# Patient Record
Sex: MALE | Race: WHITE | NOT HISPANIC OR LATINO | Employment: OTHER | URBAN - METROPOLITAN AREA
[De-identification: names, ages, dates, MRNs, and addresses within clinical notes are randomized per-mention and may not be internally consistent; named-entity substitution may affect disease eponyms.]

---

## 2022-03-07 LAB
LEFT EYE DIABETIC RETINOPATHY: NORMAL
RIGHT EYE DIABETIC RETINOPATHY: NORMAL
SEVERITY (EYE EXAM): NORMAL

## 2022-03-24 ENCOUNTER — OFFICE VISIT (OUTPATIENT)
Dept: ENDOCRINOLOGY | Facility: HOSPITAL | Age: 82
End: 2022-03-24
Payer: COMMERCIAL

## 2022-03-24 ENCOUNTER — TELEPHONE (OUTPATIENT)
Dept: ENDOCRINOLOGY | Facility: HOSPITAL | Age: 82
End: 2022-03-24

## 2022-03-24 VITALS
HEIGHT: 71 IN | SYSTOLIC BLOOD PRESSURE: 158 MMHG | OXYGEN SATURATION: 96 % | HEART RATE: 47 BPM | BODY MASS INDEX: 36.15 KG/M2 | DIASTOLIC BLOOD PRESSURE: 72 MMHG | WEIGHT: 258.2 LBS

## 2022-03-24 DIAGNOSIS — E11.22 STAGE 3 CHRONIC KIDNEY DISEASE DUE TO TYPE 2 DIABETES MELLITUS (HCC): ICD-10-CM

## 2022-03-24 DIAGNOSIS — E66.01 MORBID (SEVERE) OBESITY DUE TO EXCESS CALORIES (HCC): ICD-10-CM

## 2022-03-24 DIAGNOSIS — I10 PRIMARY HYPERTENSION: ICD-10-CM

## 2022-03-24 DIAGNOSIS — E78.2 MIXED HYPERLIPIDEMIA: ICD-10-CM

## 2022-03-24 DIAGNOSIS — E11.649 HYPOGLYCEMIA UNAWARENESS ASSOCIATED WITH TYPE 2 DIABETES MELLITUS (HCC): ICD-10-CM

## 2022-03-24 DIAGNOSIS — Z79.4 TYPE 2 DIABETES MELLITUS WITH HYPERGLYCEMIA, WITH LONG-TERM CURRENT USE OF INSULIN (HCC): Primary | ICD-10-CM

## 2022-03-24 DIAGNOSIS — E11.65 TYPE 2 DIABETES MELLITUS WITH HYPERGLYCEMIA, WITH LONG-TERM CURRENT USE OF INSULIN (HCC): Primary | ICD-10-CM

## 2022-03-24 DIAGNOSIS — E11.42 DIABETIC POLYNEUROPATHY ASSOCIATED WITH TYPE 2 DIABETES MELLITUS (HCC): ICD-10-CM

## 2022-03-24 DIAGNOSIS — N18.30 STAGE 3 CHRONIC KIDNEY DISEASE DUE TO TYPE 2 DIABETES MELLITUS (HCC): ICD-10-CM

## 2022-03-24 PROCEDURE — 99205 OFFICE O/P NEW HI 60 MIN: CPT | Performed by: INTERNAL MEDICINE

## 2022-03-24 RX ORDER — LOSARTAN POTASSIUM 100 MG/1
100 TABLET ORAL DAILY
COMMUNITY
Start: 2022-02-22

## 2022-03-24 RX ORDER — ATORVASTATIN CALCIUM 20 MG/1
20 TABLET, FILM COATED ORAL DAILY
COMMUNITY
Start: 2022-02-22

## 2022-03-24 RX ORDER — AMLODIPINE BESYLATE 10 MG/1
10 TABLET ORAL DAILY
COMMUNITY
Start: 2022-02-22

## 2022-03-24 RX ORDER — POTASSIUM CITRATE 10 MEQ/1
TABLET, EXTENDED RELEASE ORAL EVERY 12 HOURS
COMMUNITY

## 2022-03-24 RX ORDER — LORATADINE 10 MG/1
10 TABLET ORAL DAILY
COMMUNITY

## 2022-03-24 RX ORDER — ALBUTEROL SULFATE 2.5 MG/3ML
SOLUTION RESPIRATORY (INHALATION)
COMMUNITY
Start: 2022-03-09

## 2022-03-24 RX ORDER — ALLOPURINOL 300 MG/1
TABLET ORAL EVERY 24 HOURS
COMMUNITY

## 2022-03-24 RX ORDER — DABIGATRAN ETEXILATE 150 MG/1
CAPSULE, COATED PELLETS ORAL EVERY 12 HOURS
COMMUNITY

## 2022-03-24 RX ORDER — BIOTIN 1 MG
TABLET ORAL EVERY 24 HOURS
COMMUNITY

## 2022-03-24 RX ORDER — BLOOD SUGAR DIAGNOSTIC
STRIP MISCELLANEOUS
COMMUNITY
Start: 2022-02-10

## 2022-03-24 RX ORDER — AZELASTINE 1 MG/ML
SPRAY, METERED NASAL
COMMUNITY

## 2022-03-24 RX ORDER — SODIUM PHOSPHATE,MONO-DIBASIC 19G-7G/118
ENEMA (ML) RECTAL
COMMUNITY

## 2022-03-24 RX ORDER — FLUTICASONE PROPIONATE 50 MCG
SPRAY, SUSPENSION (ML) NASAL EVERY 24 HOURS
COMMUNITY

## 2022-03-24 RX ORDER — INSULIN GLARGINE 100 [IU]/ML
INJECTION, SOLUTION SUBCUTANEOUS
COMMUNITY

## 2022-03-24 RX ORDER — FUROSEMIDE 40 MG/1
40 TABLET ORAL DAILY
COMMUNITY

## 2022-03-24 NOTE — TELEPHONE ENCOUNTER
Patient called  He notes that he called his insurance company and the do cover both the [place]  Which one would you prefer?

## 2022-03-24 NOTE — PATIENT INSTRUCTIONS
We'll get the blood work results and determine when next set is needed  call insurance company to see which sensor is approved; the freestyle gerard 2 or the dexcom, call us when you  Know so we can order  Continue the same lantus insulin  Let's slightly adjust the novolog insulin to 18 units at breakfast, 20 units at lunch and 24 units at supper  Keep using the sliding scale also  Continue to test blood sugars 4 times a day  Follow up in 3 months with blood work

## 2022-03-24 NOTE — PROGRESS NOTES
3/25/2022    Assessment/Plan      Diagnoses and all orders for this visit:    Type 2 diabetes mellitus with hyperglycemia, with long-term current use of insulin ([place] [address] )  -     HEMOGLOBIN A1C W/ EAG ESTIMATION Lab Collect; Future  -     Comprehensive metabolic panel Lab Collect; Future  -     Microalbumin / creatinine urine ratio Lab Collect; Future  -     TSH, 3rd generation Lab Collect; Future  -     insulin aspart (NovoLOG) 100 units/mL injection; 18 units at breakfast , 20 units at lunch and 24 units at dinner    Diabetic polyneuropathy associated with type 2 diabetes mellitus ([place] [address] )  -     HEMOGLOBIN A1C W/ EAG ESTIMATION Lab Collect; Future  -     Comprehensive metabolic panel Lab Collect; Future  -     Microalbumin / creatinine urine ratio Lab Collect; Future  -     TSH, 3rd generation Lab Collect; Future    Mixed hyperlipidemia  -     HEMOGLOBIN A1C W/ EAG ESTIMATION Lab Collect; Future  -     Comprehensive metabolic panel Lab Collect; Future  -     Microalbumin / creatinine urine ratio Lab Collect; Future  -     TSH, 3rd generation Lab Collect; Future    Primary hypertension  -     HEMOGLOBIN A1C W/ EAG ESTIMATION Lab Collect; Future  -     Comprehensive metabolic panel Lab Collect; Future  -     Microalbumin / creatinine urine ratio Lab Collect; Future  -     TSH, 3rd generation Lab Collect; Future    Morbid (severe) obesity due to excess calories (HCC)    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 ([place] [address] )    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 ([place] [address] )    Other orders  -     losartan (COZAAR) 100 MG tablet; Take 100 mg by mouth daily  -     amLODIPine (NORVASC) 10 mg tablet; Take 10 mg by mouth daily  -     atorvastatin (LIPITOR) 20 mg tablet;  Take 20 mg by mouth daily  -     albuterol (2 5 mg/3 mL) 0 083 % nebulizer solution; INHALE ONE VIAL VIA NEBULIZER EVERY 6 HOURS AS NEEDED  -     OneTouch Verio test strip; USE AS DIRECTED FOUR TIMES DAILY  -     potassium citrate (UROCIT-K) 10 mEq; Every 12 hours  -     dabigatran etexilate (Pradaxa) 150 mg capsu; Every 12 hours  -     allopurinol (ZYLOPRIM) 300 mg tablet; every 24 hours  -     Ipratropium Bromide (ATROVENT NA)  -     azelastine (ASTELIN) 0 1 % nasal spray  -     furosemide (Lasix) 40 mg tablet; Every 12 hours  -     Multiple Vitamin (MULTIVITAMIN ADULT PO); every 24 hours  -     Cholecalciferol (Vitamin D3) 25 MCG (1000 UT) CAPS; every 24 hours  -     Glucosamine-Chondroitin 500-400 MG CAPS  -     fluticasone (Flonase Allergy Relief) 50 mcg/act nasal spray; every 24 hours  -     insulin glargine (LANTUS) 100 units/mL subcutaneous injection; Inject under the skin 30 units in the morning 26 units in the evening  -     Discontinue: insulin aspart (NovoLOG) 100 units/mL injection; Inject under the skin 18units at breakfast and lunch 22 units at dinner  -     loratadine (CLARITIN) 10 mg tablet; Take 10 mg by mouth daily        Assessment/Plan:  1  Type 2 diabetes, insulin requiring  The last hemoglobin A1c done in [place] in November 2021 was quite good at 7 3%  He reportedly had blood work done at [facility] by his primary care physician in January  I will try to get this blood work result  For now, he will continue the same Lantus insulin but will adjust his NovoLog insulin to 18 units at breakfast, 20 at lunch, and 24 units at supper with sliding scale  He will continue to test his blood sugars 4 to 6 times a day  He would be a candidate for a Dexcom or freestyle Дмитрий 2 continuous glucose monitoring system due to his hypoglycemic unawareness and insulin adjustment on a daily basis  2  Diabetic neuropathy  3  Hypoglycemic unawareness  He has had several instances where he had loss of consciousness due to hypoglycemia with ambulance calls  His most recent low blood sugar did not have any symptomatology    As such, due to his hypoglycemic unawareness, frequent testing of blood sugars 4 to 6 times a day, and utilization of insulin 5 times a day with adjustment in insulin dosage based on blood sugars, he would be a good candidate for a continuous glucose monitoring system, the DEX com G6 continuous glucose monitoring system   4  Hypertension  Blood pressure is mildly elevated today but he was seeing a new physician so for now, he will continue the same amlodipine, furosemide, and losartan  5  Hyperlipidemia  He will continue same atorvastatin 20 mg daily  I have asked him to follow up in 3 months with preceding hemoglobin A1c, CMP, TSH, and urine microalbumin to creatinine ratio  CC: Diabetes Consult    History of Present Illness     HPI: Timbo Wallace is a 79y o  year old male with type 2 diabetes, insulin requiring with neuropathy for 50 years, hyperlipidemia, hypertension for evaluation/consult  He was diagnosed with type 2 diabetes in his 35s  He originally went on metformin therapy  He was never on any other oral agents as metformin controlled his blood sugars for the 1st several years and then he was switched to insulin therapy  He reports that Humalog insulin does not improve his blood sugars as well as NovoLog insulin does  He was on higher dose of Lantus insulin and NovoLog in [place] but since moving to [place], blood sugars have dropped significantly and he has been needing to decrease his insulin and utilizes sliding scale  He is here for transfer of care since he moved from [place]  He is on insulin at home and takes Lantus insulin 30 units in am and 26 units in pm and NovoLog insulin 18 units at breakfast and lunch and 22 units at supper  He denies any polyuria, polydipsia, nocturia and blurry vision  He has once a night nocturia  He denies chest pain or shortness of breath  He has occasional shooting pains of the legs at night and has numbness of the feet to his knees bilaterally    He has had diabetic wounds in the past   He denies retinopathy, heart attack, stroke and claudication but does admit to neuropathy and nephropathy  He reports he was to Diabetes Education on diagnosis in several times since then has seen 1 on 1 with a dietitian and gone to diabetes classes  He does try to limit carbohydrates  Hypoglycemic episodes: Yes several times per week  Not much since insulin adjusted by PCP  Recently had an event while shopping or blood sugar was less than 50 with no symptoms other than just feeling off  H/o of hypoglycemia causing hospitalization or intervention such as glucagon injection  or ambulance call  Yes  Has been loc in the past requiring 911 many years ago  Hypoglycemia symptoms: sweating and and feel bad or no symptoms at all  Treatment of hypoglycemia: either cookies or glucose tablets  Glucagon:Yes  Medic alert tag: recommended,Yes  The patient's last eye exam was in March 2022 in [place] [address] at [facility] with no retinopathy  The patient's last foot exam was in March 2022 at the [address] when a podiatrist came in to trim his calluses and in January 2022 with Dr Karen Herr  Last A1C was 7 3% on 11/05/2021 in [place]  Blood Sugar/Glucometer/Pump/CGM review: checks blood sugar 4 times a day  Misses pm meal shot at times  Sugars running higher in the evening  Blood sugar record does demonstrate blood sugars that are in the 100s in the morning but go into the 200 to 300s later in the day  He has hyperlipidemia and takes atorvastatin 20 mg daily  He denies chest pain or shortness of breath  He has hypertension and takes amlodipine 10 mg daily and losartan 100 mg daily along with furosemide 40 mg twice a day  He denies headache or stroke-like symptoms  Review of Systems   Constitutional: Negative for fatigue and unexpected weight change  Wakes up at 10 am if able to  HENT: Positive for hearing loss and postnasal drip  Negative for tinnitus and trouble swallowing           Lots of PND, had a antibiotic for 10 days  Had hearing aides not wearing due to the masks  Eyes: Negative for visual disturbance  Wears glasses  Respiratory: Negative for chest tightness and shortness of breath  Cardiovascular: Positive for leg swelling  Negative for chest pain and palpitations  Gastrointestinal: Negative for abdominal pain, constipation, diarrhea and nausea  Endocrine: Negative for cold intolerance, heat intolerance, polydipsia, polyphagia and polyuria  Nocturia once a night  Musculoskeletal: Positive for back pain and neck pain  Negative for arthralgias  Low back and neck surgery  To start with PT at [facility]  Skin: Negative for wound  Callus ball of the right foot, has had wound care for open callus in the past     Neurological: Positive for numbness  Negative for dizziness, weakness, light-headedness and headaches  Numbness of the feet to the knee  Occasional shooting pain of the leg, especially at night  Uses a walker for the low back  Psychiatric/Behavioral: Negative for dysphoric mood and sleep disturbance  The patient is not nervous/anxious          Historical Information   Past Medical History:   Diagnosis Date    Asthma     Bradycardia     Carcinoma, basal cell, skin     head and face and ear    Dupuytren contracture     bilateral ring and right middle    Gout     Herniated lumbar intervertebral disc     Sleep apnea      Past Surgical History:   Procedure Laterality Date    APPENDECTOMY      age 23, ruptured    CATARACT EXTRACTION, BILATERAL Bilateral     CERVICAL FUSION      DUPUYTREN CONTRACTURE RELEASE Left     left ring finger    LUMBAR LAMINECTOMY      belkis placed    TONSILLECTOMY      VASECTOMY       Social History   Social History     Substance and Sexual Activity   Alcohol Use Yes    Comment: rare     Social History     Substance and Sexual Activity   Drug Use Never     Social History     Tobacco Use   Smoking Status Never Smoker Smokeless Tobacco Never Used     Family History:   Family History   Problem Relation Age of Onset    Hypertension Mother     Diabetes type II Mother     Hypertension Father     Diabetes type II Brother     Hypertension Brother     Kidney disease Sister     Hypertension Daughter     GI problems Daughter        Meds/Allergies   Current Outpatient Medications   Medication Sig Dispense Refill    albuterol (2 5 mg/3 mL) 0 083 % nebulizer solution INHALE ONE VIAL VIA NEBULIZER EVERY 6 HOURS AS NEEDED      allopurinol (ZYLOPRIM) 300 mg tablet every 24 hours      amLODIPine (NORVASC) 10 mg tablet Take 10 mg by mouth daily      atorvastatin (LIPITOR) 20 mg tablet Take 20 mg by mouth daily      azelastine (ASTELIN) 0 1 % nasal spray       Cholecalciferol (Vitamin D3) 25 MCG (1000 UT) CAPS every 24 hours      dabigatran etexilate (Pradaxa) 150 mg capsu Every 12 hours      fluticasone (Flonase Allergy Relief) 50 mcg/act nasal spray every 24 hours      furosemide (Lasix) 40 mg tablet Every 12 hours      Glucosamine-Chondroitin 500-400 MG CAPS       insulin aspart (NovoLOG) 100 units/mL injection 18 units at breakfast , 20 units at lunch and 24 units at dinner 10 mL     insulin glargine (LANTUS) 100 units/mL subcutaneous injection Inject under the skin 30 units in the morning 26 units in the evening      Ipratropium Bromide (ATROVENT NA)       loratadine (CLARITIN) 10 mg tablet Take 10 mg by mouth daily      losartan (COZAAR) 100 MG tablet Take 100 mg by mouth daily      Multiple Vitamin (MULTIVITAMIN ADULT PO) every 24 hours      OneTouch Verio test strip USE AS DIRECTED FOUR TIMES DAILY      potassium citrate (UROCIT-K) 10 mEq Every 12 hours       No current facility-administered medications for this visit       Allergies   Allergen Reactions    Hydrochlorothiazide Other (See Comments)     SEVERE DIZZINESS    Insulin Lispro Other (See Comments)     Cannot control blood sugars(Humalog)    Lisinopril Other (See Comments)     Reports cough with use     Minoxidil GI Intolerance    Nifedipine Other (See Comments)     EDEMA LEGS    Prazosin Other (See Comments)     CHEST PAIN      Venlafaxine Other (See Comments)     Made pt feel bad      Doxycycline Rash    Simvastatin Rash     PEDAL EDEMA         Objective   Vitals: Blood pressure 158/72, pulse (!) 47, height 5' 11" (1 803 m), weight 117 kg (258 lb 3 2 oz), SpO2 96 %  Invasive Devices  Report    None                 Physical Exam  Vitals reviewed  Constitutional:       Appearance: Normal appearance  He is well-developed  HENT:      Head: Normocephalic and atraumatic  Eyes:      Extraocular Movements: Extraocular movements intact  Conjunctiva/sclera: Conjunctivae normal       Comments: No lid lag, stare, proptosis, or periorbital edema  Neck:      Thyroid: No thyromegaly  Vascular: No carotid bruit  Comments: Thyroid normal in size  No palpable thyroid nodules  No bruits over the thyroid gland  Cardiovascular:      Rate and Rhythm: Normal rate and regular rhythm  Heart sounds: Normal heart sounds  No murmur heard  Pulmonary:      Effort: Pulmonary effort is normal       Breath sounds: Normal breath sounds  No wheezing  Abdominal:      General: Bowel sounds are normal       Palpations: Abdomen is soft  Tenderness: There is no abdominal tenderness  Musculoskeletal:         General: No deformity  Normal range of motion  Cervical back: Normal range of motion and neck supple  Right lower leg: Edema present  Left lower leg: Edema present  Comments: Teds stockings in place  2+ bilateral lower extremity edema  Diabetic foot exam was deferred today but he was asked to come in to his next appointment without his Jayme stockings on so foot exam could be performed  Lymphadenopathy:      Cervical: No cervical adenopathy  Skin:     General: Skin is warm and dry  Findings: No erythema or rash  Neurological:      Mental Status: He is alert and oriented to person, place, and time  Deep Tendon Reflexes: Reflexes are normal and symmetric  Comments: Deep tendon reflexes normal          The history was obtained from the review of the chart and from the patient and wife  Lab Results:   Blood work done on 11/05/2021 showed hemoglobin A1c of 7 3% which was done in [place]  CMP showed a creatinine of 1 24 with a GFR 53 7 and a BUN of 37 was otherwise normal   Glucose was 155  Intact PTH was 87 with a calcium of 9 8 and a phosphorus of 3 2 in the setting of an albumin of 4 0  Urine protein to creatinine ratio was 2 41     CBC is normal     TSH is 2 183      Future Appointments   Date Time Provider Delia Anderws   6/27/2022 11:20 AM Tip Hickman PA-C [facility]

## 2022-03-25 PROBLEM — E11.21 DIABETIC NEPHROPATHY ASSOCIATED WITH TYPE 2 DIABETES MELLITUS (HCC): Status: ACTIVE | Noted: 2022-03-25

## 2022-03-25 PROBLEM — R79.89 ELEVATED PARATHYROID HORMONE: Status: ACTIVE | Noted: 2022-03-25

## 2022-03-25 PROBLEM — E11.649 HYPOGLYCEMIA UNAWARENESS ASSOCIATED WITH TYPE 2 DIABETES MELLITUS (HCC): Status: ACTIVE | Noted: 2022-03-25

## 2022-03-25 PROBLEM — E34.9 ELEVATED PARATHYROID HORMONE: Status: ACTIVE | Noted: 2022-03-25

## 2022-04-06 ENCOUNTER — TELEPHONE (OUTPATIENT)
Dept: ENDOCRINOLOGY | Facility: HOSPITAL | Age: 82
End: 2022-04-06

## 2022-04-06 NOTE — TELEPHONE ENCOUNTER
Please call pt to set up an appt with [facility] for his Dexcom training  He has it at his home

## 2022-04-07 ENCOUNTER — TELEPHONE (OUTPATIENT)
Dept: DIABETES SERVICES | Facility: CLINIC | Age: 82
End: 2022-04-07

## 2022-04-07 NOTE — TELEPHONE ENCOUNTER
Pt scheduled appt for 4/21/22 for personal Dexcom training   Could you please place referral  thanks

## 2022-04-08 DIAGNOSIS — E11.65 TYPE 2 DIABETES MELLITUS WITH HYPERGLYCEMIA, WITH LONG-TERM CURRENT USE OF INSULIN (HCC): Primary | ICD-10-CM

## 2022-04-08 DIAGNOSIS — Z79.4 TYPE 2 DIABETES MELLITUS WITH HYPERGLYCEMIA, WITH LONG-TERM CURRENT USE OF INSULIN (HCC): Primary | ICD-10-CM

## 2022-06-02 LAB
CREAT ?TM UR-SCNC: 74.9 UMOL/L
EXT MICROALBUMIN URINE RANDOM: 102
HBA1C MFR BLD HPLC: 8 %
MICROALBUMIN/CREAT UR: 734 MG/G{CREAT}

## 2022-06-15 ENCOUNTER — HOSPITAL ENCOUNTER (OUTPATIENT)
Dept: RADIOLOGY | Facility: HOSPITAL | Age: 82
Discharge: HOME | End: 2022-06-15
Attending: PODIATRIST
Payer: COMMERCIAL

## 2022-06-15 DIAGNOSIS — L97.509 DIABETIC ULCER OF OTHER PART OF FOOT ASSOCIATED WITH TYPE 2 DIABETES MELLITUS, UNSPECIFIED LATERALITY, UNSPECIFIED ULCER STAGE (HCC): ICD-10-CM

## 2022-06-15 DIAGNOSIS — E11.621 DIABETIC ULCER OF OTHER PART OF FOOT ASSOCIATED WITH TYPE 2 DIABETES MELLITUS, UNSPECIFIED LATERALITY, UNSPECIFIED ULCER STAGE (HCC): ICD-10-CM

## 2022-06-15 PROCEDURE — 73630 X-RAY EXAM OF FOOT: CPT

## 2022-06-27 ENCOUNTER — OFFICE VISIT (OUTPATIENT)
Dept: ENDOCRINOLOGY | Facility: HOSPITAL | Age: 82
End: 2022-06-27
Payer: COMMERCIAL

## 2022-06-27 VITALS
HEIGHT: 71 IN | HEART RATE: 92 BPM | WEIGHT: 235.8 LBS | SYSTOLIC BLOOD PRESSURE: 142 MMHG | BODY MASS INDEX: 33.01 KG/M2 | DIASTOLIC BLOOD PRESSURE: 80 MMHG

## 2022-06-27 DIAGNOSIS — Z79.4 TYPE 2 DIABETES MELLITUS WITH HYPERGLYCEMIA, WITH LONG-TERM CURRENT USE OF INSULIN (HCC): Primary | ICD-10-CM

## 2022-06-27 DIAGNOSIS — E11.65 TYPE 2 DIABETES MELLITUS WITH HYPERGLYCEMIA, WITH LONG-TERM CURRENT USE OF INSULIN (HCC): Primary | ICD-10-CM

## 2022-06-27 PROCEDURE — 99214 OFFICE O/P EST MOD 30 MIN: CPT | Performed by: PHYSICIAN ASSISTANT

## 2022-06-27 NOTE — PROGRESS NOTES
Heaven Galdamez 79 y o  male MRN: [id number]    Encounter: [id number]      Assessment/Plan     Assessment: This is a 79y o -year-old male with type 2 diabetes with hypoglycemic unawareness, neuropathy, hypertension and hyperlipidemia  Plan:  1  Type 2 diabetes, insulin requiring:  Most recent hemoglobin A1c has increased to 8 0  Has been utilizing a Dexcom for the last 2 months  Unfortunately we are not linked to his account, but review of his Dexcom on his phone shows than glucose levels have been improving over the last 3 months  Is not having any significant episodes of hypoglycemia  At this time will not make any adjustments to his insulin  At this time he will continue with Lantus 30 units in the morning and 26 units in the evening, and NovoLog 18 units with breakfast and lunch and 23 with dinner  Contact the office with any concerns or questions  Follow-up in 3 months with lab work completed prior to visit  2  Hypoglycemic unawareness:  Has had several episodes of loss of consciousness due to hypoglycemia with ambulance called  No episodes of hypoglycemia since he started to utilize Dexcom  As such, due to his hypoglycemic unawareness, frequent testing of blood sugars 4 to 6 times a day, and utilization of insulin 5 times a day with adjustment in insulin dosage based on blood sugars, he would be a good candidate for a continuous glucose monitoring system, the DEX com G6 continuous glucose monitoring system   3  Diabetic neuropathy:  Stable  Does have wounds on bilateral feet missed following up with wound care  Diabetic foot exam is up-to-date  4  Hypertension:  Normotensive in the office  Kidney function remains stable with normal electrolytes  Continue with current medication  Repeat CMP prior to next office visit  5  Hyperlipidemia:  Previous lipid panel was excellent  Continue with current medications  Will continue monitor in the future      CC:  Type 2 diabetes follow-up    History of Present Illness     HPI:  William Contreras is a 79y o  year old male with type 2 diabetes, insulin requiring with neuropathy for 50 years, hyperlipidemia, hypertension for follow-up  He was diagnosed with type 2 diabetes in his 35s  He originally went on metformin therapy  He was never on any other oral agents as metformin controlled his blood sugars for the 1st several years and then he was switched to insulin therapy  He reports that Humalog insulin does not improve his blood sugars as well as NovoLog insulin does  He is on insulin at home and takes Lantus insulin 30 units in am and 26 units in pm and NovoLog insulin 18 units at breakfast and lunch and 22 units at supper  He denies any polyuria, polydipsia, nocturia and blurry vision  He has once a night nocturia  He denies chest pain or shortness of breath  He has occasional shooting pains of the legs at night and has numbness of the feet to his knees bilaterally  Currently has wounds on bilateral feet and is following up with wound care  He denies retinopathy, heart attack, stroke and claudication but does admit to neuropathy and nephropathy  He reports he was to Diabetes Education on diagnosis in several times since then has seen 1 on 1 with a dietitian and gone to diabetes classes  He does try to limit carbohydrates  Had a pacer placed in May 2022 a states since then has been doing much better  Has gotten off several of his medications and has lost 30 lb      Hypoglycemic episodes:  None recently since utilizing the Dexcom  H/o of hypoglycemia causing hospitalization or intervention such as glucagon injection  or ambulance call  Yes  Has been loc in the past requiring 911 many years ago  Hypoglycemia symptoms: sweating and and feel bad or no symptoms at all  Treatment of hypoglycemia: either cookies or glucose tablets  Glucagon:Yes    Medic alert tag: recommended,Yes       The patient's last eye exam was in March 2022 in [facility] at [facility] with no retinopathy  The patient's last foot exam was in March 2022 at the [address] when a podiatrist came in to trim his calluses and in January 2022 with Dr Harish Ross  Most recent hemoglobin A1c June 2, 2022 was 8 0      Blood Sugar/Glucometer/Pump/CGM review:  Is currently utilizing a Dexcom continuous glucose monitoring system going through his phone  We are not linked to his account  Review of the Dexcom clarity breanne on his phone is average glucose over the last 2 weeks has been around 160       He has hyperlipidemia and takes atorvastatin 20 mg daily  He denies chest pain or shortness of breath        He has hypertension and takes amlodipine 10 mg daily and losartan 100 mg daily along with furosemide 40 mg twice a day  He denies headache or stroke-like symptoms  Review of Systems   Constitutional: Negative for activity change, appetite change, fatigue and unexpected weight change  HENT: Negative for trouble swallowing  Eyes: Negative for visual disturbance  Respiratory: Negative for chest tightness and shortness of breath  Cardiovascular: Positive for leg swelling  Negative for chest pain and palpitations  Gastrointestinal: Negative for abdominal pain, diarrhea, nausea and vomiting  Endocrine: Negative for cold intolerance, heat intolerance, polydipsia, polyphagia and polyuria  Genitourinary: Negative for frequency  Musculoskeletal: Positive for gait problem  Skin: Positive for wound  Negative for rash  Neurological: Positive for tremors  Negative for dizziness, weakness, light-headedness, numbness and headaches  Psychiatric/Behavioral: Negative for dysphoric mood and sleep disturbance  The patient is not nervous/anxious          Historical Information   Past Medical History:   Diagnosis Date    Asthma     Bradycardia     Carcinoma, basal cell, skin     head and face and ear    Dupuytren contracture     bilateral ring and right middle    Gout     Herniated lumbar intervertebral disc     Sleep apnea      Past Surgical History:   Procedure Laterality Date    APPENDECTOMY      age 23, ruptured    CATARACT EXTRACTION, BILATERAL Bilateral     CERVICAL FUSION      DUPUYTREN CONTRACTURE RELEASE Left     left ring finger    LUMBAR LAMINECTOMY      belkis placed    TONSILLECTOMY      VASECTOMY       Social History   Social History     Substance and Sexual Activity   Alcohol Use Yes    Comment: rare     Social History     Substance and Sexual Activity   Drug Use Never     Social History     Tobacco Use   Smoking Status Never Smoker   Smokeless Tobacco Never Used     Family History:   Family History   Problem Relation Age of Onset    Hypertension Mother     Diabetes type II Mother     Hypertension Father     Diabetes type II Brother     Hypertension Brother     Kidney disease Sister     Hypertension Daughter     GI problems Daughter        Meds/Allergies   Current Outpatient Medications   Medication Sig Dispense Refill    albuterol (2 5 mg/3 mL) 0 083 % nebulizer solution INHALE ONE VIAL VIA NEBULIZER EVERY 6 HOURS AS NEEDED      allopurinol (ZYLOPRIM) 300 mg tablet every 24 hours      atorvastatin (LIPITOR) 20 mg tablet Take 20 mg by mouth daily      azelastine (ASTELIN) 0 1 % nasal spray       Cholecalciferol (Vitamin D3) 25 MCG (1000 UT) CAPS every 24 hours      fluticasone (FLONASE) 50 mcg/act nasal spray every 24 hours      furosemide (LASIX) 40 mg tablet Take 40 mg by mouth daily      Glucosamine-Chondroitin 500-400 MG CAPS       insulin aspart (NovoLOG) 100 units/mL injection 18 units at breakfast , 20 units at lunch and 24 units at dinner (Patient taking differently: 18 units at breakfast , 18 units at lunch and 23 units at dinner) 10 mL     insulin glargine (LANTUS) 100 units/mL subcutaneous injection Inject under the skin 30 units in the morning 26 units in the evening      Ipratropium Bromide (ATROVENT NA)  loratadine (CLARITIN) 10 mg tablet Take 10 mg by mouth daily      losartan (COZAAR) 100 MG tablet Take 100 mg by mouth daily      Multiple Vitamin (MULTIVITAMIN ADULT PO) every 24 hours      OneTouch Verio test strip USE AS DIRECTED FOUR TIMES DAILY      potassium citrate (UROCIT-K) 10 mEq Every 12 hours      amLODIPine (NORVASC) 10 mg tablet Take 10 mg by mouth daily (Patient not taking: Reported on 6/27/2022)      dabigatran etexilate (PRADAXA) 150 mg capsu Every 12 hours (Patient not taking: Reported on 6/27/2022)       No current facility-administered medications for this visit  Allergies   Allergen Reactions    Hydrochlorothiazide Other (See Comments)     SEVERE DIZZINESS    Insulin Lispro Other (See Comments)     Cannot control blood sugars(Humalog)    Lisinopril Other (See Comments)     Reports cough with use     Minoxidil GI Intolerance    Nifedipine Other (See Comments)     EDEMA LEGS    Other Other (See Comments)     SEVERE DIZZINESS  Hydrap-es    Prazosin Other (See Comments)     CHEST PAIN      Venlafaxine Other (See Comments)     Made pt feel bad      Doxycycline Rash    Simvastatin Rash     PEDAL EDEMA         Objective   Vitals: Blood pressure 142/80, pulse 92, height 5' 11" (1 803 m), weight 107 kg (235 lb 12 8 oz)  Physical Exam  Vitals and nursing note reviewed  Constitutional:       General: He is not in acute distress  Appearance: Normal appearance  He is not diaphoretic  HENT:      Head: Normocephalic and atraumatic  Eyes:      General: No scleral icterus  Extraocular Movements: Extraocular movements intact  Conjunctiva/sclera: Conjunctivae normal       Pupils: Pupils are equal, round, and reactive to light  Cardiovascular:      Rate and Rhythm: Normal rate and regular rhythm  Heart sounds: No murmur heard  Pulmonary:      Effort: Pulmonary effort is normal  No respiratory distress  Breath sounds: Normal breath sounds  No wheezing  Musculoskeletal:      Cervical back: Normal range of motion  Right lower leg: No edema  Left lower leg: No edema  Lymphadenopathy:      Cervical: No cervical adenopathy  Skin:     General: Skin is warm and dry  Neurological:      Mental Status: He is alert and oriented to person, place, and time  Mental status is at baseline  Sensory: No sensory deficit  Motor: Tremor ( bilateral hands) present  Gait: Gait abnormal (Utilizes walker)  Psychiatric:         Mood and Affect: Mood normal          Behavior: Behavior normal          Thought Content: Thought content normal          The history was obtained from the review of the chart, patient  Lab Results:   Lab Results   Component Value Date/Time    Hemoglobin A1C 8 0 06/02/2022 12:00 AM    Hemoglobin A1C 7 7 01/26/2022 12:00 AM       Portions of the record may have been created with voice recognition software  Occasional wrong word or "sound a like" substitutions may have occurred due to the inherent limitations of voice recognition software  Read the chart carefully and recognize, using context, where substitutions have occurred

## 2022-06-27 NOTE — PATIENT INSTRUCTIONS
Monitor diet and maintain activity  Continue with same dose of insulin at this time  Continue to use Dexcom to monitor blood sugar  Call the office with any concerns  Follow up in 3 months with lab work prior to visit

## 2022-06-28 ENCOUNTER — TELEPHONE (OUTPATIENT)
Dept: ADMINISTRATIVE | Facility: OTHER | Age: 82
End: 2022-06-28

## 2022-06-28 NOTE — TELEPHONE ENCOUNTER
----- Message from [facility] sent at 6/27/2022  1:41 PM EDT -----  Regarding: DM EYE EXAM  06/27/22 1:41 PM    Hello, our patient Enmanuel Romero has had a DM Eye Exam performed at [facility]  Their number is [phone]      Thank you,  [facility] Rocael Vazquez

## 2022-06-28 NOTE — LETTER
Diabetic Eye Exam Form    Date Requested: 22  Patient: Claris Olszewski  Patient : 1940    Referring Provider: David Corona PA-C  [facility]  [address] Exam Date _______________________________    Type of Exam MUST be documented for Diabetic Eye Exams  Please CHECK ONE  Retinal Exam       Dilated Retinal Exam       OCT       Optomap-Iris Exam      Fundus Photography     Left Eye - Please check Retinopathy AND Type or No Retinopathy      Exam did show retinopathy    Exam did not show retinopathy         Mild     Proliferative           Moderate    Severe            None         Right Eye - Please check Retinopathy AND Type or No Retinopathy     Exam did show retinopathy    Exam did not show retinopathy         Mild     Proliferative        Moderate    Severe        None       Comments ________________________________________________________    Practice Providing Exam ______________________________________________    Exam Performed By (print name) _______________________________________      Provider Signature ___________________________________________________    These reports are needed for  compliance  Please fax this completed form and a copy of the Diabetic Eye Exam report to our office located at [place] [address] as soon as possible via [phone] attention Wilfrid Funez: Phone [phone]  We thank you for your assistance in treating our mutual patient

## 2022-06-30 NOTE — TELEPHONE ENCOUNTER
Upon review of the In Basket request we were able to locate, review, and update the patient chart as requested for Diabetic Eye Exam     Any additional questions or concerns should be emailed to the Practice Liaisons via [email or web address] email, please do not reply via In Basket      Thank you  Sasha Gaitan

## 2022-09-02 ENCOUNTER — DOCUMENTATION (OUTPATIENT)
Dept: ENDOCRINOLOGY | Facility: HOSPITAL | Age: 82
End: 2022-09-02

## 2022-09-15 LAB — HBA1C MFR BLD HPLC: 7.5 %

## 2022-09-19 ENCOUNTER — TELEPHONE (OUTPATIENT)
Dept: ENDOCRINOLOGY | Facility: HOSPITAL | Age: 82
End: 2022-09-19

## 2022-09-19 NOTE — TELEPHONE ENCOUNTER
Patient is running out of insulin because when he has a high number he taking 10-15 units of Novolog  He usually takes 18 units with breakfast and lunch and 23 units with dinner  He normally runs between 150-160 but when he's high he runs in the 300's  I printed his Dexcom for you to review  He would need his dose increased in order for insurance to pay for his insulin

## 2022-09-20 DIAGNOSIS — E11.65 TYPE 2 DIABETES MELLITUS WITH HYPERGLYCEMIA, WITH LONG-TERM CURRENT USE OF INSULIN (HCC): Primary | ICD-10-CM

## 2022-09-20 DIAGNOSIS — Z79.4 TYPE 2 DIABETES MELLITUS WITH HYPERGLYCEMIA, WITH LONG-TERM CURRENT USE OF INSULIN (HCC): Primary | ICD-10-CM

## 2022-09-20 RX ORDER — INSULIN ASPART 100 [IU]/ML
INJECTION, SOLUTION INTRAVENOUS; SUBCUTANEOUS
Qty: 30 ML | Refills: 5 | Status: SHIPPED | OUTPATIENT
Start: 2022-09-20 | End: 2022-10-04 | Stop reason: SDUPTHER

## 2022-09-20 NOTE — TELEPHONE ENCOUNTER
General Reviewed recent Dexcom download  Average blood sugars over the last 2 weeks is 196 and he is in target range 38% of the time  At this time I would like to increase his NovoLog at dinner to 26 units  Keep all other insulin the same at this time  Send in a [employer] again about 2 weeks

## 2022-10-04 ENCOUNTER — OFFICE VISIT (OUTPATIENT)
Dept: ENDOCRINOLOGY | Facility: HOSPITAL | Age: 82
End: 2022-10-04
Payer: COMMERCIAL

## 2022-10-04 VITALS
WEIGHT: 237 LBS | DIASTOLIC BLOOD PRESSURE: 62 MMHG | HEIGHT: 71 IN | BODY MASS INDEX: 33.18 KG/M2 | SYSTOLIC BLOOD PRESSURE: 128 MMHG | HEART RATE: 80 BPM

## 2022-10-04 DIAGNOSIS — Z79.4 TYPE 2 DIABETES MELLITUS WITH HYPERGLYCEMIA, WITH LONG-TERM CURRENT USE OF INSULIN (HCC): ICD-10-CM

## 2022-10-04 DIAGNOSIS — E11.65 TYPE 2 DIABETES MELLITUS WITH HYPERGLYCEMIA, WITH LONG-TERM CURRENT USE OF INSULIN (HCC): ICD-10-CM

## 2022-10-04 PROCEDURE — 99214 OFFICE O/P EST MOD 30 MIN: CPT | Performed by: PHYSICIAN ASSISTANT

## 2022-10-04 PROCEDURE — 95251 CONT GLUC MNTR ANALYSIS I&R: CPT | Performed by: PHYSICIAN ASSISTANT

## 2022-10-04 RX ORDER — INSULIN ASPART 100 [IU]/ML
INJECTION, SOLUTION INTRAVENOUS; SUBCUTANEOUS
Qty: 40 ML | Refills: 5 | Status: SHIPPED | OUTPATIENT
Start: 2022-10-04

## 2022-10-04 RX ORDER — GABAPENTIN 100 MG/1
100 CAPSULE ORAL DAILY
COMMUNITY
Start: 2022-09-28

## 2022-10-04 RX ORDER — INSULIN GLARGINE 100 [IU]/ML
INJECTION, SOLUTION SUBCUTANEOUS
Qty: 30 ML | Refills: 5 | Status: SHIPPED | OUTPATIENT
Start: 2022-10-04

## 2022-10-04 NOTE — PATIENT INSTRUCTIONS
Monitor diet and maintain activity  Continue Lantus 30 units in the morning and 32 units in the evening  Continue NovoLog 20 units with breakfast, 23 units with lunch and 30 units with dinner  Continue to use Dexcom to monitor blood sugar  Call the office with any concerns  Follow up in 3 months with lab work prior to visit

## 2022-10-04 NOTE — PROGRESS NOTES
Lacey Sport 79 y o  male MRN: [id number]    Encounter: [id number]      Assessment/Plan     Assessment: This is a 79y o -year-old male with type 2 diabetes with hypoglycemic unawareness, neuropathy, hypertension and hyperlipidemia  Plan:  1  Type 2 diabetes, insulin requiring:  Most recent hemoglobin A1c was 7 5  Blood sugars have been running a little bit higher in the evening, so will make adjustments to his insulin as follows  His Lantus will be 30 units in the morning and 32 units in the evening  For his NovoLog he will take 20 units with breakfast, 23 units with lunch, and 30 units with dinner  Continue with lifestyle modifications to help improve glucose levels  Continue monitor glucose levels with Dexcom  Contact the office with any concerns or questions  Follow-up in 3 months with lab work completed prior to visit      2  Hypoglycemic unawareness:  Has had several episodes of loss of consciousness due to hypoglycemia with ambulance called  No episodes of hypoglycemia since he started to utilize Dexcom  As such, due to his hypoglycemic unawareness, frequent testing of blood sugars 4 to 6 times a day, and utilization of insulin 5 times a day with adjustment in insulin dosage based on blood sugars, he would be a good candidate for a continuous glucose monitoring system, the DEX com G6 continuous glucose monitoring system       3  Diabetic neuropathy:  Stable  Does have wounds on bilateral feet missed following up with wound care  Diabetic foot exam is up-to-date      4  Hypertension:  Normotensive in the office  Kidney function remains stable with normal electrolytes  Continue with current medication  Repeat CMP prior to next office visit      5  Hyperlipidemia:  Previous lipid panel was excellent  Continue with current medications  Will continue monitor in the future      CC:  Type 2 diabetes follow-up    History of Present Illness     HPI:  Desi Avila [facility] old male with type 2 diabetes, insulin requiring with neuropathy for 54 years, hyperlipidemia, hypertension for follow-up   He was diagnosed with type 2 diabetes in his 35s  Matt Lipscomb originally went on metformin therapy  Matt Lipscomb was never on any other oral agents as metformin controlled his blood sugars for the 1st several years and then he was switched to insulin therapy  Matt Lipscomb reports that Humalog insulin does not improve his blood sugars as well as NovoLog insulin does   He is on insulin at home and takes Lantus insulin 30 units in am and 30 units in pm and NovoLog insulin 20 units with breakfast, 23 units with lunch and 26 units at supper  He denies any polyuria, polydipsia, nocturia and blurry vision   He has once a night nocturia   He denies chest pain or shortness of breath   He has occasional shooting pains of the legs at night and has numbness of the feet to his knees bilaterally  Recently started on gabapentin and has been going to physical therapy  Currently has wounds on bilateral feet and is following up with wound care    He denies retinopathy, heart attack, stroke and claudication but does admit to neuropathy and nephropathy   He reports he was to Diabetes Education on diagnosis in several times since then has seen 1 on 1 with a dietitian and gone to diabetes classes  Matt Lipscomb does try to limit carbohydrates  Had a pacer placed in May 2022 a states since then has been doing much better      Hypoglycemic episodes:  None recently since utilizing the Dexcom  H/o of hypoglycemia causing hospitalization or intervention such as glucagon injection  or ambulance call  Yes  Has been loc in the past requiring 911 many years ago  Hypoglycemia symptoms: sweating and and feel bad or no symptoms at all  Treatment of hypoglycemia: either cookies or glucose tablets  Glucagon:Yes    Medic alert tag: recommended,Yes       The patient's last eye exam was in March 2022 in [place] [address] at [facility] with no retinopathy   The patient's last foot exam was in March 2022 at the [address] when a podiatrist came in to trim his calluses and in January 2022 with Dr Julio Fang  Most recent hemoglobin A1c completed September 15, 2022 was 7 5      Blood Sugar/Glucometer/Pump/CGM review:  Dexcom download from September 20 through October 3, 2022 reveals an average glucose level of 202 with a standard deviation of 60  He is in target range 43% the time, above target range 56% time, below target range 1% time  Glucose levels throughout the day are fruity well controlled up until dinner tray when he has a significant increase in glucose levels but then trends down overnight        He has hyperlipidemia and takes atorvastatin 20 mg daily   He denies chest pain or shortness of breath        He has hypertension and takes amlodipine 10 mg daily and losartan 100 mg daily along with furosemide 40 mg twice a day   He denies headache or stroke-like symptoms  Review of Systems   Constitutional: Negative for activity change, appetite change, fatigue and unexpected weight change  HENT: Negative for trouble swallowing  Eyes: Positive for visual disturbance (Wears glasses)  Respiratory: Negative for chest tightness and shortness of breath  Cardiovascular: Positive for leg swelling  Negative for chest pain and palpitations  Gastrointestinal: Negative for abdominal pain, diarrhea, nausea and vomiting  Endocrine: Negative for cold intolerance, heat intolerance, polydipsia, polyphagia and polyuria  Genitourinary: Negative for frequency  Musculoskeletal: Positive for gait problem  Skin: Positive for wound  Negative for rash  Neurological: Positive for tremors  Negative for dizziness, weakness, light-headedness, numbness and headaches  Psychiatric/Behavioral: Negative for dysphoric mood and sleep disturbance  The patient is not nervous/anxious          Historical Information   Past Medical History:   Diagnosis Date    Asthma     Bradycardia     Carcinoma, basal cell, skin     head and face and ear    Dupuytren contracture     bilateral ring and right middle    Gout     Herniated lumbar intervertebral disc     Sleep apnea      Past Surgical History:   Procedure Laterality Date    APPENDECTOMY      age 23, ruptured    CATARACT EXTRACTION, BILATERAL Bilateral     CERVICAL FUSION      DUPUYTREN CONTRACTURE RELEASE Left     left ring finger    LUMBAR LAMINECTOMY      belkis placed    TONSILLECTOMY      VASECTOMY       Social History   Social History     Substance and Sexual Activity   Alcohol Use Yes    Comment: rare     Social History     Substance and Sexual Activity   Drug Use Never     Social History     Tobacco Use   Smoking Status Never Smoker   Smokeless Tobacco Never Used     Family History:   Family History   Problem Relation Age of Onset    Hypertension Mother     Diabetes type II Mother     Hypertension Father     Diabetes type II Brother     Hypertension Brother     Kidney disease Sister     Hypertension Daughter     GI problems Daughter        Meds/Allergies   Current Outpatient Medications   Medication Sig Dispense Refill    albuterol (2 5 mg/3 mL) 0 083 % nebulizer solution INHALE ONE VIAL VIA NEBULIZER EVERY 6 HOURS AS NEEDED      allopurinol (ZYLOPRIM) 300 mg tablet every 24 hours      amLODIPine (NORVASC) 10 mg tablet Take 10 mg by mouth daily (Patient not taking: Reported on 6/27/2022)      atorvastatin (LIPITOR) 20 mg tablet Take 20 mg by mouth daily      azelastine (ASTELIN) 0 1 % nasal spray       Cholecalciferol (Vitamin D3) 25 MCG (1000 UT) CAPS every 24 hours      dabigatran etexilate (PRADAXA) 150 mg capsu Every 12 hours (Patient not taking: Reported on 6/27/2022)      fluticasone (FLONASE) 50 mcg/act nasal spray every 24 hours      furosemide (LASIX) 40 mg tablet Take 40 mg by mouth daily      Glucosamine-Chondroitin 500-400 MG CAPS       insulin aspart (NovoLOG) 100 units/mL injection 18 units at breakfast , 20 units at lunch and 24 units at dinner (Patient taking differently: 18 units at breakfast , 18 units at lunch and 23 units at dinner) 10 mL     Insulin Aspart (NovoLOG) 100 units/mL injection Inject insulin prior to each meal, use up to 80 units daily 30 mL 5    insulin glargine (LANTUS) 100 units/mL subcutaneous injection Inject under the skin 30 units in the morning 26 units in the evening      Ipratropium Bromide (ATROVENT NA)       loratadine (CLARITIN) 10 mg tablet Take 10 mg by mouth daily      losartan (COZAAR) 100 MG tablet Take 100 mg by mouth daily      Multiple Vitamin (MULTIVITAMIN ADULT PO) every 24 hours      OneTouch Verio test strip USE AS DIRECTED FOUR TIMES DAILY      potassium citrate (UROCIT-K) 10 mEq Every 12 hours       No current facility-administered medications for this visit  Allergies   Allergen Reactions    Hydrochlorothiazide Other (See Comments)     SEVERE DIZZINESS    Insulin Lispro Other (See Comments)     Cannot control blood sugars(Humalog)    Lisinopril Other (See Comments)     Reports cough with use     Minoxidil GI Intolerance    Nifedipine Other (See Comments)     EDEMA LEGS    Other Other (See Comments)     SEVERE DIZZINESS  Hydrap-es    Prazosin Other (See Comments)     CHEST PAIN      Venlafaxine Other (See Comments)     Made pt feel bad      Doxycycline Rash    Simvastatin Rash     PEDAL EDEMA         Objective   Vitals: There were no vitals taken for this visit  Physical Exam  Vitals and nursing note reviewed  Constitutional:       General: He is not in acute distress  Appearance: Normal appearance  He is not diaphoretic  HENT:      Head: Normocephalic and atraumatic  Eyes:      General: No scleral icterus  Extraocular Movements: Extraocular movements intact  Conjunctiva/sclera: Conjunctivae normal       Pupils: Pupils are equal, round, and reactive to light     Cardiovascular:      Rate and Rhythm: Normal rate and regular rhythm  Heart sounds: No murmur heard  Pulmonary:      Effort: Pulmonary effort is normal  No respiratory distress  Breath sounds: Normal breath sounds  No wheezing  Musculoskeletal:      Cervical back: Normal range of motion  Right lower leg: No edema  Left lower leg: No edema  Lymphadenopathy:      Cervical: No cervical adenopathy  Skin:     General: Skin is warm and dry  Neurological:      Mental Status: He is alert and oriented to person, place, and time  Mental status is at baseline  Sensory: No sensory deficit  Gait: Gait abnormal (Utilizes walker)  Psychiatric:         Mood and Affect: Mood normal          Behavior: Behavior normal          Thought Content: Thought content normal          The history was obtained from the review of the chart, patient  Lab Results:   Lab Results   Component Value Date/Time    Hemoglobin A1C 7 5 09/15/2022 12:00 AM    Hemoglobin A1C 8 0 06/02/2022 12:00 AM    Hemoglobin A1C 7 7 01/26/2022 12:00 AM       Portions of the record may have been created with voice recognition software  Occasional wrong word or "sound a like" substitutions may have occurred due to the inherent limitations of voice recognition software  Read the chart carefully and recognize, using context, where substitutions have occurred

## 2022-10-06 ENCOUNTER — DOCUMENTATION (OUTPATIENT)
Dept: ENDOCRINOLOGY | Facility: HOSPITAL | Age: 82
End: 2022-10-06

## 2022-10-06 ENCOUNTER — TELEPHONE (OUTPATIENT)
Dept: ENDOCRINOLOGY | Facility: HOSPITAL | Age: 82
End: 2022-10-06

## 2022-10-06 NOTE — TELEPHONE ENCOUNTER
Received fax from patient's pharmacy, insulin aspart and Novolog are not covered, but Humalog is- okay to order?

## 2022-10-06 NOTE — PROGRESS NOTES
Prior authorization completed on covermymeds for Insulin Aspart  Patient has tried and failed Humalog which is the covered alternative

## 2022-10-06 NOTE — TELEPHONE ENCOUNTER
Cristin would like the nurse to please call her back to convey test results. [phone] ([phone])   That is fine to order Humalog

## 2022-11-14 ENCOUNTER — OFFICE VISIT (OUTPATIENT)
Dept: ENDOCRINOLOGY | Facility: HOSPITAL | Age: 82
End: 2022-11-14

## 2022-11-14 VITALS
SYSTOLIC BLOOD PRESSURE: 146 MMHG | HEART RATE: 80 BPM | HEIGHT: 71 IN | BODY MASS INDEX: 33.49 KG/M2 | DIASTOLIC BLOOD PRESSURE: 80 MMHG | WEIGHT: 239.2 LBS

## 2022-11-14 DIAGNOSIS — I10 PRIMARY HYPERTENSION: ICD-10-CM

## 2022-11-14 DIAGNOSIS — E11.649 HYPOGLYCEMIA UNAWARENESS ASSOCIATED WITH TYPE 2 DIABETES MELLITUS (HCC): ICD-10-CM

## 2022-11-14 DIAGNOSIS — E11.65 TYPE 2 DIABETES MELLITUS WITH HYPERGLYCEMIA, WITH LONG-TERM CURRENT USE OF INSULIN (HCC): Primary | ICD-10-CM

## 2022-11-14 DIAGNOSIS — E78.2 MIXED HYPERLIPIDEMIA: ICD-10-CM

## 2022-11-14 DIAGNOSIS — E11.42 DIABETIC POLYNEUROPATHY ASSOCIATED WITH TYPE 2 DIABETES MELLITUS (HCC): ICD-10-CM

## 2022-11-14 DIAGNOSIS — Z79.4 TYPE 2 DIABETES MELLITUS WITH HYPERGLYCEMIA, WITH LONG-TERM CURRENT USE OF INSULIN (HCC): Primary | ICD-10-CM

## 2022-11-14 DIAGNOSIS — E11.21 DIABETIC NEPHROPATHY ASSOCIATED WITH TYPE 2 DIABETES MELLITUS (HCC): ICD-10-CM

## 2022-11-14 RX ORDER — PEN NEEDLE, DIABETIC 32GX 5/32"
NEEDLE, DISPOSABLE MISCELLANEOUS
COMMUNITY
Start: 2022-10-25

## 2022-11-14 RX ORDER — FLUTICASONE PROPIONATE AND SALMETEROL 250; 50 UG/1; UG/1
1 POWDER RESPIRATORY (INHALATION) 2 TIMES DAILY
COMMUNITY
Start: 2022-11-03

## 2022-11-14 RX ORDER — SULFAMETHOXAZOLE AND TRIMETHOPRIM 800; 160 MG/1; MG/1
TABLET ORAL
COMMUNITY
Start: 2022-11-03

## 2022-11-14 RX ORDER — INSULIN ASPART 100 [IU]/ML
INJECTION, SOLUTION INTRAVENOUS; SUBCUTANEOUS
Qty: 40 ML | Refills: 5 | Status: SHIPPED | OUTPATIENT
Start: 2022-11-14

## 2022-11-14 NOTE — PROGRESS NOTES
11/14/2022    Assessment/Plan      Diagnoses and all orders for this visit:    Type 2 diabetes mellitus with hyperglycemia, with long-term current use of insulin (HCC)  -     Insulin Aspart (NovoLOG) 100 units/mL injection; Inject 22 units with breakfast, 23 units with lunch, and 28 units with dinner, plus sliding scale  Up to 100 units daily    Diabetic polyneuropathy associated with type 2 diabetes mellitus ([facility] [address] )    Diabetic nephropathy associated with type 2 diabetes mellitus [facility]    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 ([facility] )    Primary hypertension    Mixed hyperlipidemia    Other orders  -     Easy Comfort Pen Needles 33G X 4 MM MISC; USE WITH INSULIN FIVE TIMES DAILY AS DIRECTED  -     sulfamethoxazole-trimethoprim (BACTRIM DS) 800-160 mg per tablet; TAKE ONE TABLET BY MOUTH TWICE DAILY FOR 10 DAYS  -     Fluticasone-Salmeterol (Advair) 250-50 mcg/dose inhaler; Inhale 1 puff 2 (two) times a day        Assessment/Plan:  1  Type 2 diabetes  His Dexcom was downloaded while he was in the office today  It does demonstrate some elevation in blood sugars post meals  I have asked him to adjust his NovoLog insulin to 22 units at breakfast, 23 units at lunch, and 28 units at supper  He will continue the same Lantus insulin 30 units in the morning and 32 units in the evening  I have asked him to have his Dexcom downloaded again in another 2 weeks  2  Diabetic neuropathy  Diabetic foot exam was performed in the office today  He will be getting shoes from his podiatrist       I have asked him to follow up in January with blood work as scheduled  CC: Diabetes type 2 follow-up    History of Present Illness     HPI: Lizette Betancourt is a 79y o  year old male with type 2 diabetes, insulin requiring with neuropat, nephropathy, hy and hypoglycemic unawareness for 50 years, hypertension, hyperlipidemia for follow-up visit    He is on insulin at home and takes Lantus 30 units in the morning and 32 units in the evening and NovoLog insulin 20 units at breakfast, 23 units at lunch, 26 units at supper  Hypoglycemic episodes: Yes occasional     The patient's last eye exam was in March 2022  The patient's last foot exam was  with Podiatry recently  Last A1C was   Lab Results   Component Value Date    HGBA1C 7 5 09/15/2022     Blood Sugar/Glucometer/Pump/CGM review:  He utilizes a Dexcom CGMS system to check his blood sugars 6 or more times a day  Dexcom download from 11/01/2022 through 11/14/2022 was reviewed the office today  Average glucose is 185 mg/dL with a standard deviation of 61 mg/dL  49% of blood sugars are in target range, 36% high, 14% very high, less than 1% low, and less than 1% very low  He tends to have higher blood sugars post breakfast and post supper      He is primarily here for diabetic foot exam     Review of Systems     Was not performed as he was here for foot exam      Historical Information   Past Medical History:   Diagnosis Date   • Asthma    • Bradycardia    • Carcinoma, basal cell, skin     head and face and ear   • Dupuytren contracture     bilateral ring and right middle   • Gout    • Herniated lumbar intervertebral disc    • Sleep apnea      Past Surgical History:   Procedure Laterality Date   • APPENDECTOMY      age 23, ruptured   • CATARACT EXTRACTION, BILATERAL Bilateral    • CERVICAL FUSION     • DUPUYTREN CONTRACTURE RELEASE Left     left ring finger   • LUMBAR LAMINECTOMY      belkis placed   • TONSILLECTOMY     • VASECTOMY       Social History   Social History     Substance and Sexual Activity   Alcohol Use Yes    Comment: rare     Social History     Substance and Sexual Activity   Drug Use Never     Social History     Tobacco Use   Smoking Status Never Smoker   Smokeless Tobacco Never Used     Family History:   Family History   Problem Relation Age of Onset   • Hypertension Mother    • Diabetes type II Mother    • Hypertension Father    • Diabetes type II Brother    • Hypertension Brother    • Kidney disease Sister    • Hypertension Daughter    • GI problems Daughter        Meds/Allergies   Current Outpatient Medications   Medication Sig Dispense Refill   • albuterol (2 5 mg/3 mL) 0 083 % nebulizer solution INHALE ONE VIAL VIA NEBULIZER EVERY 6 HOURS AS NEEDED     • allopurinol (ZYLOPRIM) 300 mg tablet every 24 hours     • amLODIPine (NORVASC) 10 mg tablet Take 10 mg by mouth daily     • atorvastatin (LIPITOR) 20 mg tablet Take 20 mg by mouth daily     • azelastine (ASTELIN) 0 1 % nasal spray      • Cholecalciferol (Vitamin D3) 25 MCG (1000 UT) CAPS every 24 hours     • Easy Comfort Pen Needles 33G X 4 MM MISC USE WITH INSULIN FIVE TIMES DAILY AS DIRECTED     • fluticasone (FLONASE) 50 mcg/act nasal spray every 24 hours     • Fluticasone-Salmeterol (Advair) 250-50 mcg/dose inhaler Inhale 1 puff 2 (two) times a day     • furosemide (LASIX) 40 mg tablet Take 40 mg by mouth daily     • gabapentin (NEURONTIN) 100 mg capsule Take 100 mg by mouth daily     • Glucosamine-Chondroitin 500-400 MG CAPS      • Insulin Aspart (NovoLOG) 100 units/mL injection Inject 22 units with breakfast, 23 units with lunch, and 28 units with dinner, plus sliding scale  Up to 100 units daily 40 mL 5   • insulin glargine (LANTUS) 100 units/mL subcutaneous injection In 30 units in am and 32 units in pm  May increase to 80 units daily  30 mL 5   • Ipratropium Bromide (ATROVENT NA)      • loratadine (CLARITIN) 10 mg tablet Take 10 mg by mouth daily     • losartan (COZAAR) 100 MG tablet Take 100 mg by mouth daily     • Multiple Vitamin (MULTIVITAMIN ADULT PO) every 24 hours     • OneTouch Verio test strip USE AS DIRECTED FOUR TIMES DAILY     • potassium citrate (UROCIT-K) 10 mEq Every 12 hours     • sulfamethoxazole-trimethoprim (BACTRIM DS) 800-160 mg per tablet TAKE ONE TABLET BY MOUTH TWICE DAILY FOR 10 DAYS       No current facility-administered medications for this visit       Allergies Allergen Reactions   • Hydrochlorothiazide Other (See Comments)     SEVERE DIZZINESS   • Insulin Lispro Other (See Comments)     Cannot control blood sugars(Humalog)   • Lisinopril Other (See Comments)     Reports cough with use    • Minoxidil GI Intolerance   • Nifedipine Other (See Comments)     EDEMA LEGS   • Other Other (See Comments)     SEVERE DIZZINESS  Hydrap-es   • Prazosin Other (See Comments)     CHEST PAIN     • Venlafaxine Other (See Comments)     Made pt feel bad     • Doxycycline Rash   • Simvastatin Rash     PEDAL EDEMA         Objective   Vitals: Blood pressure 146/80, pulse 80, height 5' 11" (1 803 m), weight 109 kg (239 lb 3 2 oz)  Invasive Devices  Report    None                 Physical Exam  Vitals reviewed  Constitutional:       Appearance: Normal appearance  He is well-developed  HENT:      Head: Normocephalic and atraumatic  Eyes:      Conjunctiva/sclera: Conjunctivae normal    Neck:      Thyroid: No thyromegaly  Cardiovascular:      Pulses: Pulses are weak  Dorsalis pedis pulses are 1+ on the right side and 1+ on the left side  Posterior tibial pulses are 1+ on the right side and 1+ on the left side  Comments: 1+ dorsalis pedis and posterior tibialis pulses bilaterally  Musculoskeletal:         General: No deformity  Right lower leg: Edema present  Left lower leg: Edema present  Comments: Trace bilateral lower extremity edema  Callus plantar surface of the 2nd MP joint bilaterally  Callus medial tip of the 1st toe and tip of 3rd toe on the left  Left 1st MP joint with a bunion  Feet:      Right foot:      Skin integrity: Callus present  No ulcer, skin breakdown, erythema, warmth or dry skin  Left foot:      Skin integrity: Callus present  No ulcer, skin breakdown, erythema, warmth or dry skin  Skin:     General: Skin is warm and dry  Findings: No erythema or rash     Neurological:      Mental Status: He is alert and oriented to person, place, and time  Deep Tendon Reflexes: Reflexes are normal and symmetric  Comments: Vibration sensation absent from the 1st toe DIP joint to the medial malleolus bilaterally  microfilament sensation absent bilateral  Achilles tendon reflexes absent  Patient's shoes and socks removed  Right Foot/Ankle   Right Foot Inspection  Skin Exam: skin normal, skin intact, callus and callus  No dry skin, no warmth, no erythema, no maceration, no abnormal color, no pre-ulcer and no ulcer  Toe Exam: No swelling and  no right toe deformity    Sensory   Vibration: absent  Monofilament testing: absent    Vascular  Capillary refills: < 3 seconds  The right DP pulse is 1+  The right PT pulse is 1+  Left Foot/Ankle  Left Foot Inspection  Skin Exam: skin normal, skin intact and callus  No dry skin, no warmth, no erythema, no maceration, normal color, no pre-ulcer and no ulcer  Toe Exam: left toe deformity  No swelling  Sensory   Vibration: absent  Monofilament testing: absent    Vascular  Capillary refills: < 3 seconds  The left DP pulse is 1+  The left PT pulse is 1+  Assign Risk Category  Deformity present  Loss of protective sensation  Weak pulses  Risk: 2         He is here for diabetic foot exam to help get diabetic shoes  The history was obtained from the review of the chart and from the patient      Lab Results:    Most recent Alc is  Lab Results   Component Value Date    HGBA1C 7 5 09/15/2022                     Future Appointments   Date Time Provider Delia Andrews   12/23/2022 11:00 AM KATHLEEN [facility]   1/5/2023 11:20 AM Darrel Goltz, PA-C [facility]

## 2022-11-14 NOTE — PATIENT INSTRUCTIONS
Based on the dexcom download, let's adjust the novolog to 22 units at breakfast, 23 units at lunch, and 28 units at supper  Continue the same lantus insulin 30 units in am and 32 units in pm      Call us to download dexcom in 2 weeks  Follow up in jan 2023 with blood work as scheduled

## 2022-11-15 ENCOUNTER — DOCUMENTATION (OUTPATIENT)
Dept: ENDOCRINOLOGY | Facility: HOSPITAL | Age: 82
End: 2022-11-15

## 2022-12-22 LAB
CREAT ?TM UR-SCNC: 56 UMOL/L
EXT MICROALBUMIN URINE RANDOM: 32.6
HBA1C MFR BLD HPLC: 7.4 %
MICROALBUMIN/CREAT UR: 582 MG/G{CREAT}

## 2022-12-23 ENCOUNTER — OFFICE VISIT (OUTPATIENT)
Dept: PODIATRY | Facility: CLINIC | Age: 82
End: 2022-12-23

## 2022-12-23 VITALS
WEIGHT: 241 LBS | BODY MASS INDEX: 33.74 KG/M2 | HEIGHT: 71 IN | DIASTOLIC BLOOD PRESSURE: 68 MMHG | SYSTOLIC BLOOD PRESSURE: 138 MMHG

## 2022-12-23 DIAGNOSIS — E11.40 TYPE 2 DIABETES MELLITUS WITH DIABETIC NEUROPATHY, UNSPECIFIED WHETHER LONG TERM INSULIN USE (HCC): Primary | ICD-10-CM

## 2022-12-23 DIAGNOSIS — B35.1 ONYCHOMYCOSIS: ICD-10-CM

## 2022-12-23 DIAGNOSIS — L84 CORNS AND CALLUS: ICD-10-CM

## 2022-12-23 NOTE — PROGRESS NOTES
PATIENT:  Christ Rocha  1940    ASSESSMENT/PLAN:  1  Type 2 diabetes mellitus with diabetic neuropathy, unspecified whether long term insulin use ([facility] [address] )        2  Onychomycosis        3  Corns and callus              No orders of the defined types were placed in this encounter  Disease prevention and related risk factors of diabetes were identified and discussed  The patient was educated in proper foot wear for diabetics  Also educated in daily foot assessment and routine diabetic foot care  Discussed the importance of controlling BS through diet and exercise  The patient will follow up in 9-12 weeks for further diabetic foot exam and care  PROCEDURE:  All mycotic toenails were reduced and debrided in length, width, and girth using a nail nipper and dremel  All hyperkeratotic skin lesion(s) were sharply pared with a scalpel with no evidence of ulceration  Patient tolerated procedure(s) well without complications  HPI:  Christ Rocha is a 79 y  o year old male seen for diabetic foot exam   The patient has class findings with diabetes  BS is under control  The patient complained of thick toenails and large preulcerative callus lesions at previous ulcer location  The patient denied any acute pedal disorder, redness, acute swelling, or recent injury          PAST MEDICAL HISTORY:  Past Medical History:   Diagnosis Date   • Asthma    • Bradycardia    • Carcinoma, basal cell, skin     head and face and ear   • Dupuytren contracture     bilateral ring and right middle   • Gout    • Herniated lumbar intervertebral disc    • Sleep apnea        PAST SURGICAL HISTORY:  Past Surgical History:   Procedure Laterality Date   • APPENDECTOMY      age 23, ruptured   • CATARACT EXTRACTION, BILATERAL Bilateral    • CERVICAL FUSION     • DUPUYTREN CONTRACTURE RELEASE Left     left ring finger   • LUMBAR LAMINECTOMY      belkis placed   • TONSILLECTOMY     • VASECTOMY ALLERGIES:  Hydrochlorothiazide, Insulin lispro, Lisinopril, Minoxidil, Nifedipine, Other, Prazosin, Venlafaxine, Doxycycline, and Simvastatin    MEDICATIONS:  Current Outpatient Medications   Medication Sig Dispense Refill   • albuterol (2 5 mg/3 mL) 0 083 % nebulizer solution INHALE ONE VIAL VIA NEBULIZER EVERY 6 HOURS AS NEEDED     • allopurinol (ZYLOPRIM) 300 mg tablet every 24 hours     • amLODIPine (NORVASC) 10 mg tablet Take 10 mg by mouth daily     • atorvastatin (LIPITOR) 20 mg tablet Take 20 mg by mouth daily     • azelastine (ASTELIN) 0 1 % nasal spray      • Cholecalciferol (Vitamin D3) 25 MCG (1000 UT) CAPS every 24 hours     • Easy Comfort Pen Needles 33G X 4 MM MISC USE WITH INSULIN FIVE TIMES DAILY AS DIRECTED     • fluticasone (FLONASE) 50 mcg/act nasal spray every 24 hours     • Fluticasone-Salmeterol (Advair) 250-50 mcg/dose inhaler Inhale 1 puff 2 (two) times a day     • furosemide (LASIX) 40 mg tablet Take 40 mg by mouth daily     • gabapentin (NEURONTIN) 100 mg capsule Take 100 mg by mouth daily     • Glucosamine-Chondroitin 500-400 MG CAPS      • Insulin Aspart (NovoLOG) 100 units/mL injection Inject 22 units with breakfast, 23 units with lunch, and 28 units with dinner, plus sliding scale  Up to 100 units daily 40 mL 5   • insulin glargine (LANTUS) 100 units/mL subcutaneous injection In 30 units in am and 32 units in pm  May increase to 80 units daily   30 mL 5   • Ipratropium Bromide (ATROVENT NA)      • loratadine (CLARITIN) 10 mg tablet Take 10 mg by mouth daily     • losartan (COZAAR) 100 MG tablet Take 100 mg by mouth daily     • Multiple Vitamin (MULTIVITAMIN ADULT PO) every 24 hours     • OneTouch Verio test strip USE AS DIRECTED FOUR TIMES DAILY     • potassium citrate (UROCIT-K) 10 mEq Every 12 hours     • sulfamethoxazole-trimethoprim (BACTRIM DS) 800-160 mg per tablet TAKE ONE TABLET BY MOUTH TWICE DAILY FOR 10 DAYS       No current facility-administered medications for this visit        SOCIAL HISTORY:  Social History     Socioeconomic History   • Marital status: /Civil Union     Spouse name: None   • Number of children: None   • Years of education: None   • Highest education level: None   Occupational History   • Occupation: teacher     Comment: retired   Tobacco Use   • Smoking status: Never   • Smokeless tobacco: Never   Vaping Use   • Vaping Use: Never used   Substance and Sexual Activity   • Alcohol use: Yes     Comment: rare   • Drug use: Never   • Sexual activity: None   Other Topics Concern   • None   Social History Narrative   • None     Social Determinants of Health     Financial Resource Strain: Not on file   Food Insecurity: Not on file   Transportation Needs: Not on file   Physical Activity: Not on file   Stress: Not on file   Social Connections: Not on file   Intimate Partner Violence: Not on file   Housing Stability: Not on file        REVIEW OF SYSTEMS:  GENERAL: No fever or chills  HEART: No chest pain, or palpitation  RESPIRATORY:  No acute SOB or cough  GI: No Nausea, vomit or diarrhea  NEUROLOGIC: No syncope or acute weakness    PHYSICAL EXAM:    /68   Ht 5' 11" (1 803 m)   Wt 109 kg (241 lb)   BMI 33 61 kg/m²     VASCULAR EXAM  Dorsalis pedis  +1, Posterior tibial artery  absent  The patient has class findings with skin atrophy, lack of digital hair, and nail dystrophy  There is +1 lower extremity edema bilaterally  Venous stasis skin changes noted BLE  NEUROLOGIC EXAM  Sensation is intact to light touch  Sensation is absent to 10gm monofilament  Vibratory sensation significantly diminished  No focal neurologic deficit  Tingling numb paresthesias noted bilateral         DERMATOLOGIC EXAM:   No ulcer or cellulitis noted  The patient has dystrophic/hypertrophic toenails with yellow/white discoloration, onycholysis, and subungal debris  Fungal odor noted     Right foot nails x3 severely dystrophic with 0 4 cm ave thickness (1 2 and 4)  Left foot nails x3 dystrophic with 0 4 cm ave thickness (1 2 and 3)  Patient has hyperkeratotic lesions noted at bilateral subsecond MPJ, large in nature approximately 1 5 cm² in size each respectively, hemorrhage noted within callus, some scar tissue noticed as this is prior ulceration site  M[place], Tone and Turgor are diminished? Yes  No notable suspicious skin lesions  MUSCULOSKELETAL EXAM:   No acute joint pain, edema, or redness  No acute musculoskeletal problem  Patient has deformity including plantarflexed second metatarsals bilateral  Patient has mild ambulation limitations  Patient wears DM shoes? Yes    Risk Category:   1 = Loss of protective sensation    A)  Has the patient had a previous amputation of the foot or integral skeletal portion thereof? No  B)  Does the patient have absent dorsalis pedis or posterior tibial pulse? Yes        Does the patient have three of the following? Yes        1  Hair growth (increased or decreased), 2   Nail changes (thickening), 3   Pigmentary changes (discoloring) and 4  Skin texture (thin, shiny)  C)  Does the patient have two of the following and one above? Yes       4  Paraesthesias (abnormal spontaneous sensations in the feet) and 5    Burning

## 2023-01-05 ENCOUNTER — OFFICE VISIT (OUTPATIENT)
Dept: ENDOCRINOLOGY | Facility: HOSPITAL | Age: 83
End: 2023-01-05

## 2023-01-05 VITALS
BODY MASS INDEX: 33.6 KG/M2 | OXYGEN SATURATION: 97 % | HEIGHT: 71 IN | SYSTOLIC BLOOD PRESSURE: 130 MMHG | HEART RATE: 68 BPM | DIASTOLIC BLOOD PRESSURE: 62 MMHG | WEIGHT: 240 LBS

## 2023-01-05 DIAGNOSIS — Z79.4 TYPE 2 DIABETES MELLITUS WITH HYPERGLYCEMIA, WITH LONG-TERM CURRENT USE OF INSULIN (HCC): Primary | ICD-10-CM

## 2023-01-05 DIAGNOSIS — E11.42 DIABETIC POLYNEUROPATHY ASSOCIATED WITH TYPE 2 DIABETES MELLITUS (HCC): ICD-10-CM

## 2023-01-05 DIAGNOSIS — E11.65 TYPE 2 DIABETES MELLITUS WITH HYPERGLYCEMIA, WITH LONG-TERM CURRENT USE OF INSULIN (HCC): Primary | ICD-10-CM

## 2023-01-05 RX ORDER — GABAPENTIN 100 MG/1
200 CAPSULE ORAL DAILY
Qty: 180 CAPSULE | Refills: 1 | Status: SHIPPED | OUTPATIENT
Start: 2023-01-05

## 2023-01-05 NOTE — PATIENT INSTRUCTIONS
Monitor diet and maintain activity  Continue Lantus 30 units in the morning and 33 units in the evening  Change NovoLog 22 units with breakfast, 23 units with lunch and 30 units with dinner  Increase gabapentin to 200 mg at night  Continue to use Dexcom to monitor blood sugar  Call the office with any concerns  Follow up in 3 months with lab work prior to visit

## 2023-01-05 NOTE — PROGRESS NOTES
Merlin eYpez 79 y o  male MRN: [id number]    Encounter: [id number]      Assessment/Plan     Assessment: This is a 79y o -year-old male with type 2 diabetes with hypoglycemic unawareness, neuropathy, hypertension and hyperlipidemia  Plan:  1  Type 2 diabetes, insulin requiring:   Recent hemoglobin A1c was 7 4  Glucose levels are doing much better than previous office visit, but he is having some significant postprandial hyperglycemia after dinner  He will continue with Lantus 30 units in the a m  and 33 units in the p m  For his NovoLog he will continue with 22 units with breakfast, 23 units with lunch but increase to 30 units with dinner  Continue to utilize Dexcom continuous glucose monitor  Contact the office with any concerns or questions  Follow-up in 3 months with lab work completed prior to visit      2  Hypoglycemic unawareness:  Has had several episodes of loss of consciousness due to hypoglycemia with ambulance called   No episodes of hypoglycemia since he started to utilize Dexcom  As such, due to his hypoglycemic unawareness, frequent testing of blood sugars 4 to 6 times a day, and utilization of insulin 5 times a day with adjustment in insulin dosage based on blood sugars, he would be a good candidate for a continuous glucose monitoring system, the DEX com G6 continuous glucose monitoring system       3  Diabetic neuropathy:  Stable   Does have wounds on bilateral feet missed following up with wound care   Diabetic foot exam is up-to-date      4  Hypertension:  Normotensive in the office  [facility]   Continue with current medication   Repeat CMP prior to next office visit      5  Hyperlipidemia:  Previous lipid panel was excellent   Continue with current medications   Will continue monitor in the future      CC: Type 2 diabetes follow-up    History of Present Illness     HPI:  Amie GORDILLO 68 PHRA old male with type 2 diabetes, insulin requiring with neuropathy for 55 years, hyperlipidemia, hypertension for follow-up   He was diagnosed with type 2 diabetes in his 35s   He originally went on metformin therapy  [facility] was never on any other oral agents as metformin controlled his blood sugars for the 1st several years and then he was switched to insulin therapy  [facility] reports that Humalog insulin does not improve his blood sugars as well as NovoLog insulin does   He is on insulin at home and takes Lantus insulin 30 units in am and 33 units in pm and NovoLog insulin 22 units with breakfast, 23 units with lunch and 24 units at supper  He denies any polyuria, polydipsia, nocturia and blurry vision   He has once a night nocturia   He denies chest pain or shortness of breath   He has occasional shooting pains of the legs at night and has numbness of the feet to his knees bilaterally  Recently started on gabapentin and has been going to physical therapy  States neuropathy in right leg has been getting worse recently  Some nights he has been having severe pain shooting up from his right foot to his right knee   He denies retinopathy, heart attack, stroke and claudication but does admit to neuropathy and nephropathy   He reports he was to Diabetes Education on diagnosis in several times since then has seen 1 on 1 with a dietitian and gone to diabetes classes  [facility] does try to limit carbohydrates   Had a pacer placed in May 2022 a states since then has been doing much better  Recently had a surgery to have a mass removed from his left shoulder  States glucose levels afterwards were a little out of control      Hypoglycemic episodes:  None recently since utilizing the Dexcom  H/o of hypoglycemia causing hospitalization or intervention such as glucagon injection  or ambulance call  Yes  Has been loc in the past requiring 911 many years ago  Hypoglycemia symptoms: sweating and and feel bad or no symptoms at all    Treatment of hypoglycemia: either cookies or glucose tablets  Glucagon:Yes  Medic alert tag: recommended,Yes       The patient's last eye exam was in March 2022 in [place] [address] at [facility] with no retinopathy   The patient's last foot exam was in November 2022 at endocrine office and in January 2022 with Dr Tanvi Gusman recent hemoglobin A1c completed December 22, 2022 was 7 4      Blood Sugar/Glucometer/Pump/CGM review: Download of Dexcom from December 23, 2022 through January 5, 2023 reveals an average glucose level of 193 with a standard deviation of 61  He is in target range 45% of the time, above target range 54% of the time, and below target range 1% of the time  Glucose levels trending down overnight, does have some mild postprandial hyperglycemia after breakfast and lunch and some significant postprandial hyperglycemia after dinner       He has hyperlipidemia and takes atorvastatin 20 mg daily   He denies chest pain or shortness of breath        He has hypertension and takes amlodipine 10 mg daily and losartan 100 mg daily along with furosemide 40 mg twice a day   He denies headache or stroke-like symptoms  Review of Systems   Constitutional: Negative for activity change, appetite change, fatigue and unexpected weight change  HENT: Negative for trouble swallowing  Eyes: Positive for visual disturbance (Wears glasses)  Respiratory: Negative for chest tightness and shortness of breath  Cardiovascular: Positive for leg swelling  Negative for chest pain and palpitations  Gastrointestinal: Negative for abdominal pain, diarrhea, nausea and vomiting  Endocrine: Negative for cold intolerance, heat intolerance, polydipsia, polyphagia and polyuria  Genitourinary: Negative for frequency  Musculoskeletal: Positive for gait problem  Skin: Positive for wound  Negative for rash  Neurological: Positive for tremors  Negative for dizziness, weakness, light-headedness, numbness and headaches     Psychiatric/Behavioral: Negative for dysphoric mood and sleep disturbance  The patient is not nervous/anxious          Historical Information   Past Medical History:   Diagnosis Date   • Asthma    • Bradycardia    • Carcinoma, basal cell, skin     head and face and ear   • COVID-19 12/05/2022   • Dupuytren contracture     bilateral ring and right middle   • Gout    • Herniated lumbar intervertebral disc    • Sleep apnea      Past Surgical History:   Procedure Laterality Date   • APPENDECTOMY      age 23, ruptured   • CATARACT EXTRACTION, BILATERAL Bilateral    • CERVICAL FUSION     • CYST REMOVAL Left 01/2023   • DUPUYTREN CONTRACTURE RELEASE Left     left ring finger   • LUMBAR LAMINECTOMY      belkis placed   • TONSILLECTOMY     • VASECTOMY       Social History   Social History     Substance and Sexual Activity   Alcohol Use Yes    Comment: rare     Social History     Substance and Sexual Activity   Drug Use Never     Social History     Tobacco Use   Smoking Status Never   Smokeless Tobacco Never     Family History:   Family History   Problem Relation Age of Onset   • Hypertension Mother    • Diabetes type II Mother    • Hypertension Father    • Diabetes type II Brother    • Hypertension Brother    • Kidney disease Sister    • Hypertension Daughter    • GI problems Daughter        Meds/Allergies   Current Outpatient Medications   Medication Sig Dispense Refill   • albuterol (2 5 mg/3 mL) 0 083 % nebulizer solution INHALE ONE VIAL VIA NEBULIZER EVERY 6 HOURS AS NEEDED     • allopurinol (ZYLOPRIM) 300 mg tablet every 24 hours     • amLODIPine (NORVASC) 10 mg tablet Take 10 mg by mouth daily     • atorvastatin (LIPITOR) 20 mg tablet Take 20 mg by mouth daily     • azelastine (ASTELIN) 0 1 % nasal spray      • Cholecalciferol (Vitamin D3) 25 MCG (1000 UT) CAPS every 24 hours     • Easy Comfort Pen Needles 33G X 4 MM MISC USE WITH INSULIN FIVE TIMES DAILY AS DIRECTED     • fluticasone (FLONASE) 50 mcg/act nasal spray every 24 hours     • Fluticasone-Salmeterol (Advair) 250-50 mcg/dose inhaler Inhale 1 puff 2 (two) times a day     • furosemide (LASIX) 40 mg tablet Take 40 mg by mouth daily     • gabapentin (NEURONTIN) 100 mg capsule Take 2 capsules (200 mg total) by mouth daily 180 capsule 1   • Glucosamine-Chondroitin 500-400 MG CAPS      • Insulin Aspart (NovoLOG) 100 units/mL injection Inject 22 units with breakfast, 23 units with lunch, and 28 units with dinner, plus sliding scale  Up to 100 units daily 40 mL 5   • insulin glargine (LANTUS) 100 units/mL subcutaneous injection In 30 units in am and 32 units in pm  May increase to 80 units daily  30 mL 5   • Ipratropium Bromide (ATROVENT NA)      • loratadine (CLARITIN) 10 mg tablet Take 10 mg by mouth daily     • losartan (COZAAR) 100 MG tablet Take 100 mg by mouth daily     • Multiple Vitamin (MULTIVITAMIN ADULT PO) every 24 hours     • OneTouch Verio test strip USE AS DIRECTED FOUR TIMES DAILY     • potassium citrate (UROCIT-K) 10 mEq Every 12 hours     • sulfamethoxazole-trimethoprim (BACTRIM DS) 800-160 mg per tablet TAKE ONE TABLET BY MOUTH TWICE DAILY FOR 10 DAYS       No current facility-administered medications for this visit  Allergies   Allergen Reactions   • Hydrochlorothiazide Other (See Comments)     SEVERE DIZZINESS   • Insulin Lispro Other (See Comments)     Cannot control blood sugars(Humalog)   • Lisinopril Other (See Comments)     Reports cough with use    • Minoxidil GI Intolerance   • Nifedipine Other (See Comments)     EDEMA LEGS   • Other Other (See Comments)     SEVERE DIZZINESS  Hydrap-es   • Prazosin Other (See Comments)     CHEST PAIN     • Venlafaxine Other (See Comments)     Made pt feel bad     • Doxycycline Rash   • Simvastatin Rash     PEDAL EDEMA         Objective   Vitals: Blood pressure 130/62, pulse 68, height 5' 11" (1 803 m), weight 109 kg (240 lb), SpO2 97 %  Physical Exam  Vitals and nursing note reviewed     Constitutional:       General: He is not in acute distress  Appearance: Normal appearance  He is not diaphoretic  HENT:      Head: Normocephalic and atraumatic  Eyes:      General: No scleral icterus  Extraocular Movements: Extraocular movements intact  Conjunctiva/sclera: Conjunctivae normal       Pupils: Pupils are equal, round, and reactive to light  Cardiovascular:      Rate and Rhythm: Normal rate and regular rhythm  Heart sounds: No murmur heard  Pulmonary:      Effort: Pulmonary effort is normal  No respiratory distress  Breath sounds: Normal breath sounds  No wheezing  Musculoskeletal:      Cervical back: Normal range of motion  Right lower leg: Edema present  Left lower leg: Edema present  Lymphadenopathy:      Cervical: No cervical adenopathy  Skin:     General: Skin is warm and dry  Neurological:      Mental Status: He is alert and oriented to person, place, and time  Mental status is at baseline  Sensory: No sensory deficit  Gait: Gait abnormal (Utilizing walker)  Psychiatric:         Mood and Affect: Mood normal          Behavior: Behavior normal          Thought Content: Thought content normal          The history was obtained from the review of the chart, patient  Lab Results:   Lab Results   Component Value Date/Time    Hemoglobin A1C 7 4 12/22/2022 12:00 AM    Hemoglobin A1C 7 5 09/15/2022 12:00 AM    Hemoglobin A1C 8 0 06/02/2022 12:00 AM       Portions of the record may have been created with voice recognition software  Occasional wrong word or "sound a like" substitutions may have occurred due to the inherent limitations of voice recognition software  Read the chart carefully and recognize, using context, where substitutions have occurred

## 2023-03-03 ENCOUNTER — OFFICE VISIT (OUTPATIENT)
Dept: PODIATRY | Facility: CLINIC | Age: 83
End: 2023-03-03

## 2023-03-03 VITALS — HEIGHT: 71 IN | BODY MASS INDEX: 35.25 KG/M2 | WEIGHT: 251.8 LBS

## 2023-03-03 DIAGNOSIS — E11.40 TYPE 2 DIABETES MELLITUS WITH DIABETIC NEUROPATHY, UNSPECIFIED WHETHER LONG TERM INSULIN USE (HCC): ICD-10-CM

## 2023-03-03 DIAGNOSIS — B35.1 ONYCHOMYCOSIS: Primary | ICD-10-CM

## 2023-03-03 DIAGNOSIS — L84 CORNS AND CALLUS: ICD-10-CM

## 2023-03-07 NOTE — PROGRESS NOTES
PATIENT:  Jessi Leach  1940    ASSESSMENT:  1  Onychomycosis        2  Corns and callus        3  Type 2 diabetes mellitus with diabetic neuropathy, unspecified whether long term insulin use ([facility] )              No orders of the defined types were placed in this encounter  PLAN:  Disease prevention and related risk factors of diabetes were identified and discussed  The patient was educated in proper foot wear for diabetics  Educated in daily foot assessment and routine diabetic foot care  Discussed the importance of controlling BS through diet and exercise  The patient will follow up in 9-12 weeks for further diabetic foot exam and care  Procedures: 39543, 38463  All mycotic toenails were reduced and debrided in length, width, and girth using a nail nipper and electric dremel  All hyperkeratotic skin lesion(s) if present were sharply pared with a #10 scalpel with no evidence of ulceration/abscess  Patient tolerated procedure(s) well without complications  Procedures     HPI:  Jessi Leach is a 79 y  o year old male seen for diabetic foot exam   The patient has class findings with diabetes  BS is under control  The patient complained of thick toenails and painful lesions which have previously ulcerated  The patient denied any acute pedal disorder, redness, acute swelling, or recent injury        The following portions of the patient's history were reviewed and updated as appropriate: allergies, current medications, past family history, past medical history, past social history, past surgical history and problem list     REVIEW OF SYSTEMS:  GENERAL: No fever or chills  HEART: No chest pain, or palpitation  RESPIRATORY:  No acute SOB or cough  GI: No Nausea, vomit or diarrhea  NEUROLOGIC: No syncope or acute weakness    PHYSICAL EXAM:    Ht 5' 11" (1 803 m)   Wt 114 kg (251 lb 12 8 oz)   BMI 35 12 kg/m²     VASCULAR EXAM  Posterior tibial artery absent bilateral  Dorsalis pedis artery +1 bilateral  The patient has class findings with skin atrophy, lack of digital hair, and nail dystrophy  There is +1 lower extremity edema bilaterally  Venous stasis skin changes noted BLE  No    NEUROLOGIC EXAM  Sensation is intact to light touch  Yes   Sensation is intact to 10gm monofilament  Vibratory sensation moderately diminished  Tingling numb paresthesias noted bilateral       No focal neurologic deficit  DERMATOLOGIC EXAM:   Texture, Tone and Turgor are diminished bilateral   The patient has dystrophic/hypertrophic toenails with yellow/white discoloration, onycholysis, and subungal debris  Fungal odor noted  Brittle nature noted  Right foot nails severely dystrophic x3 with 0 4 cm ave thickness (1 2 and 4)  Left foot nails severely dystrophic x3 with 0 4 cm ave thickness (1 2 and 3)  Patient has hyperkeratotic lesions noted:  Right foot at subsecond MPJ approximately 1 5 cm² in size, preulcerative, hemorrhaging callus noted     Left foot at subsecond MPJ, similar to contralateral side in presentation  Ulcer or cellulitis noted  No  No notable suspicious skin lesions  MUSCULOSKELETAL EXAM:   No acute joint pain, edema, or redness  No acute musculoskeletal problem  Patient has deformity including hallux valgus with contracted hammertoes, and plantarflexed second metatarsal bilateral  Patient has minimal ambulation limitations  Patient wears DM shoes? Yes    Risk Category/Class Findings: Q8 (B1, B2 ABC)  0 = No loss of protective sensation    A1)  Has the patient had a previous amputation of the foot or integral skeletal portion thereof? No  B1)  Does the patient have absent posterior tibial pulse? Yes  B3)  Does the patient have absent dorsalis pedis? No  B2)  Does the patient have three of the following? Yes           1  Hair growth (increased or decreased), 2   Nail changes (thickening) and 3    Pigmentary changes (discoloring)  C)  Does the patient have two of the following and one above? Yes            3   Edema and 4    Paraesthesias (abnormal spontaneous sensations in the feet)

## 2023-03-23 LAB — HBA1C MFR BLD HPLC: 7.1 %

## 2023-04-21 ENCOUNTER — TELEPHONE (OUTPATIENT)
Dept: ADMINISTRATIVE | Facility: OTHER | Age: 83
End: 2023-04-21

## 2023-04-21 NOTE — TELEPHONE ENCOUNTER
Upon review of the In Basket request and the patient's chart, initial outreach has been made via fax to facility  Please see Contacts section for details       Thank you  Barbara Irby MA

## 2023-04-21 NOTE — TELEPHONE ENCOUNTER
----- Message from Sergio Lincoln Jana sent at 4/20/2023  2:55 PM EDT -----  Regarding: Diabetic Eye Exam  04/20/23 2:55 PM    Hello, our patient Yaakov Belle has had Diabetic Eye Exam completed/performed  Please assist in updating the patient chart by making an External outreach to [facility] facility located at [address] [place] [address]       Thank you,  Damian Schultz  [address]

## 2023-04-21 NOTE — LETTER
Diabetic Eye Exam Form    Date Requested: 23  Patient: Yaakov Belle  Patient : 1940   Referring Provider: Ernie Wise MD      DIABETIC Eye Exam Date _______________________________      Type of Exam MUST be documented for Diabetic Eye Exams  Please CHECK ONE  Retinal Exam       Dilated Retinal Exam       OCT       Optomap-Iris Exam      Fundus Photography       Left Eye - Please check Retinopathy or No Retinopathy        Exam did show retinopathy    Exam did not show retinopathy       Right Eye - Please check Retinopathy or No Retinopathy       Exam did show retinopathy    Exam did not show retinopathy       Comments __________________________________________________________    Practice Providing Exam ______________________________________________    Exam Performed By (print name) _______________________________________      Provider Signature ___________________________________________________      These reports are needed for  compliance  Please fax this completed form and a copy of the Diabetic Eye Exam report to our office located at [place] [address] as soon as possible via Fax [phone] michelle Torres: Phone [phone]  We thank you for your assistance in treating our mutual patient

## 2023-04-25 NOTE — TELEPHONE ENCOUNTER
As a follow-up, a second attempt has been made for outreach via fax to facility  Please see Contacts section for details      Thank you  Fang Salas MA

## 2023-05-03 NOTE — TELEPHONE ENCOUNTER
As a final attempt, a third outreach has been made via telephone call to facility  Please see Contacts section for details  This encounter will be closed and completed by end of day  Should we receive the requested information because of previous outreach attempts, the requested patient's chart will be updated appropriately      Not able to leave a message on voice mail  Thank you  Cory Bautista, [address] [place]

## 2023-06-05 ENCOUNTER — OFFICE VISIT (OUTPATIENT)
Dept: PODIATRY | Facility: CLINIC | Age: 83
End: 2023-06-05
Payer: COMMERCIAL

## 2023-06-05 VITALS
DIASTOLIC BLOOD PRESSURE: 67 MMHG | HEIGHT: 71 IN | BODY MASS INDEX: 34.3 KG/M2 | WEIGHT: 245 LBS | HEART RATE: 74 BPM | SYSTOLIC BLOOD PRESSURE: 127 MMHG

## 2023-06-05 DIAGNOSIS — L84 CORNS AND CALLUS: ICD-10-CM

## 2023-06-05 DIAGNOSIS — E11.40 TYPE 2 DIABETES MELLITUS WITH DIABETIC NEUROPATHY, UNSPECIFIED WHETHER LONG TERM INSULIN USE (HCC): ICD-10-CM

## 2023-06-05 DIAGNOSIS — B35.1 ONYCHOMYCOSIS: Primary | ICD-10-CM

## 2023-06-05 PROCEDURE — 11721 DEBRIDE NAIL 6 OR MORE: CPT | Performed by: PODIATRIST

## 2023-06-05 PROCEDURE — 11056 PARNG/CUTG B9 HYPRKR LES 2-4: CPT | Performed by: PODIATRIST

## 2023-06-08 NOTE — PROGRESS NOTES
"   PATIENT:  Carrie Gerard  1940    ASSESSMENT:  1  Onychomycosis        2  Corns and callus        3  Type 2 diabetes mellitus with diabetic neuropathy, unspecified whether long term insulin use ([facility] )              No orders of the defined types were placed in this encounter  PLAN:  Disease prevention and related risk factors of diabetes were identified and discussed  The patient was educated in proper foot wear for diabetics  Educated in daily foot assessment and routine diabetic foot care  Discussed the importance of controlling BS through diet and exercise  The patient will follow up in 9-12 weeks for further diabetic foot exam and care  Procedures: 22587, 67911  All mycotic toenails were reduced and debrided in length, width, and girth using a nail nipper and electric dremel  All hyperkeratotic skin lesion(s) if present were sharply pared with a #10 scalpel with no evidence of ulceration/abscess  Patient tolerated procedure(s) well without complications  Procedures     HPI:  Carrie Gerard is a 79 y  o year old male seen for diabetic foot exam   The patient has class findings with diabetes  BS is under control  The patient complained of thick toenails and painful lesions which have previously ulcerated  The patient denied any acute pedal disorder, redness, acute swelling, or recent injury        The following portions of the patient's history were reviewed and updated as appropriate: allergies, current medications, past family history, past medical history, past social history, past surgical history and problem list     REVIEW OF SYSTEMS:  GENERAL: No fever or chills  HEART: No chest pain, or palpitation  RESPIRATORY:  No acute SOB or cough  GI: No Nausea, vomit or diarrhea  NEUROLOGIC: No syncope or acute weakness    PHYSICAL EXAM:    /67   Pulse 74   Ht 5' 11\" (1 803 m)   Wt 111 kg (245 lb)   BMI 34 17 kg/m²     VASCULAR EXAM  Posterior tibial artery " absent bilateral  Dorsalis pedis artery +1 bilateral  The patient has class findings with skin atrophy, lack of digital hair, and nail dystrophy  There is +1 lower extremity edema bilaterally  Venous stasis skin changes noted BLE  No    NEUROLOGIC EXAM  Sensation is intact to light touch  Yes   Sensation is intact to 10gm monofilament  Vibratory sensation moderately diminished  Tingling numb paresthesias noted bilateral       No focal neurologic deficit  DERMATOLOGIC EXAM:   Texture, Tone and Turgor are diminished bilateral   The patient has dystrophic/hypertrophic toenails with yellow/white discoloration, onycholysis, and subungal debris  Fungal odor noted  Brittle nature noted  Right foot nails severely dystrophic x3 with 0 4 cm ave thickness (1 2 and 4)  Left foot nails severely dystrophic x3 with 0 4 cm ave thickness (1 2 and 3)  Patient has hyperkeratotic lesions noted:  Right foot at subsecond MPJ approximately 1 5 cm² in size, preulcerative, hemorrhaging callus noted     Left foot at subsecond MPJ, similar to contralateral side in presentation  Ulcer or cellulitis noted  No  No notable suspicious skin lesions  MUSCULOSKELETAL EXAM:   No acute joint pain, edema, or redness  No acute musculoskeletal problem  Patient has deformity including hallux valgus with contracted hammertoes, and plantarflexed second metatarsal bilateral  Patient has minimal ambulation limitations  Patient wears DM shoes? Yes    Risk Category/Class Findings: Q8 (B1, B2 ABC)  0 = No loss of protective sensation    A1)  Has the patient had a previous amputation of the foot or integral skeletal portion thereof? No  B1)  Does the patient have absent posterior tibial pulse? Yes  B3)  Does the patient have absent dorsalis pedis? No  B2)  Does the patient have three of the following? Yes           1  Hair growth (increased or decreased), 2   Nail changes (thickening) and 3    Pigmentary changes (discoloring)  C) Does the patient have two of the following and one above? Yes            3   Edema and 4    Paraesthesias (abnormal spontaneous sensations in the feet)

## 2023-06-15 DIAGNOSIS — E11.65 TYPE 2 DIABETES MELLITUS WITH HYPERGLYCEMIA, WITH LONG-TERM CURRENT USE OF INSULIN (HCC): ICD-10-CM

## 2023-06-15 DIAGNOSIS — Z79.4 TYPE 2 DIABETES MELLITUS WITH HYPERGLYCEMIA, WITH LONG-TERM CURRENT USE OF INSULIN (HCC): ICD-10-CM

## 2023-06-16 RX ORDER — INSULIN GLARGINE 100 [IU]/ML
INJECTION, SOLUTION SUBCUTANEOUS
Qty: 30 ML | Refills: 5 | Status: SHIPPED | OUTPATIENT
Start: 2023-06-16

## 2023-07-19 DIAGNOSIS — E11.42 DIABETIC POLYNEUROPATHY ASSOCIATED WITH TYPE 2 DIABETES MELLITUS (HCC): ICD-10-CM

## 2023-07-19 RX ORDER — GABAPENTIN 100 MG/1
200 CAPSULE ORAL DAILY
Qty: 180 CAPSULE | Refills: 1 | Status: SHIPPED | OUTPATIENT
Start: 2023-07-19

## 2023-08-03 LAB
CREAT ?TM UR-SCNC: 38 UMOL/L
EXT ALBUMIN URINE RANDOM: 46.6
HBA1C MFR BLD HPLC: 8.1 %
MICROALBUMIN/CREAT UR: 1126 MG/G{CREAT}

## 2023-08-21 DIAGNOSIS — Z79.4 TYPE 2 DIABETES MELLITUS WITH HYPERGLYCEMIA, WITH LONG-TERM CURRENT USE OF INSULIN (HCC): ICD-10-CM

## 2023-08-21 DIAGNOSIS — E11.65 TYPE 2 DIABETES MELLITUS WITH HYPERGLYCEMIA, WITH LONG-TERM CURRENT USE OF INSULIN (HCC): ICD-10-CM

## 2023-08-21 RX ORDER — INSULIN ASPART 100 [IU]/ML
INJECTION, SOLUTION INTRAVENOUS; SUBCUTANEOUS
Qty: 45 ML | Refills: 5 | Status: SHIPPED | OUTPATIENT
Start: 2023-08-21

## 2023-08-31 ENCOUNTER — OFFICE VISIT (OUTPATIENT)
Dept: ENDOCRINOLOGY | Facility: HOSPITAL | Age: 83
End: 2023-08-31
Payer: COMMERCIAL

## 2023-08-31 VITALS
HEIGHT: 71 IN | OXYGEN SATURATION: 97 % | DIASTOLIC BLOOD PRESSURE: 70 MMHG | BODY MASS INDEX: 34.86 KG/M2 | WEIGHT: 249 LBS | HEART RATE: 52 BPM | SYSTOLIC BLOOD PRESSURE: 120 MMHG

## 2023-08-31 DIAGNOSIS — E11.65 TYPE 2 DIABETES MELLITUS WITH HYPERGLYCEMIA, WITH LONG-TERM CURRENT USE OF INSULIN (HCC): Primary | ICD-10-CM

## 2023-08-31 DIAGNOSIS — Z79.4 TYPE 2 DIABETES MELLITUS WITH HYPERGLYCEMIA, WITH LONG-TERM CURRENT USE OF INSULIN (HCC): Primary | ICD-10-CM

## 2023-08-31 PROCEDURE — 99214 OFFICE O/P EST MOD 30 MIN: CPT | Performed by: PHYSICIAN ASSISTANT

## 2023-08-31 PROCEDURE — 95251 CONT GLUC MNTR ANALYSIS I&R: CPT | Performed by: PHYSICIAN ASSISTANT

## 2023-08-31 RX ORDER — BENZONATATE 100 MG/1
CAPSULE ORAL
COMMUNITY
Start: 2023-06-30

## 2023-08-31 RX ORDER — CLINDAMYCIN PHOSPHATE 10 UG/ML
LOTION TOPICAL
COMMUNITY
Start: 2023-07-20

## 2023-08-31 RX ORDER — TRIAMCINOLONE ACETONIDE 1 MG/G
1 CREAM TOPICAL 2 TIMES DAILY
COMMUNITY
Start: 2023-06-02

## 2023-08-31 RX ORDER — CEFUROXIME AXETIL 500 MG/1
TABLET ORAL
COMMUNITY
Start: 2023-07-25

## 2023-08-31 NOTE — PATIENT INSTRUCTIONS
Monitor diet and maintain activity. Continue Lantus 30 units in the morning and 33 units in the evening. Continue NovoLog 22 units with breakfast, 23 units with lunch and 30 units with dinner. Continue gabapentin to 200 mg at night. Continue to use Dexcom to monitor blood sugar. Call the office with any concerns. Follow up in 3 months with lab work prior to visit.

## 2023-08-31 NOTE — PROGRESS NOTES
Ruthie Williamson 79 y.o. male MRN: [id number]    Encounter: [id number]      Assessment/Plan     Assessment: This is a 79y.o.-year-old male with type 2 diabetes with hypoglycemic unawareness, neuropathy, hypertension and hyperlipidemia. Plan:  1. Type 2 diabetes, insulin requiring: Recent hemoglobin A1c is 8.1. Likely sharp increase in his A1c is due to steroid and antibiotic use throughout the month of June. That being said download of his Dexcom shows a significant improvement in glucose levels that were expect his A1c to come down. Even so for his age and chronic medical conditions I will would be fine with his A1c. He will continue with Lantus 30 units in the a.m. and 33 units in the p.m., and NovoLog 22 units with breakfast, 23 units with lunch and 30 units with dinner.  Continue to utilize Dexcom continuous glucose monitor.  Contact the office with any concerns or questions.  Follow-up in 3 months with lab work completed prior to visit.     2. Hypoglycemic unawareness:  Has had several episodes of loss of consciousness due to hypoglycemia with ambulance called.  No episodes of hypoglycemia since he started to utilize Dexcom G7.     3. Diabetic neuropathy:  Stable. Diabetic foot exam is up-to-date.     4. Hypertension:  Normotensive in the office. Lethaniel Age function remains stable with normal electrolytes. May have a little bit of dehydration going on with this set of lab work. Make sure to drink plenty water throughout the day.  Continue with current medication.  Repeat CMP prior to next office visit.     5. Hyperlipidemia:  Previous lipid panel was excellent.  Continue with current medications. Repeat lipid panel prior to next office visit.     CC: Type 2 diabetes follow-up    History of Present Illness     HPI:  Cheyenne KHANNA 58 [employer] old male with type 2 diabetes, insulin requiring with neuropathy for 55 years, hyperlipidemia, hypertension for follow-up.  He was diagnosed with type 2 diabetes in his 35s. Mariah Krabbe originally went on metformin therapy. Mariah Krabbe was never on any other oral agents as metformin controlled his blood sugars for the 1st several years and then he was switched to insulin therapy. Mariah Krabbe reports that Humalog insulin does not improve his blood sugars as well as NovoLog insulin does.  He is on insulin at home and takes Lantus insulin 30 units in am and 33 units in pm and NovoLog insulin 22 units with breakfast, 23 units with lunch and 30 units at supper. We will make adjustments to his NovoLog based on current glucose levels. Has taken correction doses of his NovoLog if glucose levels remain in the 200s.  He denies any polyuria, polydipsia, nocturia and blurry vision.  He has once a night nocturia.  He denies chest pain or shortness of breath.  He has occasional shooting pains of the legs at night and has numbness of the feet to his knees bilaterally.  He denies retinopathy, heart attack, stroke and claudication but does admit to neuropathy and nephropathy.  He reports he was to Diabetes Education on diagnosis in several times since then has seen 1 on 1 with a dietitian and gone to diabetes classes. Mariah Krabbe does try to limit carbohydrates.  Had a pacer placed in May 2022. Is doing well today, but has been having breathing issues recently. Issues seem to be coming from the upper airway. That being said he was placed on antibiotics and steroids almost the whole month of June and with that glucose levels were running high. Since then they have trended back down. Does have an upcoming appointment with the ENT and pulmonology.     Hypoglycemic episodes: Rare episodes. H/o of hypoglycemia causing hospitalization or intervention such as glucagon injection  or ambulance call  Yes. Has been loc in the past requiring 911 many years ago. Hypoglycemia symptoms: sweating and and feel bad or no symptoms at all. Treatment of hypoglycemia: either cookies or glucose tablets. Glucagon:Yes.   Medic alert tag: recommended,Yes.      The patient's last eye exam was in March 2022 in [address] at [facility] with no retinopathy.  The patient's last foot exam was in November 2022 at endocrine office and in January 2022 with Dr. Wily Lynn. Most recent hemoglobin A1c completed August 3, 2023 was 8.1     Blood Sugar/Glucometer/Pump/CGM review: Download of Dexcom from August 18 through August 31, 2023 reveals an average glucose level of 162 with a standard deviation of 59. He is in target range 64% time, above target range 35% time, below target range 1% time. Typically glucose levels trend down slowly overnight and then increase with breakfast.  He will plateau on average around noon but then will slowly trend downwards late afternoon and level out until the later evening.     He has hyperlipidemia and takes atorvastatin 20 mg daily.  He denies chest pain or shortness of breath.       He has hypertension and takes amlodipine 10 mg daily and losartan 100 mg daily along with furosemide 40 mg twice a day.  He denies headache or stroke-like symptoms. Review of Systems   Constitutional: Negative for activity change, appetite change, fatigue and unexpected weight change. HENT: Negative for trouble swallowing. Eyes: Positive for visual disturbance (Wears glasses). Respiratory: Positive for shortness of breath. Negative for chest tightness. Cardiovascular: Positive for leg swelling. Negative for chest pain and palpitations. Gastrointestinal: Negative for abdominal pain, diarrhea, nausea and vomiting. Endocrine: Negative for cold intolerance, heat intolerance, polydipsia, polyphagia and polyuria. Genitourinary: Negative for frequency. Musculoskeletal: Positive for arthralgias, back pain and gait problem. Skin: Positive for wound. Negative for rash. Neurological: Positive for tremors. Negative for dizziness, weakness, light-headedness, numbness and headaches.    Psychiatric/Behavioral: Negative for dysphoric mood and sleep disturbance. The patient is not nervous/anxious.         Historical Information   Past Medical History:   Diagnosis Date   • Asthma    • Bradycardia    • Carcinoma, basal cell, skin     head and face and ear   • COVID-19 12/05/2022   • Dupuytren contracture     bilateral ring and right middle   • Gout    • Herniated lumbar intervertebral disc    • Sleep apnea      Past Surgical History:   Procedure Laterality Date   • APPENDECTOMY      age 23, ruptured   • CATARACT EXTRACTION, BILATERAL Bilateral    • CERVICAL FUSION     • CYST REMOVAL Left 01/2023   • DUPUYTREN CONTRACTURE RELEASE Left     left ring finger   • LUMBAR LAMINECTOMY      belkis placed   • TONSILLECTOMY     • VASECTOMY       Social History   Social History     Substance and Sexual Activity   Alcohol Use Yes    Comment: rare     Social History     Substance and Sexual Activity   Drug Use Never     Social History     Tobacco Use   Smoking Status Never   Smokeless Tobacco Never     Family History:   Family History   Problem Relation Age of Onset   • Hypertension Mother    • Diabetes type II Mother    • Hypertension Father    • Diabetes type II Brother    • Hypertension Brother    • Kidney disease Sister    • Hypertension Daughter    • GI problems Daughter        Meds/Allergies   Current Outpatient Medications   Medication Sig Dispense Refill   • albuterol (2.5 mg/3 mL) 0.083 % nebulizer solution INHALE ONE VIAL VIA NEBULIZER EVERY 6 HOURS AS NEEDED     • allopurinol (ZYLOPRIM) 300 mg tablet every 24 hours     • amLODIPine (NORVASC) 10 mg tablet Take 10 mg by mouth daily     • atorvastatin (LIPITOR) 20 mg tablet Take 20 mg by mouth daily     • azelastine (ASTELIN) 0.1 % nasal spray      • benzonatate (TESSALON PERLES) 100 mg capsule TAKE ONE CAPSULE BY MOUTH AT BEDTIME AS NEEDED FOR cough FOR 10 DAYS     • cefuroxime (CEFTIN) 500 mg tablet TAKE ONE TABLET BY MOUTH TWICE DAILY FOR 10 DAYS     • Cholecalciferol (Vitamin D3) 25 MCG (1000 UT) CAPS every 24 hours     • clindamycin (CLEOCIN T) 1 % lotion apply TO open cysts DAILY AFTER WASHING with hibiclens AND patting DRY     • Easy Comfort Pen Needles 33G X 4 MM MISC USE WITH INSULIN FIVE TIMES DAILY AS DIRECTED     • fluticasone (FLONASE) 50 mcg/act nasal spray every 24 hours     • Fluticasone-Salmeterol (Advair) 250-50 mcg/dose inhaler Inhale 1 puff 2 (two) times a day     • furosemide (LASIX) 40 mg tablet Take 40 mg by mouth daily     • gabapentin (NEURONTIN) 100 mg capsule Take 2 capsules (200 mg total) by mouth daily 180 capsule 1   • Glucosamine-Chondroitin 500-400 MG CAPS      • insulin aspart (NovoLOG FlexPen) 100 UNIT/ML injection pen INJECT 22 UNITS SUBCUTANEOUSLY with breakfast, 23 UNITS with LUNCH, AND 28 UNITS with dinner plus sliding scale. (UP  UNITS DAILY) 45 mL 5   • Insulin Glargine Solostar (Lantus SoloStar) 100 UNIT/ML SOPN INJECT 30 UNITS SUBCUTANEOUSLY IN THE MORNING AND 32 UNITS IN THE EVENING. MAY INCREASE TO 80 UNITS DAILY 30 mL 5   • Ipratropium Bromide (ATROVENT NA)      • losartan (COZAAR) 100 MG tablet Take 100 mg by mouth daily     • Multiple Vitamin (MULTIVITAMIN ADULT PO) every 24 hours     • mupirocin (BACTROBAN) 2 % ointment      • OneTouch Verio test strip USE AS DIRECTED FOUR TIMES DAILY     • potassium citrate (UROCIT-K) 10 mEq Every 12 hours     • sulfamethoxazole-trimethoprim (BACTRIM DS) 800-160 mg per tablet      • triamcinolone (KENALOG) 0.1 % cream 1 Application 2 (two) times a day To affected areas     • loratadine (CLARITIN) 10 mg tablet Take 10 mg by mouth daily (Patient not taking: Reported on 8/31/2023)       No current facility-administered medications for this visit.      Allergies   Allergen Reactions   • Hydrochlorothiazide Other (See Comments)     SEVERE DIZZINESS   • Insulin Lispro Other (See Comments)     Cannot control blood sugars(Humalog)   • Lisinopril Other (See Comments)     Reports cough with use    • Minoxidil GI Intolerance   • Nifedipine Other (See Comments)     EDEMA LEGS   • Other Other (See Comments)     SEVERE DIZZINESS  Hydrap-es   • Prazosin Other (See Comments)     CHEST PAIN     • Venlafaxine Other (See Comments)     Made pt feel bad     • Doxycycline Rash   • Simvastatin Rash     PEDAL EDEMA         Objective   Vitals: Blood pressure 120/70, pulse (!) 52, height 5' 11" (1.803 m), weight 113 kg (249 lb), SpO2 97 %. Physical Exam  Vitals and nursing note reviewed. Constitutional:       General: He is not in acute distress. Appearance: Normal appearance. He is not diaphoretic. HENT:      Head: Normocephalic and atraumatic. Eyes:      General: No scleral icterus. Extraocular Movements: Extraocular movements intact. Conjunctiva/sclera: Conjunctivae normal.      Pupils: Pupils are equal, round, and reactive to light. Cardiovascular:      Rate and Rhythm: Normal rate and regular rhythm. Heart sounds: No murmur heard. Pulmonary:      Effort: Pulmonary effort is normal. No respiratory distress. Breath sounds: Normal breath sounds. No stridor. No wheezing, rhonchi or rales. Musculoskeletal:      Cervical back: Normal range of motion. Right lower leg: Edema present. Left lower leg: Edema present. Lymphadenopathy:      Cervical: No cervical adenopathy. Skin:     General: Skin is warm and dry. Neurological:      Mental Status: He is alert and oriented to person, place, and time. Mental status is at baseline. Sensory: No sensory deficit. Gait: Gait abnormal (utilizing a walker). Psychiatric:         Mood and Affect: Mood normal.         Behavior: Behavior normal.         Thought Content: Thought content normal.         The history was obtained from the review of the chart, patient.     Lab Results:   Lab Results   Component Value Date/Time    Hemoglobin A1C 8.1 08/03/2023 12:00 AM    Hemoglobin A1C 7.1 03/23/2023 12:00 AM    Hemoglobin A1C 7.4 12/22/2022 12:00 AM       Portions of the record may have been created with voice recognition software. Occasional wrong word or "sound a like" substitutions may have occurred due to the inherent limitations of voice recognition software. Read the chart carefully and recognize, using context, where substitutions have occurred.

## 2023-09-05 ENCOUNTER — OFFICE VISIT (OUTPATIENT)
Dept: PODIATRY | Facility: CLINIC | Age: 83
End: 2023-09-05
Payer: COMMERCIAL

## 2023-09-05 VITALS
WEIGHT: 247 LBS | HEIGHT: 71 IN | DIASTOLIC BLOOD PRESSURE: 67 MMHG | BODY MASS INDEX: 34.58 KG/M2 | SYSTOLIC BLOOD PRESSURE: 157 MMHG

## 2023-09-05 DIAGNOSIS — E11.40 TYPE 2 DIABETES MELLITUS WITH DIABETIC NEUROPATHY, UNSPECIFIED WHETHER LONG TERM INSULIN USE (HCC): ICD-10-CM

## 2023-09-05 DIAGNOSIS — B35.1 ONYCHOMYCOSIS: Primary | ICD-10-CM

## 2023-09-05 DIAGNOSIS — L84 CORNS AND CALLUS: ICD-10-CM

## 2023-09-05 PROCEDURE — 11056 PARNG/CUTG B9 HYPRKR LES 2-4: CPT | Performed by: PODIATRIST

## 2023-09-05 PROCEDURE — 11721 DEBRIDE NAIL 6 OR MORE: CPT | Performed by: PODIATRIST

## 2023-09-06 NOTE — PROGRESS NOTES
PATIENT:  Homa Angel  1940    ASSESSMENT:  1. Onychomycosis        2. Corns and callus        3. Type 2 diabetes mellitus with diabetic neuropathy, unspecified whether long term insulin use ([address])              No orders of the defined types were placed in this encounter. PLAN:  Disease prevention and related risk factors of diabetes were identified and discussed. The patient was educated in proper foot wear for diabetics. Educated in daily foot assessment and routine diabetic foot care. Discussed the importance of controlling BS through diet and exercise. The patient will follow up in 9-12 weeks for further diabetic foot exam and care. Procedures: 14007, 11056  All mycotic toenails were reduced and debrided in length, width, and girth using a nail nipper and electric dremel. All hyperkeratotic skin lesion(s) if present were sharply pared with a #10 scalpel with no evidence of ulceration/abscess. Patient tolerated procedure(s) well without complications. Procedures     HPI:  Homa Angel is a 79 y. o.year old male seen for diabetic foot exam.  The patient has class findings with diabetes. BS is under control. The patient complained of thick toenails and painful lesions which have previously ulcerated. The patient denied any acute pedal disorder, redness, acute swelling, or recent injury.       The following portions of the patient's history were reviewed and updated as appropriate: allergies, current medications, past family history, past medical history, past social history, past surgical history and problem list.    REVIEW OF SYSTEMS:  GENERAL: No fever or chills  HEART: No chest pain, or palpitation  RESPIRATORY:  No acute SOB or cough  GI: No Nausea, vomit or diarrhea  NEUROLOGIC: No syncope or acute weakness    PHYSICAL EXAM:    /67   Ht 5' 11" (1.803 m)   Wt 112 kg (247 lb)   BMI 34.45 kg/m²     VASCULAR EXAM  Posterior tibial artery absent bilateral  Dorsalis pedis artery +1 bilateral  The patient has class findings with skin atrophy, lack of digital hair, and nail dystrophy. There is +1 lower extremity edema bilaterally. Venous stasis skin changes noted BLE. No    NEUROLOGIC EXAM  Sensation is intact to light touch. Yes   Sensation is intact to 10gm monofilament. Vibratory sensation moderately diminished. Tingling numb paresthesias noted bilateral.      No focal neurologic deficit. DERMATOLOGIC EXAM:   Texture, Tone and Turgor are diminished bilateral.  The patient has dystrophic/hypertrophic toenails with yellow/white discoloration, onycholysis, and subungal debris. Fungal odor noted. Brittle nature noted. Right foot nails severely dystrophic x3 with 0.4 cm ave thickness (1 2 and 4)  Left foot nails severely dystrophic x3 with 0.4 cm ave thickness (1 2 and 3)  Patient has hyperkeratotic lesions noted:  Right foot at subsecond MPJ approximately 1.5 cm² in size, preulcerative, hemorrhaging callus noted. .  Left foot at subsecond MPJ, similar to contralateral side in presentation. Ulcer or cellulitis noted. No  No notable suspicious skin lesions. MUSCULOSKELETAL EXAM:   No acute joint pain, edema, or redness. No acute musculoskeletal problem. Patient has deformity including hallux valgus with contracted hammertoes, and plantarflexed second metatarsal bilateral. Patient has minimal ambulation limitations. Patient wears DM shoes? Yes    Risk Category/Class Findings: Q8 (B1, B2 ABC)  0 = No loss of protective sensation    A1)  Has the patient had a previous amputation of the foot or integral skeletal portion thereof? No  B1)  Does the patient have absent posterior tibial pulse? Yes  B3)  Does the patient have absent dorsalis pedis? No  B2)  Does the patient have three of the following? Yes           1. Hair growth (increased or decreased), 2.  Nail changes (thickening) and 3.   Pigmentary changes (discoloring)  C)  Does the patient have two of the following and one above? Yes            3.  Edema and 4.   Paraesthesias (abnormal spontaneous sensations in the feet)

## 2023-09-09 ENCOUNTER — HOSPITAL ENCOUNTER (EMERGENCY)
Facility: HOSPITAL | Age: 83
Discharge: HOME | End: 2023-09-09
Attending: EMERGENCY MEDICINE
Payer: COMMERCIAL

## 2023-09-09 VITALS
RESPIRATION RATE: 17 BRPM | DIASTOLIC BLOOD PRESSURE: 58 MMHG | OXYGEN SATURATION: 99 % | TEMPERATURE: 97.9 F | HEART RATE: 61 BPM | SYSTOLIC BLOOD PRESSURE: 121 MMHG

## 2023-09-09 DIAGNOSIS — L89.152 SACRAL DECUBITUS ULCER, STAGE II (HCC): Primary | ICD-10-CM

## 2023-09-09 DIAGNOSIS — Z51.89 VISIT FOR WOUND CHECK: ICD-10-CM

## 2023-09-09 PROCEDURE — 99284 EMERGENCY DEPT VISIT MOD MDM: CPT | Performed by: PHYSICIAN ASSISTANT

## 2023-09-09 PROCEDURE — 99283 EMERGENCY DEPT VISIT LOW MDM: CPT

## 2023-09-09 NOTE — ED PROVIDER NOTES
History  Chief Complaint   Patient presents with   • Wound Check     Pt has an on going wound on his left buttock area. Pt was placed on antibiotics 5days ago, pt reports increase pain and drainage     HPI     79-year-old male with past medical history type 2 diabetes, hypertension, hyperlipidemia  Presents with chronic buttock wound he is pending evaluation at the wound care center at [facility] on September 20. He states is causing him pain and having some mild bloody spotting from the wound. He feels that the wound is open and raw. He has been seeing his PCP who has been taking care of the wound. Currently he is on clindamycin ointment as well as mupirocin ointment and oral cephalexin. He specifically denies any fevers, rigors, spreading redness, abscess formation, nausea or vomiting. No abdominal pain or diarrhea. No bloody stools. Prior to Admission Medications   Prescriptions Last Dose Informant Patient Reported? Taking?    Cholecalciferol (Vitamin D3) 25 MCG (1000 UT) CAPS 9/9/2023 Self Yes Yes   Sig: every 24 hours   Easy Comfort Pen Needles 33G X 4 MM MISC 9/9/2023 Self Yes Yes   Sig: USE WITH INSULIN FIVE TIMES DAILY AS DIRECTED   Fluticasone-Salmeterol (Advair) 250-50 mcg/dose inhaler 9/9/2023 Self Yes Yes   Sig: Inhale 1 puff 2 (two) times a day   Glucosamine-Chondroitin 500-400 MG CAPS 9/9/2023 Self Yes Yes   Insulin Glargine Solostar (Lantus SoloStar) 100 UNIT/ML SOPN 9/9/2023  No Yes   Sig: INJECT 30 UNITS SUBCUTANEOUSLY IN THE MORNING AND 32 UNITS IN THE EVENING. MAY INCREASE TO 80 UNITS DAILY   Ipratropium Bromide (ATROVENT NA) 9/9/2023 Self Yes Yes   Multiple Vitamin (MULTIVITAMIN ADULT PO) Not Taking Self Yes No   Sig: every 24 hours   Patient not taking: Reported on 9/1/2023   OneTouch Verio test strip 9/9/2023 Self Yes Yes   Sig: USE AS DIRECTED FOUR TIMES DAILY   albuterol (2.5 mg/3 mL) 0.083 % nebulizer solution 9/9/2023 Self Yes Yes   Sig: INHALE ONE VIAL VIA NEBULIZER EVERY 6 HOURS AS NEEDED   allopurinol (ZYLOPRIM) 300 mg tablet 9/9/2023 Self Yes Yes   Sig: every 24 hours   amLODIPine (NORVASC) 10 mg tablet Not Taking Self Yes No   Sig: Take 10 mg by mouth daily   Patient not taking: Reported on 9/1/2023   atorvastatin (LIPITOR) 20 mg tablet 9/9/2023 Self Yes Yes   Sig: Take 20 mg by mouth daily   azelastine (ASTELIN) 0.1 % nasal spray 9/9/2023 Self Yes Yes   benzonatate (TESSALON PERLES) 100 mg capsule Not Taking  Yes No   Sig: TAKE ONE CAPSULE BY MOUTH AT BEDTIME AS NEEDED FOR cough FOR 10 DAYS   Patient not taking: Reported on 9/1/2023   cefuroxime (CEFTIN) 500 mg tablet Not Taking  Yes No   Sig: TAKE ONE TABLET BY MOUTH TWICE DAILY FOR 10 DAYS   Patient not taking: Reported on 9/1/2023   clindamycin (CLEOCIN T) 1 % lotion Not Taking  Yes No   Sig: apply TO open cysts DAILY AFTER WASHING with hibiclens AND patting DRY   Patient not taking: Reported on 9/1/2023   fluticasone (FLONASE) 50 mcg/act nasal spray 9/9/2023 Self Yes Yes   Sig: every 24 hours   furosemide (LASIX) 40 mg tablet 9/9/2023 Self Yes Yes   Sig: Take 40 mg by mouth daily   gabapentin (NEURONTIN) 100 mg capsule 9/9/2023  No Yes   Sig: Take 2 capsules (200 mg total) by mouth daily   insulin aspart (NovoLOG FlexPen) 100 UNIT/ML injection pen Not Taking  No No   Sig: INJECT 22 UNITS SUBCUTANEOUSLY with breakfast, 23 UNITS with LUNCH, AND 28 UNITS with dinner plus sliding scale.  (UP  UNITS DAILY)   Patient not taking: Reported on 9/1/2023   loratadine (CLARITIN) 10 mg tablet Not Taking Self Yes No   Sig: Take 10 mg by mouth daily   Patient not taking: Reported on 8/31/2023   losartan (COZAAR) 100 MG tablet Not Taking Self Yes No   Sig: Take 100 mg by mouth daily   Patient not taking: Reported on 9/1/2023   mupirocin (BACTROBAN) 2 % ointment 9/9/2023  Yes Yes   potassium citrate (UROCIT-K) 10 mEq 9/9/2023 Self Yes Yes   Sig: Every 12 hours   sulfamethoxazole-trimethoprim (BACTRIM DS) 800-160 mg per tablet Not Taking Self Yes No   Patient not taking: Reported on 2023   triamcinolone (KENALOG) 0.1 % cream 2023  Yes Yes   Si Application 2 (two) times a day To affected areas      Facility-Administered Medications: None       Past Medical History:   Diagnosis Date   • Asthma    • Bradycardia    • Carcinoma, basal cell, skin     head and face and ear   • COVID-19 2022   • Dupuytren contracture     bilateral ring and right middle   • Gout    • Herniated lumbar intervertebral disc    • Sleep apnea        Past Surgical History:   Procedure Laterality Date   • APPENDECTOMY      age 23, ruptured   • CATARACT EXTRACTION, BILATERAL Bilateral    • CERVICAL FUSION     • CYST REMOVAL Left 2023   • DUPUYTREN CONTRACTURE RELEASE Left     left ring finger   • LUMBAR LAMINECTOMY      belkis placed   • TONSILLECTOMY     • VASECTOMY         Family History   Problem Relation Age of Onset   • Hypertension Mother    • Diabetes type II Mother    • Hypertension Father    • Diabetes type II Brother    • Hypertension Brother    • Kidney disease Sister    • Hypertension Daughter    • GI problems Daughter      I have reviewed and agree with the history as documented. E-Cigarette/Vaping   • E-Cigarette Use Never User      E-Cigarette/Vaping Substances   • Nicotine No    • THC No    • CBD No    • Flavoring No    • Other No    • Unknown No      Social History     Tobacco Use   • Smoking status: Never   • Smokeless tobacco: Never   Vaping Use   • Vaping Use: Never used   Substance Use Topics   • Alcohol use: Yes     Comment: rare   • Drug use: Never       Review of Systems   Constitutional: Negative for chills and fever. HENT: Negative for ear pain and sore throat. Eyes: Negative for pain and visual disturbance. Respiratory: Negative for cough and shortness of breath. Cardiovascular: Negative for chest pain and palpitations. Gastrointestinal: Negative for abdominal pain and vomiting.    Genitourinary: Negative for dysuria and hematuria. Musculoskeletal: Negative for arthralgias and back pain. Skin: Positive for wound. Negative for color change and rash. Neurological: Negative for seizures and syncope. All other systems reviewed and are negative. Physical Exam  Physical Exam  Constitutional:       Appearance: He is well-developed. Comments: Elderly male, walks with walker   HENT:      Head: Normocephalic and atraumatic. Eyes:      Conjunctiva/sclera: Conjunctivae normal.      Pupils: Pupils are equal, round, and reactive to light. Cardiovascular:      Rate and Rhythm: Normal rate and regular rhythm. Heart sounds: Normal heart sounds. Pulmonary:      Effort: Pulmonary effort is normal.      Breath sounds: Normal breath sounds. Abdominal:      General: Bowel sounds are normal.      Palpations: Abdomen is soft. Genitourinary:     Comments: Patient has bilateral buttock sacral decubitus ulcer stage II left greater than right with no surrounding cellulitis. Mild excoriations with open dermis. No abscess or fluctuance. Musculoskeletal:         General: Normal range of motion. Cervical back: Normal range of motion and neck supple. Comments: Patient has vertical lower linear healed midline scar   Skin:     General: Skin is warm and dry. Neurological:      Mental Status: He is alert and oriented to person, place, and time.              Vital Signs  ED Triage Vitals   Temperature Pulse Respirations Blood Pressure SpO2   09/09/23 0823 09/09/23 0821 09/09/23 0821 09/09/23 0823 09/09/23 0821   97.9 °F (36.6 °C) 57 18 147/69 98 %      Temp Source Heart Rate Source Patient Position - Orthostatic VS BP Location FiO2 (%)   09/09/23 0823 -- -- -- --   Temporal          Pain Score       --                  Vitals:    09/09/23 0821 09/09/23 0823   BP:  147/69   Pulse: 57          Visual Acuity      ED Medications  Medications - No data to display    Diagnostic Studies  Results Reviewed     None No orders to display              Procedures  Procedures         ED Course  ED Course as of 09/09/23 0939   Sat Sep 09, 2023   5061 Getting hydrocolloid bandages from upstairs. Provided calazime Zn Oxide cream to patient. Patient has upcoming appointment on 9/20 with [place] wound center. Provided referral also to [facility] wound care center in event patient may be able to get sooner follow-up. Medical Decision Making  Chronic sacral wound with excoriation and open dermis stage II sacral decubitus ulcer. Patient currently on antibiotics as well as mupirocin and clindamycin ointment. Abided Calazyme barrier lotion to patient as well as hydrocolloid dressings patient to use until he can get follow-up. Instructed patient and wife to purchase hydrocolloid dressings in the outpatient setting to bridge Until follow-up. No active cellulitis or abscess. Patient otherwise stable with unremarkable clinical course. Referral placed to wound care for possible earlier follow-up. Sacral decubitus ulcer, stage II ([address]): chronic illness or injury  Visit for wound check: chronic illness or injury      Disposition  Final diagnoses:   Visit for wound check   Sacral decubitus ulcer, stage II ([address])     Time reflects when diagnosis was documented in both MDM as applicable and the Disposition within this note     Time User Action Codes Description Comment    9/9/2023  8:53 AM Anola Chaya Add [Z51.89] Visit for wound check     9/9/2023  8:53 AM Anola Chaya Add [L89.152] Sacral decubitus ulcer, stage II ([address])     9/9/2023  8:54 AM Anola Chaya Modify [Z51.89] Visit for wound check     9/9/2023  8:54 AM Anola Chaya Modify [L89.152] Sacral decubitus ulcer, stage II [facility])       ED Disposition     ED Disposition   Discharge    Condition   Stable    Date/Time   Sat Sep 9, 2023  8:53 AM    Comment   Anjelica Gonzalez discharge to home/self care. Follow-up Information     Follow up With Specialties Details Why Contact Info Additional Information    Yary White MD [facility] Medicine Call  Call today for follow up appointment. [facility] [address]  [facility] at [facility] Call today Call today for follow up consultation appointment.  [address] [facility]  [phone] [place], [place], [address] [facility]          Patient's Medications   Discharge Prescriptions    No medications on file           PDMP Review     None          ED Provider  Electronically Signed by           Edie Bahena PA-C  09/09/23 1786

## 2023-09-09 NOTE — DISCHARGE INSTRUCTIONS
Use hydrocolloid dressing and calazime until follow-up. Discussed return emergency department for any newly developing or worsening signs or symptoms. Patient understood all instructions prior to discharge and plan agreed upon by patient and myself.

## 2023-09-18 ENCOUNTER — HOSPITAL ENCOUNTER (OUTPATIENT)
Dept: CT IMAGING | Facility: HOSPITAL | Age: 83
Discharge: HOME | End: 2023-09-18
Payer: COMMERCIAL

## 2023-09-18 ENCOUNTER — OFFICE VISIT (OUTPATIENT)
Dept: WOUND CARE | Facility: HOSPITAL | Age: 83
End: 2023-09-18
Payer: COMMERCIAL

## 2023-09-18 VITALS — SYSTOLIC BLOOD PRESSURE: 128 MMHG | DIASTOLIC BLOOD PRESSURE: 60 MMHG | TEMPERATURE: 98.2 F | HEART RATE: 56 BPM

## 2023-09-18 DIAGNOSIS — Z79.4 TYPE 2 DIABETES MELLITUS WITH HYPERGLYCEMIA, WITH LONG-TERM CURRENT USE OF INSULIN (HCC): ICD-10-CM

## 2023-09-18 DIAGNOSIS — L89.322 PRESSURE INJURY OF LEFT BUTTOCK, STAGE 2 (HCC): Primary | ICD-10-CM

## 2023-09-18 DIAGNOSIS — E11.65 TYPE 2 DIABETES MELLITUS WITH HYPERGLYCEMIA, WITH LONG-TERM CURRENT USE OF INSULIN (HCC): ICD-10-CM

## 2023-09-18 DIAGNOSIS — R05.9 COUGH, UNSPECIFIED: ICD-10-CM

## 2023-09-18 PROCEDURE — 71250 CT THORAX DX C-: CPT

## 2023-09-18 PROCEDURE — G1004 CDSM NDSC: HCPCS

## 2023-09-18 PROCEDURE — 99203 OFFICE O/P NEW LOW 30 MIN: CPT | Performed by: NURSE PRACTITIONER

## 2023-09-18 PROCEDURE — 99213 OFFICE O/P EST LOW 20 MIN: CPT | Performed by: NURSE PRACTITIONER

## 2023-09-18 NOTE — PROGRESS NOTES
Patient ID: Mely Pearl is a 79 y.o. male Date of Birth 1940     Chief Complaint  Chief Complaint   Patient presents with   • New Patient Visit     New patient- here for sacral wound       Allergies  Hydrochlorothiazide, Insulin lispro, Lisinopril, Minoxidil, Nifedipine, Other, Prazosin, Venlafaxine, Doxycycline, and Simvastatin    Assessment:     Diagnoses and all orders for this visit:    Pressure injury of left buttock, stage 2 (HCC)  -     Wound cleansing and dressings; Future    Type 2 diabetes mellitus with hyperglycemia, with long-term current use of insulin ([address])          Plan:  1. Initial visit. Wound cleansed with NSS and gauze. Patient has a stage 2 pressure ulcer of his left buttocks which is almost healed. Will change treatment to hydraguard cream applied to area TID and PRN. 2. Continue frequent repositioning to offload pressure from wound  3. A1C results reviewed with the patient today. Patient maintaining tight glycemic control  4. Patient will follow up in 2 weeks     Wound 09/18/23 Pressure Injury Buttocks Left (Active)   Wound Image Images linked 09/18/23 1308   Wound Description Granulation tissue 09/18/23 1307   Pressure Injury Stage 2 09/18/23 1307   Tash-wound Assessment Intact 09/18/23 1307   Wound Length (cm) 0.1 cm 09/18/23 1307   Wound Width (cm) 0.3 cm 09/18/23 1307   Wound Depth (cm) 0.1 cm 09/18/23 1307   Wound Surface Area (cm^2) 0.03 cm^2 09/18/23 1307   Wound Volume (cm^3) 0.003 cm^3 09/18/23 1307   Calculated Wound Volume (cm^3) 0 cm^3 09/18/23 1307   Drainage Amount Scant 09/18/23 1307   Drainage Description Bloody 09/18/23 1307   Non-staged Wound Description Full thickness 09/18/23 1307   Dressing Status Intact 09/18/23 1307       Wound 09/18/23 Pressure Injury Buttocks Left (Active)   Date First Assessed/Time First Assessed: 09/18/23 1307   Primary Wound Type: Pressure Injury  Location: Buttocks  Wound Location Orientation: Left       Subjective:      .     Patient is an 79year old male who presents to the [facility] wound center as a new patient for a stage 2 pressure ulcer of his left buttocks. Patient reports his wound has been present for approximately 2 months. He was recently seen in the ED for a wound check and a hydrocolloid was applied to the area. He reports that the hydrocolloid dressings have been helping his wound heal. He reports minimal drainage. Patient has a hx of T2DM. Per patient's chart his most recent A1c was 8.1 as of 8/3/23. He denies any pain, fevers, or chills. The following portions of the patient's history were reviewed and updated as appropriate:   He  has a past medical history of Asthma, Bradycardia, Carcinoma, basal cell, skin, COVID-19 (12/05/2022), Dupuytren contracture, Gout, Herniated lumbar intervertebral disc, and Sleep apnea. 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 ([address]) 03/25/2022   • Elevated parathyroid hormone 03/25/2022   • Diabetic nephropathy associated with type 2 diabetes mellitus ([address]) 03/25/2022   • Type 2 diabetes mellitus with hyperglycemia, with long-term current use of insulin ([address]) 03/24/2022   • Mixed hyperlipidemia 03/24/2022   • Diabetic polyneuropathy associated with type 2 diabetes mellitus ([address]) 03/24/2022   • Primary hypertension 03/24/2022   • Morbid (severe) obesity due to excess calories ([address]) 03/24/2022   • 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 ([address]) 03/24/2022     He  has a past surgical history that includes Cervical fusion; Lumbar laminectomy; Tonsillectomy; Appendectomy; Vasectomy; Cataract extraction, bilateral (Bilateral); Dupuytren contracture release (Left); and Cyst Removal (Left, 01/2023). His family history includes Diabetes type II in his brother and mother; GI problems in his daughter; Hypertension in his brother, daughter, father, and mother; Kidney disease in his sister.   He  reports that he has never smoked. He has never used smokeless tobacco. He reports current alcohol use. He reports that he does not use drugs. Current Outpatient Medications   Medication Sig Dispense Refill   • albuterol (2.5 mg/3 mL) 0.083 % nebulizer solution INHALE ONE VIAL VIA NEBULIZER EVERY 6 HOURS AS NEEDED     • allopurinol (ZYLOPRIM) 300 mg tablet every 24 hours     • amLODIPine (NORVASC) 10 mg tablet Take 10 mg by mouth daily (Patient not taking: Reported on 9/1/2023)     • atorvastatin (LIPITOR) 20 mg tablet Take 20 mg by mouth daily     • azelastine (ASTELIN) 0.1 % nasal spray      • benzonatate (TESSALON PERLES) 100 mg capsule TAKE ONE CAPSULE BY MOUTH AT BEDTIME AS NEEDED FOR cough FOR 10 DAYS (Patient not taking: Reported on 9/1/2023)     • cefuroxime (CEFTIN) 500 mg tablet TAKE ONE TABLET BY MOUTH TWICE DAILY FOR 10 DAYS (Patient not taking: Reported on 9/1/2023)     • Cholecalciferol (Vitamin D3) 25 MCG (1000 UT) CAPS every 24 hours     • clindamycin (CLEOCIN T) 1 % lotion apply TO open cysts DAILY AFTER WASHING with hibiclens AND patting DRY (Patient not taking: Reported on 9/1/2023)     • Easy Comfort Pen Needles 33G X 4 MM MISC USE WITH INSULIN FIVE TIMES DAILY AS DIRECTED     • fluticasone (FLONASE) 50 mcg/act nasal spray every 24 hours     • Fluticasone-Salmeterol (Advair) 250-50 mcg/dose inhaler Inhale 1 puff 2 (two) times a day     • furosemide (LASIX) 40 mg tablet Take 40 mg by mouth daily     • gabapentin (NEURONTIN) 100 mg capsule Take 2 capsules (200 mg total) by mouth daily 180 capsule 1   • Glucosamine-Chondroitin 500-400 MG CAPS      • insulin aspart (NovoLOG FlexPen) 100 UNIT/ML injection pen INJECT 22 UNITS SUBCUTANEOUSLY with breakfast, 23 UNITS with LUNCH, AND 28 UNITS with dinner plus sliding scale.  (UP  UNITS DAILY) (Patient not taking: Reported on 9/1/2023) 45 mL 5   • Insulin Glargine Solostar (Lantus SoloStar) 100 UNIT/ML SOPN INJECT 30 UNITS SUBCUTANEOUSLY IN THE MORNING AND 32 UNITS IN THE EVENING. MAY INCREASE TO 80 UNITS DAILY 30 mL 5   • Ipratropium Bromide (ATROVENT NA)      • loratadine (CLARITIN) 10 mg tablet Take 10 mg by mouth daily (Patient not taking: Reported on 8/31/2023)     • losartan (COZAAR) 100 MG tablet Take 100 mg by mouth daily (Patient not taking: Reported on 9/1/2023)     • Multiple Vitamin (MULTIVITAMIN ADULT PO) every 24 hours (Patient not taking: Reported on 9/1/2023)     • mupirocin (BACTROBAN) 2 % ointment      • OneTouch Verio test strip USE AS DIRECTED FOUR TIMES DAILY     • potassium citrate (UROCIT-K) 10 mEq Every 12 hours     • sulfamethoxazole-trimethoprim (BACTRIM DS) 800-160 mg per tablet  (Patient not taking: Reported on 9/1/2023)     • triamcinolone (KENALOG) 0.1 % cream 1 Application 2 (two) times a day To affected areas       No current facility-administered medications for this visit. He is allergic to hydrochlorothiazide, insulin lispro, lisinopril, minoxidil, nifedipine, other, prazosin, venlafaxine, doxycycline, and simvastatin. .    Review of Systems   Constitutional: Negative. HENT: Negative for ear pain and hearing loss. Eyes: Negative for pain. Respiratory: Negative for chest tightness and shortness of breath. Cardiovascular: Negative for chest pain, palpitations and leg swelling. Gastrointestinal: Negative for diarrhea, nausea and vomiting. Genitourinary: Negative for dysuria. Musculoskeletal: Positive for gait problem. Skin: Positive for wound. Neurological: Negative for tremors and weakness. Psychiatric/Behavioral: Negative for behavioral problems, confusion and suicidal ideas.          Objective:       Wound 09/18/23 Pressure Injury Buttocks Left (Active)   Wound Image Images linked 09/18/23 1308   Wound Description Granulation tissue 09/18/23 1307   Pressure Injury Stage 2 09/18/23 1307   Tash-wound Assessment Intact 09/18/23 1307   Wound Length (cm) 0.1 cm 09/18/23 1307   Wound Width (cm) 0.3 cm 09/18/23 1307 Wound Depth (cm) 0.1 cm 09/18/23 1307   Wound Surface Area (cm^2) 0.03 cm^2 09/18/23 1307   Wound Volume (cm^3) 0.003 cm^3 09/18/23 1307   Calculated Wound Volume (cm^3) 0 cm^3 09/18/23 1307   Drainage Amount Scant 09/18/23 1307   Drainage Description Bloody 09/18/23 1307   Non-staged Wound Description Full thickness 09/18/23 1307   Dressing Status Intact 09/18/23 1307       /60   Pulse 56   Temp 98.2 °F (36.8 °C)     Physical Exam  Vitals and nursing note reviewed. Constitutional:       General: He is not in acute distress. Appearance: Normal appearance. He is obese. HENT:      Head: Normocephalic and atraumatic. Eyes:      General:         Right eye: No discharge. Left eye: No discharge. Pulmonary:      Effort: Pulmonary effort is normal. No respiratory distress. Musculoskeletal:         General: Normal range of motion. Cervical back: Normal range of motion and neck supple. No rigidity. Left lower leg: No edema. Skin:     General: Skin is warm and dry. Findings: No erythema. Neurological:      General: No focal deficit present. Mental Status: He is alert and oriented to person, place, and time. Mental status is at baseline. Psychiatric:         Mood and Affect: Mood normal.         Behavior: Behavior normal.         Thought Content: Thought content normal.                    Wound Instructions:  Orders Placed This Encounter   Procedures   • Wound cleansing and dressings     Left buttock    Wash your hands with soap and water. Remove old dressing, discard into plastic bag and place in trash. Cleanse the wound with mild soap and waterprior to applying a clean dressing. Do not use tissue or cotton balls. Do not scrub the wound. Pat dry using gauze. Shower yes    Apply hydraguard to the L BUTTOCKwound. Apply 2-3 times daily and as needed. Applied today at [facility]. Next appointment in 2 weeks. Offload pressure to area.      Standing Status: Future     Standing Expiration Date:   9/18/2024        Diagnosis ICD-10-CM Associated Orders   1. Pressure injury of left buttock, stage 2 ([facility])  L89.322 Wound cleansing and dressings      2.  Type 2 diabetes mellitus with hyperglycemia, with long-term current use of insulin ([facility])  E11.65     Z79.4

## 2023-09-18 NOTE — PATIENT INSTRUCTIONS
Orders Placed This Encounter   Procedures    Wound cleansing and dressings     Left buttock    Wash your hands with soap and water. Remove old dressing, discard into plastic bag and place in trash. Cleanse the wound with mild soap and waterprior to applying a clean dressing. Do not use tissue or cotton balls. Do not scrub the wound. Pat dry using gauze. Shower yes    Apply hydraguard to the L BUTTOCKwound. Apply 2-3 times daily and as needed. Applied today at [facility]. Next appointment in 2 weeks. Offload pressure to area.      Standing Status:   Future     Standing Expiration Date:   9/18/2024

## 2023-10-02 ENCOUNTER — OFFICE VISIT (OUTPATIENT)
Dept: WOUND CARE | Facility: HOSPITAL | Age: 83
End: 2023-10-02
Payer: COMMERCIAL

## 2023-10-02 VITALS
SYSTOLIC BLOOD PRESSURE: 130 MMHG | TEMPERATURE: 97 F | RESPIRATION RATE: 18 BRPM | HEART RATE: 72 BPM | DIASTOLIC BLOOD PRESSURE: 72 MMHG

## 2023-10-02 DIAGNOSIS — L89.322 PRESSURE INJURY OF LEFT BUTTOCK, STAGE 2 (HCC): Primary | ICD-10-CM

## 2023-10-02 DIAGNOSIS — E11.65 TYPE 2 DIABETES MELLITUS WITH HYPERGLYCEMIA, WITH LONG-TERM CURRENT USE OF INSULIN (HCC): ICD-10-CM

## 2023-10-02 DIAGNOSIS — Z79.4 TYPE 2 DIABETES MELLITUS WITH HYPERGLYCEMIA, WITH LONG-TERM CURRENT USE OF INSULIN (HCC): ICD-10-CM

## 2023-10-02 PROCEDURE — 99213 OFFICE O/P EST LOW 20 MIN: CPT | Performed by: NURSE PRACTITIONER

## 2023-10-02 PROCEDURE — 99212 OFFICE O/P EST SF 10 MIN: CPT | Performed by: NURSE PRACTITIONER

## 2023-10-02 NOTE — PROGRESS NOTES
Patient ID: Geo Boston is a 79 y.o. male Date of Birth 1940     Chief Complaint  Chief Complaint   Patient presents with   • Follow Up Wound Care Visit     Wound care       Allergies  Hydrochlorothiazide, Insulin lispro, Lisinopril, Minoxidil, Nifedipine, Other, Prazosin, Venlafaxine, Doxycycline, and Simvastatin    Assessment:     Diagnoses and all orders for this visit:    Pressure injury of left buttock, stage 2 (HCC)  -     Wound cleansing and dressings; Future    Type 2 diabetes mellitus with hyperglycemia, with long-term current use of insulin ([address])          Plan:  1. F/u visit. Wound epithelialized. Counseled patient he may continue to use the hydraguard cream 1-2 times per day for prevention. Patient is discharged from the wound center today. He may follow up PRN. Wound 09/18/23 Pressure Injury Buttocks Left (Active)   Wound Image Images linked 10/02/23 1050   Wound Description Epithelialization 10/02/23 1050   Tash-wound Assessment Intact 10/02/23 1050   Wound Length (cm) 0 cm 10/02/23 1050   Wound Width (cm) 0 cm 10/02/23 1050   Wound Depth (cm) 0 cm 10/02/23 1050   Wound Surface Area (cm^2) 0 cm^2 10/02/23 1050   Wound Volume (cm^3) 0 cm^3 10/02/23 1050   Calculated Wound Volume (cm^3) 0 cm^3 10/02/23 1050   Drainage Amount None 10/02/23 1050   Treatments Cleansed 10/02/23 1050   Wound packed? No 10/02/23 1050   Patient Tolerance Tolerated well 10/02/23 1050       Wound 09/18/23 Pressure Injury Buttocks Left (Active)   Date First Assessed/Time First Assessed: 09/18/23 1307   Primary Wound Type: Pressure Injury  Location: Buttocks  Wound Location Orientation: Left       Subjective:      . F/u visit pressure ulcer of his left buttock. No new complaints. He denies any pain, fevers, or chills.        The following portions of the patient's history were reviewed and updated as appropriate:   He  has a past medical history of Asthma, Bradycardia, Carcinoma, basal cell, skin, COVID-19 (12/05/2022), Dupuytren contracture, Gout, Herniated lumbar intervertebral disc, and Sleep apnea. 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 ([address]) 03/25/2022   • Elevated parathyroid hormone 03/25/2022   • Diabetic nephropathy associated with type 2 diabetes mellitus ([address]) 03/25/2022   • Type 2 diabetes mellitus with hyperglycemia, with long-term current use of insulin ([address]) 03/24/2022   • Mixed hyperlipidemia 03/24/2022   • Diabetic polyneuropathy associated with type 2 diabetes mellitus ([address]) 03/24/2022   • Primary hypertension 03/24/2022   • Morbid (severe) obesity due to excess calories ([address]) 03/24/2022   • 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 ([address]) 03/24/2022     He  has a past surgical history that includes Cervical fusion; Lumbar laminectomy; Tonsillectomy; Appendectomy; Vasectomy; Cataract extraction, bilateral (Bilateral); Dupuytren contracture release (Left); and Cyst Removal (Left, 01/2023). His family history includes Diabetes type II in his brother and mother; GI problems in his daughter; Hypertension in his brother, daughter, father, and mother; Kidney disease in his sister. He  reports that he has never smoked. He has never used smokeless tobacco. He reports current alcohol use. He reports that he does not use drugs.   Current Outpatient Medications   Medication Sig Dispense Refill   • albuterol (2.5 mg/3 mL) 0.083 % nebulizer solution INHALE ONE VIAL VIA NEBULIZER EVERY 6 HOURS AS NEEDED     • allopurinol (ZYLOPRIM) 300 mg tablet every 24 hours     • amLODIPine (NORVASC) 10 mg tablet Take 10 mg by mouth daily (Patient not taking: Reported on 9/1/2023)     • atorvastatin (LIPITOR) 20 mg tablet Take 20 mg by mouth daily     • azelastine (ASTELIN) 0.1 % nasal spray      • benzonatate (TESSALON PERLES) 100 mg capsule TAKE ONE CAPSULE BY MOUTH AT BEDTIME AS NEEDED FOR cough FOR 10 DAYS (Patient not taking: Reported on 9/1/2023)     • cefuroxime (CEFTIN) 500 mg tablet TAKE ONE TABLET BY MOUTH TWICE DAILY FOR 10 DAYS (Patient not taking: Reported on 9/1/2023)     • Cholecalciferol (Vitamin D3) 25 MCG (1000 UT) CAPS every 24 hours     • clindamycin (CLEOCIN T) 1 % lotion apply TO open cysts DAILY AFTER WASHING with hibiclens AND patting DRY (Patient not taking: Reported on 9/1/2023)     • Easy Comfort Pen Needles 33G X 4 MM MISC USE WITH INSULIN FIVE TIMES DAILY AS DIRECTED     • fluticasone (FLONASE) 50 mcg/act nasal spray every 24 hours     • Fluticasone-Salmeterol (Advair) 250-50 mcg/dose inhaler Inhale 1 puff 2 (two) times a day     • furosemide (LASIX) 40 mg tablet Take 40 mg by mouth daily     • gabapentin (NEURONTIN) 100 mg capsule Take 2 capsules (200 mg total) by mouth daily 180 capsule 1   • Glucosamine-Chondroitin 500-400 MG CAPS      • insulin aspart (NovoLOG FlexPen) 100 UNIT/ML injection pen INJECT 22 UNITS SUBCUTANEOUSLY with breakfast, 23 UNITS with LUNCH, AND 28 UNITS with dinner plus sliding scale.  (UP  UNITS DAILY) (Patient not taking: Reported on 9/1/2023) 45 mL 5   • Insulin Glargine Solostar (Lantus SoloStar) 100 UNIT/ML SOPN INJECT 30 UNITS SUBCUTANEOUSLY IN THE MORNING AND 32 UNITS IN THE EVENING. MAY INCREASE TO 80 UNITS DAILY 30 mL 5   • Ipratropium Bromide (ATROVENT NA)      • loratadine (CLARITIN) 10 mg tablet Take 10 mg by mouth daily (Patient not taking: Reported on 8/31/2023)     • losartan (COZAAR) 100 MG tablet Take 100 mg by mouth daily (Patient not taking: Reported on 9/1/2023)     • Multiple Vitamin (MULTIVITAMIN ADULT PO) every 24 hours (Patient not taking: Reported on 9/1/2023)     • mupirocin (BACTROBAN) 2 % ointment      • OneTouch Verio test strip USE AS DIRECTED FOUR TIMES DAILY     • potassium citrate (UROCIT-K) 10 mEq Every 12 hours     • sulfamethoxazole-trimethoprim (BACTRIM DS) 800-160 mg per tablet  (Patient not taking: Reported on 9/1/2023)     • triamcinolone (KENALOG) 0.1 % cream 1 Application 2 (two) times a day To affected areas       No current facility-administered medications for this visit. He is allergic to hydrochlorothiazide, insulin lispro, lisinopril, minoxidil, nifedipine, other, prazosin, venlafaxine, doxycycline, and simvastatin. .    Review of Systems   Constitutional: Negative. HENT: Negative for ear pain and hearing loss. Eyes: Negative for pain. Respiratory: Negative for chest tightness and shortness of breath. Cardiovascular: Negative for chest pain, palpitations and leg swelling. Gastrointestinal: Negative for diarrhea, nausea and vomiting. Genitourinary: Negative for dysuria. Musculoskeletal: Negative for gait problem. Skin: Positive for wound. Neurological: Negative for tremors and weakness. Psychiatric/Behavioral: Negative for behavioral problems, confusion and suicidal ideas. Objective:       Wound 09/18/23 Pressure Injury Buttocks Left (Active)   Wound Image Images linked 10/02/23 1050   Wound Description Epithelialization 10/02/23 1050   Tash-wound Assessment Intact 10/02/23 1050   Wound Length (cm) 0 cm 10/02/23 1050   Wound Width (cm) 0 cm 10/02/23 1050   Wound Depth (cm) 0 cm 10/02/23 1050   Wound Surface Area (cm^2) 0 cm^2 10/02/23 1050   Wound Volume (cm^3) 0 cm^3 10/02/23 1050   Calculated Wound Volume (cm^3) 0 cm^3 10/02/23 1050   Drainage Amount None 10/02/23 1050   Treatments Cleansed 10/02/23 1050   Wound packed? No 10/02/23 1050   Patient Tolerance Tolerated well 10/02/23 1050       /72   Pulse 72   Temp (!) 97 °F (36.1 °C)   Resp 18     Physical Exam  Vitals and nursing note reviewed. Constitutional:       General: He is not in acute distress. Appearance: Normal appearance. He is obese. HENT:      Head: Normocephalic and atraumatic. Eyes:      General:         Right eye: No discharge. Left eye: No discharge.    Pulmonary:      Effort: Pulmonary effort is normal. No respiratory distress. Musculoskeletal:         General: Normal range of motion. Cervical back: Normal range of motion and neck supple. No rigidity. Right lower leg: No edema. Left lower leg: No edema. Skin:     General: Skin is warm and dry. Findings: No erythema or wound. Neurological:      General: No focal deficit present. Mental Status: He is alert and oriented to person, place, and time. Mental status is at baseline. Psychiatric:         Mood and Affect: Mood normal.         Behavior: Behavior normal.         Thought Content: Thought content normal.         Judgment: Judgment normal.                   Wound Instructions:  Orders Placed This Encounter   Procedures   • Wound cleansing and dressings     Left buttock wound healed today  Discharge from wound care today       Continue to Apply hydraguard to the L BUTTOCKwound. Apply 2-3 times daily and as needed.        Offload pressure to area. Standing Status:   Future     Standing Expiration Date:   10/2/2024        Diagnosis ICD-10-CM Associated Orders   1. Pressure injury of left buttock, stage 2 ([facility])  L89.322 Wound cleansing and dressings      2.  Type 2 diabetes mellitus with hyperglycemia, with long-term current use of insulin ([facility])  E11.65     Z79.4

## 2023-10-02 NOTE — PATIENT INSTRUCTIONS
Orders Placed This Encounter   Procedures    Wound cleansing and dressings     Left buttock wound healed today  Discharge from wound care today       Continue to Apply hydraguard to the L BUTTOCKwound. Apply 2-3 times daily and as needed. Offload pressure to area.      Standing Status:   Future     Standing Expiration Date:   10/2/2024

## 2023-11-02 ENCOUNTER — HOSPITAL ENCOUNTER (OUTPATIENT)
Dept: RADIOLOGY | Facility: HOSPITAL | Age: 83
End: 2023-11-02
Payer: COMMERCIAL

## 2023-11-02 DIAGNOSIS — R91.8 OTHER NONSPECIFIC ABNORMAL FINDING OF LUNG FIELD: ICD-10-CM

## 2023-11-02 PROCEDURE — 92611 MOTION FLUOROSCOPY/SWALLOW: CPT

## 2023-11-02 PROCEDURE — 74230 X-RAY XM SWLNG FUNCJ C+: CPT

## 2023-11-02 NOTE — PROCEDURES
Speech Pathology - Modified Barium Swallow Study    Patient Name: Edinson Benson    GNKEX'K Date: 11/2/2023     Problem List  Active Problems: There are no active Hospital Problems. Past Medical History  Past Medical History:   Diagnosis Date    Asthma     Bradycardia     Carcinoma, basal cell, skin     head and face and ear    COVID-19 12/05/2022    Dupuytren contracture     bilateral ring and right middle    Gout     Herniated lumbar intervertebral disc     Sleep apnea        Past Surgical History  Past Surgical History:   Procedure Laterality Date    APPENDECTOMY      age 23, ruptured    CATARACT EXTRACTION, BILATERAL Bilateral     CERVICAL FUSION      CYST REMOVAL Left 01/2023    DUPUYTREN CONTRACTURE RELEASE Left     left ring finger    LUMBAR LAMINECTOMY      belkis placed    TONSILLECTOMY      VASECTOMY         Assessment Summary:    Pt presents with minimal oropharyngeal dysphagia, ultimately functional at this time. Timely/effective mastication and oral organization w/ brisk transfers. Swallow initiation is mildly delayed w/ bolus head reaching the valleculae prior to reduced anterior hyoid movement. Complete laryngeal elevation, vestibule closure, epiglottic inversion and pharyngeal stripping wave. No significant pharyngeal retention. No aspiration on today's study. Note: Images are available for review in PACS as desired. Recommendations:   Recommended Diet: regular diet and thin liquids   Recommended Form of Medications: whole with liquid   Aspiration precautions and compensatory swallowing strategies: upright posture, only feed when fully alert, slow rate of feeding, and small bites/sips  Consider referral to:  -   SLP Dysphagia therapy recommended: Not at this time. MBS findings and recommendations immediately reviewed w/ pt w/ use of images to aid in understanding.  Education provided on strategies to optimize swallow safety, including the importance of oral care and s/s aspiration to monitor for and notify medical team of should they arise. Pt verbalized understanding and denied questions at this time. Patient Stated Goal: "I don't really know why I'm here"     Dysphagia Goals per SLP: none at this time      General Information;  Pt is a 79 y.o. male referred for MBS to assess oropharyngeal stage swallowing skills at this time. 9/1/23: Flexible nasopharyngolaryngoscopy was performed without the need for topical anesthesia  Findings demonstrate:     Nasal cavity:              Clear, no masses, exudates or polyps  Nasopharynx:            Clear, fossae of Rosenmuller clear bilaterally  Base of tongue:         Clear  Vallecula:                   Empty  Epiglottis:                   Crisp  Arytenoids/folds:      Normal mucosa  Interaryntenoid:         Normal mucosa  Postcricoid:               Normal mucosa  Glottis:                       Normal vocal fold motion, no laryngeal masses  Piriform sinuses:      Clear       Prior MBS: No    Oral Mechanism Exam  Facial: symmetrical  Labial: WFL  Lingual: WFL  Velum: symmetrical  Mandible: adequate ROM  Dentition: adequate  Vocal quality: clear/adequate   Volitional Cough: strong/productive   Respiratory Status: on RA      Pt was viewed sitting upright in the lateral and AP positions. Trials administered were consistent with MBSImP Validated Protocol: Pt was given 5-mL thin liquid x2, 20-mL cup sip thin, 40-mL sequential swallow thin, 5-mL nectar thick, 20-mL cup sip nectar thick, 40-mL sequential swallow nectar thick, 5-mL honey thick, 5-mL pudding, ½ cookie coated with 3-mL pudding, 5-mL nectar thick in the AP position, and 5-mL pudding in the AP position. Pt was also given thin liquids by straw, as well as a barium tablet with thin liquids.      Initial view observations/comments: Clear view of the upper airway and Hardware      8-Point Penetration-Aspiration Scale   Thin liquid 1 - Material does not enter the airway   Nectar thick liquid 1 - Material does not enter the airway   Honey thick liquid 1 - Material does not enter the airway   Puree (pudding) 1 - Material does not enter the airway   Solid 1 - Material does not enter the airway     Strategies and Efficacy: -    Aspiration Response and Efficacy:  -    MBS IMP Rating    ORAL Impairment  Compinent 1--Lip Closure  Judged at any point during the swallow. 0 - No labial escape    Component 2--Tongue Control During Bolus Hold  Judged on held liquid boluses only and prior to productive tongue movement. 0 - Cohesive bolus between tongue to palatal seal    Component 3--Bolus Preparation/Mastication  Judged only during presentation of 1/2 shortbread cookie coated in pudding. 0 - Timely and efficient chewing and mashing    Component 4--Bolus Transport/Lingual Motion  Judged after first productive tongue movement for oral bolus transport. 0 - Brisk tongue motion    Component 5--Oral Residue  Judged after first swallow or after the last swallow of the sequential swallow task. 1 - Trace residue lining oral structures   Location   C - Tongue    Component 6--Initiation of Pharyngeal Swallow  Judged at first movement of the brisk superior-anterior hyoid trajectory. 1 - Bolus Head in valleculae      PHARYNGEAL Impairment  Component 7--Soft Palate Elevation  Judged during maximum displacement of soft palate. 0 - No bolus between the soft palate (SP)/pharyngeal wall (PW)    Component 8--Laryngeal Elevation  Judged when epiglottis is in its most horizontal position. 0 - Complete superior movement of thyroid cartilage with complete approximation of arytenoids to epiglottic petiole    Component 9--Anterior Hyoid Excursion  Judged at height of swallow/maximal anterior hyoid displacement. 1 - Partial anterior movement    Component 10--Epiglottic Movement  Judged at height of swallow/maximal anterior hyoid displacement.   0 - Complete inversion    Component 11--Laryngeal Vestibular Closure  Judged at height of swallow/maximal anterior hyoid displacement. 0 - Complete; no air/contrast in laryngeal vestibule    Component 12--Pharyngeal Stripping Wave  Judged during the full duration of the pharyngeal swallow. 0 - Present - complete    Component 13--Pharyngeal Contraction  Judged in AP view at rest and throughout maximum movement of structures. 0 - Complete    Component 14--Pharyngoesophageal Segment Opening  Judged during maximum distension of PES and throughout opening and closure. 0 - Complete distension and complete duration; no obstruction of flow    Component 15--Tongue Base (TB) Retraction  Judged during maximum retraction of the tongue base. 0 - No contrast between TB and posterior pharyngeal wall (PW)    Component 16--Pharyngeal Residue  Judged after first swallow or after the last swallow of the sequential swallow task.   1 - Trace residue within or on pharyngeal structures   Location   B - Valleculae and E - Pyriform sinuses      ESOPHAGEAL Impairment  Component 17--Esophageal Clearance Upright Position  Judged in AP view during bolus transit through the oral cavity to the LES  0 - Complete clearance; esophageal coating

## 2023-11-14 ENCOUNTER — HOSPITAL ENCOUNTER (OUTPATIENT)
Dept: CT IMAGING | Facility: HOSPITAL | Age: 83
Discharge: HOME | End: 2023-11-14
Payer: COMMERCIAL

## 2023-11-14 DIAGNOSIS — R91.8 OTHER NONSPECIFIC ABNORMAL FINDING OF LUNG FIELD: ICD-10-CM

## 2023-11-14 PROCEDURE — 71250 CT THORAX DX C-: CPT

## 2023-11-14 PROCEDURE — G1004 CDSM NDSC: HCPCS

## 2023-11-16 LAB — HBA1C MFR BLD HPLC: 6.8 %

## 2023-12-05 ENCOUNTER — TELEPHONE (OUTPATIENT)
Age: 83
End: 2023-12-05

## 2023-12-05 ENCOUNTER — OFFICE VISIT (OUTPATIENT)
Dept: PODIATRY | Facility: CLINIC | Age: 83
End: 2023-12-05
Payer: COMMERCIAL

## 2023-12-05 VITALS
BODY MASS INDEX: 34.58 KG/M2 | WEIGHT: 247 LBS | SYSTOLIC BLOOD PRESSURE: 130 MMHG | DIASTOLIC BLOOD PRESSURE: 72 MMHG | HEIGHT: 71 IN

## 2023-12-05 DIAGNOSIS — E11.40 TYPE 2 DIABETES MELLITUS WITH DIABETIC NEUROPATHY, UNSPECIFIED WHETHER LONG TERM INSULIN USE (HCC): Primary | ICD-10-CM

## 2023-12-05 DIAGNOSIS — L84 PRE-ULCERATIVE CALLUSES: ICD-10-CM

## 2023-12-05 DIAGNOSIS — B35.1 ONYCHOMYCOSIS: ICD-10-CM

## 2023-12-05 PROCEDURE — 11056 PARNG/CUTG B9 HYPRKR LES 2-4: CPT | Performed by: PODIATRIST

## 2023-12-05 PROCEDURE — 11721 DEBRIDE NAIL 6 OR MORE: CPT | Performed by: PODIATRIST

## 2023-12-05 NOTE — TELEPHONE ENCOUNTER
Caller: Doc Ramírez    Doctor/Office: Dr. Luci Boxer    #: [phone]    Escalation: Last office visit Patient requesting script for diabetic shoes and last office visit notes to be faxed to [address] [place] [address].  Thank you

## 2023-12-12 ENCOUNTER — OFFICE VISIT (OUTPATIENT)
Dept: ENDOCRINOLOGY | Facility: HOSPITAL | Age: 83
End: 2023-12-12
Payer: COMMERCIAL

## 2023-12-12 VITALS
WEIGHT: 254 LBS | OXYGEN SATURATION: 99 % | SYSTOLIC BLOOD PRESSURE: 124 MMHG | HEIGHT: 71 IN | HEART RATE: 69 BPM | BODY MASS INDEX: 35.56 KG/M2 | DIASTOLIC BLOOD PRESSURE: 58 MMHG

## 2023-12-12 DIAGNOSIS — E78.2 MIXED HYPERLIPIDEMIA: ICD-10-CM

## 2023-12-12 DIAGNOSIS — E11.65 TYPE 2 DIABETES MELLITUS WITH HYPERGLYCEMIA, WITH LONG-TERM CURRENT USE OF INSULIN (HCC): Primary | ICD-10-CM

## 2023-12-12 DIAGNOSIS — I10 PRIMARY HYPERTENSION: ICD-10-CM

## 2023-12-12 DIAGNOSIS — Z79.4 TYPE 2 DIABETES MELLITUS WITH HYPERGLYCEMIA, WITH LONG-TERM CURRENT USE OF INSULIN (HCC): Primary | ICD-10-CM

## 2023-12-12 PROCEDURE — 99214 OFFICE O/P EST MOD 30 MIN: CPT | Performed by: PHYSICIAN ASSISTANT

## 2023-12-12 PROCEDURE — 95251 CONT GLUC MNTR ANALYSIS I&R: CPT | Performed by: PHYSICIAN ASSISTANT

## 2023-12-12 RX ORDER — IPRATROPIUM BROMIDE AND ALBUTEROL SULFATE 2.5; .5 MG/3ML; MG/3ML
SOLUTION RESPIRATORY (INHALATION) 3 TIMES DAILY
COMMUNITY
Start: 2023-09-26

## 2023-12-12 NOTE — PATIENT INSTRUCTIONS
Monitor diet and maintain activity. Decrease Lantus to 18-20 units twice a day. Call the office if still having low blood sugars. Continue NovoLog 22 units with breakfast, 23 units with lunch and 30 units with dinner. Continue gabapentin to 200 mg at night. Continue to use Dexcom to monitor blood sugar. Call the office with any concerns. Follow up in 3 months with lab work prior to visit.

## 2023-12-12 NOTE — PROGRESS NOTES
Cheo Carcamo 79 y.o. male MRN: [id number]    Encounter: [id number]      Assessment/Plan     Assessment: This is a 79y.o.-year-old male with type 2 diabetes with hypoglycemic unawareness, neuropathy, hypertension and hyperlipidemia. Plan:  1. Type 2 diabetes, insulin requiring: Recent hemoglobin A1c is 6.8. A1c is doing excellent at this time, but I do have concerns and so does he have episodes of hypoglycemia. He is currently backing off on Lantus on his own. At this time I recommend Lantus 18 units twice a day. He will continue with NovoLog 22 units breakfast, 23 units with lunch, 20 units with dinner. Continue utilizing Dexcom to monitor glucose levels. Contact the office with any concerns or questions especially if still experiencing episodes of hypoglycemia. Follow-up in 3 months with lab work completed prior to visit. 2. Hypoglycemic unawareness:  Has had several episodes of loss of consciousness due to hypoglycemia with ambulance called. Currently utilizing Dexcom G7.     3. Diabetic neuropathy:  Stable. Diabetic foot exam is up-to-date. 4. Hypertension:  Normotensive in the office. Kidney function remains stable with normal electrolytes. May have a little bit of dehydration going on with this set of lab work. Make sure to drink plenty water throughout the day. Continue with current medication. Repeat CMP prior to next office visit. 5. Hyperlipidemia:  Previous lipid panel was excellent. Continue with current medications. Repeat lipid panel prior to next office visit. CC: Type 2 diabetes follow-up    History of Present Illness     HPI:  Cheo Carcamo is a 79year old male with type 2 diabetes, insulin requiring with neuropathy for 50 years, hyperlipidemia, hypertension for follow-up. He was diagnosed with type 2 diabetes in his 35s. He originally went on metformin therapy.   He was never on any other oral agents as metformin controlled his blood sugars for the 1st several years and then he was switched to insulin therapy. He reports that Humalog insulin does not improve his blood sugars as well as NovoLog insulin does. He is on insulin at home and takes Lantus insulin 18-20 units twice a day, and NovoLog insulin 22 units with breakfast, 23 units with lunch and 30 units at supper. He will make adjustments to his NovoLog based on current glucose levels. Recently has been decreasing Lantus due to episodes of hypoglycemia. Has taken correction doses of his NovoLog if glucose levels remain in the 200s. He denies any polyuria, polydipsia, nocturia and blurry vision. He has once a night nocturia. He denies chest pain or shortness of breath. He has occasional shooting pains of the legs at night and has numbness of the feet to his knees bilaterally. He denies retinopathy, heart attack, stroke and claudication but does admit to neuropathy and nephropathy. He reports he was to Diabetes Education on diagnosis in several times since then has seen 1 on 1 with a dietitian and gone to diabetes classes. He does try to limit carbohydrates and continues to make lifestyle changes. Had a pacer placed in May 2022. Is doing well today, but has been having breathing issues recently This has been going on for about 6 months. Did have pneumonia several months ago. Has appointment with pulmonology later this month. Hypoglycemic episodes: Rare episodes. H/o of hypoglycemia causing hospitalization or intervention such as glucagon injection  or ambulance call  Yes. Has been loc in the past requiring 911 many years ago. Hypoglycemia symptoms: sweating and and feel bad or no symptoms at all. Treatment of hypoglycemia: either cookies or glucose tablets. Glucagon:Yes. Medic alert tag: recommended,Yes. The patient's last eye exam was in March 2022 in [address] [facility] with no retinopathy.   The patient's last foot exam was in November 2022 at endocrine office and in January 2022 with Dr. Tiajuana Baumgarten. Most recent hemoglobin A1c completed November 16, 2022 was 6.8. Blood Sugar/Glucometer/Pump/CGM review: Download of Dexcom from November 29 through December 12, 2023 reveals an average glucose level of 157 with a standard deviation of 54. He is in target range 71% of the time, above target range 27% of the time, and below target range 2% of the time. Glucose levels typically doing excellent overnight increase slightly with breakfast and remain at the high end of normal on average throughout the day up until about dinner where he will have postprandial hyperglycemia and then decrease in the late evening. He has hyperlipidemia and takes atorvastatin 20 mg daily. He denies chest pain or shortness of breath. He has hypertension and takes amlodipine 10 mg daily and losartan 100 mg daily along with furosemide 40 mg twice a day. He denies headache or stroke-like symptoms. Review of Systems   Constitutional:  Negative for activity change, appetite change, fatigue and unexpected weight change. HENT:  Negative for trouble swallowing. Eyes:  Positive for visual disturbance (Wears glasses). Respiratory:  Positive for shortness of breath. Negative for chest tightness. Cardiovascular:  Positive for leg swelling. Negative for chest pain and palpitations. Gastrointestinal:  Negative for abdominal pain, diarrhea, nausea and vomiting. Endocrine: Negative for cold intolerance, heat intolerance, polydipsia, polyphagia and polyuria. Genitourinary:  Negative for frequency. Musculoskeletal:  Positive for arthralgias, back pain and gait problem. Skin:  Negative for rash and wound. Neurological:  Positive for tremors. Negative for dizziness, weakness, light-headedness, numbness and headaches. Psychiatric/Behavioral:  Negative for dysphoric mood and sleep disturbance. The patient is not nervous/anxious.         Historical Information   Past Medical History:   Diagnosis Date   • Asthma    • Bradycardia    • Carcinoma, basal cell, skin     head and face and ear   • COVID-19 12/05/2022   • Dupuytren contracture     bilateral ring and right middle   • Gout    • Herniated lumbar intervertebral disc    • Sleep apnea      Past Surgical History:   Procedure Laterality Date   • APPENDECTOMY      age 23, ruptured   • CATARACT EXTRACTION, BILATERAL Bilateral    • CERVICAL FUSION     • CYST REMOVAL Left 01/2023   • DUPUYTREN CONTRACTURE RELEASE Left     left ring finger   • LUMBAR LAMINECTOMY      belkis placed   • TONSILLECTOMY     • VASECTOMY       Social History   Social History     Substance and Sexual Activity   Alcohol Use Yes    Comment: rare     Social History     Substance and Sexual Activity   Drug Use Never     Social History     Tobacco Use   Smoking Status Never   Smokeless Tobacco Never     Family History:   Family History   Problem Relation Age of Onset   • Hypertension Mother    • Diabetes type II Mother    • Hypertension Father    • Diabetes type II Brother    • Hypertension Brother    • Kidney disease Sister    • Hypertension Daughter    • GI problems Daughter        Meds/Allergies   Current Outpatient Medications   Medication Sig Dispense Refill   • allopurinol (ZYLOPRIM) 300 mg tablet every 24 hours     • amLODIPine (NORVASC) 10 mg tablet Take 10 mg by mouth daily     • atorvastatin (LIPITOR) 20 mg tablet Take 20 mg by mouth daily     • azelastine (ASTELIN) 0.1 % nasal spray      • clindamycin (CLEOCIN T) 1 % lotion      • Easy Comfort Pen Needles 33G X 4 MM MISC USE WITH INSULIN FIVE TIMES DAILY AS DIRECTED     • fluticasone (FLONASE) 50 mcg/act nasal spray every 24 hours     • Fluticasone-Salmeterol (Advair) 250-50 mcg/dose inhaler Inhale 1 puff 2 (two) times a day     • furosemide (LASIX) 40 mg tablet Take 40 mg by mouth daily     • gabapentin (NEURONTIN) 100 mg capsule Take 2 capsules (200 mg total) by mouth daily 180 capsule 1   • Glucosamine-Chondroitin 500-400 MG CAPS • insulin aspart (NovoLOG FlexPen) 100 UNIT/ML injection pen INJECT 22 UNITS SUBCUTANEOUSLY with breakfast, 23 UNITS with LUNCH, AND 28 UNITS with dinner plus sliding scale. (UP  UNITS DAILY) 45 mL 5   • Insulin Glargine Solostar (Lantus SoloStar) 100 UNIT/ML SOPN INJECT 30 UNITS SUBCUTANEOUSLY IN THE MORNING AND 32 UNITS IN THE EVENING. MAY INCREASE TO 80 UNITS DAILY 30 mL 5   • Ipratropium Bromide (ATROVENT NA)      • ipratropium-albuterol (DUO-NEB) 0.5-2.5 mg/3 mL nebulizer solution 3 (three) times a day     • losartan (COZAAR) 100 MG tablet Take 100 mg by mouth daily     • Multiple Vitamin (MULTIVITAMIN ADULT PO) every 24 hours     • mupirocin (BACTROBAN) 2 % ointment      • OneTouch Verio test strip USE AS DIRECTED FOUR TIMES DAILY     • potassium citrate (UROCIT-K) 10 mEq Every 12 hours     • triamcinolone (KENALOG) 0.1 % cream 1 Application 2 (two) times a day To affected areas     • benzonatate (TESSALON PERLES) 100 mg capsule TAKE ONE CAPSULE BY MOUTH AT BEDTIME AS NEEDED FOR cough FOR 10 DAYS (Patient not taking: Reported on 9/1/2023)     • cefuroxime (CEFTIN) 500 mg tablet TAKE ONE TABLET BY MOUTH TWICE DAILY FOR 10 DAYS (Patient not taking: Reported on 9/1/2023)     • Cholecalciferol (Vitamin D3) 25 MCG (1000 UT) CAPS every 24 hours     • sulfamethoxazole-trimethoprim (BACTRIM DS) 800-160 mg per tablet  (Patient not taking: Reported on 9/1/2023)       No current facility-administered medications for this visit.      Allergies   Allergen Reactions   • Hydrochlorothiazide Other (See Comments)     SEVERE DIZZINESS   • Insulin Lispro Other (See Comments)     Cannot control blood sugars(Humalog)   • Lisinopril Other (See Comments)     Reports cough with use    • Minoxidil GI Intolerance   • Nifedipine Other (See Comments)     EDEMA LEGS   • Other Other (See Comments)     SEVERE DIZZINESS  Hydrap-es   • Prazosin Other (See Comments)     CHEST PAIN     • Venlafaxine Other (See Comments)     Made pt feel bad     • Doxycycline Rash   • Simvastatin Rash     PEDAL EDEMA         Objective   Vitals: Blood pressure 124/58, pulse 69, height 5' 11" (1.803 m), weight 115 kg (254 lb), SpO2 99 %. Physical Exam  Vitals and nursing note reviewed. Constitutional:       General: He is not in acute distress. Appearance: Normal appearance. He is not diaphoretic. HENT:      Head: Normocephalic and atraumatic. Eyes:      General: No scleral icterus. Extraocular Movements: Extraocular movements intact. Conjunctiva/sclera: Conjunctivae normal.      Pupils: Pupils are equal, round, and reactive to light. Cardiovascular:      Rate and Rhythm: Normal rate and regular rhythm. Pulses: Pulses are weak. Dorsalis pedis pulses are 1+ on the right side and 1+ on the left side. Posterior tibial pulses are 1+ on the right side and 1+ on the left side. Heart sounds: No murmur heard. Pulmonary:      Effort: Pulmonary effort is normal. No respiratory distress. Breath sounds: Normal breath sounds. No wheezing. Musculoskeletal:      Cervical back: Normal range of motion. Right lower leg: Edema present. Left lower leg: Edema present. Feet:      Right foot:      Skin integrity: Callus and dry skin present. No ulcer, skin breakdown, erythema or warmth. Left foot:      Skin integrity: Dry skin present. No ulcer, skin breakdown, erythema, warmth or callus. Lymphadenopathy:      Cervical: No cervical adenopathy. Skin:     General: Skin is warm and dry. Neurological:      Mental Status: He is alert and oriented to person, place, and time. Mental status is at baseline. Sensory: No sensory deficit. Gait: Gait abnormal (utilizing a walker). Psychiatric:         Mood and Affect: Mood normal.         Behavior: Behavior normal.         Thought Content: Thought content normal.     Patient's shoes and socks removed.     Right Foot/Ankle   Right Foot Inspection  Skin Exam: skin normal, skin intact, dry skin, callus and callus. No warmth, no erythema, no maceration, no abnormal color, no pre-ulcer and no ulcer. Toe Exam: ROM and strength within normal limits. No tenderness, erythema and  no right toe deformity    Sensory   Vibration: diminished  Proprioception: intact  Monofilament testing: diminished    Vascular  Capillary refills: < 3 seconds  The right DP pulse is 1+. The right PT pulse is 1+. Left Foot/Ankle  Left Foot Inspection  Skin Exam: skin normal, skin intact and dry skin. No warmth, no erythema, no maceration, normal color, no pre-ulcer, no ulcer and no callus. Toe Exam: ROM and strength within normal limits. No tenderness, no erythema and no left toe deformity. Sensory   Vibration: diminished  Proprioception: intact  Monofilament testing: diminished    Vascular  Capillary refills: < 3 seconds  The left DP pulse is 1+. The left PT pulse is 1+. Assign Risk Category  Deformity present  Loss of protective sensation  Weak pulses  Risk: 2       The history was obtained from the review of the chart, patient. Lab Results:   Lab Results   Component Value Date/Time    Hemoglobin A1C 6.8 11/16/2023 12:00 AM    Hemoglobin A1C 8.1 08/03/2023 12:00 AM    Hemoglobin A1C 7.1 03/23/2023 12:00 AM       Portions of the record may have been created with voice recognition software. Occasional wrong word or "sound a like" substitutions may have occurred due to the inherent limitations of voice recognition software. Read the chart carefully and recognize, using context, where substitutions have occurred.

## 2023-12-28 ENCOUNTER — APPOINTMENT (OUTPATIENT)
Dept: RADIOLOGY | Facility: HOSPITAL | Age: 83
DRG: 291 | End: 2023-12-28
Payer: COMMERCIAL

## 2023-12-28 ENCOUNTER — HOSPITAL ENCOUNTER (INPATIENT)
Facility: HOSPITAL | Age: 83
LOS: 11 days | Discharge: HOME HEALTH | DRG: 291 | End: 2024-01-08
Attending: EMERGENCY MEDICINE | Admitting: INTERNAL MEDICINE
Payer: COMMERCIAL

## 2023-12-28 DIAGNOSIS — E11.65 TYPE 2 DIABETES MELLITUS WITH HYPERGLYCEMIA, WITH LONG-TERM CURRENT USE OF INSULIN (HCC): ICD-10-CM

## 2023-12-28 DIAGNOSIS — I50.9 ACUTE EXACERBATION OF CHF (CONGESTIVE HEART FAILURE) (HCC): Primary | ICD-10-CM

## 2023-12-28 DIAGNOSIS — Z79.4 TYPE 2 DIABETES MELLITUS WITH HYPERGLYCEMIA, WITH LONG-TERM CURRENT USE OF INSULIN (HCC): ICD-10-CM

## 2023-12-28 DIAGNOSIS — E87.70 VOLUME OVERLOAD: ICD-10-CM

## 2023-12-28 DIAGNOSIS — E11.42 DIABETIC POLYNEUROPATHY ASSOCIATED WITH TYPE 2 DIABETES MELLITUS (HCC): ICD-10-CM

## 2023-12-28 LAB
2HR DELTA HS TROPONIN: 16 NG/L
ALBUMIN SERPL BCP-MCNC: 3.5 G/DL (ref 3.5–5)
ALP SERPL-CCNC: 136 U/L (ref 34–104)
ALT SERPL W P-5'-P-CCNC: 11 U/L (ref 7–52)
ANION GAP SERPL CALCULATED.3IONS-SCNC: 9 MMOL/L
AST SERPL W P-5'-P-CCNC: 27 U/L (ref 13–39)
BASOPHILS # BLD AUTO: 0.04 THOUSANDS/ÂΜL (ref 0–0.1)
BASOPHILS NFR BLD AUTO: 1 % (ref 0–1)
BILIRUB SERPL-MCNC: 1.21 MG/DL (ref 0.2–1)
BNP SERPL-MCNC: 816 PG/ML (ref 0–100)
BUN SERPL-MCNC: 43 MG/DL (ref 5–25)
CALCIUM SERPL-MCNC: 9.2 MG/DL (ref 8.4–10.2)
CARDIAC TROPONIN I PNL SERPL HS: 48 NG/L
CARDIAC TROPONIN I PNL SERPL HS: 64 NG/L
CHLORIDE SERPL-SCNC: 102 MMOL/L (ref 96–108)
CO2 SERPL-SCNC: 24 MMOL/L (ref 21–32)
CREAT SERPL-MCNC: 1.3 MG/DL (ref 0.6–1.3)
EOSINOPHIL # BLD AUTO: 0.23 THOUSAND/ÂΜL (ref 0–0.61)
EOSINOPHIL NFR BLD AUTO: 3 % (ref 0–6)
ERYTHROCYTE [DISTWIDTH] IN BLOOD BY AUTOMATED COUNT: 18.4 % (ref 11.6–15.1)
FLUAV RNA RESP QL NAA+PROBE: NEGATIVE
FLUBV RNA RESP QL NAA+PROBE: NEGATIVE
GFR SERPL CREATININE-BSD FRML MDRD: 50 ML/MIN/1.73SQ M
GLUCOSE SERPL-MCNC: 118 MG/DL (ref 65–140)
HCT VFR BLD AUTO: 36.7 % (ref 36.5–49.3)
HGB BLD-MCNC: 11.1 G/DL (ref 12–17)
IMM GRANULOCYTES # BLD AUTO: 0.03 THOUSAND/UL (ref 0–0.2)
IMM GRANULOCYTES NFR BLD AUTO: 0 % (ref 0–2)
LYMPHOCYTES # BLD AUTO: 1.06 THOUSANDS/ÂΜL (ref 0.6–4.47)
LYMPHOCYTES NFR BLD AUTO: 13 % (ref 14–44)
MCH RBC QN AUTO: 25.1 PG (ref 26.8–34.3)
MCHC RBC AUTO-ENTMCNC: 30.2 G/DL (ref 31.4–37.4)
MCV RBC AUTO: 83 FL (ref 82–98)
MONOCYTES # BLD AUTO: 0.82 THOUSAND/ÂΜL (ref 0.17–1.22)
MONOCYTES NFR BLD AUTO: 10 % (ref 4–12)
NEUTROPHILS # BLD AUTO: 6 THOUSANDS/ÂΜL (ref 1.85–7.62)
NEUTS SEG NFR BLD AUTO: 73 % (ref 43–75)
NRBC BLD AUTO-RTO: 0 /100 WBCS
PLATELET # BLD AUTO: 169 THOUSANDS/UL (ref 149–390)
PMV BLD AUTO: 12.5 FL (ref 8.9–12.7)
POTASSIUM SERPL-SCNC: 4.9 MMOL/L (ref 3.5–5.3)
PROT SERPL-MCNC: 6.7 G/DL (ref 6.4–8.4)
RBC # BLD AUTO: 4.43 MILLION/UL (ref 3.88–5.62)
RSV RNA RESP QL NAA+PROBE: NEGATIVE
SARS-COV-2 RNA RESP QL NAA+PROBE: NEGATIVE
SODIUM SERPL-SCNC: 135 MMOL/L (ref 135–147)
WBC # BLD AUTO: 8.18 THOUSAND/UL (ref 4.31–10.16)

## 2023-12-28 PROCEDURE — 99223 1ST HOSP IP/OBS HIGH 75: CPT | Performed by: PHYSICIAN ASSISTANT

## 2023-12-28 PROCEDURE — 84484 ASSAY OF TROPONIN QUANT: CPT | Performed by: EMERGENCY MEDICINE

## 2023-12-28 PROCEDURE — 99285 EMERGENCY DEPT VISIT HI MDM: CPT

## 2023-12-28 PROCEDURE — 71046 X-RAY EXAM CHEST 2 VIEWS: CPT

## 2023-12-28 PROCEDURE — 0241U HB NFCT DS VIR RESP RNA 4 TRGT: CPT | Performed by: EMERGENCY MEDICINE

## 2023-12-28 PROCEDURE — 80053 COMPREHEN METABOLIC PANEL: CPT | Performed by: EMERGENCY MEDICINE

## 2023-12-28 PROCEDURE — 36415 COLL VENOUS BLD VENIPUNCTURE: CPT

## 2023-12-28 PROCEDURE — 99285 EMERGENCY DEPT VISIT HI MDM: CPT | Performed by: EMERGENCY MEDICINE

## 2023-12-28 PROCEDURE — 83880 ASSAY OF NATRIURETIC PEPTIDE: CPT | Performed by: EMERGENCY MEDICINE

## 2023-12-28 PROCEDURE — 93005 ELECTROCARDIOGRAM TRACING: CPT

## 2023-12-28 PROCEDURE — 85025 COMPLETE CBC W/AUTO DIFF WBC: CPT | Performed by: EMERGENCY MEDICINE

## 2023-12-28 RX ORDER — LORATADINE 10 MG/1
10 TABLET ORAL DAILY
COMMUNITY

## 2023-12-28 RX ORDER — LIDOCAINE 50 MG/G
1 PATCH TOPICAL
Status: DISCONTINUED | OUTPATIENT
Start: 2023-12-28 | End: 2024-01-08 | Stop reason: HOSPADM

## 2023-12-28 RX ORDER — PSYLLIUM HUSK 0.4 G
CAPSULE ORAL
COMMUNITY

## 2023-12-28 RX ORDER — GLUCOSA SU 2KCL/CHONDROITIN SU 500-400 MG
100 CAPSULE ORAL DAILY
COMMUNITY

## 2023-12-28 RX ORDER — ACETAMINOPHEN 325 MG/1
650 TABLET ORAL EVERY 6 HOURS PRN
Status: DISCONTINUED | OUTPATIENT
Start: 2023-12-28 | End: 2024-01-08 | Stop reason: HOSPADM

## 2023-12-28 RX ORDER — FORMOTEROL FUMARATE DIHYDRATE 20 UG/2ML
20 SOLUTION RESPIRATORY (INHALATION) 2 TIMES DAILY
COMMUNITY

## 2023-12-28 RX ORDER — BUDESONIDE 0.5 MG/2ML
0.5 INHALANT ORAL 2 TIMES DAILY
COMMUNITY

## 2023-12-28 RX ADMIN — LIDOCAINE 1 PATCH: 50 PATCH CUTANEOUS at 23:49

## 2023-12-29 PROBLEM — R79.89 ELEVATED TROPONIN: Status: ACTIVE | Noted: 2023-12-29

## 2023-12-29 PROBLEM — I27.20 PULMONARY HYPERTENSION (HCC): Status: ACTIVE | Noted: 2023-12-29

## 2023-12-29 PROBLEM — I48.0 PAROXYSMAL A-FIB (HCC): Status: ACTIVE | Noted: 2023-12-29

## 2023-12-29 PROBLEM — I50.33 ACUTE ON CHRONIC DIASTOLIC HEART FAILURE (HCC): Status: ACTIVE | Noted: 2023-12-29

## 2023-12-29 LAB
4HR DELTA HS TROPONIN: 12 NG/L
ALBUMIN SERPL BCP-MCNC: 3.4 G/DL (ref 3.5–5)
ALP SERPL-CCNC: 126 U/L (ref 34–104)
ALT SERPL W P-5'-P-CCNC: 10 U/L (ref 7–52)
ANION GAP SERPL CALCULATED.3IONS-SCNC: 9 MMOL/L
AST SERPL W P-5'-P-CCNC: 19 U/L (ref 13–39)
ATRIAL RATE: 68 BPM
BILIRUB SERPL-MCNC: 1.31 MG/DL (ref 0.2–1)
BUN SERPL-MCNC: 40 MG/DL (ref 5–25)
CALCIUM ALBUM COR SERPL-MCNC: 9.6 MG/DL (ref 8.3–10.1)
CALCIUM SERPL-MCNC: 9.1 MG/DL (ref 8.4–10.2)
CARDIAC TROPONIN I PNL SERPL HS: 60 NG/L
CHLORIDE SERPL-SCNC: 103 MMOL/L (ref 96–108)
CO2 SERPL-SCNC: 24 MMOL/L (ref 21–32)
CREAT SERPL-MCNC: 1.14 MG/DL (ref 0.6–1.3)
ERYTHROCYTE [DISTWIDTH] IN BLOOD BY AUTOMATED COUNT: 18.4 % (ref 11.6–15.1)
GFR SERPL CREATININE-BSD FRML MDRD: 58 ML/MIN/1.73SQ M
GLUCOSE SERPL-MCNC: 107 MG/DL (ref 65–140)
GLUCOSE SERPL-MCNC: 111 MG/DL (ref 65–140)
GLUCOSE SERPL-MCNC: 118 MG/DL (ref 65–140)
GLUCOSE SERPL-MCNC: 173 MG/DL (ref 65–140)
GLUCOSE SERPL-MCNC: 201 MG/DL (ref 65–140)
GLUCOSE SERPL-MCNC: 227 MG/DL (ref 65–140)
GLUCOSE SERPL-MCNC: 77 MG/DL (ref 65–140)
HCT VFR BLD AUTO: 35.1 % (ref 36.5–49.3)
HGB BLD-MCNC: 10.9 G/DL (ref 12–17)
MAGNESIUM SERPL-MCNC: 2 MG/DL (ref 1.9–2.7)
MCH RBC QN AUTO: 25.3 PG (ref 26.8–34.3)
MCHC RBC AUTO-ENTMCNC: 31.1 G/DL (ref 31.4–37.4)
MCV RBC AUTO: 82 FL (ref 82–98)
P AXIS: 50 DEGREES
PHOSPHATE SERPL-MCNC: 3.7 MG/DL (ref 2.3–4.1)
PLATELET # BLD AUTO: 154 THOUSANDS/UL (ref 149–390)
PMV BLD AUTO: 11.4 FL (ref 8.9–12.7)
POTASSIUM SERPL-SCNC: 3.6 MMOL/L (ref 3.5–5.3)
PROT SERPL-MCNC: 6.2 G/DL (ref 6.4–8.4)
QRS AXIS: 270 DEGREES
QRSD INTERVAL: 162 MS
QT INTERVAL: 454 MS
QTC INTERVAL: 530 MS
RBC # BLD AUTO: 4.3 MILLION/UL (ref 3.88–5.62)
SODIUM SERPL-SCNC: 136 MMOL/L (ref 135–147)
T WAVE AXIS: 77 DEGREES
VENTRICULAR RATE: 82 BPM
WBC # BLD AUTO: 6.55 THOUSAND/UL (ref 4.31–10.16)

## 2023-12-29 PROCEDURE — 84100 ASSAY OF PHOSPHORUS: CPT | Performed by: PHYSICIAN ASSISTANT

## 2023-12-29 PROCEDURE — 84484 ASSAY OF TROPONIN QUANT: CPT | Performed by: PHYSICIAN ASSISTANT

## 2023-12-29 PROCEDURE — 94760 N-INVAS EAR/PLS OXIMETRY 1: CPT

## 2023-12-29 PROCEDURE — 94640 AIRWAY INHALATION TREATMENT: CPT

## 2023-12-29 PROCEDURE — 85027 COMPLETE CBC AUTOMATED: CPT | Performed by: PHYSICIAN ASSISTANT

## 2023-12-29 PROCEDURE — 99223 1ST HOSP IP/OBS HIGH 75: CPT | Performed by: INTERNAL MEDICINE

## 2023-12-29 PROCEDURE — 94664 DEMO&/EVAL PT USE INHALER: CPT

## 2023-12-29 PROCEDURE — 36415 COLL VENOUS BLD VENIPUNCTURE: CPT | Performed by: PHYSICIAN ASSISTANT

## 2023-12-29 PROCEDURE — 82948 REAGENT STRIP/BLOOD GLUCOSE: CPT

## 2023-12-29 PROCEDURE — 80053 COMPREHEN METABOLIC PANEL: CPT | Performed by: PHYSICIAN ASSISTANT

## 2023-12-29 PROCEDURE — 99232 SBSQ HOSP IP/OBS MODERATE 35: CPT | Performed by: INTERNAL MEDICINE

## 2023-12-29 PROCEDURE — 83735 ASSAY OF MAGNESIUM: CPT | Performed by: PHYSICIAN ASSISTANT

## 2023-12-29 RX ORDER — ALBUTEROL SULFATE 2.5 MG/3ML
2.5 SOLUTION RESPIRATORY (INHALATION) EVERY 4 HOURS PRN
Status: DISCONTINUED | OUTPATIENT
Start: 2023-12-29 | End: 2024-01-08 | Stop reason: HOSPADM

## 2023-12-29 RX ORDER — BUDESONIDE 0.5 MG/2ML
0.5 INHALANT ORAL
Status: DISCONTINUED | OUTPATIENT
Start: 2023-12-29 | End: 2024-01-08 | Stop reason: HOSPADM

## 2023-12-29 RX ORDER — INSULIN GLARGINE 100 [IU]/ML
18 INJECTION, SOLUTION SUBCUTANEOUS EVERY 12 HOURS SCHEDULED
Status: DISCONTINUED | OUTPATIENT
Start: 2023-12-29 | End: 2023-12-30

## 2023-12-29 RX ORDER — INSULIN LISPRO 100 [IU]/ML
1-5 INJECTION, SOLUTION INTRAVENOUS; SUBCUTANEOUS
Status: DISCONTINUED | OUTPATIENT
Start: 2023-12-29 | End: 2023-12-29

## 2023-12-29 RX ORDER — GABAPENTIN 100 MG/1
100 CAPSULE ORAL DAILY
Status: DISCONTINUED | OUTPATIENT
Start: 2023-12-29 | End: 2024-01-08 | Stop reason: HOSPADM

## 2023-12-29 RX ORDER — FUROSEMIDE 10 MG/ML
100 INJECTION INTRAMUSCULAR; INTRAVENOUS
Status: DISCONTINUED | OUTPATIENT
Start: 2023-12-29 | End: 2024-01-06

## 2023-12-29 RX ORDER — FORMOTEROL FUMARATE DIHYDRATE 20 UG/2ML
20 SOLUTION RESPIRATORY (INHALATION)
Status: DISCONTINUED | OUTPATIENT
Start: 2023-12-29 | End: 2023-12-30

## 2023-12-29 RX ORDER — UBIDECARENONE 30 MG
100 CAPSULE ORAL DAILY
Status: DISCONTINUED | OUTPATIENT
Start: 2023-12-29 | End: 2024-01-08 | Stop reason: HOSPADM

## 2023-12-29 RX ORDER — SENNOSIDES 8.6 MG
2 TABLET ORAL
Status: DISCONTINUED | OUTPATIENT
Start: 2023-12-29 | End: 2024-01-08 | Stop reason: HOSPADM

## 2023-12-29 RX ORDER — HEPARIN SODIUM 5000 [USP'U]/ML
5000 INJECTION, SOLUTION INTRAVENOUS; SUBCUTANEOUS EVERY 8 HOURS SCHEDULED
Status: DISCONTINUED | OUTPATIENT
Start: 2023-12-29 | End: 2024-01-08 | Stop reason: HOSPADM

## 2023-12-29 RX ORDER — LOSARTAN POTASSIUM 50 MG/1
100 TABLET ORAL DAILY
Status: DISCONTINUED | OUTPATIENT
Start: 2023-12-29 | End: 2024-01-08 | Stop reason: HOSPADM

## 2023-12-29 RX ORDER — LORATADINE 10 MG/1
10 TABLET ORAL DAILY
Status: DISCONTINUED | OUTPATIENT
Start: 2023-12-29 | End: 2024-01-08 | Stop reason: HOSPADM

## 2023-12-29 RX ORDER — ATORVASTATIN CALCIUM 20 MG/1
20 TABLET, FILM COATED ORAL
Status: DISCONTINUED | OUTPATIENT
Start: 2023-12-29 | End: 2024-01-08 | Stop reason: HOSPADM

## 2023-12-29 RX ORDER — INSULIN LISPRO 100 [IU]/ML
1-5 INJECTION, SOLUTION INTRAVENOUS; SUBCUTANEOUS
Status: DISCONTINUED | OUTPATIENT
Start: 2023-12-29 | End: 2023-12-30

## 2023-12-29 RX ORDER — INSULIN LISPRO 100 [IU]/ML
1-5 INJECTION, SOLUTION INTRAVENOUS; SUBCUTANEOUS
Status: DISCONTINUED | OUTPATIENT
Start: 2023-12-30 | End: 2024-01-08 | Stop reason: HOSPADM

## 2023-12-29 RX ORDER — MAGNESIUM HYDROXIDE/ALUMINUM HYDROXICE/SIMETHICONE 120; 1200; 1200 MG/30ML; MG/30ML; MG/30ML
30 SUSPENSION ORAL EVERY 4 HOURS PRN
Status: DISCONTINUED | OUTPATIENT
Start: 2023-12-29 | End: 2024-01-08 | Stop reason: HOSPADM

## 2023-12-29 RX ORDER — INSULIN LISPRO 100 [IU]/ML
20 INJECTION, SOLUTION INTRAVENOUS; SUBCUTANEOUS
Status: DISCONTINUED | OUTPATIENT
Start: 2023-12-29 | End: 2023-12-30

## 2023-12-29 RX ORDER — IPRATROPIUM BROMIDE AND ALBUTEROL SULFATE 2.5; .5 MG/3ML; MG/3ML
3 SOLUTION RESPIRATORY (INHALATION)
Status: DISCONTINUED | OUTPATIENT
Start: 2023-12-29 | End: 2023-12-29

## 2023-12-29 RX ORDER — AMLODIPINE BESYLATE 5 MG/1
10 TABLET ORAL DAILY
Status: DISCONTINUED | OUTPATIENT
Start: 2023-12-29 | End: 2024-01-07

## 2023-12-29 RX ORDER — FLUTICASONE PROPIONATE 50 MCG
1 SPRAY, SUSPENSION (ML) NASAL 2 TIMES DAILY
Status: DISCONTINUED | OUTPATIENT
Start: 2023-12-29 | End: 2024-01-08 | Stop reason: HOSPADM

## 2023-12-29 RX ORDER — ALLOPURINOL 300 MG/1
300 TABLET ORAL DAILY
Status: DISCONTINUED | OUTPATIENT
Start: 2023-12-29 | End: 2024-01-08 | Stop reason: HOSPADM

## 2023-12-29 RX ORDER — LEVALBUTEROL INHALATION SOLUTION 1.25 MG/3ML
1.25 SOLUTION RESPIRATORY (INHALATION)
Status: DISCONTINUED | OUTPATIENT
Start: 2023-12-29 | End: 2023-12-30

## 2023-12-29 RX ORDER — MELATONIN
1000 DAILY
Status: DISCONTINUED | OUTPATIENT
Start: 2023-12-29 | End: 2024-01-08 | Stop reason: HOSPADM

## 2023-12-29 RX ORDER — CHLORAL HYDRATE 500 MG
1000 CAPSULE ORAL DAILY
Status: DISCONTINUED | OUTPATIENT
Start: 2023-12-29 | End: 2024-01-08 | Stop reason: HOSPADM

## 2023-12-29 RX ORDER — AZELASTINE 1 MG/ML
1 SPRAY, METERED NASAL 2 TIMES DAILY
Status: DISCONTINUED | OUTPATIENT
Start: 2023-12-29 | End: 2024-01-08 | Stop reason: HOSPADM

## 2023-12-29 RX ADMIN — FORMOTEROL FUMARATE 20 MCG: 20 SOLUTION RESPIRATORY (INHALATION) at 19:53

## 2023-12-29 RX ADMIN — BUDESONIDE 0.5 MG: 0.5 INHALANT ORAL at 19:30

## 2023-12-29 RX ADMIN — INSULIN GLARGINE 18 UNITS: 100 INJECTION, SOLUTION SUBCUTANEOUS at 22:09

## 2023-12-29 RX ADMIN — FUROSEMIDE 100 MG: 10 INJECTION, SOLUTION INTRAMUSCULAR; INTRAVENOUS at 01:30

## 2023-12-29 RX ADMIN — HEPARIN SODIUM 5000 UNITS: 5000 INJECTION INTRAVENOUS; SUBCUTANEOUS at 13:06

## 2023-12-29 RX ADMIN — INSULIN LISPRO 1 UNITS: 100 INJECTION, SOLUTION INTRAVENOUS; SUBCUTANEOUS at 22:08

## 2023-12-29 RX ADMIN — AZELASTINE HYDROCHLORIDE 1 SPRAY: 137 SPRAY, METERED NASAL at 09:08

## 2023-12-29 RX ADMIN — FUROSEMIDE 100 MG: 10 INJECTION, SOLUTION INTRAMUSCULAR; INTRAVENOUS at 16:26

## 2023-12-29 RX ADMIN — HEPARIN SODIUM 5000 UNITS: 5000 INJECTION INTRAVENOUS; SUBCUTANEOUS at 05:28

## 2023-12-29 RX ADMIN — HEPARIN SODIUM 5000 UNITS: 5000 INJECTION INTRAVENOUS; SUBCUTANEOUS at 22:08

## 2023-12-29 RX ADMIN — FLUTICASONE PROPIONATE 1 SPRAY: 50 SPRAY, METERED NASAL at 09:08

## 2023-12-29 RX ADMIN — ATORVASTATIN CALCIUM 20 MG: 20 TABLET, FILM COATED ORAL at 18:03

## 2023-12-29 RX ADMIN — IPRATROPIUM BROMIDE 0.5 MG: 0.5 SOLUTION RESPIRATORY (INHALATION) at 19:30

## 2023-12-29 RX ADMIN — LORATADINE 10 MG: 10 TABLET ORAL at 08:57

## 2023-12-29 RX ADMIN — GABAPENTIN 100 MG: 100 CAPSULE ORAL at 08:56

## 2023-12-29 RX ADMIN — LEVALBUTEROL HYDROCHLORIDE 1.25 MG: 1.25 SOLUTION RESPIRATORY (INHALATION) at 07:38

## 2023-12-29 RX ADMIN — Medication 100 MG: at 08:55

## 2023-12-29 RX ADMIN — BUDESONIDE 0.5 MG: 0.5 INHALANT ORAL at 07:38

## 2023-12-29 RX ADMIN — FORMOTEROL FUMARATE 20 MCG: 20 SOLUTION RESPIRATORY (INHALATION) at 00:52

## 2023-12-29 RX ADMIN — IPRATROPIUM BROMIDE 0.5 MG: 0.5 SOLUTION RESPIRATORY (INHALATION) at 07:38

## 2023-12-29 RX ADMIN — INSULIN LISPRO 1 UNITS: 100 INJECTION, SOLUTION INTRAVENOUS; SUBCUTANEOUS at 18:05

## 2023-12-29 RX ADMIN — INSULIN LISPRO 20 UNITS: 100 INJECTION, SOLUTION INTRAVENOUS; SUBCUTANEOUS at 18:04

## 2023-12-29 RX ADMIN — ACETAMINOPHEN 650 MG: 325 TABLET, FILM COATED ORAL at 00:06

## 2023-12-29 RX ADMIN — AMLODIPINE BESYLATE 10 MG: 5 TABLET ORAL at 08:55

## 2023-12-29 RX ADMIN — ALLOPURINOL 300 MG: 100 TABLET ORAL at 08:58

## 2023-12-29 RX ADMIN — LOSARTAN POTASSIUM 100 MG: 50 TABLET, FILM COATED ORAL at 08:58

## 2023-12-29 RX ADMIN — LEVALBUTEROL HYDROCHLORIDE 1.25 MG: 1.25 SOLUTION RESPIRATORY (INHALATION) at 19:30

## 2023-12-29 RX ADMIN — FORMOTEROL FUMARATE 20 MCG: 20 SOLUTION RESPIRATORY (INHALATION) at 07:38

## 2023-12-29 RX ADMIN — OMEGA-3 FATTY ACIDS CAP 1000 MG 1000 MG: 1000 CAP at 08:58

## 2023-12-29 RX ADMIN — LEVALBUTEROL HYDROCHLORIDE 1.25 MG: 1.25 SOLUTION RESPIRATORY (INHALATION) at 13:48

## 2023-12-29 RX ADMIN — INSULIN LISPRO 18 UNITS: 100 INJECTION, SOLUTION INTRAVENOUS; SUBCUTANEOUS at 11:12

## 2023-12-29 RX ADMIN — FLUTICASONE PROPIONATE 1 SPRAY: 50 SPRAY, METERED NASAL at 22:07

## 2023-12-29 RX ADMIN — BUDESONIDE 0.5 MG: 0.5 INHALANT ORAL at 00:52

## 2023-12-29 RX ADMIN — LIDOCAINE 1 PATCH: 50 PATCH CUTANEOUS at 22:11

## 2023-12-29 RX ADMIN — IPRATROPIUM BROMIDE 0.5 MG: 0.5 SOLUTION RESPIRATORY (INHALATION) at 13:48

## 2023-12-29 RX ADMIN — AZELASTINE HYDROCHLORIDE 1 SPRAY: 137 SPRAY, METERED NASAL at 23:00

## 2023-12-29 RX ADMIN — FUROSEMIDE 100 MG: 10 INJECTION, SOLUTION INTRAMUSCULAR; INTRAVENOUS at 08:58

## 2023-12-29 RX ADMIN — INSULIN GLARGINE 16 UNITS: 100 INJECTION, SOLUTION SUBCUTANEOUS at 09:11

## 2023-12-29 RX ADMIN — Medication 1000 UNITS: at 08:56

## 2023-12-29 RX ADMIN — IPRATROPIUM BROMIDE AND ALBUTEROL SULFATE 3 ML: 2.5; .5 SOLUTION RESPIRATORY (INHALATION) at 00:44

## 2023-12-29 NOTE — ASSESSMENT & PLAN NOTE
"Lab Results   Component Value Date    HGBA1C 6.8 11/16/2023       No results for input(s): \"POCGLU\" in the last 72 hours.    Blood Sugar Average: Last 72 hrs:  Home regimen: Lantus 18 units twice daily and NovoLog 22 unit with breakfast, 23 units lunch, and 20 units with dinner  Continue home Lantus and placed on NovoLog 20 units 3 times daily with meals the addition of SSI    "

## 2023-12-29 NOTE — PROGRESS NOTES
Respiratory Protocol:  Respiratory Plan: Patient is in mild respiratory distress and match home bronchodilators.    Keep BID Perf/Pulm to match home medications. Change Duo-neb BID to Atrovent/Xoponex TID and add an albuterol nebulizer Q4PRN.       Pt stated to have a hx of wearing home O2 before at night, but not recently. Patient uses a nebulizer set-up at home, unfamiliar to what meds. Patient stated to have an albuterol rescue inhaler, but does not frequently use. Patient has a hx of using CPAP at night with home device. Offered patient the chance to use one of our devices here for CPAP, but patient refused and said his wife is bringing in his home device today. Patient has a hx of asthma.

## 2023-12-29 NOTE — ASSESSMENT & PLAN NOTE
Home regimen: pulmicort BID, performist BID, and DuoNeb twice daily  Continue home nebulizers and increased dose of IV diuresis due to anasarca   If you are a smoker, it is important for your health to stop smoking. Please be aware that second hand smoke is also harmful.

## 2023-12-29 NOTE — ASSESSMENT & PLAN NOTE
Wt Readings from Last 3 Encounters:   12/29/23 119 kg (263 lb 4.8 oz)   12/12/23 115 kg (254 lb)   12/05/23 112 kg (247 lb)     Presents with worsening dyspnea and anasarca since [place]   Home regimen: lasix 40mg BID with increased to 60mg in AM and 40mg in PM 3 days ago with no improvement in symptoms   Last echo 10/2023: Severe left atrial dilation. RV systolic pressure 52.2mmHg. pulmonary artery pressure elevated  Pt with anasarca and pulmonary congestion - continue lasix 100mg IV BID   I/o and daily weights   Sodium and fluid restriction   Cardiology following, appreciate continue will IV diuresis over the weekend

## 2023-12-29 NOTE — ASSESSMENT & PLAN NOTE
"Home regimen: Amlodipine 10 Mg daily, losartan 100 Mg daily  Continue home medications  /63 (BP Location: Right arm)   Pulse 72   Temp 98.3 °F (36.8 °C)   Resp (!) 30   Ht 5' 11\" (1.803 m)   Wt 119 kg (263 lb 4.8 oz)   SpO2 97%   BMI 36.72 kg/m²    "

## 2023-12-29 NOTE — ASSESSMENT & PLAN NOTE
Wt Readings from Last 3 Encounters:   12/12/23 115 kg (254 lb)   12/05/23 112 kg (247 lb)   09/05/23 112 kg (247 lb)     Presents with worsening dyspnea and anasarca since [place]   Home regimen: lasix 40mg BID with increased to 60mg in AM and 40mg in PM 3 days ago with no improvement in symptoms   Last echo 10/2023: Severe left atrial dilation. RV systolic pressure 52.2mmHg. pulmonary artery pressure elevated  Pt with anasarca and pulmonary congestion - continue lasix 100mg IV BID   I/o and daily weights   Sodium and fluid restriction

## 2023-12-29 NOTE — CONSULTS
Consult - Cardiology   Hammad Montesinos 83 y.o. male MRN: [id number]  Unit/Bed#: ED 08 Encounter: [id number]        Reason For Consult: Heart failure  Outpatient Cardiologist: Dr. Richter, [facility]               ASSESSMENT:  Acute on chronic HFpEF  Pt follows with [facility]  10/2023 echo: LVEF 50%, moderate concentric LVH, severe left atrial dilation (echo reviewed on patient's phone through his [employer] breanne, official records requested through his cardiology office)  Prehospital diuretic: Lasix 40 twice daily recently increased to 60 a.m. and 40 p.m. due to worsening edema   on arrival  PERRY on CKD  Baseline creatinine reported around 1  Creatinine 1.3 on arrival which is improving with diuretics  Type 2 DM  HTN  Dual-chamber permanent pacemaker in situ  Paroxysmal atrial fibrillation  This is listed on his most recent cardiology office note however he is not maintained on anticoagulation and they have documented that he has had no recurrence of AF. Unclear when his initial diagnosis was or under what circumstances that occurred.    -He does appear significantly volume overloaded and will probably need several days of diuresis  -Records have been requested from his cardiology office, although he does have [employer] on his phone so we were able to go over his most recent echo done just 2 months ago  -No need to repeat echo  -We will await records from [facility] to find out what kind of PPM he has so we can interrogate it     PLAN/ DISCUSSION:     Continue IV Lasix, OK to trial high-dose of 100 mg but needs close watch on renal function  Continue losartan and amlodipine  Daily BMP, I/O, lytes, standing weights  Awaiting records from [facility] (will request them to be emailed)  Continue diuresis through the weekend. If lab work stable and has acceptable UOP, we will not see every day for official rounds. I do anticipate he will see several days of diuresis.     History Of Present Illness:  This is an  83-year-old gentleman who follows with cardiology at [place].  He has known history of heart failure with preserved ejection fraction as well as hypertension and dual-chamber pacemaker.  Recently he has had his diuretics increased by his cardiology office due to worsening swelling and fluid retention.  His breathing has unfortunately worsened acutely this week.  As such he came to the emergency room for evaluation.  He was found to be diffusely volume overloaded with anasarca.  He has been admitted for IV diuretics.  We are asked see him in consultation to assist with diuresis and managing his heart failure with preserved ejection fraction.    He is originally from [place].  He moved to [place] 2 years ago to be closer to his daughter.  She is a nurse practitioner working with [employer].  He lives in a house with his wife which is located in a senior living facility.  He is independent of his ADLs.  He does note probably some increased salty food over the holidays.    Past Medical History:        Past Medical History:   Diagnosis Date    Asthma     Bradycardia     Carcinoma, basal cell, skin     head and face and ear    COVID-19 12/05/2022    Dupuytren contracture     bilateral ring and right middle    Gout     Herniated lumbar intervertebral disc     Sleep apnea       Past Surgical History:   Procedure Laterality Date    APPENDECTOMY      age 20, ruptured    CATARACT EXTRACTION, BILATERAL Bilateral     CERVICAL FUSION      CYST REMOVAL Left 01/2023    DUPUYTREN CONTRACTURE RELEASE Left     left ring finger    LUMBAR LAMINECTOMY      belkis placed    TONSILLECTOMY      VASECTOMY          Allergy:        Allergies   Allergen Reactions    Hydrochlorothiazide Other (See Comments)     SEVERE DIZZINESS    Insulin Lispro Other (See Comments)     Cannot control blood sugars(Humalog)    Lisinopril Other (See Comments)     Reports cough with use     Minoxidil GI Intolerance    Nifedipine Other (See Comments)     EDEMA LEGS     Other Other (See Comments)     SEVERE DIZZINESS  Hydrap-es    Prazosin Other (See Comments)     CHEST PAIN      Venlafaxine Other (See Comments)     Made pt feel bad      Doxycycline Rash    Simvastatin Rash     PEDAL EDEMA         Medications:       Prior to Admission medications    Medication Sig Start Date End Date Taking? Authorizing Provider   allopurinol (ZYLOPRIM) 300 mg tablet every 24 hours   Yes Historical Provider, MD   amLODIPine (NORVASC) 10 mg tablet Take 10 mg by mouth daily 2/22/22  Yes Historical Provider, MD   atorvastatin (LIPITOR) 20 mg tablet Take 20 mg by mouth daily 2/22/22  Yes Historical Provider, MD   azelastine (ASTELIN) 0.1 % nasal spray    Yes Historical Provider, MD   budesonide (PULMICORT) 0.5 mg/2 mL nebulizer solution Take 0.5 mg by nebulization 2 (two) times a day Rinse mouth after use.   Yes Historical Provider, MD   Cholecalciferol (Vitamin D3) 25 MCG (1000 UT) CAPS every 24 hours   Yes Historical Provider, MD   Coenzyme Q10 (Co Q10) 100 MG CAPS Take 100 mg by mouth in the morning   Yes Historical Provider, MD   Easy Comfort Pen Needles 33G X 4 MM MISC USE WITH INSULIN FIVE TIMES DAILY AS DIRECTED 10/25/22  Yes Historical Provider, MD   fluticasone (FLONASE) 50 mcg/act nasal spray 1 spray into each nostril 2 (two) times a day   Yes Historical Provider, MD   formoterol (PERFOROMIST) 20 MCG/2ML nebulizer solution Take 20 mcg by nebulization 2 (two) times a day   Yes Historical Provider, MD   furosemide (LASIX) 40 mg tablet Take 40 mg by mouth 2 (two) times a day   Yes Historical Provider, MD   gabapentin (NEURONTIN) 100 mg capsule Take 2 capsules (200 mg total) by mouth daily  Patient taking differently: Take 100 mg by mouth daily 7/19/23  Yes Neris Sanchez MD   Glucosamine-Chondroitin 500-400 MG CAPS    Yes Historical Provider, MD   insulin aspart (NovoLOG FlexPen) 100 UNIT/ML injection pen INJECT 22 UNITS SUBCUTANEOUSLY with breakfast, 23 UNITS with LUNCH, AND 28 UNITS with  dinner plus sliding scale. (UP  UNITS DAILY)  Patient taking differently: INJECT 22 UNITS SUBCUTANEOUSLY with breakfast, 23 UNITS with LUNCH, AND 20 UNITS with dinner plus sliding scale. (UP  UNITS DAILY). 8/21/23  Yes Missy Nam MD   Insulin Glargine Solostar (Lantus SoloStar) 100 UNIT/ML SOPN INJECT 30 UNITS SUBCUTANEOUSLY IN THE MORNING AND 32 UNITS IN THE EVENING. MAY INCREASE TO 80 UNITS DAILY  Patient taking differently: Inject 18 Units under the skin 2 (two) times a day 6/16/23  Yes Jonn Rossi PA-C   ipratropium-albuterol (DUO-NEB) 0.5-2.5 mg/3 mL nebulizer solution Take 3 mL by nebulization 2 (two) times a day 9/26/23  Yes Historical Provider, MD   loratadine (CLARITIN) 10 mg tablet Take 10 mg by mouth daily   Yes Historical Provider, MD   losartan (COZAAR) 100 MG tablet Take 100 mg by mouth daily 2/22/22  Yes Historical Provider, MD   Multiple Vitamin (MULTIVITAMIN ADULT PO) every 24 hours   Yes Historical Provider, MD   mupirocin (BACTROBAN) 2 % ointment  4/20/23  Yes Historical Provider, MD   Omega-3 Fatty Acids (Fish Oil) 360 MG CAPS Take by mouth   Yes Historical Provider, MD   potassium citrate (UROCIT-K) 10 mEq Take 10 mEq by mouth 2 (two) times a day   Yes Historical Provider, MD   triamcinolone (KENALOG) 0.1 % cream 1 Application 2 (two) times a day To affected areas 6/2/23  Yes Historical Provider, MD   OneTouch Verio test strip USE AS DIRECTED FOUR TIMES DAILY 2/10/22   Historical Provider, MD       Family History:     Family History   Problem Relation Age of Onset    Hypertension Mother     Diabetes type II Mother     Hypertension Father     Diabetes type II Brother     Hypertension Brother     Kidney disease Sister     Hypertension Daughter     GI problems Daughter         Social History:       Social History     Socioeconomic History    Marital status: /Civil Union     Spouse name: None    Number of children: None    Years of education: None    Highest  education level: None   Occupational History    Occupation: teacher     Comment: retired   Tobacco Use    Smoking status: Never    Smokeless tobacco: Never   Vaping Use    Vaping status: Never Used   Substance and Sexual Activity    Alcohol use: Yes     Comment: rare    Drug use: Never    Sexual activity: None   Other Topics Concern    None   Social History Narrative    None     Social Determinants of Health     Financial Resource Strain: Not on file   Food Insecurity: Not on file   Transportation Needs: Not on file   Physical Activity: Not on file   Stress: Not on file   Social Connections: Not on file   Intimate Partner Violence: Not on file   Housing Stability: Not on file       ROS:  14 point ROS negative except as outlined above  Remainder review of systems is negative    Exam:  General:  alert, oriented and in no distress, cooperative  Head: Normocephalic, atraumatic.  Eyes:  EOMI. Pupils - equal, round, reactive to accomodation.  No icterus.  Normal Conjunctiva.   Oropharynx: moist and normal-appearing mucosa  Neck: supple, symmetrical, trachea midline and no JVD  Heart:  RRR, No: murmer, rub or gallop, S1 & S2 normal   Respiratory effort / Chest Inspection: unlabored  Lungs:  normal air entry, lungs clear to auscultation and no rales, rhonchi or wheezing   Abdomen: flat, normal findings: bowel sounds normal and soft, non-tender  Lower Limbs:  no pitting edema  Pulses::  RLE - DP: present 2+                 LLE - DP: present 2+  Musculoskeletal: ROM grossly normal        DATA:      ECG:                     Telemetry: Probable ventricular paced rhythm with intermittent PVCs          Echocardiogram:           Ischemic Testing:         Weights:    Wt Readings from Last 3 Encounters:   12/29/23 119 kg (263 lb 4.8 oz)   12/12/23 115 kg (254 lb)   12/05/23 112 kg (247 lb)   , Body mass index is 36.72 kg/m².         Lab Studies:             Results from last 7 days   Lab Units 12/29/23  0617 12/28/23  1947   WBC  Thousand/uL 6.55 8.18   HEMOGLOBIN g/dL 10.9* 11.1*   HEMATOCRIT % 35.1* 36.7   PLATELETS Thousands/uL 154 169   ,   Results from last 7 days   Lab Units 12/29/23 0527 12/28/23 1947   POTASSIUM mmol/L 3.6 4.9   CHLORIDE mmol/L 103 102   CO2 mmol/L 24 24   BUN mg/dL 40* 43*   CREATININE mg/dL 1.14 1.30   CALCIUM mg/dL 9.1 9.2   ALK PHOS U/L 126* 136*   ALT U/L 10 11   AST U/L 19 27

## 2023-12-29 NOTE — PLAN OF CARE
Problem: Potential for Falls  Goal: Patient will remain free of falls  Description: INTERVENTIONS:  - Educate patient/family on patient safety including physical limitations  - Instruct patient to call for assistance with activity   - Consult OT/PT to assist with strengthening/mobility   - Keep Call bell within reach  - Keep bed low and locked with side rails adjusted as appropriate  - Keep care items and personal belongings within reach  - Initiate and maintain comfort rounds  - Make Fall Risk Sign visible to staff  - Offer Toileting every x Hours, in advance of need  - Initiate/Maintain xalarm  - Obtain necessary fall risk management equipment: x  - Apply yellow socks and bracelet for high fall risk patients  - Consider moving patient to room near nurses station  Outcome: Progressing     Problem: CARDIOVASCULAR - ADULT  Goal: Maintains optimal cardiac output and hemodynamic stability  Description: INTERVENTIONS:  - Monitor I/O, vital signs and rhythm  - Monitor for S/S and trends of decreased cardiac output  - Administer and titrate ordered vasoactive medications to optimize hemodynamic stability  - Assess quality of pulses, skin color and temperature  - Assess for signs of decreased coronary artery perfusion  - Instruct patient to report change in severity of symptoms  Outcome: Progressing  Goal: Absence of cardiac dysrhythmias or at baseline rhythm  Description: INTERVENTIONS:  - Continuous cardiac monitoring, vital signs, obtain 12 lead EKG if ordered  - Administer antiarrhythmic and heart rate control medications as ordered  - Monitor electrolytes and administer replacement therapy as ordered  Outcome: Progressing     Problem: RESPIRATORY - ADULT  Goal: Achieves optimal ventilation and oxygenation  Description: INTERVENTIONS:  - Assess for changes in respiratory status  - Assess for changes in mentation and behavior  - Position to facilitate oxygenation and minimize respiratory effort  - Oxygen administered  by appropriate delivery if ordered  - Initiate smoking cessation education as indicated  - Encourage broncho-pulmonary hygiene including cough, deep breathe, Incentive Spirometry  - Assess the need for suctioning and aspirate as needed  - Assess and instruct to report SOB or any respiratory difficulty  - Respiratory Therapy support as indicated  Outcome: Progressing     Problem: METABOLIC, FLUID AND ELECTROLYTES - ADULT  Goal: Electrolytes maintained within normal limits  Description: INTERVENTIONS:  - Monitor labs and assess patient for signs and symptoms of electrolyte imbalances  - Administer electrolyte replacement as ordered  - Monitor response to electrolyte replacements, including repeat lab results as appropriate  - Instruct patient on fluid and nutrition as appropriate  Outcome: Progressing  Goal: Fluid balance maintained  Description: INTERVENTIONS:  - Monitor labs   - Monitor I/O and WT  - Instruct patient on fluid and nutrition as appropriate  - Assess for signs & symptoms of volume excess or deficit  Outcome: Progressing  Goal: Glucose maintained within target range  Description: INTERVENTIONS:  - Monitor Blood Glucose as ordered  - Assess for signs and symptoms of hyperglycemia and hypoglycemia  - Administer ordered medications to maintain glucose within target range  - Assess nutritional intake and initiate nutrition service referral as needed  Outcome: Progressing     Problem: MUSCULOSKELETAL - ADULT  Goal: Maintain or return mobility to safest level of function  Description: INTERVENTIONS:  - Assess patient's ability to carry out ADLs; assess patient's baseline for ADL function and identify physical deficits which impact ability to perform ADLs (bathing, care of mouth/teeth, toileting, grooming, dressing, etc.)  - Assess/evaluate cause of self-care deficits   - Assess range of motion  - Assess patient's mobility  - Assess patient's need for assistive devices and provide as appropriate  - Encourage  maximum independence but intervene and supervise when necessary  - Involve family in performance of ADLs  - Assess for home care needs following discharge   - Consider OT consult to assist with ADL evaluation and planning for discharge  - Provide patient education as appropriate  Outcome: Progressing     Problem: MOBILITY - ADULT  Goal: Maintain or return to baseline ADL function  Description: INTERVENTIONS:  -  Assess patient's ability to carry out ADLs; assess patient's baseline for ADL function and identify physical deficits which impact ability to perform ADLs (bathing, care of mouth/teeth, toileting, grooming, dressing, etc.)  - Assess/evaluate cause of self-care deficits   - Assess range of motion  - Assess patient's mobility; develop plan if impaired  - Assess patient's need for assistive devices and provide as appropriate  - Encourage maximum independence but intervene and supervise when necessary  - Involve family in performance of ADLs  - Assess for home care needs following discharge   - Consider OT consult to assist with ADL evaluation and planning for discharge  - Provide patient education as appropriate  Outcome: Progressing  Goal: Maintains/Returns to pre admission functional level  Description: INTERVENTIONS:  - Perform AM-PAC 6 Click Basic Mobility/ Daily Activity assessment daily.  - Set and communicate daily mobility goal to care team and patient/family/caregiver.   - Collaborate with rehabilitation services on mobility goals if consulted  - Perform Range of Motion x times a day.  - Reposition patient every x hours.  - Dangle patient x times a day  - Stand patient x times a day  - Ambulate patient x times a day  - Out of bed to chair x times a day   - Out of bed for meals x times a day  - Out of bed for toileting  - Record patient progress and toleration of activity level   Outcome: Progressing     Problem: PAIN - ADULT  Goal: Verbalizes/displays adequate comfort level or baseline comfort  level  Description: Interventions:  - Encourage patient to monitor pain and request assistance  - Assess pain using appropriate pain scale  - Administer analgesics based on type and severity of pain and evaluate response  - Implement non-pharmacological measures as appropriate and evaluate response  - Consider cultural and social influences on pain and pain management  - Notify physician/advanced practitioner if interventions unsuccessful or patient reports new pain  Outcome: Progressing     Problem: INFECTION - ADULT  Goal: Absence or prevention of progression during hospitalization  Description: INTERVENTIONS:  - Assess and monitor for signs and symptoms of infection  - Monitor lab/diagnostic results  - Monitor all insertion sites, i.e. indwelling lines, tubes, and drains  - Monitor endotracheal if appropriate and nasal secretions for changes in amount and color  - [place] appropriate cooling/warming therapies per order  - Administer medications as ordered  - Instruct and encourage patient and family to use good hand hygiene technique  - Identify and instruct in appropriate isolation precautions for identified infection/condition  Outcome: Progressing     Problem: SAFETY ADULT  Goal: Patient will remain free of falls  Description: INTERVENTIONS:  - Educate patient/family on patient safety including physical limitations  - Instruct patient to call for assistance with activity   - Consult OT/PT to assist with strengthening/mobility   - Keep Call bell within reach  - Keep bed low and locked with side rails adjusted as appropriate  - Keep care items and personal belongings within reach  - Initiate and maintain comfort rounds  - Make Fall Risk Sign visible to staff  - Offer Toileting every x Hours, in advance of need  - Initiate/Maintain xalarm  - Obtain necessary fall risk management equipment: xxx  - Apply yellow socks and bracelet for high fall risk patients  - Consider moving patient to room near nurses  station  Outcome: Progressing     Problem: DISCHARGE PLANNING  Goal: Discharge to home or other facility with appropriate resources  Description: INTERVENTIONS:  - Identify barriers to discharge w/patient and caregiver  - Arrange for needed discharge resources and transportation as appropriate  - Identify discharge learning needs (meds, wound care, etc.)  - Arrange for interpretive services to assist at discharge as needed  - Refer to Case Management Department for coordinating discharge planning if the patient needs post-hospital services based on physician/advanced practitioner order or complex needs related to functional status, cognitive ability, or social support system  Outcome: Progressing     Problem: Nutrition/Hydration-ADULT  Goal: Nutrient/Hydration intake appropriate for improving, restoring or maintaining nutritional needs  Description: Monitor and assess patient's nutrition/hydration status for malnutrition. Collaborate with interdisciplinary team and initiate plan and interventions as ordered.  Monitor patient's weight and dietary intake as ordered or per policy. Utilize nutrition screening tool and intervene as necessary. Determine patient's food preferences and provide high-protein, high-caloric foods as appropriate.     INTERVENTIONS:  - Monitor oral intake, urinary output, labs, and treatment plans  - Assess nutrition and hydration status and recommend course of action  - Evaluate amount of meals eaten  - Assist patient with eating if necessary   - Allow adequate time for meals  - Recommend/ encourage appropriate diets, oral nutritional supplements, and vitamin/mineral supplements  - Order, calculate, and assess calorie counts as needed  - Recommend, monitor, and adjust tube feedings and TPN/PPN based on assessed needs  - Assess need for intravenous fluids  - Provide specific nutrition/hydration education as appropriate  - Include patient/family/caregiver in decisions related to  nutrition  Outcome: Progressing

## 2023-12-29 NOTE — ED PROVIDER NOTES
History  Chief Complaint   Patient presents with    Shortness of Breath     Patient complaint of SOB w/ cough ,since xmas worsening     83-year-old man who sees cardiology at outside hospital has history of heart failure with preserved ejection fraction, sleep apnea, reactive airway disease, bradycardia.  Daughter with him states recently had increase in his diuretic dosage.  Last she saw him 5 days ago he was at his normal state of health but when she came back today she noted profound pedal edema, increased abdominal girth, increased dyspnea even at rest.  Patient unable to walk much due to the dyspnea.  Unable to lay flat to sleep.  Has a dry cough but no chest pain or palpitations.  No syncope.        Prior to Admission Medications   Prescriptions Last Dose Informant Patient Reported? Taking?   Cholecalciferol (Vitamin D3) 25 MCG (1000 UT) CAPS 12/28/2023 Self Yes Yes   Sig: every 24 hours   Coenzyme Q10 (Co Q10) 100 MG CAPS 12/28/2023  Yes Yes   Sig: Take 100 mg by mouth in the morning   Easy Comfort Pen Needles 33G X 4 MM MISC  Self Yes Yes   Sig: USE WITH INSULIN FIVE TIMES DAILY AS DIRECTED   Glucosamine-Chondroitin 500-400 MG CAPS 12/28/2023 Self Yes Yes   Insulin Glargine Solostar (Lantus SoloStar) 100 UNIT/ML SOPN 12/28/2023 Self No Yes   Sig: INJECT 30 UNITS SUBCUTANEOUSLY IN THE MORNING AND 32 UNITS IN THE EVENING. MAY INCREASE TO 80 UNITS DAILY   Patient taking differently: Inject 18 Units under the skin 2 (two) times a day   Multiple Vitamin (MULTIVITAMIN ADULT PO) 12/28/2023 Self Yes Yes   Sig: every 24 hours   Omega-3 Fatty Acids (Fish Oil) 360 MG CAPS 12/28/2023  Yes Yes   Sig: Take by mouth   OneTouch Verio test strip  Self Yes No   Sig: USE AS DIRECTED FOUR TIMES DAILY   allopurinol (ZYLOPRIM) 300 mg tablet 12/28/2023 Self Yes Yes   Sig: every 24 hours   amLODIPine (NORVASC) 10 mg tablet 12/28/2023 Self Yes Yes   Sig: Take 10 mg by mouth daily   atorvastatin (LIPITOR) 20 mg tablet 12/28/2023 Self  Yes Yes   Sig: Take 20 mg by mouth daily   azelastine (ASTELIN) 0.1 % nasal spray 2023 Self Yes Yes   budesonide (PULMICORT) 0.5 mg/2 mL nebulizer solution 2023  Yes Yes   Sig: Take 0.5 mg by nebulization 2 (two) times a day Rinse mouth after use.   fluticasone (FLONASE) 50 mcg/act nasal spray 2023 Self Yes Yes   Si spray into each nostril 2 (two) times a day   formoterol (PERFOROMIST) 20 MCG/2ML nebulizer solution 2023  Yes Yes   Sig: Take 20 mcg by nebulization 2 (two) times a day   furosemide (LASIX) 40 mg tablet 2023 Self Yes Yes   Sig: Take 40 mg by mouth 2 (two) times a day   gabapentin (NEURONTIN) 100 mg capsule 2023 Self No Yes   Sig: Take 2 capsules (200 mg total) by mouth daily   insulin aspart (NovoLOG FlexPen) 100 UNIT/ML injection pen  Self No Yes   Sig: INJECT 22 UNITS SUBCUTANEOUSLY with breakfast, 23 UNITS with LUNCH, AND 28 UNITS with dinner plus sliding scale. (UP  UNITS DAILY)   Patient taking differently: INJECT 22 UNITS SUBCUTANEOUSLY with breakfast, 23 UNITS with LUNCH, AND 20 UNITS with dinner plus sliding scale. (UP  UNITS DAILY).   ipratropium-albuterol (DUO-NEB) 0.5-2.5 mg/3 mL nebulizer solution 2023 Self Yes Yes   Sig: Take 3 mL by nebulization 2 (two) times a day   loratadine (CLARITIN) 10 mg tablet 2023  Yes Yes   Sig: Take 10 mg by mouth daily   losartan (COZAAR) 100 MG tablet 2023 Self Yes Yes   Sig: Take 100 mg by mouth daily   mupirocin (BACTROBAN) 2 % ointment 2023 Self Yes Yes   potassium citrate (UROCIT-K) 10 mEq 2023 Self Yes Yes   Sig: Take 10 mEq by mouth 2 (two) times a day   triamcinolone (KENALOG) 0.1 % cream 2023 Self Yes Yes   Si Application 2 (two) times a day To affected areas      Facility-Administered Medications: None       Past Medical History:   Diagnosis Date    Asthma     Bradycardia     Carcinoma, basal cell, skin     head and face and ear    COVID-19 2022     Dupuytren contracture     bilateral ring and right middle    Gout     Herniated lumbar intervertebral disc     Sleep apnea        Past Surgical History:   Procedure Laterality Date    APPENDECTOMY      age 20, ruptured    CATARACT EXTRACTION, BILATERAL Bilateral     CERVICAL FUSION      CYST REMOVAL Left 01/2023    DUPUYTREN CONTRACTURE RELEASE Left     left ring finger    LUMBAR LAMINECTOMY      belkis placed    TONSILLECTOMY      VASECTOMY         Family History   Problem Relation Age of Onset    Hypertension Mother     Diabetes type II Mother     Hypertension Father     Diabetes type II Brother     Hypertension Brother     Kidney disease Sister     Hypertension Daughter     GI problems Daughter      I have reviewed and agree with the history as documented.    E-Cigarette/Vaping    E-Cigarette Use Never User      E-Cigarette/Vaping Substances    Nicotine No     THC No     CBD No     Flavoring No     Other No     Unknown No      Social History     Tobacco Use    Smoking status: Never    Smokeless tobacco: Never   Vaping Use    Vaping status: Never Used   Substance Use Topics    Alcohol use: Not Currently    Drug use: Never       Review of Systems   Constitutional:  Positive for activity change. Negative for fever.   Respiratory:  Positive for shortness of breath. Negative for cough.    Cardiovascular:  Positive for leg swelling. Negative for chest pain and palpitations.   Gastrointestinal:  Negative for blood in stool.   Genitourinary:  Negative for difficulty urinating.   Neurological:  Negative for syncope and light-headedness.       Physical Exam  Physical Exam  Vitals and nursing note reviewed.   Constitutional:       General: He is not in acute distress.     Appearance: He is well-developed. He is not ill-appearing or diaphoretic.      Comments: Marked pedal edema, abdominal distention.  At times tachypneic.   HENT:      Head: Normocephalic and atraumatic.      Right Ear: External ear normal.      Left Ear:  External ear normal.      Mouth/Throat:      Mouth: Mucous membranes are moist.      Pharynx: Oropharynx is clear.   Eyes:      General: No scleral icterus.     Conjunctiva/sclera: Conjunctivae normal.   Cardiovascular:      Rate and Rhythm: Normal rate and regular rhythm.      Pulses: Normal pulses.      Heart sounds: Normal heart sounds.   Pulmonary:      Effort: Pulmonary effort is normal. No respiratory distress.      Breath sounds: Examination of the right-upper field reveals decreased breath sounds. Examination of the left-upper field reveals decreased breath sounds. Examination of the right-lower field reveals rales. Examination of the left-lower field reveals rales. Decreased breath sounds and rales present.   Chest:      Chest wall: No tenderness.   Abdominal:      Palpations: Abdomen is soft.      Tenderness: There is no abdominal tenderness.   Musculoskeletal:         General: No tenderness. Normal range of motion.      Cervical back: Neck supple.      Right lower leg: No tenderness. Edema present.      Left lower leg: No tenderness. Edema present.   Skin:     General: Skin is warm and dry.      Capillary Refill: Capillary refill takes less than 2 seconds.      Findings: No rash.   Neurological:      General: No focal deficit present.      Mental Status: He is alert and oriented to person, place, and time. Mental status is at baseline.      Cranial Nerves: No cranial nerve deficit.      Coordination: Coordination normal.      Deep Tendon Reflexes: Reflexes are normal and symmetric.   Psychiatric:         Mood and Affect: Mood normal.         Behavior: Behavior normal.         Vital Signs  ED Triage Vitals   Temperature Pulse Respirations Blood Pressure SpO2   12/28/23 1942 12/28/23 1942 12/28/23 1942 12/28/23 1942 12/28/23 1942   98.3 °F (36.8 °C) 66 18 148/71 97 %      Temp src Heart Rate Source Patient Position - Orthostatic VS BP Location FiO2 (%)   -- 12/28/23 2300 12/29/23 0740 12/29/23 0740 --     Monitor Lying Right arm       Pain Score       12/29/23 0006       9           Vitals:    12/1939 12/30/23 2105 12/30/23 2350 12/31/23 0823   BP: 125/60   127/61   Pulse: 55 75 (!) 54 58   Patient Position - Orthostatic VS:             Visual Acuity      ED Medications  Medications   acetaminophen (TYLENOL) tablet 650 mg (650 mg Oral Given 12/29/23 0006)   lidocaine (LIDODERM) 5 % patch 1 patch (1 patch Topical Not Given 12/30/23 2114)   lidocaine (LIDODERM) 5 % patch 1 patch (1 patch Topical Medication Applied 12/30/23 2113)   allopurinol (ZYLOPRIM) tablet 300 mg (300 mg Oral Given 12/31/23 0821)   amLODIPine (NORVASC) tablet 10 mg (10 mg Oral Given 12/31/23 0821)   atorvastatin (LIPITOR) tablet 20 mg (20 mg Oral Given 12/30/23 1749)   azelastine (ASTELIN) 0.1 % nasal spray 1 spray (1 spray Each Nare Given 12/31/23 0824)   budesonide (PULMICORT) inhalation solution 0.5 mg (0.5 mg Nebulization Given 12/31/23 0713)   cholecalciferol (VITAMIN D3) tablet 1,000 Units (1,000 Units Oral Given 12/31/23 0821)   co-enzyme Q-10 capsule 100 mg (100 mg Oral Given 12/31/23 0820)   fluticasone (FLONASE) 50 mcg/act nasal spray 1 spray (1 spray Nasal Given 12/31/23 0824)   gabapentin (NEURONTIN) capsule 100 mg (100 mg Oral Given 12/31/23 0821)   loratadine (CLARITIN) tablet 10 mg (10 mg Oral Given 12/31/23 0820)   losartan (COZAAR) tablet 100 mg (100 mg Oral Given 12/31/23 0820)   fish oil capsule 1,000 mg (1,000 mg Oral Given 12/31/23 0821)   furosemide (LASIX) injection 100 mg (100 mg Intravenous Given 12/31/23 0821)   heparin (porcine) subcutaneous injection 5,000 Units (5,000 Units Subcutaneous Given 12/31/23 0606)   senna (SENOKOT) tablet 17.2 mg (has no administration in time range)   aluminum-magnesium hydroxide-simethicone (MAALOX) oral suspension 30 mL (has no administration in time range)   albuterol inhalation solution 2.5 mg (has no administration in time range)   insulin lispro (HumaLOG) 100 units/mL subcutaneous  injection 1-5 Units ( Subcutaneous Not Given 12/31/23 0824)   insulin glargine (LANTUS) subcutaneous injection 15 Units 0.15 mL (15 Units Subcutaneous Not Given 12/31/23 0825)   insulin lispro (HumaLOG) 100 units/mL subcutaneous injection 10 Units (10 Units Subcutaneous Given 12/31/23 0823)   metolazone (ZAROXOLYN) tablet 2.5 mg (2.5 mg Oral Given 12/30/23 1532)   potassium chloride (K-DUR,KLOR-CON) CR tablet 40 mEq (40 mEq Oral Given 12/30/23 2113)       Diagnostic Studies  Results Reviewed       Procedure Component Value Units Date/Time    Basic metabolic panel [[id number]]  (Abnormal) Collected: 12/30/23 0545    Lab Status: Final result Specimen: Blood from Arm, Right Updated: 12/30/23 0628     Sodium 140 mmol/L      Potassium 3.8 mmol/L      Chloride 102 mmol/L      CO2 30 mmol/L      ANION GAP 8 mmol/L      BUN 40 mg/dL      Creatinine 1.41 mg/dL      Glucose 62 mg/dL      Calcium 9.3 mg/dL      eGFR 45 ml/min/1.73sq m     Narrative:      National Kidney Disease Foundation guidelines for Chronic Kidney Disease (CKD):     Stage 1 with normal or high GFR (GFR > 90 mL/min/1.73 square meters)    Stage 2 Mild CKD (GFR = 60-89 mL/min/1.73 square meters)    Stage 3A Moderate CKD (GFR = 45-59 mL/min/1.73 square meters)    Stage 3B Moderate CKD (GFR = 30-44 mL/min/1.73 square meters)    Stage 4 Severe CKD (GFR = 15-29 mL/min/1.73 square meters)    Stage 5 End Stage CKD (GFR <15 mL/min/1.73 square meters)  Note: GFR calculation is accurate only with a steady state creatinine    Magnesium [[id number]]  (Normal) Collected: 12/30/23 0545    Lab Status: Final result Specimen: Blood from Arm, Right Updated: 12/30/23 0628     Magnesium 2.0 mg/dL     CBC and differential [[id number]]  (Abnormal) Collected: 12/30/23 0545    Lab Status: Final result Specimen: Blood from Arm, Right Updated: 12/30/23 0611     WBC 6.69 Thousand/uL      RBC 4.30 Million/uL      Hemoglobin 11.0 g/dL      Hematocrit 35.1 %      MCV 82 fL      MCH 25.6 pg       MCHC 31.3 g/dL      RDW 18.4 %      MPV 11.8 fL      Platelets 163 Thousands/uL      nRBC 0 /100 WBCs      Neutrophils Relative 66 %      Immat GRANS % 0 %      Lymphocytes Relative 17 %      Monocytes Relative 13 %      Eosinophils Relative 3 %      Basophils Relative 1 %      Neutrophils Absolute 4.44 Thousands/µL      Immature Grans Absolute 0.02 Thousand/uL      Lymphocytes Absolute 1.11 Thousands/µL      Monocytes Absolute 0.84 Thousand/µL      Eosinophils Absolute 0.23 Thousand/µL      Basophils Absolute 0.05 Thousands/µL     Fingerstick Glucose (POCT) [[id number]]  (Abnormal) Collected: 12/29/23 1108    Lab Status: Final result Updated: 12/29/23 1115     POC Glucose 227 mg/dl     Fingerstick Glucose (POCT) [[id number]]  (Normal) Collected: 12/29/23 0905    Lab Status: Final result Updated: 12/29/23 0909     POC Glucose 111 mg/dl     Fingerstick Glucose (POCT) [[id number]]  (Normal) Collected: 12/29/23 0723    Lab Status: Final result Updated: 12/29/23 0725     POC Glucose 118 mg/dl     CBC (With Platelets) [[id number]]  (Abnormal) Collected: 12/29/23 0617    Lab Status: Final result Specimen: Blood from Arm, Right Updated: 12/29/23 0622     WBC 6.55 Thousand/uL      RBC 4.30 Million/uL      Hemoglobin 10.9 g/dL      Hematocrit 35.1 %      MCV 82 fL      MCH 25.3 pg      MCHC 31.1 g/dL      RDW 18.4 %      Platelets 154 Thousands/uL      MPV 11.4 fL     Comprehensive metabolic panel [[id number]]  (Abnormal) Collected: 12/29/23 0527    Lab Status: Final result Specimen: Blood from Arm, Left Updated: 12/29/23 0554     Sodium 136 mmol/L      Potassium 3.6 mmol/L      Chloride 103 mmol/L      CO2 24 mmol/L      ANION GAP 9 mmol/L      BUN 40 mg/dL      Creatinine 1.14 mg/dL      Glucose 107 mg/dL      Calcium 9.1 mg/dL      Corrected Calcium 9.6 mg/dL      AST 19 U/L      ALT 10 U/L      Alkaline Phosphatase 126 U/L      Total Protein 6.2 g/dL      Albumin 3.4 g/dL      Total Bilirubin 1.31 mg/dL      eGFR 58  ml/min/1.73sq m     Narrative:      National Kidney Disease Foundation guidelines for Chronic Kidney Disease (CKD):     Stage 1 with normal or high GFR (GFR > 90 mL/min/1.73 square meters)    Stage 2 Mild CKD (GFR = 60-89 mL/min/1.73 square meters)    Stage 3A Moderate CKD (GFR = 45-59 mL/min/1.73 square meters)    Stage 3B Moderate CKD (GFR = 30-44 mL/min/1.73 square meters)    Stage 4 Severe CKD (GFR = 15-29 mL/min/1.73 square meters)    Stage 5 End Stage CKD (GFR <15 mL/min/1.73 square meters)  Note: GFR calculation is accurate only with a steady state creatinine    Magnesium [[id number]]  (Normal) Collected: 12/29/23 0527    Lab Status: Final result Specimen: Blood from Arm, Left Updated: 12/29/23 0554     Magnesium 2.0 mg/dL     Phosphorus [[id number]]  (Normal) Collected: 12/29/23 0527    Lab Status: Final result Specimen: Blood from Arm, Left Updated: 12/29/23 0554     Phosphorus 3.7 mg/dL     Fingerstick Glucose (POCT) [[id number]]  (Normal) Collected: 12/29/23 0122    Lab Status: Final result Updated: 12/29/23 0123     POC Glucose 77 mg/dl     HS Troponin I 4hr [[id number]]  (Abnormal) Collected: 12/29/23 0033    Lab Status: Final result Specimen: Blood from Arm, Left Updated: 12/29/23 0104     hs TnI 4hr 60 ng/L      Delta 4hr hsTnI 12 ng/L     B-Type Natriuretic Peptide(BNP) [[id number]]  (Abnormal) Collected: 12/28/23 1947    Lab Status: Final result Specimen: Blood from Arm, Right Updated: 12/28/23 2257      pg/mL     HS Troponin I 2hr [[id number]]  (Abnormal) Collected: 12/28/23 2224    Lab Status: Final result Specimen: Blood from Arm, Left Updated: 12/28/23 2251     hs TnI 2hr 64 ng/L      Delta 2hr hsTnI 16 ng/L     HS Troponin 0hr (reflex protocol) [[id number]]  (Normal) Collected: 12/28/23 1947    Lab Status: Final result Specimen: Blood from Arm, Right Updated: 12/28/23 2041     hs TnI 0hr 48 ng/L     FLU/RSV/COVID - if FLU/RSV clinically relevant [[id number]]  (Normal) Collected: 12/28/23 1947     Lab Status: Final result Specimen: Nares from Nose Updated: 12/28/23 2033     SARS-CoV-2 Negative     INFLUENZA A PCR Negative     INFLUENZA B PCR Negative     RSV PCR Negative    Narrative:      FOR PEDIATRIC PATIENTS - copy/paste COVID Guidelines URL to browser: [email or web address]    SARS-CoV-2 assay is a Nucleic Acid Amplification assay intended for the  qualitative detection of nucleic acid from SARS-CoV-2 in nasopharyngeal  swabs. Results are for the presumptive identification of SARS-CoV-2 RNA.    Positive results are indicative of infection with SARS-CoV-2, the virus  causing COVID-19, but do not rule out bacterial infection or co-infection  with other viruses. Laboratories within the [place] and its  territories are required to report all positive results to the appropriate  public health authorities. Negative results do not preclude SARS-CoV-2  infection and should not be used as the sole basis for treatment or other  patient management decisions. Negative results must be combined with  clinical observations, patient history, and epidemiological information.  This test has not been FDA cleared or approved.    This test has been authorized by FDA under an Emergency Use Authorization  (EUA). This test is only authorized for the duration of time the  declaration that circumstances exist justifying the authorization of the  emergency use of an in vitro diagnostic tests for detection of SARS-CoV-2  virus and/or diagnosis of COVID-19 infection under section 564(b)(1) of  the Act, 21 U.S.C. 360bbb-3(b)(1), unless the authorization is terminated  or revoked sooner. The test has been validated but independent review by FDA  and CLIA is pending.    Test performed using [employer]: This RT-PCR assay targets N2,  a region unique to SARS-CoV-2. A conserved region in the E-gene was chosen  for pan-Sarbecovirus detection which includes  SARS-CoV-2.    According to CMS-2020-01-R, this platform meets the definition of high-throughput technology.    Comprehensive metabolic panel [[id number]]  (Abnormal) Collected: 12/28/23 1947    Lab Status: Final result Specimen: Blood from Arm, Right Updated: 12/28/23 2016     Sodium 135 mmol/L      Potassium 4.9 mmol/L      Chloride 102 mmol/L      CO2 24 mmol/L      ANION GAP 9 mmol/L      BUN 43 mg/dL      Creatinine 1.30 mg/dL      Glucose 118 mg/dL      Calcium 9.2 mg/dL      AST 27 U/L      ALT 11 U/L      Alkaline Phosphatase 136 U/L      Total Protein 6.7 g/dL      Albumin 3.5 g/dL      Total Bilirubin 1.21 mg/dL      eGFR 50 ml/min/1.73sq m     Narrative:      National Kidney Disease Foundation guidelines for Chronic Kidney Disease (CKD):     Stage 1 with normal or high GFR (GFR > 90 mL/min/1.73 square meters)    Stage 2 Mild CKD (GFR = 60-89 mL/min/1.73 square meters)    Stage 3A Moderate CKD (GFR = 45-59 mL/min/1.73 square meters)    Stage 3B Moderate CKD (GFR = 30-44 mL/min/1.73 square meters)    Stage 4 Severe CKD (GFR = 15-29 mL/min/1.73 square meters)    Stage 5 End Stage CKD (GFR <15 mL/min/1.73 square meters)  Note: GFR calculation is accurate only with a steady state creatinine    CBC and differential [[id number]]  (Abnormal) Collected: 12/28/23 1947    Lab Status: Final result Specimen: Blood from Arm, Right Updated: 12/28/23 1957     WBC 8.18 Thousand/uL      RBC 4.43 Million/uL      Hemoglobin 11.1 g/dL      Hematocrit 36.7 %      MCV 83 fL      MCH 25.1 pg      MCHC 30.2 g/dL      RDW 18.4 %      MPV 12.5 fL      Platelets 169 Thousands/uL      nRBC 0 /100 WBCs      Neutrophils Relative 73 %      Immat GRANS % 0 %      Lymphocytes Relative 13 %      Monocytes Relative 10 %      Eosinophils Relative 3 %      Basophils Relative 1 %      Neutrophils Absolute 6.00 Thousands/µL      Immature Grans Absolute 0.03 Thousand/uL      Lymphocytes Absolute 1.06 Thousands/µL      Monocytes Absolute 0.82  Thousand/µL      Eosinophils Absolute 0.23 Thousand/µL      Basophils Absolute 0.04 Thousands/µL                    XR chest 2 views   Final Result by Ovi Braun MD (12/29 1114)      Pulmonary edema with small bilateral pleural effusions.                  Workstation performed: [id number]                    Procedures  ECG 12 Lead Documentation Only    Date/Time: 12/28/2023 10:40 PM    Performed by: Samm Coleman DO  Authorized by: Samm Coleman DO    ECG reviewed by me, the ED Provider: yes    Previous ECG:     Previous ECG:  Unavailable    Comparison to cardiac monitor: Yes    Rhythm:     Rhythm: paced    Pacing:     Capture:  Complete    Type of pacing:  Ventricular  Ectopy:     Ectopy: PAC             ED Course                                             Medical Decision Making  82 yo M with h/o heart failure with preserved EF presents with respiratory distress, marked volume overload. Concern for exacerbation of CHF, ACS, cardiomyopathy, dysrhythmia, ARF, electrolyte abnormality. Check labs including BNP, serial troponin, EKG, imaging.    Admit for AECHF. No need for BiPAP, IV NTG at this time.     Amount and/or Complexity of Data Reviewed  Independent Historian: caregiver  External Data Reviewed: notes.  Labs: ordered.  Radiology: ordered and independent interpretation performed.  ECG/medicine tests: ordered and independent interpretation performed. Decision-making details documented in ED Course.    Risk  Decision regarding hospitalization.             Disposition  Final diagnoses:   Acute exacerbation of CHF (congestive heart failure) (HCC)   Volume overload     Time reflects when diagnosis was documented in both MDM as applicable and the Disposition within this note       Time User Action Codes Description Comment    12/28/2023 10:56 PM Samm Coleman Add [I50.9] Acute exacerbation of CHF (congestive heart failure) (HCC)     12/28/2023 10:57 PM Samm Coleman Add [E87.70] Volume overload            ED Disposition       ED Disposition   Admit    Condition   Stable    Date/Time   Thu Dec 28, 2023 10:56 PM    Comment   Case was discussed with NIRAV AP and the patient's admission status was agreed to be Admission Status: inpatient status to the service of Dr. Perez .               Follow-up Information    None         Current Discharge Medication List        CONTINUE these medications which have NOT CHANGED    Details   allopurinol (ZYLOPRIM) 300 mg tablet every 24 hours      amLODIPine (NORVASC) 10 mg tablet Take 10 mg by mouth daily      atorvastatin (LIPITOR) 20 mg tablet Take 20 mg by mouth daily      azelastine (ASTELIN) 0.1 % nasal spray       budesonide (PULMICORT) 0.5 mg/2 mL nebulizer solution Take 0.5 mg by nebulization 2 (two) times a day Rinse mouth after use.      Cholecalciferol (Vitamin D3) 25 MCG (1000 UT) CAPS every 24 hours      Coenzyme Q10 (Co Q10) 100 MG CAPS Take 100 mg by mouth in the morning      Easy Comfort Pen Needles 33G X 4 MM MISC USE WITH INSULIN FIVE TIMES DAILY AS DIRECTED      fluticasone (FLONASE) 50 mcg/act nasal spray 1 spray into each nostril 2 (two) times a day      formoterol (PERFOROMIST) 20 MCG/2ML nebulizer solution Take 20 mcg by nebulization 2 (two) times a day      furosemide (LASIX) 40 mg tablet Take 40 mg by mouth 2 (two) times a day      gabapentin (NEURONTIN) 100 mg capsule Take 2 capsules (200 mg total) by mouth daily  Qty: 180 capsule, Refills: 1    Associated Diagnoses: Diabetic polyneuropathy associated with type 2 diabetes mellitus (HCC)      Glucosamine-Chondroitin 500-400 MG CAPS       insulin aspart (NovoLOG FlexPen) 100 UNIT/ML injection pen INJECT 22 UNITS SUBCUTANEOUSLY with breakfast, 23 UNITS with LUNCH, AND 28 UNITS with dinner plus sliding scale. (UP  UNITS DAILY)  Qty: 45 mL, Refills: 5    Comments: This prescription was filled on 8/21/2023. Any refills authorized will be placed on file.  Associated Diagnoses: Type 2 diabetes  mellitus with hyperglycemia, with long-term current use of insulin (HCC)      Insulin Glargine Solostar (Lantus SoloStar) 100 UNIT/ML SOPN INJECT 30 UNITS SUBCUTANEOUSLY IN THE MORNING AND 32 UNITS IN THE EVENING. MAY INCREASE TO 80 UNITS DAILY  Qty: 30 mL, Refills: 5    Comments: This prescription was filled on 6/15/2023. Any refills authorized will be placed on file.  Associated Diagnoses: Type 2 diabetes mellitus with hyperglycemia, with long-term current use of insulin (HCC)      ipratropium-albuterol (DUO-NEB) 0.5-2.5 mg/3 mL nebulizer solution Take 3 mL by nebulization 2 (two) times a day      loratadine (CLARITIN) 10 mg tablet Take 10 mg by mouth daily      losartan (COZAAR) 100 MG tablet Take 100 mg by mouth daily      Multiple Vitamin (MULTIVITAMIN ADULT PO) every 24 hours      mupirocin (BACTROBAN) 2 % ointment       Omega-3 Fatty Acids (Fish Oil) 360 MG CAPS Take by mouth      potassium citrate (UROCIT-K) 10 mEq Take 10 mEq by mouth 2 (two) times a day      triamcinolone (KENALOG) 0.1 % cream 1 Application 2 (two) times a day To affected areas      OneTouch Verio test strip USE AS DIRECTED FOUR TIMES DAILY             No discharge procedures on file.    PDMP Review       None            ED Provider  Electronically Signed by             Samm Coleman DO  12/31/23 0903

## 2023-12-29 NOTE — ASSESSMENT & PLAN NOTE
Home regimen: pulmicort BID, performist BID, and DuoNeb twice daily  Continue home nebulizers and increased dose of IV diuresis due to anasarca

## 2023-12-29 NOTE — H&P
"[facility]  H&P  Name: Hammad Montesinos 83 y.o. male I MRN: [id number]  Unit/Bed#: ED 08 I Date of Admission: 12/28/2023   Date of Service: 12/29/2023 I Hospital Day: 1      Assessment/Plan   * Acute on chronic diastolic heart failure (HCC)  Assessment & Plan  Wt Readings from Last 3 Encounters:   12/12/23 115 kg (254 lb)   12/05/23 112 kg (247 lb)   09/05/23 112 kg (247 lb)     Presents with worsening dyspnea and anasarca since [place]   Home regimen: lasix 40mg BID with increased to 60mg in AM and 40mg in PM 3 days ago with no improvement in symptoms   Last echo 10/2023: Severe left atrial dilation. RV systolic pressure 52.2mmHg. 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    Blood Sugar Average: Last 72 hrs:  Home regimen: Lantus 18 units twice daily and NovoLog 22 unit with breakfast, 23 units lunch, and 20 units with dinner  Continue home Lantus and placed on NovoLog 20 units 3 times daily with meals the addition of SSI      Primary hypertension  Assessment & Plan  Home regimen: Amlodipine 10 Mg daily, " "losartan 100 Mg daily  Continue home medications    Mixed hyperlipidemia  Assessment & Plan  Continue home statin           VTE Pharmacologic Prophylaxis: VTE Score: 5 High Risk (Score >/= 5) - Pharmacological DVT Prophylaxis Ordered: heparin. Sequential Compression Devices Ordered.  Code Status: Level 1 - Full Code   Discussion with family: Patient declined call to .     Anticipated Length of Stay: Patient will be admitted on an inpatient basis with an anticipated length of stay of greater than 2 midnights secondary to acute on chronic diastolic heart failure.    Total Time Spent on Date of Encounter in care of patient: 75 mins. This time was spent on one or more of the following: performing physical exam; counseling and coordination of care; obtaining or reviewing history; documenting in the medical record; reviewing/ordering tests, medications or procedures; communicating with other healthcare professionals and discussing with patient's family/caregivers.    Chief Complaint: \" I have been having issues with breathing and swelling\"    History of Present Illness:  Hammad Montesinos is a 83 y.o. male with a PMH of pulmonary hypertension, IDDM1, HLD, and HTN who presents with worsening dyspnea and anasarca since [place] despite increased Lasix dose.  Denies recent fevers or chills.  No chest pain.  Endorses abdominal distention but no pain.  No nausea or vomiting.  No urinary symptoms.    Review of Systems:  Review of Systems   Constitutional:  Negative for chills and fever.   HENT:  Negative for congestion.    Respiratory:  Positive for shortness of breath. Negative for cough.    Cardiovascular:  Positive for leg swelling. Negative for chest pain.   Gastrointestinal:  Positive for abdominal distention. Negative for abdominal pain, constipation, diarrhea, nausea and vomiting.   Genitourinary:  Negative for difficulty urinating and dysuria.   Musculoskeletal:  Negative for gait problem.   Neurological:  " Negative for weakness and numbness.   All other systems reviewed and are negative.      Past Medical and Surgical History:   Past Medical History:   Diagnosis Date    Asthma     Bradycardia     Carcinoma, basal cell, skin     head and face and ear    COVID-19 12/05/2022    Dupuytren contracture     bilateral ring and right middle    Gout     Herniated lumbar intervertebral disc     Sleep apnea        Past Surgical History:   Procedure Laterality Date    APPENDECTOMY      age 20, ruptured    CATARACT EXTRACTION, BILATERAL Bilateral     CERVICAL FUSION      CYST REMOVAL Left 01/2023    DUPUYTREN CONTRACTURE RELEASE Left     left ring finger    LUMBAR LAMINECTOMY      belkis placed    TONSILLECTOMY      VASECTOMY         Meds/Allergies:  Prior to Admission medications    Medication Sig Start Date End Date Taking? Authorizing Provider   allopurinol (ZYLOPRIM) 300 mg tablet every 24 hours   Yes Historical Provider, MD   amLODIPine (NORVASC) 10 mg tablet Take 10 mg by mouth daily 2/22/22  Yes Historical Provider, MD   atorvastatin (LIPITOR) 20 mg tablet Take 20 mg by mouth daily 2/22/22  Yes Historical Provider, MD   azelastine (ASTELIN) 0.1 % nasal spray    Yes Historical Provider, MD   budesonide (PULMICORT) 0.5 mg/2 mL nebulizer solution Take 0.5 mg by nebulization 2 (two) times a day Rinse mouth after use.   Yes Historical Provider, MD   Cholecalciferol (Vitamin D3) 25 MCG (1000 UT) CAPS every 24 hours   Yes Historical Provider, MD   Coenzyme Q10 (Co Q10) 100 MG CAPS Take 100 mg by mouth in the morning   Yes Historical Provider, MD   Easy Comfort Pen Needles 33G X 4 MM MISC USE WITH INSULIN FIVE TIMES DAILY AS DIRECTED 10/25/22  Yes Historical Provider, MD   fluticasone (FLONASE) 50 mcg/act nasal spray 1 spray into each nostril 2 (two) times a day   Yes Historical Provider, MD   formoterol (PERFOROMIST) 20 MCG/2ML nebulizer solution Take 20 mcg by nebulization 2 (two) times a day   Yes Historical Provider, MD    furosemide (LASIX) 40 mg tablet Take 40 mg by mouth 2 (two) times a day   Yes Historical Provider, MD   gabapentin (NEURONTIN) 100 mg capsule Take 2 capsules (200 mg total) by mouth daily  Patient taking differently: Take 100 mg by mouth daily 7/19/23  Yes Neris Sanchez MD   Glucosamine-Chondroitin 500-400 MG CAPS    Yes Historical Provider, MD   insulin aspart (NovoLOG FlexPen) 100 UNIT/ML injection pen INJECT 22 UNITS SUBCUTANEOUSLY with breakfast, 23 UNITS with LUNCH, AND 28 UNITS with dinner plus sliding scale. (UP  UNITS DAILY)  Patient taking differently: INJECT 22 UNITS SUBCUTANEOUSLY with breakfast, 23 UNITS with LUNCH, AND 20 UNITS with dinner plus sliding scale. (UP  UNITS DAILY). 8/21/23  Yes Missy Nam MD   Insulin Glargine Solostar (Lantus SoloStar) 100 UNIT/ML SOPN INJECT 30 UNITS SUBCUTANEOUSLY IN THE MORNING AND 32 UNITS IN THE EVENING. MAY INCREASE TO 80 UNITS DAILY  Patient taking differently: Inject 18 Units under the skin 2 (two) times a day 6/16/23  Yes Jonn Rossi PA-C   ipratropium-albuterol (DUO-NEB) 0.5-2.5 mg/3 mL nebulizer solution Take 3 mL by nebulization 2 (two) times a day 9/26/23  Yes Historical Provider, MD   loratadine (CLARITIN) 10 mg tablet Take 10 mg by mouth daily   Yes Historical Provider, MD   losartan (COZAAR) 100 MG tablet Take 100 mg by mouth daily 2/22/22  Yes Historical Provider, MD   Multiple Vitamin (MULTIVITAMIN ADULT PO) every 24 hours   Yes Historical Provider, MD   mupirocin (BACTROBAN) 2 % ointment  4/20/23  Yes Historical Provider, MD   Omega-3 Fatty Acids (Fish Oil) 360 MG CAPS Take by mouth   Yes Historical Provider, MD   potassium citrate (UROCIT-K) 10 mEq Take 10 mEq by mouth 2 (two) times a day   Yes Historical Provider, MD   triamcinolone (KENALOG) 0.1 % cream 1 Application 2 (two) times a day To affected areas 6/2/23  Yes Historical Provider, MD [place]uch Verio test strip USE AS DIRECTED FOUR TIMES DAILY 2/10/22   Historical  Provider, MD     I have reviewed home medications with patient personally.    Allergies:   Allergies   Allergen Reactions    Hydrochlorothiazide Other (See Comments)     SEVERE DIZZINESS    Insulin Lispro Other (See Comments)     Cannot control blood sugars(Humalog)    Lisinopril Other (See Comments)     Reports cough with use     Minoxidil GI Intolerance    Nifedipine Other (See Comments)     EDEMA LEGS    Other Other (See Comments)     SEVERE DIZZINESS  Hydrap-es    Prazosin Other (See Comments)     CHEST PAIN      Venlafaxine Other (See Comments)     Made pt feel bad      Doxycycline Rash    Simvastatin Rash     PEDAL EDEMA         Social History:  Marital Status: /Civil Union   Occupation: retired   Patient Pre-hospital Living Situation: Home, With spouse  Patient Pre-hospital Level of Mobility: walks with walker  Patient Pre-hospital Diet Restrictions: none   Substance Use History:   Social History     Substance and Sexual Activity   Alcohol Use Yes    Comment: rare     Social History     Tobacco Use   Smoking Status Never   Smokeless Tobacco Never     Social History     Substance and Sexual Activity   Drug Use Never       Family History:  Family History   Problem Relation Age of Onset    Hypertension Mother     Diabetes type II Mother     Hypertension Father     Diabetes type II Brother     Hypertension Brother     Kidney disease Sister     Hypertension Daughter     GI problems Daughter        Physical Exam:     Vitals:   Blood Pressure: 130/81 (12/28/23 2300)  Pulse: 62 (12/28/23 2300)  Temperature: 98.3 °F (36.8 °C) (12/28/23 1942)  Respirations: 18 (12/28/23 2300)  SpO2: 95 % (12/29/23 0050)    Physical Exam  Vitals and nursing note reviewed.   Constitutional:       General: He is not in acute distress.     Appearance: Normal appearance. He is not ill-appearing.   HENT:      Head: Normocephalic.      Nose: Nose normal.      Mouth/Throat:      Mouth: Mucous membranes are moist.   Eyes:       Extraocular Movements: Extraocular movements intact.      Conjunctiva/sclera: Conjunctivae normal.   Cardiovascular:      Rate and Rhythm: Normal rate and regular rhythm.      Pulses: Normal pulses.   Pulmonary:      Comments: Conversational dyspnea, tachypnea, diffuse expiratory wheezing and rales  Abdominal:      General: There is distension.   Musculoskeletal:         General: Normal range of motion.      Comments: 4+ pitting edema to b/l LE with swelling to UE b/l    Skin:     General: Skin is warm and dry.   Neurological:      General: No focal deficit present.      Mental Status: He is alert and oriented to person, place, and time.   Psychiatric:         Mood and Affect: Mood normal.         Thought Content: Thought content normal.          Additional Data:     Lab Results:  Results from last 7 days   Lab Units 12/28/23 1947   WBC Thousand/uL 8.18   HEMOGLOBIN g/dL 11.1*   HEMATOCRIT % 36.7   PLATELETS Thousands/uL 169   NEUTROS PCT % 73   LYMPHS PCT % 13*   MONOS PCT % 10   EOS PCT % 3     Results from last 7 days   Lab Units 12/28/23 1947   SODIUM mmol/L 135   POTASSIUM mmol/L 4.9   CHLORIDE mmol/L 102   CO2 mmol/L 24   BUN mg/dL 43*   CREATININE mg/dL 1.30   ANION GAP mmol/L 9   CALCIUM mg/dL 9.2   ALBUMIN g/dL 3.5   TOTAL BILIRUBIN mg/dL 1.21*   ALK PHOS U/L 136*   ALT U/L 11   AST U/L 27   GLUCOSE RANDOM mg/dL 118                       Lines/Drains:  Invasive Devices       Peripheral Intravenous Line  Duration             Peripheral IV 12/28/23 Left Forearm <1 day                        Imaging: Personally reviewed the following imaging: Chest x-ray with pulmonary congestion  XR chest 2 views   ED Interpretation by Samm Coleman DO (12/28 2227)          EKG and Other Studies Reviewed on Admission:   EKG:  Ventricular paced rhythm.    ** Please Note: This note has been constructed using a voice recognition system. **

## 2023-12-29 NOTE — PROGRESS NOTES
[facility]  Progress Note  Name: Hammad Montesinos I  MRN: [id number]  Unit/Bed#: ED 08 I Date of Admission: 12/28/2023   Date of Service: 12/29/2023 I Hospital Day: 1    Assessment/Plan   * Acute on chronic diastolic heart failure (HCC)  Assessment & Plan  Wt Readings from Last 3 Encounters:   12/29/23 119 kg (263 lb 4.8 oz)   12/12/23 115 kg (254 lb)   12/05/23 112 kg (247 lb)     Presents with worsening dyspnea and anasarca since [place]   Home regimen: lasix 40mg BID with increased to 60mg in AM and 40mg in PM 3 days ago with no improvement in symptoms   Last echo 10/2023: Severe left atrial dilation. RV systolic pressure 52.2mmHg. pulmonary artery pressure elevated  Pt with anasarca and pulmonary congestion - continue lasix 100mg IV BID   I/o and daily weights   Sodium and fluid restriction   Cardiology following, appreciate continue will IV diuresis over the weekend    Paroxysmal A-fib (HCC)  Assessment & Plan  History noted  Not on anticoagulation  Cardiology following, appreciate recommendations    Pulmonary hypertension (HCC)  Assessment & Plan  Home regimen: pulmicort BID, performist BID, and DuoNeb twice daily  Continue home nebulizers and increased dose of IV diuresis due to anasarca    Elevated troponin  Assessment & Plan  HS trop: 48 > 64  EKG without acute ischemia - showing paced rhythm   Likely s/d to volume overload     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 ([facility])  Assessment & Plan  Baseline creatinine is around 1.2  Recent Labs     12/28/23  1947 12/29/23  0527   CREATININE 1.30 1.14   EGFR 50 58     Estimated Creatinine Clearance: 63.3 mL/min (by C-G formula based on SCr of 1.14 mg/dL).   Trend BMP daily in setting of IV diuresis    Primary hypertension  Assessment & Plan  Home regimen: Amlodipine 10 Mg daily, losartan 100 Mg daily  Continue home medications  /63 (BP Location: Right arm)   Pulse 72   Temp 98.3 °F (36.8 °C)   Resp  "(!) 30   Ht 5' 11\" (1.803 m)   Wt 119 kg (263 lb 4.8 oz)   SpO2 97%   BMI 36.72 kg/m²      Mixed hyperlipidemia  Assessment & Plan  Continue home statin    Type 2 diabetes mellitus with hyperglycemia, with long-term current use of insulin (HCC)  Assessment & Plan  Lab Results   Component Value Date    HGBA1C 6.8 11/16/2023       Recent Labs     12/29/23  0122 12/29/23  0723 12/29/23  0905 12/29/23  1108   POCGLU 77 118 111 227*       Blood Sugar Average: Last 72 hrs:  (P) 133.25Home regimen: Lantus 18 units twice daily and NovoLog 22 unit with breakfast, 23 units lunch, and 20 units with dinner  Continue home Lantus and placed on NovoLog 20 units 3 times daily with meals the addition of SSI  Frequent Accu-Cheks and hypoglycemia protocol                 VTE Pharmacologic Prophylaxis: VTE Score: 5 Moderate Risk (Score 3-4) - Pharmacological DVT Prophylaxis Ordered: heparin.    Mobility:   Basic Mobility Inpatient Raw Score: 17  JH-HLM Goal: 5: Stand one or more mins  JH-HLM Achieved: 6: Walk 10 steps or more  HLM Goal achieved. Continue to encourage appropriate mobility.    Patient Centered Rounds: I performed bedside rounds with nursing staff today.   Discussions with Specialists or Other Care Team Provider: yes    Education and Discussions with Family / Patient:  yes.     Total Time Spent on Date of Encounter in care of patient: 38 mins. This time was spent on one or more of the following: performing physical exam; counseling and coordination of care; obtaining or reviewing history; documenting in the medical record; reviewing/ordering tests, medications or procedures; communicating with other healthcare professionals and discussing with patient's family/caregivers.    Current Length of Stay: 1 day(s)  Current Patient Status: Inpatient   Certification Statement: The patient will continue to require additional inpatient hospital stay due to pending stabilization  Discharge Plan: Anticipate discharge in >72 hrs to " home with home services.    Code Status: Level 1 - Full Code    Subjective:   Seen and examined at bedside  No chest pain  Endorses SOB   Rest of ROS negative     Objective:     Vitals:   Temp (24hrs), Av.3 °F (36.8 °C), Min:98.3 °F (36.8 °C), Max:98.3 °F (36.8 °C)    Temp:  [98.3 °F (36.8 °C)] 98.3 °F (36.8 °C)  HR:  [] 72  Resp:  [18-38] 30  BP: (110-148)/(58-87) 123/63  SpO2:  [93 %-97 %] 97 %  Body mass index is 36.72 kg/m².     Input and Output Summary (last 24 hours):     Intake/Output Summary (Last 24 hours) at 2023 1619  Last data filed at 2023 1223  Gross per 24 hour   Intake 520 ml   Output 2675 ml   Net -2155 ml       Physical Exam:   Physical Exam  Constitutional:       Appearance: He is obese.   HENT:      Head: Atraumatic.   Eyes:      General: No scleral icterus.  Cardiovascular:      Rate and Rhythm: Regular rhythm. Tachycardia present.      Heart sounds: Normal heart sounds.   Pulmonary:      Comments: Crackles  Abdominal:      General: Bowel sounds are normal.   Musculoskeletal:      Cervical back: Neck supple.      Right lower leg: Edema present.      Left lower leg: Edema present.   Skin:     General: Skin is warm and dry.      Capillary Refill: Capillary refill takes less than 2 seconds.   Neurological:      Mental Status: He is alert and oriented to person, place, and time.      Motor: Weakness present.          Additional Data:     Labs:  Results from last 7 days   Lab Units 23  0617 23  1947   WBC Thousand/uL 6.55 8.18   HEMOGLOBIN g/dL 10.9* 11.1*   HEMATOCRIT % 35.1* 36.7   PLATELETS Thousands/uL 154 169   NEUTROS PCT %  --  73   LYMPHS PCT %  --  13*   MONOS PCT %  --  10   EOS PCT %  --  3     Results from last 7 days   Lab Units 23  0527   SODIUM mmol/L 136   POTASSIUM mmol/L 3.6   CHLORIDE mmol/L 103   CO2 mmol/L 24   BUN mg/dL 40*   CREATININE mg/dL 1.14   ANION GAP mmol/L 9   CALCIUM mg/dL 9.1   ALBUMIN g/dL 3.4*   TOTAL BILIRUBIN mg/dL 1.31*    ALK PHOS U/L 126*   ALT U/L 10   AST U/L 19   GLUCOSE RANDOM mg/dL 107         Results from last 7 days   Lab Units 12/29/23  1108 12/29/23  0905 12/29/23  0723 12/29/23  0122   POC GLUCOSE mg/dl 227* 111 118 77               Lines/Drains:  Invasive Devices       Peripheral Intravenous Line  Duration             Peripheral IV 12/28/23 Left Forearm <1 day                      Telemetry:  Telemetry Orders (From admission, onward)               24 Hour Telemetry Monitoring  Continuous x 24 Hours (Telem)        Question:  Reason for 24 Hour Telemetry  Answer:  Decompensated CHF- and any one of the following: continuous diuretic infusion or total diuretic dose >200 mg daily, associated electrolyte derangement (I.e. K < 3.0), ionotropic drip (continuous infusion), hx of ventricular arrhythmia, or new EF < 35%                     Telemetry Reviewed: Normal Sinus Rhythm  Indication for Continued Telemetry Use: Acute CHF on >200 mg lasix/day or equivalent dose or with new reduced EF.              Imaging: Reviewed radiology reports from this admission including: chest xray    Recent Cultures (last 7 days):         Last 24 Hours Medication List:   Current Facility-Administered Medications   Medication Dose Route Frequency Provider Last Rate    acetaminophen  650 mg Oral Q6H PRN Gricel Cedillo PA-C      albuterol  2.5 mg Nebulization Q4H PRN Semaj Perez MD      allopurinol  300 mg Oral Daily Gricel Cedillo PA-C      aluminum-magnesium hydroxide-simethicone  30 mL Oral Q4H PRN Gricel Cedillo PA-C      amLODIPine  10 mg Oral Daily Gricel Cedillo PA-C      atorvastatin  20 mg Oral After Dinner Gricel Cedillo PA-C      azelastine  1 spray Each Nare BID Gricel Cedillo PA-C      budesonide  0.5 mg Nebulization BID Gricel Cedillo PA-C      cholecalciferol  1,000 Units Oral Daily Gricel Cedillo PA-C      co-enzyme Q-10  100 mg Oral Daily Gricel Cedillo PA-C      fish oil  1,000 mg Oral Daily  Gricel Cedillo PA-C      fluticasone  1 spray Nasal BID Gricel Cedillo PA-C      formoterol  20 mcg Nebulization BID Gricel Cedillo PA-C      furosemide  100 mg Intravenous BID (diuretic) Gricel Cedillo PA-C      gabapentin  100 mg Oral Daily Gricel Cedillo PA-C      heparin (porcine)  5,000 Units Subcutaneous Q8H TOVA Gricel Cedillo PA-C      insulin glargine  18 Units Subcutaneous Q12H TOVA Gricel Cedillo PA-C      insulin lispro  1-5 Units Subcutaneous TID AC Gricel Cedillo PA-C      insulin lispro  1-5 Units Subcutaneous HS Gricel Cedillo PA-C      insulin lispro  20 Units Subcutaneous TID With Meals Gricel Cedillo PA-C      ipratropium  0.5 mg Nebulization TID Semaj Perez MD      levalbuterol  1.25 mg Nebulization TID Semaj Perez MD      lidocaine  1 patch Topical HS Gricel Cedillo PA-C      lidocaine  1 patch Topical HS Gricel Cedillo PA-C      loratadine  10 mg Oral Daily Gricel Cedillo PA-C      losartan  100 mg Oral Daily Gricel Cedillo PA-C      senna  2 tablet Oral HS PRN Gricel Cedillo PA-C          Today, Patient Was Seen By: Ara Leigh MD    **Please Note: This note may have been constructed using a voice recognition system.**

## 2023-12-29 NOTE — ASSESSMENT & PLAN NOTE
Lab Results   Component Value Date    HGBA1C 6.8 11/16/2023       Recent Labs     12/29/23  0122 12/29/23  0723 12/29/23  0905 12/29/23  1108   POCGLU 77 118 111 227*       Blood Sugar Average: Last 72 hrs:  (P) 133.25Home regimen: Lantus 18 units twice daily and NovoLog 22 unit with breakfast, 23 units lunch, and 20 units with dinner  Continue home Lantus and placed on NovoLog 20 units 3 times daily with meals the addition of SSI  Frequent Accu-Cheks and hypoglycemia protocol

## 2023-12-29 NOTE — ASSESSMENT & PLAN NOTE
Baseline creatinine is around 1.2  Creatinine on admission 1.30  Trend BMP daily in setting of IV diuresis

## 2023-12-29 NOTE — CASE MANAGEMENT
Case Management Assessment & Discharge Planning Note    Patient name Hammad Montesinos  Location ED 08/ED 08 MRN [id number]  : 1940 Date 2023       Current Admission Date: 2023  Current Admission Diagnosis:Acute on chronic diastolic heart failure (HCC)   Patient Active Problem List    Diagnosis Date Noted    Acute on chronic diastolic heart failure (HCC) 2023    Elevated troponin 2023    Pulmonary hypertension (HCC) 2023    Type 2 diabetes mellitus with diabetic neuropathy, unspecified whether long term insulin use (HCC) 2023    Pre-ulcerative calluses 2023    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 ([facility]) 2022    Elevated parathyroid hormone 2022    Diabetic nephropathy associated with type 2 diabetes mellitus ([facility]) 2022    Type 2 diabetes mellitus with hyperglycemia, with long-term current use of insulin ([facility]) 2022    Mixed hyperlipidemia 2022    Diabetic polyneuropathy associated with type 2 diabetes mellitus ([facility]) 2022    Primary hypertension 2022    Morbid (severe) obesity due to excess calories ([facility]) 2022    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 ([facility]) 2022      LOS (days): 1  Geometric Mean LOS (GMLOS) (days): 3.9  Days to GMLOS:3.2     OBJECTIVE:    Risk of Unplanned Readmission Score: 21.86         Current admission status: Inpatient       Preferred Pharmacy:   [facility] [address]  [address] [address]  Phone: [phone] Fax: [phone]    Primary Care Provider: Mina Zavala MD    Primary Insurance: [facility]  Secondary Insurance:     ASSESSMENT:  Active Health Care Proxies       Maci Montesinos Health Care Agent - Spouse   Primary Phone: [phone] (Mobile)                 Advance Directives  Does patient have a Health Care POA?: Yes  Does patient  have Advance Directives?: Yes  Advance Directives: Power of  for health care  Primary Contact: Maci Montesinos         Readmission Root Cause  30 Day Readmission: No    Patient Information  Admitted from:: Home  Mental Status: Alert  During Assessment patient was accompanied by: Not accompanied during assessment  Assessment information provided by:: Patient  Primary Caregiver: Self  Support Systems: Spouse/significant other  County of Residence: [place]  What city do you live in?: [place]  Home entry access options. Select all that apply.: Elevator  Type of Current Residence: Apartment  Floor Level: 4  Upon entering residence, is there a bedroom on the main floor (no further steps)?: Yes  Upon entering residence, is there a bathroom on the main floor (no further steps)?: Yes  Living Arrangements: Lives w/ Spouse/significant other  Is patient a ?: No    Activities of Daily Living Prior to Admission  Functional Status: Independent  Completes ADLs independently?: Yes  Ambulates independently?: Yes  Does patient use assisted devices?: Yes  Assisted Devices (DME) used: Walker, Rollator, Nebulizer, CPAP  DME Company Name (respiratory supplies): [employer]  Does patient currently own DME?: Yes  What DME does the patient currently own?: CPAP, Rollator, Walker, Nebulizer  Does patient have a history of Outpatient Therapy (PT/OT)?: No  Does the patient have a history of Short-Term Rehab?: No  Does patient have a history of HHC?: No  Does patient currently have HHC?: No         Patient Information Continued  Income Source: Pension/half-way  Does patient have prescription coverage?: Yes  Does patient receive dialysis treatments?: No  Does patient have a history of substance abuse?: No  Does patient have a history of Mental Health Diagnosis?: No         Means of Transportation  [facility]      Housing Stability: Low Risk  (12/29/2023)    Housing Stability Vital Sign      Unable to Pay for Housing in the Last Year: No     Number of Places Lived in the Last Year: 1     Unstable Housing in the Last Year: No   Food Insecurity: No Food Insecurity (12/29/2023)    Hunger Vital Sign     Worried About Running Out of Food in the Last Year: Never true     Ran Out of Food in the Last Year: Never true   Transportation Needs: No Transportation Needs (12/29/2023)    PRAPARE - Transportation     Lack of Transportation (Medical): No     Lack of Transportation (Non-Medical): No   Utilities: Not At Risk (12/29/2023)    [facility] Utilities     Threatened with loss of utilities: No       DISCHARGE DETAILS:    Discharge planning discussed with:: patient at bedside in the ED  Freedom of Choice: Yes  Comments - Freedom of Choice: discussed dc planning and role of CM  CM contacted family/caregiver?: No- see comments (pt alert and indpt in room)  Were Treatment Team discharge recommendations reviewed with patient/caregiver?: Yes  Did patient/caregiver verbalize understanding of patient care needs?: Yes       Contacts  Reason/Outcome: Discharge Planning    Requested Home Health Care         Is the patient interested in HHC at discharge?: Yes  Home Health Discipline requested:: Nursing  Home Health Follow-Up Provider:: PCP  Homebound Criteria Met:: Uses an Assist Device (i.e. cane, walker, etc)  Supporting Clincal Findings:: Limited Endurance, Fatigues Easliy in Short Distances    DME Referral Provided  Referral made for DME?: No    Other Referral/Resources/Interventions Provided:  Interventions: HHC  Referral Comments: Pt is interested in HHc RN if he qualifies. Needs pending progress.            Discharge Destination Plan:: Home with Home Health Care, Home  Transport at Discharge : Family                             IMM Given (Date):: 12/29/23  IMM Given to:: Patient     Additional Comments: Cm met with pt in the ED. Pt reports that [facility]. They have  elevator access to their apt. Pt has neb, insulin, CPAP ([facility]) , rollator and walker at home. Pt states that his wife is his POA and dtr is secondary. Pt is interested in a HHC RN however at this time there is no apparent SN need for home. May be more appropriate for a none skilled provider list on DC. Pt on IV lasix.

## 2023-12-29 NOTE — ASSESSMENT & PLAN NOTE
HS trop: 48 > 64  EKG without acute ischemia - showing paced rhythm   Likely s/d to volume overload

## 2023-12-29 NOTE — UTILIZATION REVIEW
Initial Clinical Review    Admission: Date/Time/Statement:   Admission Orders (From admission, onward)       Ordered        12/28/23 2257  INPATIENT ADMISSION  Once                          Orders Placed This Encounter   Procedures    INPATIENT ADMISSION     Standing Status:   Standing     Number of Occurrences:   1     Order Specific Question:   Level of Care     Answer:   Med Surg [16]     Order Specific Question:   Estimated length of stay     Answer:   More than 2 Midnights     Order Specific Question:   Certification     Answer:   I certify that inpatient services are medically necessary for this patient for a duration of greater than two midnights. See H&P and MD Progress Notes for additional information about the patient's course of treatment.     ED Arrival Information       Expected   -    Arrival   12/28/2023 19:36    Acuity   Urgent              Means of arrival   Wheelchair    Escorted by   Family Member    Service   Hospitalist    Admission type   Emergency              Arrival complaint   SOB (sent by cardiologist)             Chief Complaint   Patient presents with    Shortness of Breath     Patient complaint of SOB w/ cough ,since xmas worsening       Initial Presentation: 83 y.o. male to ED presents for worsening dyspnea and anasarca since Sharath despite increased Lasix dose. Home regimen: lasix 40mg BID with increased to 60mg in AM and 40mg in PM 3 days ago with no improvement in symptoms. Also c/o abdominal distention but no pain. PMH for CKD stage 3, Pulmonary hypertension, IDDM1, HLD, HTN. Dual-chamber permanent pacemaker in situ. Paroxysmal atrial fibrillation.   Admit Inpatient level of care for Acute on chronic diastolic heart failure and Elevated troponin. Iv Lasix 100 mg bid. Sodium and fluid restriction. HS trop: 48 > 64. EKG without acute ischemia - showing paced rhythm. Likely s/d to volume overload. On exam; Conversational dyspnea, tachypnea, diffuse expiratory wheezing and rales. 4+  pitting edema to b/l LE with swelling to UE b/l.    Wt Readings from Last 3 Encounters:   12/12/23 115 kg (254 lb)   12/05/23 112 kg (247 lb)   09/05/23 112 kg (247 lb)     Date: 12/29   Day 2:   Cardiology cons; Acute on chronic HFpEF. . Continue IV Lasix, OK to trial high-dose of 100 mg but needs close watch on renal function. Continue losartan and amlodipine. Continue diuresis through the weekend. Pt appear significantly volume overloaded and will probably need several days of diuresis. No need for repeat Echo.   10/2023 echo: LVEF 50%, moderate concentric LVH, severe left atrial dilation    Await records from [facility] to find out what kind of PPM he has so we can interrogate it      Progress notes; Continue Iv diuresis with Iv Lasix over the weekend.     ED Triage Vitals   Temperature Pulse Respirations Blood Pressure SpO2   12/28/23 1942 12/28/23 1942 12/28/23 1942 12/28/23 1942 12/28/23 1942   98.3 °F (36.8 °C) 66 18 148/71 97 %      Temp src Heart Rate Source Patient Position - Orthostatic VS BP Location FiO2 (%)   -- 12/28/23 2300 12/29/23 0740 12/29/23 0740 --    Monitor Lying Right arm       Pain Score       12/29/23 0006       9          Wt Readings from Last 1 Encounters:   12/29/23 119 kg (263 lb 4.8 oz)     Additional Vital Signs:   12/29/23 0740 -- 72 20 130/77 97 96 % None (Room air) Lying   12/29/23 0615 -- 94 38 Abnormal  117/58 83 93 % -- --   12/29/23 0400 -- 82 22 139/60 86 96 % -- --   12/29/23 0200 -- 119 Abnormal  20 110/79 90 93 % -- --   12/29/23 0130 -- 66 -- 131/87 104 94 % -- --   12/29/23 0050 -- 66 -- -- -- 95 % None (Room air)      Pertinent Labs/Diagnostic Test Results:   XR chest 2 views (12/28) - pending   ED Interpretation by Samm Coleman DO (12/28 2227)        Results from last 7 days   Lab Units 12/28/23 1947   SARS-COV-2  Negative     Results from last 7 days   Lab Units 12/29/23  0617 12/28/23 1947   WBC Thousand/uL 6.55 8.18   HEMOGLOBIN g/dL 10.9* 11.1*    HEMATOCRIT % 35.1* 36.7   PLATELETS Thousands/uL 154 169   NEUTROS ABS Thousands/µL  --  6.00         Results from last 7 days   Lab Units 12/29/23  0527 12/28/23 1947   SODIUM mmol/L 136 135   POTASSIUM mmol/L 3.6 4.9   CHLORIDE mmol/L 103 102   CO2 mmol/L 24 24   ANION GAP mmol/L 9 9   BUN mg/dL 40* 43*   CREATININE mg/dL 1.14 1.30   EGFR ml/min/1.73sq m 58 50   CALCIUM mg/dL 9.1 9.2   MAGNESIUM mg/dL 2.0  --    PHOSPHORUS mg/dL 3.7  --      Results from last 7 days   Lab Units 12/29/23  0527 12/28/23 1947   AST U/L 19 27   ALT U/L 10 11   ALK PHOS U/L 126* 136*   TOTAL PROTEIN g/dL 6.2* 6.7   ALBUMIN g/dL 3.4* 3.5   TOTAL BILIRUBIN mg/dL 1.31* 1.21*     Results from last 7 days   Lab Units 12/29/23  0905 12/29/23  0723 12/29/23  0122   POC GLUCOSE mg/dl 111 118 77     Results from last 7 days   Lab Units 12/29/23  0527 12/28/23 1947   GLUCOSE RANDOM mg/dL 107 118       Results from last 7 days   Lab Units 12/29/23  0033 12/28/23 2224 12/28/23 1947   HS TNI 0HR ng/L  --   --  48   HS TNI 2HR ng/L  --  64*  --    HSTNI D2 ng/L  --  16  --    HS TNI 4HR ng/L 60*  --   --    HSTNI D4 ng/L 12  --   --            Results from last 7 days   Lab Units 12/28/23 1947   BNP pg/mL 816*       Results from last 7 days   Lab Units 12/28/23 1947   INFLUENZA A PCR  Negative   INFLUENZA B PCR  Negative   RSV PCR  Negative       ED Treatment:   Medication Administration from 12/28/2023 1935 to 12/29/2023 1026         Date/Time Order Dose Route Action     12/29/2023 0006 EST acetaminophen (TYLENOL) tablet 650 mg 650 mg Oral Given     12/28/2023 2349 EST lidocaine (LIDODERM) 5 % patch 1 patch 1 patch Topical Medication Applied     12/28/2023 2349 EST lidocaine (LIDODERM) 5 % patch 1 patch 1 patch Topical Medication Applied     12/29/2023 0858 EST allopurinol (ZYLOPRIM) tablet 300 mg 300 mg Oral Given     12/29/2023 0855 EST amLODIPine (NORVASC) tablet 10 mg 10 mg Oral Given     12/29/2023 0908 EST azelastine (ASTELIN) 0.1 %  nasal spray 1 spray 1 spray Each Nare Given     12/29/2023 0738 EST budesonide (PULMICORT) inhalation solution 0.5 mg 0.5 mg Nebulization Given     12/29/2023 0052 EST budesonide (PULMICORT) inhalation solution 0.5 mg 0.5 mg Nebulization Given     12/29/2023 0856 EST cholecalciferol (VITAMIN D3) tablet 1,000 Units 1,000 Units Oral Given     12/29/2023 0855 EST co-enzyme Q-10 capsule 100 mg 100 mg Oral Given     12/29/2023 0908 EST fluticasone (FLONASE) 50 mcg/act nasal spray 1 spray 1 spray Nasal Given     12/29/2023 0738 EST formoterol (PERFOROMIST) nebulizer solution 20 mcg 20 mcg Nebulization Given     12/29/2023 0052 EST formoterol (PERFOROMIST) nebulizer solution 20 mcg 20 mcg Nebulization Given     12/29/2023 0856 EST gabapentin (NEURONTIN) capsule 100 mg 100 mg Oral Given     12/29/2023 0044 EST ipratropium-albuterol (DUO-NEB) 0.5-2.5 mg/3 mL inhalation solution 3 mL 3 mL Nebulization Given     12/29/2023 0857 EST loratadine (CLARITIN) tablet 10 mg 10 mg Oral Given     12/29/2023 0858 EST losartan (COZAAR) tablet 100 mg 100 mg Oral Given     12/29/2023 0858 EST fish oil capsule 1,000 mg 1,000 mg Oral Given     12/29/2023 0858 EST furosemide (LASIX) injection 100 mg 100 mg Intravenous Given     12/29/2023 0130 EST furosemide (LASIX) injection 100 mg 100 mg Intravenous Given     12/29/2023 0528 EST heparin (porcine) subcutaneous injection 5,000 Units 5,000 Units Subcutaneous Given     12/29/2023 0911 EST insulin glargine (LANTUS) subcutaneous injection 18 Units 0.18 mL 16 Units Subcutaneous Given     12/29/2023 0738 EST levalbuterol (XOPENEX) inhalation solution 1.25 mg 1.25 mg Nebulization Given     12/29/2023 0738 EST ipratropium (ATROVENT) 0.02 % inhalation solution 0.5 mg 0.5 mg Nebulization Given          Past Medical History:   Diagnosis Date    Asthma     Bradycardia     Carcinoma, basal cell, skin     head and face and ear    COVID-19 12/05/2022    Dupuytren contracture     bilateral ring and right  middle    Gout     Herniated lumbar intervertebral disc     Sleep apnea      Present on Admission:   Mixed hyperlipidemia   Primary hypertension   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 (HCC)      Admitting Diagnosis: SOB (shortness of breath) [R06.02]  Age/Sex: 83 y.o. male    Admission Orders:  Scheduled Medications:  allopurinol, 300 mg, Oral, Daily  amLODIPine, 10 mg, Oral, Daily  atorvastatin, 20 mg, Oral, After Dinner  azelastine, 1 spray, Each Nare, BID  budesonide, 0.5 mg, Nebulization, BID  cholecalciferol, 1,000 Units, Oral, Daily  co-enzyme Q-10, 100 mg, Oral, Daily  fish oil, 1,000 mg, Oral, Daily  fluticasone, 1 spray, Nasal, BID  formoterol, 20 mcg, Nebulization, BID  furosemide, 100 mg, Intravenous, BID (diuretic)  gabapentin, 100 mg, Oral, Daily  heparin (porcine), 5,000 Units, Subcutaneous, Q8H TOVA  insulin glargine, 18 Units, Subcutaneous, Q12H TOVA  insulin lispro, 1-5 Units, Subcutaneous, TID AC  insulin lispro, 1-5 Units, Subcutaneous, HS  insulin lispro, 20 Units, Subcutaneous, TID With Meals  ipratropium, 0.5 mg, Nebulization, TID  levalbuterol, 1.25 mg, Nebulization, TID  lidocaine, 1 patch, Topical, HS  lidocaine, 1 patch, Topical, HS  loratadine, 10 mg, Oral, Daily  losartan, 100 mg, Oral, Daily      Continuous IV Infusions: None     PRN Meds:  acetaminophen, 650 mg, Oral, Q6H PRN  albuterol, 2.5 mg, Nebulization, Q4H PRN  aluminum-magnesium hydroxide-simethicone, 30 mL, Oral, Q4H PRN  senna, 2 tablet, Oral, HS PRN        IP CONSULT TO NUTRITION SERVICES  IP CONSULT TO CARDIOLOGY    Network Utilization Review Department  ATTENTION: Please call with any questions or concerns to [phone] and carefully listen to the prompts so that you are directed to the right person. All voicemails are confidential.   For Discharge needs, contact Care Management DC Support Team at [phone] opt. 2  Send all requests for admission clinical reviews, approved or denied  determinations and any other requests to dedicated fax number below belonging to the campus where the patient is receiving treatment. List of dedicated fax numbers for the Facilities:  FACILITY NAME UR FAX NUMBER   ADMISSION DENIALS (Administrative/Medical Necessity) [phone]   DISCHARGE SUPPORT TEAM (NETWORK) [phone]   PARENT CHILD HEALTH (Maternity/NICU/Pediatrics) [phone]   [facility] [phone]   [facility] [phone]   [facility] [phone]   [facility] [phone]   [facility] [phone]   [facility] [phone]   [facility] [phone]   [facility] [phone]   [facility] [phone]   [facility] [phone]   [facility] [phone]      ed

## 2023-12-29 NOTE — ASSESSMENT & PLAN NOTE
Baseline creatinine is around 1.2  Recent Labs     12/28/23 1947 12/29/23  0527   CREATININE 1.30 1.14   EGFR 50 58     Estimated Creatinine Clearance: 63.3 mL/min (by C-G formula based on SCr of 1.14 mg/dL).   Trend BMP daily in setting of IV diuresis

## 2023-12-30 LAB
ANION GAP SERPL CALCULATED.3IONS-SCNC: 8 MMOL/L
BACTERIA UR QL AUTO: ABNORMAL /HPF
BASOPHILS # BLD AUTO: 0.05 THOUSANDS/ÂΜL (ref 0–0.1)
BASOPHILS NFR BLD AUTO: 1 % (ref 0–1)
BILIRUB UR QL STRIP: NEGATIVE
BUN SERPL-MCNC: 40 MG/DL (ref 5–25)
CALCIUM SERPL-MCNC: 9.3 MG/DL (ref 8.4–10.2)
CHLORIDE SERPL-SCNC: 102 MMOL/L (ref 96–108)
CLARITY UR: CLEAR
CO2 SERPL-SCNC: 30 MMOL/L (ref 21–32)
COLOR UR: YELLOW
CREAT SERPL-MCNC: 1.41 MG/DL (ref 0.6–1.3)
EOSINOPHIL # BLD AUTO: 0.23 THOUSAND/ÂΜL (ref 0–0.61)
EOSINOPHIL NFR BLD AUTO: 3 % (ref 0–6)
ERYTHROCYTE [DISTWIDTH] IN BLOOD BY AUTOMATED COUNT: 18.4 % (ref 11.6–15.1)
GFR SERPL CREATININE-BSD FRML MDRD: 45 ML/MIN/1.73SQ M
GLUCOSE SERPL-MCNC: 121 MG/DL (ref 65–140)
GLUCOSE SERPL-MCNC: 195 MG/DL (ref 65–140)
GLUCOSE SERPL-MCNC: 47 MG/DL (ref 65–140)
GLUCOSE SERPL-MCNC: 62 MG/DL (ref 65–140)
GLUCOSE SERPL-MCNC: 63 MG/DL (ref 65–140)
GLUCOSE SERPL-MCNC: 63 MG/DL (ref 65–140)
GLUCOSE SERPL-MCNC: 92 MG/DL (ref 65–140)
GLUCOSE UR STRIP-MCNC: NEGATIVE MG/DL
HCT VFR BLD AUTO: 35.1 % (ref 36.5–49.3)
HGB BLD-MCNC: 11 G/DL (ref 12–17)
HGB UR QL STRIP.AUTO: NEGATIVE
IMM GRANULOCYTES # BLD AUTO: 0.02 THOUSAND/UL (ref 0–0.2)
IMM GRANULOCYTES NFR BLD AUTO: 0 % (ref 0–2)
KETONES UR STRIP-MCNC: NEGATIVE MG/DL
LEUKOCYTE ESTERASE UR QL STRIP: ABNORMAL
LYMPHOCYTES # BLD AUTO: 1.11 THOUSANDS/ÂΜL (ref 0.6–4.47)
LYMPHOCYTES NFR BLD AUTO: 17 % (ref 14–44)
MAGNESIUM SERPL-MCNC: 2 MG/DL (ref 1.9–2.7)
MCH RBC QN AUTO: 25.6 PG (ref 26.8–34.3)
MCHC RBC AUTO-ENTMCNC: 31.3 G/DL (ref 31.4–37.4)
MCV RBC AUTO: 82 FL (ref 82–98)
MONOCYTES # BLD AUTO: 0.84 THOUSAND/ÂΜL (ref 0.17–1.22)
MONOCYTES NFR BLD AUTO: 13 % (ref 4–12)
NEUTROPHILS # BLD AUTO: 4.44 THOUSANDS/ÂΜL (ref 1.85–7.62)
NEUTS SEG NFR BLD AUTO: 66 % (ref 43–75)
NITRITE UR QL STRIP: NEGATIVE
NON-SQ EPI CELLS URNS QL MICRO: ABNORMAL /HPF
NRBC BLD AUTO-RTO: 0 /100 WBCS
PH UR STRIP.AUTO: 6.5 [PH]
PLATELET # BLD AUTO: 163 THOUSANDS/UL (ref 149–390)
PMV BLD AUTO: 11.8 FL (ref 8.9–12.7)
POTASSIUM SERPL-SCNC: 3.8 MMOL/L (ref 3.5–5.3)
PROT UR STRIP-MCNC: NEGATIVE MG/DL
RBC # BLD AUTO: 4.3 MILLION/UL (ref 3.88–5.62)
RBC #/AREA URNS AUTO: ABNORMAL /HPF
SODIUM SERPL-SCNC: 140 MMOL/L (ref 135–147)
SP GR UR STRIP.AUTO: 1.01 (ref 1–1.03)
UROBILINOGEN UR STRIP-ACNC: <2 MG/DL
WBC # BLD AUTO: 6.69 THOUSAND/UL (ref 4.31–10.16)
WBC #/AREA URNS AUTO: ABNORMAL /HPF

## 2023-12-30 PROCEDURE — 94664 DEMO&/EVAL PT USE INHALER: CPT

## 2023-12-30 PROCEDURE — 81001 URINALYSIS AUTO W/SCOPE: CPT | Performed by: INTERNAL MEDICINE

## 2023-12-30 PROCEDURE — 94660 CPAP INITIATION&MGMT: CPT

## 2023-12-30 PROCEDURE — 99233 SBSQ HOSP IP/OBS HIGH 50: CPT | Performed by: INTERNAL MEDICINE

## 2023-12-30 PROCEDURE — 94760 N-INVAS EAR/PLS OXIMETRY 1: CPT

## 2023-12-30 PROCEDURE — 94640 AIRWAY INHALATION TREATMENT: CPT

## 2023-12-30 PROCEDURE — 80048 BASIC METABOLIC PNL TOTAL CA: CPT | Performed by: INTERNAL MEDICINE

## 2023-12-30 PROCEDURE — 85025 COMPLETE CBC W/AUTO DIFF WBC: CPT | Performed by: INTERNAL MEDICINE

## 2023-12-30 PROCEDURE — 82948 REAGENT STRIP/BLOOD GLUCOSE: CPT

## 2023-12-30 PROCEDURE — 83735 ASSAY OF MAGNESIUM: CPT | Performed by: INTERNAL MEDICINE

## 2023-12-30 PROCEDURE — 99232 SBSQ HOSP IP/OBS MODERATE 35: CPT | Performed by: INTERNAL MEDICINE

## 2023-12-30 RX ORDER — INSULIN LISPRO 100 [IU]/ML
10 INJECTION, SOLUTION INTRAVENOUS; SUBCUTANEOUS
Status: DISCONTINUED | OUTPATIENT
Start: 2023-12-30 | End: 2024-01-01

## 2023-12-30 RX ORDER — INSULIN GLARGINE 100 [IU]/ML
15 INJECTION, SOLUTION SUBCUTANEOUS EVERY 12 HOURS SCHEDULED
Status: DISCONTINUED | OUTPATIENT
Start: 2023-12-30 | End: 2024-01-01

## 2023-12-30 RX ORDER — POTASSIUM CHLORIDE 20 MEQ/1
40 TABLET, EXTENDED RELEASE ORAL ONCE
Status: COMPLETED | OUTPATIENT
Start: 2023-12-30 | End: 2023-12-30

## 2023-12-30 RX ORDER — METOLAZONE 2.5 MG/1
2.5 TABLET ORAL ONCE
Status: COMPLETED | OUTPATIENT
Start: 2023-12-30 | End: 2023-12-30

## 2023-12-30 RX ORDER — INSULIN GLARGINE 100 [IU]/ML
15 INJECTION, SOLUTION SUBCUTANEOUS
Status: DISCONTINUED | OUTPATIENT
Start: 2023-12-30 | End: 2023-12-30

## 2023-12-30 RX ORDER — INSULIN GLARGINE 100 [IU]/ML
18 INJECTION, SOLUTION SUBCUTANEOUS EVERY MORNING
Status: DISCONTINUED | OUTPATIENT
Start: 2023-12-30 | End: 2023-12-30

## 2023-12-30 RX ADMIN — Medication 100 MG: at 08:54

## 2023-12-30 RX ADMIN — ALLOPURINOL 300 MG: 100 TABLET ORAL at 08:55

## 2023-12-30 RX ADMIN — LOSARTAN POTASSIUM 100 MG: 50 TABLET, FILM COATED ORAL at 08:55

## 2023-12-30 RX ADMIN — OMEGA-3 FATTY ACIDS CAP 1000 MG 1000 MG: 1000 CAP at 08:54

## 2023-12-30 RX ADMIN — GABAPENTIN 100 MG: 100 CAPSULE ORAL at 08:55

## 2023-12-30 RX ADMIN — FLUTICASONE PROPIONATE 1 SPRAY: 50 SPRAY, METERED NASAL at 08:48

## 2023-12-30 RX ADMIN — HEPARIN SODIUM 5000 UNITS: 5000 INJECTION INTRAVENOUS; SUBCUTANEOUS at 15:32

## 2023-12-30 RX ADMIN — METOLAZONE 2.5 MG: 2.5 TABLET ORAL at 15:32

## 2023-12-30 RX ADMIN — POTASSIUM CHLORIDE 40 MEQ: 1500 TABLET, EXTENDED RELEASE ORAL at 21:13

## 2023-12-30 RX ADMIN — INSULIN GLARGINE 15 UNITS: 100 INJECTION, SOLUTION SUBCUTANEOUS at 21:15

## 2023-12-30 RX ADMIN — BUDESONIDE 0.5 MG: 0.5 INHALANT ORAL at 21:02

## 2023-12-30 RX ADMIN — FUROSEMIDE 100 MG: 10 INJECTION, SOLUTION INTRAMUSCULAR; INTRAVENOUS at 08:48

## 2023-12-30 RX ADMIN — BUDESONIDE 0.5 MG: 0.5 INHALANT ORAL at 07:44

## 2023-12-30 RX ADMIN — AZELASTINE HYDROCHLORIDE 1 SPRAY: 137 SPRAY, METERED NASAL at 08:48

## 2023-12-30 RX ADMIN — INSULIN LISPRO 20 UNITS: 100 INJECTION, SOLUTION INTRAVENOUS; SUBCUTANEOUS at 12:40

## 2023-12-30 RX ADMIN — Medication 1000 UNITS: at 08:55

## 2023-12-30 RX ADMIN — HEPARIN SODIUM 5000 UNITS: 5000 INJECTION INTRAVENOUS; SUBCUTANEOUS at 06:37

## 2023-12-30 RX ADMIN — LORATADINE 10 MG: 10 TABLET ORAL at 08:55

## 2023-12-30 RX ADMIN — HEPARIN SODIUM 5000 UNITS: 5000 INJECTION INTRAVENOUS; SUBCUTANEOUS at 21:13

## 2023-12-30 RX ADMIN — ATORVASTATIN CALCIUM 20 MG: 20 TABLET, FILM COATED ORAL at 17:49

## 2023-12-30 RX ADMIN — FORMOTEROL FUMARATE 20 MCG: 20 SOLUTION RESPIRATORY (INHALATION) at 07:44

## 2023-12-30 RX ADMIN — LEVALBUTEROL HYDROCHLORIDE 1.25 MG: 1.25 SOLUTION RESPIRATORY (INHALATION) at 07:44

## 2023-12-30 RX ADMIN — LIDOCAINE 1 PATCH: 50 PATCH CUTANEOUS at 21:13

## 2023-12-30 RX ADMIN — INSULIN LISPRO 10 UNITS: 100 INJECTION, SOLUTION INTRAVENOUS; SUBCUTANEOUS at 08:55

## 2023-12-30 RX ADMIN — IPRATROPIUM BROMIDE 0.5 MG: 0.5 SOLUTION RESPIRATORY (INHALATION) at 07:44

## 2023-12-30 RX ADMIN — INSULIN GLARGINE 18 UNITS: 100 INJECTION, SOLUTION SUBCUTANEOUS at 08:49

## 2023-12-30 RX ADMIN — FUROSEMIDE 100 MG: 10 INJECTION, SOLUTION INTRAMUSCULAR; INTRAVENOUS at 15:31

## 2023-12-30 RX ADMIN — AMLODIPINE BESYLATE 10 MG: 5 TABLET ORAL at 08:54

## 2023-12-30 NOTE — ASSESSMENT & PLAN NOTE
Lab Results   Component Value Date    HGBA1C 6.8 11/16/2023       Recent Labs     12/29/23 2052 12/30/23  0738 12/30/23  1121 12/30/23  1444   POCGLU 173* 92 121 63*         Blood Sugar Average: Last 72 hrs:  (P) [id number] regimen: Lantus 18 units twice daily and NovoLog 22 unit with breakfast, 23 units lunch, and 20 units with dinner  Decrease Lantus further  to 12 units twice daily  Decrease Humalog to 8 units 3 times daily  SSI

## 2023-12-30 NOTE — ASSESSMENT & PLAN NOTE
Baseline creatinine is around 1.2  Recent Labs     12/28/23  1947 12/29/23  0527 12/30/23  0545   CREATININE 1.30 1.14 1.41*   EGFR 50 58 45       Estimated Creatinine Clearance: 51 mL/min (A) (by C-G formula based on SCr of 1.41 mg/dL (H)).   Trend BMP daily in setting of IV diuresis

## 2023-12-30 NOTE — PROGRESS NOTES
"[facility]  Progress Note  Name: Hammad Montesinos I  MRN: [id number]  Unit/Bed#: MS 329Svitlana I Date of Admission: 12/28/2023   Date of Service: 12/30/2023 I Hospital Day: 2    Assessment/Plan   Paroxysmal A-fib (Formerly [facility])  Assessment & Plan  History noted  Not on anticoagulation  Cardiology following, appreciate recommendations  Outpatient cardiology dcd AC for risk of bleeding and fall, discussed with daughter, she would like to talk with primary cardiologist before starting anticoagulation    Pulmonary hypertension (Formerly [facility])  Assessment & Plan  Home regimen: pulmicort BID, performist BID, and DuoNeb twice daily  Continue home nebulizers and increased dose of IV diuresis due to anasarca    Elevated troponin  Assessment & Plan  HS trop: 48 > 64  EKG without acute ischemia - showing paced rhythm   Likely s/d to volume overload     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   Trend BMP daily in setting of IV diuresis    Primary hypertension  Assessment & Plan  Home regimen: Amlodipine 10 Mg daily, losartan 100 Mg daily  Continue home medications  /65   Pulse 62   Temp 97.7 °F (36.5 °C)   Resp 16   Ht 5' 11\" (1.803 m)   Wt 118 kg (261 lb 3.2 oz)   SpO2 93%   BMI 36.43 kg/m²      Mixed hyperlipidemia  Assessment & Plan  Continue home statin    Type 2 diabetes mellitus with hyperglycemia, with long-term current use of insulin (Formerly [facility])  Assessment & Plan  Lab Results   Component Value Date    HGBA1C 6.8 11/16/2023       Recent Labs     12/29/23  2052 12/30/23  0738 12/30/23  1121 12/30/23  1444   POCGLU 173* 92 121 63*         Blood Sugar Average: Last 72 hrs:  (P) [id number] regimen: Lantus 18 " units twice daily and NovoLog 22 unit with breakfast, 23 units lunch, and 20 units with dinner  Decrease Lantus to 15 units twice daily  Decrease Humalog to 10 units 3 times daily  SSI      * Acute on chronic diastolic heart failure (HCC)  Assessment & Plan  Wt Readings from Last 3 Encounters:   12/30/23 118 kg (261 lb 3.2 oz)   12/12/23 115 kg (254 lb)   12/05/23 112 kg (247 lb)     Presents with worsening dyspnea and anasarca since [place]   Home regimen: lasix 40mg BID with increased to 60mg in AM and 40mg in PM 3 days ago with no improvement in symptoms   Last echo 10/2023: Severe left atrial dilation. RV systolic pressure 52.2mmHg. pulmonary artery pressure elevated  Pt with anasarca and pulmonary congestion - continue lasix 100mg IV BID   I/o and daily weights   Sodium and fluid restriction   Cardiology following  Added metolazone 1 time               VTE Pharmacologic Prophylaxis: VTE Score: 5 Moderate Risk (Score 3-4) - Pharmacological DVT Prophylaxis Ordered: heparin.    Mobility:   Basic Mobility Inpatient Raw Score: 17  JH-HLM Goal: 5: Stand one or more mins  JH-HLM Achieved: 7: Walk 25 feet or more  HLM Goal achieved. Continue to encourage appropriate mobility.    Patient Centered Rounds: I performed bedside rounds with nursing staff today.   Discussions with Specialists or Other Care Team Provider: yes    Education and Discussions with Family / Patient: Updated  (daughter) via phone.- [email or web address]    Total Time Spent on Date of Encounter in care of patient: 38 mins. This time was spent on one or more of the following: performing physical exam; counseling and coordination of care; obtaining or reviewing history; documenting in the medical record; reviewing/ordering tests, medications or procedures; communicating with other healthcare professionals and discussing with patient's family/caregivers.    Current Length of Stay: 2 day(s)  Current Patient Status: Inpatient   Certification Statement: The  patient will continue to require additional inpatient hospital stay due to pending stabilization  Discharge Plan: Anticipate discharge in >72 hrs to home with home services.    Code Status: Level 1 - Full Code    Subjective:   Seen and examined at bedside  No chest pain  Endorses SOB   Rest of ROS negative         Objective:     Vitals:   Temp (24hrs), Av.9 °F (36.6 °C), Min:97.5 °F (36.4 °C), Max:98.4 °F (36.9 °C)    Temp:  [97.5 °F (36.4 °C)-98.4 °F (36.9 °C)] 97.7 °F (36.5 °C)  HR:  [59-83] 62  Resp:  [16-24] 16  BP: (114-136)/(45-85) 114/65  SpO2:  [93 %-98 %] 93 %  Body mass index is 36.43 kg/m².     Input and Output Summary (last 24 hours):     Intake/Output Summary (Last 24 hours) at 2023 1549  Last data filed at 2023 1057  Gross per 24 hour   Intake 660 ml   Output 1050 ml   Net -390 ml       Physical Exam:   Physical Exam  Constitutional:       Appearance: He is obese.   HENT:      Head: Atraumatic.   Eyes:      General: No scleral icterus.  Cardiovascular:      Rate and Rhythm: Regular rhythm. Tachycardia present.      Heart sounds: Normal heart sounds.   Pulmonary:      Comments: Crackles  Abdominal:      General: Bowel sounds are normal.   Musculoskeletal:      Cervical back: Neck supple.      Right lower leg: Edema present.      Left lower leg: Edema present.   Skin:     General: Skin is warm and dry.      Capillary Refill: Capillary refill takes less than 2 seconds.   Neurological:      Mental Status: He is alert and oriented to person, place, and time.      Motor: Weakness present.          Additional Data:     Labs:  Results from last 7 days   Lab Units 23  0545   WBC Thousand/uL 6.69   HEMOGLOBIN g/dL 11.0*   HEMATOCRIT % 35.1*   PLATELETS Thousands/uL 163   NEUTROS PCT % 66   LYMPHS PCT % 17   MONOS PCT % 13*   EOS PCT % 3     Results from last 7 days   Lab Units 23  0545 23  0527   SODIUM mmol/L 140 136   POTASSIUM mmol/L 3.8 3.6   CHLORIDE mmol/L 102 103   CO2  mmol/L 30 24   BUN mg/dL 40* 40*   CREATININE mg/dL 1.41* 1.14   ANION GAP mmol/L 8 9   CALCIUM mg/dL 9.3 9.1   ALBUMIN g/dL  --  3.4*   TOTAL BILIRUBIN mg/dL  --  1.31*   ALK PHOS U/L  --  126*   ALT U/L  --  10   AST U/L  --  19   GLUCOSE RANDOM mg/dL 62* 107         Results from last 7 days   Lab Units 12/30/23  1444 12/30/23  1121 12/30/23  0738 12/29/23  2052 12/29/23  1803 12/29/23  1108 12/29/23  0905 12/29/23  0723 12/29/23  0122   POC GLUCOSE mg/dl 63* 121 92 173* 201* 227* 111 118 77               Lines/Drains:  Invasive Devices       Peripheral Intravenous Line  Duration             Peripheral IV 12/28/23 Left Forearm 1 day                      Telemetry:  Telemetry Orders (From admission, onward)               24 Hour Telemetry Monitoring  Continuous x 24 Hours (Telem)        Question:  Reason for 24 Hour Telemetry  Answer:  Decompensated CHF- and any one of the following: continuous diuretic infusion or total diuretic dose >200 mg daily, associated electrolyte derangement (I.e. K < 3.0), ionotropic drip (continuous infusion), hx of ventricular arrhythmia, or new EF < 35%                     Telemetry Reviewed: Normal Sinus Rhythm  Indication for Continued Telemetry Use: Acute CHF on >200 mg lasix/day or equivalent dose or with new reduced EF.              Imaging: Reviewed radiology reports from this admission including: chest xray    Recent Cultures (last 7 days):         Last 24 Hours Medication List:   Current Facility-Administered Medications   Medication Dose Route Frequency Provider Last Rate    acetaminophen  650 mg Oral Q6H PRN Gricel Cedillo PA-C      albuterol  2.5 mg Nebulization Q4H PRN Semaj Perez MD      allopurinol  300 mg Oral Daily Gricel Cedillo PA-C      aluminum-magnesium hydroxide-simethicone  30 mL Oral Q4H PRN Gricel Cedillo PA-C      amLODIPine  10 mg Oral Daily Gricel Cedillo PA-C      atorvastatin  20 mg Oral After Dinner Gricel Cedillo PA-C       azelastine  1 spray Each Nare BID Gricel Cedillo PA-C      budesonide  0.5 mg Nebulization BID Gricel Cedillo PA-C      cholecalciferol  1,000 Units Oral Daily rGicel Cedillo PA-C      co-enzyme Q-10  100 mg Oral Daily Gricel Cedillo PA-C      fish oil  1,000 mg Oral Daily Gricel Cedillo PA-C      fluticasone  1 spray Nasal BID Gricel Cedillo PA-C      furosemide  100 mg Intravenous BID (diuretic) Gricel Cedillo PA-C      gabapentin  100 mg Oral Daily Gricel Cedillo PA-C      heparin (porcine)  5,000 Units Subcutaneous Q8H TOVA Gricel Cedillo PA-C      insulin glargine  15 Units Subcutaneous Q12H TOVA Semaj Perez MD      insulin lispro  1-5 Units Subcutaneous TID AC Ara Leigh MD      insulin lispro  10 Units Subcutaneous TID With Meals Semaj Perez MD      lidocaine  1 patch Topical HS Gricel Cedillo PA-C      lidocaine  1 patch Topical HS Gricel Cedillo PA-C      loratadine  10 mg Oral Daily Gricel Cedillo PA-C      losartan  100 mg Oral Daily Gricel Cedillo PA-C      senna  2 tablet Oral HS PRN Gricel Cedillo PA-C          Today, Patient Was Seen By: Semaj Perez MD    **Please Note: This note may have been constructed using a voice recognition system.**

## 2023-12-30 NOTE — PLAN OF CARE
Problem: Potential for Falls  Goal: Patient will remain free of falls  Description: INTERVENTIONS:  - Educate patient/family on patient safety including physical limitations  - Instruct patient to call for assistance with activity   - Consult OT/PT to assist with strengthening/mobility   - Keep Call bell within reach  - Keep bed low and locked with side rails adjusted as appropriate  - Keep care items and personal belongings within reach  - Initiate and maintain comfort rounds  - Make Fall Risk Sign visible to staff  - Offer Toileting every 2 Hours, in advance of need  - Initiate/Maintain alarm  - Obtain necessary fall risk management equipment  - Apply yellow socks and bracelet for high fall risk patients  - Consider moving patient to room near nurses station  Outcome: Progressing     Problem: CARDIOVASCULAR - ADULT  Goal: Maintains optimal cardiac output and hemodynamic stability  Description: INTERVENTIONS:  - Monitor I/O, vital signs and rhythm  - Monitor for S/S and trends of decreased cardiac output  - Administer and titrate ordered vasoactive medications to optimize hemodynamic stability  - Assess quality of pulses, skin color and temperature  - Assess for signs of decreased coronary artery perfusion  - Instruct patient to report change in severity of symptoms  Outcome: Progressing  Goal: Absence of cardiac dysrhythmias or at baseline rhythm  Description: INTERVENTIONS:  - Continuous cardiac monitoring, vital signs, obtain 12 lead EKG if ordered  - Administer antiarrhythmic and heart rate control medications as ordered  - Monitor electrolytes and administer replacement therapy as ordered  Outcome: Progressing     Problem: RESPIRATORY - ADULT  Goal: Achieves optimal ventilation and oxygenation  Description: INTERVENTIONS:  - Assess for changes in respiratory status  - Assess for changes in mentation and behavior  - Position to facilitate oxygenation and minimize respiratory effort  - Oxygen administered by  appropriate delivery if ordered  - Initiate smoking cessation education as indicated  - Encourage broncho-pulmonary hygiene including cough, deep breathe, Incentive Spirometry  - Assess the need for suctioning and aspirate as needed  - Assess and instruct to report SOB or any respiratory difficulty  - Respiratory Therapy support as indicated  Outcome: Progressing     Problem: METABOLIC, FLUID AND ELECTROLYTES - ADULT  Goal: Electrolytes maintained within normal limits  Description: INTERVENTIONS:  - Monitor labs and assess patient for signs and symptoms of electrolyte imbalances  - Administer electrolyte replacement as ordered  - Monitor response to electrolyte replacements, including repeat lab results as appropriate  - Instruct patient on fluid and nutrition as appropriate  Outcome: Progressing  Goal: Fluid balance maintained  Description: INTERVENTIONS:  - Monitor labs   - Monitor I/O and WT  - Instruct patient on fluid and nutrition as appropriate  - Assess for signs & symptoms of volume excess or deficit  Outcome: Progressing  Goal: Glucose maintained within target range  Description: INTERVENTIONS:  - Monitor Blood Glucose as ordered  - Assess for signs and symptoms of hyperglycemia and hypoglycemia  - Administer ordered medications to maintain glucose within target range  - Assess nutritional intake and initiate nutrition service referral as needed  Outcome: Progressing     Problem: MUSCULOSKELETAL - ADULT  Goal: Maintain or return mobility to safest level of function  Description: INTERVENTIONS:  - Assess patient's ability to carry out ADLs; assess patient's baseline for ADL function and identify physical deficits which impact ability to perform ADLs (bathing, care of mouth/teeth, toileting, grooming, dressing, etc.)  - Assess/evaluate cause of self-care deficits   - Assess range of motion  - Assess patient's mobility  - Assess patient's need for assistive devices and provide as appropriate  - Encourage  maximum independence but intervene and supervise when necessary  - Involve family in performance of ADLs  - Assess for home care needs following discharge   - Consider OT consult to assist with ADL evaluation and planning for discharge  - Provide patient education as appropriate  Outcome: Progressing     Problem: MOBILITY - ADULT  Goal: Maintain or return to baseline ADL function  Description: INTERVENTIONS:  -  Assess patient's ability to carry out ADLs; assess patient's baseline for ADL function and identify physical deficits which impact ability to perform ADLs (bathing, care of mouth/teeth, toileting, grooming, dressing, etc.)  - Assess/evaluate cause of self-care deficits   - Assess range of motion  - Assess patient's mobility; develop plan if impaired  - Assess patient's need for assistive devices and provide as appropriate  - Encourage maximum independence but intervene and supervise when necessary  - Involve family in performance of ADLs  - Assess for home care needs following discharge   - Consider OT consult to assist with ADL evaluation and planning for discharge  - Provide patient education as appropriate  Outcome: Progressing  Goal: Maintains/Returns to pre admission functional level  Description: INTERVENTIONS:  - Perform AM-PAC 6 Click Basic Mobility/ Daily Activity assessment daily.  - Set and communicate daily mobility goal to care team and patient/family/caregiver.   - Collaborate with rehabilitation services on mobility goals if consulted  - Perform Range of Motion 3 times a day.  - Reposition patient every 3 hours.  - Dangle patient 3 times a day  - Stand patient 3 times a day  - Ambulate patient 3 times a day  - Out of bed to chair 3 times a day   - Out of bed for meals 3 times a day  - Out of bed for toileting  - Record patient progress and toleration of activity level   Outcome: Progressing     Problem: PAIN - ADULT  Goal: Verbalizes/displays adequate comfort level or baseline comfort  level  Description: Interventions:  - Encourage patient to monitor pain and request assistance  - Assess pain using appropriate pain scale  - Administer analgesics based on type and severity of pain and evaluate response  - Implement non-pharmacological measures as appropriate and evaluate response  - Consider cultural and social influences on pain and pain management  - Notify physician/advanced practitioner if interventions unsuccessful or patient reports new pain  Outcome: Progressing

## 2023-12-30 NOTE — PROGRESS NOTES
"[facility]    Cardiology Progress Note  Hammad Montesinos 83 y.o. male   YOB: 1940 MRN: [id number]  Unit/Bed#: -[facility] Encounter: [id number]      Subjective:   Shortness of breath is improving. No chest pain. Had issues with hypoglycemia today.    Assessments  82yo M presented with increased shortness of breath and increased lower extremity edema over 4 days. He reportedly had diuretics decreased to 40 mg bid last year after Biotron[employer] pacemaker implant (Machias).  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 (HCC)    Elevated troponin    Pulmonary hypertension (HCC)    Paroxysmal A-fib (HCC)        Plan  Acute on chronic diastolic heart failure  Baseline wt 247 lbs  Presenting wt 263 lbs  Home diuretic: lasix 40 mg bid  Recent TTE () - 10/27/23 - report reviewed on patient's phone  LVEF 50%, moderate LVH, moderate PHTN - PASP 52, IVC dilated, mild MR, mild-moderate TR, severely dilated LA  Currently on IV lasix 100 mg bid, and diuresing reasonably well  Still very volume overloaded  Continue IV lasix 100 mg bid, and will give 1 dose PO metolazone 2.5 today  Low salt diet    Hypertension with CKD  Bp well controlled  Home meds: Losartan 100, amlodipine 10  Cr up slightly today  Monitor BP, Cr      Review of Systems   All other systems reviewed and are negative.        Telemetry Review: A-V dual paced rhythm with occasional PVCs.     Objective:   Vitals: Blood pressure 114/65, pulse 62, temperature 98.4 °F (36.9 °C), resp. rate (!) 24, height 5' 11\" (1.803 m), weight 118 kg (261 lb 3.2 oz), SpO2 93%., Body mass index is 36.43 kg/m².,   Orthostatic Blood Pressures      Flowsheet Row Most Recent Value   Blood Pressure 114/65 " filed at 2023 1445   Patient Position - Orthostatic VS Lying filed at 2023 1142           Systolic (24hrs), Av , Min:114 , Max:136     Diastolic (24hrs), Av, Min:45, Max:85    Wt Readings from Last 5 Encounters:   23 118 kg (261 lb 3.2 oz)   23 115 kg (254 lb)   23 112 kg (247 lb)   23 112 kg (247 lb)   23 112 kg (247 lb)     I/O          0701   0700  0701   0700  0701   0700    P.O.  1000 180    Total Intake(mL/kg)  1000 (8.5) 180 (1.5)    Urine (mL/kg/hr) 825 2400 (0.8) 500 (0.5)    Total Output 825 2400 500    Net -825 -1400 -320           Unmeasured Urine Occurrence  1 x                 Physical Exam  Vitals and nursing note reviewed.   Constitutional:       General: He is not in acute distress.     Appearance: He is well-developed. He is obese. He is not ill-appearing.   HENT:      Head: Normocephalic and atraumatic.      Nose: No congestion.   Eyes:      General: No scleral icterus.     Conjunctiva/sclera: Conjunctivae normal.   Neck:      Vascular: No carotid bruit or JVD.   Cardiovascular:      Rate and Rhythm: Normal rate and regular rhythm.      Heart sounds: Murmur heard.      No friction rub. No gallop.   Pulmonary:      Effort: Pulmonary effort is normal. No respiratory distress.      Breath sounds: Examination of the right-lower field reveals rales. Examination of the left-lower field reveals rales. Rales present. No wheezing.   Chest:      Chest wall: No tenderness.   Abdominal:      General: There is no distension.      Palpations: Abdomen is soft.      Tenderness: There is no abdominal tenderness.   Musculoskeletal:         General: No swelling, tenderness or deformity.      Cervical back: Neck supple. No muscular tenderness.      Right lower leg: Edema present.      Left lower leg: Edema present.   Skin:     General: Skin is warm.   Neurological:      General: No focal deficit present.      Mental Status: He is alert  and oriented to person, place, and time. Mental status is at baseline.   Psychiatric:         Mood and Affect: Mood normal.         Behavior: Behavior normal.         Thought Content: Thought content normal.         Laboratory Results: personally reviewed        CBC with diff:   Results from last 7 days   Lab Units 12/30/23 0545 12/29/23 0617 12/28/23 1947   WBC Thousand/uL 6.69 6.55 8.18   HEMOGLOBIN g/dL 11.0* 10.9* 11.1*   HEMATOCRIT % 35.1* 35.1* 36.7   MCV fL 82 82 83   PLATELETS Thousands/uL 163 154 169   RBC Million/uL 4.30 4.30 4.43   MCH pg 25.6* 25.3* 25.1*   MCHC g/dL 31.3* 31.1* 30.2*   RDW % 18.4* 18.4* 18.4*   MPV fL 11.8 11.4 12.5   NRBC AUTO /100 WBCs 0  --  0         CMP:  Results from last 7 days   Lab Units 12/30/23 0545 12/29/23 0527 12/28/23 1947   POTASSIUM mmol/L 3.8 3.6 4.9   CHLORIDE mmol/L 102 103 102   CO2 mmol/L 30 24 24   BUN mg/dL 40* 40* 43*   CREATININE mg/dL 1.41* 1.14 1.30   CALCIUM mg/dL 9.3 9.1 9.2   AST U/L  --  19 27   ALT U/L  --  10 11   ALK PHOS U/L  --  126* 136*   EGFR ml/min/1.73sq m 45 58 50         BMP:  Results from last 7 days   Lab Units 12/30/23 0545 12/29/23 0527 12/28/23 1947   POTASSIUM mmol/L 3.8 3.6 4.9   CHLORIDE mmol/L 102 103 102   CO2 mmol/L 30 24 24   BUN mg/dL 40* 40* 43*   CREATININE mg/dL 1.41* 1.14 1.30   CALCIUM mg/dL 9.3 9.1 9.2       BNP:   Recent Labs     12/28/23 1947   *       Magnesium:   Results from last 7 days   Lab Units 12/30/23 0545 12/29/23 0527   MAGNESIUM mg/dL 2.0 2.0       Coags:       TSH:        Hemoglobin A1C       Lipid Profile:       Meds/Allergies   all current active meds have been reviewed and current meds:   Current Facility-Administered Medications   Medication Dose Route Frequency    acetaminophen (TYLENOL) tablet 650 mg  650 mg Oral Q6H PRN    albuterol inhalation solution 2.5 mg  2.5 mg Nebulization Q4H PRN    allopurinol (ZYLOPRIM) tablet 300 mg  300 mg Oral Daily    aluminum-magnesium  hydroxide-simethicone (MAALOX) oral suspension 30 mL  30 mL Oral Q4H PRN    amLODIPine (NORVASC) tablet 10 mg  10 mg Oral Daily    atorvastatin (LIPITOR) tablet 20 mg  20 mg Oral After Dinner    azelastine (ASTELIN) 0.1 % nasal spray 1 spray  1 spray Each Nare BID    budesonide (PULMICORT) inhalation solution 0.5 mg  0.5 mg Nebulization BID    cholecalciferol (VITAMIN D3) tablet 1,000 Units  1,000 Units Oral Daily    co-enzyme Q-10 capsule 100 mg  100 mg Oral Daily    fish oil capsule 1,000 mg  1,000 mg Oral Daily    fluticasone (FLONASE) 50 mcg/act nasal spray 1 spray  1 spray Nasal BID    furosemide (LASIX) injection 100 mg  100 mg Intravenous BID (diuretic)    gabapentin (NEURONTIN) capsule 100 mg  100 mg Oral Daily    heparin (porcine) subcutaneous injection 5,000 Units  5,000 Units Subcutaneous Q8H TOVA    insulin glargine (LANTUS) subcutaneous injection 15 Units 0.15 mL  15 Units Subcutaneous Q12H TOVA    insulin lispro (HumaLOG) 100 units/mL subcutaneous injection 1-5 Units  1-5 Units Subcutaneous TID AC    insulin lispro (HumaLOG) 100 units/mL subcutaneous injection 20 Units  20 Units Subcutaneous TID With Meals    lidocaine (LIDODERM) 5 % patch 1 patch  1 patch Topical HS    lidocaine (LIDODERM) 5 % patch 1 patch  1 patch Topical HS    loratadine (CLARITIN) tablet 10 mg  10 mg Oral Daily    losartan (COZAAR) tablet 100 mg  100 mg Oral Daily    senna (SENOKOT) tablet 17.2 mg  2 tablet Oral HS PRN     Medications Prior to Admission   Medication    allopurinol (ZYLOPRIM) 300 mg tablet    amLODIPine (NORVASC) 10 mg tablet    atorvastatin (LIPITOR) 20 mg tablet    azelastine (ASTELIN) 0.1 % nasal spray    budesonide (PULMICORT) 0.5 mg/2 mL nebulizer solution    Cholecalciferol (Vitamin D3) 25 MCG (1000 UT) CAPS    Coenzyme Q10 (Co Q10) 100 MG CAPS    Easy Comfort Pen Needles 33G X 4 MM MISC    fluticasone (FLONASE) 50 mcg/act nasal spray    formoterol (PERFOROMIST) 20 MCG/2ML nebulizer solution    furosemide  (LASIX) 40 mg tablet    gabapentin (NEURONTIN) 100 mg capsule    Glucosamine-Chondroitin 500-400 MG CAPS    insulin aspart (NovoLOG FlexPen) 100 UNIT/ML injection pen    Insulin Glargine Solostar (Lantus SoloStar) 100 UNIT/ML SOPN    ipratropium-albuterol (DUO-NEB) 0.5-2.5 mg/3 mL nebulizer solution    loratadine (CLARITIN) 10 mg tablet    losartan (COZAAR) 100 MG tablet    Multiple Vitamin (MULTIVITAMIN ADULT PO)    mupirocin (BACTROBAN) 2 % ointment    Omega-3 Fatty Acids (Fish Oil) 360 MG CAPS    potassium citrate (UROCIT-K) 10 mEq    triamcinolone (KENALOG) 0.1 % cream    OneTouch Verio test strip            Cardiac testing: reviewed  No results found for this or any previous visit.    No results found for this or any previous visit.    No results found for this or any previous visit.    No results found for this or any previous visit.

## 2023-12-30 NOTE — ASSESSMENT & PLAN NOTE
History noted  Not on anticoagulation  Cardiology following, appreciate recommendations  Outpatient cardiology dcd AC for risk of bleeding and fall, discussed with daughter, she would like to talk with primary cardiologist before starting anticoagulation

## 2023-12-30 NOTE — ASSESSMENT & PLAN NOTE
"Home regimen: Amlodipine 10 Mg daily, losartan 100 Mg daily  Continue home medications  /65   Pulse 62   Temp 97.7 °F (36.5 °C)   Resp 16   Ht 5' 11\" (1.803 m)   Wt 118 kg (261 lb 3.2 oz)   SpO2 93%   BMI 36.43 kg/m²    "

## 2023-12-30 NOTE — UTILIZATION REVIEW
Date: 12/30    Day 3: Has surpassed a 2nd midnight with active treatments and services, which include continued iv lasix  .

## 2023-12-30 NOTE — RESPIRATORY THERAPY NOTE
RT Protocol Note  Hammad Montesinos 83 y.o. 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 of using CPAP at night, albuterol rescue inhaler (not used frequently), and home nebulizers)    Past Medical History:   Diagnosis Date    Asthma     Bradycardia     Carcinoma, basal cell, skin     head and face and ear    COVID-19 12/05/2022    Dupuytren contracture     bilateral ring and right middle    Gout     Herniated lumbar intervertebral disc     Sleep apnea      Social History     Socioeconomic History    Marital status: /Civil Union     Spouse name: None    Number of children: None    Years of education: None    Highest education level: None   Occupational History    Occupation: teacher     Comment: retired   Tobacco Use    Smoking status: Never    Smokeless tobacco: Never   Vaping Use    Vaping status: Never Used   Substance and Sexual Activity    Alcohol use: Not Currently    Drug use: Never    Sexual activity: None   Other Topics Concern    None   Social History Narrative    None     Social Determinants of Health     Financial Resource Strain: Not on file   Food Insecurity: No Food Insecurity (12/29/2023)    Hunger Vital Sign     Worried About Running Out of Food in the Last Year: Never true     Ran Out of Food in the Last Year: Never true   Transportation Needs: No Transportation Needs (12/29/2023)    PRAPARE - Transportation     Lack of Transportation (Medical): No     Lack of Transportation (Non-Medical): No   Physical  "Activity: Not on file   Stress: Not on file   Social Connections: Not on file   Intimate Partner Violence: Not on file   Housing Stability: Low Risk  (12/29/2023)    Housing Stability Vital Sign     Unable to Pay for Housing in the Last Year: No     Number of Places Lived in the Last Year: 1     Unstable Housing in the Last Year: No       Subjective         Objective    Physical Exam:        Vitals:  Blood pressure 121/65, pulse 76, temperature 98.4 °F (36.9 °C), resp. rate (!) 24, height 5' 11\" (1.803 m), weight 118 kg (261 lb 3.2 oz), SpO2 96%.          Imaging and other studies: I have personally reviewed pertinent reports.      O2 Device: RA     Plan    Respiratory Plan: Mild Distress pathway, Home Bronchodilator Patient pathway        Resp Comments: (P) per pt/chart, pt takes pulmcort bid at home, cxr shows pulm edema, no distress noted, d/c'd udn tx's wt xop/atrovent/performist per protocol   "

## 2023-12-30 NOTE — PLAN OF CARE
Problem: Potential for Falls  Goal: Patient will remain free of falls  Description: INTERVENTIONS:  - Educate patient/family on patient safety including physical limitations  - Instruct patient to call for assistance with activity   - Consult OT/PT to assist with strengthening/mobility   - Keep Call bell within reach  - Keep bed low and locked with side rails adjusted as appropriate  - Keep care items and personal belongings within reach  - Initiate and maintain comfort rounds  - Make Fall Risk Sign visible to staff  - Offer Toileting every 2 Hours, in advance of need  - Initiate/Maintain alarm  - Obtain necessary fall risk management equipment  - Apply yellow socks and bracelet for high fall risk patients  - Consider moving patient to room near nurses station  Outcome: Progressing     Problem: CARDIOVASCULAR - ADULT  Goal: Maintains optimal cardiac output and hemodynamic stability  Description: INTERVENTIONS:  - Monitor I/O, vital signs and rhythm  - Monitor for S/S and trends of decreased cardiac output  - Administer and titrate ordered vasoactive medications to optimize hemodynamic stability  - Assess quality of pulses, skin color and temperature  - Assess for signs of decreased coronary artery perfusion  - Instruct patient to report change in severity of symptoms  Outcome: Progressing  Goal: Absence of cardiac dysrhythmias or at baseline rhythm  Description: INTERVENTIONS:  - Continuous cardiac monitoring, vital signs, obtain 12 lead EKG if ordered  - Administer antiarrhythmic and heart rate control medications as ordered  - Monitor electrolytes and administer replacement therapy as ordered  Outcome: Progressing     Problem: RESPIRATORY - ADULT  Goal: Achieves optimal ventilation and oxygenation  Description: INTERVENTIONS:  - Assess for changes in respiratory status  - Assess for changes in mentation and behavior  - Position to facilitate oxygenation and minimize respiratory effort  - Oxygen administered by  appropriate delivery if ordered  - Initiate smoking cessation education as indicated  - Encourage broncho-pulmonary hygiene including cough, deep breathe, Incentive Spirometry  - Assess the need for suctioning and aspirate as needed  - Assess and instruct to report SOB or any respiratory difficulty  - Respiratory Therapy support as indicated  Outcome: Progressing     Problem: METABOLIC, FLUID AND ELECTROLYTES - ADULT  Goal: Electrolytes maintained within normal limits  Description: INTERVENTIONS:  - Monitor labs and assess patient for signs and symptoms of electrolyte imbalances  - Administer electrolyte replacement as ordered  - Monitor response to electrolyte replacements, including repeat lab results as appropriate  - Instruct patient on fluid and nutrition as appropriate  Outcome: Progressing  Goal: Fluid balance maintained  Description: INTERVENTIONS:  - Monitor labs   - Monitor I/O and WT  - Instruct patient on fluid and nutrition as appropriate  - Assess for signs & symptoms of volume excess or deficit  Outcome: Progressing  Goal: Glucose maintained within target range  Description: INTERVENTIONS:  - Monitor Blood Glucose as ordered  - Assess for signs and symptoms of hyperglycemia and hypoglycemia  - Administer ordered medications to maintain glucose within target range  - Assess nutritional intake and initiate nutrition service referral as needed  Outcome: Progressing     Problem: MUSCULOSKELETAL - ADULT  Goal: Maintain or return mobility to safest level of function  Description: INTERVENTIONS:  - Assess patient's ability to carry out ADLs; assess patient's baseline for ADL function and identify physical deficits which impact ability to perform ADLs (bathing, care of mouth/teeth, toileting, grooming, dressing, etc.)  - Assess/evaluate cause of self-care deficits   - Assess range of motion  - Assess patient's mobility  - Assess patient's need for assistive devices and provide as appropriate  - Encourage  maximum independence but intervene and supervise when necessary  - Involve family in performance of ADLs  - Assess for home care needs following discharge   - Consider OT consult to assist with ADL evaluation and planning for discharge  - Provide patient education as appropriate  Outcome: Progressing     Problem: MOBILITY - ADULT  Goal: Maintain or return to baseline ADL function  Description: INTERVENTIONS:  -  Assess patient's ability to carry out ADLs; assess patient's baseline for ADL function and identify physical deficits which impact ability to perform ADLs (bathing, care of mouth/teeth, toileting, grooming, dressing, etc.)  - Assess/evaluate cause of self-care deficits   - Assess range of motion  - Assess patient's mobility; develop plan if impaired  - Assess patient's need for assistive devices and provide as appropriate  - Encourage maximum independence but intervene and supervise when necessary  - Involve family in performance of ADLs  - Assess for home care needs following discharge   - Consider OT consult to assist with ADL evaluation and planning for discharge  - Provide patient education as appropriate  Outcome: Progressing  Goal: Maintains/Returns to pre admission functional level  Description: INTERVENTIONS:  - Perform AM-PAC 6 Click Basic Mobility/ Daily Activity assessment daily.  - Set and communicate daily mobility goal to care team and patient/family/caregiver.   - Collaborate with rehabilitation services on mobility goals if consulted  - Perform Range of Motion 3 times a day.  - Reposition patient every 3 hours.  - Dangle patient 3 times a day  - Stand patient 3 times a day  - Ambulate patient 3 times a day  - Out of bed to chair 3 times a day   - Out of bed for meals 3 times a day  - Out of bed for toileting  - Record patient progress and toleration of activity level   Outcome: Progressing     Problem: PAIN - ADULT  Goal: Verbalizes/displays adequate comfort level or baseline comfort  level  Description: Interventions:  - Encourage patient to monitor pain and request assistance  - Assess pain using appropriate pain scale  - Administer analgesics based on type and severity of pain and evaluate response  - Implement non-pharmacological measures as appropriate and evaluate response  - Consider cultural and social influences on pain and pain management  - Notify physician/advanced practitioner if interventions unsuccessful or patient reports new pain  Outcome: Progressing     Problem: INFECTION - ADULT  Goal: Absence or prevention of progression during hospitalization  Description: INTERVENTIONS:  - Assess and monitor for signs and symptoms of infection  - Monitor lab/diagnostic results  - Monitor all insertion sites, i.e. indwelling lines, tubes, and drains  - Monitor endotracheal if appropriate and nasal secretions for changes in amount and color  - [place] appropriate cooling/warming therapies per order  - Administer medications as ordered  - Instruct and encourage patient and family to use good hand hygiene technique  - Identify and instruct in appropriate isolation precautions for identified infection/condition  Outcome: Progressing     Problem: SAFETY ADULT  Goal: Patient will remain free of falls  Description: INTERVENTIONS:  - Educate patient/family on patient safety including physical limitations  - Instruct patient to call for assistance with activity   - Consult OT/PT to assist with strengthening/mobility   - Keep Call bell within reach  - Keep bed low and locked with side rails adjusted as appropriate  - Keep care items and personal belongings within reach  - Initiate and maintain comfort rounds  - Make Fall Risk Sign visible to staff  - Offer Toileting every 2 Hours, in advance of need  - Initiate/Maintain alarm  - Obtain necessary fall risk management equipment  - Apply yellow socks and bracelet for high fall risk patients  - Consider moving patient to room near nurses  station  Outcome: Progressing     Problem: DISCHARGE PLANNING  Goal: Discharge to home or other facility with appropriate resources  Description: INTERVENTIONS:  - Identify barriers to discharge w/patient and caregiver  - Arrange for needed discharge resources and transportation as appropriate  - Identify discharge learning needs (meds, wound care, etc.)  - Arrange for interpretive services to assist at discharge as needed  - Refer to Case Management Department for coordinating discharge planning if the patient needs post-hospital services based on physician/advanced practitioner order or complex needs related to functional status, cognitive ability, or social support system  Outcome: Progressing     Problem: Nutrition/Hydration-ADULT  Goal: Nutrient/Hydration intake appropriate for improving, restoring or maintaining nutritional needs  Description: Monitor and assess patient's nutrition/hydration status for malnutrition. Collaborate with interdisciplinary team and initiate plan and interventions as ordered.  Monitor patient's weight and dietary intake as ordered or per policy. Utilize nutrition screening tool and intervene as necessary. Determine patient's food preferences and provide high-protein, high-caloric foods as appropriate.     INTERVENTIONS:  - Monitor oral intake, urinary output, labs, and treatment plans  - Assess nutrition and hydration status and recommend course of action  - Evaluate amount of meals eaten  - Assist patient with eating if necessary   - Allow adequate time for meals  - Recommend/ encourage appropriate diets, oral nutritional supplements, and vitamin/mineral supplements  - Order, calculate, and assess calorie counts as needed  - Recommend, monitor, and adjust tube feedings and TPN/PPN based on assessed needs  - Assess need for intravenous fluids  - Provide specific nutrition/hydration education as appropriate  - Include patient/family/caregiver in decisions related to  nutrition  Outcome: Progressing     Problem: Prexisting or High Potential for Compromised Skin Integrity  Goal: Skin integrity is maintained or improved  Description: INTERVENTIONS:  - Identify patients at risk for skin breakdown  - Assess and monitor skin integrity  - Assess and monitor nutrition and hydration status  - Monitor labs   - Assess for incontinence   - Turn and reposition patient  - Assist with mobility/ambulation  - Relieve pressure over bony prominences  - Avoid friction and shearing  - Provide appropriate hygiene as needed including keeping skin clean and dry  - Evaluate need for skin moisturizer/barrier cream  - Collaborate with interdisciplinary team   - Patient/family teaching  - Consider wound care consult   Outcome: Progressing

## 2023-12-30 NOTE — ASSESSMENT & PLAN NOTE
Wt Readings from Last 3 Encounters:   12/30/23 118 kg (261 lb 3.2 oz)   12/12/23 115 kg (254 lb)   12/05/23 112 kg (247 lb)     Presents with worsening dyspnea and anasarca since [place]   Home regimen: lasix 40mg BID with increased to 60mg in AM and 40mg in PM 3 days ago with no improvement in symptoms   Last echo 10/2023: Severe left atrial dilation. RV systolic pressure 52.2mmHg. pulmonary artery pressure elevated  Pt with anasarca and pulmonary congestion - continue lasix 100mg IV BID   I/o and daily weights   Sodium and fluid restriction   Cardiology following  Observe off further metolazone for now.   Volume status improving.

## 2023-12-31 LAB
ANION GAP SERPL CALCULATED.3IONS-SCNC: 8 MMOL/L
BUN SERPL-MCNC: 40 MG/DL (ref 5–25)
CALCIUM SERPL-MCNC: 9.4 MG/DL (ref 8.4–10.2)
CHLORIDE SERPL-SCNC: 99 MMOL/L (ref 96–108)
CO2 SERPL-SCNC: 30 MMOL/L (ref 21–32)
CREAT SERPL-MCNC: 1.46 MG/DL (ref 0.6–1.3)
GFR SERPL CREATININE-BSD FRML MDRD: 43 ML/MIN/1.73SQ M
GLUCOSE SERPL-MCNC: 148 MG/DL (ref 65–140)
GLUCOSE SERPL-MCNC: 150 MG/DL (ref 65–140)
GLUCOSE SERPL-MCNC: 155 MG/DL (ref 65–140)
GLUCOSE SERPL-MCNC: 193 MG/DL (ref 65–140)
GLUCOSE SERPL-MCNC: 194 MG/DL (ref 65–140)
POTASSIUM SERPL-SCNC: 3.8 MMOL/L (ref 3.5–5.3)
SODIUM SERPL-SCNC: 137 MMOL/L (ref 135–147)

## 2023-12-31 PROCEDURE — 80048 BASIC METABOLIC PNL TOTAL CA: CPT | Performed by: INTERNAL MEDICINE

## 2023-12-31 PROCEDURE — 94640 AIRWAY INHALATION TREATMENT: CPT

## 2023-12-31 PROCEDURE — 82948 REAGENT STRIP/BLOOD GLUCOSE: CPT

## 2023-12-31 PROCEDURE — 94760 N-INVAS EAR/PLS OXIMETRY 1: CPT

## 2023-12-31 PROCEDURE — 99233 SBSQ HOSP IP/OBS HIGH 50: CPT | Performed by: INTERNAL MEDICINE

## 2023-12-31 PROCEDURE — 99232 SBSQ HOSP IP/OBS MODERATE 35: CPT | Performed by: INTERNAL MEDICINE

## 2023-12-31 RX ADMIN — LIDOCAINE 1 PATCH: 50 PATCH CUTANEOUS at 21:14

## 2023-12-31 RX ADMIN — HEPARIN SODIUM 5000 UNITS: 5000 INJECTION INTRAVENOUS; SUBCUTANEOUS at 13:39

## 2023-12-31 RX ADMIN — INSULIN LISPRO 10 UNITS: 100 INJECTION, SOLUTION INTRAVENOUS; SUBCUTANEOUS at 08:23

## 2023-12-31 RX ADMIN — INSULIN LISPRO 10 UNITS: 100 INJECTION, SOLUTION INTRAVENOUS; SUBCUTANEOUS at 11:41

## 2023-12-31 RX ADMIN — BUDESONIDE 0.5 MG: 0.5 INHALANT ORAL at 20:55

## 2023-12-31 RX ADMIN — OMEGA-3 FATTY ACIDS CAP 1000 MG 1000 MG: 1000 CAP at 08:21

## 2023-12-31 RX ADMIN — FLUTICASONE PROPIONATE 1 SPRAY: 50 SPRAY, METERED NASAL at 08:24

## 2023-12-31 RX ADMIN — LIDOCAINE 1 PATCH: 50 PATCH CUTANEOUS at 21:33

## 2023-12-31 RX ADMIN — INSULIN GLARGINE 15 UNITS: 100 INJECTION, SOLUTION SUBCUTANEOUS at 21:14

## 2023-12-31 RX ADMIN — FUROSEMIDE 100 MG: 10 INJECTION, SOLUTION INTRAMUSCULAR; INTRAVENOUS at 17:20

## 2023-12-31 RX ADMIN — Medication 100 MG: at 08:20

## 2023-12-31 RX ADMIN — INSULIN LISPRO 1 UNITS: 100 INJECTION, SOLUTION INTRAVENOUS; SUBCUTANEOUS at 17:27

## 2023-12-31 RX ADMIN — LOSARTAN POTASSIUM 100 MG: 50 TABLET, FILM COATED ORAL at 08:20

## 2023-12-31 RX ADMIN — GABAPENTIN 100 MG: 100 CAPSULE ORAL at 08:21

## 2023-12-31 RX ADMIN — LORATADINE 10 MG: 10 TABLET ORAL at 08:20

## 2023-12-31 RX ADMIN — FUROSEMIDE 100 MG: 10 INJECTION, SOLUTION INTRAMUSCULAR; INTRAVENOUS at 08:21

## 2023-12-31 RX ADMIN — AZELASTINE HYDROCHLORIDE 1 SPRAY: 137 SPRAY, METERED NASAL at 08:24

## 2023-12-31 RX ADMIN — HEPARIN SODIUM 5000 UNITS: 5000 INJECTION INTRAVENOUS; SUBCUTANEOUS at 21:14

## 2023-12-31 RX ADMIN — INSULIN LISPRO 1 UNITS: 100 INJECTION, SOLUTION INTRAVENOUS; SUBCUTANEOUS at 11:41

## 2023-12-31 RX ADMIN — ATORVASTATIN CALCIUM 20 MG: 20 TABLET, FILM COATED ORAL at 17:20

## 2023-12-31 RX ADMIN — Medication 1000 UNITS: at 08:21

## 2023-12-31 RX ADMIN — AMLODIPINE BESYLATE 10 MG: 5 TABLET ORAL at 08:21

## 2023-12-31 RX ADMIN — ALLOPURINOL 300 MG: 100 TABLET ORAL at 08:21

## 2023-12-31 RX ADMIN — BUDESONIDE 0.5 MG: 0.5 INHALANT ORAL at 07:13

## 2023-12-31 RX ADMIN — HEPARIN SODIUM 5000 UNITS: 5000 INJECTION INTRAVENOUS; SUBCUTANEOUS at 06:06

## 2023-12-31 RX ADMIN — INSULIN LISPRO 10 UNITS: 100 INJECTION, SOLUTION INTRAVENOUS; SUBCUTANEOUS at 17:27

## 2023-12-31 NOTE — PROGRESS NOTES
"Formerly [facility]  Progress Note  Name: Hammad Montesinos I  MRN: [id number]  Unit/Bed#: MS 329Svitlana I Date of Admission: 12/28/2023   Date of Service: 12/31/2023 I Hospital Day: 3    Assessment/Plan   Paroxysmal A-fib ([facility])  Assessment & Plan  History noted  Not on anticoagulation  Cardiology following, appreciate recommendations  Outpatient cardiology dcd AC for risk of bleeding and fall, discussed with daughter, she would like to talk with primary cardiologist before starting anticoagulation    Pulmonary hypertension ([facility])  Assessment & Plan  Home regimen: pulmicort BID, performist BID, and DuoNeb twice daily  Continue home nebulizers and increased dose of IV diuresis due to anasarca    Elevated troponin  Assessment & Plan  HS trop: 48 > 64  EKG without acute ischemia - showing paced rhythm   Likely s/d to volume overload     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 ([facility])  Assessment & Plan  Baseline creatinine is around 1.2  Recent Labs     12/28/23  1947 12/29/23  0527 12/30/23  0545   CREATININE 1.30 1.14 1.41*   EGFR 50 58 45       Estimated Creatinine Clearance: 51 mL/min (A) (by C-G formula based on SCr of 1.41 mg/dL (H)).   Trend BMP daily in setting of IV diuresis    Primary hypertension  Assessment & Plan  Home regimen: Amlodipine 10 Mg daily, losartan 100 Mg daily  Continue home medications  /65   Pulse 62   Temp 97.7 °F (36.5 °C)   Resp 16   Ht 5' 11\" (1.803 m)   Wt 118 kg (261 lb 3.2 oz)   SpO2 93%   BMI 36.43 kg/m²      Mixed hyperlipidemia  Assessment & Plan  Continue home statin    Type 2 diabetes mellitus with hyperglycemia, with long-term current use of insulin ([facility])  Assessment & Plan  Lab Results   Component Value Date    HGBA1C 6.8 11/16/2023       Recent Labs     12/29/23  2052 12/30/23  0738 12/30/23  1121 12/30/23  1444   POCGLU 173* 92 121 63*         Blood Sugar Average: Last 72 hrs:  (P) [id number] regimen: Lantus 18 " units twice daily and NovoLog 22 unit with breakfast, 23 units lunch, and 20 units with dinner  Decrease Lantus to 15 units twice daily  Decrease Humalog to 10 units 3 times daily  SSI      * Acute on chronic diastolic heart failure (HCC)  Assessment & Plan  Wt Readings from Last 3 Encounters:   12/30/23 118 kg (261 lb 3.2 oz)   12/12/23 115 kg (254 lb)   12/05/23 112 kg (247 lb)     Presents with worsening dyspnea and anasarca since [place]   Home regimen: lasix 40mg BID with increased to 60mg in AM and 40mg in PM 3 days ago with no improvement in symptoms   Last echo 10/2023: Severe left atrial dilation. RV systolic pressure 52.2mmHg. pulmonary artery pressure elevated  Pt with anasarca and pulmonary congestion - continue lasix 100mg IV BID   I/o and daily weights   Sodium and fluid restriction   Cardiology following  Added metolazone 1 time               VTE Pharmacologic Prophylaxis: VTE Score: 5 Moderate Risk (Score 3-4) - Pharmacological DVT Prophylaxis Ordered: heparin.    Mobility:   Basic Mobility Inpatient Raw Score: 17  JH-HLM Goal: 5: Stand one or more mins  JH-HLM Achieved: 6: Walk 10 steps or more  HLM Goal achieved. Continue to encourage appropriate mobility.    Patient Centered Rounds: I performed bedside rounds with nursing staff today.   Discussions with Specialists or Other Care Team Provider: yes    Education and Discussions with Family / Patient: Updated  (daughter) via phone.- [email or web address]    Total Time Spent on Date of Encounter in care of patient: 38 mins. This time was spent on one or more of the following: performing physical exam; counseling and coordination of care; obtaining or reviewing history; documenting in the medical record; reviewing/ordering tests, medications or procedures; communicating with other healthcare professionals and discussing with patient's family/caregivers.    Current Length of Stay: 3 day(s)  Current Patient Status: Inpatient   Certification Statement: The  patient will continue to require additional inpatient hospital stay due to pending stabilization  Discharge Plan: Anticipate discharge in >72 hrs to home with home services.    Code Status: Level 1 - Full Code    Subjective:   Seen and examined at bedside  No chest pain  Endorses SOB, better compared to yesterday  Rest of ROS negative         Objective:     Vitals:   Temp (24hrs), Av.7 °F (36.5 °C), Min:97.5 °F (36.4 °C), Max:98.2 °F (36.8 °C)    Temp:  [97.5 °F (36.4 °C)-98.2 °F (36.8 °C)] 98.2 °F (36.8 °C)  HR:  [54-75] 58  Resp:  [18-19] 19  BP: (119-127)/(60-61) 127/61  SpO2:  [88 %-100 %] 88 %  Body mass index is 35.98 kg/m².     Input and Output Summary (last 24 hours):     Intake/Output Summary (Last 24 hours) at 2023 1446  Last data filed at 2023 1311  Gross per 24 hour   Intake 1330 ml   Output 4250 ml   Net -2920 ml       Physical Exam:   Physical Exam  Constitutional:       Appearance: He is obese.   HENT:      Head: Atraumatic.   Eyes:      General: No scleral icterus.  Cardiovascular:      Rate and Rhythm: Regular rhythm. Tachycardia present.      Heart sounds: Normal heart sounds.   Pulmonary:      Comments: Crackles- improved exam from yesterday  Abdominal:      General: Bowel sounds are normal.   Musculoskeletal:      Cervical back: Neck supple.      Right lower leg: Edema present.      Left lower leg: Edema present.   Skin:     General: Skin is warm and dry.      Capillary Refill: Capillary refill takes less than 2 seconds.   Neurological:      Mental Status: He is alert and oriented to person, place, and time.      Motor: Weakness present.          Additional Data:     Labs:  Results from last 7 days   Lab Units 23  0545   WBC Thousand/uL 6.69   HEMOGLOBIN g/dL 11.0*   HEMATOCRIT % 35.1*   PLATELETS Thousands/uL 163   NEUTROS PCT % 66   LYMPHS PCT % 17   MONOS PCT % 13*   EOS PCT % 3     Results from last 7 days   Lab Units 23  0306 23  0545 23  0527   SODIUM  mmol/L 137   < > 136   POTASSIUM mmol/L 3.8   < > 3.6   CHLORIDE mmol/L 99   < > 103   CO2 mmol/L 30   < > 24   BUN mg/dL 40*   < > 40*   CREATININE mg/dL 1.46*   < > 1.14   ANION GAP mmol/L 8   < > 9   CALCIUM mg/dL 9.4   < > 9.1   ALBUMIN g/dL  --   --  3.4*   TOTAL BILIRUBIN mg/dL  --   --  1.31*   ALK PHOS U/L  --   --  126*   ALT U/L  --   --  10   AST U/L  --   --  19   GLUCOSE RANDOM mg/dL 150*   < > 107    < > = values in this interval not displayed.         Results from last 7 days   Lab Units 12/31/23  1134 12/31/23  0820 12/30/23  2049 12/30/23  1629 12/30/23  1534 12/30/23  1444 12/30/23  1121 12/30/23  0738 12/29/23  2052 12/29/23  1803 12/29/23  1108 12/29/23  0905   POC GLUCOSE mg/dl 155* 148* 195* 63* 47* 63* 121 92 173* 201* 227* 111               Lines/Drains:  Invasive Devices       Peripheral Intravenous Line  Duration             Peripheral IV 12/31/23 Right;Ventral (anterior) Forearm <1 day                      Telemetry:  Telemetry Orders (From admission, onward)               24 Hour Telemetry Monitoring  Continuous x 24 Hours ([place]lem)        Question:  Reason for 24 Hour Telemetry  Answer:  Decompensated CHF- and any one of the following: continuous diuretic infusion or total diuretic dose >200 mg daily, associated electrolyte derangement (I.e. K < 3.0), ionotropic drip (continuous infusion), hx of ventricular arrhythmia, or new EF < 35%                     Telemetry Reviewed: Normal Sinus Rhythm  Indication for Continued Telemetry Use: Acute CHF on >200 mg lasix/day or equivalent dose or with new reduced EF.              Imaging: Reviewed radiology reports from this admission including: chest xray    Recent Cultures (last 7 days):         Last 24 Hours Medication List:   Current Facility-Administered Medications   Medication Dose Route Frequency Provider Last Rate    acetaminophen  650 mg Oral Q6H PRN Gricel Cedillo PA-C      albuterol  2.5 mg Nebulization Q4H PRN Semaj Perez MD       allopurinol  300 mg Oral Daily Gricel Cedillo PA-C      aluminum-magnesium hydroxide-simethicone  30 mL Oral Q4H PRN Gricel Cedillo PA-C      amLODIPine  10 mg Oral Daily Gricel Cedillo PA-C      atorvastatin  20 mg Oral After Dinner ROSSY Carpenter-DORIAN      azelastine  1 spray Each Nare BID Gricel Cedillo PA-C      budesonide  0.5 mg Nebulization BID Gricel Cedillo PA-C      cholecalciferol  1,000 Units Oral Daily ROSSY Carpenter-DORIAN      co-enzyme Q-10  100 mg Oral Daily Gricel Cedillo, PA-C      fish oil  1,000 mg Oral Daily Gricel Cedillo, AINSLEY      fluticasone  1 spray Nasal BID Gricel Cedillo PA-C      furosemide  100 mg Intravenous BID (diuretic) Gricel Cedillo PA-C      gabapentin  100 mg Oral Daily Gricel Cedillo PA-C      heparin (porcine)  5,000 Units Subcutaneous Q8H TOVA Gricel Cedillo PA-C      insulin glargine  15 Units Subcutaneous Q12H TOVA Semaj Perez MD      insulin lispro  1-5 Units Subcutaneous TID AC Ara Leigh MD      insulin lispro  10 Units Subcutaneous TID With Meals Semaj Perez MD      lidocaine  1 patch Topical HS Gricel Cedillo PA-C      lidocaine  1 patch Topical HS Gricel Cedillo PA-C      loratadine  10 mg Oral Daily Gricel Cedillo PA-C      losartan  100 mg Oral Daily Gricel Cedillo PA-C      senna  2 tablet Oral HS PRN Gricel Cedillo PA-C          Today, Patient Was Seen By: Semaj Perez MD    **Please Note: This note may have been constructed using a voice recognition system.**

## 2023-12-31 NOTE — PLAN OF CARE
Problem: Potential for Falls  Goal: Patient will remain free of falls  Description: INTERVENTIONS:  - Educate patient/family on patient safety including physical limitations  - Instruct patient to call for assistance with activity   - Consult OT/PT to assist with strengthening/mobility   - Keep Call bell within reach  - Keep bed low and locked with side rails adjusted as appropriate  - Keep care items and personal belongings within reach  - Initiate and maintain comfort rounds  - Make Fall Risk Sign visible to staff  - Offer Toileting every 2 Hours, in advance of need  - Initiate/Maintain alarm  - Obtain necessary fall risk management equipment  - Apply yellow socks and bracelet for high fall risk patients  - Consider moving patient to room near nurses station  Outcome: Progressing     Problem: CARDIOVASCULAR - ADULT  Goal: Maintains optimal cardiac output and hemodynamic stability  Description: INTERVENTIONS:  - Monitor I/O, vital signs and rhythm  - Monitor for S/S and trends of decreased cardiac output  - Administer and titrate ordered vasoactive medications to optimize hemodynamic stability  - Assess quality of pulses, skin color and temperature  - Assess for signs of decreased coronary artery perfusion  - Instruct patient to report change in severity of symptoms  Outcome: Progressing  Goal: Absence of cardiac dysrhythmias or at baseline rhythm  Description: INTERVENTIONS:  - Continuous cardiac monitoring, vital signs, obtain 12 lead EKG if ordered  - Administer antiarrhythmic and heart rate control medications as ordered  - Monitor electrolytes and administer replacement therapy as ordered  Outcome: Progressing     Problem: RESPIRATORY - ADULT  Goal: Achieves optimal ventilation and oxygenation  Description: INTERVENTIONS:  - Assess for changes in respiratory status  - Assess for changes in mentation and behavior  - Position to facilitate oxygenation and minimize respiratory effort  - Oxygen administered by  appropriate delivery if ordered  - Initiate smoking cessation education as indicated  - Encourage broncho-pulmonary hygiene including cough, deep breathe, Incentive Spirometry  - Assess the need for suctioning and aspirate as needed  - Assess and instruct to report SOB or any respiratory difficulty  - Respiratory Therapy support as indicated  Outcome: Progressing     Problem: METABOLIC, FLUID AND ELECTROLYTES - ADULT  Goal: Electrolytes maintained within normal limits  Description: INTERVENTIONS:  - Monitor labs and assess patient for signs and symptoms of electrolyte imbalances  - Administer electrolyte replacement as ordered  - Monitor response to electrolyte replacements, including repeat lab results as appropriate  - Instruct patient on fluid and nutrition as appropriate  Outcome: Progressing  Goal: Fluid balance maintained  Description: INTERVENTIONS:  - Monitor labs   - Monitor I/O and WT  - Instruct patient on fluid and nutrition as appropriate  - Assess for signs & symptoms of volume excess or deficit  Outcome: Progressing  Goal: Glucose maintained within target range  Description: INTERVENTIONS:  - Monitor Blood Glucose as ordered  - Assess for signs and symptoms of hyperglycemia and hypoglycemia  - Administer ordered medications to maintain glucose within target range  - Assess nutritional intake and initiate nutrition service referral as needed  Outcome: Progressing     Problem: MUSCULOSKELETAL - ADULT  Goal: Maintain or return mobility to safest level of function  Description: INTERVENTIONS:  - Assess patient's ability to carry out ADLs; assess patient's baseline for ADL function and identify physical deficits which impact ability to perform ADLs (bathing, care of mouth/teeth, toileting, grooming, dressing, etc.)  - Assess/evaluate cause of self-care deficits   - Assess range of motion  - Assess patient's mobility  - Assess patient's need for assistive devices and provide as appropriate  - Encourage  maximum independence but intervene and supervise when necessary  - Involve family in performance of ADLs  - Assess for home care needs following discharge   - Consider OT consult to assist with ADL evaluation and planning for discharge  - Provide patient education as appropriate  Outcome: Progressing     Problem: MOBILITY - ADULT  Goal: Maintain or return to baseline ADL function  Description: INTERVENTIONS:  -  Assess patient's ability to carry out ADLs; assess patient's baseline for ADL function and identify physical deficits which impact ability to perform ADLs (bathing, care of mouth/teeth, toileting, grooming, dressing, etc.)  - Assess/evaluate cause of self-care deficits   - Assess range of motion  - Assess patient's mobility; develop plan if impaired  - Assess patient's need for assistive devices and provide as appropriate  - Encourage maximum independence but intervene and supervise when necessary  - Involve family in performance of ADLs  - Assess for home care needs following discharge   - Consider OT consult to assist with ADL evaluation and planning for discharge  - Provide patient education as appropriate  Outcome: Progressing  Goal: Maintains/Returns to pre admission functional level  Description: INTERVENTIONS:  - Perform AM-PAC 6 Click Basic Mobility/ Daily Activity assessment daily.  - Set and communicate daily mobility goal to care team and patient/family/caregiver.   - Collaborate with rehabilitation services on mobility goals if consulted  - Perform Range of Motion 3 times a day.  - Reposition patient every 3 hours.  - Dangle patient 3 times a day  - Stand patient 3 times a day  - Ambulate patient 3 times a day  - Out of bed to chair 3 times a day   - Out of bed for meals 3 times a day  - Out of bed for toileting  - Record patient progress and toleration of activity level   Outcome: Progressing     Problem: PAIN - ADULT  Goal: Verbalizes/displays adequate comfort level or baseline comfort  level  Description: Interventions:  - Encourage patient to monitor pain and request assistance  - Assess pain using appropriate pain scale  - Administer analgesics based on type and severity of pain and evaluate response  - Implement non-pharmacological measures as appropriate and evaluate response  - Consider cultural and social influences on pain and pain management  - Notify physician/advanced practitioner if interventions unsuccessful or patient reports new pain  Outcome: Progressing     Problem: INFECTION - ADULT  Goal: Absence or prevention of progression during hospitalization  Description: INTERVENTIONS:  - Assess and monitor for signs and symptoms of infection  - Monitor lab/diagnostic results  - Monitor all insertion sites, i.e. indwelling lines, tubes, and drains  - Monitor endotracheal if appropriate and nasal secretions for changes in amount and color  - [place] appropriate cooling/warming therapies per order  - Administer medications as ordered  - Instruct and encourage patient and family to use good hand hygiene technique  - Identify and instruct in appropriate isolation precautions for identified infection/condition  Outcome: Progressing     Problem: SAFETY ADULT  Goal: Patient will remain free of falls  Description: INTERVENTIONS:  - Educate patient/family on patient safety including physical limitations  - Instruct patient to call for assistance with activity   - Consult OT/PT to assist with strengthening/mobility   - Keep Call bell within reach  - Keep bed low and locked with side rails adjusted as appropriate  - Keep care items and personal belongings within reach  - Initiate and maintain comfort rounds  - Make Fall Risk Sign visible to staff  - Offer Toileting every 2 Hours, in advance of need  - Initiate/Maintain alarm  - Obtain necessary fall risk management equipment  - Apply yellow socks and bracelet for high fall risk patients  - Consider moving patient to room near nurses  station  Outcome: Progressing     Problem: DISCHARGE PLANNING  Goal: Discharge to home or other facility with appropriate resources  Description: INTERVENTIONS:  - Identify barriers to discharge w/patient and caregiver  - Arrange for needed discharge resources and transportation as appropriate  - Identify discharge learning needs (meds, wound care, etc.)  - Arrange for interpretive services to assist at discharge as needed  - Refer to Case Management Department for coordinating discharge planning if the patient needs post-hospital services based on physician/advanced practitioner order or complex needs related to functional status, cognitive ability, or social support system  Outcome: Progressing     Problem: Nutrition/Hydration-ADULT  Goal: Nutrient/Hydration intake appropriate for improving, restoring or maintaining nutritional needs  Description: Monitor and assess patient's nutrition/hydration status for malnutrition. Collaborate with interdisciplinary team and initiate plan and interventions as ordered.  Monitor patient's weight and dietary intake as ordered or per policy. Utilize nutrition screening tool and intervene as necessary. Determine patient's food preferences and provide high-protein, high-caloric foods as appropriate.     INTERVENTIONS:  - Monitor oral intake, urinary output, labs, and treatment plans  - Assess nutrition and hydration status and recommend course of action  - Evaluate amount of meals eaten  - Assist patient with eating if necessary   - Allow adequate time for meals  - Recommend/ encourage appropriate diets, oral nutritional supplements, and vitamin/mineral supplements  - Order, calculate, and assess calorie counts as needed  - Recommend, monitor, and adjust tube feedings and TPN/PPN based on assessed needs  - Assess need for intravenous fluids  - Provide specific nutrition/hydration education as appropriate  - Include patient/family/caregiver in decisions related to  nutrition  Outcome: Progressing     Problem: Prexisting or High Potential for Compromised Skin Integrity  Goal: Skin integrity is maintained or improved  Description: INTERVENTIONS:  - Identify patients at risk for skin breakdown  - Assess and monitor skin integrity  - Assess and monitor nutrition and hydration status  - Monitor labs   - Assess for incontinence   - Turn and reposition patient  - Assist with mobility/ambulation  - Relieve pressure over bony prominences  - Avoid friction and shearing  - Provide appropriate hygiene as needed including keeping skin clean and dry  - Evaluate need for skin moisturizer/barrier cream  - Collaborate with interdisciplinary team   - Patient/family teaching  - Consider wound care consult   Outcome: Progressing     Problem: Knowledge Deficit  Goal: Patient/family/caregiver demonstrates understanding of disease process, treatment plan, medications, and discharge instructions  Description: Complete learning assessment and assess knowledge base.  Interventions:  - Provide teaching at level of understanding  - Provide teaching via preferred learning methods  Outcome: Progressing     Problem: SKIN/TISSUE INTEGRITY - ADULT  Goal: Skin Integrity remains intact(Skin Breakdown Prevention)  Description: Assess:  -Perform Joss assessment every x  -Clean and moisturize skin every x  -Inspect skin when repositioning, toileting, and assisting with ADLS  -Assess under medical devices such as x every x  -Assess extremities for adequate circulation and sensation     Bed Management:  -Have minimal linens on bed & keep smooth, unwrinkled  -Change linens as needed when moist or perspiring  -Avoid sitting or lying in one position for more than x hours while in bed  -Keep HOB at xdegrees     Toileting:  -Offer bedside commode  -Assess for incontinence every x  -Use incontinent care products after each incontinent episode such as x    Activity:  -Mobilize patient x times a day  -Encourage activity  and walks on unit  -Encourage or provide ROM exercises   -Turn and reposition patient every x Hours  -Use appropriate equipment to lift or move patient in bed  -Instruct/ Assist with weight shifting every x when out of bed in chair  -Consider limitation of chair time x hour intervals    Skin Care:  -Avoid use of baby powder, tape, friction and shearing, hot water or constrictive clothing  -Relieve pressure over bony prominences using x  -Do not massage red bony areas    Next Steps:  -Teach patient strategies to minimize risks such as x   -Consider consults to  interdisciplinary teams such as x  Outcome: Progressing     Problem: HEMATOLOGIC - ADULT  Goal: Maintains hematologic stability  Description: INTERVENTIONS  - Assess for signs and symptoms of bleeding or hemorrhage  - Monitor labs  - Administer supportive blood products/factors as ordered and appropriate  Outcome: Progressing

## 2023-12-31 NOTE — PROGRESS NOTES
"Progress Note - Cardiology   Hammad Montesinos 83 y.o. male MRN: [id number]  Unit/Bed#: -01 Encounter: [id number]    Assessment:  #. Acute on chronic HFpEF  #.  Hypertension    Diuresing well already at least 2 L UOP from his AM dose of Lasix  He was getting his second dose of IV lasix during my encounter   Still quite volume overloaded  Renal function is stable  BP is acceptable on current regimen    Plan:  Continue Furosemide 100 mg IV BID  Hold additional metolazone today   Continue strict I/Os, standing weights  Repeat BMP in a.m. for renal function and electrolytes    Subjective/Objective   Interval history: No acute events overnight.  He received metolazone 2.5 mg yesterday and responded very well to this.  His edema is improving.  He denies any lightheadedness, chest pain, palpitations.    Objective:   Vitals: /69   Pulse 55   Temp 97.9 °F (36.6 °C)   Resp 19   Ht 5' 11\" (1.803 m)   Wt 117 kg (258 lb)   SpO2 94%   BMI 35.98 kg/m²   Vitals:    12/30/23 0546 12/31/23 0536   Weight: 118 kg (261 lb 3.2 oz) 117 kg (258 lb)     Orthostatic Blood Pressures      Flowsheet Row Most Recent Value   Blood Pressure 135/69 filed at 12/31/2023 1552   Patient Position - Orthostatic VS Lying filed at 12/30/2023 1445              Intake/Output Summary (Last 24 hours) at 12/31/2023 1859  Last data filed at 12/31/2023 1657  Gross per 24 hour   Intake 1330 ml   Output 4850 ml   Net -3520 ml       Invasive Devices       Peripheral Intravenous Line  Duration             Peripheral IV 12/31/23 Right;Ventral (anterior) Forearm <1 day                    Review of Systems: All systems reviewed and negative except for that noted above    Physical Exam: General appearance: alert and oriented, in no acute distress  Neck: no JVD  Lungs: Normal effort, bibasilar rales, no wheezing  Heart: Regular rate and rhythm, + murmur, no gallop  Abdomen: Normal bowel sounds, soft, nontender, nondistended  Extremities: Bilateral leg " edema  Skin: Warm and dry    Lab Results: I have personally reviewed pertinent lab results.    Imaging: I have personally reviewed pertinent reports.    EKG: Personally reviewed

## 2024-01-01 LAB
ANION GAP SERPL CALCULATED.3IONS-SCNC: 5 MMOL/L
BUN SERPL-MCNC: 43 MG/DL (ref 5–25)
CALCIUM SERPL-MCNC: 9.3 MG/DL (ref 8.4–10.2)
CHLORIDE SERPL-SCNC: 99 MMOL/L (ref 96–108)
CO2 SERPL-SCNC: 34 MMOL/L (ref 21–32)
CREAT SERPL-MCNC: 1.35 MG/DL (ref 0.6–1.3)
GFR SERPL CREATININE-BSD FRML MDRD: 47 ML/MIN/1.73SQ M
GLUCOSE SERPL-MCNC: 161 MG/DL (ref 65–140)
GLUCOSE SERPL-MCNC: 174 MG/DL (ref 65–140)
GLUCOSE SERPL-MCNC: 196 MG/DL (ref 65–140)
GLUCOSE SERPL-MCNC: 53 MG/DL (ref 65–140)
GLUCOSE SERPL-MCNC: 63 MG/DL (ref 65–140)
GLUCOSE SERPL-MCNC: 85 MG/DL (ref 65–140)
GLUCOSE SERPL-MCNC: 95 MG/DL (ref 65–140)
POTASSIUM SERPL-SCNC: 3.6 MMOL/L (ref 3.5–5.3)
SODIUM SERPL-SCNC: 138 MMOL/L (ref 135–147)

## 2024-01-01 PROCEDURE — 94660 CPAP INITIATION&MGMT: CPT

## 2024-01-01 PROCEDURE — 94760 N-INVAS EAR/PLS OXIMETRY 1: CPT

## 2024-01-01 PROCEDURE — 80048 BASIC METABOLIC PNL TOTAL CA: CPT | Performed by: INTERNAL MEDICINE

## 2024-01-01 PROCEDURE — 82948 REAGENT STRIP/BLOOD GLUCOSE: CPT

## 2024-01-01 PROCEDURE — 99232 SBSQ HOSP IP/OBS MODERATE 35: CPT | Performed by: HOSPITALIST

## 2024-01-01 PROCEDURE — 94640 AIRWAY INHALATION TREATMENT: CPT

## 2024-01-01 RX ORDER — INSULIN GLARGINE 100 [IU]/ML
12 INJECTION, SOLUTION SUBCUTANEOUS EVERY 12 HOURS SCHEDULED
Status: DISCONTINUED | OUTPATIENT
Start: 2024-01-01 | End: 2024-01-08 | Stop reason: HOSPADM

## 2024-01-01 RX ORDER — INSULIN LISPRO 100 [IU]/ML
8 INJECTION, SOLUTION INTRAVENOUS; SUBCUTANEOUS
Status: DISCONTINUED | OUTPATIENT
Start: 2024-01-01 | End: 2024-01-08 | Stop reason: HOSPADM

## 2024-01-01 RX ADMIN — INSULIN GLARGINE 12 UNITS: 100 INJECTION, SOLUTION SUBCUTANEOUS at 21:09

## 2024-01-01 RX ADMIN — INSULIN LISPRO 1 UNITS: 100 INJECTION, SOLUTION INTRAVENOUS; SUBCUTANEOUS at 13:05

## 2024-01-01 RX ADMIN — HEPARIN SODIUM 5000 UNITS: 5000 INJECTION INTRAVENOUS; SUBCUTANEOUS at 13:06

## 2024-01-01 RX ADMIN — Medication 100 MG: at 10:01

## 2024-01-01 RX ADMIN — AMLODIPINE BESYLATE 10 MG: 5 TABLET ORAL at 10:01

## 2024-01-01 RX ADMIN — BUDESONIDE 0.5 MG: 0.5 INHALANT ORAL at 20:28

## 2024-01-01 RX ADMIN — ALBUTEROL SULFATE 2.5 MG: 2.5 SOLUTION RESPIRATORY (INHALATION) at 07:09

## 2024-01-01 RX ADMIN — Medication 1000 UNITS: at 10:01

## 2024-01-01 RX ADMIN — OMEGA-3 FATTY ACIDS CAP 1000 MG 1000 MG: 1000 CAP at 10:08

## 2024-01-01 RX ADMIN — FUROSEMIDE 100 MG: 10 INJECTION, SOLUTION INTRAMUSCULAR; INTRAVENOUS at 15:52

## 2024-01-01 RX ADMIN — LORATADINE 10 MG: 10 TABLET ORAL at 10:03

## 2024-01-01 RX ADMIN — BUDESONIDE 0.5 MG: 0.5 INHALANT ORAL at 07:04

## 2024-01-01 RX ADMIN — LIDOCAINE 1 PATCH: 50 PATCH CUTANEOUS at 21:07

## 2024-01-01 RX ADMIN — FLUTICASONE PROPIONATE 1 SPRAY: 50 SPRAY, METERED NASAL at 10:15

## 2024-01-01 RX ADMIN — ATORVASTATIN CALCIUM 20 MG: 20 TABLET, FILM COATED ORAL at 19:00

## 2024-01-01 RX ADMIN — AZELASTINE HYDROCHLORIDE 1 SPRAY: 137 SPRAY, METERED NASAL at 10:16

## 2024-01-01 RX ADMIN — FUROSEMIDE 100 MG: 10 INJECTION, SOLUTION INTRAMUSCULAR; INTRAVENOUS at 10:06

## 2024-01-01 RX ADMIN — ALLOPURINOL 300 MG: 100 TABLET ORAL at 10:01

## 2024-01-01 RX ADMIN — HEPARIN SODIUM 5000 UNITS: 5000 INJECTION INTRAVENOUS; SUBCUTANEOUS at 21:04

## 2024-01-01 RX ADMIN — FLUTICASONE PROPIONATE 1 SPRAY: 50 SPRAY, METERED NASAL at 21:03

## 2024-01-01 RX ADMIN — INSULIN LISPRO 8 UNITS: 100 INJECTION, SOLUTION INTRAVENOUS; SUBCUTANEOUS at 15:59

## 2024-01-01 RX ADMIN — GABAPENTIN 100 MG: 100 CAPSULE ORAL at 10:01

## 2024-01-01 RX ADMIN — INSULIN GLARGINE 15 UNITS: 100 INJECTION, SOLUTION SUBCUTANEOUS at 10:00

## 2024-01-01 RX ADMIN — INSULIN LISPRO 1 UNITS: 100 INJECTION, SOLUTION INTRAVENOUS; SUBCUTANEOUS at 15:58

## 2024-01-01 RX ADMIN — INSULIN LISPRO 8 UNITS: 100 INJECTION, SOLUTION INTRAVENOUS; SUBCUTANEOUS at 13:05

## 2024-01-01 RX ADMIN — HEPARIN SODIUM 5000 UNITS: 5000 INJECTION INTRAVENOUS; SUBCUTANEOUS at 05:03

## 2024-01-01 RX ADMIN — LIDOCAINE 1 PATCH: 50 PATCH CUTANEOUS at 21:06

## 2024-01-01 NOTE — PLAN OF CARE
Problem: Potential for Falls  Goal: Patient will remain free of falls  Description: INTERVENTIONS:  - Educate patient/family on patient safety including physical limitations  - Instruct patient to call for assistance with activity   - Consult OT/PT to assist with strengthening/mobility   - Keep Call bell within reach  - Keep bed low and locked with side rails adjusted as appropriate  - Keep care items and personal belongings within reach  - Initiate and maintain comfort rounds  - Make Fall Risk Sign visible to staff  - Offer Toileting every 2 Hours, in advance of need  - Initiate/Maintain alarm  - Obtain necessary fall risk management equipment  - Apply yellow socks and bracelet for high fall risk patients  - Consider moving patient to room near nurses station  Outcome: Progressing     Problem: CARDIOVASCULAR - ADULT  Goal: Maintains optimal cardiac output and hemodynamic stability  Description: INTERVENTIONS:  - Monitor I/O, vital signs and rhythm  - Monitor for S/S and trends of decreased cardiac output  - Administer and titrate ordered vasoactive medications to optimize hemodynamic stability  - Assess quality of pulses, skin color and temperature  - Assess for signs of decreased coronary artery perfusion  - Instruct patient to report change in severity of symptoms  Outcome: Progressing  Goal: Absence of cardiac dysrhythmias or at baseline rhythm  Description: INTERVENTIONS:  - Continuous cardiac monitoring, vital signs, obtain 12 lead EKG if ordered  - Administer antiarrhythmic and heart rate control medications as ordered  - Monitor electrolytes and administer replacement therapy as ordered  Outcome: Progressing     Problem: RESPIRATORY - ADULT  Goal: Achieves optimal ventilation and oxygenation  Description: INTERVENTIONS:  - Assess for changes in respiratory status  - Assess for changes in mentation and behavior  - Position to facilitate oxygenation and minimize respiratory effort  - Oxygen administered by  appropriate delivery if ordered  - Initiate smoking cessation education as indicated  - Encourage broncho-pulmonary hygiene including cough, deep breathe, Incentive Spirometry  - Assess the need for suctioning and aspirate as needed  - Assess and instruct to report SOB or any respiratory difficulty  - Respiratory Therapy support as indicated  Outcome: Progressing     Problem: METABOLIC, FLUID AND ELECTROLYTES - ADULT  Goal: Electrolytes maintained within normal limits  Description: INTERVENTIONS:  - Monitor labs and assess patient for signs and symptoms of electrolyte imbalances  - Administer electrolyte replacement as ordered  - Monitor response to electrolyte replacements, including repeat lab results as appropriate  - Instruct patient on fluid and nutrition as appropriate  Outcome: Progressing  Goal: Fluid balance maintained  Description: INTERVENTIONS:  - Monitor labs   - Monitor I/O and WT  - Instruct patient on fluid and nutrition as appropriate  - Assess for signs & symptoms of volume excess or deficit  Outcome: Progressing  Goal: Glucose maintained within target range  Description: INTERVENTIONS:  - Monitor Blood Glucose as ordered  - Assess for signs and symptoms of hyperglycemia and hypoglycemia  - Administer ordered medications to maintain glucose within target range  - Assess nutritional intake and initiate nutrition service referral as needed  Outcome: Progressing     Problem: MUSCULOSKELETAL - ADULT  Goal: Maintain or return mobility to safest level of function  Description: INTERVENTIONS:  - Assess patient's ability to carry out ADLs; assess patient's baseline for ADL function and identify physical deficits which impact ability to perform ADLs (bathing, care of mouth/teeth, toileting, grooming, dressing, etc.)  - Assess/evaluate cause of self-care deficits   - Assess range of motion  - Assess patient's mobility  - Assess patient's need for assistive devices and provide as appropriate  - Encourage  maximum independence but intervene and supervise when necessary  - Involve family in performance of ADLs  - Assess for home care needs following discharge   - Consider OT consult to assist with ADL evaluation and planning for discharge  - Provide patient education as appropriate  Outcome: Progressing     Problem: MOBILITY - ADULT  Goal: Maintain or return to baseline ADL function  Description: INTERVENTIONS:  -  Assess patient's ability to carry out ADLs; assess patient's baseline for ADL function and identify physical deficits which impact ability to perform ADLs (bathing, care of mouth/teeth, toileting, grooming, dressing, etc.)  - Assess/evaluate cause of self-care deficits   - Assess range of motion  - Assess patient's mobility; develop plan if impaired  - Assess patient's need for assistive devices and provide as appropriate  - Encourage maximum independence but intervene and supervise when necessary  - Involve family in performance of ADLs  - Assess for home care needs following discharge   - Consider OT consult to assist with ADL evaluation and planning for discharge  - Provide patient education as appropriate  Outcome: Progressing  Goal: Maintains/Returns to pre admission functional level  Description: INTERVENTIONS:  - Perform AM-PAC 6 Click Basic Mobility/ Daily Activity assessment daily.  - Set and communicate daily mobility goal to care team and patient/family/caregiver.   - Collaborate with rehabilitation services on mobility goals if consulted  - Perform Range of Motion 3 times a day.  - Reposition patient every 3 hours.  - Dangle patient 3 times a day  - Stand patient 3 times a day  - Ambulate patient 3 times a day  - Out of bed to chair 3 times a day   - Out of bed for meals 3 times a day  - Out of bed for toileting  - Record patient progress and toleration of activity level   Outcome: Progressing     Problem: PAIN - ADULT  Goal: Verbalizes/displays adequate comfort level or baseline comfort  level  Description: Interventions:  - Encourage patient to monitor pain and request assistance  - Assess pain using appropriate pain scale  - Administer analgesics based on type and severity of pain and evaluate response  - Implement non-pharmacological measures as appropriate and evaluate response  - Consider cultural and social influences on pain and pain management  - Notify physician/advanced practitioner if interventions unsuccessful or patient reports new pain  Outcome: Progressing     Problem: INFECTION - ADULT  Goal: Absence or prevention of progression during hospitalization  Description: INTERVENTIONS:  - Assess and monitor for signs and symptoms of infection  - Monitor lab/diagnostic results  - Monitor all insertion sites, i.e. indwelling lines, tubes, and drains  - Monitor endotracheal if appropriate and nasal secretions for changes in amount and color  - [place] appropriate cooling/warming therapies per order  - Administer medications as ordered  - Instruct and encourage patient and family to use good hand hygiene technique  - Identify and instruct in appropriate isolation precautions for identified infection/condition  Outcome: Progressing     Problem: SAFETY ADULT  Goal: Patient will remain free of falls  Description: INTERVENTIONS:  - Educate patient/family on patient safety including physical limitations  - Instruct patient to call for assistance with activity   - Consult OT/PT to assist with strengthening/mobility   - Keep Call bell within reach  - Keep bed low and locked with side rails adjusted as appropriate  - Keep care items and personal belongings within reach  - Initiate and maintain comfort rounds  - Make Fall Risk Sign visible to staff  - Offer Toileting every 2 Hours, in advance of need  - Initiate/Maintain alarm  - Obtain necessary fall risk management equipment  - Apply yellow socks and bracelet for high fall risk patients  - Consider moving patient to room near nurses  station  Outcome: Progressing     Problem: DISCHARGE PLANNING  Goal: Discharge to home or other facility with appropriate resources  Description: INTERVENTIONS:  - Identify barriers to discharge w/patient and caregiver  - Arrange for needed discharge resources and transportation as appropriate  - Identify discharge learning needs (meds, wound care, etc.)  - Arrange for interpretive services to assist at discharge as needed  - Refer to Case Management Department for coordinating discharge planning if the patient needs post-hospital services based on physician/advanced practitioner order or complex needs related to functional status, cognitive ability, or social support system  Outcome: Progressing     Problem: Nutrition/Hydration-ADULT  Goal: Nutrient/Hydration intake appropriate for improving, restoring or maintaining nutritional needs  Description: Monitor and assess patient's nutrition/hydration status for malnutrition. Collaborate with interdisciplinary team and initiate plan and interventions as ordered.  Monitor patient's weight and dietary intake as ordered or per policy. Utilize nutrition screening tool and intervene as necessary. Determine patient's food preferences and provide high-protein, high-caloric foods as appropriate.     INTERVENTIONS:  - Monitor oral intake, urinary output, labs, and treatment plans  - Assess nutrition and hydration status and recommend course of action  - Evaluate amount of meals eaten  - Assist patient with eating if necessary   - Allow adequate time for meals  - Recommend/ encourage appropriate diets, oral nutritional supplements, and vitamin/mineral supplements  - Order, calculate, and assess calorie counts as needed  - Recommend, monitor, and adjust tube feedings and TPN/PPN based on assessed needs  - Assess need for intravenous fluids  - Provide specific nutrition/hydration education as appropriate  - Include patient/family/caregiver in decisions related to  nutrition  Outcome: Progressing     Problem: Prexisting or High Potential for Compromised Skin Integrity  Goal: Skin integrity is maintained or improved  Description: INTERVENTIONS:  - Identify patients at risk for skin breakdown  - Assess and monitor skin integrity  - Assess and monitor nutrition and hydration status  - Monitor labs   - Assess for incontinence   - Turn and reposition patient  - Assist with mobility/ambulation  - Relieve pressure over bony prominences  - Avoid friction and shearing  - Provide appropriate hygiene as needed including keeping skin clean and dry  - Evaluate need for skin moisturizer/barrier cream  - Collaborate with interdisciplinary team   - Patient/family teaching  - Consider wound care consult   Outcome: Progressing     Problem: Knowledge Deficit  Goal: Patient/family/caregiver demonstrates understanding of disease process, treatment plan, medications, and discharge instructions  Description: Complete learning assessment and assess knowledge base.  Interventions:  - Provide teaching at level of understanding  - Provide teaching via preferred learning methods  Outcome: Progressing     Problem: SKIN/TISSUE INTEGRITY - ADULT  Goal: Skin Integrity remains intact(Skin Breakdown Prevention)  Description: Assess:  -Perform Joss assessment every 8  -Clean and moisturize skin every   -Inspect skin when repositioning, toileting, and assisting with ADLS  -Assess under medical devices such as 2 every 2  -Assess extremities for adequate circulation and sensation     Bed Management:  -Have minimal linens on bed & keep smooth, unwrinkled  -Change linens as needed when moist or perspiring  -Avoid sitting or lying in one position for more than 2 hours while in bed  -Keep HOB at 45degrees     Toileting:  -Offer bedside commode  -Assess for incontinence every 2  -Use incontinent care products after each incontinent episode such as     Activity:  -Mobilize patient 3 times a day  -Encourage activity  and walks on unit  -Encourage or provide ROM exercises   -Turn and reposition patient every 2 Hours  -Use appropriate equipment to lift or move patient in bed  -Instruct/ Assist with weight shifting every 4 when out of bed in chair  -Consider limitation of chair time  hour intervals    Skin Care:  -Avoid use of baby powder, tape, friction and shearing, hot water or constrictive clothing  -Relieve pressure over bony prominences using   -Do not massage red bony areas    Next Steps:  -Teach patient strategies to minimize risks such as    -Consider consults to  interdisciplinary teams such as   Outcome: Progressing     Problem: HEMATOLOGIC - ADULT  Goal: Maintains hematologic stability  Description: INTERVENTIONS  - Assess for signs and symptoms of bleeding or hemorrhage  - Monitor labs  - Administer supportive blood products/factors as ordered and appropriate  Outcome: Progressing

## 2024-01-01 NOTE — PROGRESS NOTES
"[facility]  Progress Note  Name: Hammad Montesinos I  MRN: [id number]  Unit/Bed#: MS 329Svitlana I Date of Admission: 12/28/2023   Date of Service: 1/1/2024 I Hospital Day: 4    Assessment/Plan   Paroxysmal A-fib ([facility])  Assessment & Plan  History noted  Not on anticoagulation  Cardiology following, appreciate recommendations  Outpatient cardiology dcd AC for risk of bleeding and fall, discussed with daughter, she would like to talk with primary cardiologist before starting anticoagulation    Pulmonary hypertension ([facility])  Assessment & Plan  Home regimen: pulmicort BID, performist BID, and DuoNeb twice daily  Continue home nebulizers and increased dose of IV diuresis due to anasarca    Elevated troponin  Assessment & Plan  HS trop: 48 > 64  EKG without acute ischemia - showing paced rhythm   Likely s/d to volume overload     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 ([facility])  Assessment & Plan  Baseline creatinine is around 1.2  Recent Labs     12/28/23  1947 12/29/23  0527 12/30/23  0545   CREATININE 1.30 1.14 1.41*   EGFR 50 58 45       Estimated Creatinine Clearance: 51 mL/min (A) (by C-G formula based on SCr of 1.41 mg/dL (H)).   Trend BMP daily in setting of IV diuresis    Primary hypertension  Assessment & Plan  Home regimen: Amlodipine 10 Mg daily, losartan 100 Mg daily  Continue home medications  /65   Pulse 62   Temp 97.7 °F (36.5 °C)   Resp 16   Ht 5' 11\" (1.803 m)   Wt 118 kg (261 lb 3.2 oz)   SpO2 93%   BMI 36.43 kg/m²      Mixed hyperlipidemia  Assessment & Plan  Continue home statin    Type 2 diabetes mellitus with hyperglycemia, with long-term current use of insulin ([facility])  Assessment & Plan  Lab Results   Component Value Date    HGBA1C 6.8 11/16/2023       Recent Labs     12/29/23  2052 12/30/23  0738 12/30/23  1121 12/30/23  1444   POCGLU 173* 92 121 63*         Blood Sugar Average: Last 72 hrs:  (P) [id number] regimen: Lantus 18 " units twice daily and NovoLog 22 unit with breakfast, 23 units lunch, and 20 units with dinner  Decrease Lantus further  to 12 units twice daily  Decrease Humalog to 8 units 3 times daily  SSI      * Acute on chronic diastolic heart failure (HCC)  Assessment & Plan  Wt Readings from Last 3 Encounters:   23 118 kg (261 lb 3.2 oz)   23 115 kg (254 lb)   23 112 kg (247 lb)     Presents with worsening dyspnea and anasarca since [place]   Home regimen: lasix 40mg BID with increased to 60mg in AM and 40mg in PM 3 days ago with no improvement in symptoms   Last echo 10/2023: Severe left atrial dilation. RV systolic pressure 52.2mmHg. pulmonary artery pressure elevated  Pt with anasarca and pulmonary congestion - continue lasix 100mg IV BID   I/o and daily weights   Sodium and fluid restriction   Cardiology following  Observe off further metolazone for now.   Volume status improving.             VTE  Prophylaxis:   Pharmacologic: in place  Mechanical VTE Prophylaxis in Place: Yes    Patient Centered Rounds: I have performed bedside rounds with nursing staff today.    Discussions with Specialists or Other Care Team Provider: case management    Education and Discussions with Family / Patient: left VM for dtr      Current Length of Stay: 4 day(s)    Current Patient Status: Inpatient        Code Status: Level 1 - Full Code    Discharge Plan: Pt will require continued inpatient hospitalization.    Subjective:   Pt states he is urinating a lot  States generalized edema is improving but is still present.     Patient is seen and examined at bedside.  All other ROS are negative.    Objective:     Vitals:   Temp (24hrs), Av °F (36.7 °C), Min:97.8 °F (36.6 °C), Max:98.5 °F (36.9 °C)    Temp:  [97.8 °F (36.6 °C)-98.5 °F (36.9 °C)] 97.9 °F (36.6 °C)  HR:  [55-80] 74  Resp:  [17-20] 18  BP: (105-135)/(62-69) 111/63  SpO2:  [94 %-100 %] 95 %  Body mass index is 35.31 kg/m².     Input and Output Summary (last 24  hours):       Intake/Output Summary (Last 24 hours) at 1/1/2024 1049  Last data filed at 1/1/2024 1017  Gross per 24 hour   Intake 1300 ml   Output 4350 ml   Net -3050 ml       Physical Exam:       GEN: No acute distress, comfortable  HEEENT: No JVD, PERRLA, no scleral icterus  RESP: Lungs clear to auscultation bilaterally  CV: RRR, +s1/s2   ABD: SOFT NON TENDER, POSITIVE BOWEL SOUNDS, NO DISTENTION  PSYCH: CALM  NEURO: [place] baseline, NO FOCAL DEFICITS  SKIN: NO RASH  EXTREM:   EDEMA of LE present.     Additional Data:     Labs:    Results from last 7 days   Lab Units 12/30/23  0545   WBC Thousand/uL 6.69   HEMOGLOBIN g/dL 11.0*   HEMATOCRIT % 35.1*   PLATELETS Thousands/uL 163   NEUTROS PCT % 66   LYMPHS PCT % 17   MONOS PCT % 13*   EOS PCT % 3     Results from last 7 days   Lab Units 01/01/24  0459 12/30/23  0545 12/29/23  0527   SODIUM mmol/L 138   < > 136   POTASSIUM mmol/L 3.6   < > 3.6   CHLORIDE mmol/L 99   < > 103   CO2 mmol/L 34*   < > 24   BUN mg/dL 43*   < > 40*   CREATININE mg/dL 1.35*   < > 1.14   ANION GAP mmol/L 5   < > 9   CALCIUM mg/dL 9.3   < > 9.1   ALBUMIN g/dL  --   --  3.4*   TOTAL BILIRUBIN mg/dL  --   --  1.31*   ALK PHOS U/L  --   --  126*   ALT U/L  --   --  10   AST U/L  --   --  19   GLUCOSE RANDOM mg/dL 63*   < > 107    < > = values in this interval not displayed.         Results from last 7 days   Lab Units 01/01/24  0719 01/01/24  0048 01/01/24  0027 12/31/23  2025 12/31/23  1656 12/31/23  1134 12/31/23  0820 12/30/23  2049 12/30/23  1629 12/30/23  1534 12/30/23  1444 12/30/23  1121   POC GLUCOSE mg/dl 95 85 53* 193* 194* 155* 148* 195* 63* 47* 63* 121               Lines/Drains:  Invasive Devices       Peripheral Intravenous Line  Duration             Peripheral IV 12/31/23 Right;Ventral (anterior) Forearm 1 day                    Telemetry:   Telemetry Orders (From admission, onward)               24 Hour Telemetry Monitoring  Continuous x 24 Hours (Telem)        Question:   Reason for 24 Hour Telemetry  Answer:  Decompensated CHF- and any one of the following: continuous diuretic infusion or total diuretic dose >200 mg daily, associated electrolyte derangement (I.e. K < 3.0), ionotropic drip (continuous infusion), hx of ventricular arrhythmia, or new EF < 35%                        * I Have Reviewed All Lab Data Listed Above.           Imaging:     No results found for this or any previous visit.    Results for orders placed during the hospital encounter of 12/28/23    XR chest 2 views    Narrative  CHEST    INDICATION:   Chest Pain.    COMPARISON: CT chest 11/14/2023    EXAM PERFORMED/VIEWS:  XR CHEST PA & LATERAL      FINDINGS:  Left-sided chest wall intracardiac device is identified.  Leads are intact.    Cardiomegaly.    Diffuse prominence of the pulmonary vascular markings with mild hazy opacities. Small bilateral pleural effusions.    Osseous structures appear within normal limits for patient age.    Impression  Pulmonary edema with small bilateral pleural effusions.            Workstation performed: [id number]      *I have reviewed all imaging reports listed above      Recent Cultures (last 7 days):           Last 24 Hours Medication List:   Current Facility-Administered Medications   Medication Dose Route Frequency Provider Last Rate    acetaminophen  650 mg Oral Q6H PRN Gricel Cedillo PA-C      albuterol  2.5 mg Nebulization Q4H PRN Semaj Perez MD      allopurinol  300 mg Oral Daily Gricel Cedillo PA-C      aluminum-magnesium hydroxide-simethicone  30 mL Oral Q4H PRN Gricel Cedillo PA-C      amLODIPine  10 mg Oral Daily Gricel Cedillo PA-C      atorvastatin  20 mg Oral After Dinner Gricel Cedillo PA-C      azelastine  1 spray Each Nare BID Gricel Cedillo PA-C      budesonide  0.5 mg Nebulization BID Gricel Cedillo PA-C      cholecalciferol  1,000 Units Oral Daily Gricel Cedillo PA-C      co-enzyme Q-10  100 mg Oral Daily Gricel STOCKTON  AINSLEY Cedillo      fish oil  1,000 mg Oral Daily Gricel Cedillo PA-C      fluticasone  1 spray Nasal BID Gricel Cedillo PA-C      furosemide  100 mg Intravenous BID (diuretic) Gricel Cedillo PA-C      gabapentin  100 mg Oral Daily Gricel Cedillo PA-C      heparin (porcine)  5,000 Units Subcutaneous Q8H TOVA Gricel Cedillo PA-C      insulin glargine  12 Units Subcutaneous Q12H [facility] Madahvi Cesar MD      insulin lispro  1-5 Units Subcutaneous TID  Ara Leigh MD      insulin lispro  8 Units Subcutaneous TID With Meals Madhavi Cesar MD      lidocaine  1 patch Topical HS Gricel Cedillo PA-C      lidocaine  1 patch Topical HS Gricel Cedillo PA-C      loratadine  10 mg Oral Daily Gricel Cedillo PA-C      losartan  100 mg Oral Daily Gricel Cedillo PA-C      senna  2 tablet Oral HS PRN Gricel Cedillo PA-C          Today, Patient Was Seen By: Madhavi Cesar MD    ** Please Note: Dictation voice to text software may have been used in the creation of this document. **

## 2024-01-01 NOTE — PLAN OF CARE
Problem: CARDIOVASCULAR - ADULT  Goal: Maintains optimal cardiac output and hemodynamic stability  Description: INTERVENTIONS:  - Monitor I/O, vital signs and rhythm  - Monitor for S/S and trends of decreased cardiac output  - Administer and titrate ordered vasoactive medications to optimize hemodynamic stability  - Assess quality of pulses, skin color and temperature  - Assess for signs of decreased coronary artery perfusion  - Instruct patient to report change in severity of symptoms  Outcome: Progressing     Problem: RESPIRATORY - ADULT  Goal: Achieves optimal ventilation and oxygenation  Description: INTERVENTIONS:  - Assess for changes in respiratory status  - Assess for changes in mentation and behavior  - Position to facilitate oxygenation and minimize respiratory effort  - Oxygen administered by appropriate delivery if ordered  - Initiate smoking cessation education as indicated  - Encourage broncho-pulmonary hygiene including cough, deep breathe, Incentive Spirometry  - Assess the need for suctioning and aspirate as needed  - Assess and instruct to report SOB or any respiratory difficulty  - Respiratory Therapy support as indicated  Outcome: Progressing     Problem: PAIN - ADULT  Goal: Verbalizes/displays adequate comfort level or baseline comfort level  Description: Interventions:  - Encourage patient to monitor pain and request assistance  - Assess pain using appropriate pain scale  - Administer analgesics based on type and severity of pain and evaluate response  - Implement non-pharmacological measures as appropriate and evaluate response  - Consider cultural and social influences on pain and pain management  - Notify physician/advanced practitioner if interventions unsuccessful or patient reports new pain  Outcome: Progressing     Problem: MUSCULOSKELETAL - ADULT  Goal: Maintain or return mobility to safest level of function  Description: INTERVENTIONS:  - Assess patient's ability to carry out ADLs;  assess patient's baseline for ADL function and identify physical deficits which impact ability to perform ADLs (bathing, care of mouth/teeth, toileting, grooming, dressing, etc.)  - Assess/evaluate cause of self-care deficits   - Assess range of motion  - Assess patient's mobility  - Assess patient's need for assistive devices and provide as appropriate  - Encourage maximum independence but intervene and supervise when necessary  - Involve family in performance of ADLs  - Assess for home care needs following discharge   - Consider OT consult to assist with ADL evaluation and planning for discharge  - Provide patient education as appropriate  Outcome: Progressing

## 2024-01-02 LAB
ALBUMIN SERPL BCP-MCNC: 3.8 G/DL (ref 3.5–5)
ALP SERPL-CCNC: 134 U/L (ref 34–104)
ALT SERPL W P-5'-P-CCNC: 13 U/L (ref 7–52)
ANION GAP SERPL CALCULATED.3IONS-SCNC: 10 MMOL/L
AST SERPL W P-5'-P-CCNC: 18 U/L (ref 13–39)
BASOPHILS # BLD AUTO: 0.05 THOUSANDS/ÂΜL (ref 0–0.1)
BASOPHILS NFR BLD AUTO: 1 % (ref 0–1)
BILIRUB SERPL-MCNC: 1.69 MG/DL (ref 0.2–1)
BUN SERPL-MCNC: 41 MG/DL (ref 5–25)
CALCIUM SERPL-MCNC: 9.4 MG/DL (ref 8.4–10.2)
CHLORIDE SERPL-SCNC: 96 MMOL/L (ref 96–108)
CO2 SERPL-SCNC: 32 MMOL/L (ref 21–32)
CREAT SERPL-MCNC: 1.29 MG/DL (ref 0.6–1.3)
EOSINOPHIL # BLD AUTO: 0.23 THOUSAND/ÂΜL (ref 0–0.61)
EOSINOPHIL NFR BLD AUTO: 3 % (ref 0–6)
ERYTHROCYTE [DISTWIDTH] IN BLOOD BY AUTOMATED COUNT: 17.9 % (ref 11.6–15.1)
GFR SERPL CREATININE-BSD FRML MDRD: 50 ML/MIN/1.73SQ M
GLUCOSE SERPL-MCNC: 130 MG/DL (ref 65–140)
GLUCOSE SERPL-MCNC: 134 MG/DL (ref 65–140)
GLUCOSE SERPL-MCNC: 213 MG/DL (ref 65–140)
GLUCOSE SERPL-MCNC: 268 MG/DL (ref 65–140)
GLUCOSE SERPL-MCNC: 317 MG/DL (ref 65–140)
HCT VFR BLD AUTO: 35.1 % (ref 36.5–49.3)
HGB BLD-MCNC: 11.1 G/DL (ref 12–17)
IMM GRANULOCYTES # BLD AUTO: 0.01 THOUSAND/UL (ref 0–0.2)
IMM GRANULOCYTES NFR BLD AUTO: 0 % (ref 0–2)
LYMPHOCYTES # BLD AUTO: 1.36 THOUSANDS/ÂΜL (ref 0.6–4.47)
LYMPHOCYTES NFR BLD AUTO: 19 % (ref 14–44)
MAGNESIUM SERPL-MCNC: 1.8 MG/DL (ref 1.9–2.7)
MCH RBC QN AUTO: 25.4 PG (ref 26.8–34.3)
MCHC RBC AUTO-ENTMCNC: 31.6 G/DL (ref 31.4–37.4)
MCV RBC AUTO: 80 FL (ref 82–98)
MONOCYTES # BLD AUTO: 0.94 THOUSAND/ÂΜL (ref 0.17–1.22)
MONOCYTES NFR BLD AUTO: 13 % (ref 4–12)
NEUTROPHILS # BLD AUTO: 4.74 THOUSANDS/ÂΜL (ref 1.85–7.62)
NEUTS SEG NFR BLD AUTO: 64 % (ref 43–75)
NRBC BLD AUTO-RTO: 0 /100 WBCS
PLATELET # BLD AUTO: 173 THOUSANDS/UL (ref 149–390)
PMV BLD AUTO: 12 FL (ref 8.9–12.7)
POTASSIUM SERPL-SCNC: 3.3 MMOL/L (ref 3.5–5.3)
PROT SERPL-MCNC: 6.8 G/DL (ref 6.4–8.4)
RBC # BLD AUTO: 4.37 MILLION/UL (ref 3.88–5.62)
SODIUM SERPL-SCNC: 138 MMOL/L (ref 135–147)
WBC # BLD AUTO: 7.33 THOUSAND/UL (ref 4.31–10.16)

## 2024-01-02 PROCEDURE — 94760 N-INVAS EAR/PLS OXIMETRY 1: CPT

## 2024-01-02 PROCEDURE — 99232 SBSQ HOSP IP/OBS MODERATE 35: CPT | Performed by: HOSPITALIST

## 2024-01-02 PROCEDURE — 80053 COMPREHEN METABOLIC PANEL: CPT | Performed by: HOSPITALIST

## 2024-01-02 PROCEDURE — 82948 REAGENT STRIP/BLOOD GLUCOSE: CPT

## 2024-01-02 PROCEDURE — 99233 SBSQ HOSP IP/OBS HIGH 50: CPT | Performed by: INTERNAL MEDICINE

## 2024-01-02 PROCEDURE — 83735 ASSAY OF MAGNESIUM: CPT | Performed by: HOSPITALIST

## 2024-01-02 PROCEDURE — 85025 COMPLETE CBC W/AUTO DIFF WBC: CPT | Performed by: HOSPITALIST

## 2024-01-02 PROCEDURE — 94640 AIRWAY INHALATION TREATMENT: CPT

## 2024-01-02 RX ORDER — MAGNESIUM SULFATE HEPTAHYDRATE 40 MG/ML
2 INJECTION, SOLUTION INTRAVENOUS ONCE
Status: COMPLETED | OUTPATIENT
Start: 2024-01-02 | End: 2024-01-02

## 2024-01-02 RX ORDER — POTASSIUM CHLORIDE 20 MEQ/1
40 TABLET, EXTENDED RELEASE ORAL ONCE
Status: COMPLETED | OUTPATIENT
Start: 2024-01-02 | End: 2024-01-02

## 2024-01-02 RX ORDER — POTASSIUM CHLORIDE 14.9 MG/ML
20 INJECTION INTRAVENOUS ONCE
Status: COMPLETED | OUTPATIENT
Start: 2024-01-02 | End: 2024-01-02

## 2024-01-02 RX ADMIN — AMLODIPINE BESYLATE 10 MG: 5 TABLET ORAL at 08:29

## 2024-01-02 RX ADMIN — ALBUTEROL SULFATE 2.5 MG: 2.5 SOLUTION RESPIRATORY (INHALATION) at 18:06

## 2024-01-02 RX ADMIN — INSULIN LISPRO 2 UNITS: 100 INJECTION, SOLUTION INTRAVENOUS; SUBCUTANEOUS at 12:19

## 2024-01-02 RX ADMIN — ATORVASTATIN CALCIUM 20 MG: 20 TABLET, FILM COATED ORAL at 17:03

## 2024-01-02 RX ADMIN — INSULIN GLARGINE 12 UNITS: 100 INJECTION, SOLUTION SUBCUTANEOUS at 21:33

## 2024-01-02 RX ADMIN — FUROSEMIDE 100 MG: 10 INJECTION, SOLUTION INTRAMUSCULAR; INTRAVENOUS at 08:28

## 2024-01-02 RX ADMIN — INSULIN LISPRO 8 UNITS: 100 INJECTION, SOLUTION INTRAVENOUS; SUBCUTANEOUS at 12:19

## 2024-01-02 RX ADMIN — HEPARIN SODIUM 5000 UNITS: 5000 INJECTION INTRAVENOUS; SUBCUTANEOUS at 21:33

## 2024-01-02 RX ADMIN — INSULIN LISPRO 8 UNITS: 100 INJECTION, SOLUTION INTRAVENOUS; SUBCUTANEOUS at 16:10

## 2024-01-02 RX ADMIN — FLUTICASONE PROPIONATE 1 SPRAY: 50 SPRAY, METERED NASAL at 21:32

## 2024-01-02 RX ADMIN — MAGNESIUM SULFATE HEPTAHYDRATE 2 G: 2 INJECTION, SOLUTION INTRAVENOUS at 06:35

## 2024-01-02 RX ADMIN — FLUTICASONE PROPIONATE 1 SPRAY: 50 SPRAY, METERED NASAL at 08:29

## 2024-01-02 RX ADMIN — BUDESONIDE 0.5 MG: 0.5 INHALANT ORAL at 08:00

## 2024-01-02 RX ADMIN — AZELASTINE HYDROCHLORIDE 1 SPRAY: 137 SPRAY, METERED NASAL at 21:32

## 2024-01-02 RX ADMIN — ALLOPURINOL 300 MG: 100 TABLET ORAL at 08:29

## 2024-01-02 RX ADMIN — FUROSEMIDE 100 MG: 10 INJECTION, SOLUTION INTRAMUSCULAR; INTRAVENOUS at 16:10

## 2024-01-02 RX ADMIN — POTASSIUM CHLORIDE 40 MEQ: 1500 TABLET, EXTENDED RELEASE ORAL at 06:42

## 2024-01-02 RX ADMIN — GABAPENTIN 100 MG: 100 CAPSULE ORAL at 08:29

## 2024-01-02 RX ADMIN — OMEGA-3 FATTY ACIDS CAP 1000 MG 1000 MG: 1000 CAP at 08:28

## 2024-01-02 RX ADMIN — INSULIN GLARGINE 12 UNITS: 100 INJECTION, SOLUTION SUBCUTANEOUS at 08:28

## 2024-01-02 RX ADMIN — POTASSIUM CHLORIDE 20 MEQ: 14.9 INJECTION, SOLUTION INTRAVENOUS at 06:42

## 2024-01-02 RX ADMIN — AZELASTINE HYDROCHLORIDE 1 SPRAY: 137 SPRAY, METERED NASAL at 08:29

## 2024-01-02 RX ADMIN — Medication 100 MG: at 08:28

## 2024-01-02 RX ADMIN — INSULIN LISPRO 8 UNITS: 100 INJECTION, SOLUTION INTRAVENOUS; SUBCUTANEOUS at 08:28

## 2024-01-02 RX ADMIN — LORATADINE 10 MG: 10 TABLET ORAL at 08:29

## 2024-01-02 RX ADMIN — HEPARIN SODIUM 5000 UNITS: 5000 INJECTION INTRAVENOUS; SUBCUTANEOUS at 14:17

## 2024-01-02 RX ADMIN — Medication 1000 UNITS: at 08:28

## 2024-01-02 RX ADMIN — HEPARIN SODIUM 5000 UNITS: 5000 INJECTION INTRAVENOUS; SUBCUTANEOUS at 05:27

## 2024-01-02 RX ADMIN — INSULIN LISPRO 2 UNITS: 100 INJECTION, SOLUTION INTRAVENOUS; SUBCUTANEOUS at 16:11

## 2024-01-02 RX ADMIN — LOSARTAN POTASSIUM 100 MG: 50 TABLET, FILM COATED ORAL at 08:28

## 2024-01-02 NOTE — ASSESSMENT & PLAN NOTE
Baseline creatinine is around 1.2  Recent Labs     12/31/23  0306 01/01/24  0459 01/02/24  0430   CREATININE 1.46* 1.35* 1.29   EGFR 43 47 50       Estimated Creatinine Clearance: 54.3 mL/min (by C-G formula based on SCr of 1.29 mg/dL).   Trend BMP daily in setting of IV diuresis

## 2024-01-02 NOTE — PROGRESS NOTES
"Cardiology Progress Note   Hammad Montesinos 83 y.o. male MRN: [id number]    Unit/Bed#: -01 Encounter: [id number]      Assessment:  Acute on chronic HFpEF  Follows with Dr Richter ([facility])   10/2023 echo: LVEF 50%, moderate concentric LVH, severe left atrial dilation  Prehospital diuretic: Lasix 40mg/20mg -> 60mg/40mg daily (approx 4 days prior to admission)   Baseline weight: 230lbs  Admission wt: 263lbs -> 247lbs.   Hypertension   Home Rx: Norvasc 10mg, losartan 100mg  Hyperlipidemia   Home Rx: Lipitor 20mg  Remote hx of paroxysmal atrial fibrillation   S/P DC Biotronik PPM    Plan:  Still significantly volume overloaded, likely needs 1-2 more days of diuresis  Continue IV Lasix 100mg BID  No additional metolazone needed at this point  Salt/fluid restrictions, daily weights, strict I&O monitoring.   Daily BMPs; keep replenishing for goal K>4 and Mg>2  Keep telemetry monitoring while diuresing  He is not on OAC at this point due to low Afib burden in the past. Ongoing discussion in regards to AC with his primary cardiologist.     Subjective:   Patient seen and examined. Feeling better with significant urine output. On O2 via NC (usually only on O2 at night usually).  Still feels overloaded with LE edema.     Objective:     Vitals: Blood pressure 145/56, pulse 78, temperature 98.4 °F (36.9 °C), resp. rate 14, height 5' 11\" (1.803 m), weight 112 kg (247 lb 6.4 oz), SpO2 97%., Body mass index is 34.51 kg/m².,   Orthostatic Blood Pressures      Flowsheet Row Most Recent Value   Blood Pressure 145/56 filed at 01/02/2024 0737   Patient Position - Orthostatic VS Lying filed at 01/01/2024 1927              Intake/Output Summary (Last 24 hours) at 1/2/2024 1121  Last data filed at 1/2/2024 0815  Gross per 24 hour   Intake 1145 ml   Output 3725 ml   Net -2580 ml         Physical Exam:    GEN: Hammad Montesinos appears well, alert and oriented x 3, pleasant and cooperative   HEENT: Sclera anicteric, " conjunctivae pink, mucous membranes moist. Oropharynx clear.   NECK: supple, no significant JVD, Trachea midline, no thyromegaly.   HEART: regular rhythm, normal S1 and S2, no murmurs, clicks, gallops or rubs   LUNGS: clear to auscultation bilaterally; no wheezes, rales, or rhonchi. No increased work of breathing or signs of respiratory distress.   ABDOMEN: Soft, nontender, nondistended  EXTREMITIES: +2 LE edema. Skin warm and well perfused, no clubbing, cyanosis  NEURO: No focal findings. Normal speech. Mood and affect normal.   SKIN: Normal without suspicious lesions on exposed skin.      Medications:      Current Facility-Administered Medications:     acetaminophen (TYLENOL) tablet 650 mg, 650 mg, Oral, Q6H PRN, ROSSY Carpenter-C, 650 mg at 12/29/23 0006    albuterol inhalation solution 2.5 mg, 2.5 mg, Nebulization, Q4H PRN, Semaj Perez MD, 2.5 mg at 01/01/24 0709    allopurinol (ZYLOPRIM) tablet 300 mg, 300 mg, Oral, Daily, Gricel Cedillo PA-C, 300 mg at 01/02/24 0829    aluminum-magnesium hydroxide-simethicone (MAALOX) oral suspension 30 mL, 30 mL, Oral, Q4H PRN, Gricel Cedillo PA-C    amLODIPine (NORVASC) tablet 10 mg, 10 mg, Oral, Daily, Gricel Cedillo PA-C, 10 mg at 01/02/24 0829    atorvastatin (LIPITOR) tablet 20 mg, 20 mg, Oral, After Dinner, ROSSY Carpenter-C, 20 mg at 01/01/24 1900    azelastine (ASTELIN) 0.1 % nasal spray 1 spray, 1 spray, Each Nare, BID, ROSSY Carpenter-C, 1 spray at 01/02/24 0829    budesonide (PULMICORT) inhalation solution 0.5 mg, 0.5 mg, Nebulization, BID, Gricel Cedillo PA-C, 0.5 mg at 01/02/24 0800    cholecalciferol (VITAMIN D3) tablet 1,000 Units, 1,000 Units, Oral, Daily, Gricelmalini Cedillo, PA-C, 1,000 Units at 01/02/24 0828    co-enzyme Q-10 capsule 100 mg, 100 mg, Oral, Daily, Gricel KATH Cedillo, PA-C, 100 mg at 01/02/24 0828    fish oil capsule 1,000 mg, 1,000 mg, Oral, Daily, Gricel L Cedillo, PA-C, 1,000 mg at 01/02/24 0828     fluticasone (FLONASE) 50 mcg/act nasal spray 1 spray, 1 spray, Nasal, BID, Gricel Cedillo PA-C, 1 spray at 01/02/24 0829    furosemide (LASIX) injection 100 mg, 100 mg, Intravenous, BID (diuretic), Gricel Cedillo PA-C, 100 mg at 01/02/24 0828    gabapentin (NEURONTIN) capsule 100 mg, 100 mg, Oral, Daily, Gricel Cedillo PA-C, 100 mg at 01/02/24 0829    heparin (porcine) subcutaneous injection 5,000 Units, 5,000 Units, Subcutaneous, Q8H TOVA, ROSSY Carpenter-C, 5,000 Units at 01/02/24 0527    insulin glargine (LANTUS) subcutaneous injection 12 Units 0.12 mL, 12 Units, Subcutaneous, Q12H TOVA, Madhavi Cesar MD, 12 Units at 01/02/24 0828    insulin lispro (HumaLOG) 100 units/mL subcutaneous injection 1-5 Units, 1-5 Units, Subcutaneous, TID AC, 1 Units at 01/01/24 1558 **AND** Fingerstick Glucose (POCT), , , 4x Daily AC and at bedtime, Ara Leigh MD    insulin lispro (HumaLOG) 100 units/mL subcutaneous injection 8 Units, 8 Units, Subcutaneous, TID With Meals, Madhavi Cesar MD, 8 Units at 01/02/24 0828    lidocaine (LIDODERM) 5 % patch 1 patch, 1 patch, Topical, HS, Gricel Cedillo PA-C, 1 patch at 01/01/24 2107    lidocaine (LIDODERM) 5 % patch 1 patch, 1 patch, Topical, HS, Gricelmalini Marihens, PA-C, 1 patch at 01/01/24 2106    loratadine (CLARITIN) tablet 10 mg, 10 mg, Oral, Daily, Gricelmalini Cedillo PA-C, 10 mg at 01/02/24 0829    losartan (COZAAR) tablet 100 mg, 100 mg, Oral, Daily, Gricelmalini Cedillo, PA-C, 100 mg at 01/02/24 0828    senna (SENOKOT) tablet 17.2 mg, 2 tablet, Oral, HS PRN, Gricel Cedillo PA-C     Labs & Results:        Results from last 7 days   Lab Units 01/02/24  0430 12/30/23  0545 12/29/23  0617   WBC Thousand/uL 7.33 6.69 6.55   HEMOGLOBIN g/dL 11.1* 11.0* 10.9*   HEMATOCRIT % 35.1* 35.1* 35.1*   PLATELETS Thousands/uL 173 163 154         Results from last 7 days   Lab Units 01/02/24  0430 01/01/24  0459 12/31/23  0306 12/30/23  0545 12/29/23  0527  12/28/23  1947   POTASSIUM mmol/L 3.3* 3.6 3.8   < > 3.6 4.9   CHLORIDE mmol/L 96 99 99   < > 103 102   CO2 mmol/L 32 34* 30   < > 24 24   BUN mg/dL 41* 43* 40*   < > 40* 43*   CREATININE mg/dL 1.29 1.35* 1.46*   < > 1.14 1.30   CALCIUM mg/dL 9.4 9.3 9.4   < > 9.1 9.2   ALK PHOS U/L 134*  --   --   --  126* 136*   ALT U/L 13  --   --   --  10 11   AST U/L 18  --   --   --  19 27    < > = values in this interval not displayed.         Results from last 7 days   Lab Units 01/02/24  0430 12/30/23  0545 12/29/23  0527   MAGNESIUM mg/dL 1.8* 2.0 2.0

## 2024-01-02 NOTE — ASSESSMENT & PLAN NOTE
"Home regimen: Amlodipine 10 Mg daily, losartan 100 Mg daily  Continue home medications  /56   Pulse 78   Temp 98.4 °F (36.9 °C)   Resp 14   Ht 5' 11\" (1.803 m)   Wt 112 kg (247 lb 6.4 oz)   SpO2 97%   BMI 34.51 kg/m²    "

## 2024-01-02 NOTE — PLAN OF CARE
Problem: Potential for Falls  Goal: Patient will remain free of falls  Description: INTERVENTIONS:  - Educate patient/family on patient safety including physical limitations  - Instruct patient to call for assistance with activity   - Consult OT/PT to assist with strengthening/mobility   - Keep Call bell within reach  - Keep bed low and locked with side rails adjusted as appropriate  - Keep care items and personal belongings within reach  - Initiate and maintain comfort rounds  - Make Fall Risk Sign visible to staff  - Offer Toileting every 2 Hours, in advance of need  - Initiate/Maintain alarm  - Obtain necessary fall risk management equipment  - Apply yellow socks and bracelet for high fall risk patients  - Consider moving patient to room near nurses station  Outcome: Progressing     Problem: CARDIOVASCULAR - ADULT  Goal: Maintains optimal cardiac output and hemodynamic stability  Description: INTERVENTIONS:  - Monitor I/O, vital signs and rhythm  - Monitor for S/S and trends of decreased cardiac output  - Administer and titrate ordered vasoactive medications to optimize hemodynamic stability  - Assess quality of pulses, skin color and temperature  - Assess for signs of decreased coronary artery perfusion  - Instruct patient to report change in severity of symptoms  Outcome: Progressing  Goal: Absence of cardiac dysrhythmias or at baseline rhythm  Description: INTERVENTIONS:  - Continuous cardiac monitoring, vital signs, obtain 12 lead EKG if ordered  - Administer antiarrhythmic and heart rate control medications as ordered  - Monitor electrolytes and administer replacement therapy as ordered  Outcome: Progressing     Problem: RESPIRATORY - ADULT  Goal: Achieves optimal ventilation and oxygenation  Description: INTERVENTIONS:  - Assess for changes in respiratory status  - Assess for changes in mentation and behavior  - Position to facilitate oxygenation and minimize respiratory effort  - Oxygen administered by  appropriate delivery if ordered  - Initiate smoking cessation education as indicated  - Encourage broncho-pulmonary hygiene including cough, deep breathe, Incentive Spirometry  - Assess the need for suctioning and aspirate as needed  - Assess and instruct to report SOB or any respiratory difficulty  - Respiratory Therapy support as indicated  Outcome: Progressing     Problem: METABOLIC, FLUID AND ELECTROLYTES - ADULT  Goal: Electrolytes maintained within normal limits  Description: INTERVENTIONS:  - Monitor labs and assess patient for signs and symptoms of electrolyte imbalances  - Administer electrolyte replacement as ordered  - Monitor response to electrolyte replacements, including repeat lab results as appropriate  - Instruct patient on fluid and nutrition as appropriate  Outcome: Progressing  Goal: Fluid balance maintained  Description: INTERVENTIONS:  - Monitor labs   - Monitor I/O and WT  - Instruct patient on fluid and nutrition as appropriate  - Assess for signs & symptoms of volume excess or deficit  Outcome: Progressing  Goal: Glucose maintained within target range  Description: INTERVENTIONS:  - Monitor Blood Glucose as ordered  - Assess for signs and symptoms of hyperglycemia and hypoglycemia  - Administer ordered medications to maintain glucose within target range  - Assess nutritional intake and initiate nutrition service referral as needed  Outcome: Progressing     Problem: SKIN/TISSUE INTEGRITY - ADULT  Goal: Skin Integrity remains intact(Skin Breakdown Prevention)  Description: Assess:  -Perform Joss assessment every x  -Clean and moisturize skin every x  -Inspect skin when repositioning, toileting, and assisting with ADLS  -Assess under medical devices such as x every x  -Assess extremities for adequate circulation and sensation     Bed Management:  -Have minimal linens on bed & keep smooth, unwrinkled  -Change linens as needed when moist or perspiring  -Avoid sitting or lying in one position  for more than x hours while in bed  -Keep HOB at xdegrees     Toileting:  -Offer bedside commode  -Assess for incontinence every x  -Use incontinent care products after each incontinent episode such as x    Activity:  -Mobilize patient x times a day  -Encourage activity and walks on unit  -Encourage or provide ROM exercises   -Turn and reposition patient every x Hours  -Use appropriate equipment to lift or move patient in bed  -Instruct/ Assist with weight shifting every x when out of bed in chair  -Consider limitation of chair time x hour intervals    Skin Care:  -Avoid use of baby powder, tape, friction and shearing, hot water or constrictive clothing  -Relieve pressure over bony prominences using x  -Do not massage red bony areas    Next Steps:  -Teach patient strategies to minimize risks such as x   -Consider consults to  interdisciplinary teams such as xx  Outcome: Progressing     Problem: HEMATOLOGIC - ADULT  Goal: Maintains hematologic stability  Description: INTERVENTIONS  - Assess for signs and symptoms of bleeding or hemorrhage  - Monitor labs  - Administer supportive blood products/factors as ordered and appropriate  Outcome: Progressing     Problem: MOBILITY - ADULT  Goal: Maintain or return to baseline ADL function  Description: INTERVENTIONS:  -  Assess patient's ability to carry out ADLs; assess patient's baseline for ADL function and identify physical deficits which impact ability to perform ADLs (bathing, care of mouth/teeth, toileting, grooming, dressing, etc.)  - Assess/evaluate cause of self-care deficits   - Assess range of motion  - Assess patient's mobility; develop plan if impaired  - Assess patient's need for assistive devices and provide as appropriate  - Encourage maximum independence but intervene and supervise when necessary  - Involve family in performance of ADLs  - Assess for home care needs following discharge   - Consider OT consult to assist with ADL evaluation and planning for  discharge  - Provide patient education as appropriate  Outcome: Progressing  Goal: Maintains/Returns to pre admission functional level  Description: INTERVENTIONS:  - Perform AM-PAC 6 Click Basic Mobility/ Daily Activity assessment daily.  - Set and communicate daily mobility goal to care team and patient/family/caregiver.   - Collaborate with rehabilitation services on mobility goals if consulted  - Perform Range of Motion 3 times a day.  - Reposition patient every 3 hours.  - Dangle patient 3 times a day  - Stand patient 3 times a day  - Ambulate patient 3 times a day  - Out of bed to chair 3 times a day   - Out of bed for meals 3 times a day  - Out of bed for toileting  - Record patient progress and toleration of activity level   Outcome: Progressing     Problem: PAIN - ADULT  Goal: Verbalizes/displays adequate comfort level or baseline comfort level  Description: Interventions:  - Encourage patient to monitor pain and request assistance  - Assess pain using appropriate pain scale  - Administer analgesics based on type and severity of pain and evaluate response  - Implement non-pharmacological measures as appropriate and evaluate response  - Consider cultural and social influences on pain and pain management  - Notify physician/advanced practitioner if interventions unsuccessful or patient reports new pain  Outcome: Progressing     Problem: SAFETY ADULT  Goal: Patient will remain free of falls  Description: INTERVENTIONS:  - Educate patient/family on patient safety including physical limitations  - Instruct patient to call for assistance with activity   - Consult OT/PT to assist with strengthening/mobility   - Keep Call bell within reach  - Keep bed low and locked with side rails adjusted as appropriate  - Keep care items and personal belongings within reach  - Initiate and maintain comfort rounds  - Make Fall Risk Sign visible to staff  - Offer Toileting every 2 Hours, in advance of need  - Initiate/Maintain  alarm  - Obtain necessary fall risk management equipment  - Apply yellow socks and bracelet for high fall risk patients  - Consider moving patient to room near nurses station  Outcome: Progressing     Problem: DISCHARGE PLANNING  Goal: Discharge to home or other facility with appropriate resources  Description: INTERVENTIONS:  - Identify barriers to discharge w/patient and caregiver  - Arrange for needed discharge resources and transportation as appropriate  - Identify discharge learning needs (meds, wound care, etc.)  - Arrange for interpretive services to assist at discharge as needed  - Refer to Case Management Department for coordinating discharge planning if the patient needs post-hospital services based on physician/advanced practitioner order or complex needs related to functional status, cognitive ability, or social support system  Outcome: Progressing     Problem: Nutrition/Hydration-ADULT  Goal: Nutrient/Hydration intake appropriate for improving, restoring or maintaining nutritional needs  Description: Monitor and assess patient's nutrition/hydration status for malnutrition. Collaborate with interdisciplinary team and initiate plan and interventions as ordered.  Monitor patient's weight and dietary intake as ordered or per policy. Utilize nutrition screening tool and intervene as necessary. Determine patient's food preferences and provide high-protein, high-caloric foods as appropriate.     INTERVENTIONS:  - Monitor oral intake, urinary output, labs, and treatment plans  - Assess nutrition and hydration status and recommend course of action  - Evaluate amount of meals eaten  - Assist patient with eating if necessary   - Allow adequate time for meals  - Recommend/ encourage appropriate diets, oral nutritional supplements, and vitamin/mineral supplements  - Order, calculate, and assess calorie counts as needed  - Recommend, monitor, and adjust tube feedings and TPN/PPN based on assessed needs  - Assess need  for intravenous fluids  - Provide specific nutrition/hydration education as appropriate  - Include patient/family/caregiver in decisions related to nutrition  Outcome: Progressing     Problem: Prexisting or High Potential for Compromised Skin Integrity  Goal: Skin integrity is maintained or improved  Description: INTERVENTIONS:  - Identify patients at risk for skin breakdown  - Assess and monitor skin integrity  - Assess and monitor nutrition and hydration status  - Monitor labs   - Assess for incontinence   - Turn and reposition patient  - Assist with mobility/ambulation  - Relieve pressure over bony prominences  - Avoid friction and shearing  - Provide appropriate hygiene as needed including keeping skin clean and dry  - Evaluate need for skin moisturizer/barrier cream  - Collaborate with interdisciplinary team   - Patient/family teaching  - Consider wound care consult   Outcome: Progressing     Problem: Knowledge Deficit  Goal: Patient/family/caregiver demonstrates understanding of disease process, treatment plan, medications, and discharge instructions  Description: Complete learning assessment and assess knowledge base.  Interventions:  - Provide teaching at level of understanding  - Provide teaching via preferred learning methods  Outcome: Progressing

## 2024-01-02 NOTE — PLAN OF CARE
Problem: Potential for Falls  Goal: Patient will remain free of falls  Description: INTERVENTIONS:  - Educate patient/family on patient safety including physical limitations  - Instruct patient to call for assistance with activity   - Consult OT/PT to assist with strengthening/mobility   - Keep Call bell within reach  - Keep bed low and locked with side rails adjusted as appropriate  - Keep care items and personal belongings within reach  - Initiate and maintain comfort rounds  - Make Fall Risk Sign visible to staff  - Offer Toileting every 2 Hours, in advance of need  - Initiate/Maintain alarm  - Obtain necessary fall risk management equipment  - Apply yellow socks and bracelet for high fall risk patients  - Consider moving patient to room near nurses station  Outcome: Progressing     Problem: CARDIOVASCULAR - ADULT  Goal: Maintains optimal cardiac output and hemodynamic stability  Description: INTERVENTIONS:  - Monitor I/O, vital signs and rhythm  - Monitor for S/S and trends of decreased cardiac output  - Administer and titrate ordered vasoactive medications to optimize hemodynamic stability  - Assess quality of pulses, skin color and temperature  - Assess for signs of decreased coronary artery perfusion  - Instruct patient to report change in severity of symptoms  Outcome: Progressing  Goal: Absence of cardiac dysrhythmias or at baseline rhythm  Description: INTERVENTIONS:  - Continuous cardiac monitoring, vital signs, obtain 12 lead EKG if ordered  - Administer antiarrhythmic and heart rate control medications as ordered  - Monitor electrolytes and administer replacement therapy as ordered  Outcome: Progressing     Problem: RESPIRATORY - ADULT  Goal: Achieves optimal ventilation and oxygenation  Description: INTERVENTIONS:  - Assess for changes in respiratory status  - Assess for changes in mentation and behavior  - Position to facilitate oxygenation and minimize respiratory effort  - Oxygen administered by  appropriate delivery if ordered  - Initiate smoking cessation education as indicated  - Encourage broncho-pulmonary hygiene including cough, deep breathe, Incentive Spirometry  - Assess the need for suctioning and aspirate as needed  - Assess and instruct to report SOB or any respiratory difficulty  - Respiratory Therapy support as indicated  Outcome: Progressing     Problem: METABOLIC, FLUID AND ELECTROLYTES - ADULT  Goal: Electrolytes maintained within normal limits  Description: INTERVENTIONS:  - Monitor labs and assess patient for signs and symptoms of electrolyte imbalances  - Administer electrolyte replacement as ordered  - Monitor response to electrolyte replacements, including repeat lab results as appropriate  - Instruct patient on fluid and nutrition as appropriate  Outcome: Progressing  Goal: Fluid balance maintained  Description: INTERVENTIONS:  - Monitor labs   - Monitor I/O and WT  - Instruct patient on fluid and nutrition as appropriate  - Assess for signs & symptoms of volume excess or deficit  Outcome: Progressing  Goal: Glucose maintained within target range  Description: INTERVENTIONS:  - Monitor Blood Glucose as ordered  - Assess for signs and symptoms of hyperglycemia and hypoglycemia  - Administer ordered medications to maintain glucose within target range  - Assess nutritional intake and initiate nutrition service referral as needed  Outcome: Progressing

## 2024-01-02 NOTE — ASSESSMENT & PLAN NOTE
Wt Readings from Last 3 Encounters:   01/02/24 112 kg (247 lb 6.4 oz)   12/12/23 115 kg (254 lb)   12/05/23 112 kg (247 lb)     Presents with worsening dyspnea and anasarca since [place]   Home regimen: lasix 40mg BID with increased to 60mg in AM and 40mg in PM 3 days ago with no improvement in symptoms   Last echo 10/2023: Severe left atrial dilation. RV systolic pressure 52.2mmHg. pulmonary artery pressure elevated  Pt with anasarca and pulmonary congestion - continue lasix 100mg IV BID   I/o and daily weights   Sodium and fluid restriction   Cardiology following  Observe off further metolazone for now.   Volume status improving.

## 2024-01-02 NOTE — PROGRESS NOTES
"[facility]  Progress Note  Name: Hammad Montesinos I  MRN: [id number]  Unit/Bed#: MS 329Svitlana I Date of Admission: 12/28/2023   Date of Service: 1/2/2024  Hospital Day: 5    Assessment/Plan   Paroxysmal A-fib ([facility])  Assessment & Plan  History noted  Not on anticoagulation  Cardiology following, appreciate recommendations  Outpatient cardiology dcd AC for risk of bleeding and fall, discussed with daughter, she would like to talk with primary cardiologist before starting anticoagulation    Pulmonary hypertension ([facility])  Assessment & Plan  Home regimen: pulmicort BID, performist BID, and DuoNeb twice daily  Continue home nebulizers and increased dose of IV diuresis due to anasarca    Elevated troponin  Assessment & Plan  HS trop: 48 > 64  EKG without acute ischemia - showing paced rhythm   Likely s/d to volume overload     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 ([facility])  Assessment & Plan  Baseline creatinine is around 1.2  Recent Labs     12/31/23  0306 01/01/24  0459 01/02/24  0430   CREATININE 1.46* 1.35* 1.29   EGFR 43 47 50       Estimated Creatinine Clearance: 54.3 mL/min (by C-G formula based on SCr of 1.29 mg/dL).   Trend BMP daily in setting of IV diuresis    Primary hypertension  Assessment & Plan  Home regimen: Amlodipine 10 Mg daily, losartan 100 Mg daily  Continue home medications  /56   Pulse 78   Temp 98.4 °F (36.9 °C)   Resp 14   Ht 5' 11\" (1.803 m)   Wt 112 kg (247 lb 6.4 oz)   SpO2 97%   BMI 34.51 kg/m²      Mixed hyperlipidemia  Assessment & Plan  Continue home statin    Type 2 diabetes mellitus with hyperglycemia, with long-term current use of insulin ([facility])  Assessment & Plan  Lab Results   Component Value Date    HGBA1C 6.8 11/16/2023       Recent Labs     01/01/24  1556 01/01/24 2037 01/02/24  0736 01/02/24  1107   POCGLU 196* 161* 130 213*         Blood Sugar Average: Last 72 hrs:  (P) [id number] regimen: Lantus 18 units " twice daily and NovoLog 22 unit with breakfast, 23 units lunch, and 20 units with dinner  Decrease Lantus further  to 12 units twice daily  Decrease Humalog to 8 units 3 times daily  SSI      * Acute on chronic diastolic heart failure (HCC)  Assessment & Plan  Wt Readings from Last 3 Encounters:   24 112 kg (247 lb 6.4 oz)   23 115 kg (254 lb)   23 112 kg (247 lb)     Presents with worsening dyspnea and anasarca since [place]   Home regimen: lasix 40mg BID with increased to 60mg in AM and 40mg in PM 3 days ago with no improvement in symptoms   Last echo 10/2023: Severe left atrial dilation. RV systolic pressure 52.2mmHg. pulmonary artery pressure elevated  Pt with anasarca and pulmonary congestion - continue lasix 100mg IV BID   I/o and daily weights   Sodium and fluid restriction   Cardiology following  Observe off further metolazone for now.   Volume status improving.           VTE  Prophylaxis:   Pharmacologic: in place  Mechanical VTE Prophylaxis in Place: Yes    Patient Centered Rounds: I have performed bedside rounds with nursing staff today.    Discussions with Specialists or Other Care Team Provider: case management    Education and Discussions with Family / Patient: pt wife      Current Length of Stay: 5 day(s)    Current Patient Status: Inpatient        Code Status: Level 1 - Full Code    Discharge Plan: Pt will require continued inpatient hospitalization.    Subjective:   Pt continues to diurese well  Does feel better no acute complaints reported    Patient is seen and examined at bedside.  All other ROS are negative.    Objective:     Vitals:   Temp (24hrs), Av.3 °F (36.8 °C), Min:98.2 °F (36.8 °C), Max:98.4 °F (36.9 °C)    Temp:  [98.2 °F (36.8 °C)-98.4 °F (36.9 °C)] 98.4 °F (36.9 °C)  HR:  [52-94] 78  Resp:  [14-18] 14  BP: (125-145)/(56-73) 145/56  SpO2:  [93 %-98 %] 97 %  Body mass index is 34.51 kg/m².     Input and Output Summary (last 24 hours):       Intake/Output Summary  (Last 24 hours) at 1/2/2024 1311  Last data filed at 1/2/2024 0815  Gross per 24 hour   Intake 905 ml   Output 3325 ml   Net -2420 ml       Physical Exam:       GEN: No acute distress, comfortable  HEEENT: No JVD, PERRLA, no scleral icterus  RESP: Lungs clear to auscultation bilaterally  CV: RRR, +s1/s2   ABD: SOFT NON TENDER, POSITIVE BOWEL SOUNDS, NO DISTENTION  PSYCH: CALM  NEURO: Mentation baseline, NO FOCAL DEFICITS  SKIN: NO RASH  EXTREM:   EDEMA of LE present, improved    Additional Data:     Labs:    Results from last 7 days   Lab Units 01/02/24  0430   WBC Thousand/uL 7.33   HEMOGLOBIN g/dL 11.1*   HEMATOCRIT % 35.1*   PLATELETS Thousands/uL 173   NEUTROS PCT % 64   LYMPHS PCT % 19   MONOS PCT % 13*   EOS PCT % 3     Results from last 7 days   Lab Units 01/02/24  0430   SODIUM mmol/L 138   POTASSIUM mmol/L 3.3*   CHLORIDE mmol/L 96   CO2 mmol/L 32   BUN mg/dL 41*   CREATININE mg/dL 1.29   ANION GAP mmol/L 10   CALCIUM mg/dL 9.4   ALBUMIN g/dL 3.8   TOTAL BILIRUBIN mg/dL 1.69*   ALK PHOS U/L 134*   ALT U/L 13   AST U/L 18   GLUCOSE RANDOM mg/dL 134         Results from last 7 days   Lab Units 01/02/24  1107 01/02/24  0736 01/01/24 2037 01/01/24  1556 01/01/24  1145 01/01/24  0719 01/01/24  0048 01/01/24  0027 12/31/23  2025 12/31/23  1656 12/31/23  1134 12/31/23  0820   POC GLUCOSE mg/dl 213* 130 161* 196* 174* 95 85 53* 193* 194* 155* 148*               Lines/Drains:  Invasive Devices       Peripheral Intravenous Line  Duration             Peripheral IV 12/31/23 Right;Ventral (anterior) Forearm 2 days                    Telemetry:   Telemetry Orders (From admission, onward)               24 Hour Telemetry Monitoring  Continuous x 24 Hours (Telem)        Question:  Reason for 24 Hour Telemetry  Answer:  Decompensated CHF- and any one of the following: continuous diuretic infusion or total diuretic dose >200 mg daily, associated electrolyte derangement (I.e. K < 3.0), ionotropic drip (continuous infusion),  hx of ventricular arrhythmia, or new EF < 35%                        * I Have Reviewed All Lab Data Listed Above.           Imaging:     No results found for this or any previous visit.    Results for orders placed during the hospital encounter of 12/28/23    XR chest 2 views    Narrative  CHEST    INDICATION:   Chest Pain.    COMPARISON: CT chest 11/14/2023    EXAM PERFORMED/VIEWS:  XR CHEST PA & LATERAL      FINDINGS:  Left-sided chest wall intracardiac device is identified.  Leads are intact.    Cardiomegaly.    Diffuse prominence of the pulmonary vascular markings with mild hazy opacities. Small bilateral pleural effusions.    Osseous structures appear within normal limits for patient age.    Impression  Pulmonary edema with small bilateral pleural effusions.            Workstation performed: [id number]      *I have reviewed all imaging reports listed above      Recent Cultures (last 7 days):           Last 24 Hours Medication List:   Current Facility-Administered Medications   Medication Dose Route Frequency Provider Last Rate    acetaminophen  650 mg Oral Q6H PRN Gricel L Cedillo, PA-C      albuterol  2.5 mg Nebulization Q4H PRN Semaj Perez MD      allopurinol  300 mg Oral Daily Gricel L Cedillo, PA-C      aluminum-magnesium hydroxide-simethicone  30 mL Oral Q4H PRN Gricel L Cedillo, PA-C      amLODIPine  10 mg Oral Daily Gricel L Cedillo, PA-C      atorvastatin  20 mg Oral After Dinner Gricel L Cedillo, PA-C      azelastine  1 spray Each Nare BID Gricel L Cedillo, PA-C      budesonide  0.5 mg Nebulization BID Gricel L Cedillo, PA-C      cholecalciferol  1,000 Units Oral Daily Gricel L Cedillo, PA-C      co-enzyme Q-10  100 mg Oral Daily Gricel L Cedillo, PA-C      fish oil  1,000 mg Oral Daily Gricel L Cedillo, PA-C      fluticasone  1 spray Nasal BID Gricel L Cedillo, PA-C      furosemide  100 mg Intravenous BID (diuretic) Gricel L Cedillo, PA-C      gabapentin  100 mg Oral Daily Gricel L  AINSLEY Cedillo      heparin (porcine)  5,000 Units Subcutaneous Q8H [facility] Gricel Cedillo PA-C      insulin glargine  12 Units Subcutaneous Q12H [facility] Madhavi Cesar MD      insulin lispro  1-5 Units Subcutaneous TID  Ara Leigh MD      insulin lispro  8 Units Subcutaneous TID With Meals Madhavi Cesar MD      lidocaine  1 patch Topical HS Gricel Cedillo PA-C      lidocaine  1 patch Topical HS Gricel Cedillo PA-C      loratadine  10 mg Oral Daily Gricel Cedillo PA-C      losartan  100 mg Oral Daily Gricel Cedillo PA-C      senna  2 tablet Oral HS PRN Gricel Cedillo PA-C          Today, Patient Was Seen By: Madhavi Cesar MD    ** Please Note: Dictation voice to text software may have been used in the creation of this document. **

## 2024-01-02 NOTE — ASSESSMENT & PLAN NOTE
Lab Results   Component Value Date    HGBA1C 6.8 11/16/2023       Recent Labs     01/01/24  1556 01/01/24 2037 01/02/24  0736 01/02/24  1107   POCGLU 196* 161* 130 213*         Blood Sugar Average: Last 72 hrs:  (P) [id number] regimen: Lantus 18 units twice daily and NovoLog 22 unit with breakfast, 23 units lunch, and 20 units with dinner  Decrease Lantus further  to 12 units twice daily  Decrease Humalog to 8 units 3 times daily  SSI

## 2024-01-03 DIAGNOSIS — E11.42 DIABETIC POLYNEUROPATHY ASSOCIATED WITH TYPE 2 DIABETES MELLITUS (HCC): ICD-10-CM

## 2024-01-03 LAB
ALBUMIN SERPL BCP-MCNC: 3.5 G/DL (ref 3.5–5)
ALP SERPL-CCNC: 128 U/L (ref 34–104)
ALT SERPL W P-5'-P-CCNC: 14 U/L (ref 7–52)
ANION GAP SERPL CALCULATED.3IONS-SCNC: 7 MMOL/L
AST SERPL W P-5'-P-CCNC: 18 U/L (ref 13–39)
BASOPHILS # BLD AUTO: 0.05 THOUSANDS/ÂΜL (ref 0–0.1)
BASOPHILS NFR BLD AUTO: 1 % (ref 0–1)
BILIRUB SERPL-MCNC: 1.54 MG/DL (ref 0.2–1)
BUN SERPL-MCNC: 44 MG/DL (ref 5–25)
CALCIUM SERPL-MCNC: 9.5 MG/DL (ref 8.4–10.2)
CHLORIDE SERPL-SCNC: 98 MMOL/L (ref 96–108)
CO2 SERPL-SCNC: 32 MMOL/L (ref 21–32)
CREAT SERPL-MCNC: 1.34 MG/DL (ref 0.6–1.3)
EOSINOPHIL # BLD AUTO: 0.29 THOUSAND/ÂΜL (ref 0–0.61)
EOSINOPHIL NFR BLD AUTO: 5 % (ref 0–6)
ERYTHROCYTE [DISTWIDTH] IN BLOOD BY AUTOMATED COUNT: 17.7 % (ref 11.6–15.1)
GFR SERPL CREATININE-BSD FRML MDRD: 48 ML/MIN/1.73SQ M
GLUCOSE SERPL-MCNC: 163 MG/DL (ref 65–140)
GLUCOSE SERPL-MCNC: 171 MG/DL (ref 65–140)
GLUCOSE SERPL-MCNC: 197 MG/DL (ref 65–140)
GLUCOSE SERPL-MCNC: 199 MG/DL (ref 65–140)
GLUCOSE SERPL-MCNC: 310 MG/DL (ref 65–140)
HCT VFR BLD AUTO: 35.1 % (ref 36.5–49.3)
HGB BLD-MCNC: 11 G/DL (ref 12–17)
IMM GRANULOCYTES # BLD AUTO: 0.02 THOUSAND/UL (ref 0–0.2)
IMM GRANULOCYTES NFR BLD AUTO: 0 % (ref 0–2)
LYMPHOCYTES # BLD AUTO: 1.13 THOUSANDS/ÂΜL (ref 0.6–4.47)
LYMPHOCYTES NFR BLD AUTO: 19 % (ref 14–44)
MAGNESIUM SERPL-MCNC: 2.1 MG/DL (ref 1.9–2.7)
MCH RBC QN AUTO: 24.9 PG (ref 26.8–34.3)
MCHC RBC AUTO-ENTMCNC: 31.3 G/DL (ref 31.4–37.4)
MCV RBC AUTO: 80 FL (ref 82–98)
MONOCYTES # BLD AUTO: 0.67 THOUSAND/ÂΜL (ref 0.17–1.22)
MONOCYTES NFR BLD AUTO: 11 % (ref 4–12)
NEUTROPHILS # BLD AUTO: 3.73 THOUSANDS/ÂΜL (ref 1.85–7.62)
NEUTS SEG NFR BLD AUTO: 64 % (ref 43–75)
NRBC BLD AUTO-RTO: 0 /100 WBCS
PLATELET # BLD AUTO: 151 THOUSANDS/UL (ref 149–390)
PMV BLD AUTO: 11.8 FL (ref 8.9–12.7)
POTASSIUM SERPL-SCNC: 3.5 MMOL/L (ref 3.5–5.3)
PROT SERPL-MCNC: 6.5 G/DL (ref 6.4–8.4)
RBC # BLD AUTO: 4.41 MILLION/UL (ref 3.88–5.62)
SODIUM SERPL-SCNC: 137 MMOL/L (ref 135–147)
WBC # BLD AUTO: 5.89 THOUSAND/UL (ref 4.31–10.16)

## 2024-01-03 PROCEDURE — 99232 SBSQ HOSP IP/OBS MODERATE 35: CPT | Performed by: HOSPITALIST

## 2024-01-03 PROCEDURE — 83735 ASSAY OF MAGNESIUM: CPT | Performed by: HOSPITALIST

## 2024-01-03 PROCEDURE — 80053 COMPREHEN METABOLIC PANEL: CPT | Performed by: HOSPITALIST

## 2024-01-03 PROCEDURE — 82948 REAGENT STRIP/BLOOD GLUCOSE: CPT

## 2024-01-03 PROCEDURE — 94664 DEMO&/EVAL PT USE INHALER: CPT

## 2024-01-03 PROCEDURE — 94640 AIRWAY INHALATION TREATMENT: CPT

## 2024-01-03 PROCEDURE — 99233 SBSQ HOSP IP/OBS HIGH 50: CPT | Performed by: INTERNAL MEDICINE

## 2024-01-03 PROCEDURE — 94760 N-INVAS EAR/PLS OXIMETRY 1: CPT

## 2024-01-03 PROCEDURE — 85025 COMPLETE CBC W/AUTO DIFF WBC: CPT | Performed by: HOSPITALIST

## 2024-01-03 RX ORDER — GABAPENTIN 100 MG/1
200 CAPSULE ORAL DAILY
Qty: 180 CAPSULE | Refills: 1 | Status: SHIPPED | OUTPATIENT
Start: 2024-01-03

## 2024-01-03 RX ORDER — POTASSIUM CHLORIDE 20 MEQ/1
40 TABLET, EXTENDED RELEASE ORAL ONCE
Status: COMPLETED | OUTPATIENT
Start: 2024-01-03 | End: 2024-01-03

## 2024-01-03 RX ADMIN — FLUTICASONE PROPIONATE 1 SPRAY: 50 SPRAY, METERED NASAL at 08:53

## 2024-01-03 RX ADMIN — INSULIN LISPRO 8 UNITS: 100 INJECTION, SOLUTION INTRAVENOUS; SUBCUTANEOUS at 08:52

## 2024-01-03 RX ADMIN — GABAPENTIN 100 MG: 100 CAPSULE ORAL at 08:51

## 2024-01-03 RX ADMIN — ALLOPURINOL 300 MG: 100 TABLET ORAL at 08:51

## 2024-01-03 RX ADMIN — Medication 1000 UNITS: at 08:51

## 2024-01-03 RX ADMIN — INSULIN LISPRO 8 UNITS: 100 INJECTION, SOLUTION INTRAVENOUS; SUBCUTANEOUS at 16:02

## 2024-01-03 RX ADMIN — INSULIN LISPRO 1 UNITS: 100 INJECTION, SOLUTION INTRAVENOUS; SUBCUTANEOUS at 16:03

## 2024-01-03 RX ADMIN — Medication 100 MG: at 08:52

## 2024-01-03 RX ADMIN — FUROSEMIDE 100 MG: 10 INJECTION, SOLUTION INTRAMUSCULAR; INTRAVENOUS at 08:50

## 2024-01-03 RX ADMIN — ATORVASTATIN CALCIUM 20 MG: 20 TABLET, FILM COATED ORAL at 17:06

## 2024-01-03 RX ADMIN — AMLODIPINE BESYLATE 10 MG: 5 TABLET ORAL at 08:50

## 2024-01-03 RX ADMIN — POTASSIUM CHLORIDE 40 MEQ: 1500 TABLET, EXTENDED RELEASE ORAL at 08:51

## 2024-01-03 RX ADMIN — AZELASTINE HYDROCHLORIDE 1 SPRAY: 137 SPRAY, METERED NASAL at 08:53

## 2024-01-03 RX ADMIN — FUROSEMIDE 100 MG: 10 INJECTION, SOLUTION INTRAMUSCULAR; INTRAVENOUS at 16:02

## 2024-01-03 RX ADMIN — INSULIN LISPRO 1 UNITS: 100 INJECTION, SOLUTION INTRAVENOUS; SUBCUTANEOUS at 08:52

## 2024-01-03 RX ADMIN — INSULIN LISPRO 1 UNITS: 100 INJECTION, SOLUTION INTRAVENOUS; SUBCUTANEOUS at 12:17

## 2024-01-03 RX ADMIN — BUDESONIDE 0.5 MG: 0.5 INHALANT ORAL at 07:30

## 2024-01-03 RX ADMIN — HEPARIN SODIUM 5000 UNITS: 5000 INJECTION INTRAVENOUS; SUBCUTANEOUS at 13:06

## 2024-01-03 RX ADMIN — HEPARIN SODIUM 5000 UNITS: 5000 INJECTION INTRAVENOUS; SUBCUTANEOUS at 21:38

## 2024-01-03 RX ADMIN — LORATADINE 10 MG: 10 TABLET ORAL at 08:51

## 2024-01-03 RX ADMIN — HEPARIN SODIUM 5000 UNITS: 5000 INJECTION INTRAVENOUS; SUBCUTANEOUS at 05:07

## 2024-01-03 RX ADMIN — INSULIN GLARGINE 12 UNITS: 100 INJECTION, SOLUTION SUBCUTANEOUS at 08:51

## 2024-01-03 RX ADMIN — BUDESONIDE 0.5 MG: 0.5 INHALANT ORAL at 20:02

## 2024-01-03 RX ADMIN — INSULIN LISPRO 8 UNITS: 100 INJECTION, SOLUTION INTRAVENOUS; SUBCUTANEOUS at 12:17

## 2024-01-03 RX ADMIN — OMEGA-3 FATTY ACIDS CAP 1000 MG 1000 MG: 1000 CAP at 08:51

## 2024-01-03 RX ADMIN — LOSARTAN POTASSIUM 100 MG: 50 TABLET, FILM COATED ORAL at 08:51

## 2024-01-03 RX ADMIN — INSULIN GLARGINE 12 UNITS: 100 INJECTION, SOLUTION SUBCUTANEOUS at 21:38

## 2024-01-03 NOTE — PHYSICAL THERAPY NOTE
Physical Therapy Screen    Patient Name: Hammad Montesinos  Today's Date: 1/3/2024  Problem List  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 (HCC)    Elevated troponin    Pulmonary hypertension (HCC)    Paroxysmal A-fib (HCC)       01/03/24 1033   PT Last Visit   PT Visit Date 01/03/24   Note Type   Note type Screen   Additional Comments Pt consult received and chart reviewed. Spoke with VIKASH Esparza whom reports that pt has been ambulating independently in room and hallway. RN discussed with pt, and pt himself denies need for a formal PT evaluation as he presents at his baseline level of mobility. Will screen from PT caseload. Please reconsult if pt's medical or functional status significantly changes.       Jeimy Logan, PT, DPT   Available via Offerial  NPAngel Alerts # [id number]  [place] License - [id number]  1/3/2024

## 2024-01-03 NOTE — ASSESSMENT & PLAN NOTE
"Home regimen: Amlodipine 10 Mg daily, losartan 100 Mg daily  Continue home medications  /59 (BP Location: Right arm)   Pulse 59   Temp 97.9 °F (36.6 °C) (Axillary)   Resp 20   Ht 5' 11\" (1.803 m)   Wt 112 kg (246 lb 9.6 oz)   SpO2 96%   BMI 34.39 kg/m²    "

## 2024-01-03 NOTE — ASSESSMENT & PLAN NOTE
Wt Readings from Last 3 Encounters:   01/03/24 112 kg (246 lb 9.6 oz)   12/12/23 115 kg (254 lb)   12/05/23 112 kg (247 lb)     Presents with worsening dyspnea and anasarca since [place]   Home regimen: lasix 40mg BID with increased to 60mg in AM and 40mg in PM 3 days ago with no improvement in symptoms   Last echo 10/2023: Severe left atrial dilation. RV systolic pressure 52.2mmHg. pulmonary artery pressure elevated  Pt with anasarca and pulmonary congestion - continue lasix 100mg IV BID   I/o and daily weights   Sodium and fluid restriction   Cardiology following  Observe off further metolazone for now.   Volume status improving.

## 2024-01-03 NOTE — PROGRESS NOTES
"[facility]  Progress Note  Name: Hammad Montesinos I  MRN: [id number]  Unit/Bed#: MS 329Svitlana I Date of Admission: 12/28/2023   Date of Service: 1/3/2024 I Hospital Day: 6    Assessment/Plan   Paroxysmal A-fib (HCC)  Assessment & Plan  History noted  Not on anticoagulation  Cardiology following, appreciate recommendations  Outpatient cardiology dcd AC for risk of bleeding and fall, discussed with daughter, she would like to talk with primary cardiologist before starting anticoagulation    Pulmonary hypertension (HCC)  Assessment & Plan  Home regimen: pulmicort BID, performist BID, and DuoNeb twice daily  Continue home nebulizers and increased dose of IV diuresis due to anasarca    Elevated troponin  Assessment & Plan  HS trop: 48 > 64  EKG without acute ischemia - showing paced rhythm   Likely s/d to volume overload     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 ([facility])  Assessment & Plan  Baseline creatinine is around 1.2  Recent Labs     01/01/24  0459 01/02/24  0430 01/03/24  0456   CREATININE 1.35* 1.29 1.34*   EGFR 47 50 48       Estimated Creatinine Clearance: 52.2 mL/min (A) (by C-G formula based on SCr of 1.34 mg/dL (H)).   Trend BMP daily in setting of IV diuresis    Primary hypertension  Assessment & Plan  Home regimen: Amlodipine 10 Mg daily, losartan 100 Mg daily  Continue home medications  /59 (BP Location: Right arm)   Pulse 59   Temp 97.9 °F (36.6 °C) (Axillary)   Resp 20   Ht 5' 11\" (1.803 m)   Wt 112 kg (246 lb 9.6 oz)   SpO2 96%   BMI 34.39 kg/m²      Mixed hyperlipidemia  Assessment & Plan  Continue home statin    Type 2 diabetes mellitus with hyperglycemia, with long-term current use of insulin ([facility])  Assessment & Plan  Lab Results   Component Value Date    HGBA1C 6.8 11/16/2023       Recent Labs     01/02/24  1534 01/02/24  2121 01/03/24  0825 01/03/24  1113   POCGLU 268* 317* 163* 199*         Blood Sugar Average: Last 72 hrs:  (P) " 171.5Home regimen: Lantus 18 units twice daily and NovoLog 22 unit with breakfast, 23 units lunch, and 20 units with dinner  Decrease Lantus further  to 12 units twice daily  Decrease Humalog to 8 units 3 times daily  SSI      * Acute on chronic diastolic heart failure (HCC)  Assessment & Plan  Wt Readings from Last 3 Encounters:   24 112 kg (246 lb 9.6 oz)   23 115 kg (254 lb)   23 112 kg (247 lb)     Presents with worsening dyspnea and anasarca since [place]   Home regimen: lasix 40mg BID with increased to 60mg in AM and 40mg in PM 3 days ago with no improvement in symptoms   Last echo 10/2023: Severe left atrial dilation. RV systolic pressure 52.2mmHg. pulmonary artery pressure elevated  Pt with anasarca and pulmonary congestion - continue lasix 100mg IV BID   I/o and daily weights   Sodium and fluid restriction   Cardiology following  Observe off further metolazone for now.   Volume status improving.           VTE  Prophylaxis:   Pharmacologic: in place  Mechanical VTE Prophylaxis in Place: Yes    Patient Centered Rounds: I have performed bedside rounds with nursing staff today.    Discussions with Specialists or Other Care Team Provider: case management         Current Length of Stay: 6 day(s)    Current Patient Status: Inpatient        Code Status: Level 1 - Full Code    Discharge Plan: Pt will require continued inpatient hospitalization.    Subjective:   Pt with continued improvement of the LE edema    Patient is seen and examined at bedside.  All other ROS are negative.    Objective:     Vitals:   Temp (24hrs), Av.2 °F (36.8 °C), Min:97.9 °F (36.6 °C), Max:98.5 °F (36.9 °C)    Temp:  [97.9 °F (36.6 °C)-98.5 °F (36.9 °C)] 97.9 °F (36.6 °C)  HR:  [59-71] 59  BP: (110-124)/(57-65) 113/59  SpO2:  [93 %-98 %] 96 %  Body mass index is 34.39 kg/m².     Input and Output Summary (last 24 hours):       Intake/Output Summary (Last 24 hours) at 1/3/2024 1440  Last data filed at 1/3/2024  1433  Gross per 24 hour   Intake 1386 ml   Output 2020 ml   Net -634 ml       Physical Exam:       GEN: No acute distress, comfortable  HEEENT: No JVD, PERRLA, no scleral icterus  RESP: Lungs clear to auscultation bilaterally  CV: RRR, +s1/s2   ABD: SOFT NON TENDER, POSITIVE BOWEL SOUNDS, NO DISTENTION  PSYCH: CALM  NEURO: Mentation baseline, NO FOCAL DEFICITS  SKIN: NO RASH  EXTREM: edema of LE improving.     Additional Data:     Labs:    Results from last 7 days   Lab Units 24  0456   WBC Thousand/uL 5.89   HEMOGLOBIN g/dL 11.0*   HEMATOCRIT % 35.1*   PLATELETS Thousands/uL 151   NEUTROS PCT % 64   LYMPHS PCT % 19   MONOS PCT % 11   EOS PCT % 5     Results from last 7 days   Lab Units 24  0456   SODIUM mmol/L 137   POTASSIUM mmol/L 3.5   CHLORIDE mmol/L 98   CO2 mmol/L 32   BUN mg/dL 44*   CREATININE mg/dL 1.34*   ANION GAP mmol/L 7   CALCIUM mg/dL 9.5   ALBUMIN g/dL 3.5   TOTAL BILIRUBIN mg/dL 1.54*   ALK PHOS U/L 128*   ALT U/L 14   AST U/L 18   GLUCOSE RANDOM mg/dL 197*         Results from last 7 days   Lab Units 24  1113 24  0825 24  2121 24  1534 24  1107 24  0736 24  2037 24  1556 24  1145 24  0719 24  0048 24  0027   POC GLUCOSE mg/dl 199* 163* 317* 268* 213* 130 161* 196* 174* 95 85 53*               Lines/Drains:  Invasive Devices       Peripheral Intravenous Line  Duration             Peripheral IV 23 Right;Ventral (anterior) Forearm 3 days                    Telemetry:   Telemetry Orders (From admission, onward)               24 Hour Telemetry Monitoring  Continuous x 24 Hours (Telem)           Question:  Reason for 24 Hour Telemetry  Answer:  Decompensated CHF- and any one of the following: continuous diuretic infusion or total diuretic dose >200 mg daily, associated electrolyte derangement (I.e. K < 3.0), ionotropic drip (continuous infusion), hx of ventricular arrhythmia, or new EF < 35%                         * I Have Reviewed All Lab Data Listed Above.           Imaging:     No results found for this or any previous visit.    Results for orders placed during the hospital encounter of 12/28/23    XR chest 2 views    Narrative  CHEST    INDICATION:   Chest Pain.    COMPARISON: CT chest 11/14/2023    EXAM PERFORMED/VIEWS:  XR CHEST PA & LATERAL      FINDINGS:  Left-sided chest wall intracardiac device is identified.  Leads are intact.    Cardiomegaly.    Diffuse prominence of the pulmonary vascular markings with mild hazy opacities. Small bilateral pleural effusions.    Osseous structures appear within normal limits for patient age.    Impression  Pulmonary edema with small bilateral pleural effusions.            Workstation performed: [id number]      *I have reviewed all imaging reports listed above      Recent Cultures (last 7 days):           Last 24 Hours Medication List:   Current Facility-Administered Medications   Medication Dose Route Frequency Provider Last Rate    acetaminophen  650 mg Oral Q6H PRN Gricel L Cedillo, PA-C      albuterol  2.5 mg Nebulization Q4H PRN Semaj Perez MD      allopurinol  300 mg Oral Daily Gricel L Cedillo, PA-C      aluminum-magnesium hydroxide-simethicone  30 mL Oral Q4H PRN Gricel L Cedillo, PA-C      amLODIPine  10 mg Oral Daily Gricel L Cedillo, PA-C      atorvastatin  20 mg Oral After Dinner Gricel L Cedillo, PA-C      azelastine  1 spray Each Nare BID Gricel L Cedillo, PA-C      budesonide  0.5 mg Nebulization BID Gricel L Cedillo, PA-C      cholecalciferol  1,000 Units Oral Daily Gricel L Cedillo, PA-C      co-enzyme Q-10  100 mg Oral Daily Gricel L Cedillo, PA-C      fish oil  1,000 mg Oral Daily Gricel L Cedillo, PA-C      fluticasone  1 spray Nasal BID Gricel L Cedillo, PA-C      furosemide  100 mg Intravenous BID (diuretic) Gricel L Cedillo, PA-C      gabapentin  100 mg Oral Daily Gricel L Cedillo, PA-C      heparin (porcine)  5,000 Units  Subcutaneous Q8H [facility] Gricel Cedillo PA-C      insulin glargine  12 Units Subcutaneous Q12H [facility] Madhavi Cesar MD      insulin lispro  1-5 Units Subcutaneous TID  Ara Leigh MD      insulin lispro  8 Units Subcutaneous TID With Meals Madhavi Cesar MD      lidocaine  1 patch Topical HS Gricel Cedillo PA-C      lidocaine  1 patch Topical HS Gricel Cedillo PA-C      loratadine  10 mg Oral Daily Gricel Cedillo PA-C      losartan  100 mg Oral Daily Gricel Cedillo PA-C      senna  2 tablet Oral HS PRN Gricel Cedillo PA-C          Today, Patient Was Seen By: Madhavi Cesar MD    ** Please Note: Dictation voice to text software may have been used in the creation of this document. **

## 2024-01-03 NOTE — NUTRITION
01/02/24 1700   Education Assessment   Education Education initiated/ completed   Education Notes Reviewed CHF diet educaion with pt, discussed diet recall and foods to limit with higher salt levels. Reviewed fluid restriction, 1800 ml with pt. Written materials provided on same, RD name and number provided for future questions.

## 2024-01-03 NOTE — ASSESSMENT & PLAN NOTE
Baseline creatinine is around 1.2  Recent Labs     01/01/24  0459 01/02/24  0430 01/03/24  0456   CREATININE 1.35* 1.29 1.34*   EGFR 47 50 48       Estimated Creatinine Clearance: 52.2 mL/min (A) (by C-G formula based on SCr of 1.34 mg/dL (H)).   Trend BMP daily in setting of IV diuresis

## 2024-01-03 NOTE — TELEPHONE ENCOUNTER
Received fax notification for refill of the patient's Gabapentin from [facility].

## 2024-01-03 NOTE — ASSESSMENT & PLAN NOTE
Lab Results   Component Value Date    HGBA1C 6.8 11/16/2023       Recent Labs     01/02/24  1534 01/02/24  2121 01/03/24  0825 01/03/24  1113   POCGLU 268* 317* 163* 199*         Blood Sugar Average: Last 72 hrs:  (P) 171.5Home regimen: Lantus 18 units twice daily and NovoLog 22 unit with breakfast, 23 units lunch, and 20 units with dinner  Decrease Lantus further  to 12 units twice daily  Decrease Humalog to 8 units 3 times daily  SSI

## 2024-01-03 NOTE — PROGRESS NOTES
"Cardiology Progress Note   Hammad Montesinos 83 y.o. male MRN: [id number]    Unit/Bed#: -01 Encounter: [id number]      Assessment:  Acute on chronic HFpEF  Follows with Dr Richter ([facility])   10/2023 echo: LVEF 50%, moderate concentric LVH, severe left atrial dilation  Prehospital diuretic: Lasix 40mg/20mg -> 60mg/40mg daily (approx 4 days prior to admission)   Baseline weight: 230lbs  Admission wt: 263lbs -> 247lbs -> 246lbs.   Hypertension   Home Rx: Norvasc 10mg, losartan 100mg  Hyperlipidemia   Home Rx: Lipitor 20mg  Remote hx of paroxysmal atrial fibrillation   S/P DC Biotronik PPM    Plan/Discussion:   Volume status improving overall; BMP shows slight bump in creatinine today. Would still continue IV diuretics today considering his volume status. Recheck BMP tomorrow.   Salt/fluid restrictions, daily weights, strict I&O monitoring.   Daily BMPs; keep replenishing for goal K>4 and Mg>2  Keep telemetry monitoring while diuresing  He is not on OAC at this point due to low Afib burden in the past. No AF noted here since admission. Ongoing discussion in regards to AC with his primary cardiologist.     Subjective:   Patient seen and examined. Overnight events reviewed. Patient denies any acute complaints at this time.     Objective:     Vitals: Blood pressure 113/59, pulse 59, temperature 97.9 °F (36.6 °C), temperature source Axillary, resp. rate 20, height 5' 11\" (1.803 m), weight 112 kg (246 lb 9.6 oz), SpO2 96%., Body mass index is 34.39 kg/m².,   Orthostatic Blood Pressures      Flowsheet Row Most Recent Value   Blood Pressure 113/59 filed at 01/03/2024 0827   Patient Position - Orthostatic VS Sitting filed at 01/03/2024 0827              Intake/Output Summary (Last 24 hours) at 1/3/2024 1041  Last data filed at 1/3/2024 0902  Gross per 24 hour   Intake 440 ml   Output 2295 ml   Net -1855 ml         Physical Exam:    GEN: Hammad Montesinos appears well, alert and oriented x 3, pleasant and " cooperative   HEENT: Sclera anicteric, conjunctivae pink, mucous membranes moist. Oropharynx clear.   NECK: supple, no significant JVD, Trachea midline, no thyromegaly.   HEART: regular rhythm, normal S1 and S2, no murmurs, clicks, gallops or rubs   LUNGS: clear to auscultation bilaterally; no wheezes, rales, or rhonchi. No increased work of breathing or signs of respiratory distress.   ABDOMEN: Soft, nontender, nondistended  EXTREMITIES: Skin warm and well perfused, no clubbing, cyanosis, or edema.  NEURO: No focal findings. Normal speech. Mood and affect normal.   SKIN: Normal without suspicious lesions on exposed skin.      Medications:      Current Facility-Administered Medications:     acetaminophen (TYLENOL) tablet 650 mg, 650 mg, Oral, Q6H PRN, Gricelmalini Cedillo PA-C, 650 mg at 12/29/23 0006    albuterol inhalation solution 2.5 mg, 2.5 mg, Nebulization, Q4H PRN, Semaj Perez MD, 2.5 mg at 01/02/24 1806    allopurinol (ZYLOPRIM) tablet 300 mg, 300 mg, Oral, Daily, Gricel Cedillo PA-C, 300 mg at 01/03/24 0851    aluminum-magnesium hydroxide-simethicone (MAALOX) oral suspension 30 mL, 30 mL, Oral, Q4H PRN, Gricel Cedillo PA-C    amLODIPine (NORVASC) tablet 10 mg, 10 mg, Oral, Daily, Gricel Cedillo PA-C, 10 mg at 01/03/24 0850    atorvastatin (LIPITOR) tablet 20 mg, 20 mg, Oral, After Dinner, Gricel Cedillo PA-C, 20 mg at 01/02/24 1703    azelastine (ASTELIN) 0.1 % nasal spray 1 spray, 1 spray, Each Nare, BID, Gricel Cedillo PA-C, 1 spray at 01/03/24 0853    budesonide (PULMICORT) inhalation solution 0.5 mg, 0.5 mg, Nebulization, BID, ROSSY Carpenter-C, 0.5 mg at 01/03/24 0730    cholecalciferol (VITAMIN D3) tablet 1,000 Units, 1,000 Units, Oral, Daily, ROSSY Carpenter-C, 1,000 Units at 01/03/24 0851    co-enzyme Q-10 capsule 100 mg, 100 mg, Oral, Daily, ROSSY Carpenter-C, 100 mg at 01/03/24 0852    fish oil capsule 1,000 mg, 1,000 mg, Oral, Daily, Gricel L Cedillo,  PA-C, 1,000 mg at 01/03/24 0851    fluticasone (FLONASE) 50 mcg/act nasal spray 1 spray, 1 spray, Nasal, BID, Gricel Cedillo PA-C, 1 spray at 01/03/24 0853    furosemide (LASIX) injection 100 mg, 100 mg, Intravenous, BID (diuretic), Gricel Cedillo PA-C, 100 mg at 01/03/24 0850    gabapentin (NEURONTIN) capsule 100 mg, 100 mg, Oral, Daily, Gricel Cedillo PA-C, 100 mg at 01/03/24 0851    heparin (porcine) subcutaneous injection 5,000 Units, 5,000 Units, Subcutaneous, Q8H TOVA, ROSSY Carpenter-C, 5,000 Units at 01/03/24 0507    insulin glargine (LANTUS) subcutaneous injection 12 Units 0.12 mL, 12 Units, Subcutaneous, Q12H TOVA, Madhavi Cesar MD, 12 Units at 01/03/24 0851    insulin lispro (HumaLOG) 100 units/mL subcutaneous injection 1-5 Units, 1-5 Units, Subcutaneous, TID AC, 1 Units at 01/03/24 0852 **AND** Fingerstick Glucose (POCT), , , 4x Daily AC and at bedtime, Ara Leigh MD    insulin lispro (HumaLOG) 100 units/mL subcutaneous injection 8 Units, 8 Units, Subcutaneous, TID With Meals, Madhavi Cesar MD, 8 Units at 01/03/24 0852    lidocaine (LIDODERM) 5 % patch 1 patch, 1 patch, Topical, HS, Gricel Cedillo PA-C, 1 patch at 01/01/24 2107    lidocaine (LIDODERM) 5 % patch 1 patch, 1 patch, Topical, HS, Gricel Cedillo PA-C, 1 patch at 01/01/24 2106    loratadine (CLARITIN) tablet 10 mg, 10 mg, Oral, Daily, Gricel Cedillo PA-C, 10 mg at 01/03/24 0851    losartan (COZAAR) tablet 100 mg, 100 mg, Oral, Daily, Gricel Cedillo PA-C, 100 mg at 01/03/24 0851    senna (SENOKOT) tablet 17.2 mg, 2 tablet, Oral, HS PRN, Gricel Cedillo PA-C     Labs & Results:        Results from last 7 days   Lab Units 01/03/24  0456 01/02/24  0430 12/30/23  0545   WBC Thousand/uL 5.89 7.33 6.69   HEMOGLOBIN g/dL 11.0* 11.1* 11.0*   HEMATOCRIT % 35.1* 35.1* 35.1*   PLATELETS Thousands/uL 151 173 163         Results from last 7 days   Lab Units 01/03/24  0456 01/02/24  0430 01/01/24  0459  12/30/23  0545 12/29/23  0527   POTASSIUM mmol/L 3.5 3.3* 3.6   < > 3.6   CHLORIDE mmol/L 98 96 99   < > 103   CO2 mmol/L 32 32 34*   < > 24   BUN mg/dL 44* 41* 43*   < > 40*   CREATININE mg/dL 1.34* 1.29 1.35*   < > 1.14   CALCIUM mg/dL 9.5 9.4 9.3   < > 9.1   ALK PHOS U/L 128* 134*  --   --  126*   ALT U/L 14 13  --   --  10   AST U/L 18 18  --   --  19    < > = values in this interval not displayed.         Results from last 7 days   Lab Units 01/03/24  0456 01/02/24  0430 12/30/23  0545   MAGNESIUM mg/dL 2.1 1.8* 2.0

## 2024-01-03 NOTE — PLAN OF CARE
Problem: Potential for Falls  Goal: Patient will remain free of falls  Description: INTERVENTIONS:  - Educate patient/family on patient safety including physical limitations  - Instruct patient to call for assistance with activity   - Consult OT/PT to assist with strengthening/mobility   - Keep Call bell within reach  - Keep bed low and locked with side rails adjusted as appropriate  - Keep care items and personal belongings within reach  - Initiate and maintain comfort rounds  - Make Fall Risk Sign visible to staff  - Offer Toileting every 2 Hours, in advance of need  - Initiate/Maintain alarm  - Obtain necessary fall risk management equipment  - Apply yellow socks and bracelet for high fall risk patients  - Consider moving patient to room near nurses station  Outcome: Progressing     Problem: CARDIOVASCULAR - ADULT  Goal: Maintains optimal cardiac output and hemodynamic stability  Description: INTERVENTIONS:  - Monitor I/O, vital signs and rhythm  - Monitor for S/S and trends of decreased cardiac output  - Administer and titrate ordered vasoactive medications to optimize hemodynamic stability  - Assess quality of pulses, skin color and temperature  - Assess for signs of decreased coronary artery perfusion  - Instruct patient to report change in severity of symptoms  Outcome: Progressing  Goal: Absence of cardiac dysrhythmias or at baseline rhythm  Description: INTERVENTIONS:  - Continuous cardiac monitoring, vital signs, obtain 12 lead EKG if ordered  - Administer antiarrhythmic and heart rate control medications as ordered  - Monitor electrolytes and administer replacement therapy as ordered  Outcome: Progressing     Problem: RESPIRATORY - ADULT  Goal: Achieves optimal ventilation and oxygenation  Description: INTERVENTIONS:  - Assess for changes in respiratory status  - Assess for changes in mentation and behavior  - Position to facilitate oxygenation and minimize respiratory effort  - Oxygen administered by  appropriate delivery if ordered  - Initiate smoking cessation education as indicated  - Encourage broncho-pulmonary hygiene including cough, deep breathe, Incentive Spirometry  - Assess the need for suctioning and aspirate as needed  - Assess and instruct to report SOB or any respiratory difficulty  - Respiratory Therapy support as indicated  Outcome: Progressing     Problem: METABOLIC, FLUID AND ELECTROLYTES - ADULT  Goal: Electrolytes maintained within normal limits  Description: INTERVENTIONS:  - Monitor labs and assess patient for signs and symptoms of electrolyte imbalances  - Administer electrolyte replacement as ordered  - Monitor response to electrolyte replacements, including repeat lab results as appropriate  - Instruct patient on fluid and nutrition as appropriate  Outcome: Progressing  Goal: Fluid balance maintained  Description: INTERVENTIONS:  - Monitor labs   - Monitor I/O and WT  - Instruct patient on fluid and nutrition as appropriate  - Assess for signs & symptoms of volume excess or deficit  Outcome: Progressing  Goal: Glucose maintained within target range  Description: INTERVENTIONS:  - Monitor Blood Glucose as ordered  - Assess for signs and symptoms of hyperglycemia and hypoglycemia  - Administer ordered medications to maintain glucose within target range  - Assess nutritional intake and initiate nutrition service referral as needed  Outcome: Progressing     Problem: MUSCULOSKELETAL - ADULT  Goal: Maintain or return mobility to safest level of function  Description: INTERVENTIONS:  - Assess patient's ability to carry out ADLs; assess patient's baseline for ADL function and identify physical deficits which impact ability to perform ADLs (bathing, care of mouth/teeth, toileting, grooming, dressing, etc.)  - Assess/evaluate cause of self-care deficits   - Assess range of motion  - Assess patient's mobility  - Assess patient's need for assistive devices and provide as appropriate  - Encourage  maximum independence but intervene and supervise when necessary  - Involve family in performance of ADLs  - Assess for home care needs following discharge   - Consider OT consult to assist with ADL evaluation and planning for discharge  - Provide patient education as appropriate  Outcome: Progressing     Problem: MOBILITY - ADULT  Goal: Maintain or return to baseline ADL function  Description: INTERVENTIONS:  -  Assess patient's ability to carry out ADLs; assess patient's baseline for ADL function and identify physical deficits which impact ability to perform ADLs (bathing, care of mouth/teeth, toileting, grooming, dressing, etc.)  - Assess/evaluate cause of self-care deficits   - Assess range of motion  - Assess patient's mobility; develop plan if impaired  - Assess patient's need for assistive devices and provide as appropriate  - Encourage maximum independence but intervene and supervise when necessary  - Involve family in performance of ADLs  - Assess for home care needs following discharge   - Consider OT consult to assist with ADL evaluation and planning for discharge  - Provide patient education as appropriate  Outcome: Progressing  Goal: Maintains/Returns to pre admission functional level  Description: INTERVENTIONS:  - Perform AM-PAC 6 Click Basic Mobility/ Daily Activity assessment daily.  - Set and communicate daily mobility goal to care team and patient/family/caregiver.   - Collaborate with rehabilitation services on mobility goals if consulted  - Perform Range of Motion 3 times a day.  - Reposition patient every 3 hours.  - Dangle patient 3 times a day  - Stand patient 3 times a day  - Ambulate patient 3 times a day  - Out of bed to chair 3 times a day   - Out of bed for meals 3 times a day  - Out of bed for toileting  - Record patient progress and toleration of activity level   Outcome: Progressing     Problem: PAIN - ADULT  Goal: Verbalizes/displays adequate comfort level or baseline comfort  level  Description: Interventions:  - Encourage patient to monitor pain and request assistance  - Assess pain using appropriate pain scale  - Administer analgesics based on type and severity of pain and evaluate response  - Implement non-pharmacological measures as appropriate and evaluate response  - Consider cultural and social influences on pain and pain management  - Notify physician/advanced practitioner if interventions unsuccessful or patient reports new pain  Outcome: Progressing     Problem: INFECTION - ADULT  Goal: Absence or prevention of progression during hospitalization  Description: INTERVENTIONS:  - Assess and monitor for signs and symptoms of infection  - Monitor lab/diagnostic results  - Monitor all insertion sites, i.e. indwelling lines, tubes, and drains  - Monitor endotracheal if appropriate and nasal secretions for changes in amount and color  - [place] appropriate cooling/warming therapies per order  - Administer medications as ordered  - Instruct and encourage patient and family to use good hand hygiene technique  - Identify and instruct in appropriate isolation precautions for identified infection/condition  Outcome: Progressing     Problem: SAFETY ADULT  Goal: Patient will remain free of falls  Description: INTERVENTIONS:  - Educate patient/family on patient safety including physical limitations  - Instruct patient to call for assistance with activity   - Consult OT/PT to assist with strengthening/mobility   - Keep Call bell within reach  - Keep bed low and locked with side rails adjusted as appropriate  - Keep care items and personal belongings within reach  - Initiate and maintain comfort rounds  - Make Fall Risk Sign visible to staff  - Offer Toileting every 2 Hours, in advance of need  - Initiate/Maintain alarm  - Obtain necessary fall risk management equipment  - Apply yellow socks and bracelet for high fall risk patients  - Consider moving patient to room near nurses  station  Outcome: Progressing     Problem: DISCHARGE PLANNING  Goal: Discharge to home or other facility with appropriate resources  Description: INTERVENTIONS:  - Identify barriers to discharge w/patient and caregiver  - Arrange for needed discharge resources and transportation as appropriate  - Identify discharge learning needs (meds, wound care, etc.)  - Arrange for interpretive services to assist at discharge as needed  - Refer to Case Management Department for coordinating discharge planning if the patient needs post-hospital services based on physician/advanced practitioner order or complex needs related to functional status, cognitive ability, or social support system  Outcome: Progressing     Problem: Nutrition/Hydration-ADULT  Goal: Nutrient/Hydration intake appropriate for improving, restoring or maintaining nutritional needs  Description: Monitor and assess patient's nutrition/hydration status for malnutrition. Collaborate with interdisciplinary team and initiate plan and interventions as ordered.  Monitor patient's weight and dietary intake as ordered or per policy. Utilize nutrition screening tool and intervene as necessary. Determine patient's food preferences and provide high-protein, high-caloric foods as appropriate.     INTERVENTIONS:  - Monitor oral intake, urinary output, labs, and treatment plans  - Assess nutrition and hydration status and recommend course of action  - Evaluate amount of meals eaten  - Assist patient with eating if necessary   - Allow adequate time for meals  - Recommend/ encourage appropriate diets, oral nutritional supplements, and vitamin/mineral supplements  - Order, calculate, and assess calorie counts as needed  - Recommend, monitor, and adjust tube feedings and TPN/PPN based on assessed needs  - Assess need for intravenous fluids  - Provide specific nutrition/hydration education as appropriate  - Include patient/family/caregiver in decisions related to  nutrition  Outcome: Progressing     Problem: Prexisting or High Potential for Compromised Skin Integrity  Goal: Skin integrity is maintained or improved  Description: INTERVENTIONS:  - Identify patients at risk for skin breakdown  - Assess and monitor skin integrity  - Assess and monitor nutrition and hydration status  - Monitor labs   - Assess for incontinence   - Turn and reposition patient  - Assist with mobility/ambulation  - Relieve pressure over bony prominences  - Avoid friction and shearing  - Provide appropriate hygiene as needed including keeping skin clean and dry  - Evaluate need for skin moisturizer/barrier cream  - Collaborate with interdisciplinary team   - Patient/family teaching  - Consider wound care consult   Outcome: Progressing     Problem: Knowledge Deficit  Goal: Patient/family/caregiver demonstrates understanding of disease process, treatment plan, medications, and discharge instructions  Description: Complete learning assessment and assess knowledge base.  Interventions:  - Provide teaching at level of understanding  - Provide teaching via preferred learning methods  Outcome: Progressing     Problem: SKIN/TISSUE INTEGRITY - ADULT  Goal: Skin Integrity remains intact(Skin Breakdown Prevention)  Description: Assess:  -Perform Joss assessment every shift  -Clean and moisturize skin   -Inspect skin when repositioning, toileting, and assisting with ADLS  -Assess under medical devices such   -Assess extremities for adequate circulation and sensation     Bed Management:  -Have minimal linens on bed & keep smooth, unwrinkled  -Change linens as needed when moist or perspiring  -Avoid sitting or lying in one position for more than 2 hours while in bed  -Keep HOB at 45degrees     Toileting:  -Offer bedside commode  -Assess for incontinence   -Use incontinent care products after each incontinent episode     Activity:  -Mobilize patient 4 times a day  -Encourage activity and walks on unit  -Encourage  or provide ROM exercises   -Turn and reposition patient every 2 Hours  -Use appropriate equipment to lift or move patient in bed  -Instruct/ Assist with weight shifting every 2 when out of bed in chair  -Consider limitation of chair time 4 hour intervals    Skin Care:  -Avoid use of baby powder, tape, friction and shearing, hot water or constrictive clothing  -Relieve pressure over bony prominences  -Do not massage red bony areas    Next Steps:  -Teach patient strategies to minimize risk  -Consider consults to  interdisciplinary teams  Outcome: Progressing     Problem: HEMATOLOGIC - ADULT  Goal: Maintains hematologic stability  Description: INTERVENTIONS  - Assess for signs and symptoms of bleeding or hemorrhage  - Monitor labs  - Administer supportive blood products/factors as ordered and appropriate  Outcome: Progressing

## 2024-01-03 NOTE — PLAN OF CARE
Problem: Potential for Falls  Goal: Patient will remain free of falls  Description: INTERVENTIONS:  - Educate patient/family on patient safety including physical limitations  - Instruct patient to call for assistance with activity   - Consult OT/PT to assist with strengthening/mobility   - Keep Call bell within reach  - Keep bed low and locked with side rails adjusted as appropriate  - Keep care items and personal belongings within reach  - Initiate and maintain comfort rounds  - Make Fall Risk Sign visible to staff  - Offer Toileting every 2 Hours, in advance of need  - Initiate/Maintain alarm  - Obtain necessary fall risk management equipment  - Apply yellow socks and bracelet for high fall risk patients  - Consider moving patient to room near nurses station  Outcome: Progressing     Problem: CARDIOVASCULAR - ADULT  Goal: Maintains optimal cardiac output and hemodynamic stability  Description: INTERVENTIONS:  - Monitor I/O, vital signs and rhythm  - Monitor for S/S and trends of decreased cardiac output  - Administer and titrate ordered vasoactive medications to optimize hemodynamic stability  - Assess quality of pulses, skin color and temperature  - Assess for signs of decreased coronary artery perfusion  - Instruct patient to report change in severity of symptoms  Outcome: Progressing  Goal: Absence of cardiac dysrhythmias or at baseline rhythm  Description: INTERVENTIONS:  - Continuous cardiac monitoring, vital signs, obtain 12 lead EKG if ordered  - Administer antiarrhythmic and heart rate control medications as ordered  - Monitor electrolytes and administer replacement therapy as ordered  Outcome: Progressing     Problem: RESPIRATORY - ADULT  Goal: Achieves optimal ventilation and oxygenation  Description: INTERVENTIONS:  - Assess for changes in respiratory status  - Assess for changes in mentation and behavior  - Position to facilitate oxygenation and minimize respiratory effort  - Oxygen administered by  appropriate delivery if ordered  - Initiate smoking cessation education as indicated  - Encourage broncho-pulmonary hygiene including cough, deep breathe, Incentive Spirometry  - Assess the need for suctioning and aspirate as needed  - Assess and instruct to report SOB or any respiratory difficulty  - Respiratory Therapy support as indicated  Outcome: Progressing     Problem: METABOLIC, FLUID AND ELECTROLYTES - ADULT  Goal: Electrolytes maintained within normal limits  Description: INTERVENTIONS:  - Monitor labs and assess patient for signs and symptoms of electrolyte imbalances  - Administer electrolyte replacement as ordered  - Monitor response to electrolyte replacements, including repeat lab results as appropriate  - Instruct patient on fluid and nutrition as appropriate  Outcome: Progressing  Goal: Fluid balance maintained  Description: INTERVENTIONS:  - Monitor labs   - Monitor I/O and WT  - Instruct patient on fluid and nutrition as appropriate  - Assess for signs & symptoms of volume excess or deficit  Outcome: Progressing  Goal: Glucose maintained within target range  Description: INTERVENTIONS:  - Monitor Blood Glucose as ordered  - Assess for signs and symptoms of hyperglycemia and hypoglycemia  - Administer ordered medications to maintain glucose within target range  - Assess nutritional intake and initiate nutrition service referral as needed  Outcome: Progressing     Problem: MUSCULOSKELETAL - ADULT  Goal: Maintain or return mobility to safest level of function  Description: INTERVENTIONS:  - Assess patient's ability to carry out ADLs; assess patient's baseline for ADL function and identify physical deficits which impact ability to perform ADLs (bathing, care of mouth/teeth, toileting, grooming, dressing, etc.)  - Assess/evaluate cause of self-care deficits   - Assess range of motion  - Assess patient's mobility  - Assess patient's need for assistive devices and provide as appropriate  - Encourage  maximum independence but intervene and supervise when necessary  - Involve family in performance of ADLs  - Assess for home care needs following discharge   - Consider OT consult to assist with ADL evaluation and planning for discharge  - Provide patient education as appropriate  Outcome: Progressing     Problem: SKIN/TISSUE INTEGRITY - ADULT  Goal: Skin Integrity remains intact(Skin Breakdown Prevention)  Description: Assess:  -Perform Joss assessment every x  -Clean and moisturize skin every x  -Inspect skin when repositioning, toileting, and assisting with ADLS  -Assess under medical devices such as x every x  -Assess extremities for adequate circulation and sensation     Bed Management:  -Have minimal linens on bed & keep smooth, unwrinkled  -Change linens as needed when moist or perspiring  -Avoid sitting or lying in one position for more than x hours while in bed  -Keep HOB at xdegrees     Toileting:  -Offer bedside commode  -Assess for incontinence every x  -Use incontinent care products after each incontinent episode such as x    Activity:  -Mobilize patient x times a day  -Encourage activity and walks on unit  -Encourage or provide ROM exercises   -Turn and reposition patient every x Hours  -Use appropriate equipment to lift or move patient in bed  -Instruct/ Assist with weight shifting every x when out of bed in chair  -Consider limitation of chair time x hour intervals    Skin Care:  -Avoid use of baby powder, tape, friction and shearing, hot water or constrictive clothing  -Relieve pressure over bony prominences using x  -Do not massage red bony areas    Next Steps:  -Teach patient strategies to minimize risks such as x   -Consider consults to  interdisciplinary teams such as xxxx  Outcome: Progressing     Problem: HEMATOLOGIC - ADULT  Goal: Maintains hematologic stability  Description: INTERVENTIONS  - Assess for signs and symptoms of bleeding or hemorrhage  - Monitor labs  - Administer supportive  blood products/factors as ordered and appropriate  Outcome: Progressing     Problem: MOBILITY - ADULT  Goal: Maintain or return to baseline ADL function  Description: INTERVENTIONS:  -  Assess patient's ability to carry out ADLs; assess patient's baseline for ADL function and identify physical deficits which impact ability to perform ADLs (bathing, care of mouth/teeth, toileting, grooming, dressing, etc.)  - Assess/evaluate cause of self-care deficits   - Assess range of motion  - Assess patient's mobility; develop plan if impaired  - Assess patient's need for assistive devices and provide as appropriate  - Encourage maximum independence but intervene and supervise when necessary  - Involve family in performance of ADLs  - Assess for home care needs following discharge   - Consider OT consult to assist with ADL evaluation and planning for discharge  - Provide patient education as appropriate  Outcome: Progressing  Goal: Maintains/Returns to pre admission functional level  Description: INTERVENTIONS:  - Perform AM-PAC 6 Click Basic Mobility/ Daily Activity assessment daily.  - Set and communicate daily mobility goal to care team and patient/family/caregiver.   - Collaborate with rehabilitation services on mobility goals if consulted  - Perform Range of Motion 3 times a day.  - Reposition patient every 3 hours.  - Dangle patient 3 times a day  - Stand patient 3 times a day  - Ambulate patient 3 times a day  - Out of bed to chair 3 times a day   - Out of bed for meals 3 times a day  - Out of bed for toileting  - Record patient progress and toleration of activity level   Outcome: Progressing     Problem: PAIN - ADULT  Goal: Verbalizes/displays adequate comfort level or baseline comfort level  Description: Interventions:  - Encourage patient to monitor pain and request assistance  - Assess pain using appropriate pain scale  - Administer analgesics based on type and severity of pain and evaluate response  - Implement  non-pharmacological measures as appropriate and evaluate response  - Consider cultural and social influences on pain and pain management  - Notify physician/advanced practitioner if interventions unsuccessful or patient reports new pain  Outcome: Progressing     Problem: INFECTION - ADULT  Goal: Absence or prevention of progression during hospitalization  Description: INTERVENTIONS:  - Assess and monitor for signs and symptoms of infection  - Monitor lab/diagnostic results  - Monitor all insertion sites, i.e. indwelling lines, tubes, and drains  - Monitor endotracheal if appropriate and nasal secretions for changes in amount and color  - [place] appropriate cooling/warming therapies per order  - Administer medications as ordered  - Instruct and encourage patient and family to use good hand hygiene technique  - Identify and instruct in appropriate isolation precautions for identified infection/condition  Outcome: Progressing     Problem: SAFETY ADULT  Goal: Patient will remain free of falls  Description: INTERVENTIONS:  - Educate patient/family on patient safety including physical limitations  - Instruct patient to call for assistance with activity   - Consult OT/PT to assist with strengthening/mobility   - Keep Call bell within reach  - Keep bed low and locked with side rails adjusted as appropriate  - Keep care items and personal belongings within reach  - Initiate and maintain comfort rounds  - Make Fall Risk Sign visible to staff  - Offer Toileting every 2 Hours, in advance of need  - Initiate/Maintain alarm  - Obtain necessary fall risk management equipment  - Apply yellow socks and bracelet for high fall risk patients  - Consider moving patient to room near nurses station  Outcome: Progressing     Problem: DISCHARGE PLANNING  Goal: Discharge to home or other facility with appropriate resources  Description: INTERVENTIONS:  - Identify barriers to discharge w/patient and caregiver  - Arrange for needed  discharge resources and transportation as appropriate  - Identify discharge learning needs (meds, wound care, etc.)  - Arrange for interpretive services to assist at discharge as needed  - Refer to Case Management Department for coordinating discharge planning if the patient needs post-hospital services based on physician/advanced practitioner order or complex needs related to functional status, cognitive ability, or social support system  Outcome: Progressing     Problem: Nutrition/Hydration-ADULT  Goal: Nutrient/Hydration intake appropriate for improving, restoring or maintaining nutritional needs  Description: Monitor and assess patient's nutrition/hydration status for malnutrition. Collaborate with interdisciplinary team and initiate plan and interventions as ordered.  Monitor patient's weight and dietary intake as ordered or per policy. Utilize nutrition screening tool and intervene as necessary. Determine patient's food preferences and provide high-protein, high-caloric foods as appropriate.     INTERVENTIONS:  - Monitor oral intake, urinary output, labs, and treatment plans  - Assess nutrition and hydration status and recommend course of action  - Evaluate amount of meals eaten  - Assist patient with eating if necessary   - Allow adequate time for meals  - Recommend/ encourage appropriate diets, oral nutritional supplements, and vitamin/mineral supplements  - Order, calculate, and assess calorie counts as needed  - Recommend, monitor, and adjust tube feedings and TPN/PPN based on assessed needs  - Assess need for intravenous fluids  - Provide specific nutrition/hydration education as appropriate  - Include patient/family/caregiver in decisions related to nutrition  Outcome: Progressing     Problem: Prexisting or High Potential for Compromised Skin Integrity  Goal: Skin integrity is maintained or improved  Description: INTERVENTIONS:  - Identify patients at risk for skin breakdown  - Assess and monitor skin  integrity  - Assess and monitor nutrition and hydration status  - Monitor labs   - Assess for incontinence   - Turn and reposition patient  - Assist with mobility/ambulation  - Relieve pressure over bony prominences  - Avoid friction and shearing  - Provide appropriate hygiene as needed including keeping skin clean and dry  - Evaluate need for skin moisturizer/barrier cream  - Collaborate with interdisciplinary team   - Patient/family teaching  - Consider wound care consult   Outcome: Progressing     Problem: Knowledge Deficit  Goal: Patient/family/caregiver demonstrates understanding of disease process, treatment plan, medications, and discharge instructions  Description: Complete learning assessment and assess knowledge base.  Interventions:  - Provide teaching at level of understanding  - Provide teaching via preferred learning methods  Outcome: Progressing

## 2024-01-04 LAB
ALBUMIN SERPL BCP-MCNC: 3.7 G/DL (ref 3.5–5)
ALP SERPL-CCNC: 134 U/L (ref 34–104)
ALT SERPL W P-5'-P-CCNC: 14 U/L (ref 7–52)
ANION GAP SERPL CALCULATED.3IONS-SCNC: 8 MMOL/L
AST SERPL W P-5'-P-CCNC: 18 U/L (ref 13–39)
BILIRUB SERPL-MCNC: 1.7 MG/DL (ref 0.2–1)
BUN SERPL-MCNC: 43 MG/DL (ref 5–25)
CALCIUM SERPL-MCNC: 9.4 MG/DL (ref 8.4–10.2)
CHLORIDE SERPL-SCNC: 98 MMOL/L (ref 96–108)
CO2 SERPL-SCNC: 31 MMOL/L (ref 21–32)
CREAT SERPL-MCNC: 1.35 MG/DL (ref 0.6–1.3)
ERYTHROCYTE [DISTWIDTH] IN BLOOD BY AUTOMATED COUNT: 17.9 % (ref 11.6–15.1)
GFR SERPL CREATININE-BSD FRML MDRD: 47 ML/MIN/1.73SQ M
GLUCOSE SERPL-MCNC: 149 MG/DL (ref 65–140)
GLUCOSE SERPL-MCNC: 184 MG/DL (ref 65–140)
GLUCOSE SERPL-MCNC: 193 MG/DL (ref 65–140)
GLUCOSE SERPL-MCNC: 221 MG/DL (ref 65–140)
GLUCOSE SERPL-MCNC: 254 MG/DL (ref 65–140)
HCT VFR BLD AUTO: 34.2 % (ref 36.5–49.3)
HGB BLD-MCNC: 10.7 G/DL (ref 12–17)
MAGNESIUM SERPL-MCNC: 1.8 MG/DL (ref 1.9–2.7)
MCH RBC QN AUTO: 25.5 PG (ref 26.8–34.3)
MCHC RBC AUTO-ENTMCNC: 31.3 G/DL (ref 31.4–37.4)
MCV RBC AUTO: 81 FL (ref 82–98)
PHOSPHATE SERPL-MCNC: 3 MG/DL (ref 2.3–4.1)
PLATELET # BLD AUTO: 157 THOUSANDS/UL (ref 149–390)
PMV BLD AUTO: 12.8 FL (ref 8.9–12.7)
POTASSIUM SERPL-SCNC: 3.6 MMOL/L (ref 3.5–5.3)
PROT SERPL-MCNC: 6.7 G/DL (ref 6.4–8.4)
RBC # BLD AUTO: 4.2 MILLION/UL (ref 3.88–5.62)
SODIUM SERPL-SCNC: 137 MMOL/L (ref 135–147)
WBC # BLD AUTO: 7.14 THOUSAND/UL (ref 4.31–10.16)

## 2024-01-04 PROCEDURE — 83735 ASSAY OF MAGNESIUM: CPT | Performed by: PHYSICIAN ASSISTANT

## 2024-01-04 PROCEDURE — 94640 AIRWAY INHALATION TREATMENT: CPT

## 2024-01-04 PROCEDURE — 94760 N-INVAS EAR/PLS OXIMETRY 1: CPT

## 2024-01-04 PROCEDURE — 84100 ASSAY OF PHOSPHORUS: CPT | Performed by: PHYSICIAN ASSISTANT

## 2024-01-04 PROCEDURE — 85027 COMPLETE CBC AUTOMATED: CPT | Performed by: PHYSICIAN ASSISTANT

## 2024-01-04 PROCEDURE — 99232 SBSQ HOSP IP/OBS MODERATE 35: CPT | Performed by: HOSPITALIST

## 2024-01-04 PROCEDURE — 99233 SBSQ HOSP IP/OBS HIGH 50: CPT | Performed by: INTERNAL MEDICINE

## 2024-01-04 PROCEDURE — 82948 REAGENT STRIP/BLOOD GLUCOSE: CPT

## 2024-01-04 PROCEDURE — 80053 COMPREHEN METABOLIC PANEL: CPT | Performed by: PHYSICIAN ASSISTANT

## 2024-01-04 RX ORDER — METOLAZONE 2.5 MG/1
2.5 TABLET ORAL DAILY
Status: DISCONTINUED | OUTPATIENT
Start: 2024-01-04 | End: 2024-01-07

## 2024-01-04 RX ORDER — LANOLIN ALCOHOL/MO/W.PET/CERES
400 CREAM (GRAM) TOPICAL 2 TIMES DAILY
Status: COMPLETED | OUTPATIENT
Start: 2024-01-04 | End: 2024-01-04

## 2024-01-04 RX ADMIN — INSULIN LISPRO 1 UNITS: 100 INJECTION, SOLUTION INTRAVENOUS; SUBCUTANEOUS at 08:38

## 2024-01-04 RX ADMIN — BUDESONIDE 0.5 MG: 0.5 INHALANT ORAL at 07:57

## 2024-01-04 RX ADMIN — HEPARIN SODIUM 5000 UNITS: 5000 INJECTION INTRAVENOUS; SUBCUTANEOUS at 13:29

## 2024-01-04 RX ADMIN — HEPARIN SODIUM 5000 UNITS: 5000 INJECTION INTRAVENOUS; SUBCUTANEOUS at 06:03

## 2024-01-04 RX ADMIN — OMEGA-3 FATTY ACIDS CAP 1000 MG 1000 MG: 1000 CAP at 08:37

## 2024-01-04 RX ADMIN — LORATADINE 10 MG: 10 TABLET ORAL at 08:37

## 2024-01-04 RX ADMIN — FLUTICASONE PROPIONATE 1 SPRAY: 50 SPRAY, METERED NASAL at 08:37

## 2024-01-04 RX ADMIN — HEPARIN SODIUM 5000 UNITS: 5000 INJECTION INTRAVENOUS; SUBCUTANEOUS at 21:37

## 2024-01-04 RX ADMIN — INSULIN LISPRO 8 UNITS: 100 INJECTION, SOLUTION INTRAVENOUS; SUBCUTANEOUS at 08:38

## 2024-01-04 RX ADMIN — BUDESONIDE 0.5 MG: 0.5 INHALANT ORAL at 20:40

## 2024-01-04 RX ADMIN — ALLOPURINOL 300 MG: 100 TABLET ORAL at 08:37

## 2024-01-04 RX ADMIN — INSULIN GLARGINE 12 UNITS: 100 INJECTION, SOLUTION SUBCUTANEOUS at 21:35

## 2024-01-04 RX ADMIN — Medication 400 MG: at 17:05

## 2024-01-04 RX ADMIN — INSULIN GLARGINE 12 UNITS: 100 INJECTION, SOLUTION SUBCUTANEOUS at 08:38

## 2024-01-04 RX ADMIN — INSULIN LISPRO 8 UNITS: 100 INJECTION, SOLUTION INTRAVENOUS; SUBCUTANEOUS at 11:55

## 2024-01-04 RX ADMIN — AZELASTINE HYDROCHLORIDE 1 SPRAY: 137 SPRAY, METERED NASAL at 08:37

## 2024-01-04 RX ADMIN — ATORVASTATIN CALCIUM 20 MG: 20 TABLET, FILM COATED ORAL at 17:05

## 2024-01-04 RX ADMIN — FLUTICASONE PROPIONATE 1 SPRAY: 50 SPRAY, METERED NASAL at 21:34

## 2024-01-04 RX ADMIN — FUROSEMIDE 100 MG: 10 INJECTION, SOLUTION INTRAMUSCULAR; INTRAVENOUS at 08:38

## 2024-01-04 RX ADMIN — INSULIN LISPRO 2 UNITS: 100 INJECTION, SOLUTION INTRAVENOUS; SUBCUTANEOUS at 17:04

## 2024-01-04 RX ADMIN — FUROSEMIDE 100 MG: 10 INJECTION, SOLUTION INTRAMUSCULAR; INTRAVENOUS at 16:24

## 2024-01-04 RX ADMIN — AMLODIPINE BESYLATE 10 MG: 5 TABLET ORAL at 08:37

## 2024-01-04 RX ADMIN — Medication 400 MG: at 08:49

## 2024-01-04 RX ADMIN — Medication 100 MG: at 08:37

## 2024-01-04 RX ADMIN — INSULIN LISPRO 2 UNITS: 100 INJECTION, SOLUTION INTRAVENOUS; SUBCUTANEOUS at 11:55

## 2024-01-04 RX ADMIN — METOLAZONE 2.5 MG: 2.5 TABLET ORAL at 15:40

## 2024-01-04 RX ADMIN — LOSARTAN POTASSIUM 100 MG: 50 TABLET, FILM COATED ORAL at 08:38

## 2024-01-04 RX ADMIN — GABAPENTIN 100 MG: 100 CAPSULE ORAL at 08:38

## 2024-01-04 RX ADMIN — INSULIN LISPRO 8 UNITS: 100 INJECTION, SOLUTION INTRAVENOUS; SUBCUTANEOUS at 17:04

## 2024-01-04 RX ADMIN — Medication 1000 UNITS: at 08:37

## 2024-01-04 NOTE — PLAN OF CARE
Problem: Potential for Falls  Goal: Patient will remain free of falls  Description: INTERVENTIONS:  - Educate patient/family on patient safety including physical limitations  - Instruct patient to call for assistance with activity   - Consult OT/PT to assist with strengthening/mobility   - Keep Call bell within reach  - Keep bed low and locked with side rails adjusted as appropriate  - Keep care items and personal belongings within reach  - Initiate and maintain comfort rounds  - Make Fall Risk Sign visible to staff  - Offer Toileting every 2 Hours, in advance of need  - Initiate/Maintain alarm  - Obtain necessary fall risk management equipment  - Apply yellow socks and bracelet for high fall risk patients  - Consider moving patient to room near nurses station  Outcome: Progressing     Problem: CARDIOVASCULAR - ADULT  Goal: Maintains optimal cardiac output and hemodynamic stability  Description: INTERVENTIONS:  - Monitor I/O, vital signs and rhythm  - Monitor for S/S and trends of decreased cardiac output  - Administer and titrate ordered vasoactive medications to optimize hemodynamic stability  - Assess quality of pulses, skin color and temperature  - Assess for signs of decreased coronary artery perfusion  - Instruct patient to report change in severity of symptoms  Outcome: Progressing  Goal: Absence of cardiac dysrhythmias or at baseline rhythm  Description: INTERVENTIONS:  - Continuous cardiac monitoring, vital signs, obtain 12 lead EKG if ordered  - Administer antiarrhythmic and heart rate control medications as ordered  - Monitor electrolytes and administer replacement therapy as ordered  Outcome: Progressing     Problem: RESPIRATORY - ADULT  Goal: Achieves optimal ventilation and oxygenation  Description: INTERVENTIONS:  - Assess for changes in respiratory status  - Assess for changes in mentation and behavior  - Position to facilitate oxygenation and minimize respiratory effort  - Oxygen administered by  appropriate delivery if ordered  - Initiate smoking cessation education as indicated  - Encourage broncho-pulmonary hygiene including cough, deep breathe, Incentive Spirometry  - Assess the need for suctioning and aspirate as needed  - Assess and instruct to report SOB or any respiratory difficulty  - Respiratory Therapy support as indicated  Outcome: Progressing     Problem: METABOLIC, FLUID AND ELECTROLYTES - ADULT  Goal: Electrolytes maintained within normal limits  Description: INTERVENTIONS:  - Monitor labs and assess patient for signs and symptoms of electrolyte imbalances  - Administer electrolyte replacement as ordered  - Monitor response to electrolyte replacements, including repeat lab results as appropriate  - Instruct patient on fluid and nutrition as appropriate  Outcome: Progressing  Goal: Fluid balance maintained  Description: INTERVENTIONS:  - Monitor labs   - Monitor I/O and WT  - Instruct patient on fluid and nutrition as appropriate  - Assess for signs & symptoms of volume excess or deficit  Outcome: Progressing  Goal: Glucose maintained within target range  Description: INTERVENTIONS:  - Monitor Blood Glucose as ordered  - Assess for signs and symptoms of hyperglycemia and hypoglycemia  - Administer ordered medications to maintain glucose within target range  - Assess nutritional intake and initiate nutrition service referral as needed  Outcome: Progressing     Problem: MUSCULOSKELETAL - ADULT  Goal: Maintain or return mobility to safest level of function  Description: INTERVENTIONS:  - Assess patient's ability to carry out ADLs; assess patient's baseline for ADL function and identify physical deficits which impact ability to perform ADLs (bathing, care of mouth/teeth, toileting, grooming, dressing, etc.)  - Assess/evaluate cause of self-care deficits   - Assess range of motion  - Assess patient's mobility  - Assess patient's need for assistive devices and provide as appropriate  - Encourage  maximum independence but intervene and supervise when necessary  - Involve family in performance of ADLs  - Assess for home care needs following discharge   - Consider OT consult to assist with ADL evaluation and planning for discharge  - Provide patient education as appropriate  Outcome: Progressing     Problem: MOBILITY - ADULT  Goal: Maintain or return to baseline ADL function  Description: INTERVENTIONS:  -  Assess patient's ability to carry out ADLs; assess patient's baseline for ADL function and identify physical deficits which impact ability to perform ADLs (bathing, care of mouth/teeth, toileting, grooming, dressing, etc.)  - Assess/evaluate cause of self-care deficits   - Assess range of motion  - Assess patient's mobility; develop plan if impaired  - Assess patient's need for assistive devices and provide as appropriate  - Encourage maximum independence but intervene and supervise when necessary  - Involve family in performance of ADLs  - Assess for home care needs following discharge   - Consider OT consult to assist with ADL evaluation and planning for discharge  - Provide patient education as appropriate  Outcome: Progressing  Goal: Maintains/Returns to pre admission functional level  Description: INTERVENTIONS:  - Perform AM-PAC 6 Click Basic Mobility/ Daily Activity assessment daily.  - Set and communicate daily mobility goal to care team and patient/family/caregiver.   - Collaborate with rehabilitation services on mobility goals if consulted  - Perform Range of Motion 3 times a day.  - Reposition patient every 3 hours.  - Dangle patient 3 times a day  - Stand patient 3 times a day  - Ambulate patient 3 times a day  - Out of bed to chair 3 times a day   - Out of bed for meals 3 times a day  - Out of bed for toileting  - Record patient progress and toleration of activity level   Outcome: Progressing     Problem: PAIN - ADULT  Goal: Verbalizes/displays adequate comfort level or baseline comfort  level  Description: Interventions:  - Encourage patient to monitor pain and request assistance  - Assess pain using appropriate pain scale  - Administer analgesics based on type and severity of pain and evaluate response  - Implement non-pharmacological measures as appropriate and evaluate response  - Consider cultural and social influences on pain and pain management  - Notify physician/advanced practitioner if interventions unsuccessful or patient reports new pain  Outcome: Progressing     Problem: INFECTION - ADULT  Goal: Absence or prevention of progression during hospitalization  Description: INTERVENTIONS:  - Assess and monitor for signs and symptoms of infection  - Monitor lab/diagnostic results  - Monitor all insertion sites, i.e. indwelling lines, tubes, and drains  - Monitor endotracheal if appropriate and nasal secretions for changes in amount and color  - [place] appropriate cooling/warming therapies per order  - Administer medications as ordered  - Instruct and encourage patient and family to use good hand hygiene technique  - Identify and instruct in appropriate isolation precautions for identified infection/condition  Outcome: Progressing     Problem: SAFETY ADULT  Goal: Patient will remain free of falls  Description: INTERVENTIONS:  - Educate patient/family on patient safety including physical limitations  - Instruct patient to call for assistance with activity   - Consult OT/PT to assist with strengthening/mobility   - Keep Call bell within reach  - Keep bed low and locked with side rails adjusted as appropriate  - Keep care items and personal belongings within reach  - Initiate and maintain comfort rounds  - Make Fall Risk Sign visible to staff  - Offer Toileting every 2 Hours, in advance of need  - Initiate/Maintain alarm  - Obtain necessary fall risk management equipment  - Apply yellow socks and bracelet for high fall risk patients  - Consider moving patient to room near nurses  station  Outcome: Progressing     Problem: DISCHARGE PLANNING  Goal: Discharge to home or other facility with appropriate resources  Description: INTERVENTIONS:  - Identify barriers to discharge w/patient and caregiver  - Arrange for needed discharge resources and transportation as appropriate  - Identify discharge learning needs (meds, wound care, etc.)  - Arrange for interpretive services to assist at discharge as needed  - Refer to Case Management Department for coordinating discharge planning if the patient needs post-hospital services based on physician/advanced practitioner order or complex needs related to functional status, cognitive ability, or social support system  Outcome: Progressing     Problem: Nutrition/Hydration-ADULT  Goal: Nutrient/Hydration intake appropriate for improving, restoring or maintaining nutritional needs  Description: Monitor and assess patient's nutrition/hydration status for malnutrition. Collaborate with interdisciplinary team and initiate plan and interventions as ordered.  Monitor patient's weight and dietary intake as ordered or per policy. Utilize nutrition screening tool and intervene as necessary. Determine patient's food preferences and provide high-protein, high-caloric foods as appropriate.     INTERVENTIONS:  - Monitor oral intake, urinary output, labs, and treatment plans  - Assess nutrition and hydration status and recommend course of action  - Evaluate amount of meals eaten  - Assist patient with eating if necessary   - Allow adequate time for meals  - Recommend/ encourage appropriate diets, oral nutritional supplements, and vitamin/mineral supplements  - Order, calculate, and assess calorie counts as needed  - Recommend, monitor, and adjust tube feedings and TPN/PPN based on assessed needs  - Assess need for intravenous fluids  - Provide specific nutrition/hydration education as appropriate  - Include patient/family/caregiver in decisions related to  nutrition  Outcome: Progressing     Problem: Prexisting or High Potential for Compromised Skin Integrity  Goal: Skin integrity is maintained or improved  Description: INTERVENTIONS:  - Identify patients at risk for skin breakdown  - Assess and monitor skin integrity  - Assess and monitor nutrition and hydration status  - Monitor labs   - Assess for incontinence   - Turn and reposition patient  - Assist with mobility/ambulation  - Relieve pressure over bony prominences  - Avoid friction and shearing  - Provide appropriate hygiene as needed including keeping skin clean and dry  - Evaluate need for skin moisturizer/barrier cream  - Collaborate with interdisciplinary team   - Patient/family teaching  - Consider wound care consult   Outcome: Progressing     Problem: Knowledge Deficit  Goal: Patient/family/caregiver demonstrates understanding of disease process, treatment plan, medications, and discharge instructions  Description: Complete learning assessment and assess knowledge base.  Interventions:  - Provide teaching at level of understanding  - Provide teaching via preferred learning methods  Outcome: Progressing     Problem: SKIN/TISSUE INTEGRITY - ADULT  Goal: Skin Integrity remains intact(Skin Breakdown Prevention)  Description: Assess:  -Perform Joss assessment every x  -Clean and moisturize skin every x  -Inspect skin when repositioning, toileting, and assisting with ADLS  -Assess under medical devices such as x every x  -Assess extremities for adequate circulation and sensation     Bed Management:  -Have minimal linens on bed & keep smooth, unwrinkled  -Change linens as needed when moist or perspiring  -Avoid sitting or lying in one position for more than x hours while in bed  -Keep HOB at x degrees     Toileting:  -Offer bedside commode  -Assess for incontinence every x  -Use incontinent care products after each incontinent episode such as x    Activity:  -Mobilize patient x times a day  -Encourage activity  and walks on unit  -Encourage or provide ROM exercises   -Turn and reposition patient every x Hours  -Use appropriate equipment to lift or move patient in bed  -Instruct/ Assist with weight shifting every x when out of bed in chair  -Consider limitation of chair time x hour intervals    Skin Care:  -Avoid use of baby powder, tape, friction and shearing, hot water or constrictive clothing  -Relieve pressure over bony prominences using x  -Do not massage red bony areas    Next Steps:  -Teach patient strategies to minimize risks such as x   -Consider consults to  interdisciplinary teams such as x  Outcome: Progressing     Problem: HEMATOLOGIC - ADULT  Goal: Maintains hematologic stability  Description: INTERVENTIONS  - Assess for signs and symptoms of bleeding or hemorrhage  - Monitor labs  - Administer supportive blood products/factors as ordered and appropriate  Outcome: Progressing

## 2024-01-04 NOTE — PLAN OF CARE
Problem: Potential for Falls  Goal: Patient will remain free of falls  Description: INTERVENTIONS:  - Educate patient/family on patient safety including physical limitations  - Instruct patient to call for assistance with activity   - Consult OT/PT to assist with strengthening/mobility   - Keep Call bell within reach  - Keep bed low and locked with side rails adjusted as appropriate  - Keep care items and personal belongings within reach  - Initiate and maintain comfort rounds  - Make Fall Risk Sign visible to staff  - Offer Toileting every 2 Hours, in advance of need  - Initiate/Maintain alarm  - Obtain necessary fall risk management equipment  - Apply yellow socks and bracelet for high fall risk patients  - Consider moving patient to room near nurses station  Outcome: Progressing     Problem: CARDIOVASCULAR - ADULT  Goal: Maintains optimal cardiac output and hemodynamic stability  Description: INTERVENTIONS:  - Monitor I/O, vital signs and rhythm  - Monitor for S/S and trends of decreased cardiac output  - Administer and titrate ordered vasoactive medications to optimize hemodynamic stability  - Assess quality of pulses, skin color and temperature  - Assess for signs of decreased coronary artery perfusion  - Instruct patient to report change in severity of symptoms  Outcome: Progressing  Goal: Absence of cardiac dysrhythmias or at baseline rhythm  Description: INTERVENTIONS:  - Continuous cardiac monitoring, vital signs, obtain 12 lead EKG if ordered  - Administer antiarrhythmic and heart rate control medications as ordered  - Monitor electrolytes and administer replacement therapy as ordered  Outcome: Progressing     Problem: RESPIRATORY - ADULT  Goal: Achieves optimal ventilation and oxygenation  Description: INTERVENTIONS:  - Assess for changes in respiratory status  - Assess for changes in mentation and behavior  - Position to facilitate oxygenation and minimize respiratory effort  - Oxygen administered by  appropriate delivery if ordered  - Initiate smoking cessation education as indicated  - Encourage broncho-pulmonary hygiene including cough, deep breathe, Incentive Spirometry  - Assess the need for suctioning and aspirate as needed  - Assess and instruct to report SOB or any respiratory difficulty  - Respiratory Therapy support as indicated  Outcome: Progressing     Problem: METABOLIC, FLUID AND ELECTROLYTES - ADULT  Goal: Electrolytes maintained within normal limits  Description: INTERVENTIONS:  - Monitor labs and assess patient for signs and symptoms of electrolyte imbalances  - Administer electrolyte replacement as ordered  - Monitor response to electrolyte replacements, including repeat lab results as appropriate  - Instruct patient on fluid and nutrition as appropriate  Outcome: Progressing  Goal: Fluid balance maintained  Description: INTERVENTIONS:  - Monitor labs   - Monitor I/O and WT  - Instruct patient on fluid and nutrition as appropriate  - Assess for signs & symptoms of volume excess or deficit  Outcome: Progressing  Goal: Glucose maintained within target range  Description: INTERVENTIONS:  - Monitor Blood Glucose as ordered  - Assess for signs and symptoms of hyperglycemia and hypoglycemia  - Administer ordered medications to maintain glucose within target range  - Assess nutritional intake and initiate nutrition service referral as needed  Outcome: Progressing     Problem: MUSCULOSKELETAL - ADULT  Goal: Maintain or return mobility to safest level of function  Description: INTERVENTIONS:  - Assess patient's ability to carry out ADLs; assess patient's baseline for ADL function and identify physical deficits which impact ability to perform ADLs (bathing, care of mouth/teeth, toileting, grooming, dressing, etc.)  - Assess/evaluate cause of self-care deficits   - Assess range of motion  - Assess patient's mobility  - Assess patient's need for assistive devices and provide as appropriate  - Encourage  maximum independence but intervene and supervise when necessary  - Involve family in performance of ADLs  - Assess for home care needs following discharge   - Consider OT consult to assist with ADL evaluation and planning for discharge  - Provide patient education as appropriate  Outcome: Progressing     Problem: MOBILITY - ADULT  Goal: Maintain or return to baseline ADL function  Description: INTERVENTIONS:  -  Assess patient's ability to carry out ADLs; assess patient's baseline for ADL function and identify physical deficits which impact ability to perform ADLs (bathing, care of mouth/teeth, toileting, grooming, dressing, etc.)  - Assess/evaluate cause of self-care deficits   - Assess range of motion  - Assess patient's mobility; develop plan if impaired  - Assess patient's need for assistive devices and provide as appropriate  - Encourage maximum independence but intervene and supervise when necessary  - Involve family in performance of ADLs  - Assess for home care needs following discharge   - Consider OT consult to assist with ADL evaluation and planning for discharge  - Provide patient education as appropriate  Outcome: Progressing  Goal: Maintains/Returns to pre admission functional level  Description: INTERVENTIONS:  - Perform AM-PAC 6 Click Basic Mobility/ Daily Activity assessment daily.  - Set and communicate daily mobility goal to care team and patient/family/caregiver.   - Collaborate with rehabilitation services on mobility goals if consulted  - Perform Range of Motion 3 times a day.  - Reposition patient every 3 hours.  - Dangle patient 3 times a day  - Stand patient 3 times a day  - Ambulate patient 3 times a day  - Out of bed to chair 3 times a day   - Out of bed for meals 3 times a day  - Out of bed for toileting  - Record patient progress and toleration of activity level   Outcome: Progressing     Problem: PAIN - ADULT  Goal: Verbalizes/displays adequate comfort level or baseline comfort  level  Description: Interventions:  - Encourage patient to monitor pain and request assistance  - Assess pain using appropriate pain scale  - Administer analgesics based on type and severity of pain and evaluate response  - Implement non-pharmacological measures as appropriate and evaluate response  - Consider cultural and social influences on pain and pain management  - Notify physician/advanced practitioner if interventions unsuccessful or patient reports new pain  Outcome: Progressing     Problem: INFECTION - ADULT  Goal: Absence or prevention of progression during hospitalization  Description: INTERVENTIONS:  - Assess and monitor for signs and symptoms of infection  - Monitor lab/diagnostic results  - Monitor all insertion sites, i.e. indwelling lines, tubes, and drains  - Monitor endotracheal if appropriate and nasal secretions for changes in amount and color  - [place] appropriate cooling/warming therapies per order  - Administer medications as ordered  - Instruct and encourage patient and family to use good hand hygiene technique  - Identify and instruct in appropriate isolation precautions for identified infection/condition  Outcome: Progressing     Problem: SAFETY ADULT  Goal: Patient will remain free of falls  Description: INTERVENTIONS:  - Educate patient/family on patient safety including physical limitations  - Instruct patient to call for assistance with activity   - Consult OT/PT to assist with strengthening/mobility   - Keep Call bell within reach  - Keep bed low and locked with side rails adjusted as appropriate  - Keep care items and personal belongings within reach  - Initiate and maintain comfort rounds  - Make Fall Risk Sign visible to staff  - Offer Toileting every 2 Hours, in advance of need  - Initiate/Maintain alarm  - Obtain necessary fall risk management equipment  - Apply yellow socks and bracelet for high fall risk patients  - Consider moving patient to room near nurses  station  Outcome: Progressing     Problem: DISCHARGE PLANNING  Goal: Discharge to home or other facility with appropriate resources  Description: INTERVENTIONS:  - Identify barriers to discharge w/patient and caregiver  - Arrange for needed discharge resources and transportation as appropriate  - Identify discharge learning needs (meds, wound care, etc.)  - Arrange for interpretive services to assist at discharge as needed  - Refer to Case Management Department for coordinating discharge planning if the patient needs post-hospital services based on physician/advanced practitioner order or complex needs related to functional status, cognitive ability, or social support system  Outcome: Progressing     Problem: Nutrition/Hydration-ADULT  Goal: Nutrient/Hydration intake appropriate for improving, restoring or maintaining nutritional needs  Description: Monitor and assess patient's nutrition/hydration status for malnutrition. Collaborate with interdisciplinary team and initiate plan and interventions as ordered.  Monitor patient's weight and dietary intake as ordered or per policy. Utilize nutrition screening tool and intervene as necessary. Determine patient's food preferences and provide high-protein, high-caloric foods as appropriate.     INTERVENTIONS:  - Monitor oral intake, urinary output, labs, and treatment plans  - Assess nutrition and hydration status and recommend course of action  - Evaluate amount of meals eaten  - Assist patient with eating if necessary   - Allow adequate time for meals  - Recommend/ encourage appropriate diets, oral nutritional supplements, and vitamin/mineral supplements  - Order, calculate, and assess calorie counts as needed  - Recommend, monitor, and adjust tube feedings and TPN/PPN based on assessed needs  - Assess need for intravenous fluids  - Provide specific nutrition/hydration education as appropriate  - Include patient/family/caregiver in decisions related to  nutrition  Outcome: Progressing     Problem: Prexisting or High Potential for Compromised Skin Integrity  Goal: Skin integrity is maintained or improved  Description: INTERVENTIONS:  - Identify patients at risk for skin breakdown  - Assess and monitor skin integrity  - Assess and monitor nutrition and hydration status  - Monitor labs   - Assess for incontinence   - Turn and reposition patient  - Assist with mobility/ambulation  - Relieve pressure over bony prominences  - Avoid friction and shearing  - Provide appropriate hygiene as needed including keeping skin clean and dry  - Evaluate need for skin moisturizer/barrier cream  - Collaborate with interdisciplinary team   - Patient/family teaching  - Consider wound care consult   Outcome: Progressing     Problem: Knowledge Deficit  Goal: Patient/family/caregiver demonstrates understanding of disease process, treatment plan, medications, and discharge instructions  Description: Complete learning assessment and assess knowledge base.  Interventions:  - Provide teaching at level of understanding  - Provide teaching via preferred learning methods  Outcome: Progressing     Problem: SKIN/TISSUE INTEGRITY - ADULT  Goal: Skin Integrity remains intact(Skin Breakdown Prevention)  Description: Assess:  -Perform Joss assessment every shift  -Clean and moisturize skin every day  -Inspect skin when repositioning, toileting, and assisting with ADLS  -Assess extremities for adequate circulation and sensation     Bed Management:  -Have minimal linens on bed & keep smooth, unwrinkled  -Change linens as needed when moist or perspiring  -Avoid sitting or lying in one position for more than 2 hours while in bed  -Keep HOB at 30 degrees     Toileting:  -Offer bedside commode  -Assess for incontinence every shift  -Use incontinent care products after each incontinent episode such as foam    Activity:  -Mobilize patient 3 times a day  -Encourage activity and walks on unit  -Encourage or  provide ROM exercises   -Turn and reposition patient every 2 Hours  -Use appropriate equipment to lift or move patient in bed  -Instruct/ Assist with weight shifting every 2 hours when out of bed in chair  -Consider limitation of chair time 4 hour intervals    Skin Care:  -Avoid use of baby powder, tape, friction and shearing, hot water or constrictive clothing  -Relieve pressure over bony prominences using wedges  -Do not massage red bony areas    Next Steps:  -Teach patient strategies to minimize risks such as falls   -Consider consults to  interdisciplinary teams such as PT/OT  Outcome: Progressing     Problem: HEMATOLOGIC - ADULT  Goal: Maintains hematologic stability  Description: INTERVENTIONS  - Assess for signs and symptoms of bleeding or hemorrhage  - Monitor labs  - Administer supportive blood products/factors as ordered and appropriate  Outcome: Progressing

## 2024-01-04 NOTE — CASE MANAGEMENT
Case Management Discharge Planning Note    Patient name Hammad Montesinos  Location /-01 MRN [id number]  : 1940 Date 2024       Current Admission Date: 2023  Current Admission Diagnosis:Acute on chronic diastolic heart failure (HCC)   Patient Active Problem List    Diagnosis Date Noted    Acute on chronic diastolic heart failure ([facility]) 2023    Elevated troponin 2023    Pulmonary hypertension ([facility]) 2023    Paroxysmal A-fib ([facility]) 2023    Type 2 diabetes mellitus with diabetic neuropathy, unspecified whether long term insulin use ([facility]) 2023    Pre-ulcerative calluses 2023    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 ([facility]) 2022    Elevated parathyroid hormone 2022    Diabetic nephropathy associated with type 2 diabetes mellitus ([facility]) 2022    Type 2 diabetes mellitus with hyperglycemia, with long-term current use of insulin ([facility]) 2022    Mixed hyperlipidemia 2022    Diabetic polyneuropathy associated with type 2 diabetes mellitus ([facility]) 2022    Primary hypertension 2022    Morbid (severe) obesity due to excess calories ([facility]) 2022    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 ([facility]) 2022      LOS (days): 7  Geometric Mean LOS (GMLOS) (days): 3.9  Days to GMLOS:-2.8     OBJECTIVE:  Risk of Unplanned Readmission Score: 24.67         Current admission status: Inpatient   Preferred Pharmacy:   [facility] [address]  [address] [address]  Phone: [phone] Fax: [phone]    Primary Care Provider: Mina Zavala MD    Primary Insurance: [employer] [facility]  Secondary Insurance:     DISCHARGE DETAILS:                                Requested Home Health Care         Home Health Discipline requested:: Nursing  HHA External Referral Reason (only applicable if external HHA name  selected): Patient has established relationship with provider  Home Health Follow-Up Provider:: PCP  Home Health Services Needed:: Other (comment) (med monitoring, patient education)  Homebound Criteria Met:: Uses an Assist Device (i.e. cane, walker, etc)  Supporting Clincal Findings:: Limited Endurance       Placed call to patient's wifeMaci at [phone] and discuss the process for patient to obtain additional services (rides, Meals) for [facility]. She also is requesting a referral to [facility] for patient deucation and asissitng with meds. Referral to [facility] sent via [facility], wait availability and preference.                                                IMM Given (Date):: 01/03/24

## 2024-01-04 NOTE — ASSESSMENT & PLAN NOTE
"Home regimen: Amlodipine 10 Mg daily, losartan 100 Mg daily  Continue home medications  /64 (BP Location: Right arm)   Pulse 78   Temp 98.9 °F (37.2 °C) (Oral)   Resp 20   Ht 5' 11\" (1.803 m)   Wt 111 kg (245 lb 6 oz)   SpO2 100%   BMI 34.22 kg/m²    "

## 2024-01-04 NOTE — ASSESSMENT & PLAN NOTE
Wt Readings from Last 3 Encounters:   01/04/24 111 kg (245 lb 6 oz)   12/12/23 115 kg (254 lb)   12/05/23 112 kg (247 lb)     Presents with worsening dyspnea and anasarca since [place]   Home regimen: lasix 40mg BID with increased to 60mg in AM and 40mg in PM 3 days ago with no improvement in symptoms   Last echo 10/2023: Severe left atrial dilation. RV systolic pressure 52.2mmHg. pulmonary artery pressure elevated  Pt with anasarca and pulmonary congestion - continue lasix 100mg IV BID   I/o and daily weights   Sodium and fluid restriction   Cardiology following  Observe off further metolazone for now.   Volume status improving.

## 2024-01-04 NOTE — PROGRESS NOTES
"Cardiology Progress Note   Hammad Montesinos 83 y.o. male MRN: [id number]    Unit/Bed#: -01 Encounter: [id number]      Assessment:  Acute on chronic HFpEF  Follows with Dr Richter ([facility])   10/2023 echo: LVEF 50%, moderate concentric LVH, severe left atrial dilation  Prehospital diuretic: Lasix 40mg/20mg -> 60mg/40mg daily (approx 4 days prior to admission)   Baseline weight: 230lbs  Admission wt: 263lbs -> 247lbs -> 246lbs.   Hypertension   Home Rx: Norvasc 10mg, losartan 100mg  Hyperlipidemia   Home Rx: Lipitor 20mg  Remote hx of paroxysmal atrial fibrillation   S/P DC Biotronik PPM     Plan/Discussion:   Volume status improving overall but continues to appear overloaded. Renal fx overall stable. Given metolazone 2.5mg x1 this afternoon to augment diuresis today. Continue IV lasix 100mg BID. Check BMP tomorrow.   He is not on OAC at this point due to low Afib burden in the past. No AF noted here since admission. Ongoing discussion in regards to AC with his primary cardiologist.   Salt/fluid restrictions, daily weights, strict I&O monitoring.   Daily BMPs; keep replenishing for goal K>4 and Mg>2  Keep telemetry monitoring while diuresing    Subjective:   Patient seen and examined. Overnight events reviewed. Patient denies any acute complaints at this time.     Objective:     Vitals: Blood pressure 123/64, pulse 78, temperature 98.9 °F (37.2 °C), temperature source Oral, resp. rate 20, height 5' 11\" (1.803 m), weight 111 kg (245 lb 6 oz), SpO2 100%., Body mass index is 34.22 kg/m².,   Orthostatic Blood Pressures      Flowsheet Row Most Recent Value   Blood Pressure 123/64 filed at 01/04/2024 0723   Patient Position - Orthostatic VS Sitting filed at 01/04/2024 0723              Intake/Output Summary (Last 24 hours) at 1/4/2024 1233  Last data filed at 1/4/2024 1047  Gross per 24 hour   Intake 1406 ml   Output 2650 ml   Net -1244 ml         Physical Exam:    GEN: Hammad Montesinos appears well, " alert and oriented x 3, pleasant and cooperative   HEENT: Sclera anicteric, conjunctivae pink, mucous membranes moist. Oropharynx clear.   NECK: supple, no significant JVD, Trachea midline, no thyromegaly.   HEART: regular rhythm, normal S1 and S2, no murmurs, clicks, gallops or rubs   LUNGS: clear to auscultation bilaterally; no wheezes, rales, or rhonchi. No increased work of breathing or signs of respiratory distress.   ABDOMEN: Soft, nontender, nondistended  EXTREMITIES: Skin warm and well perfused, no clubbing, cyanosis, or edema.  NEURO: No focal findings. Normal speech. Mood and affect normal.   SKIN: Normal without suspicious lesions on exposed skin.      Medications:      Current Facility-Administered Medications:     acetaminophen (TYLENOL) tablet 650 mg, 650 mg, Oral, Q6H PRN, ROSSY Carpenter-C, 650 mg at 12/29/23 0006    albuterol inhalation solution 2.5 mg, 2.5 mg, Nebulization, Q4H PRN, Semaj Perez MD, 2.5 mg at 01/02/24 1806    allopurinol (ZYLOPRIM) tablet 300 mg, 300 mg, Oral, Daily, ROSSY Carpenter-C, 300 mg at 01/04/24 0837    aluminum-magnesium hydroxide-simethicone (MAALOX) oral suspension 30 mL, 30 mL, Oral, Q4H PRN, Gricel Cedillo PA-C    amLODIPine (NORVASC) tablet 10 mg, 10 mg, Oral, Daily, ROSSY Carpenter-C, 10 mg at 01/04/24 0837    atorvastatin (LIPITOR) tablet 20 mg, 20 mg, Oral, After Dinner, Gricel Cedillo PA-C, 20 mg at 01/03/24 1706    azelastine (ASTELIN) 0.1 % nasal spray 1 spray, 1 spray, Each Nare, BID, ROSSY Carpenter-C, 1 spray at 01/04/24 0837    budesonide (PULMICORT) inhalation solution 0.5 mg, 0.5 mg, Nebulization, BID, ROSSY Carpenter-C, 0.5 mg at 01/04/24 0757    cholecalciferol (VITAMIN D3) tablet 1,000 Units, 1,000 Units, Oral, Daily, Gricel Cedillo PA-C, 1,000 Units at 01/04/24 0837    co-enzyme Q-10 capsule 100 mg, 100 mg, Oral, Daily, Gricel Cedillo PA-C, 100 mg at 01/04/24 0837    fish oil capsule 1,000 mg, 1,000  mg, Oral, Daily, ROSSY Carpenter-C, 1,000 mg at 01/04/24 0837    fluticasone (FLONASE) 50 mcg/act nasal spray 1 spray, 1 spray, Nasal, BID, ROSSY Carpenter-C, 1 spray at 01/04/24 0837    furosemide (LASIX) injection 100 mg, 100 mg, Intravenous, BID (diuretic), ROSSY Carpenter-C, 100 mg at 01/04/24 0838    gabapentin (NEURONTIN) capsule 100 mg, 100 mg, Oral, Daily, Gricel Cedillo PA-C, 100 mg at 01/04/24 0838    heparin (porcine) subcutaneous injection 5,000 Units, 5,000 Units, Subcutaneous, Q8H TOVA, Gricel Cedillo PA-C, 5,000 Units at 01/04/24 0603    insulin glargine (LANTUS) subcutaneous injection 12 Units 0.12 mL, 12 Units, Subcutaneous, Q12H TOVA, Madhavi Cesar MD, 12 Units at 01/04/24 0838    insulin lispro (HumaLOG) 100 units/mL subcutaneous injection 1-5 Units, 1-5 Units, Subcutaneous, TID AC, 2 Units at 01/04/24 1155 **AND** Fingerstick Glucose (POCT), , , 4x Daily AC and at bedtime, Ara Leigh MD    insulin lispro (HumaLOG) 100 units/mL subcutaneous injection 8 Units, 8 Units, Subcutaneous, TID With Meals, Madhavi Cesar MD, 8 Units at 01/04/24 1155    lidocaine (LIDODERM) 5 % patch 1 patch, 1 patch, Topical, HS, Gricel Cedillo PA-C, 1 patch at 01/01/24 2107    lidocaine (LIDODERM) 5 % patch 1 patch, 1 patch, Topical, HS, Gricel Cedillo PA-C, 1 patch at 01/01/24 2106    loratadine (CLARITIN) tablet 10 mg, 10 mg, Oral, Daily, Gricel Cedillo PA-C, 10 mg at 01/04/24 0837    losartan (COZAAR) tablet 100 mg, 100 mg, Oral, Daily, Gricel Cedillo PA-C, 100 mg at 01/04/24 0838    magnesium Oxide (MAG-OX) tablet 400 mg, 400 mg, Oral, BID, Madhavi Cesar MD, 400 mg at 01/04/24 0849    metolazone (ZAROXOLYN) tablet 2.5 mg, 2.5 mg, Oral, Daily, Jayy Babcock PA-C    senna (SENOKOT) tablet 17.2 mg, 2 tablet, Oral, HS PRN, Gricel Cedillo PA-C     Labs & Results:        Results from last 7 days   Lab Units 01/04/24  0254 01/03/24  0456 01/02/24  0430   WBC  Thousand/uL 7.14 5.89 7.33   HEMOGLOBIN g/dL 10.7* 11.0* 11.1*   HEMATOCRIT % 34.2* 35.1* 35.1*   PLATELETS Thousands/uL 157 151 173         Results from last 7 days   Lab Units 01/04/24 0254 01/03/24 0456 01/02/24  0430   POTASSIUM mmol/L 3.6 3.5 3.3*   CHLORIDE mmol/L 98 98 96   CO2 mmol/L 31 32 32   BUN mg/dL 43* 44* 41*   CREATININE mg/dL 1.35* 1.34* 1.29   CALCIUM mg/dL 9.4 9.5 9.4   ALK PHOS U/L 134* 128* 134*   ALT U/L 14 14 13   AST U/L 18 18 18         Results from last 7 days   Lab Units 01/04/24 0254 01/03/24 0456 01/02/24  0430   MAGNESIUM mg/dL 1.8* 2.1 1.8*       - - -

## 2024-01-04 NOTE — ASSESSMENT & PLAN NOTE
Lab Results   Component Value Date    HGBA1C 6.8 11/16/2023       Recent Labs     01/03/24  1558 01/03/24 2054 01/04/24  0723 01/04/24  1046   POCGLU 171* 310* 184* 254*         Blood Sugar Average: Last 72 hrs:  (P) 185.8125Home regimen: Lantus 18 units twice daily and NovoLog 22 unit with breakfast, 23 units lunch, and 20 units with dinner  Decrease Lantus further  to 12 units twice daily  Decrease Humalog to 8 units 3 times daily  SSI

## 2024-01-04 NOTE — PROGRESS NOTES
"Formerly [facility], [facility]–1983  Progress Note  Name: Hammad Montesinos I  MRN: [id number]  Unit/Bed#: MS 329Svitlana I Date of Admission: 12/28/2023   Date of Service: 1/4/2024 I Hospital Day: 7    Assessment/Plan   Paroxysmal A-fib (HCC)  Assessment & Plan  History noted  Not on anticoagulation  Cardiology following, appreciate recommendations  Outpatient cardiology dcd AC for risk of bleeding and fall, discussed with daughter, she would like to talk with primary cardiologist before starting anticoagulation    Pulmonary hypertension (HCC)  Assessment & Plan  Home regimen: pulmicort BID, performist BID, and DuoNeb twice daily  Continue home nebulizers and increased dose of IV diuresis due to anasarca    Elevated troponin  Assessment & Plan  HS trop: 48 > 64  EKG without acute ischemia - showing paced rhythm   Likely s/d to volume overload     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   Trend BMP daily in setting of IV diuresis    Primary hypertension  Assessment & Plan  Home regimen: Amlodipine 10 Mg daily, losartan 100 Mg daily  Continue home medications  /64 (BP Location: Right arm)   Pulse 78   Temp 98.9 °F (37.2 °C) (Oral)   Resp 20   Ht 5' 11\" (1.803 m)   Wt 111 kg (245 lb 6 oz)   SpO2 100%   BMI 34.22 kg/m²      Mixed hyperlipidemia  Assessment & Plan  Continue home statin    Type 2 diabetes mellitus with hyperglycemia, with long-term current use of insulin ([facility])  Assessment & Plan  Lab Results   Component Value Date    HGBA1C 6.8 11/16/2023       Recent Labs     01/03/24  1558 01/03/24  2054 01/04/24  0723 01/04/24  1046   POCGLU 171* 310* 184* 254*         Blood Sugar Average: Last 72 hrs:  (P) " 185.8125Home regimen: Lantus 18 units twice daily and NovoLog 22 unit with breakfast, 23 units lunch, and 20 units with dinner  Decrease Lantus further  to 12 units twice daily  Decrease Humalog to 8 units 3 times daily  SSI      * Acute on chronic diastolic heart failure (HCC)  Assessment & Plan  Wt Readings from Last 3 Encounters:   24 111 kg (245 lb 6 oz)   23 115 kg (254 lb)   23 112 kg (247 lb)     Presents with worsening dyspnea and anasarca since [place]   Home regimen: lasix 40mg BID with increased to 60mg in AM and 40mg in PM 3 days ago with no improvement in symptoms   Last echo 10/2023: Severe left atrial dilation. RV systolic pressure 52.2mmHg. pulmonary artery pressure elevated  Pt with anasarca and pulmonary congestion - continue lasix 100mg IV BID   I/o and daily weights   Sodium and fluid restriction   Cardiology following  Observe off further metolazone for now.   Volume status improving.          VTE  Prophylaxis:   Pharmacologic: in place  Mechanical VTE Prophylaxis in Place: Yes    Patient Centered Rounds: I have performed bedside rounds with nursing staff today.    Discussions with Specialists or Other Care Team Provider: case management    Education and Discussions with Family / Patient: pt wife, poa      Current Length of Stay: 7 day(s)    Current Patient Status: Inpatient        Code Status: Level 1 - Full Code    Discharge Plan: Pt will require continued inpatient hospitalization.    Subjective:   Pt continues to report improvement in edema    Patient is seen and examined at bedside.  All other ROS are negative.    Objective:     Vitals:   Temp (24hrs), Av.7 °F (37.1 °C), Min:98.4 °F (36.9 °C), Max:98.9 °F (37.2 °C)    Temp:  [98.4 °F (36.9 °C)-98.9 °F (37.2 °C)] 98.9 °F (37.2 °C)  HR:  [47-81] 78  Resp:  [18-20] 20  BP: (110-131)/(53-69) 123/64  SpO2:  [92 %-100 %] 100 %  Body mass index is 34.22 kg/m².     Input and Output Summary (last 24 hours):        Intake/Output Summary (Last 24 hours) at 1/4/2024 1117  Last data filed at 1/4/2024 1047  Gross per 24 hour   Intake 1406 ml   Output 2650 ml   Net -1244 ml       Physical Exam:       GEN: No acute distress, comfortable  HEEENT: No JVD, PERRLA, no scleral icterus  RESP: Lungs clear to auscultation bilaterally  CV: RRR, +s1/s2   ABD: SOFT NON TENDER, POSITIVE BOWEL SOUNDS, NO DISTENTION  PSYCH: CALM  NEURO: Mentation baseline, NO FOCAL DEFICITS  SKIN: NO RASH  EXTREM: LE edema improving    Additional Data:     Labs:    Results from last 7 days   Lab Units 01/04/24  0254 01/03/24  0456   WBC Thousand/uL 7.14 5.89   HEMOGLOBIN g/dL 10.7* 11.0*   HEMATOCRIT % 34.2* 35.1*   PLATELETS Thousands/uL 157 151   NEUTROS PCT %  --  64   LYMPHS PCT %  --  19   MONOS PCT %  --  11   EOS PCT %  --  5     Results from last 7 days   Lab Units 01/04/24  0254   SODIUM mmol/L 137   POTASSIUM mmol/L 3.6   CHLORIDE mmol/L 98   CO2 mmol/L 31   BUN mg/dL 43*   CREATININE mg/dL 1.35*   ANION GAP mmol/L 8   CALCIUM mg/dL 9.4   ALBUMIN g/dL 3.7   TOTAL BILIRUBIN mg/dL 1.70*   ALK PHOS U/L 134*   ALT U/L 14   AST U/L 18   GLUCOSE RANDOM mg/dL 193*         Results from last 7 days   Lab Units 01/04/24  1046 01/04/24  0723 01/03/24  2054 01/03/24  1558 01/03/24  1113 01/03/24  0825 01/02/24  2121 01/02/24  1534 01/02/24  1107 01/02/24  0736 01/01/24  2037 01/01/24  1556   POC GLUCOSE mg/dl 254* 184* 310* 171* 199* 163* 317* 268* 213* 130 161* 196*               Lines/Drains:  Invasive Devices       Peripheral Intravenous Line  Duration             Peripheral IV 01/04/24 Dorsal (posterior);Left Forearm <1 day                    Telemetry:   Telemetry Orders (From admission, onward)               24 Hour Telemetry Monitoring  Continuous x 24 Hours (Telem)        Question:  Reason for 24 Hour Telemetry  Answer:  Decompensated CHF- and any one of the following: continuous diuretic infusion or total diuretic dose >200 mg daily, associated  electrolyte derangement (I.e. K < 3.0), ionotropic drip (continuous infusion), hx of ventricular arrhythmia, or new EF < 35%                        * I Have Reviewed All Lab Data Listed Above.           Imaging:     No results found for this or any previous visit.    Results for orders placed during the hospital encounter of 12/28/23    XR chest 2 views    Narrative  CHEST    INDICATION:   Chest Pain.    COMPARISON: CT chest 11/14/2023    EXAM PERFORMED/VIEWS:  XR CHEST PA & LATERAL      FINDINGS:  Left-sided chest wall intracardiac device is identified.  Leads are intact.    Cardiomegaly.    Diffuse prominence of the pulmonary vascular markings with mild hazy opacities. Small bilateral pleural effusions.    Osseous structures appear within normal limits for patient age.    Impression  Pulmonary edema with small bilateral pleural effusions.            Workstation performed: [id number]      *I have reviewed all imaging reports listed above      Recent Cultures (last 7 days):           Last 24 Hours Medication List:   Current Facility-Administered Medications   Medication Dose Route Frequency Provider Last Rate    acetaminophen  650 mg Oral Q6H PRN Gricel L Cedillo, PA-C      albuterol  2.5 mg Nebulization Q4H PRN Semaj Perez MD      allopurinol  300 mg Oral Daily Gricel L Cedillo, PA-C      aluminum-magnesium hydroxide-simethicone  30 mL Oral Q4H PRN Gricel L Cedillo, PA-C      amLODIPine  10 mg Oral Daily Gricel L Cedillo, PA-C      atorvastatin  20 mg Oral After Dinner Gricel KATH Rogers, PA-C      azelastine  1 spray Each Nare BID Gricel L Cedillo, PA-C      budesonide  0.5 mg Nebulization BID Gricel L Cedillo, PA-C      cholecalciferol  1,000 Units Oral Daily Gricel L Cedillo, PA-C      co-enzyme Q-10  100 mg Oral Daily Gricel L Cedillo, PA-C      fish oil  1,000 mg Oral Daily Gricel L Cedillo, PA-C      fluticasone  1 spray Nasal BID Gricel L Cedillo, PA-C      furosemide  100 mg Intravenous BID  (diuretic) Gricel Cedillo PA-C      gabapentin  100 mg Oral Daily Gricel Cedillo PA-C      heparin (porcine)  5,000 Units Subcutaneous Q8H [facility] Gricel Cedillo PA-C      insulin glargine  12 Units Subcutaneous Q12H [facility] Madhavi Cesar MD      insulin lispro  1-5 Units Subcutaneous TID  Ara Leigh MD      insulin lispro  8 Units Subcutaneous TID With Meals Madhavi Cesar MD      lidocaine  1 patch Topical HS Gricel Cedillo PA-C      lidocaine  1 patch Topical HS Gricel Cedillo PA-C      loratadine  10 mg Oral Daily Gricel Cedillo PA-C      losartan  100 mg Oral Daily Gricel Cedillo PA-C      magnesium Oxide  400 mg Oral BID Madhavi Cesar MD      metolazone  2.5 mg Oral Daily Jayy Babcock PA-C      senna  2 tablet Oral HS PRN Gricel Cedillo PA-C          Today, Patient Was Seen By: Madhavi Cesar MD    ** Please Note: Dictation voice to text software may have been used in the creation of this document. **

## 2024-01-04 NOTE — ASSESSMENT & PLAN NOTE
Baseline creatinine is around 1.2  Recent Labs     01/02/24  0430 01/03/24  0456 01/04/24  0254   CREATININE 1.29 1.34* 1.35*   EGFR 50 48 47       Estimated Creatinine Clearance: 51.6 mL/min (A) (by C-G formula based on SCr of 1.35 mg/dL (H)).   Trend BMP daily in setting of IV diuresis

## 2024-01-05 ENCOUNTER — HOME HEALTH ADMISSION (OUTPATIENT)
Dept: HOME HEALTH SERVICES | Facility: HOME HEALTHCARE | Age: 84
End: 2024-01-05
Payer: COMMERCIAL

## 2024-01-05 LAB
ALBUMIN SERPL BCP-MCNC: 3.6 G/DL (ref 3.5–5)
ALP SERPL-CCNC: 132 U/L (ref 34–104)
ALT SERPL W P-5'-P-CCNC: 15 U/L (ref 7–52)
ANION GAP SERPL CALCULATED.3IONS-SCNC: 7 MMOL/L
AST SERPL W P-5'-P-CCNC: 21 U/L (ref 13–39)
BASOPHILS # BLD AUTO: 0.04 THOUSANDS/ÂΜL (ref 0–0.1)
BASOPHILS NFR BLD AUTO: 1 % (ref 0–1)
BILIRUB SERPL-MCNC: 1.9 MG/DL (ref 0.2–1)
BUN SERPL-MCNC: 43 MG/DL (ref 5–25)
CALCIUM SERPL-MCNC: 9.7 MG/DL (ref 8.4–10.2)
CHLORIDE SERPL-SCNC: 97 MMOL/L (ref 96–108)
CO2 SERPL-SCNC: 32 MMOL/L (ref 21–32)
CREAT SERPL-MCNC: 1.27 MG/DL (ref 0.6–1.3)
EOSINOPHIL # BLD AUTO: 0.21 THOUSAND/ÂΜL (ref 0–0.61)
EOSINOPHIL NFR BLD AUTO: 3 % (ref 0–6)
ERYTHROCYTE [DISTWIDTH] IN BLOOD BY AUTOMATED COUNT: 17.7 % (ref 11.6–15.1)
GFR SERPL CREATININE-BSD FRML MDRD: 51 ML/MIN/1.73SQ M
GLUCOSE SERPL-MCNC: 106 MG/DL (ref 65–140)
GLUCOSE SERPL-MCNC: 132 MG/DL (ref 65–140)
GLUCOSE SERPL-MCNC: 141 MG/DL (ref 65–140)
GLUCOSE SERPL-MCNC: 172 MG/DL (ref 65–140)
GLUCOSE SERPL-MCNC: 186 MG/DL (ref 65–140)
HCT VFR BLD AUTO: 34.6 % (ref 36.5–49.3)
HGB BLD-MCNC: 11 G/DL (ref 12–17)
IMM GRANULOCYTES # BLD AUTO: 0.04 THOUSAND/UL (ref 0–0.2)
IMM GRANULOCYTES NFR BLD AUTO: 1 % (ref 0–2)
LYMPHOCYTES # BLD AUTO: 1.28 THOUSANDS/ÂΜL (ref 0.6–4.47)
LYMPHOCYTES NFR BLD AUTO: 17 % (ref 14–44)
MAGNESIUM SERPL-MCNC: 2 MG/DL (ref 1.9–2.7)
MCH RBC QN AUTO: 25.5 PG (ref 26.8–34.3)
MCHC RBC AUTO-ENTMCNC: 31.8 G/DL (ref 31.4–37.4)
MCV RBC AUTO: 80 FL (ref 82–98)
MONOCYTES # BLD AUTO: 0.87 THOUSAND/ÂΜL (ref 0.17–1.22)
MONOCYTES NFR BLD AUTO: 11 % (ref 4–12)
NEUTROPHILS # BLD AUTO: 5.16 THOUSANDS/ÂΜL (ref 1.85–7.62)
NEUTS SEG NFR BLD AUTO: 67 % (ref 43–75)
NRBC BLD AUTO-RTO: 0 /100 WBCS
PLATELET # BLD AUTO: 150 THOUSANDS/UL (ref 149–390)
PMV BLD AUTO: 12.1 FL (ref 8.9–12.7)
POTASSIUM SERPL-SCNC: 3.4 MMOL/L (ref 3.5–5.3)
PROT SERPL-MCNC: 6.7 G/DL (ref 6.4–8.4)
RBC # BLD AUTO: 4.32 MILLION/UL (ref 3.88–5.62)
SODIUM SERPL-SCNC: 136 MMOL/L (ref 135–147)
WBC # BLD AUTO: 7.6 THOUSAND/UL (ref 4.31–10.16)

## 2024-01-05 PROCEDURE — 83735 ASSAY OF MAGNESIUM: CPT | Performed by: HOSPITALIST

## 2024-01-05 PROCEDURE — 99232 SBSQ HOSP IP/OBS MODERATE 35: CPT | Performed by: HOSPITALIST

## 2024-01-05 PROCEDURE — 94660 CPAP INITIATION&MGMT: CPT

## 2024-01-05 PROCEDURE — 94640 AIRWAY INHALATION TREATMENT: CPT

## 2024-01-05 PROCEDURE — 99232 SBSQ HOSP IP/OBS MODERATE 35: CPT | Performed by: INTERNAL MEDICINE

## 2024-01-05 PROCEDURE — 82948 REAGENT STRIP/BLOOD GLUCOSE: CPT

## 2024-01-05 PROCEDURE — 85025 COMPLETE CBC W/AUTO DIFF WBC: CPT | Performed by: HOSPITALIST

## 2024-01-05 PROCEDURE — 94760 N-INVAS EAR/PLS OXIMETRY 1: CPT

## 2024-01-05 PROCEDURE — 80053 COMPREHEN METABOLIC PANEL: CPT | Performed by: HOSPITALIST

## 2024-01-05 RX ORDER — POTASSIUM CHLORIDE 20 MEQ/1
40 TABLET, EXTENDED RELEASE ORAL ONCE
Status: COMPLETED | OUTPATIENT
Start: 2024-01-05 | End: 2024-01-05

## 2024-01-05 RX ADMIN — INSULIN GLARGINE 12 UNITS: 100 INJECTION, SOLUTION SUBCUTANEOUS at 09:03

## 2024-01-05 RX ADMIN — INSULIN LISPRO 8 UNITS: 100 INJECTION, SOLUTION INTRAVENOUS; SUBCUTANEOUS at 09:05

## 2024-01-05 RX ADMIN — LOSARTAN POTASSIUM 100 MG: 50 TABLET, FILM COATED ORAL at 09:04

## 2024-01-05 RX ADMIN — METOLAZONE 2.5 MG: 2.5 TABLET ORAL at 09:04

## 2024-01-05 RX ADMIN — HEPARIN SODIUM 5000 UNITS: 5000 INJECTION INTRAVENOUS; SUBCUTANEOUS at 05:50

## 2024-01-05 RX ADMIN — OMEGA-3 FATTY ACIDS CAP 1000 MG 1000 MG: 1000 CAP at 09:04

## 2024-01-05 RX ADMIN — HEPARIN SODIUM 5000 UNITS: 5000 INJECTION INTRAVENOUS; SUBCUTANEOUS at 22:05

## 2024-01-05 RX ADMIN — ATORVASTATIN CALCIUM 20 MG: 20 TABLET, FILM COATED ORAL at 17:10

## 2024-01-05 RX ADMIN — AMLODIPINE BESYLATE 10 MG: 5 TABLET ORAL at 09:04

## 2024-01-05 RX ADMIN — AZELASTINE HYDROCHLORIDE 1 SPRAY: 137 SPRAY, METERED NASAL at 22:04

## 2024-01-05 RX ADMIN — ALLOPURINOL 300 MG: 100 TABLET ORAL at 09:04

## 2024-01-05 RX ADMIN — BUDESONIDE 0.5 MG: 0.5 INHALANT ORAL at 20:14

## 2024-01-05 RX ADMIN — FLUTICASONE PROPIONATE 1 SPRAY: 50 SPRAY, METERED NASAL at 22:05

## 2024-01-05 RX ADMIN — INSULIN LISPRO 1 UNITS: 100 INJECTION, SOLUTION INTRAVENOUS; SUBCUTANEOUS at 12:55

## 2024-01-05 RX ADMIN — INSULIN GLARGINE 12 UNITS: 100 INJECTION, SOLUTION SUBCUTANEOUS at 22:06

## 2024-01-05 RX ADMIN — LORATADINE 10 MG: 10 TABLET ORAL at 09:04

## 2024-01-05 RX ADMIN — POTASSIUM CHLORIDE 40 MEQ: 1500 TABLET, EXTENDED RELEASE ORAL at 09:04

## 2024-01-05 RX ADMIN — BUDESONIDE 0.5 MG: 0.5 INHALANT ORAL at 08:22

## 2024-01-05 RX ADMIN — INSULIN LISPRO 8 UNITS: 100 INJECTION, SOLUTION INTRAVENOUS; SUBCUTANEOUS at 12:56

## 2024-01-05 RX ADMIN — AZELASTINE HYDROCHLORIDE 1 SPRAY: 137 SPRAY, METERED NASAL at 09:06

## 2024-01-05 RX ADMIN — Medication 1000 UNITS: at 09:04

## 2024-01-05 RX ADMIN — HEPARIN SODIUM 5000 UNITS: 5000 INJECTION INTRAVENOUS; SUBCUTANEOUS at 15:26

## 2024-01-05 RX ADMIN — FLUTICASONE PROPIONATE 1 SPRAY: 50 SPRAY, METERED NASAL at 09:06

## 2024-01-05 RX ADMIN — FUROSEMIDE 100 MG: 10 INJECTION, SOLUTION INTRAMUSCULAR; INTRAVENOUS at 17:10

## 2024-01-05 RX ADMIN — GABAPENTIN 100 MG: 100 CAPSULE ORAL at 09:04

## 2024-01-05 RX ADMIN — Medication 100 MG: at 09:04

## 2024-01-05 RX ADMIN — INSULIN LISPRO 8 UNITS: 100 INJECTION, SOLUTION INTRAVENOUS; SUBCUTANEOUS at 17:34

## 2024-01-05 RX ADMIN — FUROSEMIDE 100 MG: 10 INJECTION, SOLUTION INTRAMUSCULAR; INTRAVENOUS at 09:05

## 2024-01-05 NOTE — PLAN OF CARE
Problem: METABOLIC, FLUID AND ELECTROLYTES - ADULT  Goal: Electrolytes maintained within normal limits  Description: INTERVENTIONS:  - Monitor labs and assess patient for signs and symptoms of electrolyte imbalances  - Administer electrolyte replacement as ordered  - Monitor response to electrolyte replacements, including repeat lab results as appropriate  - Instruct patient on fluid and nutrition as appropriate  Outcome: Progressing  Goal: Fluid balance maintained  Description: INTERVENTIONS:  - Monitor labs   - Monitor I/O and WT  - Instruct patient on fluid and nutrition as appropriate  - Assess for signs & symptoms of volume excess or deficit  Outcome: Progressing  Goal: Glucose maintained within target range  Description: INTERVENTIONS:  - Monitor Blood Glucose as ordered  - Assess for signs and symptoms of hyperglycemia and hypoglycemia  - Administer ordered medications to maintain glucose within target range  - Assess nutritional intake and initiate nutrition service referral as needed  Outcome: Progressing     Problem: CARDIOVASCULAR - ADULT  Goal: Maintains optimal cardiac output and hemodynamic stability  Description: INTERVENTIONS:  - Monitor I/O, vital signs and rhythm  - Monitor for S/S and trends of decreased cardiac output  - Administer and titrate ordered vasoactive medications to optimize hemodynamic stability  - Assess quality of pulses, skin color and temperature  - Assess for signs of decreased coronary artery perfusion  - Instruct patient to report change in severity of symptoms  Outcome: Progressing  Goal: Absence of cardiac dysrhythmias or at baseline rhythm  Description: INTERVENTIONS:  - Continuous cardiac monitoring, vital signs, obtain 12 lead EKG if ordered  - Administer antiarrhythmic and heart rate control medications as ordered  - Monitor electrolytes and administer replacement therapy as ordered  Outcome: Progressing     Problem: Potential for Falls  Goal: Patient will remain free  of falls  Description: INTERVENTIONS:  - Educate patient/family on patient safety including physical limitations  - Instruct patient to call for assistance with activity   - Consult OT/PT to assist with strengthening/mobility   - Keep Call bell within reach  - Keep bed low and locked with side rails adjusted as appropriate  - Keep care items and personal belongings within reach  - Initiate and maintain comfort rounds  - Make Fall Risk Sign visible to staff  - Offer Toileting every 2 Hours, in advance of need  - Initiate/Maintain alarm  - Obtain necessary fall risk management equipment  - Apply yellow socks and bracelet for high fall risk patients  - Consider moving patient to room near nurses station  Outcome: Progressing

## 2024-01-05 NOTE — ASSESSMENT & PLAN NOTE
Baseline creatinine is around 1.2  Recent Labs     01/03/24  0456 01/04/24  0254 01/05/24  0407   CREATININE 1.34* 1.35* 1.27   EGFR 48 47 51       Estimated Creatinine Clearance: 54.4 mL/min (by C-G formula based on SCr of 1.27 mg/dL).   Trend BMP daily in setting of IV diuresis

## 2024-01-05 NOTE — ASSESSMENT & PLAN NOTE
Lab Results   Component Value Date    HGBA1C 6.8 11/16/2023       Recent Labs     01/04/24  1611 01/04/24  2111 01/05/24  0901 01/05/24  1054   POCGLU 221* 149* 132 172*         Blood Sugar Average: Last 72 hrs:  (P) [id number] regimen: Lantus 18 units twice daily and NovoLog 22 unit with breakfast, 23 units lunch, and 20 units with dinner  Decrease Lantus further  to 12 units twice daily  Decrease Humalog to 8 units 3 times daily  SSI

## 2024-01-05 NOTE — PROGRESS NOTES
"Cardiology Progress Note   Hammad Montesinos 83 y.o. male MRN: [id number]    Unit/Bed#: -01 Encounter: [id number]      Assessment:  Acute on chronic HFpEF  Follows with Dr Richter ([facility])   10/2023 echo: LVEF 50%, moderate concentric LVH, severe left atrial dilation  Prehospital diuretic: Lasix 40mg/20mg -> 60mg/40mg daily (approx 4 days prior to admission)   Baseline weight: 230lbs  Admission wt: 263lbs -> 247lbs -> 246lbs.   Hypertension   Home Rx: Norvasc 10mg, losartan 100mg  Hyperlipidemia   Home Rx: Lipitor 20mg  Remote hx of paroxysmal atrial fibrillation, not on OAC due to low AF burden  S/P DC Biotronik PPM     Plan/Discussion:   Originally started on IV Lasix 100mg BID but with tapering response. Good response to PO metolazone 2.5mg yesterday (-1.8L). Creatinine also improved 1.3>1.2 this AM But still appears volume overloaded. Start metolazone 2.5mg daily for now in addition to IV diuretics. Plan to continue this regimen through the weekend as long as renal function remains stable. Continue to monitor BMPs daily. Keep K above 4 and Mg above 2.  Continue remainder of cardiac regimen: Norvasc 10 mg daily, Lipitor 20 mg, losartan 100 mg daily.    Cardiology will see again 1/8/2024. Please call with any questions or concerns in the meantime.     Subjective:   Patient seen and examined. Overnight events reviewed.     Objective:     Vitals: Blood pressure 124/59, pulse 69, temperature 98.3 °F (36.8 °C), temperature source Oral, resp. rate 19, height 5' 11\" (1.803 m), weight 109 kg (241 lb), SpO2 100%., Body mass index is 33.61 kg/m².,   Orthostatic Blood Pressures      Flowsheet Row Most Recent Value   Blood Pressure 124/59 filed at 01/05/2024 0759   Patient Position - Orthostatic VS Sitting filed at 01/05/2024 0755              Intake/Output Summary (Last 24 hours) at 1/5/2024 1223  Last data filed at 1/5/2024 1053  Gross per 24 hour   Intake 1260 ml   Output 4000 ml   Net -2740 ml "       Physical Exam:  GEN: Hammad Montesinos appears well, alert and oriented x 3, pleasant and cooperative   HEENT: Sclera anicteric, conjunctivae pink, mucous membranes moist. Oropharynx clear.   NECK: supple, no significant JVD, Trachea midline, no thyromegaly.   HEART: regular rhythm, normal S1 and S2, no murmurs, clicks, gallops or rubs   LUNGS: clear to auscultation bilaterally; no wheezes, rales, or rhonchi. No increased work of breathing or signs of respiratory distress.   ABDOMEN: Soft, nontender, nondistended  EXTREMITIES: +2 pitting edema B/L. Skin warm and well perfused, no clubbing, cyanosis  NEURO: No focal findings. Normal speech. Mood and affect normal.   SKIN: Normal without suspicious lesions on exposed skin.      Medications:      Current Facility-Administered Medications:     acetaminophen (TYLENOL) tablet 650 mg, 650 mg, Oral, Q6H PRN, Gricel Cedillo PA-C, 650 mg at 12/29/23 0006    albuterol inhalation solution 2.5 mg, 2.5 mg, Nebulization, Q4H PRN, Semaj Perez MD, 2.5 mg at 01/02/24 1806    allopurinol (ZYLOPRIM) tablet 300 mg, 300 mg, Oral, Daily, Gricel Cedillo PA-C, 300 mg at 01/05/24 0904    aluminum-magnesium hydroxide-simethicone (MAALOX) oral suspension 30 mL, 30 mL, Oral, Q4H PRN, Gricel Cedillo PA-C    amLODIPine (NORVASC) tablet 10 mg, 10 mg, Oral, Daily, Gricel Cedillo PA-C, 10 mg at 01/05/24 0904    atorvastatin (LIPITOR) tablet 20 mg, 20 mg, Oral, After Dinner, ROSSY Carpenter-C, 20 mg at 01/04/24 1705    azelastine (ASTELIN) 0.1 % nasal spray 1 spray, 1 spray, Each Nare, BID, ROSSY Carpenter-C, 1 spray at 01/05/24 0906    budesonide (PULMICORT) inhalation solution 0.5 mg, 0.5 mg, Nebulization, BID, Gricel Cedillo PA-C, 0.5 mg at 01/05/24 0822    cholecalciferol (VITAMIN D3) tablet 1,000 Units, 1,000 Units, Oral, Daily, Gricel Cedillo PA-C, 1,000 Units at 01/05/24 0904    co-enzyme Q-10 capsule 100 mg, 100 mg, Oral, Daily, Gricel L Cedillo,  PA-C, 100 mg at 01/05/24 0904    fish oil capsule 1,000 mg, 1,000 mg, Oral, Daily, Gricel Cedillo PA-C, 1,000 mg at 01/05/24 0904    fluticasone (FLONASE) 50 mcg/act nasal spray 1 spray, 1 spray, Nasal, BID, Gricel Cedillo PA-C, 1 spray at 01/05/24 0906    furosemide (LASIX) injection 100 mg, 100 mg, Intravenous, BID (diuretic), Gricel Cedillo PA-C, 100 mg at 01/05/24 0905    gabapentin (NEURONTIN) capsule 100 mg, 100 mg, Oral, Daily, ROSSY Carpenter-C, 100 mg at 01/05/24 0904    heparin (porcine) subcutaneous injection 5,000 Units, 5,000 Units, Subcutaneous, Q8H TOVA, ROSSY Carpenter-C, 5,000 Units at 01/05/24 0550    insulin glargine (LANTUS) subcutaneous injection 12 Units 0.12 mL, 12 Units, Subcutaneous, Q12H TOVA, Madhavi Cesar MD, 12 Units at 01/05/24 0903    insulin lispro (HumaLOG) 100 units/mL subcutaneous injection 1-5 Units, 1-5 Units, Subcutaneous, TID AC, 2 Units at 01/04/24 1704 **AND** Fingerstick Glucose (POCT), , , 4x Daily AC and at bedtime, Ara Leigh MD    insulin lispro (HumaLOG) 100 units/mL subcutaneous injection 8 Units, 8 Units, Subcutaneous, TID With Meals, Madhavi Cesar MD, 8 Units at 01/05/24 0905    lidocaine (LIDODERM) 5 % patch 1 patch, 1 patch, Topical, HS, Gricel Cedillo PA-C, 1 patch at 01/01/24 2107    lidocaine (LIDODERM) 5 % patch 1 patch, 1 patch, Topical, HS, Gricel Cedillo PA-C, 1 patch at 01/01/24 2106    loratadine (CLARITIN) tablet 10 mg, 10 mg, Oral, Daily, Gricel Cedillo PA-C, 10 mg at 01/05/24 0904    losartan (COZAAR) tablet 100 mg, 100 mg, Oral, Daily, Gricel Cedillo PA-C, 100 mg at 01/05/24 0904    metolazone (ZAROXOLYN) tablet 2.5 mg, 2.5 mg, Oral, Daily, Jayy Babcock PA-C, 2.5 mg at 01/05/24 0904    senna (SENOKOT) tablet 17.2 mg, 2 tablet, Oral, HS PRN, Gricel Cedillo PA-C     Labs & Results:        Results from last 7 days   Lab Units 01/05/24  0407 01/04/24  0254 01/03/24  0456   WBC Thousand/uL 7.60  7.14 5.89   HEMOGLOBIN g/dL 11.0* 10.7* 11.0*   HEMATOCRIT % 34.6* 34.2* 35.1*   PLATELETS Thousands/uL 150 157 151         Results from last 7 days   Lab Units 01/05/24 0407 01/04/24 0254 01/03/24  0456   POTASSIUM mmol/L 3.4* 3.6 3.5   CHLORIDE mmol/L 97 98 98   CO2 mmol/L 32 31 32   BUN mg/dL 43* 43* 44*   CREATININE mg/dL 1.27 1.35* 1.34*   CALCIUM mg/dL 9.7 9.4 9.5   ALK PHOS U/L 132* 134* 128*   ALT U/L 15 14 14   AST U/L 21 18 18         Results from last 7 days   Lab Units 01/05/24 0407 01/04/24 0254 01/03/24  0456   MAGNESIUM mg/dL 2.0 1.8* 2.1

## 2024-01-05 NOTE — PLAN OF CARE
Problem: Potential for Falls  Goal: Patient will remain free of falls  Description: INTERVENTIONS:  - Educate patient/family on patient safety including physical limitations  - Instruct patient to call for assistance with activity   - Consult OT/PT to assist with strengthening/mobility   - Keep Call bell within reach  - Keep bed low and locked with side rails adjusted as appropriate  - Keep care items and personal belongings within reach  - Initiate and maintain comfort rounds  - Make Fall Risk Sign visible to staff  - Offer Toileting every 2 Hours, in advance of need  - Initiate/Maintain alarm  - Obtain necessary fall risk management equipment  - Apply yellow socks and bracelet for high fall risk patients  - Consider moving patient to room near nurses station  Outcome: Progressing     Problem: RESPIRATORY - ADULT  Goal: Achieves optimal ventilation and oxygenation  Description: INTERVENTIONS:  - Assess for changes in respiratory status  - Assess for changes in mentation and behavior  - Position to facilitate oxygenation and minimize respiratory effort  - Oxygen administered by appropriate delivery if ordered  - Initiate smoking cessation education as indicated  - Encourage broncho-pulmonary hygiene including cough, deep breathe, Incentive Spirometry  - Assess the need for suctioning and aspirate as needed  - Assess and instruct to report SOB or any respiratory difficulty  - Respiratory Therapy support as indicated  Outcome: Progressing

## 2024-01-05 NOTE — CASE MANAGEMENT
Case Management Discharge Planning Note    Patient name Hammad Montesinos  Location /-01 MRN [id number]  : 1940 Date 2024       Current Admission Date: 2023  Current Admission Diagnosis:Acute on chronic diastolic heart failure (HCC)   Patient Active Problem List    Diagnosis Date Noted    Acute on chronic diastolic heart failure (HCC) 2023    Elevated troponin 2023    Pulmonary hypertension ([facility]) 2023    Paroxysmal A-fib ([facility]) 2023    Type 2 diabetes mellitus with diabetic neuropathy, unspecified whether long term insulin use ([facility]) 2023    Pre-ulcerative calluses 2023    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 ([facility]) 2022    Elevated parathyroid hormone 2022    Diabetic nephropathy associated with type 2 diabetes mellitus ([facility]) 2022    Type 2 diabetes mellitus with hyperglycemia, with long-term current use of insulin ([facility]) 2022    Mixed hyperlipidemia 2022    Diabetic polyneuropathy associated with type 2 diabetes mellitus ([facility]) 2022    Primary hypertension 2022    Morbid (severe) obesity due to excess calories ([facility]) 2022    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 ([facility]) 2022      LOS (days): 8  Geometric Mean LOS (GMLOS) (days): 3.9  Days to GMLOS:-3.6     OBJECTIVE:  Risk of Unplanned Readmission Score: 23.52         Current admission status: Inpatient   Preferred Pharmacy:   [facility] [address]  [address] [facility] [address]  Phone: [phone] Fax: [phone]    Primary Care Provider: Mina Zavala MD    Primary Insurance: [employer] [facility]  Secondary Insurance:     DISCHARGE DETAILS:     Met with patient and provided list of accepting HHC. [facility] preference [facility], [facility] on [facility] and their contact information added to patient's AVS                                                                                    IMM Given (Date):: 01/05/24  IMM Given to:: Patient

## 2024-01-05 NOTE — PROGRESS NOTES
Patient:  TITA MANUEL    MRN:  [id number]    Aidin Request ID:  [id number]    Level of care reserved:  Home Health Agency    Partner Reserved:  [facility], [place] [address] [phone]    Clinical needs requested:    Geography searched:  [id number]    Start of Service:    Request sent:  2:44pm EST on 1/4/2024 by Gloria Almaraz    Partner reserved:  10:30am EST on 1/5/2024 by Gloria Almaraz    Choice list shared:  8:48am EST on 1/5/2024 by Gloria Almaraz

## 2024-01-05 NOTE — CASE MANAGEMENT
Case Management Discharge Planning Note    Patient name Hammad Montesinos  Location /-01 MRN [id number]  : 1940 Date 2024       Current Admission Date: 2023  Current Admission Diagnosis:Acute on chronic diastolic heart failure (HCC)   Patient Active Problem List    Diagnosis Date Noted    Acute on chronic diastolic heart failure (HCC) 2023    Elevated troponin 2023    Pulmonary hypertension ([facility]) 2023    Paroxysmal A-fib ([facility]) 2023    Type 2 diabetes mellitus with diabetic neuropathy, unspecified whether long term insulin use ([facility]) 2023    Pre-ulcerative calluses 2023    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 ([facility]) 2022    Elevated parathyroid hormone 2022    Diabetic nephropathy associated with type 2 diabetes mellitus ([facility]) 2022    Type 2 diabetes mellitus with hyperglycemia, with long-term current use of insulin ([facility]) 2022    Mixed hyperlipidemia 2022    Diabetic polyneuropathy associated with type 2 diabetes mellitus ([facility]) 2022    Primary hypertension 2022    Morbid (severe) obesity due to excess calories ([facility]) 2022    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 ([facility]) 2022      LOS (days): 8  Geometric Mean LOS (GMLOS) (days): 3.9  Days to GMLOS:-3.8     OBJECTIVE:  Risk of Unplanned Readmission Score: 23.85         Current admission status: Inpatient   Preferred Pharmacy:   [facility] [address]  [address] [facility] [address]  Phone: [phone] Fax: [phone]    Primary Care Provider: Mina Zavala MD    Primary Insurance: [employer] [facility]  Secondary Insurance:     DISCHARGE DETAILS:           Placed call to [facility] at 5- to start process for special services patient is eligible for through [facility].  Information provided and was informed they would be  reaching out to patient within the next 48 hrs.  Notified patient of the above.

## 2024-01-05 NOTE — ASSESSMENT & PLAN NOTE
"Home regimen: Amlodipine 10 Mg daily, losartan 100 Mg daily  Continue home medications  /59 (BP Location: Right arm)   Pulse 69   Temp 98.3 °F (36.8 °C) (Oral)   Resp 19   Ht 5' 11\" (1.803 m)   Wt 109 kg (241 lb)   SpO2 100%   BMI 33.61 kg/m²    "

## 2024-01-05 NOTE — ASSESSMENT & PLAN NOTE
Wt Readings from Last 3 Encounters:   01/05/24 109 kg (241 lb)   12/12/23 115 kg (254 lb)   12/05/23 112 kg (247 lb)     Presents with worsening dyspnea and anasarca since [place]   Home regimen: lasix 40mg BID with increased to 60mg in AM and 40mg in PM 3 days ago with no improvement in symptoms   Last echo 10/2023: Severe left atrial dilation. RV systolic pressure 52.2mmHg. pulmonary artery pressure elevated  Pt with anasarca and pulmonary congestion - continue lasix 100mg IV BID   I/o and daily weights   Sodium and fluid restriction   Cardiology following  Observe off further metolazone for now.   Volume status improving further

## 2024-01-05 NOTE — PROGRESS NOTES
"[facility]  Progress Note  Name: Hammad Montesinos I  MRN: [id number]  Unit/Bed#: MS 329Svitlana I Date of Admission: 12/28/2023   Date of Service: 1/5/2024 I Hospital Day: 8    Assessment/Plan   Paroxysmal A-fib (HCC)  Assessment & Plan  History noted  Not on anticoagulation  Cardiology following, appreciate recommendations  Outpatient cardiology dcd AC for risk of bleeding and fall, discussed with daughter, she would like to talk with primary cardiologist before starting anticoagulation    Pulmonary hypertension (HCC)  Assessment & Plan  Home regimen: pulmicort BID, performist BID, and DuoNeb twice daily  Continue home nebulizers and increased dose of IV diuresis due to anasarca    Elevated troponin  Assessment & Plan  HS trop: 48 > 64  EKG without acute ischemia - showing paced rhythm   Likely s/d to volume overload     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 ([facility])  Assessment & Plan  Baseline creatinine is around 1.2  Recent Labs     01/03/24  0456 01/04/24  0254 01/05/24  0407   CREATININE 1.34* 1.35* 1.27   EGFR 48 47 51       Estimated Creatinine Clearance: 54.4 mL/min (by C-G formula based on SCr of 1.27 mg/dL).   Trend BMP daily in setting of IV diuresis    Primary hypertension  Assessment & Plan  Home regimen: Amlodipine 10 Mg daily, losartan 100 Mg daily  Continue home medications  /59 (BP Location: Right arm)   Pulse 69   Temp 98.3 °F (36.8 °C) (Oral)   Resp 19   Ht 5' 11\" (1.803 m)   Wt 109 kg (241 lb)   SpO2 100%   BMI 33.61 kg/m²      Mixed hyperlipidemia  Assessment & Plan  Continue home statin    Type 2 diabetes mellitus with hyperglycemia, with long-term current use of insulin ([facility])  Assessment & Plan  Lab Results   Component Value Date    HGBA1C 6.8 11/16/2023       Recent Labs     01/04/24  1611 01/04/24  2111 01/05/24  0901 01/05/24  1054   POCGLU 221* 149* 132 172*         Blood Sugar Average: Last 72 hrs:  (P) " [id number] regimen: Lantus 18 units twice daily and NovoLog 22 unit with breakfast, 23 units lunch, and 20 units with dinner  Decrease Lantus further  to 12 units twice daily  Decrease Humalog to 8 units 3 times daily  SSI      * Acute on chronic diastolic heart failure (HCC)  Assessment & Plan  Wt Readings from Last 3 Encounters:   24 109 kg (241 lb)   23 115 kg (254 lb)   23 112 kg (247 lb)     Presents with worsening dyspnea and anasarca since [place]   Home regimen: lasix 40mg BID with increased to 60mg in AM and 40mg in PM 3 days ago with no improvement in symptoms   Last echo 10/2023: Severe left atrial dilation. RV systolic pressure 52.2mmHg. pulmonary artery pressure elevated  Pt with anasarca and pulmonary congestion - continue lasix 100mg IV BID   I/o and daily weights   Sodium and fluid restriction   Cardiology following  Observe off further metolazone for now.   Volume status improving further          VTE  Prophylaxis:   Pharmacologic: in place  Mechanical VTE Prophylaxis in Place: Yes    Patient Centered Rounds: I have performed bedside rounds with nursing staff today.    Discussions with Specialists or Other Care Team Provider: case management    Education and Discussions with Family / Patient: Maci       Current Length of Stay: 8 day(s)    Current Patient Status: Inpatient        Code Status: Level 1 - Full Code    Discharge Plan: Pt will require continued inpatient hospitalization.    Subjective:     Pt continues to diurese well    Patient is seen and examined at bedside.  All other ROS are negative.    Objective:     Vitals:   Temp (24hrs), Av.1 °F (36.7 °C), Min:97.6 °F (36.4 °C), Max:98.5 °F (36.9 °C)    Temp:  [97.6 °F (36.4 °C)-98.5 °F (36.9 °C)] 98.3 °F (36.8 °C)  HR:  [69-88] 69  Resp:  [18-20] 19  BP: (122-143)/(59-64) 124/59  SpO2:  [91 %-100 %] 100 %  Body mass index is 33.61 kg/m².     Input and Output Summary (last 24 hours):       Intake/Output  Summary (Last 24 hours) at 2024 1115  Last data filed at 2024 1053  Gross per 24 hour   Intake 1260 ml   Output 4000 ml   Net -2740 ml       Physical Exam:       GEN: No acute distress, comfortable  HEEENT: No JVD, PERRLA, no scleral icterus  RESP: Lungs clear to auscultation bilaterally  CV: RRR, +s1/s2   ABD: SOFT NON TENDER, POSITIVE BOWEL SOUNDS, NO DISTENTION  PSYCH: CALM  NEURO: Mentation baseline, NO FOCAL DEFICITS  SKIN: NO RASH  EXTREM: EDEMA present, but improving    Additional Data:     Labs:    Results from last 7 days   Lab Units 24  0407   WBC Thousand/uL 7.60   HEMOGLOBIN g/dL 11.0*   HEMATOCRIT % 34.6*   PLATELETS Thousands/uL 150   NEUTROS PCT % 67   LYMPHS PCT % 17   MONOS PCT % 11   EOS PCT % 3     Results from last 7 days   Lab Units 24  0407   SODIUM mmol/L 136   POTASSIUM mmol/L 3.4*   CHLORIDE mmol/L 97   CO2 mmol/L 32   BUN mg/dL 43*   CREATININE mg/dL 1.27   ANION GAP mmol/L 7   CALCIUM mg/dL 9.7   ALBUMIN g/dL 3.6   TOTAL BILIRUBIN mg/dL 1.90*   ALK PHOS U/L 132*   ALT U/L 15   AST U/L 21   GLUCOSE RANDOM mg/dL 141*         Results from last 7 days   Lab Units 24  1054 24  0901 24  2111 24  1611 24  1046 24  0723 24  2054 24  1558 24  1113 24  0825 24  2121 24  1534   POC GLUCOSE mg/dl 172* 132 149* 221* 254* 184* 310* 171* 199* 163* 317* 268*               Lines/Drains:  Invasive Devices       Peripheral Intravenous Line  Duration             Peripheral IV 24 Dorsal (posterior);Left Forearm 1 day                    Telemetry:   Telemetry Orders (From admission, onward)               24 Hour Telemetry Monitoring  Continuous x 24 Hours (Telem)           Question:  Reason for 24 Hour Telemetry  Answer:  Decompensated CHF- and any one of the following: continuous diuretic infusion or total diuretic dose >200 mg daily, associated electrolyte derangement (I.e. K < 3.0), ionotropic drip  (continuous infusion), hx of ventricular arrhythmia, or new EF < 35%                        * I Have Reviewed All Lab Data Listed Above.           Imaging:     No results found for this or any previous visit.    Results for orders placed during the hospital encounter of 12/28/23    XR chest 2 views    Narrative  CHEST    INDICATION:   Chest Pain.    COMPARISON: CT chest 11/14/2023    EXAM PERFORMED/VIEWS:  XR CHEST PA & LATERAL      FINDINGS:  Left-sided chest wall intracardiac device is identified.  Leads are intact.    Cardiomegaly.    Diffuse prominence of the pulmonary vascular markings with mild hazy opacities. Small bilateral pleural effusions.    Osseous structures appear within normal limits for patient age.    Impression  Pulmonary edema with small bilateral pleural effusions.            Workstation performed: [id number]      *I have reviewed all imaging reports listed above      Recent Cultures (last 7 days):           Last 24 Hours Medication List:   Current Facility-Administered Medications   Medication Dose Route Frequency Provider Last Rate    acetaminophen  650 mg Oral Q6H PRN Gricel L Cedillo, PA-C      albuterol  2.5 mg Nebulization Q4H PRN Semaj Perez MD      allopurinol  300 mg Oral Daily Gricel L Cedillo, PA-C      aluminum-magnesium hydroxide-simethicone  30 mL Oral Q4H PRN Gricel L Cedillo, PA-C      amLODIPine  10 mg Oral Daily Gricel L Cedillo, PA-C      atorvastatin  20 mg Oral After Dinner Gricel KATH Rogers, PA-C      azelastine  1 spray Each Nare BID Gricel L Cedillo, PA-C      budesonide  0.5 mg Nebulization BID Gricel L Cedillo, PA-C      cholecalciferol  1,000 Units Oral Daily Gricel L Cedillo, PA-C      co-enzyme Q-10  100 mg Oral Daily Gricel L Cedillo, PA-C      fish oil  1,000 mg Oral Daily Gricel L Cedillo, PA-C      fluticasone  1 spray Nasal BID Gricel L Cedillo, PA-C      furosemide  100 mg Intravenous BID (diuretic) Gricel L Cedillo, PA-C      gabapentin  100  mg Oral Daily Gricel Cedillo PA-C      heparin (porcine)  5,000 Units Subcutaneous Q8H [facility] Gricel Cedillo PA-C      insulin glargine  12 Units Subcutaneous Q12H [facility] Madhavi Cesar MD      insulin lispro  1-5 Units Subcutaneous TID  Ara Leigh MD      insulin lispro  8 Units Subcutaneous TID With Meals Madhavi Cesar MD      lidocaine  1 patch Topical HS Gricel Cedillo PA-C      lidocaine  1 patch Topical HS Gricel Cedillo PA-C      loratadine  10 mg Oral Daily Gricel Cedillo PA-C      losartan  100 mg Oral Daily Gricel Cedillo PA-C      metolazone  2.5 mg Oral Daily Jayy Babcock PA-C      senna  2 tablet Oral HS PRN Gricel Cedillo PA-C          Today, Patient Was Seen By: Madhavi Cesar MD    ** Please Note: Dictation voice to text software may have been used in the creation of this document. **

## 2024-01-06 LAB
ALBUMIN SERPL BCP-MCNC: 3.4 G/DL (ref 3.5–5)
ALP SERPL-CCNC: 127 U/L (ref 34–104)
ALT SERPL W P-5'-P-CCNC: 15 U/L (ref 7–52)
ANION GAP SERPL CALCULATED.3IONS-SCNC: 8 MMOL/L
AST SERPL W P-5'-P-CCNC: 20 U/L (ref 13–39)
BASOPHILS # BLD AUTO: 0.06 THOUSANDS/ÂΜL (ref 0–0.1)
BASOPHILS NFR BLD AUTO: 1 % (ref 0–1)
BILIRUB SERPL-MCNC: 1.71 MG/DL (ref 0.2–1)
BUN SERPL-MCNC: 51 MG/DL (ref 5–25)
CALCIUM ALBUM COR SERPL-MCNC: 10.1 MG/DL (ref 8.3–10.1)
CALCIUM SERPL-MCNC: 9.6 MG/DL (ref 8.4–10.2)
CHLORIDE SERPL-SCNC: 97 MMOL/L (ref 96–108)
CO2 SERPL-SCNC: 33 MMOL/L (ref 21–32)
CREAT SERPL-MCNC: 1.37 MG/DL (ref 0.6–1.3)
EOSINOPHIL # BLD AUTO: 0.36 THOUSAND/ÂΜL (ref 0–0.61)
EOSINOPHIL NFR BLD AUTO: 6 % (ref 0–6)
ERYTHROCYTE [DISTWIDTH] IN BLOOD BY AUTOMATED COUNT: 17.6 % (ref 11.6–15.1)
GFR SERPL CREATININE-BSD FRML MDRD: 47 ML/MIN/1.73SQ M
GLUCOSE SERPL-MCNC: 106 MG/DL (ref 65–140)
GLUCOSE SERPL-MCNC: 133 MG/DL (ref 65–140)
GLUCOSE SERPL-MCNC: 150 MG/DL (ref 65–140)
GLUCOSE SERPL-MCNC: 174 MG/DL (ref 65–140)
GLUCOSE SERPL-MCNC: 72 MG/DL (ref 65–140)
GLUCOSE SERPL-MCNC: 82 MG/DL (ref 65–140)
HCT VFR BLD AUTO: 34.7 % (ref 36.5–49.3)
HGB BLD-MCNC: 10.9 G/DL (ref 12–17)
IMM GRANULOCYTES # BLD AUTO: 0.03 THOUSAND/UL (ref 0–0.2)
IMM GRANULOCYTES NFR BLD AUTO: 1 % (ref 0–2)
LYMPHOCYTES # BLD AUTO: 1.13 THOUSANDS/ÂΜL (ref 0.6–4.47)
LYMPHOCYTES NFR BLD AUTO: 18 % (ref 14–44)
MAGNESIUM SERPL-MCNC: 1.9 MG/DL (ref 1.9–2.7)
MCH RBC QN AUTO: 25 PG (ref 26.8–34.3)
MCHC RBC AUTO-ENTMCNC: 31.4 G/DL (ref 31.4–37.4)
MCV RBC AUTO: 80 FL (ref 82–98)
MONOCYTES # BLD AUTO: 0.8 THOUSAND/ÂΜL (ref 0.17–1.22)
MONOCYTES NFR BLD AUTO: 13 % (ref 4–12)
NEUTROPHILS # BLD AUTO: 3.94 THOUSANDS/ÂΜL (ref 1.85–7.62)
NEUTS SEG NFR BLD AUTO: 61 % (ref 43–75)
NRBC BLD AUTO-RTO: 0 /100 WBCS
PLATELET # BLD AUTO: 154 THOUSANDS/UL (ref 149–390)
PMV BLD AUTO: 12 FL (ref 8.9–12.7)
POTASSIUM SERPL-SCNC: 3.1 MMOL/L (ref 3.5–5.3)
PROT SERPL-MCNC: 6.5 G/DL (ref 6.4–8.4)
RBC # BLD AUTO: 4.36 MILLION/UL (ref 3.88–5.62)
SODIUM SERPL-SCNC: 138 MMOL/L (ref 135–147)
WBC # BLD AUTO: 6.32 THOUSAND/UL (ref 4.31–10.16)

## 2024-01-06 PROCEDURE — 94760 N-INVAS EAR/PLS OXIMETRY 1: CPT

## 2024-01-06 PROCEDURE — 94660 CPAP INITIATION&MGMT: CPT

## 2024-01-06 PROCEDURE — 82948 REAGENT STRIP/BLOOD GLUCOSE: CPT

## 2024-01-06 PROCEDURE — 99232 SBSQ HOSP IP/OBS MODERATE 35: CPT | Performed by: INTERNAL MEDICINE

## 2024-01-06 PROCEDURE — 99232 SBSQ HOSP IP/OBS MODERATE 35: CPT | Performed by: HOSPITALIST

## 2024-01-06 PROCEDURE — 94640 AIRWAY INHALATION TREATMENT: CPT

## 2024-01-06 PROCEDURE — 85025 COMPLETE CBC W/AUTO DIFF WBC: CPT | Performed by: HOSPITALIST

## 2024-01-06 PROCEDURE — 83735 ASSAY OF MAGNESIUM: CPT | Performed by: HOSPITALIST

## 2024-01-06 PROCEDURE — 80053 COMPREHEN METABOLIC PANEL: CPT | Performed by: HOSPITALIST

## 2024-01-06 RX ORDER — LANOLIN ALCOHOL/MO/W.PET/CERES
400 CREAM (GRAM) TOPICAL DAILY
Status: DISCONTINUED | OUTPATIENT
Start: 2024-01-06 | End: 2024-01-08 | Stop reason: HOSPADM

## 2024-01-06 RX ORDER — TORSEMIDE 20 MG/1
40 TABLET ORAL 2 TIMES DAILY
Status: DISCONTINUED | OUTPATIENT
Start: 2024-01-06 | End: 2024-01-07

## 2024-01-06 RX ORDER — POTASSIUM CHLORIDE 20 MEQ/1
40 TABLET, EXTENDED RELEASE ORAL 2 TIMES DAILY
Status: DISCONTINUED | OUTPATIENT
Start: 2024-01-06 | End: 2024-01-08 | Stop reason: HOSPADM

## 2024-01-06 RX ADMIN — AZELASTINE HYDROCHLORIDE 1 SPRAY: 137 SPRAY, METERED NASAL at 20:08

## 2024-01-06 RX ADMIN — HEPARIN SODIUM 5000 UNITS: 5000 INJECTION INTRAVENOUS; SUBCUTANEOUS at 05:09

## 2024-01-06 RX ADMIN — ALLOPURINOL 300 MG: 100 TABLET ORAL at 08:22

## 2024-01-06 RX ADMIN — POTASSIUM CHLORIDE 40 MEQ: 1500 TABLET, EXTENDED RELEASE ORAL at 08:33

## 2024-01-06 RX ADMIN — ATORVASTATIN CALCIUM 20 MG: 20 TABLET, FILM COATED ORAL at 17:15

## 2024-01-06 RX ADMIN — POTASSIUM CHLORIDE 40 MEQ: 1500 TABLET, EXTENDED RELEASE ORAL at 17:15

## 2024-01-06 RX ADMIN — INSULIN LISPRO 8 UNITS: 100 INJECTION, SOLUTION INTRAVENOUS; SUBCUTANEOUS at 17:37

## 2024-01-06 RX ADMIN — FUROSEMIDE 100 MG: 10 INJECTION, SOLUTION INTRAMUSCULAR; INTRAVENOUS at 11:06

## 2024-01-06 RX ADMIN — INSULIN LISPRO 8 UNITS: 100 INJECTION, SOLUTION INTRAVENOUS; SUBCUTANEOUS at 08:22

## 2024-01-06 RX ADMIN — HEPARIN SODIUM 5000 UNITS: 5000 INJECTION INTRAVENOUS; SUBCUTANEOUS at 20:13

## 2024-01-06 RX ADMIN — INSULIN LISPRO 8 UNITS: 100 INJECTION, SOLUTION INTRAVENOUS; SUBCUTANEOUS at 13:15

## 2024-01-06 RX ADMIN — AMLODIPINE BESYLATE 10 MG: 5 TABLET ORAL at 11:06

## 2024-01-06 RX ADMIN — FLUTICASONE PROPIONATE 1 SPRAY: 50 SPRAY, METERED NASAL at 08:22

## 2024-01-06 RX ADMIN — AZELASTINE HYDROCHLORIDE 1 SPRAY: 137 SPRAY, METERED NASAL at 08:22

## 2024-01-06 RX ADMIN — Medication 100 MG: at 08:22

## 2024-01-06 RX ADMIN — BUDESONIDE 0.5 MG: 0.5 INHALANT ORAL at 08:31

## 2024-01-06 RX ADMIN — FLUTICASONE PROPIONATE 1 SPRAY: 50 SPRAY, METERED NASAL at 20:08

## 2024-01-06 RX ADMIN — Medication 1000 UNITS: at 08:22

## 2024-01-06 RX ADMIN — GABAPENTIN 100 MG: 100 CAPSULE ORAL at 08:22

## 2024-01-06 RX ADMIN — Medication 400 MG: at 08:33

## 2024-01-06 RX ADMIN — INSULIN GLARGINE 12 UNITS: 100 INJECTION, SOLUTION SUBCUTANEOUS at 08:21

## 2024-01-06 RX ADMIN — INSULIN LISPRO 1 UNITS: 100 INJECTION, SOLUTION INTRAVENOUS; SUBCUTANEOUS at 13:16

## 2024-01-06 RX ADMIN — LOSARTAN POTASSIUM 100 MG: 50 TABLET, FILM COATED ORAL at 11:06

## 2024-01-06 RX ADMIN — TORSEMIDE 40 MG: 20 TABLET ORAL at 17:15

## 2024-01-06 RX ADMIN — METOLAZONE 2.5 MG: 2.5 TABLET ORAL at 08:21

## 2024-01-06 RX ADMIN — OMEGA-3 FATTY ACIDS CAP 1000 MG 1000 MG: 1000 CAP at 08:22

## 2024-01-06 RX ADMIN — LORATADINE 10 MG: 10 TABLET ORAL at 08:22

## 2024-01-06 RX ADMIN — INSULIN GLARGINE 12 UNITS: 100 INJECTION, SOLUTION SUBCUTANEOUS at 20:13

## 2024-01-06 RX ADMIN — INSULIN LISPRO 1 UNITS: 100 INJECTION, SOLUTION INTRAVENOUS; SUBCUTANEOUS at 08:26

## 2024-01-06 RX ADMIN — HEPARIN SODIUM 5000 UNITS: 5000 INJECTION INTRAVENOUS; SUBCUTANEOUS at 13:15

## 2024-01-06 RX ADMIN — BUDESONIDE 0.5 MG: 0.5 INHALANT ORAL at 19:48

## 2024-01-06 NOTE — ASSESSMENT & PLAN NOTE
Baseline creatinine is around 1.2  Recent Labs     01/04/24  0254 01/05/24  0407 01/06/24  0445   CREATININE 1.35* 1.27 1.37*   EGFR 47 51 47       Estimated Creatinine Clearance: 49.7 mL/min (A) (by C-G formula based on SCr of 1.37 mg/dL (H)).   Trend BMP daily in setting of IV diuresis

## 2024-01-06 NOTE — PROGRESS NOTES
"[facility]  Progress Note  Name: Hammad Montesinos I  MRN: [id number]  Unit/Bed#: MS 329Svitlana I Date of Admission: 12/28/2023   Date of Service: 1/6/2024 I Hospital Day: 9    Assessment/Plan   Paroxysmal A-fib (HCC)  Assessment & Plan  History noted  Not on anticoagulation  Cardiology following, appreciate recommendations  Outpatient cardiology dcd AC for risk of bleeding and fall, discussed with daughter, she would like to talk with primary cardiologist before starting anticoagulation    Pulmonary hypertension (HCC)  Assessment & Plan  Home regimen: pulmicort BID, performist BID, and DuoNeb twice daily  Continue home nebulizers and increased dose of IV diuresis due to anasarca    Elevated troponin  Assessment & Plan  HS trop: 48 > 64  EKG without acute ischemia - showing paced rhythm   Likely s/d to volume overload     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 ([facility])  Assessment & Plan  Baseline creatinine is around 1.2  Recent Labs     01/04/24  0254 01/05/24  0407 01/06/24  0445   CREATININE 1.35* 1.27 1.37*   EGFR 47 51 47       Estimated Creatinine Clearance: 49.7 mL/min (A) (by C-G formula based on SCr of 1.37 mg/dL (H)).   Trend BMP daily in setting of IV diuresis    Primary hypertension  Assessment & Plan  Home regimen: Amlodipine 10 Mg daily, losartan 100 Mg daily  Continue home medications  BP 96/59   Pulse 77   Temp 98.3 °F (36.8 °C) (Oral)   Resp 18   Ht 5' 11\" (1.803 m)   Wt 106 kg (233 lb)   SpO2 (!) 89%   BMI 32.50 kg/m²      Mixed hyperlipidemia  Assessment & Plan  Continue home statin    Type 2 diabetes mellitus with hyperglycemia, with long-term current use of insulin ([facility])  Assessment & Plan  Lab Results   Component Value Date    HGBA1C 6.8 11/16/2023       Recent Labs     01/05/24  1054 01/05/24  1624 01/05/24 2039 01/06/24  0734   POCGLU 172* 106 186* 150*         Blood Sugar Average: Last 72 hrs:  (P) [id number] regimen: " Lantus 18 units twice daily and NovoLog 22 unit with breakfast, 23 units lunch, and 20 units with dinner  Decrease Lantus further  to 12 units twice daily  Decrease Humalog to 8 units 3 times daily  SSI      * Acute on chronic diastolic heart failure (HCC)  Assessment & Plan  Wt Readings from Last 3 Encounters:   24 106 kg (233 lb)   23 115 kg (254 lb)   23 112 kg (247 lb)     Presents with worsening dyspnea and anasarca since [place]   Home regimen: lasix 40mg BID with increased to 60mg in AM and 40mg in PM 3 days ago with no improvement in symptoms   Last echo 10/2023: Severe left atrial dilation. RV systolic pressure 52.2mmHg. pulmonary artery pressure elevated  Pt with anasarca and pulmonary congestion - continue lasix 100mg IV BID  Will hold AM dose of lasix d/t hypokalemia, soft blood pressure this AM.   I/o and daily weights   Sodium and fluid restriction   Cardiology following  Observe off further metolazone for now.   Volume status improving further          VTE  Prophylaxis:   Pharmacologic: in place  Mechanical VTE Prophylaxis in Place: Yes    Patient Centered Rounds: I have performed bedside rounds with nursing staff today.    Discussions with Specialists or Other Care Team Provider: case management    Education and Discussions with Family / Patient: pt  Poa, wife    Current Length of Stay: 9 day(s)    Current Patient Status: Inpatient        Code Status: Level 1 - Full Code    Discharge Plan: Pt will require continued inpatient hospitalization.    Subjective:     Pt very happy to continue to diurese  No chest pain.    Patient is seen and examined at bedside.  All other ROS are negative.    Objective:     Vitals:   Temp (24hrs), Av.2 °F (36.8 °C), Min:97.6 °F (36.4 °C), Max:98.9 °F (37.2 °C)    Temp:  [97.6 °F (36.4 °C)-98.9 °F (37.2 °C)] 98.3 °F (36.8 °C)  HR:  [50-81] 77  Resp:  [16-21] 18  BP: ()/(55-62) 96/59  SpO2:  [89 %-99 %] 89 %  Body mass index is 32.5 kg/m².      Input and Output Summary (last 24 hours):       Intake/Output Summary (Last 24 hours) at 2024 0913  Last data filed at 2024 0501  Gross per 24 hour   Intake 480 ml   Output 4930 ml   Net -4450 ml       Physical Exam:       GEN: No acute distress, comfortable  HEEENT: No JVD, PERRLA, no scleral icterus  RESP: Lungs clear to auscultation bilaterally  CV: RRR, +s1/s2   ABD: SOFT NON TENDER, POSITIVE BOWEL SOUNDS, NO DISTENTION  PSYCH: CALM  NEURO: Mentation baseline, NO FOCAL DEFICITS  SKIN: NO RASH  EXTREM: improved edema    Additional Data:     Labs:    Results from last 7 days   Lab Units 24  0445   WBC Thousand/uL 6.32   HEMOGLOBIN g/dL 10.9*   HEMATOCRIT % 34.7*   PLATELETS Thousands/uL 154   NEUTROS PCT % 61   LYMPHS PCT % 18   MONOS PCT % 13*   EOS PCT % 6     Results from last 7 days   Lab Units 24  0445   SODIUM mmol/L 138   POTASSIUM mmol/L 3.1*   CHLORIDE mmol/L 97   CO2 mmol/L 33*   BUN mg/dL 51*   CREATININE mg/dL 1.37*   ANION GAP mmol/L 8   CALCIUM mg/dL 9.6   ALBUMIN g/dL 3.4*   TOTAL BILIRUBIN mg/dL 1.71*   ALK PHOS U/L 127*   ALT U/L 15   AST U/L 20   GLUCOSE RANDOM mg/dL 133         Results from last 7 days   Lab Units 24  0734 24  2039 24  1624 24  1054 24  0901 24  2111 24  1611 24  1046 24  0723 24  2054 24  1558 24  1113   POC GLUCOSE mg/dl 150* 186* 106 172* 132 149* 221* 254* 184* 310* 171* 199*               Lines/Drains:  Invasive Devices       Peripheral Intravenous Line  Duration             Peripheral IV 24 Dorsal (posterior);Left Forearm 2 days                    Telemetry:   Telemetry Orders (From admission, onward)               24 Hour Telemetry Monitoring  Continuous x 24 Hours (Telem)           Question:  Reason for 24 Hour Telemetry  Answer:  Decompensated CHF- and any one of the following: continuous diuretic infusion or total diuretic dose >200 mg daily, associated  electrolyte derangement (I.e. K < 3.0), ionotropic drip (continuous infusion), hx of ventricular arrhythmia, or new EF < 35%                        * I Have Reviewed All Lab Data Listed Above.           Imaging:     No results found for this or any previous visit.    Results for orders placed during the hospital encounter of 12/28/23    XR chest 2 views    Narrative  CHEST    INDICATION:   Chest Pain.    COMPARISON: CT chest 11/14/2023    EXAM PERFORMED/VIEWS:  XR CHEST PA & LATERAL      FINDINGS:  Left-sided chest wall intracardiac device is identified.  Leads are intact.    Cardiomegaly.    Diffuse prominence of the pulmonary vascular markings with mild hazy opacities. Small bilateral pleural effusions.    Osseous structures appear within normal limits for patient age.    Impression  Pulmonary edema with small bilateral pleural effusions.            Workstation performed: [id number]      *I have reviewed all imaging reports listed above      Recent Cultures (last 7 days):           Last 24 Hours Medication List:   Current Facility-Administered Medications   Medication Dose Route Frequency Provider Last Rate    acetaminophen  650 mg Oral Q6H PRN Gricel L Cedillo, PA-C      albuterol  2.5 mg Nebulization Q4H PRN Semaj Perez MD      allopurinol  300 mg Oral Daily Gricel L Cedillo, PA-C      aluminum-magnesium hydroxide-simethicone  30 mL Oral Q4H PRN Gricel L Cedillo, PA-C      amLODIPine  10 mg Oral Daily Gricel L Cedillo, PA-C      atorvastatin  20 mg Oral After Dinner Gricel KATH Rogers, PA-C      azelastine  1 spray Each Nare BID Gricel L Cedillo, PA-C      budesonide  0.5 mg Nebulization BID Gricel L Cedillo, PA-C      cholecalciferol  1,000 Units Oral Daily Gricel L Cedillo, PA-C      co-enzyme Q-10  100 mg Oral Daily Gricel L Cedillo, PA-C      fish oil  1,000 mg Oral Daily Gricel L Cedillo, PA-C      fluticasone  1 spray Nasal BID Gricel L Cedillo, PA-C      furosemide  100 mg Intravenous BID  (diuretic) Gricel Cedillo PA-C      gabapentin  100 mg Oral Daily Gricel Cedillo PA-C      heparin (porcine)  5,000 Units Subcutaneous Q8H [facility] Gricel Cedillo PA-C      insulin glargine  12 Units Subcutaneous Q12H [facility] Madhavi Cesar MD      insulin lispro  1-5 Units Subcutaneous TID  Ara Leigh MD      insulin lispro  8 Units Subcutaneous TID With Meals Madhavi Cesar MD      lidocaine  1 patch Topical HS Gricel Cedillo PA-C      lidocaine  1 patch Topical HS Gricel Cedillo PA-C      loratadine  10 mg Oral Daily Gricel Cedillo PA-C      losartan  100 mg Oral Daily Gricel Cedillo PA-C      magnesium Oxide  400 mg Oral Daily Madhavi Cesar MD      metolazone  2.5 mg Oral Daily Jayy Babcock PA-C      potassium chloride  40 mEq Oral BID Madhavi Cesar MD      senna  2 tablet Oral HS PRN Gricel Cedillo PA-C          Today, Patient Was Seen By: Madhavi Cesar MD    ** Please Note: Dictation voice to text software may have been used in the creation of this document. **

## 2024-01-06 NOTE — ASSESSMENT & PLAN NOTE
Wt Readings from Last 3 Encounters:   01/06/24 106 kg (233 lb)   12/12/23 115 kg (254 lb)   12/05/23 112 kg (247 lb)     Presents with worsening dyspnea and anasarca since [place]   Home regimen: lasix 40mg BID with increased to 60mg in AM and 40mg in PM 3 days ago with no improvement in symptoms   Last echo 10/2023: Severe left atrial dilation. RV systolic pressure 52.2mmHg. pulmonary artery pressure elevated  Pt with anasarca and pulmonary congestion - continue lasix 100mg IV BID  Will hold AM dose of lasix d/t hypokalemia, soft blood pressure this AM.   I/o and daily weights   Sodium and fluid restriction   Cardiology following  Observe off further metolazone for now.   Volume status improving further

## 2024-01-06 NOTE — PROGRESS NOTES
"Cardiology Progress Note - Hammad Montesinos 83 y.o. male MRN: [id number]    Unit/Bed#: -01 Encounter: [id number]      Assessment:  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 (HCC)    Elevated troponin    Pulmonary hypertension (HCC)    Paroxysmal A-fib (HCC)      Plan:    Acute on chronic heart failure with preserved ejection fraction - Echo report from October shows an EF of 50% with moderate LVH and significant left atrial dilation.  Outpatient diuretic regimen was Lasix 40 mg twice daily, which was recently increased to 60 in the morning and 40 in the afternoon.  He has been on IV Lasix 100 mg twice daily and has been responding well.  At this point we will transition back to oral diuretics and switch him to torsemide 40 mg twice daily.  We will continue to follow its response.  Follow urine output and daily labs.    Permanent pacemaker - No issues from this standpoint.  ECG shows ventricular paced rhythm.    Paroxysmal atrial fibrillation - Unclear in the history when this was last documented.  It was stated that he is not on anticoagulation as he has had no recurrence recently.    Subjective:   Patient seen and examined.  No significant events overnight.  Patient continues to clinically improving.  Had some hypotension this morning which did resolve.    Objective:     Vitals: Blood pressure 128/59, pulse 63, temperature 98.3 °F (36.8 °C), temperature source Oral, resp. rate 18, height 5' 11\" (1.803 m), weight 106 kg (233 lb), SpO2 99%., Body mass index is 32.5 kg/m².,   Orthostatic Blood Pressures      Flowsheet Row Most Recent Value   Blood Pressure 128/59 filed at 01/06/2024 1105   Patient Position - Orthostatic VS Lying filed at 01/06/2024 0711      "         Intake/Output Summary (Last 24 hours) at 1/6/2024 1315  Last data filed at 1/6/2024 1117  Gross per 24 hour   Intake 960 ml   Output 3930 ml   Net -2970 ml         Physical Exam:    GEN: Hammad Montesinos appears well, alert and oriented x 3, pleasant and cooperative   HEENT: pupils equal, round, and reactive to light; extraocular muscles intact  NECK: supple, no carotid bruits   HEART: regular rhythm, normal S1 and S2, no murmurs, clicks, gallops or rubs   LUNGS: Diminished at bases bilaterally; no wheezes, rales, or rhonchi   ABDOMEN: normal bowel sounds, soft, no tenderness, no distention  EXTREMITIES: peripheral pulses normal; no clubbing, cyanosis.  ++  Edema bilaterally  NEURO: no focal findings   SKIN: normal without suspicious lesions on exposed skin    Medications:      Current Facility-Administered Medications:     acetaminophen (TYLENOL) tablet 650 mg, 650 mg, Oral, Q6H PRN, Gricel Cedillo PA-C, 650 mg at 12/29/23 0006    albuterol inhalation solution 2.5 mg, 2.5 mg, Nebulization, Q4H PRN, Semaj Perez MD, 2.5 mg at 01/02/24 1806    allopurinol (ZYLOPRIM) tablet 300 mg, 300 mg, Oral, Daily, Gricel Cedillo PA-C, 300 mg at 01/06/24 0822    aluminum-magnesium hydroxide-simethicone (MAALOX) oral suspension 30 mL, 30 mL, Oral, Q4H PRN, Gricel Cedillo PA-C    amLODIPine (NORVASC) tablet 10 mg, 10 mg, Oral, Daily, Gricel Cedillo PA-C, 10 mg at 01/06/24 1106    atorvastatin (LIPITOR) tablet 20 mg, 20 mg, Oral, After Dinner, Gricel Cedillo PA-C, 20 mg at 01/05/24 1710    azelastine (ASTELIN) 0.1 % nasal spray 1 spray, 1 spray, Each Nare, BID, Gricel Cedillo PA-C, 1 spray at 01/06/24 0822    budesonide (PULMICORT) inhalation solution 0.5 mg, 0.5 mg, Nebulization, BID, Gricel Cedillo PA-C, 0.5 mg at 01/06/24 0831    cholecalciferol (VITAMIN D3) tablet 1,000 Units, 1,000 Units, Oral, Daily, Gricel Cedillo PA-C, 1,000 Units at 01/06/24 0822    co-enzyme Q-10 capsule 100 mg,  100 mg, Oral, Daily, Gricel Cedillo PA-C, 100 mg at 01/06/24 0822    fish oil capsule 1,000 mg, 1,000 mg, Oral, Daily, Gricel L Cedillo, PA-C, 1,000 mg at 01/06/24 0822    fluticasone (FLONASE) 50 mcg/act nasal spray 1 spray, 1 spray, Nasal, BID, Gricel Cedillo PA-C, 1 spray at 01/06/24 0822    furosemide (LASIX) injection 100 mg, 100 mg, Intravenous, BID (diuretic), Gricel Cedillo, PA-C, 100 mg at 01/06/24 1106    gabapentin (NEURONTIN) capsule 100 mg, 100 mg, Oral, Daily, Gricel Cedillo, PA-C, 100 mg at 01/06/24 0822    heparin (porcine) subcutaneous injection 5,000 Units, 5,000 Units, Subcutaneous, Q8H TOVA, ROSSY Carpenter-C, 5,000 Units at 01/06/24 0509    insulin glargine (LANTUS) subcutaneous injection 12 Units 0.12 mL, 12 Units, Subcutaneous, Q12H TOVA, Madhavi Cesar MD, 12 Units at 01/06/24 0821    insulin lispro (HumaLOG) 100 units/mL subcutaneous injection 1-5 Units, 1-5 Units, Subcutaneous, TID AC, 1 Units at 01/06/24 0826 **AND** Fingerstick Glucose (POCT), , , 4x Daily AC and at bedtime, Ara Leigh MD    insulin lispro (HumaLOG) 100 units/mL subcutaneous injection 8 Units, 8 Units, Subcutaneous, TID With Meals, Madhavi Cesar MD, 8 Units at 01/06/24 0822    lidocaine (LIDODERM) 5 % patch 1 patch, 1 patch, Topical, HS, Gricel Cedillo PA-C, 1 patch at 01/01/24 2107    lidocaine (LIDODERM) 5 % patch 1 patch, 1 patch, Topical, HS, Gricel Cedillo PA-C, 1 patch at 01/01/24 2106    loratadine (CLARITIN) tablet 10 mg, 10 mg, Oral, Daily, ROSSY Carpenter-C, 10 mg at 01/06/24 0822    losartan (COZAAR) tablet 100 mg, 100 mg, Oral, Daily, Gricel Cedillo PA-C, 100 mg at 01/06/24 1106    magnesium Oxide (MAG-OX) tablet 400 mg, 400 mg, Oral, Daily, Madhavi Cesar MD, 400 mg at 01/06/24 0833    metolazone (ZAROXOLYN) tablet 2.5 mg, 2.5 mg, Oral, Daily, Jayy Babcock PA-C, 2.5 mg at 01/06/24 0821    potassium chloride (K-DUR,KLOR-CON) CR tablet 40 mEq, 40 mEq,  Oral, BID, Madhavi Cesar MD, 40 mEq at 01/06/24 0833    senna (SENOKOT) tablet 17.2 mg, 2 tablet, Oral, HS PRN, Gricel Cedillo PA-C     Labs & Results:        Results from last 7 days   Lab Units 01/06/24 0445 01/05/24 0407 01/04/24  0254   WBC Thousand/uL 6.32 7.60 7.14   HEMOGLOBIN g/dL 10.9* 11.0* 10.7*   HEMATOCRIT % 34.7* 34.6* 34.2*   PLATELETS Thousands/uL 154 150 157         Results from last 7 days   Lab Units 01/06/24 0445 01/05/24 0407 01/04/24  0254   POTASSIUM mmol/L 3.1* 3.4* 3.6   CHLORIDE mmol/L 97 97 98   CO2 mmol/L 33* 32 31   BUN mg/dL 51* 43* 43*   CREATININE mg/dL 1.37* 1.27 1.35*   CALCIUM mg/dL 9.6 9.7 9.4   ALK PHOS U/L 127* 132* 134*   ALT U/L 15 15 14   AST U/L 20 21 18         Results from last 7 days   Lab Units 01/06/24 0445 01/05/24 0407 01/04/24  0254   MAGNESIUM mg/dL 1.9 2.0 1.8*         EKG personally reviewed by Mina Landa MD. Ventricular paced rhythm with PVCs.    Counseling / Coordination of Care  Total floor / unit time spent today 25 minutes.  Greater than 50% of total time was spent with the patient and / or family counseling and / or coordination of care.

## 2024-01-06 NOTE — ASSESSMENT & PLAN NOTE
Lab Results   Component Value Date    HGBA1C 6.8 11/16/2023       Recent Labs     01/05/24  1054 01/05/24  1624 01/05/24 2039 01/06/24  0734   POCGLU 172* 106 186* 150*         Blood Sugar Average: Last 72 hrs:  (P) [id number] regimen: Lantus 18 units twice daily and NovoLog 22 unit with breakfast, 23 units lunch, and 20 units with dinner  Decrease Lantus further  to 12 units twice daily  Decrease Humalog to 8 units 3 times daily  SSI

## 2024-01-06 NOTE — ASSESSMENT & PLAN NOTE
Caller: Yandy Kaur    Relationship to patient: Emergency Contact    Best call back number: [phone]    Patient is needing: YANDY STATED THAT SHE WOULD LIKE SPEAK WITH PROVIDER AT HIS EARLIEST CONVENIENCE REGARDING PATIENT; YANDY WOULD NOT GO INTO DETAIL UNTIL SHE WOULD SPEAK WITH PCP    PLEASE ADVISE       HS trop: 48 > 64  EKG without acute ischemia - showing paced rhythm   Likely s/d to volume overload

## 2024-01-06 NOTE — PLAN OF CARE
Problem: Potential for Falls  Goal: Patient will remain free of falls  Description: INTERVENTIONS:  - Educate patient/family on patient safety including physical limitations  - Instruct patient to call for assistance with activity   - Consult OT/PT to assist with strengthening/mobility   - Keep Call bell within reach  - Keep bed low and locked with side rails adjusted as appropriate  - Keep care items and personal belongings within reach  - Initiate and maintain comfort rounds  - Make Fall Risk Sign visible to staff  - Offer Toileting every 2 Hours, in advance of need  - Initiate/Maintain alarm  - Obtain necessary fall risk management equipment  - Apply yellow socks and bracelet for high fall risk patients  - Consider moving patient to room near nurses station  Outcome: Progressing

## 2024-01-06 NOTE — ASSESSMENT & PLAN NOTE
"Home regimen: Amlodipine 10 Mg daily, losartan 100 Mg daily  Continue home medications  BP 96/59   Pulse 77   Temp 98.3 °F (36.8 °C) (Oral)   Resp 18   Ht 5' 11\" (1.803 m)   Wt 106 kg (233 lb)   SpO2 (!) 89%   BMI 32.50 kg/m²    "

## 2024-01-06 NOTE — PLAN OF CARE
Problem: CARDIOVASCULAR - ADULT  Goal: Maintains optimal cardiac output and hemodynamic stability  Description: INTERVENTIONS:  - Monitor I/O, vital signs and rhythm  - Monitor for S/S and trends of decreased cardiac output  - Administer and titrate ordered vasoactive medications to optimize hemodynamic stability  - Assess quality of pulses, skin color and temperature  - Assess for signs of decreased coronary artery perfusion  - Instruct patient to report change in severity of symptoms  Outcome: Progressing

## 2024-01-07 LAB
ALBUMIN SERPL BCP-MCNC: 3.7 G/DL (ref 3.5–5)
ALP SERPL-CCNC: 132 U/L (ref 34–104)
ALT SERPL W P-5'-P-CCNC: 16 U/L (ref 7–52)
ANION GAP SERPL CALCULATED.3IONS-SCNC: 8 MMOL/L
AST SERPL W P-5'-P-CCNC: 20 U/L (ref 13–39)
BASOPHILS # BLD AUTO: 0.06 THOUSANDS/ÂΜL (ref 0–0.1)
BASOPHILS NFR BLD AUTO: 1 % (ref 0–1)
BILIRUB SERPL-MCNC: 1.56 MG/DL (ref 0.2–1)
BUN SERPL-MCNC: 56 MG/DL (ref 5–25)
CALCIUM SERPL-MCNC: 9.9 MG/DL (ref 8.4–10.2)
CHLORIDE SERPL-SCNC: 95 MMOL/L (ref 96–108)
CO2 SERPL-SCNC: 33 MMOL/L (ref 21–32)
CREAT SERPL-MCNC: 1.43 MG/DL (ref 0.6–1.3)
EOSINOPHIL # BLD AUTO: 0.42 THOUSAND/ÂΜL (ref 0–0.61)
EOSINOPHIL NFR BLD AUTO: 6 % (ref 0–6)
ERYTHROCYTE [DISTWIDTH] IN BLOOD BY AUTOMATED COUNT: 17.6 % (ref 11.6–15.1)
GFR SERPL CREATININE-BSD FRML MDRD: 44 ML/MIN/1.73SQ M
GLUCOSE SERPL-MCNC: 111 MG/DL (ref 65–140)
GLUCOSE SERPL-MCNC: 155 MG/DL (ref 65–140)
GLUCOSE SERPL-MCNC: 202 MG/DL (ref 65–140)
GLUCOSE SERPL-MCNC: 249 MG/DL (ref 65–140)
GLUCOSE SERPL-MCNC: 96 MG/DL (ref 65–140)
GLUCOSE SERPL-MCNC: 99 MG/DL (ref 65–140)
HCT VFR BLD AUTO: 36.1 % (ref 36.5–49.3)
HGB BLD-MCNC: 11.3 G/DL (ref 12–17)
IMM GRANULOCYTES # BLD AUTO: 0.02 THOUSAND/UL (ref 0–0.2)
IMM GRANULOCYTES NFR BLD AUTO: 0 % (ref 0–2)
LYMPHOCYTES # BLD AUTO: 1.35 THOUSANDS/ÂΜL (ref 0.6–4.47)
LYMPHOCYTES NFR BLD AUTO: 20 % (ref 14–44)
MAGNESIUM SERPL-MCNC: 2 MG/DL (ref 1.9–2.7)
MCH RBC QN AUTO: 25 PG (ref 26.8–34.3)
MCHC RBC AUTO-ENTMCNC: 31.3 G/DL (ref 31.4–37.4)
MCV RBC AUTO: 80 FL (ref 82–98)
MONOCYTES # BLD AUTO: 0.88 THOUSAND/ÂΜL (ref 0.17–1.22)
MONOCYTES NFR BLD AUTO: 13 % (ref 4–12)
NEUTROPHILS # BLD AUTO: 4.15 THOUSANDS/ÂΜL (ref 1.85–7.62)
NEUTS SEG NFR BLD AUTO: 60 % (ref 43–75)
NRBC BLD AUTO-RTO: 0 /100 WBCS
PLATELET # BLD AUTO: 175 THOUSANDS/UL (ref 149–390)
PMV BLD AUTO: 12.5 FL (ref 8.9–12.7)
POTASSIUM SERPL-SCNC: 3.7 MMOL/L (ref 3.5–5.3)
PROT SERPL-MCNC: 7 G/DL (ref 6.4–8.4)
RBC # BLD AUTO: 4.52 MILLION/UL (ref 3.88–5.62)
SODIUM SERPL-SCNC: 136 MMOL/L (ref 135–147)
WBC # BLD AUTO: 6.88 THOUSAND/UL (ref 4.31–10.16)

## 2024-01-07 PROCEDURE — 82948 REAGENT STRIP/BLOOD GLUCOSE: CPT

## 2024-01-07 PROCEDURE — 85025 COMPLETE CBC W/AUTO DIFF WBC: CPT | Performed by: HOSPITALIST

## 2024-01-07 PROCEDURE — 80053 COMPREHEN METABOLIC PANEL: CPT | Performed by: HOSPITALIST

## 2024-01-07 PROCEDURE — 94760 N-INVAS EAR/PLS OXIMETRY 1: CPT

## 2024-01-07 PROCEDURE — 99232 SBSQ HOSP IP/OBS MODERATE 35: CPT | Performed by: HOSPITALIST

## 2024-01-07 PROCEDURE — 99232 SBSQ HOSP IP/OBS MODERATE 35: CPT | Performed by: INTERNAL MEDICINE

## 2024-01-07 PROCEDURE — 94660 CPAP INITIATION&MGMT: CPT

## 2024-01-07 PROCEDURE — 83735 ASSAY OF MAGNESIUM: CPT | Performed by: HOSPITALIST

## 2024-01-07 PROCEDURE — 94640 AIRWAY INHALATION TREATMENT: CPT

## 2024-01-07 RX ORDER — TORSEMIDE 20 MG/1
40 TABLET ORAL 2 TIMES DAILY
Status: DISCONTINUED | OUTPATIENT
Start: 2024-01-07 | End: 2024-01-07

## 2024-01-07 RX ORDER — TORSEMIDE 20 MG/1
40 TABLET ORAL DAILY
Status: DISCONTINUED | OUTPATIENT
Start: 2024-01-08 | End: 2024-01-08 | Stop reason: HOSPADM

## 2024-01-07 RX ADMIN — FLUTICASONE PROPIONATE 1 SPRAY: 50 SPRAY, METERED NASAL at 21:02

## 2024-01-07 RX ADMIN — INSULIN LISPRO 8 UNITS: 100 INJECTION, SOLUTION INTRAVENOUS; SUBCUTANEOUS at 12:22

## 2024-01-07 RX ADMIN — GABAPENTIN 100 MG: 100 CAPSULE ORAL at 09:23

## 2024-01-07 RX ADMIN — LORATADINE 10 MG: 10 TABLET ORAL at 09:27

## 2024-01-07 RX ADMIN — Medication 1000 UNITS: at 09:24

## 2024-01-07 RX ADMIN — Medication 400 MG: at 09:23

## 2024-01-07 RX ADMIN — HEPARIN SODIUM 5000 UNITS: 5000 INJECTION INTRAVENOUS; SUBCUTANEOUS at 13:34

## 2024-01-07 RX ADMIN — METOLAZONE 2.5 MG: 2.5 TABLET ORAL at 09:23

## 2024-01-07 RX ADMIN — LOSARTAN POTASSIUM 100 MG: 50 TABLET, FILM COATED ORAL at 09:22

## 2024-01-07 RX ADMIN — BUDESONIDE 0.5 MG: 0.5 INHALANT ORAL at 19:57

## 2024-01-07 RX ADMIN — BUDESONIDE 0.5 MG: 0.5 INHALANT ORAL at 07:52

## 2024-01-07 RX ADMIN — ATORVASTATIN CALCIUM 20 MG: 20 TABLET, FILM COATED ORAL at 17:09

## 2024-01-07 RX ADMIN — AZELASTINE HYDROCHLORIDE 1 SPRAY: 137 SPRAY, METERED NASAL at 09:27

## 2024-01-07 RX ADMIN — INSULIN LISPRO 8 UNITS: 100 INJECTION, SOLUTION INTRAVENOUS; SUBCUTANEOUS at 09:27

## 2024-01-07 RX ADMIN — POTASSIUM CHLORIDE 40 MEQ: 1500 TABLET, EXTENDED RELEASE ORAL at 17:09

## 2024-01-07 RX ADMIN — INSULIN LISPRO 1 UNITS: 100 INJECTION, SOLUTION INTRAVENOUS; SUBCUTANEOUS at 17:11

## 2024-01-07 RX ADMIN — AMLODIPINE BESYLATE 10 MG: 5 TABLET ORAL at 09:24

## 2024-01-07 RX ADMIN — INSULIN GLARGINE 12 UNITS: 100 INJECTION, SOLUTION SUBCUTANEOUS at 21:01

## 2024-01-07 RX ADMIN — INSULIN LISPRO 1 UNITS: 100 INJECTION, SOLUTION INTRAVENOUS; SUBCUTANEOUS at 12:22

## 2024-01-07 RX ADMIN — Medication 100 MG: at 09:23

## 2024-01-07 RX ADMIN — ALLOPURINOL 300 MG: 100 TABLET ORAL at 09:23

## 2024-01-07 RX ADMIN — POTASSIUM CHLORIDE 40 MEQ: 1500 TABLET, EXTENDED RELEASE ORAL at 09:23

## 2024-01-07 RX ADMIN — INSULIN GLARGINE 12 UNITS: 100 INJECTION, SOLUTION SUBCUTANEOUS at 09:23

## 2024-01-07 RX ADMIN — HEPARIN SODIUM 5000 UNITS: 5000 INJECTION INTRAVENOUS; SUBCUTANEOUS at 21:00

## 2024-01-07 RX ADMIN — FLUTICASONE PROPIONATE 1 SPRAY: 50 SPRAY, METERED NASAL at 09:27

## 2024-01-07 RX ADMIN — INSULIN LISPRO 8 UNITS: 100 INJECTION, SOLUTION INTRAVENOUS; SUBCUTANEOUS at 17:11

## 2024-01-07 RX ADMIN — OMEGA-3 FATTY ACIDS CAP 1000 MG 1000 MG: 1000 CAP at 09:23

## 2024-01-07 RX ADMIN — HEPARIN SODIUM 5000 UNITS: 5000 INJECTION INTRAVENOUS; SUBCUTANEOUS at 03:46

## 2024-01-07 RX ADMIN — AZELASTINE HYDROCHLORIDE 1 SPRAY: 137 SPRAY, METERED NASAL at 21:02

## 2024-01-07 NOTE — ASSESSMENT & PLAN NOTE
Baseline creatinine is around 1.2  Recent Labs     01/05/24  0407 01/06/24  0445 01/07/24  0352   CREATININE 1.27 1.37* 1.43*   EGFR 51 47 44       Estimated Creatinine Clearance: 47.4 mL/min (A) (by C-G formula based on SCr of 1.43 mg/dL (H)).   Trend BMP daily in setting of diuretics.

## 2024-01-07 NOTE — ASSESSMENT & PLAN NOTE
Wt Readings from Last 3 Encounters:   01/07/24 105 kg (231 lb 12.8 oz)   12/12/23 115 kg (254 lb)   12/05/23 112 kg (247 lb)     Presents with worsening dyspnea and anasarca since [place]   Home regimen: lasix 40mg BID with increased to 60mg in AM and 40mg in PM 3 days ago with no improvement in symptoms   Last echo 10/2023: Severe left atrial dilation. RV systolic pressure 52.2mmHg. pulmonary artery pressure elevated  Pt with anasarca and pulmonary congestion on admission.  I/o and daily weights   Sodium and fluid restriction   Cardiology following  Observe off further metolazone for now.   Volume status improving further. Approaching euvolemic status. Transition to oral diuretics 1/6, watch renal fn closely.   Strong peripheral pulses

## 2024-01-07 NOTE — PROGRESS NOTES
"Cardiology Progress Note - Hammad Montesinos 83 y.o. male MRN: [id number]    Unit/Bed#: -01 Encounter: [id number]      Assessment:  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 (HCC)    Elevated troponin    Pulmonary hypertension (HCC)    Paroxysmal A-fib (HCC)      Plan:    Acute on chronic heart failure with preserved ejection fraction - Echo report from October shows an EF of 50% with moderate LVH and significant left atrial dilation.  Outpatient diuretic regimen was Lasix 40 mg twice daily, which was recently increased to 60 in the morning and 40 in the afternoon.  Yesterday we changed him from IV Lasix to torsemide 40 mg twice daily however he continues to have a profound response, and his creatinine did increase.  We are going to hold the evening dose of torsemide and discontinue his metolazone, hoping to see if we could just do 40 mg of torsemide daily.  Follow urine output and labs closely.    Permanent pacemaker - No issues from this standpoint.  ECG shows ventricular paced rhythm.    Paroxysmal atrial fibrillation - Unclear in the history when this was last documented.  It was stated that he is not on anticoagulation as he has had no recurrence recently.    Hypertension - He is on amlodipine 10 mg daily for this.  This does cause edema as a side effect and we will discontinue it.    Subjective:   Patient seen and examined.  No significant events overnight.  Patient responding well to diuretics, even with the switch to torsemide.    Objective:     Vitals: Blood pressure 111/57, pulse 72, temperature 98.6 °F (37 °C), temperature source Oral, resp. rate 18, height 5' 11\" (1.803 m), weight 105 kg (231 lb 12.8 oz), SpO2 98%., Body mass index is 32.33 kg/m²., "   Orthostatic Blood Pressures      Flowsheet Row Most Recent Value   Blood Pressure 111/57 filed at 01/07/2024 0926   Patient Position - Orthostatic VS Lying filed at 01/07/2024 0625              Intake/Output Summary (Last 24 hours) at 1/7/2024 1334  Last data filed at 1/7/2024 1315  Gross per 24 hour   Intake 1140 ml   Output 3900 ml   Net -2760 ml       Physical Exam:    GEN: Hammad Montesinos appears well, alert and oriented x 3, pleasant and cooperative   HEENT: pupils equal, round, and reactive to light; extraocular muscles intact  NECK: supple, no carotid bruits.  Improved JVP  HEART: regular rhythm, normal S1 and S2, no murmurs, clicks, gallops or rubs   LUNGS: Diminished bilaterally; no wheezes, rales, or rhonchi   ABDOMEN: normal bowel sounds, soft, no tenderness, no distention  EXTREMITIES: peripheral pulses normal; no clubbing, cyanosis. + Edema bilaterally - improved.  NEURO: no focal findings   SKIN: normal without suspicious lesions on exposed skin     Medications:      Current Facility-Administered Medications:     acetaminophen (TYLENOL) tablet 650 mg, 650 mg, Oral, Q6H PRN, Gricel Cedillo PA-C, 650 mg at 12/29/23 0006    albuterol inhalation solution 2.5 mg, 2.5 mg, Nebulization, Q4H PRN, Semaj Perez MD, 2.5 mg at 01/02/24 1806    allopurinol (ZYLOPRIM) tablet 300 mg, 300 mg, Oral, Daily, ROSSY Carpenter-C, 300 mg at 01/07/24 0923    aluminum-magnesium hydroxide-simethicone (MAALOX) oral suspension 30 mL, 30 mL, Oral, Q4H PRN, Gricel Cedillo PA-C    amLODIPine (NORVASC) tablet 10 mg, 10 mg, Oral, Daily, Gricelmalini Cedillo PA-C, 10 mg at 01/07/24 0924    atorvastatin (LIPITOR) tablet 20 mg, 20 mg, Oral, After Dinner, Gricel Cedillo PA-C, 20 mg at 01/06/24 1715    azelastine (ASTELIN) 0.1 % nasal spray 1 spray, 1 spray, Each Nare, BID, Gricel L Cedillo, PA-C, 1 spray at 01/07/24 0927    budesonide (PULMICORT) inhalation solution 0.5 mg, 0.5 mg, Nebulization, BID, Gricel STOCKTON  AINSLEY Cedillo, 0.5 mg at 01/07/24 0752    cholecalciferol (VITAMIN D3) tablet 1,000 Units, 1,000 Units, Oral, Daily, ROSSY Carpenter-C, 1,000 Units at 01/07/24 0924    co-enzyme Q-10 capsule 100 mg, 100 mg, Oral, Daily, ROSSY Carpenter-C, 100 mg at 01/07/24 0923    fish oil capsule 1,000 mg, 1,000 mg, Oral, Daily, ROSSY Carpenter-C, 1,000 mg at 01/07/24 0923    fluticasone (FLONASE) 50 mcg/act nasal spray 1 spray, 1 spray, Nasal, BID, Gricel Cedillo PA-C, 1 spray at 01/07/24 0927    gabapentin (NEURONTIN) capsule 100 mg, 100 mg, Oral, Daily, ROSSY Carpenter-C, 100 mg at 01/07/24 0923    heparin (porcine) subcutaneous injection 5,000 Units, 5,000 Units, Subcutaneous, Q8H TOVA, Gricel Cedillo PA-C, 5,000 Units at 01/07/24 0346    insulin glargine (LANTUS) subcutaneous injection 12 Units 0.12 mL, 12 Units, Subcutaneous, Q12H TOVA, Madhavi Cesar MD, 12 Units at 01/07/24 0923    insulin lispro (HumaLOG) 100 units/mL subcutaneous injection 1-5 Units, 1-5 Units, Subcutaneous, TID AC, 1 Units at 01/07/24 1222 **AND** Fingerstick Glucose (POCT), , , 4x Daily AC and at bedtime, Ara Leigh MD    insulin lispro (HumaLOG) 100 units/mL subcutaneous injection 8 Units, 8 Units, Subcutaneous, TID With Meals, Madhavi Cesar MD, 8 Units at 01/07/24 1222    lidocaine (LIDODERM) 5 % patch 1 patch, 1 patch, Topical, HS, ROSSY Carpenter-C, 1 patch at 01/01/24 2107    lidocaine (LIDODERM) 5 % patch 1 patch, 1 patch, Topical, HS, ROSSY Carpenter-C, 1 patch at 01/01/24 2106    loratadine (CLARITIN) tablet 10 mg, 10 mg, Oral, Daily, Gricel Cedillo PA-C, 10 mg at 01/07/24 0927    losartan (COZAAR) tablet 100 mg, 100 mg, Oral, Daily, Gricel Cedillo PA-C, 100 mg at 01/07/24 0922    magnesium Oxide (MAG-OX) tablet 400 mg, 400 mg, Oral, Daily, Madhavi Cesar MD, 400 mg at 01/07/24 0923    metolazone (ZAROXOLYN) tablet 2.5 mg, 2.5 mg, Oral, Daily, Jayy Babcock PA-C, 2.5 mg at  01/07/24 0923    potassium chloride (K-DUR,KLOR-CON) CR tablet 40 mEq, 40 mEq, Oral, BID, Madhavi Cesar MD, 40 mEq at 01/07/24 0923    senna (SENOKOT) tablet 17.2 mg, 2 tablet, Oral, HS PRN, Gricel Cedillo PA-C    torsemide (DEMADEX) tablet 40 mg, 40 mg, Oral, BID, Madhavi Cesar MD     Labs & Results:        Results from last 7 days   Lab Units 01/07/24 0352 01/06/24 0445 01/05/24 0407   WBC Thousand/uL 6.88 6.32 7.60   HEMOGLOBIN g/dL 11.3* 10.9* 11.0*   HEMATOCRIT % 36.1* 34.7* 34.6*   PLATELETS Thousands/uL 175 154 150         Results from last 7 days   Lab Units 01/07/24  0352 01/06/24 0445 01/05/24  0407   POTASSIUM mmol/L 3.7 3.1* 3.4*   CHLORIDE mmol/L 95* 97 97   CO2 mmol/L 33* 33* 32   BUN mg/dL 56* 51* 43*   CREATININE mg/dL 1.43* 1.37* 1.27   CALCIUM mg/dL 9.9 9.6 9.7   ALK PHOS U/L 132* 127* 132*   ALT U/L 16 15 15   AST U/L 20 20 21         Results from last 7 days   Lab Units 01/07/24  0352 01/06/24 0445 01/05/24 0407   MAGNESIUM mg/dL 2.0 1.9 2.0         EKG personally reviewed by Mina Landa MD. Ventricular paced rhythm with PVCs.    Counseling / Coordination of Care  Total floor / unit time spent today 25 minutes.  Greater than 50% of total time was spent with the patient and / or family counseling and / or coordination of care.

## 2024-01-07 NOTE — ASSESSMENT & PLAN NOTE
Lab Results   Component Value Date    HGBA1C 6.8 11/16/2023       Recent Labs     01/06/24  1916 01/06/24 2012 01/07/24  0346 01/07/24  0624   POCGLU 72 82 96 111         Blood Sugar Average: Last 72 hrs:  (P) [id number] regimen: Lantus 18 units twice daily and NovoLog 22 unit with breakfast, 23 units lunch, and 20 units with dinner  Decrease Lantus further  to 12 units twice daily  Decrease Humalog to 8 units 3 times daily  SSI

## 2024-01-07 NOTE — PROGRESS NOTES
"[facility]  Progress Note  Name: Hammad Montesinos I  MRN: [id number]  Unit/Bed#: MS 329Svitlana I Date of Admission: 12/28/2023   Date of Service: 1/7/2024 I Hospital Day: 10    Assessment/Plan   Paroxysmal A-fib (HCC)  Assessment & Plan  History noted  Not on anticoagulation  Cardiology following, appreciate recommendations  Outpatient cardiology dcd AC for risk of bleeding and fall, discussed with daughter, she would like to talk with primary cardiologist before starting anticoagulation    Pulmonary hypertension (HCC)  Assessment & Plan  Home regimen: pulmicort BID, performist BID, and DuoNeb twice daily  Continue home nebulizers and increased dose of IV diuresis due to anasarca    Elevated troponin  Assessment & Plan  HS trop: 48 > 64  EKG without acute ischemia - showing paced rhythm   Likely s/d to volume overload     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 ([facility])  Assessment & Plan  Baseline creatinine is around 1.2  Recent Labs     01/05/24  0407 01/06/24  0445 01/07/24  0352   CREATININE 1.27 1.37* 1.43*   EGFR 51 47 44       Estimated Creatinine Clearance: 47.4 mL/min (A) (by C-G formula based on SCr of 1.43 mg/dL (H)).   Trend BMP daily in setting of diuretics.    Primary hypertension  Assessment & Plan  Home regimen: Amlodipine 10 Mg daily, losartan 100 Mg daily  Continue home medications  /57   Pulse 72   Temp 98.6 °F (37 °C) (Oral)   Resp 18   Ht 5' 11\" (1.803 m)   Wt 105 kg (231 lb 12.8 oz)   SpO2 98%   BMI 32.33 kg/m²      Mixed hyperlipidemia  Assessment & Plan  Continue home statin    Type 2 diabetes mellitus with hyperglycemia, with long-term current use of insulin ([facility])  Assessment & Plan  Lab Results   Component Value Date    HGBA1C 6.8 11/16/2023       Recent Labs     01/06/24  1916 01/06/24 2012 01/07/24  0346 01/07/24  0624   POCGLU 72 82 96 111         Blood Sugar Average: Last 72 hrs:  (P) [id number] regimen: Lantus " 18 units twice daily and NovoLog 22 unit with breakfast, 23 units lunch, and 20 units with dinner  Decrease Lantus further  to 12 units twice daily  Decrease Humalog to 8 units 3 times daily  SSI      * Acute on chronic diastolic heart failure (HCC)  Assessment & Plan  Wt Readings from Last 3 Encounters:   24 105 kg (231 lb 12.8 oz)   23 115 kg (254 lb)   23 112 kg (247 lb)     Presents with worsening dyspnea and anasarca since [place]   Home regimen: lasix 40mg BID with increased to 60mg in AM and 40mg in PM 3 days ago with no improvement in symptoms   Last echo 10/2023: Severe left atrial dilation. RV systolic pressure 52.2mmHg. pulmonary artery pressure elevated  Pt with anasarca and pulmonary congestion on admission.  I/o and daily weights   Sodium and fluid restriction   Cardiology following  Observe off further metolazone for now.   Volume status improving further. Approaching euvolemic status. Transition to oral diuretics , watch renal fn closely.            VTE  Prophylaxis:   Pharmacologic: in place  Mechanical VTE Prophylaxis in Place: Yes    Patient Centered Rounds: I have performed bedside rounds with nursing staff today.    Discussions with Specialists or Other Care Team Provider: case management       Current Length of Stay: 10 day(s)    Current Patient Status: Inpatient        Code Status: Level 1 - Full Code    Discharge Plan: Pt will require continued inpatient hospitalization.    Subjective:   Pt denies sob, chest pain    Patient is seen and examined at bedside.  All other ROS are negative.    Objective:     Vitals:   Temp (24hrs), Av °F (36.7 °C), Min:97.4 °F (36.3 °C), Max:98.6 °F (37 °C)    Temp:  [97.4 °F (36.3 °C)-98.6 °F (37 °C)] 98.6 °F (37 °C)  HR:  [42-77] 72  Resp:  [18] 18  BP: (102-132)/(54-63) 111/57  SpO2:  [89 %-99 %] 98 %  Body mass index is 32.33 kg/m².     Input and Output Summary (last 24 hours):       Intake/Output Summary (Last 24 hours) at 2024  1021  Last data filed at 1/7/2024 0753  Gross per 24 hour   Intake 780 ml   Output 3700 ml   Net -2920 ml       Physical Exam:       GEN: No acute distress, comfortable  HEEENT: No JVD, PERRLA, no scleral icterus  RESP: Lungs clear to auscultation bilaterally  CV: RRR, +s1/s2   ABD: SOFT NON TENDER, POSITIVE BOWEL SOUNDS, NO DISTENTION  PSYCH: CALM  NEURO: Mentation baseline, NO FOCAL DEFICITS  SKIN: NO RASH  EXTREM: minimal edema.    Additional Data:     Labs:    Results from last 7 days   Lab Units 01/07/24  0352   WBC Thousand/uL 6.88   HEMOGLOBIN g/dL 11.3*   HEMATOCRIT % 36.1*   PLATELETS Thousands/uL 175   NEUTROS PCT % 60   LYMPHS PCT % 20   MONOS PCT % 13*   EOS PCT % 6     Results from last 7 days   Lab Units 01/07/24  0352   SODIUM mmol/L 136   POTASSIUM mmol/L 3.7   CHLORIDE mmol/L 95*   CO2 mmol/L 33*   BUN mg/dL 56*   CREATININE mg/dL 1.43*   ANION GAP mmol/L 8   CALCIUM mg/dL 9.9   ALBUMIN g/dL 3.7   TOTAL BILIRUBIN mg/dL 1.56*   ALK PHOS U/L 132*   ALT U/L 16   AST U/L 20   GLUCOSE RANDOM mg/dL 99         Results from last 7 days   Lab Units 01/07/24  0624 01/07/24  0346 01/06/24  2012 01/06/24  1916 01/06/24  1632 01/06/24  1158 01/06/24  0734 01/05/24  2039 01/05/24  1624 01/05/24  1054 01/05/24  0901 01/04/24  2111   POC GLUCOSE mg/dl 111 96 82 72 106 174* 150* 186* 106 172* 132 149*               Lines/Drains:  Invasive Devices       Peripheral Intravenous Line  Duration             Peripheral IV 01/04/24 Dorsal (posterior);Left Forearm 3 days                    Telemetry:        * I Have Reviewed All Lab Data Listed Above.           Imaging:     No results found for this or any previous visit.    Results for orders placed during the hospital encounter of 12/28/23    XR chest 2 views    Narrative  CHEST    INDICATION:   Chest Pain.    COMPARISON: CT chest 11/14/2023    EXAM PERFORMED/VIEWS:  XR CHEST PA & LATERAL      FINDINGS:  Left-sided chest wall intracardiac device is identified.  Leads are  intact.    Cardiomegaly.    Diffuse prominence of the pulmonary vascular markings with mild hazy opacities. Small bilateral pleural effusions.    Osseous structures appear within normal limits for patient age.    Impression  Pulmonary edema with small bilateral pleural effusions.            Workstation performed: [id number]      *I have reviewed all imaging reports listed above      Recent Cultures (last 7 days):           Last 24 Hours Medication List:   Current Facility-Administered Medications   Medication Dose Route Frequency Provider Last Rate    acetaminophen  650 mg Oral Q6H PRN Gricel L Cedillo, PA-C      albuterol  2.5 mg Nebulization Q4H PRN Semaj Perez MD      allopurinol  300 mg Oral Daily Gricel L Cedillo, PA-C      aluminum-magnesium hydroxide-simethicone  30 mL Oral Q4H PRN Gricel L Cedillo, PA-C      amLODIPine  10 mg Oral Daily Gricel L Cedillo, PA-C      atorvastatin  20 mg Oral After Dinner Gricel L Cedillo, PA-C      azelastine  1 spray Each Nare BID Gricel L Cedillo, PA-C      budesonide  0.5 mg Nebulization BID Gricel L Cedillo, PA-C      cholecalciferol  1,000 Units Oral Daily Gricel L Cedillo, PA-C      co-enzyme Q-10  100 mg Oral Daily Gricel L Cedillo, PA-C      fish oil  1,000 mg Oral Daily Gricel L Cedillo, PA-C      fluticasone  1 spray Nasal BID Gricel L Cedillo, PA-C      gabapentin  100 mg Oral Daily Gricel L Cedillo, PA-C      heparin (porcine)  5,000 Units Subcutaneous Q8H TOVA Gricel L Cedillo, PA-C      insulin glargine  12 Units Subcutaneous Q12H [facility] Madhavi Cesar MD      insulin lispro  1-5 Units Subcutaneous TID  Ara Leigh MD      insulin lispro  8 Units Subcutaneous TID With Meals Madhavi Cesar MD      lidocaine  1 patch Topical HS Gricel L Cedillo, PA-C      lidocaine  1 patch Topical HS Gricel L Cedillo, PA-C      loratadine  10 mg Oral Daily Gricel L Cedillo, PA-C      losartan  100 mg Oral Daily Gricel L Cedillo, PA-C      magnesium  Oxide  400 mg Oral Daily Madhavi Cesar MD      metolazone  2.5 mg Oral Daily Jayy Babcock PA-C      potassium chloride  40 mEq Oral BID Madhavi Cesar MD      senna  2 tablet Oral HS PRN Gricel eCdillo PA-C      torsemide  40 mg Oral BID Madhavi Cesar MD          Today, Patient Was Seen By: Madhavi Cesar MD    ** Please Note: Dictation voice to text software may have been used in the creation of this document. **

## 2024-01-07 NOTE — ASSESSMENT & PLAN NOTE
"Home regimen: Amlodipine 10 Mg daily, losartan 100 Mg daily  Continue home medications  /57   Pulse 72   Temp 98.6 °F (37 °C) (Oral)   Resp 18   Ht 5' 11\" (1.803 m)   Wt 105 kg (231 lb 12.8 oz)   SpO2 98%   BMI 32.33 kg/m²    "

## 2024-01-08 VITALS
DIASTOLIC BLOOD PRESSURE: 58 MMHG | OXYGEN SATURATION: 92 % | WEIGHT: 231.8 LBS | SYSTOLIC BLOOD PRESSURE: 119 MMHG | TEMPERATURE: 98.3 F | HEART RATE: 52 BPM | HEIGHT: 71 IN | BODY MASS INDEX: 32.45 KG/M2 | RESPIRATION RATE: 19 BRPM

## 2024-01-08 LAB
ALBUMIN SERPL BCP-MCNC: 3.6 G/DL (ref 3.5–5)
ALP SERPL-CCNC: 147 U/L (ref 34–104)
ALT SERPL W P-5'-P-CCNC: 16 U/L (ref 7–52)
ANION GAP SERPL CALCULATED.3IONS-SCNC: 8 MMOL/L
AST SERPL W P-5'-P-CCNC: 19 U/L (ref 13–39)
BASOPHILS # BLD AUTO: 0.05 THOUSANDS/ÂΜL (ref 0–0.1)
BASOPHILS NFR BLD AUTO: 1 % (ref 0–1)
BILIRUB SERPL-MCNC: 1.44 MG/DL (ref 0.2–1)
BUN SERPL-MCNC: 59 MG/DL (ref 5–25)
CALCIUM SERPL-MCNC: 9.8 MG/DL (ref 8.4–10.2)
CHLORIDE SERPL-SCNC: 96 MMOL/L (ref 96–108)
CO2 SERPL-SCNC: 31 MMOL/L (ref 21–32)
CREAT SERPL-MCNC: 1.47 MG/DL (ref 0.6–1.3)
EOSINOPHIL # BLD AUTO: 0.32 THOUSAND/ÂΜL (ref 0–0.61)
EOSINOPHIL NFR BLD AUTO: 5 % (ref 0–6)
ERYTHROCYTE [DISTWIDTH] IN BLOOD BY AUTOMATED COUNT: 17.8 % (ref 11.6–15.1)
GFR SERPL CREATININE-BSD FRML MDRD: 43 ML/MIN/1.73SQ M
GLUCOSE SERPL-MCNC: 191 MG/DL (ref 65–140)
GLUCOSE SERPL-MCNC: 192 MG/DL (ref 65–140)
GLUCOSE SERPL-MCNC: 194 MG/DL (ref 65–140)
HCT VFR BLD AUTO: 37.8 % (ref 36.5–49.3)
HGB BLD-MCNC: 11.7 G/DL (ref 12–17)
IMM GRANULOCYTES # BLD AUTO: 0.04 THOUSAND/UL (ref 0–0.2)
IMM GRANULOCYTES NFR BLD AUTO: 1 % (ref 0–2)
LYMPHOCYTES # BLD AUTO: 1.33 THOUSANDS/ÂΜL (ref 0.6–4.47)
LYMPHOCYTES NFR BLD AUTO: 20 % (ref 14–44)
MAGNESIUM SERPL-MCNC: 2 MG/DL (ref 1.9–2.7)
MCH RBC QN AUTO: 25.1 PG (ref 26.8–34.3)
MCHC RBC AUTO-ENTMCNC: 31 G/DL (ref 31.4–37.4)
MCV RBC AUTO: 81 FL (ref 82–98)
MONOCYTES # BLD AUTO: 0.77 THOUSAND/ÂΜL (ref 0.17–1.22)
MONOCYTES NFR BLD AUTO: 12 % (ref 4–12)
NEUTROPHILS # BLD AUTO: 4.03 THOUSANDS/ÂΜL (ref 1.85–7.62)
NEUTS SEG NFR BLD AUTO: 61 % (ref 43–75)
NRBC BLD AUTO-RTO: 0 /100 WBCS
PLATELET # BLD AUTO: 191 THOUSANDS/UL (ref 149–390)
PMV BLD AUTO: 11.5 FL (ref 8.9–12.7)
POTASSIUM SERPL-SCNC: 4.2 MMOL/L (ref 3.5–5.3)
PROT SERPL-MCNC: 7.1 G/DL (ref 6.4–8.4)
RBC # BLD AUTO: 4.67 MILLION/UL (ref 3.88–5.62)
SODIUM SERPL-SCNC: 135 MMOL/L (ref 135–147)
WBC # BLD AUTO: 6.54 THOUSAND/UL (ref 4.31–10.16)

## 2024-01-08 PROCEDURE — 94640 AIRWAY INHALATION TREATMENT: CPT

## 2024-01-08 PROCEDURE — 99239 HOSP IP/OBS DSCHRG MGMT >30: CPT | Performed by: INTERNAL MEDICINE

## 2024-01-08 PROCEDURE — 94760 N-INVAS EAR/PLS OXIMETRY 1: CPT

## 2024-01-08 PROCEDURE — 83735 ASSAY OF MAGNESIUM: CPT | Performed by: HOSPITALIST

## 2024-01-08 PROCEDURE — 85025 COMPLETE CBC W/AUTO DIFF WBC: CPT | Performed by: HOSPITALIST

## 2024-01-08 PROCEDURE — 82948 REAGENT STRIP/BLOOD GLUCOSE: CPT

## 2024-01-08 PROCEDURE — 80053 COMPREHEN METABOLIC PANEL: CPT | Performed by: HOSPITALIST

## 2024-01-08 RX ORDER — GABAPENTIN 100 MG/1
100 CAPSULE ORAL DAILY
Qty: 180 CAPSULE | Refills: 0 | Status: SHIPPED | OUTPATIENT
Start: 2024-01-08

## 2024-01-08 RX ORDER — INSULIN GLARGINE 100 [IU]/ML
12 INJECTION, SOLUTION SUBCUTANEOUS 2 TIMES DAILY
Qty: 30 ML | Refills: 0 | Status: SHIPPED | OUTPATIENT
Start: 2024-01-08

## 2024-01-08 RX ORDER — INSULIN ASPART 100 [IU]/ML
8 INJECTION, SOLUTION INTRAVENOUS; SUBCUTANEOUS
Qty: 45 ML | Refills: 0 | Status: SHIPPED | OUTPATIENT
Start: 2024-01-08

## 2024-01-08 RX ORDER — LANOLIN ALCOHOL/MO/W.PET/CERES
400 CREAM (GRAM) TOPICAL DAILY
Qty: 30 TABLET | Refills: 0 | Status: SHIPPED | OUTPATIENT
Start: 2024-01-09

## 2024-01-08 RX ORDER — SENNOSIDES 8.6 MG
17.2 TABLET ORAL
Qty: 30 TABLET | Refills: 0 | Status: SHIPPED | OUTPATIENT
Start: 2024-01-08

## 2024-01-08 RX ORDER — POTASSIUM CHLORIDE 20 MEQ/1
20 TABLET, EXTENDED RELEASE ORAL DAILY
Qty: 30 TABLET | Refills: 0 | Status: SHIPPED | OUTPATIENT
Start: 2024-01-08

## 2024-01-08 RX ORDER — TORSEMIDE 20 MG/1
40 TABLET ORAL DAILY
Qty: 120 TABLET | Refills: 0 | Status: SHIPPED | OUTPATIENT
Start: 2024-01-09 | End: 2024-03-09

## 2024-01-08 RX ADMIN — POTASSIUM CHLORIDE 40 MEQ: 1500 TABLET, EXTENDED RELEASE ORAL at 09:29

## 2024-01-08 RX ADMIN — INSULIN LISPRO 1 UNITS: 100 INJECTION, SOLUTION INTRAVENOUS; SUBCUTANEOUS at 08:19

## 2024-01-08 RX ADMIN — Medication 1000 UNITS: at 09:30

## 2024-01-08 RX ADMIN — HEPARIN SODIUM 5000 UNITS: 5000 INJECTION INTRAVENOUS; SUBCUTANEOUS at 04:34

## 2024-01-08 RX ADMIN — ALLOPURINOL 300 MG: 100 TABLET ORAL at 09:29

## 2024-01-08 RX ADMIN — OMEGA-3 FATTY ACIDS CAP 1000 MG 1000 MG: 1000 CAP at 09:29

## 2024-01-08 RX ADMIN — GABAPENTIN 100 MG: 100 CAPSULE ORAL at 09:30

## 2024-01-08 RX ADMIN — TORSEMIDE 40 MG: 20 TABLET ORAL at 09:29

## 2024-01-08 RX ADMIN — LORATADINE 10 MG: 10 TABLET ORAL at 09:29

## 2024-01-08 RX ADMIN — Medication 100 MG: at 09:29

## 2024-01-08 RX ADMIN — FLUTICASONE PROPIONATE 1 SPRAY: 50 SPRAY, METERED NASAL at 09:30

## 2024-01-08 RX ADMIN — INSULIN GLARGINE 12 UNITS: 100 INJECTION, SOLUTION SUBCUTANEOUS at 09:29

## 2024-01-08 RX ADMIN — BUDESONIDE 0.5 MG: 0.5 INHALANT ORAL at 07:26

## 2024-01-08 RX ADMIN — LOSARTAN POTASSIUM 100 MG: 50 TABLET, FILM COATED ORAL at 09:29

## 2024-01-08 RX ADMIN — AZELASTINE HYDROCHLORIDE 1 SPRAY: 137 SPRAY, METERED NASAL at 09:30

## 2024-01-08 RX ADMIN — INSULIN LISPRO 8 UNITS: 100 INJECTION, SOLUTION INTRAVENOUS; SUBCUTANEOUS at 08:20

## 2024-01-08 RX ADMIN — Medication 400 MG: at 09:29

## 2024-01-08 NOTE — NURSING NOTE
Patient discharged to home with [facility]. Reviewed discharge instructions with patient, spouse and daughter - all questions/concerns addressed prior to d/c.

## 2024-01-08 NOTE — DISCHARGE INSTR - AVS FIRST PAGE
Please see your PCP within 7 days of discharge to discuss recent hospitalization  Your water pill has been changed to torsemide 40 mg daily  Check your body weight every day  Repeat BMP/blood work ordered within 48 hours  Continue with outpatient follow-up with cardiology  Continue to monitor your blood sugars and blood pressures at home  Salt restricted diet  Not more than 1800 mL of fluid every day  Have outpatient follow-up with endocrinology for your insulin regimen.

## 2024-01-08 NOTE — ASSESSMENT & PLAN NOTE
Lab Results   Component Value Date    HGBA1C 6.8 11/16/2023       Recent Labs     01/07/24  1615 01/07/24  2109 01/08/24  0713 01/08/24  1035   POCGLU 202* 249* 191* 194*       Blood Sugar Average: Last 72 hrs:  (P) 148.625Home regimen: Lantus 18 units twice daily and NovoLog 22 unit with breakfast, 23 units lunch, and 20 units with dinner  Decrease Lantus further  to 12 units twice daily  Decrease Humalog to 8 units 3 times daily  SSI  Outpatient endocrinology follow-up

## 2024-01-08 NOTE — ASSESSMENT & PLAN NOTE
"Home regimen: Amlodipine 10 Mg daily, losartan 100 Mg daily  Discontinue amlodipine  Continue losartan 100 mg daily and torsemide 40 mg daily  /58 (BP Location: Right arm)   Pulse (!) 52   Temp 98.3 °F (36.8 °C) (Oral)   Resp 19   Ht 5' 11\" (1.803 m)   Wt 105 kg (231 lb 12.8 oz)   SpO2 92%   BMI 32.33 kg/m²    "

## 2024-01-08 NOTE — DISCHARGE SUMMARY
[facility]  Discharge- Hammad Montesinos 1940, 83 y.o. male MRN: [id number]  Unit/Bed#: -Willy Encounter: [id number]  Primary Care Provider: Mina Zavala MD   Date and time admitted to hospital: 12/28/2023  8:55 PM    * Acute on chronic diastolic heart failure (HCC)  Assessment & Plan  Wt Readings from Last 3 Encounters:   01/08/24 105 kg (231 lb 12.8 oz)   12/12/23 115 kg (254 lb)   12/05/23 112 kg (247 lb)     Presents with worsening dyspnea and anasarca since [place]   Home regimen: lasix 40mg BID with increased to 60mg in AM and 40mg in PM 3 days ago with no improvement in symptoms   Last echo 10/2023: Severe left atrial dilation. RV systolic pressure 52.2mmHg. pulmonary artery pressure elevated  Pt with anasarca and pulmonary congestion on admission.  Diuretic regimen optimized to torsemide 40 mg daily  Sodium and fluid restriction   Outpatient cardiology follow-up  Furosemide and metolazone have been discontinued  Outpatient BMP    Paroxysmal A-fib (HCC)  Assessment & Plan  History noted  Not on anticoagulation  No abnormal rhythm observed on telemetry while inpatient  Outpatient cardiology follow-up n    Pulmonary hypertension (HCC)  Assessment & Plan  Home regimen: pulmicort BID, performist BID, and DuoNeb twice daily  Continue home bronchodilator therapy  Torsemide 40 mg daily  Outpatient cardiology follow-up    Elevated troponin  Assessment & Plan  HS trop: 48 > 64  EKG without acute ischemia - showing paced rhythm   Likely s/d to volume overload     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 (HCC)  Assessment & Plan  Baseline creatinine is around 1.2  Recent Labs     01/06/24  0445 01/07/24  0352 01/08/24  0433   CREATININE 1.37* 1.43* 1.47*   EGFR 47 44 43     Estimated Creatinine Clearance: 46.1 mL/min (A) (by C-G formula based on SCr of 1.47 mg/dL (H)).   Repeat BMP within 72 hours      Primary hypertension  Assessment & Plan  Home  "regimen: Amlodipine 10 Mg daily, losartan 100 Mg daily  Discontinue amlodipine  Continue losartan 100 mg daily and torsemide 40 mg daily  /58 (BP Location: Right arm)   Pulse (!) 52   Temp 98.3 °F (36.8 °C) (Oral)   Resp 19   Ht 5' 11\" (1.803 m)   Wt 105 kg (231 lb 12.8 oz)   SpO2 92%   BMI 32.33 kg/m²      Mixed hyperlipidemia  Assessment & Plan  Continue home statin    Type 2 diabetes mellitus with hyperglycemia, with long-term current use of insulin (HCC)  Assessment & Plan  Lab Results   Component Value Date    HGBA1C 6.8 11/16/2023       Recent Labs     01/07/24  1615 01/07/24  2109 01/08/24  0713 01/08/24  1035   POCGLU 202* 249* 191* 194*       Blood Sugar Average: Last 72 hrs:  (P) 148.625Home regimen: Lantus 18 units twice daily and NovoLog 22 unit with breakfast, 23 units lunch, and 20 units with dinner  Decrease Lantus further  to 12 units twice daily  Decrease Humalog to 8 units 3 times daily  SSI  Outpatient endocrinology follow-up                Medical Problems       Resolved Problems  Date Reviewed: 1/8/2024   None         Admission Date:   Admission Orders (From admission, onward)       Ordered        12/28/23 2257  INPATIENT ADMISSION  Once                            Admitting Diagnosis: SOB (shortness of breath) [R06.02]  Acute exacerbation of CHF (congestive heart failure) (HCC) [I50.9]  Volume overload [E87.70]    HPI: as per, Gricel Cedillo PA-C , 12/28/23 Hammad Montesinos is a 83 y.o. male with a PMH of pulmonary hypertension, IDDM1, HLD, and HTN who presents with worsening dyspnea and anasarca since [place] despite increased Lasix dose.  Denies recent fevers or chills.  No chest pain.  Endorses abdominal distention but no pain.  No nausea or vomiting.  No urinary symptoms.     Procedures Performed:   Orders Placed This Encounter   Procedures    ED ECG Documentation Only       Summary of Hospital Course: \    Hold the hospital stay, patient was closely followed by " cardiology.  Patient was admitted with acute on chronic diastolic heart failure.  Initially was placed on IV diuretics.  Regimen was optimized multiple times for optimal diuresis.  Patient's home furosemide and metolazone have been discontinued.  Patient is being discharged on torsemide 40 mg daily.  Patient will repeat BMP in 3 days, to see creatinine trend.  Patient will continue with fluid restriction 1800 mL and sodium restriction 2 g/day.  Patient will have outpatient follow-up at heart failure clinic.    Patient's blood pressure remains stable, amlodipine has been discontinued due to lower extremity swelling.  Patient will continue with losartan and torsemide.    Patient's insulin regimen has been optimized to insulin Lantus 12 units twice daily, with insulin lispro 8 units with meals.  Patient has a Dexcom.  Patient is advised to have blood sugar monitoring 4 times daily before meals.    Patient will have outpatient PCP and endocrinology follow-up.  Per discharge plan was discussed in detail with patient and patient's daughter and spouse present at bedside.  All questions were answered to satisfaction.    Significant Findings, Care, Treatment and Services Provided:     See above     Complications: none    Condition at Discharge: stable         Discharge instructions/Information to patient and family:   See after visit summary for information provided to patient and family.      Provisions for Follow-Up Care:  See after visit summary for information related to follow-up care and any pertinent home health orders.      PCP: Mina Zavala MD    [facility]     Planned Readmission: No    Discharge Statement   I spent 40 minutes discharging the patient. This time was spent on the day of discharge. I had direct contact with the patient on the day of discharge. Additional documentation is required if more than 30 minutes were spent on discharge.     Discharge Medications:  See after visit summary  for reconciled discharge medications provided to patient and family.

## 2024-01-08 NOTE — ASSESSMENT & PLAN NOTE
Wt Readings from Last 3 Encounters:   01/08/24 105 kg (231 lb 12.8 oz)   12/12/23 115 kg (254 lb)   12/05/23 112 kg (247 lb)     Presents with worsening dyspnea and anasarca since [place]   Home regimen: lasix 40mg BID with increased to 60mg in AM and 40mg in PM 3 days ago with no improvement in symptoms   Last echo 10/2023: Severe left atrial dilation. RV systolic pressure 52.2mmHg. pulmonary artery pressure elevated  Pt with anasarca and pulmonary congestion on admission.  Diuretic regimen optimized to torsemide 40 mg daily  Sodium and fluid restriction   Outpatient cardiology follow-up  Furosemide and metolazone have been discontinued  Outpatient BMP

## 2024-01-08 NOTE — ASSESSMENT & PLAN NOTE
Home regimen: pulmicort BID, performist BID, and DuoNeb twice daily  Continue home bronchodilator therapy  Torsemide 40 mg daily  Outpatient cardiology follow-up

## 2024-01-08 NOTE — ASSESSMENT & PLAN NOTE
Baseline creatinine is around 1.2  Recent Labs     01/06/24  0445 01/07/24  0352 01/08/24  0433   CREATININE 1.37* 1.43* 1.47*   EGFR 47 44 43     Estimated Creatinine Clearance: 46.1 mL/min (A) (by C-G formula based on SCr of 1.47 mg/dL (H)).   Repeat BMP within 72 hours

## 2024-01-08 NOTE — ASSESSMENT & PLAN NOTE
History noted  Not on anticoagulation  No abnormal rhythm observed on telemetry while inpatient  Outpatient cardiology follow-up n

## 2024-01-08 NOTE — CASE MANAGEMENT
Case Management Discharge Planning Note    Patient name Hammad Montesinos  Location /-01 MRN [id number]  : 1940 Date 2024       Current Admission Date: 2023  Current Admission Diagnosis:Acute on chronic diastolic heart failure (HCC)   Patient Active Problem List    Diagnosis Date Noted    Acute on chronic diastolic heart failure (HCC) 2023    Elevated troponin 2023    Pulmonary hypertension ([facility]) 2023    Paroxysmal A-fib ([facility]) 2023    Type 2 diabetes mellitus with diabetic neuropathy, unspecified whether long term insulin use ([facility]) 2023    Pre-ulcerative calluses 2023    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 ([facility]) 2022    Elevated parathyroid hormone 2022    Diabetic nephropathy associated with type 2 diabetes mellitus ([facility]) 2022    Type 2 diabetes mellitus with hyperglycemia, with long-term current use of insulin ([facility]) 2022    Mixed hyperlipidemia 2022    Diabetic polyneuropathy associated with type 2 diabetes mellitus ([facility]) 2022    Primary hypertension 2022    Morbid (severe) obesity due to excess calories ([facility]) 2022    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 ([facility]) 2022      LOS (days): 11  Geometric Mean LOS (GMLOS) (days): 3.9  Days to GMLOS:-6.7     OBJECTIVE:  Risk of Unplanned Readmission Score: 24.66         Current admission status: Inpatient   Preferred Pharmacy:   [facility] [address]  [address] [address]  Phone: [phone] Fax: [phone]    Primary Care Provider: Mina Zavala MD    Primary Insurance: [email or web address] [facility]  Secondary Insurance:     DISCHARGE DETAILS:      Requested Home Health Care         Home Health Discipline requested:: Nursing  [facility] Reason (only applicable if  external [facility] name selected): Patient has established relationship with provider  Home Health Follow-Up Provider:: PCP  Home Health Services Needed:: Other (comment) (med monitoring and patient education.)  Homebound Criteria Met:: Uses an Assist Device (i.e. cane, walker, etc)  Supporting Clincal Findings:: Limited Endurance    Treatment Team Recommendation: Home with Home Health Care  Discharge Destination Plan:: Home with Home Health Care  Transport at Discharge : Family     IMM Given (Date):: 01/08/24  IMM Given to:: Patient  IMM reviewed with patient, patient agrees with discharge determination.    Additional Comments: [facility] accepted. CM added [facility] to pt's dc instructions. CM informed [facility] via A[facility]IN of dc today. Pt's family to transport home.

## 2024-01-09 ENCOUNTER — HOME CARE VISIT (OUTPATIENT)
Dept: HOME HEALTH SERVICES | Facility: HOME HEALTHCARE | Age: 84
End: 2024-01-09
Payer: COMMERCIAL

## 2024-01-09 VITALS
OXYGEN SATURATION: 98 % | TEMPERATURE: 97.6 F | HEART RATE: 72 BPM | DIASTOLIC BLOOD PRESSURE: 32 MMHG | SYSTOLIC BLOOD PRESSURE: 102 MMHG

## 2024-01-09 PROCEDURE — G0299 HHS/HOSPICE OF RN EA 15 MIN: HCPCS

## 2024-01-09 NOTE — UTILIZATION REVIEW
NOTIFICATION OF ADMISSION DISCHARGE   This is a Notification of Discharge from [facility]. Please be advised that this patient has been discharge from our facility. Below you will find the admission and discharge date and time including the patient’s disposition.   UTILIZATION REVIEW CONTACT:  Mariama Crawley  Utilization   Network Utilization Review Department  Phone: [phone] carefully listen to the prompts. All voicemails are confidential.  Email: [email or web address]     ADMISSION INFORMATION  PRESENTATION DATE: 12/28/2023  8:55 PM  OBERVATION ADMISSION DATE:   INPATIENT ADMISSION DATE: 12/28/23 10:57 PM   DISCHARGE DATE: 1/8/2024 12:35 PM   DISPOSITION:Home with Home Health Care    Network Utilization Review Department  ATTENTION: Please call with any questions or concerns to [phone] and carefully listen to the prompts so that you are directed to the right person. All voicemails are confidential.   For Discharge needs, contact Care Management DC Support Team at [phone] opt. [phone]  Send all requests for admission clinical reviews, approved or denied determinations and any other requests to dedicated fax number below belonging to the campus where the patient is receiving treatment. List of dedicated fax numbers for the Facilities:  FACILITY NAME UR FAX NUMBER   ADMISSION DENIALS (Administrative/Medical Necessity) [phone]   DISCHARGE SUPPORT TEAM ([facility]) [phone]   PARENT CHILD HEALTH (Maternity/NICU/Pediatrics) [phone]   [facility] [phone]   [facility] [phone]   [facility] [phone]   [facility] [phone]   [facility] [phone]   [facility] [phone]   [facility] [phone]   [facility]  [phone]   [facility] [phone]   [facility] [phone]   [facility] [phone]

## 2024-01-11 ENCOUNTER — HOME CARE VISIT (OUTPATIENT)
Dept: HOME HEALTH SERVICES | Facility: HOME HEALTHCARE | Age: 84
End: 2024-01-11
Payer: COMMERCIAL

## 2024-01-11 VITALS
BODY MASS INDEX: 31.59 KG/M2 | DIASTOLIC BLOOD PRESSURE: 58 MMHG | TEMPERATURE: 98.4 F | SYSTOLIC BLOOD PRESSURE: 110 MMHG | OXYGEN SATURATION: 98 % | WEIGHT: 226.5 LBS | HEART RATE: 82 BPM | RESPIRATION RATE: 16 BRPM

## 2024-01-11 PROCEDURE — G0299 HHS/HOSPICE OF RN EA 15 MIN: HCPCS

## 2024-01-12 ENCOUNTER — HOME CARE VISIT (OUTPATIENT)
Dept: HOME HEALTH SERVICES | Facility: HOME HEALTHCARE | Age: 84
End: 2024-01-12
Payer: COMMERCIAL

## 2024-01-12 VITALS — DIASTOLIC BLOOD PRESSURE: 62 MMHG | SYSTOLIC BLOOD PRESSURE: 122 MMHG

## 2024-01-12 PROCEDURE — G0151 HHCP-SERV OF PT,EA 15 MIN: HCPCS

## 2024-01-13 ENCOUNTER — HOME CARE VISIT (OUTPATIENT)
Dept: HOME HEALTH SERVICES | Facility: HOME HEALTHCARE | Age: 84
End: 2024-01-13
Payer: COMMERCIAL

## 2024-01-13 VITALS
RESPIRATION RATE: 18 BRPM | OXYGEN SATURATION: 99 % | SYSTOLIC BLOOD PRESSURE: 120 MMHG | DIASTOLIC BLOOD PRESSURE: 68 MMHG | HEART RATE: 60 BPM | TEMPERATURE: 98.3 F

## 2024-01-13 PROCEDURE — G0299 HHS/HOSPICE OF RN EA 15 MIN: HCPCS

## 2024-01-15 ENCOUNTER — HOME CARE VISIT (OUTPATIENT)
Dept: HOME HEALTH SERVICES | Facility: HOME HEALTHCARE | Age: 84
End: 2024-01-15
Payer: COMMERCIAL

## 2024-01-16 ENCOUNTER — HOME CARE VISIT (OUTPATIENT)
Dept: HOME HEALTH SERVICES | Facility: HOME HEALTHCARE | Age: 84
End: 2024-01-16
Payer: COMMERCIAL

## 2024-01-16 VITALS
BODY MASS INDEX: 30.91 KG/M2 | HEART RATE: 65 BPM | RESPIRATION RATE: 16 BRPM | DIASTOLIC BLOOD PRESSURE: 65 MMHG | SYSTOLIC BLOOD PRESSURE: 120 MMHG | WEIGHT: 221.6 LBS | OXYGEN SATURATION: 95 %

## 2024-01-16 LAB
LEFT EYE DIABETIC RETINOPATHY: POSITIVE
RIGHT EYE DIABETIC RETINOPATHY: POSITIVE

## 2024-01-16 PROCEDURE — G0299 HHS/HOSPICE OF RN EA 15 MIN: HCPCS

## 2024-01-16 PROCEDURE — G0151 HHCP-SERV OF PT,EA 15 MIN: HCPCS

## 2024-01-17 ENCOUNTER — HOME CARE VISIT (OUTPATIENT)
Dept: HOME HEALTH SERVICES | Facility: HOME HEALTHCARE | Age: 84
End: 2024-01-17
Payer: COMMERCIAL

## 2024-01-17 VITALS — OXYGEN SATURATION: 96 % | HEART RATE: 74 BPM | DIASTOLIC BLOOD PRESSURE: 60 MMHG | SYSTOLIC BLOOD PRESSURE: 112 MMHG

## 2024-01-17 PROCEDURE — G0152 HHCP-SERV OF OT,EA 15 MIN: HCPCS

## 2024-01-18 ENCOUNTER — HOME CARE VISIT (OUTPATIENT)
Dept: HOME HEALTH SERVICES | Facility: HOME HEALTHCARE | Age: 84
End: 2024-01-18
Payer: COMMERCIAL

## 2024-01-18 VITALS
BODY MASS INDEX: 30.49 KG/M2 | TEMPERATURE: 99 F | HEART RATE: 52 BPM | RESPIRATION RATE: 16 BRPM | SYSTOLIC BLOOD PRESSURE: 130 MMHG | WEIGHT: 218.6 LBS | OXYGEN SATURATION: 97 % | DIASTOLIC BLOOD PRESSURE: 70 MMHG

## 2024-01-18 PROCEDURE — G0151 HHCP-SERV OF PT,EA 15 MIN: HCPCS

## 2024-01-18 PROCEDURE — G0299 HHS/HOSPICE OF RN EA 15 MIN: HCPCS

## 2024-01-20 ENCOUNTER — HOME CARE VISIT (OUTPATIENT)
Dept: HOME HEALTH SERVICES | Facility: HOME HEALTHCARE | Age: 84
End: 2024-01-20
Payer: COMMERCIAL

## 2024-01-23 ENCOUNTER — HOME CARE VISIT (OUTPATIENT)
Dept: HOME HEALTH SERVICES | Facility: HOME HEALTHCARE | Age: 84
End: 2024-01-23
Payer: COMMERCIAL

## 2024-01-23 VITALS
RESPIRATION RATE: 16 BRPM | SYSTOLIC BLOOD PRESSURE: 90 MMHG | BODY MASS INDEX: 30.64 KG/M2 | WEIGHT: 219.7 LBS | HEART RATE: 66 BPM | OXYGEN SATURATION: 98 % | DIASTOLIC BLOOD PRESSURE: 60 MMHG

## 2024-01-23 PROCEDURE — G0299 HHS/HOSPICE OF RN EA 15 MIN: HCPCS

## 2024-01-24 ENCOUNTER — TELEPHONE (OUTPATIENT)
Dept: CARDIOLOGY CLINIC | Facility: CLINIC | Age: 84
End: 2024-01-24

## 2024-01-24 DIAGNOSIS — I48.0 PAROXYSMAL A-FIB (HCC): Primary | ICD-10-CM

## 2024-01-24 NOTE — TELEPHONE ENCOUNTER
Patient is transferring care to Dr. Holland and has an appointment on 2/13.    Yesterday on remote monitoring he had an episode of Afib and they are recommending that Dr. Holland take start him on blood thinner if appropriate.  They do realize this is a little complicated but with patient transferring care they would like Dr. Holland to take over.     They will also transfer the online remote monitoring for patient device over so it can be accessed by Cardiology    [phone]      Call was transferred to me in error. Wanted to make sure Dr. Holland got the information

## 2024-01-24 NOTE — TELEPHONE ENCOUNTER
Angelita called from [facility]  called pt had hospital visit at [facility]  Pt was seen by Dr Landa on 1/7/2024  Pt has a device.     [facility] received device report and as of yesterday has been in Afib.     Previous Cardiology group recevied device report today are recommending anticoagulant. Will be faxing over device report.     Pt has an apt with Dr Holland on 2/13/2024   Please advise     Device report will be faxing to office. Att to Sarai Moon received fax   Will have scanned into system for review under Media tablet  Pt also had a f/up appt device 2/24/24    Please advise

## 2024-01-25 ENCOUNTER — HOME CARE VISIT (OUTPATIENT)
Dept: HOME HEALTH SERVICES | Facility: HOME HEALTHCARE | Age: 84
End: 2024-01-25
Payer: COMMERCIAL

## 2024-01-25 ENCOUNTER — TELEPHONE (OUTPATIENT)
Dept: CARDIOLOGY CLINIC | Facility: CLINIC | Age: 84
End: 2024-01-25

## 2024-01-25 VITALS
RESPIRATION RATE: 16 BRPM | HEART RATE: 64 BPM | SYSTOLIC BLOOD PRESSURE: 120 MMHG | DIASTOLIC BLOOD PRESSURE: 60 MMHG | BODY MASS INDEX: 31.35 KG/M2 | WEIGHT: 224.8 LBS | OXYGEN SATURATION: 98 % | TEMPERATURE: 98 F

## 2024-01-25 PROCEDURE — G0299 HHS/HOSPICE OF RN EA 15 MIN: HCPCS

## 2024-01-25 NOTE — TELEPHONE ENCOUNTER
Per Dr. Holland -   I would recommend xarelto 20 mg daily. We can put it through to make sure covered by insurance. Thanks.       Called, spoke to pt. Pt verbalized understanding and is agreeable to starting Xarelto. Will send Rx to [facility]. ## provided to [facility] office. Pt will call if medication is cost prohibitive.

## 2024-01-25 NOTE — TELEPHONE ENCOUNTER
I spoke with patient and advised.  He verbalized understanding of instructions. Will increase to 40 in AM and remain at 20 mg in PM.

## 2024-01-25 NOTE — TELEPHONE ENCOUNTER
Jamel from [facility] left a message in regards to weight gain.     Called patient. Patient states on Tuesday wt was 219 and today wt is 224. Pt denies SOB and swelling. He is currently taking Toresmide 20mg am and 20mg afternoon. He said that he forget to take his afternoon dose yesterday and he has been eating meals that his insurance company has been sending him. He had a burrito this morning with a sodium content of 450. Pt has a hospital follow up scheduled with Dr. Holland on 2/13/24.     Last seen in the hospital for CHF on 12/28/23    Please advise

## 2024-01-29 ENCOUNTER — HOME CARE VISIT (OUTPATIENT)
Dept: HOME HEALTH SERVICES | Facility: HOME HEALTHCARE | Age: 84
End: 2024-01-29
Payer: COMMERCIAL

## 2024-01-29 VITALS
BODY MASS INDEX: 30.49 KG/M2 | HEART RATE: 69 BPM | SYSTOLIC BLOOD PRESSURE: 120 MMHG | RESPIRATION RATE: 16 BRPM | DIASTOLIC BLOOD PRESSURE: 60 MMHG | WEIGHT: 218.6 LBS | OXYGEN SATURATION: 95 %

## 2024-01-29 PROCEDURE — G0299 HHS/HOSPICE OF RN EA 15 MIN: HCPCS

## 2024-02-01 ENCOUNTER — HOME CARE VISIT (OUTPATIENT)
Dept: HOME HEALTH SERVICES | Facility: HOME HEALTHCARE | Age: 84
End: 2024-02-01
Payer: COMMERCIAL

## 2024-02-01 VITALS
SYSTOLIC BLOOD PRESSURE: 110 MMHG | TEMPERATURE: 97.5 F | WEIGHT: 218.1 LBS | DIASTOLIC BLOOD PRESSURE: 60 MMHG | HEART RATE: 70 BPM | BODY MASS INDEX: 30.42 KG/M2 | OXYGEN SATURATION: 99 % | RESPIRATION RATE: 16 BRPM

## 2024-02-01 PROCEDURE — G0300 HHS/HOSPICE OF LPN EA 15 MIN: HCPCS

## 2024-02-06 ENCOUNTER — HOME CARE VISIT (OUTPATIENT)
Dept: HOME HEALTH SERVICES | Facility: HOME HEALTHCARE | Age: 84
End: 2024-02-06
Payer: COMMERCIAL

## 2024-02-06 VITALS
DIASTOLIC BLOOD PRESSURE: 58 MMHG | WEIGHT: 219 LBS | SYSTOLIC BLOOD PRESSURE: 108 MMHG | TEMPERATURE: 98.1 F | BODY MASS INDEX: 30.54 KG/M2 | HEART RATE: 68 BPM | OXYGEN SATURATION: 100 %

## 2024-02-06 PROCEDURE — G0299 HHS/HOSPICE OF RN EA 15 MIN: HCPCS

## 2024-02-08 ENCOUNTER — HOME CARE VISIT (OUTPATIENT)
Dept: HOME HEALTH SERVICES | Facility: HOME HEALTHCARE | Age: 84
End: 2024-02-08
Payer: COMMERCIAL

## 2024-02-08 VITALS
BODY MASS INDEX: 30.63 KG/M2 | RESPIRATION RATE: 16 BRPM | SYSTOLIC BLOOD PRESSURE: 110 MMHG | OXYGEN SATURATION: 96 % | DIASTOLIC BLOOD PRESSURE: 60 MMHG | WEIGHT: 219.6 LBS | HEART RATE: 64 BPM

## 2024-02-08 PROCEDURE — G0299 HHS/HOSPICE OF RN EA 15 MIN: HCPCS

## 2024-02-12 ENCOUNTER — OFFICE VISIT (OUTPATIENT)
Dept: PODIATRY | Facility: CLINIC | Age: 84
End: 2024-02-12
Payer: COMMERCIAL

## 2024-02-12 ENCOUNTER — HOME CARE VISIT (OUTPATIENT)
Dept: HOME HEALTH SERVICES | Facility: HOME HEALTHCARE | Age: 84
End: 2024-02-12
Payer: COMMERCIAL

## 2024-02-12 VITALS
BODY MASS INDEX: 30.66 KG/M2 | SYSTOLIC BLOOD PRESSURE: 118 MMHG | HEIGHT: 71 IN | DIASTOLIC BLOOD PRESSURE: 68 MMHG | WEIGHT: 219 LBS

## 2024-02-12 DIAGNOSIS — L97.512 DIABETIC ULCER OF OTHER PART OF RIGHT FOOT ASSOCIATED WITH TYPE 2 DIABETES MELLITUS, WITH FAT LAYER EXPOSED (HCC): Primary | ICD-10-CM

## 2024-02-12 DIAGNOSIS — E11.621 DIABETIC ULCER OF OTHER PART OF RIGHT FOOT ASSOCIATED WITH TYPE 2 DIABETES MELLITUS, WITH FAT LAYER EXPOSED (HCC): Primary | ICD-10-CM

## 2024-02-12 DIAGNOSIS — Z79.01 CHRONIC ANTICOAGULATION: ICD-10-CM

## 2024-02-12 DIAGNOSIS — Z79.4 TYPE 2 DIABETES MELLITUS WITH DIABETIC NEUROPATHY, WITH LONG-TERM CURRENT USE OF INSULIN (HCC): ICD-10-CM

## 2024-02-12 DIAGNOSIS — E11.40 TYPE 2 DIABETES MELLITUS WITH DIABETIC NEUROPATHY, WITH LONG-TERM CURRENT USE OF INSULIN (HCC): ICD-10-CM

## 2024-02-12 DIAGNOSIS — L03.115 CELLULITIS OF RIGHT FOOT: ICD-10-CM

## 2024-02-12 PROCEDURE — 11045 DBRDMT SUBQ TISS EACH ADDL: CPT | Performed by: PODIATRIST

## 2024-02-12 PROCEDURE — 11042 DBRDMT SUBQ TIS 1ST 20SQCM/<: CPT | Performed by: PODIATRIST

## 2024-02-12 PROCEDURE — 99213 OFFICE O/P EST LOW 20 MIN: CPT | Performed by: PODIATRIST

## 2024-02-12 RX ORDER — AMOXICILLIN AND CLAVULANATE POTASSIUM 875; 125 MG/1; MG/1
1 TABLET, FILM COATED ORAL EVERY 12 HOURS SCHEDULED
Qty: 14 TABLET | Refills: 0 | Status: SHIPPED | OUTPATIENT
Start: 2024-02-12 | End: 2024-02-19

## 2024-02-12 NOTE — PROGRESS NOTES
Patient ID: Hammad Montesinos is a 83 y.o. male Date of Birth 1940       No chief complaint on file.      Allergies:  Hydrochlorothiazide, Insulin lispro, Lisinopril, Minoxidil, Nifedipine, Other, Prazosin, Venlafaxine, Doxycycline, and Simvastatin    Diagnosis:  1. 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 Type 2 diabetes mellitus with diabetic neuropathy, with long-term current use of insulin ([facility])    3. Cellulitis of right foot  -     amoxicillin-clavulanate (AUGMENTIN) 875-125 mg per tablet; Take 1 tablet by mouth every 12 (twelve) hours for 7 days    4. Chronic anticoagulation       Diagnosis ICD-10-CM Associated Orders   1. 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)  E11.621 Ambulatory Referral to Wound Care    L97.512 Wound culture and Gram stain     mupirocin (BACTROBAN) 2 % ointment     Offloading Wedge Shoe     Debridement Diabetic Ulcer (wound) Plantar;Distal Foot      2. Type 2 diabetes mellitus with diabetic neuropathy, with long-term current use of insulin ([facility])  E11.40     Z79.4       3. Cellulitis of right foot  L03.115 amoxicillin-clavulanate (AUGMENTIN) 875-125 mg per tablet      4. Chronic anticoagulation  Z79.01            Assessment & Plan:  Mupirocin/Alginate dressing to be changed every other day  Patient instructed to limit his walking to a minimum  Wound culture obtained right foot ulceration  Empirically started on Augmentin 875 twice daily x1wk  Rx mupirocin ointment to be applied with bandage changes as instructed.  Ambulatory referral to wound care within 1 week.  Rx forefoot  wedge offloading shoe  Call if any signs of increasing infection are noted.  Ulcer debridement as noted below.    Subjective:   83-year-old male type II diabetic seen today for evaluation of new issue/ulceration right forefoot which recently broke down within the past 2 weeks.  This is the site of a previous ulceration that was present 2 years ago.  It is now more red and swollen and draining fluid.  Patient was recently hospitalized with CHF causing him to have a lot of swelling in his legs.        The following portions of the patient's history were reviewed and updated as appropriate:   Patient Active Problem List   Diagnosis   • Type 2 diabetes mellitus with hyperglycemia, with long-term current use of insulin ([facility])   • Mixed hyperlipidemia   • Diabetic polyneuropathy associated with type 2 diabetes mellitus ([facility])   • Primary hypertension   • Morbid (severe) obesity due to excess calories ([facility])   • 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 ([facility])   • 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 ([facility])   • Elevated parathyroid hormone   • Diabetic nephropathy associated with type 2 diabetes mellitus ([facility])   • Type 2 diabetes mellitus with diabetic neuropathy, unspecified whether long term insulin use ([facility])   • Pre-ulcerative calluses   • Acute on chronic diastolic heart failure (HCC)   • Elevated troponin   • Pulmonary hypertension (HCC)   • Paroxysmal A-fib ([facility])   • 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, with fat layer exposed ([facility])     Past Medical History:   Diagnosis Date   • Asthma    • Bradycardia    • Carcinoma, basal cell, skin     head and face and ear   • COVID-19 12/05/2022   • Dupuytren contracture     bilateral ring and right middle   • Gout    • Herniated lumbar intervertebral disc    • Sleep apnea      Past Surgical History:   Procedure Laterality Date   • APPENDECTOMY      age 20, ruptured   • CATARACT  EXTRACTION, BILATERAL Bilateral    • CERVICAL FUSION     • CYST REMOVAL Left 01/2023   • DUPUYTREN CONTRACTURE RELEASE Left     left ring finger   • LUMBAR LAMINECTOMY      belkis placed   • TONSILLECTOMY     • VASECTOMY       Social History     Socioeconomic History   • Marital status: /Civil Union     Spouse name: None   • Number of children: None   • Years of education: None   • Highest education level: None   Occupational History   • Occupation: teacher     Comment: retired   Tobacco Use   • Smoking status: Never   • Smokeless tobacco: Never   Vaping Use   • Vaping status: Never Used   Substance and Sexual Activity   • Alcohol use: Not Currently   • Drug use: Never   • Sexual activity: None   Other Topics Concern   • None   Social History Narrative   • None     Social Determinants of Health     Financial Resource Strain: Not on file   Food Insecurity: No Food Insecurity (12/29/2023)    Hunger Vital Sign    • Worried About Running Out of Food in the Last Year: Never true    • Ran Out of Food in the Last Year: Never true   Transportation Needs: No Transportation Needs (12/29/2023)    PRAPARE - Transportation    • Lack of Transportation (Medical): No    • Lack of Transportation (Non-Medical): No   Physical Activity: Not on file   Stress: Not on file   Social Connections: Not on file   Intimate Partner Violence: Not on file   Housing Stability: Low Risk  (12/29/2023)    Housing Stability Vital Sign    • Unable to Pay for Housing in the Last Year: No    • Number of Places Lived in the Last Year: 1    • Unstable Housing in the Last Year: No        Current Outpatient Medications:   •  amoxicillin-clavulanate (AUGMENTIN) 875-125 mg per tablet, Take 1 tablet by mouth every 12 (twelve) hours for 7 days, Disp: 14 tablet, Rfl: 0  •  mupirocin (BACTROBAN) 2 % ointment, Apply topically every other day With gauze, Disp: 22 g, Rfl: 0  •  allopurinol (ZYLOPRIM) 300 mg tablet, every 24 hours, Disp: , Rfl:   •  atorvastatin  (LIPITOR) 20 mg tablet, Take 20 mg by mouth daily, Disp: , Rfl:   •  azelastine (ASTELIN) 0.1 % nasal spray, 2 sprays into each nostril 2 (two) times a day, Disp: , Rfl:   •  budesonide (PULMICORT) 0.5 mg/2 mL nebulizer solution, Take 0.5 mg by nebulization 2 (two) times a day Rinse mouth after use., Disp: , Rfl:   •  Cholecalciferol (Vitamin D3) 25 MCG (1000 UT) CAPS, every 24 hours, Disp: , Rfl:   •  Coenzyme Q10 (Co Q10) 100 MG CAPS, Take 100 mg by mouth in the morning, Disp: , Rfl:   •  Easy Comfort Pen Needles 33G X 4 MM MISC, USE WITH INSULIN FIVE TIMES DAILY AS DIRECTED, Disp: , Rfl:   •  fluticasone (FLONASE) 50 mcg/act nasal spray, 1 spray into each nostril 2 (two) times a day, Disp: , Rfl:   •  formoterol (PERFOROMIST) 20 MCG/2ML nebulizer solution, Take 20 mcg by nebulization 2 (two) times a day, Disp: , Rfl:   •  gabapentin (NEURONTIN) 100 mg capsule, Take 1 capsule (100 mg total) by mouth daily, Disp: 180 capsule, Rfl: 0  •  Glucosamine-Chondroitin 500-400 MG CAPS, , Disp: , Rfl:   •  insulin aspart (NovoLOG FlexPen) 100 UNIT/ML injection pen, Inject 8 Units under the skin 3 (three) times a day with meals INJECT 22 UNITS SUBCUTANEOUSLY with breakfast, 23 UNITS with LUNCH, AND 28 UNITS with dinner plus sliding scale. (UP  UNITS DAILY), Disp: 45 mL, Rfl: 0  •  Insulin Glargine Solostar (Lantus SoloStar) 100 UNIT/ML SOPN, Inject 0.12 mL (12 Units total) under the skin 2 (two) times a day INJECT 30 UNITS SUBCUTANEOUSLY IN THE MORNING AND 32 UNITS IN THE EVENING. MAY INCREASE TO 80 UNITS DAILY, Disp: 30 mL, Rfl: 0  •  ipratropium-albuterol (DUO-NEB) 0.5-2.5 mg/3 mL nebulizer solution, Take 3 mL by nebulization 2 (two) times a day, Disp: , Rfl:   •  loratadine (CLARITIN) 10 mg tablet, Take 10 mg by mouth daily, Disp: , Rfl:   •  losartan (COZAAR) 100 MG tablet, Take 100 mg by mouth daily, Disp: , Rfl:   •  magnesium Oxide (MAG-OX) 400 mg TABS, Take 1 tablet (400 mg total) by mouth daily, Disp: 30  "tablet, Rfl: 0  •  Multiple Vitamin (MULTIVITAMIN ADULT PO), every 24 hours, Disp: , Rfl:   •  mupirocin (BACTROBAN) 2 % ointment, , Disp: , Rfl:   •  Omega-3 Fatty Acids (Fish Oil) 360 MG CAPS, Take by mouth, Disp: , Rfl:   •  OneTouch Verio test strip, USE AS DIRECTED FOUR TIMES DAILY, Disp: , Rfl:   •  potassium chloride (K-DUR,KLOR-CON) 20 mEq tablet, Take 1 tablet (20 mEq total) by mouth daily, Disp: 30 tablet, Rfl: 0  •  rivaroxaban (Xarelto) 20 mg tablet, Take 1 tablet (20 mg total) by mouth daily with breakfast, Disp: 30 tablet, Rfl: 5  •  senna (SENOKOT) 8.6 mg, Take 2 tablets (17.2 mg total) by mouth daily at bedtime as needed for constipation, Disp: 30 tablet, Rfl: 0  •  torsemide (DEMADEX) 20 mg tablet, Take 2 tablets (40 mg total) by mouth daily (Patient taking differently: Take 20 mg by mouth daily 1 tablet in AM), Disp: 120 tablet, Rfl: 0  •  triamcinolone (KENALOG) 0.1 % cream, 1 Application 2 (two) times a day To affected areas, Disp: , Rfl:   Family History   Problem Relation Age of Onset   • Hypertension Mother    • Diabetes type II Mother    • Hypertension Father    • Diabetes type II Brother    • Hypertension Brother    • Kidney disease Sister    • Hypertension Daughter    • GI problems Daughter       Review of Systems   Constitutional: Negative.    HENT: Negative.     Eyes: Negative.    Respiratory: Negative.     Cardiovascular: Negative.    Gastrointestinal: Negative.    Endocrine: Negative.    Genitourinary: Negative.    Musculoskeletal: Negative.    Skin:  Positive for wound (Right foot subthird MPJ DFU).   Allergic/Immunologic: Negative.    Neurological: Negative.    Hematological: Negative.    Psychiatric/Behavioral: Negative.           Post-Debridement      Objective:  /68   Ht 5' 11\" (1.803 m)   Wt 99.3 kg (219 lb)   BMI 30.54 kg/m²     Physical Exam  Constitutional:       Appearance: Normal appearance.   HENT:      Head: Normocephalic.      Right Ear: Tympanic membrane normal. "      Left Ear: Tympanic membrane normal.      Nose: No congestion.      Mouth/Throat:      Pharynx: No oropharyngeal exudate or posterior oropharyngeal erythema.   Eyes:      Conjunctiva/sclera: Conjunctivae normal.      Pupils: Pupils are equal, round, and reactive to light.   Cardiovascular:      Rate and Rhythm: Normal rate and regular rhythm.      Pulses:           Dorsalis pedis pulses are 1+ on the right side and 1+ on the left side.        Posterior tibial pulses are 0 on the right side and 0 on the left side.   Pulmonary:      Effort: Pulmonary effort is normal.   Musculoskeletal:         General: Deformity present.      Right lower leg: Edema (+2) present.      Left lower leg: Edema (+2) present.      Comments: Plantarflexed third metatarsal bilateral   Feet:      Right foot:      Protective Sensation: 10 sites tested.  7 sites sensed.      Skin integrity: Ulcer (See comments), erythema and warmth present.      Left foot:      Protective Sensation: 10 sites tested.  7 sites sensed.      Skin integrity: Callus and dry skin present.      Comments: Right foot: Full-thickness necrotic diabetic foot ulcer subthird MPJ, malodor with significant depth and necrosis.  Maceration with hyperkeratotic margins.  Erythema and calor noted of the right forefoot.  With localized induration.  Skin:     General: Skin is warm and dry.      Capillary Refill: Capillary refill takes 2 to 3 seconds.      Coloration: Skin is not pale.      Findings: Erythema present. No bruising, lesion or rash.   Neurological:      Mental Status: He is alert.      Cranial Nerves: No cranial nerve deficit.      Sensory: Sensory deficit present.      Motor: No weakness.      Gait: Gait normal.      Deep Tendon Reflexes: Reflexes normal.   Psychiatric:         Mood and Affect: Mood normal.         Behavior: Behavior normal.         Judgment: Judgment normal.         Wound 02/06/24  Foot Medial;Plantar (Active)       Debridement   Wound 02/06/24  "Diabetic Ulcer Foot Plantar;Distal;Right    Universal Protocol:  Consent: Verbal consent obtained.  Risks and benefits: risks, benefits and alternatives were discussed  Consent given by: patient  Time out: Immediately prior to procedure a \"time out\" was called to verify the correct patient, procedure, equipment, support staff and site/side marked as required.  Patient understanding: patient states understanding of the procedure being performed  Patient identity confirmed: verbally with patient    Debridement Details  Performed by: physician  Debridement type: surgical  Level of debridement: subcutaneous tissue  Pain control: lidocaine 4%      Post-debridement measurements  Length (cm): 2  Width (cm): 2  Depth (cm): 0.4  Percent debrided: 100%  Surface Area (cm^2): 4  Area Debrided (cm^2): 4  Volume (cm^3): 1.6    Tissue and other material debrided: subcutaneous tissue  Devitalized tissue debrided: callus, necrotic debris and slough  Instrument(s) utilized: nippers and blade  Bleeding: medium  Hemostasis obtained with: pressure  Procedural pain (0-10): 3  Post-procedural pain: 3   Response to treatment: procedure was tolerated well                 Wound Instructions:  Orders Placed This Encounter   Procedures   • Debridement Diabetic Ulcer (wound) Plantar;Distal Foot     This order was created via procedure documentation   • Offloading Wedge Shoe     Order Specific Question:   Size     Answer:   X-Large     Order Specific Question:   This patient has an active home care or hospice episode. [facility]?     Answer:   No   • Wound culture and Gram stain     Order Specific Question:   Release to patient through Mychart     Answer:   Immediate   • Ambulatory Referral to Wound Care     Standing Status:   Future     Standing Expiration Date:   2/12/2025     Referral Priority:   Routine     Referral Type:   Consult - AMB     Referral Reason:   Specialty Services Required     Requested " "Specialty:   Wound Care     Number of Visits Requested:   1     Expiration Date:   2/12/2025           Julio César Mac DPM      Portions of the record may have been created with voice recognition software. Occasional wrong word or \"sound a like\" substitutions may have occurred due to the inherent limitations of voice recognition software. Read the chart carefully and recognize, using context, where substitutions have occurred.    "

## 2024-02-14 ENCOUNTER — TELEPHONE (OUTPATIENT)
Age: 84
End: 2024-02-14

## 2024-02-14 ENCOUNTER — HOME CARE VISIT (OUTPATIENT)
Dept: HOME HEALTH SERVICES | Facility: HOME HEALTHCARE | Age: 84
End: 2024-02-14
Payer: COMMERCIAL

## 2024-02-14 NOTE — TELEPHONE ENCOUNTER
[facility]    Doctor/Office: Dr. Mac/Darya    #: [phone]    Escalation: Care Please fax current wound care orders to [phone]. Thank you

## 2024-02-15 ENCOUNTER — TELEPHONE (OUTPATIENT)
Age: 84
End: 2024-02-15

## 2024-02-15 ENCOUNTER — HOME CARE VISIT (OUTPATIENT)
Dept: HOME HEALTH SERVICES | Facility: HOME HEALTHCARE | Age: 84
End: 2024-02-15
Payer: COMMERCIAL

## 2024-02-15 NOTE — TELEPHONE ENCOUNTER
Caller: Rashmi Parisi [facility]    Doctor: Bubba    Reason for call: She needs updated orders that discuss the frequency change.      Please fax to : [phone]    Call back#: [phone]

## 2024-02-16 ENCOUNTER — HOME CARE VISIT (OUTPATIENT)
Dept: HOME HEALTH SERVICES | Facility: HOME HEALTHCARE | Age: 84
End: 2024-02-16
Payer: COMMERCIAL

## 2024-02-16 VITALS
WEIGHT: 220.44 LBS | OXYGEN SATURATION: 99 % | SYSTOLIC BLOOD PRESSURE: 98 MMHG | DIASTOLIC BLOOD PRESSURE: 52 MMHG | BODY MASS INDEX: 30.74 KG/M2 | TEMPERATURE: 97.9 F | HEART RATE: 69 BPM

## 2024-02-16 PROCEDURE — G0299 HHS/HOSPICE OF RN EA 15 MIN: HCPCS

## 2024-02-19 ENCOUNTER — OFFICE VISIT (OUTPATIENT)
Dept: WOUND CARE | Facility: HOSPITAL | Age: 84
End: 2024-02-19
Payer: COMMERCIAL

## 2024-02-19 ENCOUNTER — HOME CARE VISIT (OUTPATIENT)
Dept: HOME HEALTH SERVICES | Facility: HOME HEALTHCARE | Age: 84
End: 2024-02-19
Payer: COMMERCIAL

## 2024-02-19 VITALS
HEIGHT: 70 IN | SYSTOLIC BLOOD PRESSURE: 110 MMHG | TEMPERATURE: 98 F | HEART RATE: 68 BPM | RESPIRATION RATE: 16 BRPM | DIASTOLIC BLOOD PRESSURE: 60 MMHG | WEIGHT: 219 LBS | BODY MASS INDEX: 31.35 KG/M2

## 2024-02-19 DIAGNOSIS — Z79.4 TYPE 2 DIABETES MELLITUS WITH DIABETIC NEUROPATHY, WITH LONG-TERM CURRENT USE OF INSULIN (HCC): ICD-10-CM

## 2024-02-19 DIAGNOSIS — I50.9 ACUTE EXACERBATION OF CHF (CONGESTIVE HEART FAILURE) (HCC): ICD-10-CM

## 2024-02-19 DIAGNOSIS — E11.40 TYPE 2 DIABETES MELLITUS WITH DIABETIC NEUROPATHY, WITH LONG-TERM CURRENT USE OF INSULIN (HCC): ICD-10-CM

## 2024-02-19 DIAGNOSIS — L97.512 DIABETIC ULCER OF OTHER PART OF RIGHT FOOT ASSOCIATED WITH TYPE 2 DIABETES MELLITUS, WITH FAT LAYER EXPOSED (HCC): Primary | ICD-10-CM

## 2024-02-19 DIAGNOSIS — E11.621 DIABETIC ULCER OF OTHER PART OF RIGHT FOOT ASSOCIATED WITH TYPE 2 DIABETES MELLITUS, WITH FAT LAYER EXPOSED (HCC): Primary | ICD-10-CM

## 2024-02-19 PROCEDURE — 11042 DBRDMT SUBQ TIS 1ST 20SQCM/<: CPT | Performed by: PODIATRIST

## 2024-02-19 PROCEDURE — 99213 OFFICE O/P EST LOW 20 MIN: CPT | Performed by: PODIATRIST

## 2024-02-19 RX ORDER — TORSEMIDE 20 MG/1
TABLET ORAL
Qty: 90 TABLET | Refills: 2 | Status: SHIPPED | OUTPATIENT
Start: 2024-02-19

## 2024-02-19 NOTE — PATIENT INSTRUCTIONS
Orders Placed This Encounter   Procedures    Wound cleansing and dressings Diabetic Ulcer (wound) Plantar;Distal;Right Foot     Wash your hands with soap and water.  Remove old dressing, discard into plastic bag and place in trash.  Cleanse the wound with SOAP AND WATER prior to applying a clean dressing. Do not use tissue or cotton balls. Do not scrub the wound. Pat dry using gauze.  Shower YES WITH PROTECTION   Apply MOISTURIZER to skin surrounding wound  Apply MUPIROCIN OINTMENT AND  ALGINATE AG to the RIGHT FOOT wound.  Cover with ABD  Secure with YONATHAN OR KERLIX AND TAPE  Secure with coban  Change dressing 3XWEEK  MONDAY WEDNESDAY AND FRIDAY    Done today    Off-loading Instructions  Continue to wear  PROCAR SHOE WHEN OOB      RETURN IN 2 WEEKS    CONTINUE VISITING NURSE FOR WOUND CARE     Standing Status:   Future     Standing Expiration Date:   2/19/2025

## 2024-02-19 NOTE — TELEPHONE ENCOUNTER
Yes, this Hammad Montesinos 1940  Im a new patient with Doctor Skyler. I saw him when I was in the hospital.   He increased my Torsemide to two tablets in the morning, and one in the afternoon and I only was taking only two in the am, so I'm running out.   So I need to get a renewal   I hope you get that message and give me a call [phone]. Thank you. Joe.

## 2024-02-19 NOTE — PROGRESS NOTES
Patient ID: Hammad Montesinos is a 83 y.o. male Date of Birth 1940       Chief Complaint   Patient presents with   • New Patient Visit     Wound care right foot       Allergies:  Hydrochlorothiazide, Insulin lispro, Lisinopril, Minoxidil, Nifedipine, Other, Prazosin, Venlafaxine, Doxycycline, and Simvastatin    Diagnosis:  1. 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, with fat layer exposed (HCC)  -     Wound cleansing and dressings Diabetic Ulcer (wound) Plantar;Distal;Right Foot; Future  -     XR foot 3+ vw right; Future; Expected date: 02/19/2024  -     Debridement Diabetic Ulcer (wound) Plantar;Distal;Right Foot    2. Type 2 diabetes mellitus with diabetic neuropathy, with long-term current use of insulin ([facility])       Diagnosis ICD-10-CM Associated Orders   1. 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, with fat layer exposed (HCC)  E11.621 Wound cleansing and dressings Diabetic Ulcer (wound) Plantar;Distal;Right Foot    L97.512 XR foot 3+ vw right     Debridement Diabetic Ulcer (wound) Plantar;Distal;Right Foot      2. Type 2 diabetes mellitus with diabetic neuropathy, with long-term current use of insulin ([facility])  E11.40     Z79.4            Assessment & Plan:  Overall right foot has improved  Continue with mupirocin/alginate dressing every other day.  Rx x-ray right foot to evaluate underlying osseous structures.  Continue with forefoot wedge shoe and limited activity/walking  Follow-up in 2 weeks.    Subjective:   2/19/2024: 83-year-old male type II diabetic presents for follow-up of new recurrent ulceration involving his right forefoot.  Patient reports good progress with less swelling and redness since taking antibiotic.  Was seen last in my [place] office and empirically started on Augmentin 875 twice daily.    2/12/2024: 83-year-old male type II diabetic seen today for evaluation of new issue/ulceration right forefoot  which recently broke down within the past 2 weeks.  This is the site of a previous ulceration that was present 2 years ago.  It is now more red and swollen and draining fluid.  Patient was recently hospitalized with CHF causing him to have a lot of swelling in his legs.        The following portions of the patient's history were reviewed and updated as appropriate:   Patient Active Problem List   Diagnosis   • Type 2 diabetes mellitus with hyperglycemia, with long-term current use of insulin ([facility])   • Mixed hyperlipidemia   • Diabetic polyneuropathy associated with type 2 diabetes mellitus ([facility])   • Primary hypertension   • Morbid (severe) obesity due to excess calories ([facility])   • 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 ([facility])   • 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 ([facility])   • Elevated parathyroid hormone   • Diabetic nephropathy associated with type 2 diabetes mellitus ([facility])   • Type 2 diabetes mellitus with diabetic neuropathy, unspecified whether long term insulin use ([facility])   • Pre-ulcerative calluses   • Acute on chronic diastolic heart failure ([facility])   • Elevated troponin   • Pulmonary hypertension ([facility])   • Paroxysmal A-fib ([facility])   • 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, with fat layer exposed ([facility])     Past Medical History:   Diagnosis Date   • Asthma    • Bradycardia    • Carcinoma, basal cell, skin     head and face and ear   • COVID-19 12/05/2022   • Dupuytren contracture     bilateral ring and right middle   • Gout    • Herniated lumbar intervertebral disc    • Sleep apnea      Past Surgical History:   Procedure Laterality Date   • APPENDECTOMY      age 20, ruptured   • CATARACT EXTRACTION, BILATERAL Bilateral    • CERVICAL FUSION     • CYST REMOVAL Left 01/2023   • DUPUYTREN CONTRACTURE RELEASE Left     left ring finger   • LUMBAR LAMINECTOMY      belkis placed   • TONSILLECTOMY     • VASECTOMY       Social  History     Socioeconomic History   • Marital status: /Civil Union     Spouse name: Not on file   • Number of children: Not on file   • Years of education: Not on file   • Highest education level: Not on file   Occupational History   • Occupation: teacher     Comment: retired   Tobacco Use   • Smoking status: Never   • Smokeless tobacco: Never   Vaping Use   • Vaping status: Never Used   Substance and Sexual Activity   • Alcohol use: Not Currently   • Drug use: Never   • Sexual activity: Not on file   Other Topics Concern   • Not on file   Social History Narrative   • Not on file     Social Determinants of Health     Financial Resource Strain: Not on file   Food Insecurity: No Food Insecurity (12/29/2023)    Hunger Vital Sign    • Worried About Running Out of Food in the Last Year: Never true    • Ran Out of Food in the Last Year: Never true   Transportation Needs: No Transportation Needs (12/29/2023)    PRAPARE - Transportation    • Lack of Transportation (Medical): No    • Lack of Transportation (Non-Medical): No   Physical Activity: Not on file   Stress: Not on file   Social Connections: Not on file   Intimate Partner Violence: Not on file   Housing Stability: Low Risk  (12/29/2023)    Housing Stability Vital Sign    • Unable to Pay for Housing in the Last Year: No    • Number of Places Lived in the Last Year: 1    • Unstable Housing in the Last Year: No        Current Outpatient Medications:   •  allopurinol (ZYLOPRIM) 300 mg tablet, every 24 hours, Disp: , Rfl:   •  atorvastatin (LIPITOR) 20 mg tablet, Take 20 mg by mouth daily, Disp: , Rfl:   •  azelastine (ASTELIN) 0.1 % nasal spray, 2 sprays into each nostril 2 (two) times a day, Disp: , Rfl:   •  budesonide (PULMICORT) 0.5 mg/2 mL nebulizer solution, Take 0.5 mg by nebulization 2 (two) times a day Rinse mouth after use., Disp: , Rfl:   •  Cholecalciferol (Vitamin D3) 25 MCG (1000 UT) CAPS, every 24 hours, Disp: , Rfl:   •  Coenzyme Q10 (Co Q10) 100  MG CAPS, Take 100 mg by mouth in the morning, Disp: , Rfl:   •  Easy Comfort Pen Needles 33G X 4 MM MISC, USE WITH INSULIN FIVE TIMES DAILY AS DIRECTED, Disp: , Rfl:   •  fluticasone (FLONASE) 50 mcg/act nasal spray, 1 spray into each nostril 2 (two) times a day, Disp: , Rfl:   •  formoterol (PERFOROMIST) 20 MCG/2ML nebulizer solution, Take 20 mcg by nebulization 2 (two) times a day, Disp: , Rfl:   •  gabapentin (NEURONTIN) 100 mg capsule, Take 1 capsule (100 mg total) by mouth daily, Disp: 180 capsule, Rfl: 0  •  Glucosamine-Chondroitin 500-400 MG CAPS, , Disp: , Rfl:   •  insulin aspart (NovoLOG FlexPen) 100 UNIT/ML injection pen, Inject 8 Units under the skin 3 (three) times a day with meals INJECT 22 UNITS SUBCUTANEOUSLY with breakfast, 23 UNITS with LUNCH, AND 28 UNITS with dinner plus sliding scale. (UP  UNITS DAILY), Disp: 45 mL, Rfl: 0  •  Insulin Glargine Solostar (Lantus SoloStar) 100 UNIT/ML SOPN, Inject 0.12 mL (12 Units total) under the skin 2 (two) times a day INJECT 30 UNITS SUBCUTANEOUSLY IN THE MORNING AND 32 UNITS IN THE EVENING. MAY INCREASE TO 80 UNITS DAILY, Disp: 30 mL, Rfl: 0  •  ipratropium-albuterol (DUO-NEB) 0.5-2.5 mg/3 mL nebulizer solution, Take 3 mL by nebulization 2 (two) times a day, Disp: , Rfl:   •  loratadine (CLARITIN) 10 mg tablet, Take 10 mg by mouth daily, Disp: , Rfl:   •  losartan (COZAAR) 100 MG tablet, Take 100 mg by mouth daily, Disp: , Rfl:   •  magnesium Oxide (MAG-OX) 400 mg TABS, Take 1 tablet (400 mg total) by mouth daily, Disp: 30 tablet, Rfl: 0  •  Multiple Vitamin (MULTIVITAMIN ADULT PO), every 24 hours, Disp: , Rfl:   •  mupirocin (BACTROBAN) 2 % ointment, , Disp: , Rfl:   •  mupirocin (BACTROBAN) 2 % ointment, Apply topically every other day With gauze, Disp: 22 g, Rfl: 0  •  Omega-3 Fatty Acids (Fish Oil) 360 MG CAPS, Take by mouth, Disp: , Rfl:   •  OneTouch Verio test strip, USE AS DIRECTED FOUR TIMES DAILY, Disp: , Rfl:   •  potassium chloride  "(K-DUR,KLOR-CON) 20 mEq tablet, Take 1 tablet (20 mEq total) by mouth daily, Disp: 30 tablet, Rfl: 0  •  rivaroxaban (Xarelto) 20 mg tablet, Take 1 tablet (20 mg total) by mouth daily with breakfast, Disp: 30 tablet, Rfl: 5  •  senna (SENOKOT) 8.6 mg, Take 2 tablets (17.2 mg total) by mouth daily at bedtime as needed for constipation, Disp: 30 tablet, Rfl: 0  •  torsemide (DEMADEX) 20 mg tablet, Take 2 tablets (40 mg total) by mouth every morning AND 1 tablet (20 mg total) daily after lunch., Disp: 90 tablet, Rfl: 2  •  triamcinolone (KENALOG) 0.1 % cream, 1 Application 2 (two) times a day To affected areas, Disp: , Rfl:   Family History   Problem Relation Age of Onset   • Hypertension Mother    • Diabetes type II Mother    • Hypertension Father    • Diabetes type II Brother    • Hypertension Brother    • Kidney disease Sister    • Hypertension Daughter    • GI problems Daughter       Review of Systems   Constitutional: Negative.    HENT: Negative.     Eyes: Negative.    Respiratory: Negative.     Cardiovascular: Negative.    Gastrointestinal: Negative.    Endocrine: Negative.    Genitourinary: Negative.    Musculoskeletal: Negative.    Skin:  Positive for wound (Right foot subthird MPJ DFU).   Allergic/Immunologic: Negative.    Neurological: Negative.    Hematological: Negative.    Psychiatric/Behavioral: Negative.       2/12/2024:        2/19/2024:        Objective:  /60   Pulse 68   Temp 98 °F (36.7 °C)   Resp 16   Ht 5' 10\" (1.778 m)   Wt 99.3 kg (219 lb)   BMI 31.42 kg/m²     Physical Exam  Constitutional:       Appearance: Normal appearance.   HENT:      Head: Normocephalic.      Right Ear: Tympanic membrane normal.      Left Ear: Tympanic membrane normal.      Nose: No congestion.      Mouth/Throat:      Pharynx: No oropharyngeal exudate or posterior oropharyngeal erythema.   Eyes:      Conjunctiva/sclera: Conjunctivae normal.      Pupils: Pupils are equal, round, and reactive to light. "   Cardiovascular:      Rate and Rhythm: Normal rate and regular rhythm.      Pulses:           Dorsalis pedis pulses are 1+ on the right side and 1+ on the left side.        Posterior tibial pulses are 0 on the right side and 0 on the left side.   Pulmonary:      Effort: Pulmonary effort is normal.   Musculoskeletal:         General: Deformity present.      Right lower leg: Edema (+2) present.      Left lower leg: Edema (+2) present.      Comments: Plantarflexed third metatarsal bilateral   Feet:      Right foot:      Protective Sensation: 10 sites tested.  7 sites sensed.      Skin integrity: Ulcer (See comments), erythema and warmth present.      Left foot:      Protective Sensation: 10 sites tested.  7 sites sensed.      Skin integrity: Callus and dry skin present.      Comments: Right foot: Full-thickness necrotic diabetic foot ulcer subthird MPJ, SQ depth and less necrosis.  Maceration with hyperkeratotic margins.  Erythema and calor have dramatically diminished, much less edema also.  Skin:     General: Skin is warm and dry.      Capillary Refill: Capillary refill takes 2 to 3 seconds.      Coloration: Skin is not pale.      Findings: Erythema present. No bruising, lesion or rash.   Neurological:      Mental Status: He is alert.      Cranial Nerves: No cranial nerve deficit.      Sensory: Sensory deficit present.      Motor: No weakness.      Gait: Gait normal.      Deep Tendon Reflexes: Reflexes normal.   Psychiatric:         Mood and Affect: Mood normal.         Behavior: Behavior normal.         Judgment: Judgment normal.         Wound 02/06/24 Diabetic Ulcer Foot Plantar;Distal;Right (Active)   Enter Calhoun score: Calhoun Grade 1: Partial or full-thickness ulcer (superficial) 02/19/24 1345   Wound Image   02/19/24 1442   Wound Description Slough;Pink;Eschar 02/19/24 1345   Tash-wound Assessment Callus;Maceration;Other (Comment) 02/19/24 1345   Wound Length (cm) 1.7 cm 02/19/24 1345   Wound Width (cm) 0.5 cm  "02/19/24 1345   Wound Depth (cm) 0.6 cm 02/19/24 1345   Wound Surface Area (cm^2) 0.85 cm^2 02/19/24 1345   Wound Volume (cm^3) 0.51 cm^3 02/19/24 1345   Calculated Wound Volume (cm^3) 0.51 cm^3 02/19/24 1345   Change in Wound Size % 32.89 02/19/24 1345   Drainage Amount Moderate 02/19/24 1345   Drainage Description Serous;Serosanguineous 02/19/24 1345   Non-staged Wound Description Full thickness 02/19/24 1345   Treatments Cleansed 02/19/24 1345   Wound packed? No 02/19/24 1345   Dressing Changed Changed 02/19/24 1345   Dressing Status Intact 02/19/24 1345       Debridement   Wound 02/06/24 Diabetic Ulcer Foot Plantar;Distal;Right    Universal Protocol:  Consent: Verbal consent obtained.  Risks and benefits: risks, benefits and alternatives were discussed  Consent given by: patient  Time out: Immediately prior to procedure a \"time out\" was called to verify the correct patient, procedure, equipment, support staff and site/side marked as required.  Patient understanding: patient states understanding of the procedure being performed  Patient identity confirmed: verbally with patient    Debridement Details  Performed by: physician  Debridement type: surgical  Level of debridement: subcutaneous tissue  Pain control: lidocaine 4%      Post-debridement measurements  Length (cm): 2  Width (cm): 0.8  Depth (cm): 0.6  Percent debrided: 100%  Surface Area (cm^2): 1.6  Area Debrided (cm^2): 1.6  Volume (cm^3): 0.96    Tissue and other material debrided: subcutaneous tissue  Devitalized tissue debrided: callus, necrotic debris and slough  Instrument(s) utilized: nippers, curette and blade  Bleeding: medium  Hemostasis obtained with: pressure  Procedural pain (0-10): 3  Post-procedural pain: 3   Response to treatment: procedure was tolerated well                 Wound Instructions:  Orders Placed This Encounter   Procedures   • Wound cleansing and dressings Diabetic Ulcer (wound) Plantar;Distal;Right Foot     Wash your hands " "with soap and water.  Remove old dressing, discard into plastic bag and place in trash.  Cleanse the wound with SOAP AND WATER prior to applying a clean dressing. Do not use tissue or cotton balls. Do not scrub the wound. Pat dry using gauze.  Shower YES WITH PROTECTION   Apply MOISTURIZER to skin surrounding wound  Apply MUPIROCIN OINTMENT AND  ALGINATE AG to the RIGHT FOOT wound.  Cover with ABD  Secure with YONATHAN OR KERLIX AND TAPE  Secure with coban  Change dressing 3XWEEK  MONDAY WEDNESDAY AND FRIDAY    Done today    Off-loading Instructions  Continue to wear  PROCAR SHOE WHEN OOB      RETURN IN 2 WEEKS    CONTINUE VISITING NURSE FOR WOUND CARE     Standing Status:   Future     Standing Expiration Date:   2/19/2025   • Debridement Diabetic Ulcer (wound) Plantar;Distal;Right Foot     This order was created via procedure documentation   • XR foot 3+ vw right     Weightbearing please  Rule out osteomyelitis third MPJ, chronic diabetic ulcer subthird MPJ.     Standing Status:   Future     Standing Expiration Date:   2/19/2028     Scheduling Instructions:      Bring along any outside films relating to this procedure.               Julio César Mac DPM      Portions of the record may have been created with voice recognition software. Occasional wrong word or \"sound a like\" substitutions may have occurred due to the inherent limitations of voice recognition software. Read the chart carefully and recognize, using context, where substitutions have occurred.    "

## 2024-02-21 ENCOUNTER — IN-CLINIC DEVICE VISIT (OUTPATIENT)
Dept: CARDIOLOGY CLINIC | Facility: CLINIC | Age: 84
End: 2024-02-21
Payer: COMMERCIAL

## 2024-02-21 ENCOUNTER — OFFICE VISIT (OUTPATIENT)
Dept: CARDIOLOGY CLINIC | Facility: CLINIC | Age: 84
End: 2024-02-21
Payer: COMMERCIAL

## 2024-02-21 ENCOUNTER — HOME CARE VISIT (OUTPATIENT)
Dept: HOME HEALTH SERVICES | Facility: HOME HEALTHCARE | Age: 84
End: 2024-02-21
Payer: COMMERCIAL

## 2024-02-21 VITALS
WEIGHT: 221.5 LBS | BODY MASS INDEX: 31.78 KG/M2 | TEMPERATURE: 98.7 F | OXYGEN SATURATION: 98 % | RESPIRATION RATE: 16 BRPM | HEART RATE: 66 BPM | DIASTOLIC BLOOD PRESSURE: 60 MMHG | SYSTOLIC BLOOD PRESSURE: 110 MMHG

## 2024-02-21 VITALS
HEIGHT: 71 IN | HEART RATE: 70 BPM | BODY MASS INDEX: 30.94 KG/M2 | WEIGHT: 221 LBS | SYSTOLIC BLOOD PRESSURE: 126 MMHG | DIASTOLIC BLOOD PRESSURE: 56 MMHG

## 2024-02-21 DIAGNOSIS — I48.0 PAROXYSMAL A-FIB (HCC): Primary | ICD-10-CM

## 2024-02-21 DIAGNOSIS — I50.32 CHRONIC DIASTOLIC CHF (CONGESTIVE HEART FAILURE) (HCC): ICD-10-CM

## 2024-02-21 DIAGNOSIS — Z95.0 CARDIAC PACEMAKER IN SITU: Primary | ICD-10-CM

## 2024-02-21 PROCEDURE — G0299 HHS/HOSPICE OF RN EA 15 MIN: HCPCS

## 2024-02-21 PROCEDURE — 99214 OFFICE O/P EST MOD 30 MIN: CPT | Performed by: INTERNAL MEDICINE

## 2024-02-21 PROCEDURE — 93280 PM DEVICE PROGR EVAL DUAL: CPT | Performed by: STUDENT IN AN ORGANIZED HEALTH CARE EDUCATION/TRAINING PROGRAM

## 2024-02-21 NOTE — PROGRESS NOTES
Results for orders placed or performed in visit on 02/21/24   Cardiac EP device report    Narrative    BIO DC/PM  NP/ESTA CARE-DEVICE INTERROGATED IN THE [place] OFFICE: BATTERY VOLTAGE ADEQUATE-6 YRS. AP 32%  83%. ALL AVAILABLE LEAD PARAMETERS & TRENS WITHIN NORMAL LIMITS. 9 AHRS NOTES; 12% BURDEN; IN AFIB SINCE 1/23/24. PT ON XARELTO. NO PROGRAMMING CHANGES MADE TO DEVICE PARAMETERS. NORMAL DEVICE FUNCTION. NC

## 2024-02-21 NOTE — PROGRESS NOTES
Cardiology Follow Up    Hammad Montesinos  1940  [id number]  [facility] [place] [address]  [address] [address] [phone]    1. Morbid (severe) obesity due to excess calories (HCC)            Interval History: Followup for CHF.    Overall stable. Waiting to be enrolled in device. No chest pain. Dypsnea stable. Mild LE edema today.     Medical Problems       Problem List       Type 2 diabetes mellitus with hyperglycemia, with long-term current use of insulin ([facility])        Lab Results   Component Value Date    HGBA1C 6.8 11/16/2023         Mixed hyperlipidemia    Diabetic polyneuropathy associated with type 2 diabetes mellitus ([facility])        Lab Results   Component Value Date    HGBA1C 6.8 11/16/2023         Primary hypertension    Morbid (severe) obesity due to excess calories (HCC)    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 (HCC)        Lab Results   Component Value Date    HGBA1C 6.8 11/16/2023         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 (HCC)        Lab Results   Component Value Date    HGBA1C 6.8 11/16/2023         Elevated parathyroid hormone    Diabetic nephropathy associated with type 2 diabetes mellitus (HCC)        Lab Results   Component Value Date    HGBA1C 6.8 11/16/2023         Type 2 diabetes mellitus with diabetic neuropathy, unspecified whether long term insulin use ([facility])        Lab Results   Component Value Date    HGBA1C 6.8 11/16/2023         Pre-ulcerative calluses    Acute on chronic diastolic heart failure (HCC)    Wt Readings from Last 3 Encounters:   02/21/24 100 kg (221 lb)   02/19/24 99.3 kg (219 lb)   02/16/24 100 kg (220 lb 7 oz)                 Elevated troponin    Pulmonary hypertension (HCC)    Paroxysmal A-fib (HCC)    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, with fat layer exposed (HCC)         Lab Results   Component Value Date    HGBA1C 6.8 11/16/2023             Past Medical History:   Diagnosis Date    Asthma     Bradycardia     Carcinoma, basal cell, skin     head and face and ear    COVID-19 12/05/2022    Dupuytren contracture     bilateral ring and right middle    Gout     Herniated lumbar intervertebral disc     Sleep apnea      Social History     Socioeconomic History    Marital status: /Civil Union     Spouse name: Not on file    Number of children: Not on file    Years of education: Not on file    Highest education level: Not on file   Occupational History    Occupation: teacher     Comment: retired   Tobacco Use    Smoking status: Never    Smokeless tobacco: Never   Vaping Use    Vaping status: Never Used   Substance and Sexual Activity    Alcohol use: Not Currently    Drug use: Never    Sexual activity: Not on file   Other Topics Concern    Not on file   Social History Narrative    Not on file     Social Determinants of Health     Financial Resource Strain: Not on file   Food Insecurity: No Food Insecurity (12/29/2023)    Hunger Vital Sign     Worried About Running Out of Food in the Last Year: Never true     Ran Out of Food in the Last Year: Never true   Transportation Needs: No Transportation Needs (12/29/2023)    PRAPARE - Transportation     Lack of Transportation (Medical): No     Lack of Transportation (Non-Medical): No   Physical Activity: Not on file   Stress: Not on file   Social Connections: Not on file   Intimate Partner Violence: Not on file   Housing Stability: Low Risk  (12/29/2023)    Housing Stability Vital Sign     Unable to Pay for Housing in the Last Year: No     Number of Places Lived in the Last Year: 1     Unstable Housing in the Last Year: No      Family History   Problem Relation Age of Onset    Hypertension Mother     Diabetes type II Mother     Hypertension Father     Diabetes type II Brother     Hypertension Brother     Kidney disease Sister      Hypertension Daughter     GI problems Daughter      Past Surgical History:   Procedure Laterality Date    APPENDECTOMY      age 20, ruptured    CATARACT EXTRACTION, BILATERAL Bilateral     CERVICAL FUSION      CYST REMOVAL Left 01/2023    DUPUYTREN CONTRACTURE RELEASE Left     left ring finger    LUMBAR LAMINECTOMY      belkis placed    TONSILLECTOMY      VASECTOMY         Current Outpatient Medications:     allopurinol (ZYLOPRIM) 300 mg tablet, every 24 hours, Disp: , Rfl:     atorvastatin (LIPITOR) 20 mg tablet, Take 20 mg by mouth daily, Disp: , Rfl:     azelastine (ASTELIN) 0.1 % nasal spray, 2 sprays into each nostril 2 (two) times a day, Disp: , Rfl:     budesonide (PULMICORT) 0.5 mg/2 mL nebulizer solution, Take 0.5 mg by nebulization 2 (two) times a day Rinse mouth after use., Disp: , Rfl:     Cholecalciferol (Vitamin D3) 25 MCG (1000 UT) CAPS, every 24 hours, Disp: , Rfl:     Coenzyme Q10 (Co Q10) 100 MG CAPS, Take 100 mg by mouth in the morning, Disp: , Rfl:     Easy Comfort Pen Needles 33G X 4 MM MISC, USE WITH INSULIN FIVE TIMES DAILY AS DIRECTED, Disp: , Rfl:     fluticasone (FLONASE) 50 mcg/act nasal spray, 1 spray into each nostril 2 (two) times a day, Disp: , Rfl:     formoterol (PERFOROMIST) 20 MCG/2ML nebulizer solution, Take 20 mcg by nebulization 2 (two) times a day, Disp: , Rfl:     gabapentin (NEURONTIN) 100 mg capsule, Take 1 capsule (100 mg total) by mouth daily, Disp: 180 capsule, Rfl: 0    Glucosamine-Chondroitin 500-400 MG CAPS, , Disp: , Rfl:     insulin aspart (NovoLOG FlexPen) 100 UNIT/ML injection pen, Inject 8 Units under the skin 3 (three) times a day with meals INJECT 22 UNITS SUBCUTANEOUSLY with breakfast, 23 UNITS with LUNCH, AND 28 UNITS with dinner plus sliding scale. (UP  UNITS DAILY), Disp: 45 mL, Rfl: 0    Insulin Glargine Solostar (Lantus SoloStar) 100 UNIT/ML SOPN, Inject 0.12 mL (12 Units total) under the skin 2 (two) times a day INJECT 30 UNITS SUBCUTANEOUSLY IN THE  MORNING AND 32 UNITS IN THE EVENING. MAY INCREASE TO 80 UNITS DAILY, Disp: 30 mL, Rfl: 0    ipratropium-albuterol (DUO-NEB) 0.5-2.5 mg/3 mL nebulizer solution, Take 3 mL by nebulization 2 (two) times a day, Disp: , Rfl:     loratadine (CLARITIN) 10 mg tablet, Take 10 mg by mouth daily, Disp: , Rfl:     losartan (COZAAR) 100 MG tablet, Take 100 mg by mouth daily, Disp: , Rfl:     magnesium Oxide (MAG-OX) 400 mg TABS, Take 1 tablet (400 mg total) by mouth daily, Disp: 30 tablet, Rfl: 0    Multiple Vitamin (MULTIVITAMIN ADULT PO), every 24 hours, Disp: , Rfl:     mupirocin (BACTROBAN) 2 % ointment, , Disp: , Rfl:     mupirocin (BACTROBAN) 2 % ointment, Apply topically every other day With gauze, Disp: 22 g, Rfl: 0    Omega-3 Fatty Acids (Fish Oil) 360 MG CAPS, Take by mouth, Disp: , Rfl:     OneTouch Verio test strip, USE AS DIRECTED FOUR TIMES DAILY, Disp: , Rfl:     potassium chloride (K-DUR,KLOR-CON) 20 mEq tablet, Take 1 tablet (20 mEq total) by mouth daily, Disp: 30 tablet, Rfl: 0    rivaroxaban (Xarelto) 20 mg tablet, Take 1 tablet (20 mg total) by mouth daily with breakfast, Disp: 30 tablet, Rfl: 5    senna (SENOKOT) 8.6 mg, Take 2 tablets (17.2 mg total) by mouth daily at bedtime as needed for constipation, Disp: 30 tablet, Rfl: 0    torsemide (DEMADEX) 20 mg tablet, Take 2 tablets (40 mg total) by mouth every morning AND 1 tablet (20 mg total) daily after lunch., Disp: 90 tablet, Rfl: 2    triamcinolone (KENALOG) 0.1 % cream, 1 Application 2 (two) times a day To affected areas, Disp: , Rfl:   Allergies   Allergen Reactions    Hydrochlorothiazide Other (See Comments)     SEVERE DIZZINESS    Insulin Lispro Other (See Comments)     Cannot control blood sugars(Humalog)    Lisinopril Other (See Comments)     Reports cough with use     Minoxidil GI Intolerance    Nifedipine Other (See Comments)     EDEMA LEGS    Other Other (See Comments)     SEVERE DIZZINESS  Hydrap-es    Prazosin Other (See Comments)     CHEST  "PAIN      Venlafaxine Other (See Comments)     Made pt feel bad      Doxycycline Rash    Simvastatin Rash     PEDAL EDEMA         Labs:     Chemistry        Component Value Date/Time    K 4.2 01/08/2024 0433    CL 96 01/08/2024 0433    CO2 31 01/08/2024 0433    BUN 59 (H) 01/08/2024 0433    CREATININE 1.47 (H) 01/08/2024 0433        Component Value Date/Time    CALCIUM 9.8 01/08/2024 0433    ALKPHOS 147 (H) 01/08/2024 0433    AST 19 01/08/2024 0433    ALT 16 01/08/2024 0433            No results found for: \"CHOL\"  No results found for: \"HDL\"  No results found for: \"LDLCALC\"  No results found for: \"TRIG\"  No results found for: \"CHOLHDL\"    Imaging: Cardiac EP device report    Result Date: 2/21/2024  Narrative: BIO DC/PM NP/ESTA CARE-DEVICE INTERROGATED IN THE [place] OFFICE: BATTERY VOLTAGE ADEQUATE-6 YRS. AP 32%  83%. ALL AVAILABLE LEAD PARAMETERS & TRENS WITHIN NORMAL LIMITS. 9 AHRS NOTES; 12% BURDEN; IN AFIB SINCE 1/23/24. PT ON XARELTO. NO PROGRAMMING CHANGES MADE TO DEVICE PARAMETERS. NORMAL DEVICE FUNCTION. NC       EKG: .    Review of Systems   Constitutional: Negative.   HENT: Negative.     Eyes: Negative.    Cardiovascular: Negative.    Respiratory: Negative.     Endocrine: Negative.    Hematologic/Lymphatic: Negative.    Skin: Negative.    Musculoskeletal: Negative.    Gastrointestinal: Negative.    Genitourinary: Negative.    Neurological: Negative.    Psychiatric/Behavioral: Negative.     Allergic/Immunologic: Negative.        Vitals:    02/21/24 1346   BP: 126/56   Pulse: 70           Physical Exam  Vitals reviewed.   Constitutional:       Appearance: Normal appearance.   HENT:      Head: Normocephalic.      Nose: Nose normal.      Mouth/Throat:      Mouth: Mucous membranes are moist.      Pharynx: Oropharynx is clear.   Eyes:      General: No scleral icterus.     Conjunctiva/sclera: Conjunctivae normal.   Cardiovascular:      Rate and Rhythm: Normal rate and regular rhythm.      Heart sounds: No " murmur heard.     No friction rub. No gallop.   Pulmonary:      Effort: Pulmonary effort is normal. No respiratory distress.      Breath sounds: Normal breath sounds. No wheezing or rales.   Abdominal:      General: Abdomen is flat. Bowel sounds are normal. There is no distension.      Palpations: Abdomen is soft.      Tenderness: There is no abdominal tenderness. There is no guarding.   Musculoskeletal:      Cervical back: Normal range of motion and neck supple.      Right lower leg: No edema.      Left lower leg: No edema.   Skin:     General: Skin is warm and dry.   Neurological:      General: No focal deficit present.      Mental Status: He is alert and oriented to person, place, and time.   Psychiatric:         Mood and Affect: Mood normal.         Behavior: Behavior normal.         Discussion/Summary:    Chronic Diastolic CHF: EF 50%. Continue torsemide at current dosing. Weights at home are stable. No changes today. Check BMP.     Paroxysmal Atrial Fibrillation: Continue AC with Xarelto.     S/P PPM implant. Enroll in device clinic.       The patient was counseled regarding diagnostic results, instructions for management, risk factor reductions, impressions. total time of encounter was 25 minutes and 15 minutes was spent counseling.

## 2024-02-23 ENCOUNTER — HOME CARE VISIT (OUTPATIENT)
Dept: HOME HEALTH SERVICES | Facility: HOME HEALTHCARE | Age: 84
End: 2024-02-23
Payer: COMMERCIAL

## 2024-02-23 VITALS
OXYGEN SATURATION: 91 % | HEART RATE: 70 BPM | BODY MASS INDEX: 30.84 KG/M2 | SYSTOLIC BLOOD PRESSURE: 120 MMHG | DIASTOLIC BLOOD PRESSURE: 60 MMHG | TEMPERATURE: 98.8 F | WEIGHT: 221.1 LBS

## 2024-02-23 PROCEDURE — G0299 HHS/HOSPICE OF RN EA 15 MIN: HCPCS

## 2024-02-26 ENCOUNTER — HOME CARE VISIT (OUTPATIENT)
Dept: HOME HEALTH SERVICES | Facility: HOME HEALTHCARE | Age: 84
End: 2024-02-26
Payer: COMMERCIAL

## 2024-02-26 VITALS
WEIGHT: 221.6 LBS | DIASTOLIC BLOOD PRESSURE: 72 MMHG | HEART RATE: 68 BPM | BODY MASS INDEX: 30.91 KG/M2 | TEMPERATURE: 98.9 F | SYSTOLIC BLOOD PRESSURE: 128 MMHG

## 2024-02-26 PROCEDURE — G0299 HHS/HOSPICE OF RN EA 15 MIN: HCPCS

## 2024-02-28 ENCOUNTER — HOME CARE VISIT (OUTPATIENT)
Dept: HOME HEALTH SERVICES | Facility: HOME HEALTHCARE | Age: 84
End: 2024-02-28
Payer: COMMERCIAL

## 2024-02-28 VITALS
RESPIRATION RATE: 16 BRPM | SYSTOLIC BLOOD PRESSURE: 140 MMHG | DIASTOLIC BLOOD PRESSURE: 70 MMHG | HEART RATE: 62 BPM | TEMPERATURE: 98.9 F

## 2024-02-28 PROCEDURE — G0299 HHS/HOSPICE OF RN EA 15 MIN: HCPCS

## 2024-03-01 ENCOUNTER — HOME CARE VISIT (OUTPATIENT)
Dept: HOME HEALTH SERVICES | Facility: HOME HEALTHCARE | Age: 84
End: 2024-03-01
Payer: COMMERCIAL

## 2024-03-01 PROCEDURE — G0299 HHS/HOSPICE OF RN EA 15 MIN: HCPCS

## 2024-03-04 ENCOUNTER — HOME CARE VISIT (OUTPATIENT)
Dept: HOME HEALTH SERVICES | Facility: HOME HEALTHCARE | Age: 84
End: 2024-03-04
Payer: COMMERCIAL

## 2024-03-04 VITALS
SYSTOLIC BLOOD PRESSURE: 122 MMHG | DIASTOLIC BLOOD PRESSURE: 68 MMHG | RESPIRATION RATE: 16 BRPM | TEMPERATURE: 98.8 F | HEART RATE: 80 BPM | OXYGEN SATURATION: 96 %

## 2024-03-04 PROCEDURE — G0299 HHS/HOSPICE OF RN EA 15 MIN: HCPCS

## 2024-03-06 ENCOUNTER — HOME CARE VISIT (OUTPATIENT)
Dept: HOME HEALTH SERVICES | Facility: HOME HEALTHCARE | Age: 84
End: 2024-03-06
Payer: COMMERCIAL

## 2024-03-06 ENCOUNTER — TELEPHONE (OUTPATIENT)
Dept: CARDIOLOGY CLINIC | Facility: CLINIC | Age: 84
End: 2024-03-06

## 2024-03-06 ENCOUNTER — OFFICE VISIT (OUTPATIENT)
Dept: WOUND CARE | Facility: HOSPITAL | Age: 84
End: 2024-03-06
Payer: COMMERCIAL

## 2024-03-06 VITALS
SYSTOLIC BLOOD PRESSURE: 130 MMHG | DIASTOLIC BLOOD PRESSURE: 62 MMHG | RESPIRATION RATE: 17 BRPM | TEMPERATURE: 98.5 F | HEART RATE: 58 BPM

## 2024-03-06 DIAGNOSIS — L97.512 DIABETIC ULCER OF OTHER PART OF RIGHT FOOT ASSOCIATED WITH TYPE 2 DIABETES MELLITUS, WITH FAT LAYER EXPOSED (HCC): Primary | ICD-10-CM

## 2024-03-06 DIAGNOSIS — Z79.4 TYPE 2 DIABETES MELLITUS WITH DIABETIC NEUROPATHY, WITH LONG-TERM CURRENT USE OF INSULIN (HCC): ICD-10-CM

## 2024-03-06 DIAGNOSIS — E11.621 DIABETIC ULCER OF OTHER PART OF RIGHT FOOT ASSOCIATED WITH TYPE 2 DIABETES MELLITUS, WITH FAT LAYER EXPOSED (HCC): Primary | ICD-10-CM

## 2024-03-06 DIAGNOSIS — E11.40 TYPE 2 DIABETES MELLITUS WITH DIABETIC NEUROPATHY, WITH LONG-TERM CURRENT USE OF INSULIN (HCC): ICD-10-CM

## 2024-03-06 PROCEDURE — 11042 DBRDMT SUBQ TIS 1ST 20SQCM/<: CPT | Performed by: PODIATRIST

## 2024-03-06 NOTE — TELEPHONE ENCOUNTER
Hammad called seems to having a steady up hill on weights.   3/3/2024 223.1  3/4/2024 224.9  3/5/2024 224.00  3/6/2024 226.3    Tried to watch his sodium intake, and fluids  Food is prepared for him, he is able to watch sodium intake .    Denies any sob, chest pains,palpations,  /62 HR 58   Currently taking   Torsemide 40 mg am, and 20 mg pm      2/21/2024 last ov   Please advise    Labs done 2/22/2024

## 2024-03-06 NOTE — PATIENT INSTRUCTIONS
Orders Placed This Encounter   Procedures    Wound cleansing and dressings Diabetic Ulcer (wound) Plantar;Distal;Right Foot     Wash your hands with soap and water.  Remove old dressing, discard into plastic bag and place in trash.  Cleanse the wound with SOAP AND WATER prior to applying a clean dressing. Do not use tissue or cotton balls. Do not scrub the wound. Pat dry using gauze.  Shower YES WITH PROTECTION   Apply MOISTURIZER to skin surrounding wound  Apply MUPIROCIN OINTMENT AND  ALGINATE AG to the RIGHT FOOT wound.  Cover with ABD  Secure with YONATHAN OR KERLIX AND TAPE  Secure with coban  Change dressing 3XWEEK  MONDAY WEDNESDAY AND FRIDAY     Done today     Standing Status:   Future     Standing Expiration Date:   3/6/2025    Wound off loading Diabetic Ulcer (wound) Plantar;Distal;Right Foot     Continue to wear  PROCAR SHOE WHEN OOB     Standing Status:   Future     Standing Expiration Date:   3/6/2025

## 2024-03-08 ENCOUNTER — HOME CARE VISIT (OUTPATIENT)
Dept: HOME HEALTH SERVICES | Facility: HOME HEALTHCARE | Age: 84
End: 2024-03-08
Payer: COMMERCIAL

## 2024-03-08 VITALS
DIASTOLIC BLOOD PRESSURE: 80 MMHG | RESPIRATION RATE: 16 BRPM | TEMPERATURE: 98.6 F | OXYGEN SATURATION: 98 % | SYSTOLIC BLOOD PRESSURE: 110 MMHG | BODY MASS INDEX: 31.24 KG/M2 | WEIGHT: 224 LBS | HEART RATE: 76 BPM

## 2024-03-08 PROCEDURE — G0299 HHS/HOSPICE OF RN EA 15 MIN: HCPCS

## 2024-03-08 NOTE — TELEPHONE ENCOUNTER
Called pt and gave provider's message. Pt verbalized understanding and he is going to call back with his weights next week.

## 2024-03-09 NOTE — CASE COMMUNICATION
Home Health need continues for: wound care to right plantart  Primary diagnoses/co-morbidities/recent procedures in past 60 days that impact current episode: diabetes afib   Current level of functional ability: walk with walker   Homebound status and living arrangements: homebound   Goals accomplished and/or measurable progress toward unmet goals in past 60 days: wound improving   Focus of care for next 60 days for each discipline order ed: wound care   Skin integrity/wound status: improving   Code status:   Most recent fall risk:  Plan of Care confirmed with **Dr. Gonzales office

## 2024-03-09 NOTE — CASE COMMUNICATION
raheel coteeeds a visit 3/13, it would not allow me to schedule that day bc it is red and says could not find a slot. i have more than 6 on that day and am blocked off for the [employer], I am guessing this is why. TY. Krista

## 2024-03-11 ENCOUNTER — HOME CARE VISIT (OUTPATIENT)
Dept: HOME HEALTH SERVICES | Facility: HOME HEALTHCARE | Age: 84
End: 2024-03-11
Payer: COMMERCIAL

## 2024-03-11 VITALS
TEMPERATURE: 98.9 F | WEIGHT: 220.8 LBS | DIASTOLIC BLOOD PRESSURE: 60 MMHG | RESPIRATION RATE: 16 BRPM | HEART RATE: 66 BPM | SYSTOLIC BLOOD PRESSURE: 110 MMHG | OXYGEN SATURATION: 99 % | BODY MASS INDEX: 30.8 KG/M2

## 2024-03-11 PROCEDURE — 400014 VN F/U

## 2024-03-11 PROCEDURE — G0299 HHS/HOSPICE OF RN EA 15 MIN: HCPCS

## 2024-03-11 NOTE — TELEPHONE ENCOUNTER
Pt called stating that his Wt is going down since increasing Toresmide for 3days.     Saturday 3/9- .3lbs  Sumit 3/10- .9lbs  Today 3/11- .8lbs    Advised pt to go back to taking his original dose of Torsemide 20mg 2 tablets=40mg in AM and 1 tablet in the PM.     SABRINA

## 2024-03-13 ENCOUNTER — HOME CARE VISIT (OUTPATIENT)
Dept: HOME HEALTH SERVICES | Facility: HOME HEALTHCARE | Age: 84
End: 2024-03-13
Payer: COMMERCIAL

## 2024-03-13 VITALS
HEART RATE: 69 BPM | TEMPERATURE: 98.5 F | DIASTOLIC BLOOD PRESSURE: 70 MMHG | RESPIRATION RATE: 16 BRPM | BODY MASS INDEX: 30.68 KG/M2 | OXYGEN SATURATION: 98 % | WEIGHT: 220 LBS | SYSTOLIC BLOOD PRESSURE: 112 MMHG

## 2024-03-13 PROCEDURE — G0299 HHS/HOSPICE OF RN EA 15 MIN: HCPCS

## 2024-03-13 NOTE — PROGRESS NOTES
Patient ID: Hammad Montesinos is a 83 y.o. male Date of Birth 1940       Chief Complaint   Patient presents with   • Follow Up Wound Care Visit     Right foot wound       Allergies:  Hydrochlorothiazide, Insulin lispro, Lisinopril, Minoxidil, Nifedipine, Other, Prazosin, Venlafaxine, Doxycycline, and Simvastatin    Diagnosis:  1. 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, with fat layer exposed (HCC)  -     Wound cleansing and dressings Diabetic Ulcer (wound) Plantar;Distal;Right Foot; Future  -     Wound off loading Diabetic Ulcer (wound) Plantar;Distal;Right Foot; Future  -     Debridement Diabetic Ulcer (wound) Plantar;Distal;Right Foot    2. Type 2 diabetes mellitus with diabetic neuropathy, with long-term current use of insulin ([facility])       Diagnosis ICD-10-CM Associated Orders   1. 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, with fat layer exposed (HCC)  E11.621 Wound cleansing and dressings Diabetic Ulcer (wound) Plantar;Distal;Right Foot    L97.512 Wound off loading Diabetic Ulcer (wound) Plantar;Distal;Right Foot     Debridement Diabetic Ulcer (wound) Plantar;Distal;Right Foot      2. Type 2 diabetes mellitus with diabetic neuropathy, with long-term current use of insulin ([facility])  E11.40     Z79.4            Assessment & Plan:  Continue with mupirocin/alginate dressing every other day.  Continue with postop shoe and appropriate offloading.  Follow-up in 3 weeks.  Call if any signs of infection are noted.    Subjective:   3/6/2024: 83-year-old male DM2 seen for follow-up of ulceration plantar right forefoot.  Reports good progress with diminishing size.    2/19/2024: 83-year-old male type II diabetic presents for follow-up of new recurrent ulceration involving his right forefoot.  Patient reports good progress with less swelling and redness since taking antibiotic.  Was seen last in my [place] office and empirically  started on Augmentin 875 twice daily.    2/12/2024: 83-year-old male type II diabetic seen today for evaluation of new issue/ulceration right forefoot which recently broke down within the past 2 weeks.  This is the site of a previous ulceration that was present 2 years ago.  It is now more red and swollen and draining fluid.  Patient was recently hospitalized with CHF causing him to have a lot of swelling in his legs.        The following portions of the patient's history were reviewed and updated as appropriate:   Patient Active Problem List   Diagnosis   • Type 2 diabetes mellitus with hyperglycemia, with long-term current use of insulin (HCC)   • Mixed hyperlipidemia   • Diabetic polyneuropathy associated with type 2 diabetes mellitus (HCC)   • Primary hypertension   • Morbid (severe) obesity due to excess calories ([facility])   • 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 ([facility])   • 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 (HCC)   • Elevated parathyroid hormone   • Diabetic nephropathy associated with type 2 diabetes mellitus (HCC)   • Type 2 diabetes mellitus with diabetic neuropathy, unspecified whether long term insulin use ([facility])   • Pre-ulcerative calluses   • Acute on chronic diastolic heart failure (HCC)   • Elevated troponin   • Pulmonary hypertension (HCC)   • Paroxysmal A-fib ([facility])   • 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, with fat layer exposed ([facility])     Past Medical History:   Diagnosis Date   • Asthma    • Bradycardia    • Carcinoma, basal cell, skin     head and face and ear   • COVID-19 12/05/2022   • Dupuytren contracture     bilateral ring and right middle   • Gout    • Herniated lumbar intervertebral disc    • Sleep apnea      Past Surgical History:   Procedure Laterality Date   • APPENDECTOMY      age 20, ruptured   • CATARACT EXTRACTION, BILATERAL Bilateral    • CERVICAL FUSION     • CYST REMOVAL Left 01/2023    • DUPUYTREN CONTRACTURE RELEASE Left     left ring finger   • LUMBAR LAMINECTOMY      belkis placed   • TONSILLECTOMY     • VASECTOMY       Social History     Socioeconomic History   • Marital status: /Civil Union     Spouse name: Not on file   • Number of children: Not on file   • Years of education: Not on file   • Highest education level: Not on file   Occupational History   • Occupation: teacher     Comment: retired   Tobacco Use   • Smoking status: Never   • Smokeless tobacco: Never   Vaping Use   • Vaping status: Never Used   Substance and Sexual Activity   • Alcohol use: Not Currently   • Drug use: Never   • Sexual activity: Not on file   Other Topics Concern   • Not on file   Social History Narrative   • Not on file     Social Determinants of Health     Financial Resource Strain: Not on file   Food Insecurity: No Food Insecurity (12/29/2023)    Hunger Vital Sign    • Worried About Running Out of Food in the Last Year: Never true    • Ran Out of Food in the Last Year: Never true   Transportation Needs: No Transportation Needs (12/29/2023)    PRAPARE - Transportation    • Lack of Transportation (Medical): No    • Lack of Transportation (Non-Medical): No   Physical Activity: Not on file   Stress: Not on file   Social Connections: Not on file   Intimate Partner Violence: Not on file   Housing Stability: Low Risk  (12/29/2023)    Housing Stability Vital Sign    • Unable to Pay for Housing in the Last Year: No    • Number of Places Lived in the Last Year: 1    • Unstable Housing in the Last Year: No        Current Outpatient Medications:   •  allopurinol (ZYLOPRIM) 300 mg tablet, every 24 hours, Disp: , Rfl:   •  atorvastatin (LIPITOR) 20 mg tablet, Take 20 mg by mouth daily, Disp: , Rfl:   •  azelastine (ASTELIN) 0.1 % nasal spray, 2 sprays into each nostril 2 (two) times a day, Disp: , Rfl:   •  budesonide (PULMICORT) 0.5 mg/2 mL nebulizer solution, Take 0.5 mg by nebulization 2 (two) times a day Rinse  mouth after use., Disp: , Rfl:   •  Cholecalciferol (Vitamin D3) 25 MCG (1000 UT) CAPS, every 24 hours, Disp: , Rfl:   •  Coenzyme Q10 (Co Q10) 100 MG CAPS, Take 100 mg by mouth in the morning, Disp: , Rfl:   •  Easy Comfort Pen Needles 33G X 4 MM MISC, USE WITH INSULIN FIVE TIMES DAILY AS DIRECTED, Disp: , Rfl:   •  fluticasone (FLONASE) 50 mcg/act nasal spray, 1 spray into each nostril 2 (two) times a day, Disp: , Rfl:   •  formoterol (PERFOROMIST) 20 MCG/2ML nebulizer solution, Take 20 mcg by nebulization 2 (two) times a day, Disp: , Rfl:   •  gabapentin (NEURONTIN) 100 mg capsule, Take 1 capsule (100 mg total) by mouth daily, Disp: 180 capsule, Rfl: 0  •  Glucosamine-Chondroitin 500-400 MG CAPS, , Disp: , Rfl:   •  insulin aspart (NovoLOG FlexPen) 100 UNIT/ML injection pen, Inject 8 Units under the skin 3 (three) times a day with meals INJECT 22 UNITS SUBCUTANEOUSLY with breakfast, 23 UNITS with LUNCH, AND 28 UNITS with dinner plus sliding scale. (UP  UNITS DAILY), Disp: 45 mL, Rfl: 0  •  Insulin Glargine Solostar (Lantus SoloStar) 100 UNIT/ML SOPN, Inject 0.12 mL (12 Units total) under the skin 2 (two) times a day INJECT 30 UNITS SUBCUTANEOUSLY IN THE MORNING AND 32 UNITS IN THE EVENING. MAY INCREASE TO 80 UNITS DAILY, Disp: 30 mL, Rfl: 0  •  ipratropium-albuterol (DUO-NEB) 0.5-2.5 mg/3 mL nebulizer solution, Take 3 mL by nebulization 2 (two) times a day, Disp: , Rfl:   •  loratadine (CLARITIN) 10 mg tablet, Take 10 mg by mouth daily, Disp: , Rfl:   •  losartan (COZAAR) 100 MG tablet, Take 100 mg by mouth daily, Disp: , Rfl:   •  magnesium Oxide (MAG-OX) 400 mg TABS, Take 1 tablet (400 mg total) by mouth daily, Disp: 30 tablet, Rfl: 0  •  Multiple Vitamin (MULTIVITAMIN ADULT PO), every 24 hours, Disp: , Rfl:   •  mupirocin (BACTROBAN) 2 % ointment, , Disp: , Rfl:   •  mupirocin (BACTROBAN) 2 % ointment, Apply topically every other day With gauze, Disp: 22 g, Rfl: 0  •  Omega-3 Fatty Acids (Fish Oil) 360  MG CAPS, Take by mouth, Disp: , Rfl:   •  OneTouch Verio test strip, USE AS DIRECTED FOUR TIMES DAILY, Disp: , Rfl:   •  potassium chloride (K-DUR,KLOR-CON) 20 mEq tablet, Take 1 tablet (20 mEq total) by mouth daily, Disp: 30 tablet, Rfl: 0  •  rivaroxaban (Xarelto) 20 mg tablet, Take 1 tablet (20 mg total) by mouth daily with breakfast, Disp: 30 tablet, Rfl: 5  •  senna (SENOKOT) 8.6 mg, Take 2 tablets (17.2 mg total) by mouth daily at bedtime as needed for constipation, Disp: 30 tablet, Rfl: 0  •  torsemide (DEMADEX) 20 mg tablet, Take 2 tablets (40 mg total) by mouth every morning AND 1 tablet (20 mg total) daily after lunch., Disp: 90 tablet, Rfl: 2  •  triamcinolone (KENALOG) 0.1 % cream, 1 Application 2 (two) times a day To affected areas, Disp: , Rfl:   Family History   Problem Relation Age of Onset   • Hypertension Mother    • Diabetes type II Mother    • Hypertension Father    • Diabetes type II Brother    • Hypertension Brother    • Kidney disease Sister    • Hypertension Daughter    • GI problems Daughter       Review of Systems   Constitutional: Negative.    HENT: Negative.     Eyes: Negative.    Respiratory: Negative.     Cardiovascular: Negative.    Gastrointestinal: Negative.    Endocrine: Negative.    Genitourinary: Negative.    Musculoskeletal: Negative.    Skin:  Positive for wound (Right foot subthird MPJ DFU).   Allergic/Immunologic: Negative.    Neurological: Negative.    Hematological: Negative.    Psychiatric/Behavioral: Negative.           Objective:  /62   Pulse 58   Temp 98.5 °F (36.9 °C)   Resp 17     Physical Exam  Constitutional:       Appearance: Normal appearance.   HENT:      Head: Normocephalic.      Right Ear: Tympanic membrane normal.      Left Ear: Tympanic membrane normal.      Nose: No congestion.      Mouth/Throat:      Pharynx: No oropharyngeal exudate or posterior oropharyngeal erythema.   Eyes:      Conjunctiva/sclera: Conjunctivae normal.      Pupils: Pupils are  equal, round, and reactive to light.   Cardiovascular:      Rate and Rhythm: Normal rate and regular rhythm.      Pulses:           Dorsalis pedis pulses are 1+ on the right side and 1+ on the left side.        Posterior tibial pulses are 0 on the right side and 0 on the left side.   Pulmonary:      Effort: Pulmonary effort is normal.   Musculoskeletal:         General: Deformity present.      Right lower leg: Edema (+2) present.      Left lower leg: Edema (+2) present.      Comments: Plantarflexed third metatarsal bilateral   Feet:      Right foot:      Protective Sensation: 10 sites tested.  7 sites sensed.      Skin integrity: Ulcer (See comments), erythema and warmth present.      Left foot:      Protective Sensation: 10 sites tested.  7 sites sensed.      Skin integrity: Callus and dry skin present.      Comments: Right foot: Full-thickness necrotic diabetic foot ulcer subthird MPJ, SQ depth, smaller size and less necrosis.  No maceration with continued hyperkeratotic margins.  Erythema and calor have resolved.  Skin:     General: Skin is warm and dry.      Capillary Refill: Capillary refill takes 2 to 3 seconds.      Coloration: Skin is not pale.      Findings: Erythema present. No bruising, lesion or rash.   Neurological:      Mental Status: He is alert.      Cranial Nerves: No cranial nerve deficit.      Sensory: Sensory deficit present.      Motor: No weakness.      Gait: Gait normal.      Deep Tendon Reflexes: Reflexes normal.   Psychiatric:         Mood and Affect: Mood normal.         Behavior: Behavior normal.         Judgment: Judgment normal.         Wound 02/06/24 Diabetic Ulcer Foot Plantar;Distal;Right (Active)   Enter Calhoun score: Calhoun Grade 1: Partial or full-thickness ulcer (superficial) 02/19/24 1345   Wound Image   03/06/24 0942   Wound Description Granulation tissue 03/06/24 0906   Tash-wound Assessment Callus 03/06/24 0906   Wound Length (cm) 0.3 cm 03/06/24 0906   Wound Width (cm) 0.4 cm  "03/06/24 0906   Wound Depth (cm) 0.3 cm 03/06/24 0906   Wound Surface Area (cm^2) 0.12 cm^2 03/06/24 0906   Wound Volume (cm^3) 0.036 cm^3 03/06/24 0906   Calculated Wound Volume (cm^3) 0.04 cm^3 03/06/24 0906   Change in Wound Size % 94.74 03/06/24 0906   Drainage Amount Moderate 03/06/24 0906   Drainage Description Serosanguineous 03/06/24 0906   Non-staged Wound Description Full thickness 03/06/24 0906   Treatments Cleansed 02/19/24 1345   Wound packed? No 02/19/24 1345   Dressing Changed Changed 02/19/24 1345   Dressing Status Intact 03/06/24 0906       Debridement   Wound 02/06/24 Diabetic Ulcer Foot Plantar;Distal;Right    Universal Protocol:  Consent: Verbal consent obtained.  Risks and benefits: risks, benefits and alternatives were discussed  Consent given by: patient  Time out: Immediately prior to procedure a \"time out\" was called to verify the correct patient, procedure, equipment, support staff and site/side marked as required.  Patient understanding: patient states understanding of the procedure being performed  Patient identity confirmed: verbally with patient    Debridement Details  Performed by: physician  Debridement type: surgical  Level of debridement: subcutaneous tissue  Pain control: lidocaine 4%      Post-debridement measurements  Length (cm): 0.5  Width (cm): 0.6  Depth (cm): 0.3  Percent debrided: 100%  Surface Area (cm^2): 0.3  Area Debrided (cm^2): 0.3  Volume (cm^3): 0.09    Tissue and other material debrided: subcutaneous tissue  Devitalized tissue debrided: callus and necrotic debris  Instrument(s) utilized: nippers and blade  Bleeding: medium  Hemostasis obtained with: pressure  Procedural pain (0-10): 3  Post-procedural pain: 3   Response to treatment: procedure was tolerated well                 Wound Instructions:  Orders Placed This Encounter   Procedures   • Wound cleansing and dressings Diabetic Ulcer (wound) Plantar;Distal;Right Foot     Wash your hands with soap and water.  " "Remove old dressing, discard into plastic bag and place in trash.  Cleanse the wound with SOAP AND WATER prior to applying a clean dressing. Do not use tissue or cotton balls. Do not scrub the wound. Pat dry using gauze.  Shower YES WITH PROTECTION   Apply MOISTURIZER to skin surrounding wound  Apply MUPIROCIN OINTMENT AND  ALGINATE AG to the RIGHT FOOT wound.  Cover with ABD  Secure with YONATHAN OR KERLIX AND TAPE  Secure with coban  Change dressing 3XWEEK  MONDAY WEDNESDAY AND FRIDAY     Done today     Standing Status:   Future     Standing Expiration Date:   3/6/2025   • Wound off loading Diabetic Ulcer (wound) Plantar;Distal;Right Foot     Continue to wear  PROCAR SHOE WHEN OOB     Standing Status:   Future     Standing Expiration Date:   3/6/2025   • Debridement Diabetic Ulcer (wound) Plantar;Distal;Right Foot     This order was created via procedure documentation         Julio César Mac DPM      Portions of the record may have been created with voice recognition software. Occasional wrong word or \"sound a like\" substitutions may have occurred due to the inherent limitations of voice recognition software. Read the chart carefully and recognize, using context, where substitutions have occurred.    "

## 2024-03-15 ENCOUNTER — HOME CARE VISIT (OUTPATIENT)
Dept: HOME HEALTH SERVICES | Facility: HOME HEALTHCARE | Age: 84
End: 2024-03-15
Payer: COMMERCIAL

## 2024-03-15 VITALS
DIASTOLIC BLOOD PRESSURE: 60 MMHG | BODY MASS INDEX: 30.82 KG/M2 | OXYGEN SATURATION: 94 % | RESPIRATION RATE: 16 BRPM | HEART RATE: 50 BPM | WEIGHT: 221 LBS | TEMPERATURE: 98.5 F | SYSTOLIC BLOOD PRESSURE: 110 MMHG

## 2024-03-15 PROCEDURE — G0299 HHS/HOSPICE OF RN EA 15 MIN: HCPCS

## 2024-03-18 ENCOUNTER — HOME CARE VISIT (OUTPATIENT)
Dept: HOME HEALTH SERVICES | Facility: HOME HEALTHCARE | Age: 84
End: 2024-03-18
Payer: COMMERCIAL

## 2024-03-20 ENCOUNTER — HOME CARE VISIT (OUTPATIENT)
Dept: HOME HEALTH SERVICES | Facility: HOME HEALTHCARE | Age: 84
End: 2024-03-20
Payer: COMMERCIAL

## 2024-03-20 VITALS
BODY MASS INDEX: 30.4 KG/M2 | OXYGEN SATURATION: 67 % | DIASTOLIC BLOOD PRESSURE: 70 MMHG | HEART RATE: 97 BPM | TEMPERATURE: 98.5 F | SYSTOLIC BLOOD PRESSURE: 120 MMHG | WEIGHT: 218 LBS | RESPIRATION RATE: 16 BRPM

## 2024-03-20 PROCEDURE — G0299 HHS/HOSPICE OF RN EA 15 MIN: HCPCS

## 2024-03-21 ENCOUNTER — OFFICE VISIT (OUTPATIENT)
Dept: ENDOCRINOLOGY | Facility: HOSPITAL | Age: 84
End: 2024-03-21
Payer: COMMERCIAL

## 2024-03-21 VITALS
HEIGHT: 71 IN | HEART RATE: 48 BPM | DIASTOLIC BLOOD PRESSURE: 58 MMHG | BODY MASS INDEX: 31.5 KG/M2 | SYSTOLIC BLOOD PRESSURE: 124 MMHG | WEIGHT: 225 LBS

## 2024-03-21 DIAGNOSIS — E66.01 MORBID (SEVERE) OBESITY DUE TO EXCESS CALORIES (HCC): ICD-10-CM

## 2024-03-21 DIAGNOSIS — E11.40 TYPE 2 DIABETES MELLITUS WITH DIABETIC NEUROPATHY, UNSPECIFIED WHETHER LONG TERM INSULIN USE (HCC): Primary | ICD-10-CM

## 2024-03-21 PROCEDURE — 95251 CONT GLUC MNTR ANALYSIS I&R: CPT | Performed by: PHYSICIAN ASSISTANT

## 2024-03-21 PROCEDURE — 99214 OFFICE O/P EST MOD 30 MIN: CPT | Performed by: PHYSICIAN ASSISTANT

## 2024-03-21 NOTE — PROGRESS NOTES
Hammad Montesinos 83 y.o. male MRN: [id number]    Encounter: [id number]      Assessment/Plan     Assessment:  This is a 83 y.o.-year-old male with type 2 diabetes with hypoglycemic unawareness, neuropathy, hypertension and hyperlipidemia    Plan:  1. Type 2 diabetes, insulin requiring: Most recent hemoglobin A1c was 7.8.  For his age and chronic medical conditions this is excellent.  Review of his Dexcom I am concerned of hyperglycemia with dinner.  At this time I do not want to make any significant adjustments to his insulin, but I will give him a sliding scale.  At this time he will continue with Lantus 20 units twice a day, NovoLog 12 units with each meal +1 unit for every 50 points above 150.  Continue utilizing Dexcom to monitor glucose levels.  Contact the office with any concerns or questions, especially if experiencing any hypoglycemia.  Follow-up in 3 months with lab work completed prior to visit.     2. Hypoglycemic unawareness:  Has had several episodes of loss of consciousness due to hypoglycemia with ambulance called.  Currently utilizing Dexcom G7.     3. Diabetic neuropathy:  Stable. Diabetic foot exam is up-to-date.     4. Hypertension:  Normotensive in the office.  Kidney function remains stable with normal electrolytes.  May have a little bit of dehydration going on with this set of lab work.  Make sure to drink plenty water throughout the day.  Continue with current medication.  Repeat CMP prior to next office visit.     5. Hyperlipidemia:  Previous lipid panel was excellent.  Continue with current medications.  Repeat lipid panel prior to next office visit.    CC: Type 2 diabetes follow-up    History of Present Illness     HPI:  Hammad Montesinos is a 83 year old male with type 2 diabetes, insulin requiring with neuropathy for 50 years, hyperlipidemia, hypertension for follow-up.  He was diagnosed with type 2 diabetes in his 30s.  He originally went on metformin therapy.  He was never on any  other oral agents as metformin controlled his blood sugars for the 1st several years and then he was switched to insulin therapy.  He reports that Humalog insulin does not improve his blood sugars as well as NovoLog insulin does.  He is on insulin at home and takes Lantus insulin 20 units twice a day, and NovoLog 12 units with meals plus a variable sliding scale based on what he thinks he needs.  He denies any polyuria, polydipsia, nocturia and blurry vision.  He has once a night nocturia.  He denies chest pain or shortness of breath.  He has occasional shooting pains of the legs at night and has numbness of the feet to his knees bilaterally.  He denies retinopathy, heart attack, stroke and claudication but does admit to neuropathy and nephropathy.  He reports he was to Diabetes Education on diagnosis in several times since then has seen 1 on 1 with a dietitian and gone to diabetes classes.  Does pay attention to what he is eating and to eat a diabetic diet.  Had a pacer placed in May 2022.  Was admitted to the hospital at the end of December 2023 for acute on chronic heart failure developed an ulcer on his right foot.  States that he is doing better at this time.     Hypoglycemic episodes: Rare episodes.  H/o of hypoglycemia causing hospitalization or intervention such as glucagon injection  or ambulance call  Yes. Has been loc in the past requiring 911 many years ago.  Hypoglycemia symptoms: sweating and and feel bad or no symptoms at all.  Treatment of hypoglycemia: either cookies or glucose tablets.  Glucagon:Yes.  Medic alert tag: recommended,Yes.      The patient's last eye exam was in March 2022 in [place] at [facility] with no retinopathy.  The patient's last foot exam was in December 2023 at endocrine office.  Most recent hemoglobin A1c completed February 22, 2024 was 7.8.     Blood Sugar/Glucometer/Pump/CGM review: Download of Dexcom from March 8 through March 21, 2024 reveals average glucose level of  179 with a standard deviation of 63.  He is in target range 58% of the time, above target range 41% time, below target range 1% time.  Glucose levels typically trend down overnight increase slightly with breakfast, and decrease in the afternoon.  He then has a significant increase after dinner and will be running high prior to bed.     He has hyperlipidemia and takes atorvastatin 20 mg daily.  He denies chest pain or shortness of breath.       He has hypertension and takes amlodipine 10 mg daily and losartan 100 mg daily along with furosemide 40 mg twice a day.  He denies headache or stroke-like symptoms.     Review of Systems   Constitutional:  Negative for activity change, appetite change, fatigue and unexpected weight change.   HENT:  Negative for trouble swallowing.    Eyes:  Positive for visual disturbance (Wears glasses).   Respiratory:  Positive for shortness of breath. Negative for chest tightness.    Cardiovascular:  Negative for chest pain, palpitations and leg swelling.   Gastrointestinal:  Negative for abdominal pain, diarrhea, nausea and vomiting.   Endocrine: Negative for cold intolerance, heat intolerance, polydipsia, polyphagia and polyuria.   Genitourinary:  Negative for frequency.   Musculoskeletal:  Positive for arthralgias, back pain and gait problem.   Skin:  Negative for rash and wound.   Neurological:  Positive for tremors. Negative for dizziness, weakness, light-headedness, numbness and headaches.   Psychiatric/Behavioral:  Negative for dysphoric mood and sleep disturbance. The patient is not nervous/anxious.        Historical Information   Past Medical History:   Diagnosis Date   • Asthma    • Bradycardia    • Carcinoma, basal cell, skin     head and face and ear   • COVID-19 12/05/2022   • Dupuytren contracture     bilateral ring and right middle   • Gout    • Herniated lumbar intervertebral disc    • Sleep apnea      Past Surgical History:   Procedure Laterality Date   • APPENDECTOMY       age 20, ruptured   • CATARACT EXTRACTION, BILATERAL Bilateral    • CERVICAL FUSION     • CYST REMOVAL Left 01/2023   • DUPUYTREN CONTRACTURE RELEASE Left     left ring finger   • LUMBAR LAMINECTOMY      belkis placed   • TONSILLECTOMY     • VASECTOMY       Social History   Social History     Substance and Sexual Activity   Alcohol Use Not Currently     Social History     Substance and Sexual Activity   Drug Use Never     Social History     Tobacco Use   Smoking Status Never   Smokeless Tobacco Never     Family History:   Family History   Problem Relation Age of Onset   • Hypertension Mother    • Diabetes type II Mother    • Hypertension Father    • Diabetes type II Brother    • Hypertension Brother    • Kidney disease Sister    • Hypertension Daughter    • GI problems Daughter        Meds/Allergies   Current Outpatient Medications   Medication Sig Dispense Refill   • allopurinol (ZYLOPRIM) 300 mg tablet Take 300 mg by mouth every 24 hours     • atorvastatin (LIPITOR) 20 mg tablet Take 20 mg by mouth daily     • azelastine (ASTELIN) 0.1 % nasal spray 2 sprays into each nostril 2 (two) times a day     • budesonide (PULMICORT) 0.5 mg/2 mL nebulizer solution Take 0.5 mg by nebulization 2 (two) times a day Rinse mouth after use.     • Cholecalciferol (Vitamin D3) 25 MCG (1000 UT) CAPS Take 1 capsule by mouth every 24 hours     • Coenzyme Q10 (Co Q10) 100 MG CAPS Take 100 mg by mouth in the morning     • Easy Comfort Pen Needles 33G X 4 MM MISC USE WITH INSULIN FIVE TIMES DAILY AS DIRECTED     • fluticasone (FLONASE) 50 mcg/act nasal spray 1 spray into each nostril 2 (two) times a day     • formoterol (PERFOROMIST) 20 MCG/2ML nebulizer solution Take 20 mcg by nebulization 2 (two) times a day     • gabapentin (NEURONTIN) 100 mg capsule Take 1 capsule (100 mg total) by mouth daily 180 capsule 0   • Glucosamine-Chondroitin 500-400 MG CAPS Take 1 capsule by mouth daily     • insulin aspart (NovoLOG FlexPen) 100 UNIT/ML injection  pen Inject 8 Units under the skin 3 (three) times a day with meals INJECT 22 UNITS SUBCUTANEOUSLY with breakfast, 23 UNITS with LUNCH, AND 28 UNITS with dinner plus sliding scale. (UP  UNITS DAILY) (Patient taking differently: Inject 9-12 Units under the skin 3 (three) times a day with meals Plus sliding scale) 45 mL 0   • Insulin Glargine Solostar (Lantus SoloStar) 100 UNIT/ML SOPN Inject 0.12 mL (12 Units total) under the skin 2 (two) times a day INJECT 30 UNITS SUBCUTANEOUSLY IN THE MORNING AND 32 UNITS IN THE EVENING. MAY INCREASE TO 80 UNITS DAILY (Patient taking differently: Inject 20 Units under the skin 2 (two) times a day) 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     • mupirocin (BACTROBAN) 2 % ointment Apply topically every other day With gauze 22 g 0   • Omega-3 Fatty Acids (Fish Oil) 360 MG CAPS Take by mouth     • OneTouch Verio test strip USE AS DIRECTED FOUR TIMES DAILY     • potassium chloride (K-DUR,KLOR-CON) 20 mEq tablet Take 1 tablet (20 mEq total) by mouth daily 30 tablet 0   • rivaroxaban (Xarelto) 20 mg tablet Take 1 tablet (20 mg total) by mouth daily with breakfast 30 tablet 5   • senna (SENOKOT) 8.6 mg Take 2 tablets (17.2 mg total) by mouth daily at bedtime as needed for constipation 30 tablet 0   • torsemide (DEMADEX) 20 mg tablet Take 2 tablets (40 mg total) by mouth every morning AND 1 tablet (20 mg total) daily after lunch. 90 tablet 2   • triamcinolone  "(KENALOG) 0.1 % cream 1 Application 2 (two) times a day To affected areas       No current facility-administered medications for this visit.     Allergies   Allergen Reactions   • Hydrochlorothiazide Other (See Comments)     SEVERE DIZZINESS   • Insulin Lispro Other (See Comments)     Cannot control blood sugars(Humalog)   • Lisinopril Other (See Comments)     Reports cough with use    • Minoxidil GI Intolerance   • Nifedipine Other (See Comments)     EDEMA LEGS   • Other Other (See Comments)     SEVERE DIZZINESS  Hydrap-es   • Prazosin Other (See Comments)     CHEST PAIN     • Venlafaxine Other (See Comments)     Made pt feel bad     • Doxycycline Rash   • Simvastatin Rash     PEDAL EDEMA         Objective   Vitals: Blood pressure 124/58, pulse (!) 48, height 5' 11\" (1.803 m), weight 102 kg (225 lb).    Physical Exam  Vitals and nursing note reviewed.   Constitutional:       General: He is not in acute distress.     Appearance: Normal appearance. He is not diaphoretic.   HENT:      Head: Normocephalic and atraumatic.   Eyes:      General: No scleral icterus.     Extraocular Movements: Extraocular movements intact.      Conjunctiva/sclera: Conjunctivae normal.      Pupils: Pupils are equal, round, and reactive to light.   Cardiovascular:      Rate and Rhythm: Regular rhythm. Bradycardia present.      Heart sounds: No murmur heard.  Pulmonary:      Effort: Pulmonary effort is normal. No respiratory distress.      Breath sounds: Normal breath sounds. No wheezing.   Musculoskeletal:      Cervical back: Normal range of motion.      Right lower leg: No edema.      Left lower leg: No edema.   Lymphadenopathy:      Cervical: No cervical adenopathy.   Skin:     General: Skin is warm and dry.   Neurological:      Mental Status: He is alert and oriented to person, place, and time. Mental status is at baseline.      Sensory: No sensory deficit.      Gait: Gait abnormal (Utilizing walker).   Psychiatric:         Mood and " Affect: Mood normal.         Behavior: Behavior normal.         Thought Content: Thought content normal.         The history was obtained from the review of the chart, patient.    Lab Results:   Lab Results   Component Value Date/Time    Hemoglobin A1C 6.8 11/16/2023 12:00 AM    Hemoglobin A1C 8.1 08/03/2023 12:00 AM    Hemoglobin A1C 7.1 03/23/2023 12:00 AM    WBC 6.54 01/08/2024 04:33 AM    WBC 6.88 01/07/2024 03:52 AM    WBC 6.32 01/06/2024 04:45 AM    Hemoglobin 11.7 (L) 01/08/2024 04:33 AM    Hemoglobin 11.3 (L) 01/07/2024 03:52 AM    Hemoglobin 10.9 (L) 01/06/2024 04:45 AM    Hematocrit 37.8 01/08/2024 04:33 AM    Hematocrit 36.1 (L) 01/07/2024 03:52 AM    Hematocrit 34.7 (L) 01/06/2024 04:45 AM    MCV 81 (L) 01/08/2024 04:33 AM    MCV 80 (L) 01/07/2024 03:52 AM    MCV 80 (L) 01/06/2024 04:45 AM    Platelets 191 01/08/2024 04:33 AM    Platelets 175 01/07/2024 03:52 AM    Platelets 154 01/06/2024 04:45 AM    BUN 59 (H) 01/08/2024 04:33 AM    BUN 56 (H) 01/07/2024 03:52 AM    BUN 51 (H) 01/06/2024 04:45 AM    Potassium 4.2 01/08/2024 04:33 AM    Potassium 3.7 01/07/2024 03:52 AM    Potassium 3.1 (L) 01/06/2024 04:45 AM    Chloride 96 01/08/2024 04:33 AM    Chloride 95 (L) 01/07/2024 03:52 AM    Chloride 97 01/06/2024 04:45 AM    CO2 31 01/08/2024 04:33 AM    CO2 33 (H) 01/07/2024 03:52 AM    CO2 33 (H) 01/06/2024 04:45 AM    Creatinine 1.47 (H) 01/08/2024 04:33 AM    Creatinine 1.43 (H) 01/07/2024 03:52 AM    Creatinine 1.37 (H) 01/06/2024 04:45 AM    AST 19 01/08/2024 04:33 AM    AST 20 01/07/2024 03:52 AM    AST 20 01/06/2024 04:45 AM    ALT 16 01/08/2024 04:33 AM    ALT 16 01/07/2024 03:52 AM    ALT 15 01/06/2024 04:45 AM    Total Protein 7.1 01/08/2024 04:33 AM    Total Protein 7.0 01/07/2024 03:52 AM    Total Protein 6.5 01/06/2024 04:45 AM    Albumin 3.6 01/08/2024 04:33 AM    Albumin 3.7 01/07/2024 03:52 AM    Albumin 3.4 (L) 01/06/2024 04:45 AM           Imaging Studies: I have personally reviewed  "pertinent reports.      Portions of the record may have been created with voice recognition software. Occasional wrong word or \"sound a like\" substitutions may have occurred due to the inherent limitations of voice recognition software. Read the chart carefully and recognize, using context, where substitutions have occurred.    "

## 2024-03-21 NOTE — PATIENT INSTRUCTIONS
Monitor diet and maintain activity.     Continue Lantus 20 units twice a day.     Call the office if still having low blood sugars.    Continue NovoLog 12 unit with meals plus sliding scale.    Take 1 extra unit of insulin for every 50 points above 150.  151-200: 1 unit  201-250: 2 units  251-300: 3 units  301-350: 4 units  351-400: 5 units  400+: 6 units     Continue gabapentin to 200 mg at night.     Continue to use Dexcom to monitor blood sugar. Call the office with any concerns.     Follow up in 3 months with lab work prior to visit.

## 2024-03-22 ENCOUNTER — HOME CARE VISIT (OUTPATIENT)
Dept: HOME HEALTH SERVICES | Facility: HOME HEALTHCARE | Age: 84
End: 2024-03-22
Payer: COMMERCIAL

## 2024-03-22 VITALS
TEMPERATURE: 98.5 F | SYSTOLIC BLOOD PRESSURE: 122 MMHG | OXYGEN SATURATION: 95 % | DIASTOLIC BLOOD PRESSURE: 70 MMHG | RESPIRATION RATE: 16 BRPM | HEART RATE: 75 BPM

## 2024-03-22 DIAGNOSIS — E11.40 TYPE 2 DIABETES MELLITUS WITH DIABETIC NEUROPATHY, UNSPECIFIED WHETHER LONG TERM INSULIN USE (HCC): Primary | ICD-10-CM

## 2024-03-22 PROCEDURE — G0299 HHS/HOSPICE OF RN EA 15 MIN: HCPCS

## 2024-03-22 RX ORDER — INSULIN GLARGINE 100 [IU]/ML
INJECTION, SOLUTION SUBCUTANEOUS
Qty: 30 ML | Refills: 0 | Status: SHIPPED | OUTPATIENT
Start: 2024-03-22

## 2024-03-22 RX ORDER — INSULIN ASPART 100 [IU]/ML
INJECTION, SOLUTION INTRAVENOUS; SUBCUTANEOUS
Qty: 45 ML | Refills: 0 | Status: SHIPPED | OUTPATIENT
Start: 2024-03-22

## 2024-03-25 ENCOUNTER — TELEPHONE (OUTPATIENT)
Dept: ENDOCRINOLOGY | Facility: HOSPITAL | Age: 84
End: 2024-03-25

## 2024-03-25 ENCOUNTER — HOME CARE VISIT (OUTPATIENT)
Dept: HOME HEALTH SERVICES | Facility: HOME HEALTHCARE | Age: 84
End: 2024-03-25
Payer: COMMERCIAL

## 2024-03-25 VITALS
SYSTOLIC BLOOD PRESSURE: 118 MMHG | WEIGHT: 219 LBS | RESPIRATION RATE: 16 BRPM | OXYGEN SATURATION: 96 % | DIASTOLIC BLOOD PRESSURE: 62 MMHG | TEMPERATURE: 98 F | BODY MASS INDEX: 30.54 KG/M2 | HEART RATE: 50 BPM

## 2024-03-25 PROCEDURE — G0299 HHS/HOSPICE OF RN EA 15 MIN: HCPCS

## 2024-03-25 NOTE — CASE COMMUNICATION
Ship to . Home     Branch: [facility]    Ordering MD Dr. Mac    Wound 1 -right plantar    All items are ordered by the each unless otherwise noted.  Orders should be for a 2 week period (unless noted by insurance)      Dry Dressings   (Nonsterile gauze not covered by private insurance)  (Sterile gauze may be requested for insurance rather than nonsterile gauze)  Gauze 4x4 N/S 200 4x4s per unit  [id number]    Tape  Tape. Mefix 2inc h [id number]

## 2024-03-25 NOTE — TELEPHONE ENCOUNTER
Insulin Glargine Solostar requires prior auth. Cover my meds Key [id number]. Request scanned into media.

## 2024-03-26 NOTE — TELEPHONE ENCOUNTER
PA for Cleopatra Hoover     Submitted via    [x]CMM-KEY [id number]  [][employer]-Case ID #   []Faxed to plan   []Other website   []Phone call Case ID #     Office notes sent, clinical questions answered. Awaiting determination    Turnaround time for your insurance to make a decision on your Prior Authorization can take 7-21 business days.

## 2024-03-27 ENCOUNTER — OFFICE VISIT (OUTPATIENT)
Dept: WOUND CARE | Facility: HOSPITAL | Age: 84
End: 2024-03-27
Payer: COMMERCIAL

## 2024-03-27 ENCOUNTER — HOME CARE VISIT (OUTPATIENT)
Dept: HOME HEALTH SERVICES | Facility: HOME HEALTHCARE | Age: 84
End: 2024-03-27
Payer: COMMERCIAL

## 2024-03-27 VITALS
SYSTOLIC BLOOD PRESSURE: 120 MMHG | TEMPERATURE: 96.7 F | RESPIRATION RATE: 18 BRPM | HEART RATE: 76 BPM | DIASTOLIC BLOOD PRESSURE: 62 MMHG

## 2024-03-27 DIAGNOSIS — Z79.4 TYPE 2 DIABETES MELLITUS WITH DIABETIC NEUROPATHY, WITH LONG-TERM CURRENT USE OF INSULIN (HCC): ICD-10-CM

## 2024-03-27 DIAGNOSIS — L97.512 DIABETIC ULCER OF OTHER PART OF RIGHT FOOT ASSOCIATED WITH TYPE 2 DIABETES MELLITUS, WITH FAT LAYER EXPOSED (HCC): Primary | ICD-10-CM

## 2024-03-27 DIAGNOSIS — E11.621 DIABETIC ULCER OF OTHER PART OF RIGHT FOOT ASSOCIATED WITH TYPE 2 DIABETES MELLITUS, WITH FAT LAYER EXPOSED (HCC): Primary | ICD-10-CM

## 2024-03-27 DIAGNOSIS — E11.40 TYPE 2 DIABETES MELLITUS WITH DIABETIC NEUROPATHY, WITH LONG-TERM CURRENT USE OF INSULIN (HCC): ICD-10-CM

## 2024-03-27 PROCEDURE — 11042 DBRDMT SUBQ TIS 1ST 20SQCM/<: CPT | Performed by: PODIATRIST

## 2024-03-27 NOTE — PROGRESS NOTES
Patient ID: Hammad Montesinos is a 83 y.o. male Date of Birth 1940       Chief Complaint   Patient presents with   • Follow Up Wound Care Visit     Right foot wound        Allergies:  Hydrochlorothiazide, Insulin lispro, Lisinopril, Minoxidil, Nifedipine, Other, Prazosin, Venlafaxine, Doxycycline, and Simvastatin    Diagnosis:  1. 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 Type 2 diabetes mellitus with diabetic neuropathy, with long-term current use of insulin (HCC)  -     Wound cleansing and dressings Diabetic Ulcer (wound) Plantar;Distal;Right Foot; Future       Diagnosis ICD-10-CM Associated Orders   1. 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, with fat layer exposed (HCC)  E11.621 Wound cleansing and dressings Diabetic Ulcer (wound) Plantar;Distal;Right Foot    L97.512 Debridement      2. Type 2 diabetes mellitus with diabetic neuropathy, with long-term current use of insulin ([facility])  E11.40 Wound cleansing and dressings Diabetic Ulcer (wound) Plantar;Distal;Right Foot    Z79.4            Assessment & Plan:  Overall clinically unchanged from last evaluation.  Debridement of ulceration, excisional as noted below with 10 blade and tissue nipper.  Continue with mupirocin/alginate dressing every other day with felt aperture padding offloading with forefoot wedge shoe.  Follow-up in 2 weeks.  Call if any signs of infection are noted.    Subjective:   3/27/2024: 83-year-old type II diabetic presents for continued follow-up care of chronic DFU plantar right forefoot.  Reports no significant change since last visit with some area of slight discoloration that concerns him.    3/6/2024: 83-year-old male DM2 seen for follow-up of ulceration plantar right forefoot.  Reports good progress with  diminishing size.    2/19/2024: 83-year-old male type II diabetic presents for follow-up of new recurrent ulceration involving his right forefoot.  Patient reports good progress with less swelling and redness since taking antibiotic.  Was seen last in my [place] office and empirically started on Augmentin 875 twice daily.    2/12/2024: 83-year-old male type II diabetic seen today for evaluation of new issue/ulceration right forefoot which recently broke down within the past 2 weeks.  This is the site of a previous ulceration that was present 2 years ago.  It is now more red and swollen and draining fluid.  Patient was recently hospitalized with CHF causing him to have a lot of swelling in his legs.        The following portions of the patient's history were reviewed and updated as appropriate:   Patient Active Problem List   Diagnosis   • Type 2 diabetes mellitus with hyperglycemia, with long-term current use of insulin ([facility])   • Mixed hyperlipidemia   • Diabetic polyneuropathy associated with type 2 diabetes mellitus ([facility])   • Primary hypertension   • Morbid (severe) obesity due to excess calories ([facility])   • 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 ([facility])   • 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 ([facility])   • Elevated parathyroid hormone   • Diabetic nephropathy associated with type 2 diabetes mellitus ([facility])   • Type 2 diabetes mellitus with diabetic neuropathy, unspecified whether long term insulin use ([facility])   • Pre-ulcerative calluses   • Acute on chronic diastolic heart failure ([facility])   • Elevated troponin   • Pulmonary hypertension ([facility])   • Paroxysmal A-fib ([facility])   • 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, with fat layer exposed ([facility])     Past Medical History:   Diagnosis Date   • Asthma    • Bradycardia    • Carcinoma, basal cell, skin     head and face and ear   • COVID-19 12/05/2022   • Dupuytren contracture      bilateral ring and right middle   • Gout    • Herniated lumbar intervertebral disc    • Sleep apnea      Past Surgical History:   Procedure Laterality Date   • APPENDECTOMY      age 20, ruptured   • CATARACT EXTRACTION, BILATERAL Bilateral    • CERVICAL FUSION     • CYST REMOVAL Left 01/2023   • DUPUYTREN CONTRACTURE RELEASE Left     left ring finger   • LUMBAR LAMINECTOMY      belkis placed   • TONSILLECTOMY     • VASECTOMY       Social History     Socioeconomic History   • Marital status: /Civil Union     Spouse name: None   • Number of children: None   • Years of education: None   • Highest education level: None   Occupational History   • Occupation: teacher     Comment: retired   Tobacco Use   • Smoking status: Never   • Smokeless tobacco: Never   Vaping Use   • Vaping status: Never Used   Substance and Sexual Activity   • Alcohol use: Not Currently   • Drug use: Never   • Sexual activity: None   Other Topics Concern   • None   Social History Narrative   • None     Social Determinants of Health     Financial Resource Strain: Not on file   Food Insecurity: No Food Insecurity (12/29/2023)    Hunger Vital Sign    • Worried About Running Out of Food in the Last Year: Never true    • Ran Out of Food in the Last Year: Never true   Transportation Needs: No Transportation Needs (12/29/2023)    PRAPARE - Transportation    • Lack of Transportation (Medical): No    • Lack of Transportation (Non-Medical): No   Physical Activity: Not on file   Stress: Not on file   Social Connections: Not on file   Intimate Partner Violence: Not on file   Housing Stability: Low Risk  (12/29/2023)    Housing Stability Vital Sign    • Unable to Pay for Housing in the Last Year: No    • Number of Places Lived in the Last Year: 1    • Unstable Housing in the Last Year: No        Current Outpatient Medications:   •  allopurinol (ZYLOPRIM) 300 mg tablet, Take 300 mg by mouth every 24 hours, Disp: , Rfl:   •  atorvastatin (LIPITOR) 20 mg  tablet, Take 20 mg by mouth daily, Disp: , Rfl:   •  azelastine (ASTELIN) 0.1 % nasal spray, 2 sprays into each nostril 2 (two) times a day, Disp: , Rfl:   •  budesonide (PULMICORT) 0.5 mg/2 mL nebulizer solution, Take 0.5 mg by nebulization 2 (two) times a day Rinse mouth after use., Disp: , Rfl:   •  Cholecalciferol (Vitamin D3) 25 MCG (1000 UT) CAPS, Take 1 capsule by mouth every 24 hours, Disp: , Rfl:   •  Coenzyme Q10 (Co Q10) 100 MG CAPS, Take 100 mg by mouth in the morning, Disp: , Rfl:   •  Easy Comfort Pen Needles 33G X 4 MM MISC, USE WITH INSULIN FIVE TIMES DAILY AS DIRECTED, Disp: , Rfl:   •  fluticasone (FLONASE) 50 mcg/act nasal spray, 1 spray into each nostril 2 (two) times a day, Disp: , Rfl:   •  formoterol (PERFOROMIST) 20 MCG/2ML nebulizer solution, Take 20 mcg by nebulization 2 (two) times a day, Disp: , Rfl:   •  gabapentin (NEURONTIN) 100 mg capsule, Take 1 capsule (100 mg total) by mouth daily, Disp: 180 capsule, Rfl: 0  •  Glucosamine-Chondroitin 500-400 MG CAPS, Take 1 capsule by mouth daily, Disp: , Rfl:   •  insulin aspart (NovoLOG FlexPen) 100 UNIT/ML injection pen, INJECT 12 UNITS SUBCUTANEOUSLY with breakfast, LUNCH and with dinner plus sliding scale. (UP  UNITS DAILY). 150-200 :1 unit 201-250:2 units 251-300:3 units 301-350: 4 units 351-400:5 units 400+: 6 units, Disp: 45 mL, Rfl: 0  •  Insulin Glargine Solostar (Lantus SoloStar) 100 UNIT/ML SOPN, INJECT 20 UNITS SUBCUTANEOUSLY IN THE MORNING AND 20 UNITS IN THE EVENING., Disp: 30 mL, Rfl: 0  •  ipratropium-albuterol (DUO-NEB) 0.5-2.5 mg/3 mL nebulizer solution, Take 3 mL by nebulization 2 (two) times a day, Disp: , Rfl:   •  loratadine (CLARITIN) 10 mg tablet, Take 10 mg by mouth daily, Disp: , Rfl:   •  losartan (COZAAR) 100 MG tablet, Take 100 mg by mouth daily, Disp: , Rfl:   •  magnesium Oxide (MAG-OX) 400 mg TABS, Take 1 tablet (400 mg total) by mouth daily, Disp: 30 tablet, Rfl: 0  •  Multiple Vitamin (MULTIVITAMIN ADULT  PO), every 24 hours, Disp: , Rfl:   •  mupirocin (BACTROBAN) 2 % ointment, Apply 1 Application topically every other day 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, Disp: , Rfl:   •  mupirocin (BACTROBAN) 2 % ointment, Apply topically every other day With gauze, Disp: 22 g, Rfl: 0  •  Omega-3 Fatty Acids (Fish Oil) 360 MG CAPS, Take by mouth, Disp: , Rfl:   •  OneTouch Verio test strip, USE AS DIRECTED FOUR TIMES DAILY, Disp: , Rfl:   •  potassium chloride (K-DUR,KLOR-CON) 20 mEq tablet, Take 1 tablet (20 mEq total) by mouth daily, Disp: 30 tablet, Rfl: 0  •  rivaroxaban (Xarelto) 20 mg tablet, Take 1 tablet (20 mg total) by mouth daily with breakfast, Disp: 30 tablet, Rfl: 5  •  senna (SENOKOT) 8.6 mg, Take 2 tablets (17.2 mg total) by mouth daily at bedtime as needed for constipation, Disp: 30 tablet, Rfl: 0  •  torsemide (DEMADEX) 20 mg tablet, Take 2 tablets (40 mg total) by mouth every morning AND 1 tablet (20 mg total) daily after lunch., Disp: 90 tablet, Rfl: 2  •  triamcinolone (KENALOG) 0.1 % cream, 1 Application 2 (two) times a day To affected areas, Disp: , Rfl:   Family History   Problem Relation Age of Onset   • Hypertension Mother    • Diabetes type II Mother    • Hypertension Father    • Diabetes type II Brother    • Hypertension Brother    • Kidney disease Sister    • Hypertension Daughter    • GI problems Daughter       Review of Systems   Constitutional: Negative.    HENT: Negative.     Eyes: Negative.    Respiratory: Negative.     Cardiovascular: Negative.    Gastrointestinal: Negative.    Endocrine: Negative.    Genitourinary: Negative.    Musculoskeletal: Negative.    Skin:  Positive for wound (Right foot subthird MPJ DFU).   Allergic/Immunologic: Negative.    Neurological: Negative.    Hematological: Negative.    Psychiatric/Behavioral: Negative.           Objective:  /62   Pulse 76   Temp (!) 96.7 °F (35.9 °C)   Resp 18     Physical  Exam  Constitutional:       Appearance: Normal appearance.   HENT:      Head: Normocephalic.      Right Ear: Tympanic membrane normal.      Left Ear: Tympanic membrane normal.      Nose: No congestion.      Mouth/Throat:      Pharynx: No oropharyngeal exudate or posterior oropharyngeal erythema.   Eyes:      Conjunctiva/sclera: Conjunctivae normal.      Pupils: Pupils are equal, round, and reactive to light.   Cardiovascular:      Rate and Rhythm: Normal rate and regular rhythm.      Pulses:           Dorsalis pedis pulses are 1+ on the right side and 1+ on the left side.        Posterior tibial pulses are 0 on the right side and 0 on the left side.   Pulmonary:      Effort: Pulmonary effort is normal.   Musculoskeletal:         General: Deformity present.      Right lower leg: Edema (+2) present.      Left lower leg: Edema (+2) present.      Comments: Plantarflexed third metatarsal bilateral   Feet:      Right foot:      Protective Sensation: 10 sites tested.  7 sites sensed.      Skin integrity: Ulcer (See comments), erythema and warmth present.      Left foot:      Protective Sensation: 10 sites tested.  7 sites sensed.      Skin integrity: Callus and dry skin present.      Comments: Right foot: Full-thickness necrotic diabetic foot ulcer subthird MPJ, SQ depth, unchanged size.  No maceration with continued hyperkeratotic margins.  Subtle hematoma distal and adjacent to ulcer, no signs of infection.  Skin:     General: Skin is warm and dry.      Capillary Refill: Capillary refill takes 2 to 3 seconds.      Coloration: Skin is not pale.      Findings: Erythema present. No bruising, lesion or rash.   Neurological:      Mental Status: He is alert.      Cranial Nerves: No cranial nerve deficit.      Sensory: Sensory deficit present.      Motor: No weakness.      Gait: Gait normal.      Deep Tendon Reflexes: Reflexes normal.   Psychiatric:         Mood and Affect: Mood normal.         Behavior: Behavior normal.         " Judgment: Judgment normal.         Wound 02/06/24 Diabetic Ulcer Foot Plantar;Distal;Right (Active)   Enter Calhoun score: Calhoun Grade 1: Partial or full-thickness ulcer (superficial) 03/27/24 1017   Wound Image   03/27/24 1041   Wound Description Granulation tissue 03/27/24 1017   Tash-wound Assessment Callus;Black;Other (Comment) 03/27/24 1017   Wound Length (cm) 0.4 cm 03/27/24 1017   Wound Width (cm) 0.3 cm 03/27/24 1017   Wound Depth (cm) 0.2 cm 03/27/24 1017   Wound Surface Area (cm^2) 0.12 cm^2 03/27/24 1017   Wound Volume (cm^3) 0.024 cm^3 03/27/24 1017   Calculated Wound Volume (cm^3) 0.02 cm^3 03/27/24 1017   Change in Wound Size % 97.37 03/27/24 1017   Drainage Amount Small 03/27/24 1017   Drainage Description Serosanguineous 03/27/24 1017   Non-staged Wound Description Full thickness 03/27/24 1017   Treatments Cleansed 02/19/24 1345   Wound packed? No 02/19/24 1345   Dressing Changed Changed 02/19/24 1345   Dressing Status Intact 03/27/24 1017       Debridement    Universal Protocol:  Consent: Verbal consent obtained.  Risks and benefits: risks, benefits and alternatives were discussed  Consent given by: patient  Time out: Immediately prior to procedure a \"time out\" was called to verify the correct patient, procedure, equipment, support staff and site/side marked as required.  Patient understanding: patient states understanding of the procedure being performed  Patient identity confirmed: verbally with patient    Debridement Details  Performed by: physician  Debridement type: surgical  Level of debridement: subcutaneous tissue  Pain control: lidocaine 4%      Post-debridement measurements  Length (cm): 0.7  Width (cm): 0.6  Depth (cm): 0.3  Percent debrided: 100%  Surface Area (cm^2): 0.42  Area Debrided (cm^2): 0.42  Volume (cm^3): 0.13    Tissue and other material debrided: subcutaneous tissue  Devitalized tissue debrided: callus, necrotic debris and slough  Instrument(s) utilized: nippers and " "blade  Bleeding: medium  Hemostasis obtained with: pressure  Procedural pain (0-10): 3  Post-procedural pain: 3   Response to treatment: procedure was tolerated well                 Wound Instructions:  Orders Placed This Encounter   Procedures   • Wound cleansing and dressings Diabetic Ulcer (wound) Plantar;Distal;Right Foot     Right foot wound:    Wash your hands with soap and water.  Remove old dressing, discard into plastic bag and place in trash.  Cleanse the wound with SOAP AND WATER prior to applying a clean dressing. Do not use tissue or cotton balls. Do not scrub the wound. Pat dry using gauze.  Shower YES WITH PROTECTION     Felt Pad applied to plantar foot for offloading--Leave in place   Apply MOISTURIZER to skin surrounding wound  Apply MUPIROCIN OINTMENT AND  ALGINATE AG to the RIGHT FOOT wound.  Cover with gauze/ABD  Secure with YONATHAN OR KERLIX AND TAPE  Secure with coban  Change dressing 3XWEEK  MONDAY WEDNESDAY AND FRIDAY       Off-loading Instructions:    Keep weight and pressure off wound at all times. Wear off-loading device as directed by your physician (darco wedge shoe). Put on immediately when rising in the morning and remove when going to bed.      Above applied at wound care center today     Standing Status:   Future     Standing Expiration Date:   3/27/2025   • Debridement     This order was created via procedure documentation         Julio César Mac DPM      Portions of the record may have been created with voice recognition software. Occasional wrong word or \"sound a like\" substitutions may have occurred due to the inherent limitations of voice recognition software. Read the chart carefully and recognize, using context, where substitutions have occurred.    "

## 2024-03-27 NOTE — PATIENT INSTRUCTIONS
Orders Placed This Encounter   Procedures    Wound cleansing and dressings Diabetic Ulcer (wound) Plantar;Distal;Right Foot     Right foot wound:    Wash your hands with soap and water.  Remove old dressing, discard into plastic bag and place in trash.  Cleanse the wound with SOAP AND WATER prior to applying a clean dressing. Do not use tissue or cotton balls. Do not scrub the wound. Pat dry using gauze.  Shower YES WITH PROTECTION     Felt Pad applied to plantar foot for offloading--Leave in place   Apply MOISTURIZER to skin surrounding wound  Apply MUPIROCIN OINTMENT AND  ALGINATE AG to the RIGHT FOOT wound.  Cover with gauze/ABD  Secure with YONATHAN OR KERLIX AND TAPE  Secure with coban  Change dressing 3XWEEK  MONDAY WEDNESDAY AND FRIDAY       Off-loading Instructions:    Keep weight and pressure off wound at all times. Wear off-loading device as directed by your physician (darco wedge shoe). Put on immediately when rising in the morning and remove when going to bed.      Above applied at wound care center today     Standing Status:   Future     Standing Expiration Date:   3/27/2025

## 2024-03-28 ENCOUNTER — HOME CARE VISIT (OUTPATIENT)
Dept: HOME HEALTH SERVICES | Facility: HOME HEALTHCARE | Age: 84
End: 2024-03-28
Payer: COMMERCIAL

## 2024-03-29 ENCOUNTER — HOME CARE VISIT (OUTPATIENT)
Dept: HOME HEALTH SERVICES | Facility: HOME HEALTHCARE | Age: 84
End: 2024-03-29
Payer: COMMERCIAL

## 2024-03-29 VITALS
TEMPERATURE: 98.3 F | RESPIRATION RATE: 16 BRPM | OXYGEN SATURATION: 98 % | HEART RATE: 71 BPM | BODY MASS INDEX: 30.98 KG/M2 | WEIGHT: 222.1 LBS | DIASTOLIC BLOOD PRESSURE: 60 MMHG | SYSTOLIC BLOOD PRESSURE: 112 MMHG

## 2024-03-29 PROCEDURE — G0299 HHS/HOSPICE OF RN EA 15 MIN: HCPCS

## 2024-04-01 ENCOUNTER — HOME CARE VISIT (OUTPATIENT)
Dept: HOME HEALTH SERVICES | Facility: HOME HEALTHCARE | Age: 84
End: 2024-04-01
Payer: COMMERCIAL

## 2024-04-01 VITALS
RESPIRATION RATE: 16 BRPM | SYSTOLIC BLOOD PRESSURE: 118 MMHG | DIASTOLIC BLOOD PRESSURE: 60 MMHG | HEART RATE: 69 BPM | TEMPERATURE: 98.8 F | OXYGEN SATURATION: 98 %

## 2024-04-01 PROCEDURE — G0299 HHS/HOSPICE OF RN EA 15 MIN: HCPCS

## 2024-04-03 ENCOUNTER — HOME CARE VISIT (OUTPATIENT)
Dept: HOME HEALTH SERVICES | Facility: HOME HEALTHCARE | Age: 84
End: 2024-04-03
Payer: COMMERCIAL

## 2024-04-03 VITALS
DIASTOLIC BLOOD PRESSURE: 80 MMHG | WEIGHT: 214 LBS | SYSTOLIC BLOOD PRESSURE: 140 MMHG | OXYGEN SATURATION: 98 % | BODY MASS INDEX: 29.85 KG/M2 | RESPIRATION RATE: 16 BRPM | TEMPERATURE: 98.6 F | HEART RATE: 82 BPM

## 2024-04-03 PROCEDURE — G0299 HHS/HOSPICE OF RN EA 15 MIN: HCPCS

## 2024-04-05 ENCOUNTER — HOME CARE VISIT (OUTPATIENT)
Dept: HOME HEALTH SERVICES | Facility: HOME HEALTHCARE | Age: 84
End: 2024-04-05
Payer: COMMERCIAL

## 2024-04-05 VITALS
HEART RATE: 70 BPM | SYSTOLIC BLOOD PRESSURE: 130 MMHG | TEMPERATURE: 98.5 F | OXYGEN SATURATION: 98 % | DIASTOLIC BLOOD PRESSURE: 70 MMHG | RESPIRATION RATE: 16 BRPM

## 2024-04-05 PROCEDURE — G0299 HHS/HOSPICE OF RN EA 15 MIN: HCPCS

## 2024-04-05 NOTE — CASE COMMUNICATION
Ship to [facility]     Branch: [facility]   Ordering MD Dr. Mac (home health only)        All items are ordered by the each unless otherwise noted.  Orders should be for a 2 week period (unless noted by insurance)          Dry Dressings   (Nonsterile gauze not covered by private insurance)  (Sterile gauze may be requested for insurance rather than nonsterile gauze)    Gauze Jennifer stretch roll 4inch n/s 12 rolls per unit  [id number]

## 2024-04-08 ENCOUNTER — HOME CARE VISIT (OUTPATIENT)
Dept: HOME HEALTH SERVICES | Facility: HOME HEALTHCARE | Age: 84
End: 2024-04-08
Payer: COMMERCIAL

## 2024-04-08 VITALS
RESPIRATION RATE: 16 BRPM | OXYGEN SATURATION: 99 % | DIASTOLIC BLOOD PRESSURE: 62 MMHG | HEART RATE: 69 BPM | TEMPERATURE: 98.9 F | SYSTOLIC BLOOD PRESSURE: 110 MMHG

## 2024-04-08 PROCEDURE — G0299 HHS/HOSPICE OF RN EA 15 MIN: HCPCS

## 2024-04-10 ENCOUNTER — HOME CARE VISIT (OUTPATIENT)
Dept: HOME HEALTH SERVICES | Facility: HOME HEALTHCARE | Age: 84
End: 2024-04-10
Payer: COMMERCIAL

## 2024-04-10 ENCOUNTER — OFFICE VISIT (OUTPATIENT)
Dept: WOUND CARE | Facility: HOSPITAL | Age: 84
End: 2024-04-10
Payer: COMMERCIAL

## 2024-04-10 VITALS
TEMPERATURE: 97.1 F | SYSTOLIC BLOOD PRESSURE: 132 MMHG | HEART RATE: 68 BPM | DIASTOLIC BLOOD PRESSURE: 58 MMHG | RESPIRATION RATE: 18 BRPM

## 2024-04-10 DIAGNOSIS — L97.512 DIABETIC ULCER OF OTHER PART OF RIGHT FOOT ASSOCIATED WITH TYPE 2 DIABETES MELLITUS, WITH FAT LAYER EXPOSED (HCC): Primary | ICD-10-CM

## 2024-04-10 DIAGNOSIS — E11.621 DIABETIC ULCER OF OTHER PART OF RIGHT FOOT ASSOCIATED WITH TYPE 2 DIABETES MELLITUS, WITH FAT LAYER EXPOSED (HCC): Primary | ICD-10-CM

## 2024-04-10 PROCEDURE — 97597 DBRDMT OPN WND 1ST 20 CM/<: CPT | Performed by: PODIATRIST

## 2024-04-10 NOTE — PATIENT INSTRUCTIONS
Orders Placed This Encounter   Procedures    Wound cleansing and dressings Diabetic Ulcer (wound) Plantar;Distal;Right Foot     Right foot wound:     Wash your hands with soap and water.  Remove old dressing, discard into plastic bag and place in trash.   Ok to shower   Cleanse the wound with SOAP AND WATER prior to applying a clean dressing. Do not use tissue or cotton balls. Do not scrub the wound. Pat dry using gauze.       Felt Pad applied to plantar foot for offloading-- change weekly (extra sent with patient)    Apply silicone bordered foam dressing  Change ever 3 days and as needed          You can now wear your normal shoe. Call [facility] for your inserts and orthotic shoes       Continue visiting nurses for dressing changes     Standing Status:   Future     Standing Expiration Date:   5/1/2024    Wound Procedure Treatment Diabetic Ulcer (wound) Plantar;Distal;Right Foot     This order was created via procedure documentation

## 2024-04-10 NOTE — CASE COMMUNICATION
[facility] to [facility]   Ordering MD (home health only) Julio César Mac    Wound 1 Right Plantar Foot    Partial XX    Frequency q 3 days    All items are ordered by the each unless otherwise noted.  Orders should be for a 2 week period (unless noted by insurance)  Foam Dressings    Sub for Allevyn:  Hydrocellular Foam Non-Adh. 4x4 [id number]      10

## 2024-04-10 NOTE — PROGRESS NOTES
Wound Procedure Treatment Diabetic Ulcer (wound) Plantar;Distal;Right Foot    Performed by: Cyn Burton RN  Authorized by: Julio César Mac DPM  Associated wounds:   Wound 02/06/24 Diabetic Ulcer Foot Plantar;Distal;Right  Wound cleansed with:  Soap and water  Applied to periwound:  Other  Applied primary dressing:  Other    Applied felt pad for offloading to periwound/plantar foot.  Applied silicone bordered foam dressing

## 2024-04-12 ENCOUNTER — HOME CARE VISIT (OUTPATIENT)
Dept: HOME HEALTH SERVICES | Facility: HOME HEALTHCARE | Age: 84
End: 2024-04-12
Payer: COMMERCIAL

## 2024-04-12 VITALS
TEMPERATURE: 97.8 F | HEART RATE: 78 BPM | BODY MASS INDEX: 30.96 KG/M2 | OXYGEN SATURATION: 98 % | RESPIRATION RATE: 16 BRPM | WEIGHT: 222 LBS | SYSTOLIC BLOOD PRESSURE: 118 MMHG | DIASTOLIC BLOOD PRESSURE: 60 MMHG

## 2024-04-12 PROCEDURE — G0299 HHS/HOSPICE OF RN EA 15 MIN: HCPCS

## 2024-04-19 NOTE — PROGRESS NOTES
Patient ID: Hammad Montesinos is a 83 y.o. male Date of Birth 1940       Chief Complaint   Patient presents with   • Follow Up Wound Care Visit     Right foot wound        Allergies:  Hydrochlorothiazide, Insulin lispro, Lisinopril, Minoxidil, Nifedipine, Other, Prazosin, Venlafaxine, Doxycycline, and Simvastatin    Diagnosis:  1. 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  Debridement of ulcer selective with 10 blade as noted below.  Continue with silver alginate every 3 days as needed.  Follow-up in 3 weeks.  Call if any signs of infection are noted.    Subjective:   4/10/2024: 83-year-old male seen today for follow-up chronic DFU right foot subthird MPJ.  Reports good progress since last visit with diminishing size and depth.    3/27/2024: 83-year-old type II diabetic presents for continued follow-up care of chronic DFU plantar right forefoot.  Reports no significant change since last visit with some area of slight discoloration that concerns him.    3/6/2024: 83-year-old male DM2 seen for follow-up of ulceration plantar right forefoot.  Reports good progress with diminishing size.    2/19/2024: 83-year-old male type II diabetic  presents for follow-up of new recurrent ulceration involving his right forefoot.  Patient reports good progress with less swelling and redness since taking antibiotic.  Was seen last in my [place] office and empirically started on Augmentin 875 twice daily.    2/12/2024: 83-year-old male type II diabetic seen today for evaluation of new issue/ulceration right forefoot which recently broke down within the past 2 weeks.  This is the site of a previous ulceration that was present 2 years ago.  It is now more red and swollen and draining fluid.  Patient was recently hospitalized with CHF causing him to have a lot of swelling in his legs.        The following portions of the patient's history were reviewed and updated as appropriate:   Patient Active Problem List   Diagnosis   • Type 2 diabetes mellitus with hyperglycemia, with long-term current use of insulin ([facility])   • Mixed hyperlipidemia   • Diabetic polyneuropathy associated with type 2 diabetes mellitus ([facility])   • Primary hypertension   • Morbid (severe) obesity due to excess calories ([facility])   • 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 ([facility])   • 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 ([facility])   • Elevated parathyroid hormone   • Diabetic nephropathy associated with type 2 diabetes mellitus (HCC)   • Type 2 diabetes mellitus with diabetic neuropathy, unspecified whether long term insulin use ([facility])   • Pre-ulcerative calluses   • Acute on chronic diastolic heart failure ([facility])   • Elevated troponin   • Pulmonary hypertension ([facility])   • Paroxysmal A-fib ([facility])   • 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, with fat layer exposed ([facility])     Past Medical History:   Diagnosis Date   • Asthma    • Bradycardia    • Carcinoma, basal cell, skin     head and face and ear   • COVID-19 12/05/2022   • Dupuytren contracture     bilateral ring and right middle   • Gout    • Herniated lumbar  intervertebral disc    • Sleep apnea      Past Surgical History:   Procedure Laterality Date   • APPENDECTOMY      age 20, ruptured   • CATARACT EXTRACTION, BILATERAL Bilateral    • CERVICAL FUSION     • CYST REMOVAL Left 01/2023   • DUPUYTREN CONTRACTURE RELEASE Left     left ring finger   • LUMBAR LAMINECTOMY      belkis placed   • TONSILLECTOMY     • VASECTOMY       Social History     Socioeconomic History   • Marital status: /Civil Union     Spouse name: Not on file   • Number of children: Not on file   • Years of education: Not on file   • Highest education level: Not on file   Occupational History   • Occupation: teacher     Comment: retired   Tobacco Use   • Smoking status: Never   • Smokeless tobacco: Never   Vaping Use   • Vaping status: Never Used   Substance and Sexual Activity   • Alcohol use: Not Currently   • Drug use: Never   • Sexual activity: Not on file   Other Topics Concern   • Not on file   Social History Narrative   • Not on file     Social Determinants of Health     Financial Resource Strain: Not on file   Food Insecurity: No Food Insecurity (12/29/2023)    Hunger Vital Sign    • Worried About Running Out of Food in the Last Year: Never true    • Ran Out of Food in the Last Year: Never true   Transportation Needs: No Transportation Needs (12/29/2023)    PRAPARE - Transportation    • Lack of Transportation (Medical): No    • Lack of Transportation (Non-Medical): No   Physical Activity: Not on file   Stress: Not on file   Social Connections: Not on file   Intimate Partner Violence: Not on file   Housing Stability: Low Risk  (12/29/2023)    Housing Stability Vital Sign    • Unable to Pay for Housing in the Last Year: No    • Number of Places Lived in the Last Year: 1    • Unstable Housing in the Last Year: No        Current Outpatient Medications:   •  allopurinol (ZYLOPRIM) 300 mg tablet, Take 300 mg by mouth every 24 hours, Disp: , Rfl:   •  atorvastatin (LIPITOR) 20 mg tablet, Take 20 mg  by mouth daily, Disp: , Rfl:   •  azelastine (ASTELIN) 0.1 % nasal spray, 2 sprays into each nostril 2 (two) times a day, Disp: , Rfl:   •  budesonide (PULMICORT) 0.5 mg/2 mL nebulizer solution, Take 0.5 mg by nebulization 2 (two) times a day Rinse mouth after use., Disp: , Rfl:   •  Cholecalciferol (Vitamin D3) 25 MCG (1000 UT) CAPS, Take 1 capsule by mouth every 24 hours, Disp: , Rfl:   •  Coenzyme Q10 (Co Q10) 100 MG CAPS, Take 100 mg by mouth in the morning, Disp: , Rfl:   •  Easy Comfort Pen Needles 33G X 4 MM MISC, USE WITH INSULIN FIVE TIMES DAILY AS DIRECTED, Disp: , Rfl:   •  fluticasone (FLONASE) 50 mcg/act nasal spray, 1 spray into each nostril 2 (two) times a day, Disp: , Rfl:   •  formoterol (PERFOROMIST) 20 MCG/2ML nebulizer solution, Take 20 mcg by nebulization 2 (two) times a day, Disp: , Rfl:   •  gabapentin (NEURONTIN) 100 mg capsule, Take 1 capsule (100 mg total) by mouth daily, Disp: 180 capsule, Rfl: 0  •  Glucosamine-Chondroitin 500-400 MG CAPS, Take 1 capsule by mouth daily, Disp: , Rfl:   •  insulin aspart (NovoLOG FlexPen) 100 UNIT/ML injection pen, INJECT 12 UNITS SUBCUTANEOUSLY with breakfast, LUNCH and with dinner plus sliding scale. (UP  UNITS DAILY). 150-200 :1 unit 201-250:2 units 251-300:3 units 301-350: 4 units 351-400:5 units 400+: 6 units, Disp: 45 mL, Rfl: 0  •  Insulin Glargine Solostar (Lantus SoloStar) 100 UNIT/ML SOPN, INJECT 20 UNITS SUBCUTANEOUSLY IN THE MORNING AND 20 UNITS IN THE EVENING., Disp: 30 mL, Rfl: 0  •  ipratropium-albuterol (DUO-NEB) 0.5-2.5 mg/3 mL nebulizer solution, Take 3 mL by nebulization 2 (two) times a day, Disp: , Rfl:   •  loratadine (CLARITIN) 10 mg tablet, Take 10 mg by mouth daily, Disp: , Rfl:   •  losartan (COZAAR) 100 MG tablet, Take 100 mg by mouth daily, Disp: , Rfl:   •  magnesium Oxide (MAG-OX) 400 mg TABS, Take 1 tablet (400 mg total) by mouth daily, Disp: 30 tablet, Rfl: 0  •  Multiple Vitamin (MULTIVITAMIN ADULT PO), every 24 hours,  Disp: , Rfl:   •  mupirocin (BACTROBAN) 2 % ointment, Apply 1 Application topically every other day 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, Disp: , Rfl:   •  mupirocin (BACTROBAN) 2 % ointment, Apply topically every other day With gauze, Disp: 22 g, Rfl: 0  •  Omega-3 Fatty Acids (Fish Oil) 360 MG CAPS, Take by mouth, Disp: , Rfl:   •  OneTouch Verio test strip, USE AS DIRECTED FOUR TIMES DAILY, Disp: , Rfl:   •  potassium chloride (K-DUR,KLOR-CON) 20 mEq tablet, Take 1 tablet (20 mEq total) by mouth daily, Disp: 30 tablet, Rfl: 0  •  rivaroxaban (Xarelto) 20 mg tablet, Take 1 tablet (20 mg total) by mouth daily with breakfast, Disp: 30 tablet, Rfl: 5  •  senna (SENOKOT) 8.6 mg, Take 2 tablets (17.2 mg total) by mouth daily at bedtime as needed for constipation, Disp: 30 tablet, Rfl: 0  •  torsemide (DEMADEX) 20 mg tablet, Take 2 tablets (40 mg total) by mouth every morning AND 1 tablet (20 mg total) daily after lunch., Disp: 90 tablet, Rfl: 2  •  triamcinolone (KENALOG) 0.1 % cream, 1 Application 2 (two) times a day To affected areas, Disp: , Rfl:   Family History   Problem Relation Age of Onset   • Hypertension Mother    • Diabetes type II Mother    • Hypertension Father    • Diabetes type II Brother    • Hypertension Brother    • Kidney disease Sister    • Hypertension Daughter    • GI problems Daughter       Review of Systems   Constitutional: Negative.    HENT: Negative.     Eyes: Negative.    Respiratory: Negative.     Cardiovascular: Negative.    Gastrointestinal: Negative.    Endocrine: Negative.    Genitourinary: Negative.    Musculoskeletal: Negative.    Skin:  Positive for wound (Right foot subthird MPJ DFU).   Allergic/Immunologic: Negative.    Neurological: Negative.    Hematological: Negative.    Psychiatric/Behavioral: Negative.           Objective:  /58   Pulse 68   Temp (!) 97.1 °F (36.2 °C)   Resp 18     Physical Exam  Constitutional:       Appearance:  Normal appearance.   HENT:      Head: Normocephalic.      Right Ear: Tympanic membrane normal.      Left Ear: Tympanic membrane normal.      Nose: No congestion.      Mouth/Throat:      Pharynx: No oropharyngeal exudate or posterior oropharyngeal erythema.   Eyes:      Conjunctiva/sclera: Conjunctivae normal.      Pupils: Pupils are equal, round, and reactive to light.   Cardiovascular:      Rate and Rhythm: Normal rate and regular rhythm.      Pulses:           Dorsalis pedis pulses are 1+ on the right side and 1+ on the left side.        Posterior tibial pulses are 0 on the right side and 0 on the left side.   Pulmonary:      Effort: Pulmonary effort is normal.   Musculoskeletal:         General: Deformity present.      Right lower leg: Edema (+2) present.      Left lower leg: Edema (+2) present.      Comments: Plantarflexed third metatarsal bilateral   Feet:      Right foot:      Protective Sensation: 10 sites tested.  7 sites sensed.      Skin integrity: Ulcer (See comments), erythema and warmth present.      Left foot:      Protective Sensation: 10 sites tested.  7 sites sensed.      Skin integrity: Callus and dry skin present.      Comments: Right foot: Full-thickness necrotic diabetic foot ulcer subthird MPJ, SQ depth, diminished size.  No maceration with continued hyperkeratotic margins.  Overall good progress.  No signs of infection.  Skin:     General: Skin is warm and dry.      Capillary Refill: Capillary refill takes 2 to 3 seconds.      Coloration: Skin is not pale.      Findings: Erythema present. No bruising, lesion or rash.   Neurological:      Mental Status: He is alert.      Cranial Nerves: No cranial nerve deficit.      Sensory: Sensory deficit present.      Motor: No weakness.      Gait: Gait normal.      Deep Tendon Reflexes: Reflexes normal.   Psychiatric:         Mood and Affect: Mood normal.         Behavior: Behavior normal.         Judgment: Judgment normal.         Wound 02/06/24 Diabetic  "Ulcer Foot Plantar;Distal;Right (Active)   Enter Calhoun score: Calhoun Grade 1: Partial or full-thickness ulcer (superficial) 04/10/24 1020   Wound Image   04/10/24 1021   Wound Description Brown;Eschar 04/10/24 1020   Tash-wound Assessment Dry;Callus 04/10/24 1020   Wound Length (cm) 0.4 cm 04/10/24 1020   Wound Width (cm) 0.3 cm 04/10/24 1020   Wound Depth (cm) 0 cm 04/10/24 1020   Wound Surface Area (cm^2) 0.12 cm^2 04/10/24 1020   Wound Volume (cm^3) 0 cm^3 04/10/24 1020   Calculated Wound Volume (cm^3) 0 cm^3 04/10/24 1020   Change in Wound Size % 100 04/10/24 1020   Drainage Amount None 04/10/24 1020   Drainage Description Serosanguineous 03/27/24 1017   Non-staged Wound Description Full thickness 03/27/24 1017   Treatments Cleansed 02/19/24 1345   Wound packed? No 02/19/24 1345   Dressing Changed Changed 02/19/24 1345   Dressing Status Intact 04/10/24 1020       Debridement   Wound 02/06/24 Diabetic Ulcer Foot Plantar;Distal;Right    Universal Protocol:  Consent: Verbal consent obtained.  Risks and benefits: risks, benefits and alternatives were discussed  Consent given by: patient  Time out: Immediately prior to procedure a \"time out\" was called to verify the correct patient, procedure, equipment, support staff and site/side marked as required.  Patient understanding: patient states understanding of the procedure being performed  Patient identity confirmed: verbally with patient    Debridement Details  Performed by: physician  Debridement type: selective  Pain control: lidocaine 4%      Post-debridement measurements  Length (cm): 0.4  Width (cm): 0.3  Depth (cm): 0.1  Percent debrided: 100%  Surface Area (cm^2): 0.12  Area Debrided (cm^2): 0.12  Volume (cm^3): 0.01    Devitalized tissue debrided: callus and slough  Instrument(s) utilized: blade  Bleeding: small  Hemostasis obtained with: pressure  Procedural pain (0-10): 2  Post-procedural pain: 2   Response to treatment: procedure was tolerated well          " "       Wound Instructions:  Orders Placed This Encounter   Procedures   • Wound cleansing and dressings Diabetic Ulcer (wound) Plantar;Distal;Right Foot     Right foot wound:     Wash your hands with soap and water.  Remove old dressing, discard into plastic bag and place in trash.   Ok to shower   Cleanse the wound with SOAP AND WATER prior to applying a clean dressing. Do not use tissue or cotton balls. Do not scrub the wound. Pat dry using gauze.       Felt Pad applied to plantar foot for offloading-- change weekly (extra sent with patient)    Apply silicone bordered foam dressing  Change ever 3 days and as needed          You can now wear your normal shoe. Call  Clinic for your inserts and orthotic shoes       Continue visiting nurses for dressing changes     Standing Status:   Future     Standing Expiration Date:   5/1/2024   • Wound Procedure Treatment Diabetic Ulcer (wound) Plantar;Distal;Right Foot     This order was created via procedure documentation   • Debridement Diabetic Ulcer (wound) Plantar;Distal;Right Foot     This order was created via procedure documentation         Julio César Mac DPM      Portions of the record may have been created with voice recognition software. Occasional wrong word or \"sound a like\" substitutions may have occurred due to the inherent limitations of voice recognition software. Read the chart carefully and recognize, using context, where substitutions have occurred.    "

## 2024-05-01 ENCOUNTER — OFFICE VISIT (OUTPATIENT)
Dept: WOUND CARE | Facility: HOSPITAL | Age: 84
End: 2024-05-01
Payer: COMMERCIAL

## 2024-05-01 VITALS
SYSTOLIC BLOOD PRESSURE: 124 MMHG | TEMPERATURE: 97.2 F | HEART RATE: 64 BPM | DIASTOLIC BLOOD PRESSURE: 54 MMHG | RESPIRATION RATE: 16 BRPM

## 2024-05-01 DIAGNOSIS — E11.621 DIABETIC ULCER OF OTHER PART OF RIGHT FOOT ASSOCIATED WITH TYPE 2 DIABETES MELLITUS, WITH FAT LAYER EXPOSED (HCC): Primary | ICD-10-CM

## 2024-05-01 DIAGNOSIS — E11.40 TYPE 2 DIABETES MELLITUS WITH DIABETIC NEUROPATHY, WITH LONG-TERM CURRENT USE OF INSULIN (HCC): ICD-10-CM

## 2024-05-01 DIAGNOSIS — Z79.4 TYPE 2 DIABETES MELLITUS WITH DIABETIC NEUROPATHY, WITH LONG-TERM CURRENT USE OF INSULIN (HCC): ICD-10-CM

## 2024-05-01 DIAGNOSIS — L97.512 DIABETIC ULCER OF OTHER PART OF RIGHT FOOT ASSOCIATED WITH TYPE 2 DIABETES MELLITUS, WITH FAT LAYER EXPOSED (HCC): Primary | ICD-10-CM

## 2024-05-01 PROCEDURE — 99213 OFFICE O/P EST LOW 20 MIN: CPT | Performed by: PODIATRIST

## 2024-05-01 PROCEDURE — 99212 OFFICE O/P EST SF 10 MIN: CPT | Performed by: PODIATRIST

## 2024-05-01 NOTE — PROGRESS NOTES
Wound Procedure Treatment Diabetic Ulcer (wound) Plantar;Distal;Right Foot    Performed by: Cyn Burton RN  Authorized by: Julio César Mac DPM  Associated wounds:   Wound 02/06/24 Diabetic Ulcer Foot Plantar;Distal;Right  Wound cleansed with:  Soap and water  Applied to periwound:  Moisture lotion

## 2024-05-01 NOTE — PATIENT INSTRUCTIONS
Orders Placed This Encounter   Procedures    Wound cleansing and dressings Diabetic Ulcer (wound) Plantar;Distal;Right Foot     Your wound is healed. Continue good daily skin care and moisturize daily. Monitor feet daily and continue to wear diabetic shoes.   Follow up at Dr Mca office ASAP  Call wound care center if your wound re-opens or you notice any drainage  Thank you for choosing [facility] wound care [place]     Standing Status:   Future     Standing Expiration Date:   5/1/2024       Orders Placed This Encounter   Procedures    Wound cleansing and dressings Diabetic Ulcer (wound) Plantar;Distal;Right Foot     Your wound is healed. Continue good daily skin care and moisturize daily. Monitor feet daily and continue to wear diabetic shoes.   Follow up at Dr Mac office ASAP  Call wound care center if your wound re-opens or you notice any drainage  Thank you for choosing [facility] wound care [place]     Standing Status:   Future     Standing Expiration Date:   5/1/2024

## 2024-05-01 NOTE — PROGRESS NOTES
Patient ID: Hammad Montesinos is a 83 y.o. male Date of Birth 1940       Chief Complaint   Patient presents with   • Follow Up Wound Care Visit     Nonhealing right plantar diabetic ulcer       Allergies:  Hydrochlorothiazide, Insulin lispro, Lisinopril, Minoxidil, Nifedipine, Other, Prazosin, Venlafaxine, Doxycycline, and Simvastatin    Diagnosis:  1. 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, with fat layer exposed (HCC)  -     Wound cleansing and dressings Diabetic Ulcer (wound) Plantar;Distal;Right Foot; Future  -     Wound Procedure Treatment Diabetic Ulcer (wound) Plantar;Distal;Right Foot    2. Type 2 diabetes mellitus with diabetic neuropathy, with long-term current use of insulin ([facility])       Diagnosis ICD-10-CM Associated Orders   1. 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, with fat layer exposed ([facility])  E11.621 Wound cleansing and dressings Diabetic Ulcer (wound) Plantar;Distal;Right Foot    L97.512 Wound Procedure Treatment Diabetic Ulcer (wound) Plantar;Distal;Right Foot      2. Type 2 diabetes mellitus with diabetic neuropathy, with long-term current use of insulin ([facility])  E11.40     Z79.4            Assessment & Plan:  Excellent progress with closure of wound.  Follow-up as scheduled for regular diabetic footcare.  Monitor right foot closely for recurrent breakdown as it is at risk.  Call if any signs of infection are noted.  Continue with current diabetic shoes though may require adjustment to prescription.    Subjective:   5/1/2024: 83-year-old type II diabetic seen today for follow-up chronic DFU right foot.  Reports excellent progress and feels the wound has finally closed.    4/10/2024: 83-year-old male seen today for follow-up chronic DFU right foot subthird MPJ.  Reports good progress since last visit with diminishing size and depth.    3/27/2024: 83-year-old type II diabetic presents for continued follow-up  care of chronic DFU plantar right forefoot.  Reports no significant change since last visit with some area of slight discoloration that concerns him.    3/6/2024: 83-year-old male DM2 seen for follow-up of ulceration plantar right forefoot.  Reports good progress with diminishing size.    2/19/2024: 83-year-old male type II diabetic presents for follow-up of new recurrent ulceration involving his right forefoot.  Patient reports good progress with less swelling and redness since taking antibiotic.  Was seen last in my [place] office and empirically started on Augmentin 875 twice daily.    2/12/2024: 83-year-old male type II diabetic seen today for evaluation of new issue/ulceration right forefoot which recently broke down within the past 2 weeks.  This is the site of a previous ulceration that was present 2 years ago.  It is now more red and swollen and draining fluid.  Patient was recently hospitalized with CHF causing him to have a lot of swelling in his legs.        The following portions of the patient's history were reviewed and updated as appropriate:   Patient Active Problem List   Diagnosis   • Type 2 diabetes mellitus with hyperglycemia, with long-term current use of insulin ([facility])   • Mixed hyperlipidemia   • Diabetic polyneuropathy associated with type 2 diabetes mellitus ([facility])   • Primary hypertension   • Morbid (severe) obesity due to excess calories ([facility])   • 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 ([facility])   • 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 ([facility])   • Elevated parathyroid hormone   • Diabetic nephropathy associated with type 2 diabetes mellitus ([facility])   • Type 2 diabetes mellitus with diabetic neuropathy, unspecified whether long term insulin use ([facility])   • Pre-ulcerative calluses   • Acute on chronic diastolic heart failure ([facility])   • Elevated troponin   • Pulmonary hypertension ([facility])   • Paroxysmal A-fib ([facility])   • Diabetic ulcer of other part of  right foot associated with type 2 diabetes mellitus, with fat layer exposed (HCC)     Past Medical History:   Diagnosis Date   • Asthma    • Bradycardia    • Carcinoma, basal cell, skin     head and face and ear   • COVID-19 12/05/2022   • Dupuytren contracture     bilateral ring and right middle   • Gout    • Herniated lumbar intervertebral disc    • Sleep apnea      Past Surgical History:   Procedure Laterality Date   • APPENDECTOMY      age 20, ruptured   • CATARACT EXTRACTION, BILATERAL Bilateral    • CERVICAL FUSION     • CYST REMOVAL Left 01/2023   • DUPUYTREN CONTRACTURE RELEASE Left     left ring finger   • LUMBAR LAMINECTOMY      belkis placed   • TONSILLECTOMY     • VASECTOMY       Social History     Socioeconomic History   • Marital status: /Civil Union     Spouse name: Not on file   • Number of children: Not on file   • Years of education: Not on file   • Highest education level: Not on file   Occupational History   • Occupation: teacher     Comment: retired   Tobacco Use   • Smoking status: Never   • Smokeless tobacco: Never   Vaping Use   • Vaping status: Never Used   Substance and Sexual Activity   • Alcohol use: Not Currently   • Drug use: Never   • Sexual activity: Not on file   Other Topics Concern   • Not on file   Social History Narrative   • Not on file     Social Determinants of Health     Financial Resource Strain: Not on file   Food Insecurity: No Food Insecurity (12/29/2023)    Hunger Vital Sign    • Worried About Running Out of Food in the Last Year: Never true    • Ran Out of Food in the Last Year: Never true   Transportation Needs: No Transportation Needs (12/29/2023)    PRAPARE - Transportation    • Lack of Transportation (Medical): No    • Lack of Transportation (Non-Medical): No   Physical Activity: Not on file   Stress: Not on file   Social Connections: Not on file   Intimate Partner Violence: Not on file   Housing Stability: Low Risk  (12/29/2023)    Housing Stability Vital Sign     • Unable to Pay for Housing in the Last Year: No    • Number of Places Lived in the Last Year: 1    • Unstable Housing in the Last Year: No        Current Outpatient Medications:   •  allopurinol (ZYLOPRIM) 300 mg tablet, Take 300 mg by mouth every 24 hours, Disp: , Rfl:   •  atorvastatin (LIPITOR) 20 mg tablet, Take 20 mg by mouth daily, Disp: , Rfl:   •  azelastine (ASTELIN) 0.1 % nasal spray, 2 sprays into each nostril 2 (two) times a day, Disp: , Rfl:   •  budesonide (PULMICORT) 0.5 mg/2 mL nebulizer solution, Take 0.5 mg by nebulization 2 (two) times a day Rinse mouth after use., Disp: , Rfl:   •  Cholecalciferol (Vitamin D3) 25 MCG (1000 UT) CAPS, Take 1 capsule by mouth every 24 hours, Disp: , Rfl:   •  Coenzyme Q10 (Co Q10) 100 MG CAPS, Take 100 mg by mouth in the morning, Disp: , Rfl:   •  Easy Comfort Pen Needles 33G X 4 MM MISC, USE WITH INSULIN FIVE TIMES DAILY AS DIRECTED, Disp: , Rfl:   •  fluticasone (FLONASE) 50 mcg/act nasal spray, 1 spray into each nostril 2 (two) times a day, Disp: , Rfl:   •  formoterol (PERFOROMIST) 20 MCG/2ML nebulizer solution, Take 20 mcg by nebulization 2 (two) times a day, Disp: , Rfl:   •  gabapentin (NEURONTIN) 100 mg capsule, Take 1 capsule (100 mg total) by mouth daily, Disp: 180 capsule, Rfl: 0  •  Glucosamine-Chondroitin 500-400 MG CAPS, Take 1 capsule by mouth daily, Disp: , Rfl:   •  insulin aspart (NovoLOG FlexPen) 100 UNIT/ML injection pen, INJECT 12 UNITS SUBCUTANEOUSLY with breakfast, LUNCH and with dinner plus sliding scale. (UP  UNITS DAILY). 150-200 :1 unit 201-250:2 units 251-300:3 units 301-350: 4 units 351-400:5 units 400+: 6 units, Disp: 45 mL, Rfl: 0  •  Insulin Glargine Solostar (Lantus SoloStar) 100 UNIT/ML SOPN, INJECT 20 UNITS SUBCUTANEOUSLY IN THE MORNING AND 20 UNITS IN THE EVENING., Disp: 30 mL, Rfl: 0  •  ipratropium-albuterol (DUO-NEB) 0.5-2.5 mg/3 mL nebulizer solution, Take 3 mL by nebulization 2 (two) times a day, Disp: , Rfl:   •   loratadine (CLARITIN) 10 mg tablet, Take 10 mg by mouth daily, Disp: , Rfl:   •  losartan (COZAAR) 100 MG tablet, Take 100 mg by mouth daily, Disp: , Rfl:   •  magnesium Oxide (MAG-OX) 400 mg TABS, Take 1 tablet (400 mg total) by mouth daily, Disp: 30 tablet, Rfl: 0  •  Multiple Vitamin (MULTIVITAMIN ADULT PO), every 24 hours, Disp: , Rfl:   •  mupirocin (BACTROBAN) 2 % ointment, Apply 1 Application topically every other day 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, Disp: , Rfl:   •  mupirocin (BACTROBAN) 2 % ointment, Apply topically every other day With gauze, Disp: 22 g, Rfl: 0  •  Omega-3 Fatty Acids (Fish Oil) 360 MG CAPS, Take by mouth, Disp: , Rfl:   •  OneTouch Verio test strip, USE AS DIRECTED FOUR TIMES DAILY, Disp: , Rfl:   •  potassium chloride (K-DUR,KLOR-CON) 20 mEq tablet, Take 1 tablet (20 mEq total) by mouth daily, Disp: 30 tablet, Rfl: 0  •  rivaroxaban (Xarelto) 20 mg tablet, Take 1 tablet (20 mg total) by mouth daily with breakfast, Disp: 30 tablet, Rfl: 5  •  senna (SENOKOT) 8.6 mg, Take 2 tablets (17.2 mg total) by mouth daily at bedtime as needed for constipation, Disp: 30 tablet, Rfl: 0  •  torsemide (DEMADEX) 20 mg tablet, Take 2 tablets (40 mg total) by mouth every morning AND 1 tablet (20 mg total) daily after lunch., Disp: 90 tablet, Rfl: 2  •  triamcinolone (KENALOG) 0.1 % cream, 1 Application 2 (two) times a day To affected areas, Disp: , Rfl:   Family History   Problem Relation Age of Onset   • Hypertension Mother    • Diabetes type II Mother    • Hypertension Father    • Diabetes type II Brother    • Hypertension Brother    • Kidney disease Sister    • Hypertension Daughter    • GI problems Daughter       Review of Systems   Constitutional: Negative.    HENT: Negative.     Eyes: Negative.    Respiratory: Negative.     Cardiovascular: Negative.    Gastrointestinal: Negative.    Endocrine: Negative.    Genitourinary: Negative.    Musculoskeletal:  Negative.    Skin:  Positive for wound (Right foot subthird MPJ DFU).   Allergic/Immunologic: Negative.    Neurological: Negative.    Hematological: Negative.    Psychiatric/Behavioral: Negative.           Objective:  /54   Pulse 64   Temp (!) 97.2 °F (36.2 °C)   Resp 16     Physical Exam  Constitutional:       Appearance: Normal appearance.   HENT:      Head: Normocephalic.      Right Ear: Tympanic membrane normal.      Left Ear: Tympanic membrane normal.      Nose: No congestion.      Mouth/Throat:      Pharynx: No oropharyngeal exudate or posterior oropharyngeal erythema.   Eyes:      Conjunctiva/sclera: Conjunctivae normal.      Pupils: Pupils are equal, round, and reactive to light.   Cardiovascular:      Rate and Rhythm: Normal rate and regular rhythm.      Pulses:           Dorsalis pedis pulses are 1+ on the right side and 1+ on the left side.        Posterior tibial pulses are 0 on the right side and 0 on the left side.   Pulmonary:      Effort: Pulmonary effort is normal.   Musculoskeletal:         General: Deformity present.      Right lower leg: Edema (+2) present.      Left lower leg: Edema (+2) present.      Comments: Plantarflexed third metatarsal bilateral   Feet:      Right foot:      Protective Sensation: 10 sites tested.  7 sites sensed.      Skin integrity: Ulcer (See comments), erythema and warmth present.      Left foot:      Protective Sensation: 10 sites tested.  7 sites sensed.      Skin integrity: Callus and dry skin present.      Comments: Right foot: Full-thickness necrotic diabetic foot ulcer subthird MPJ, has closed 100%.  Overall good progress.  No signs of infection.  Skin:     General: Skin is warm and dry.      Capillary Refill: Capillary refill takes 2 to 3 seconds.      Coloration: Skin is not pale.      Findings: Erythema present. No bruising, lesion or rash.   Neurological:      Mental Status: He is alert.      Cranial Nerves: No cranial nerve deficit.      Sensory:  Sensory deficit present.      Motor: No weakness.      Gait: Gait normal.      Deep Tendon Reflexes: Reflexes normal.   Psychiatric:         Mood and Affect: Mood normal.         Behavior: Behavior normal.         Judgment: Judgment normal.         Wound 02/06/24 Diabetic Ulcer Foot Plantar;Distal;Right (Active)   Enter Calhoun score: Calhoun Grade 1: Partial or full-thickness ulcer (superficial) 04/10/24 1020   Wound Image   05/01/24 0948   Wound Description Epithelialization 05/01/24 0948   Tash-wound Assessment Intact 05/01/24 0948   Wound Length (cm) 0 cm 05/01/24 0948   Wound Width (cm) 0 cm 05/01/24 0948   Wound Depth (cm) 0 cm 05/01/24 0948   Wound Surface Area (cm^2) 0 cm^2 05/01/24 0948   Wound Volume (cm^3) 0 cm^3 05/01/24 0948   Calculated Wound Volume (cm^3) 0 cm^3 05/01/24 0948   Change in Wound Size % 100 05/01/24 0948   Drainage Amount None 05/01/24 0948   Drainage Description Serosanguineous 03/27/24 1017   Non-staged Wound Description Not applicable 05/01/24 0948   Treatments Cleansed 02/19/24 1345   Wound packed? No 02/19/24 1345   Dressing Changed Changed 02/19/24 1345   Dressing Status Intact 05/01/24 0948       Procedures             Wound Instructions:  Orders Placed This Encounter   Procedures   • Wound cleansing and dressings Diabetic Ulcer (wound) Plantar;Distal;Right Foot     Your wound is healed. Continue good daily skin care and moisturize daily. Monitor feet daily and continue to wear diabetic shoes.   Follow up at Dr Mac office ASAP  Call wound care center if your wound re-opens or you notice any drainage  Thank you for choosing [facility] wound care center     Standing Status:   Future     Standing Expiration Date:   5/1/2024   • Wound Procedure Treatment Diabetic Ulcer (wound) Plantar;Distal;Right Foot     This order was created via procedure documentation         Julio César Mac DPM      Portions of the record may have been created with voice recognition software.  "Occasional wrong word or \"sound a like\" substitutions may have occurred due to the inherent limitations of voice recognition software. Read the chart carefully and recognize, using context, where substitutions have occurred.    "

## 2024-05-20 ENCOUNTER — OFFICE VISIT (OUTPATIENT)
Dept: PODIATRY | Facility: CLINIC | Age: 84
End: 2024-05-20
Payer: COMMERCIAL

## 2024-05-20 VITALS
BODY MASS INDEX: 31.5 KG/M2 | SYSTOLIC BLOOD PRESSURE: 121 MMHG | DIASTOLIC BLOOD PRESSURE: 71 MMHG | WEIGHT: 225 LBS | HEIGHT: 71 IN | HEART RATE: 71 BPM

## 2024-05-20 DIAGNOSIS — B35.1 ONYCHOMYCOSIS: Primary | ICD-10-CM

## 2024-05-20 DIAGNOSIS — L84 CORNS AND CALLUS: ICD-10-CM

## 2024-05-20 DIAGNOSIS — E11.621 DIABETIC ULCER OF TOE OF RIGHT FOOT ASSOCIATED WITH TYPE 2 DIABETES MELLITUS, LIMITED TO BREAKDOWN OF SKIN (HCC): ICD-10-CM

## 2024-05-20 DIAGNOSIS — E11.40 TYPE 2 DIABETES MELLITUS WITH DIABETIC NEUROPATHY, WITH LONG-TERM CURRENT USE OF INSULIN (HCC): ICD-10-CM

## 2024-05-20 DIAGNOSIS — L97.511 DIABETIC ULCER OF TOE OF RIGHT FOOT ASSOCIATED WITH TYPE 2 DIABETES MELLITUS, LIMITED TO BREAKDOWN OF SKIN (HCC): ICD-10-CM

## 2024-05-20 DIAGNOSIS — Z79.4 TYPE 2 DIABETES MELLITUS WITH DIABETIC NEUROPATHY, WITH LONG-TERM CURRENT USE OF INSULIN (HCC): ICD-10-CM

## 2024-05-20 PROCEDURE — 11056 PARNG/CUTG B9 HYPRKR LES 2-4: CPT | Performed by: PODIATRIST

## 2024-05-20 PROCEDURE — 97597 DBRDMT OPN WND 1ST 20 CM/<: CPT | Performed by: PODIATRIST

## 2024-05-20 PROCEDURE — 11721 DEBRIDE NAIL 6 OR MORE: CPT | Performed by: PODIATRIST

## 2024-05-20 NOTE — PROGRESS NOTES
"   PATIENT:  Hammad Montesinos  1940    ASSESSMENT:  1. Onychomycosis        2. Corns and callus        3. Diabetic ulcer of toe of right foot associated with type 2 diabetes mellitus, limited to breakdown of skin (HCC)  Debridement Diabetic Ulcer Anterior;Right Toe D1, great      4. Type 2 diabetes mellitus with diabetic neuropathy, with long-term current use of insulin (HCC)                Orders Placed This Encounter   Procedures   • Debridement Diabetic Ulcer Anterior;Right Toe D1, great          PLAN:  Disease prevention and related risk factors of diabetes were identified and discussed.    The patient was educated in proper foot wear for diabetics.    Educated in daily foot assessment and routine diabetic foot care.    Discussed the importance of controlling BS through diet and exercise.    The patient will follow up in 10 weeks for further diabetic foot exam and care.   Dispensed padding for right foot and advised patient to monitor that lesion closely for ulceration/breakdown.  Rx diabetic shoes with multidensity orthotics to accommodate patient's clinical deformity which causes his foot to breakdown and ulcerate.    Procedures: 41557, 41747  All mycotic toenails were reduced and debrided in length, width, and girth using a nail nipper and electric dremel.    All hyperkeratotic skin lesion(s) if present were sharply pared with a #10 scalpel with no evidence of ulceration/abscess.    Patient tolerated procedure(s) well without complications.    Debridement   Wound 05/20/24 Diabetic Ulcer Toe D1, great Anterior;Right    Universal Protocol:  Consent: Verbal consent obtained.  Risks and benefits: risks, benefits and alternatives were discussed  Consent given by: patient  Time out: Immediately prior to procedure a \"time out\" was called to verify the correct patient, procedure, equipment, support staff and site/side marked as required.  Patient understanding: patient states understanding of the procedure " "being performed  Patient identity confirmed: verbally with patient    Debridement Details  Performed by: physician  Debridement type: selective  Pain control: lidocaine 4%      Post-debridement measurements  Length (cm): 0.7  Width (cm): 0.7  Depth (cm): 0.1  Percent debrided: 100%  Surface Area (cm^2): 0.49  Area Debrided (cm^2): 0.49  Volume (cm^3): 0.05    Devitalized tissue debrided: callus and slough  Instrument(s) utilized: blade  Bleeding: small  Hemostasis obtained with: pressure  Procedural pain (0-10): 2  Post-procedural pain: 2   Response to treatment: procedure was tolerated well         HPI:  Hammad Montesinos is a 83 y.o.year old male seen for diabetic foot exam.  The patient has class findings with diabetes.   BS is under control.  The patient complained of thick toenails and painful lesions which have previously ulcerated.  Patient is concerned with possible breakdown of tip of his right great toe.  The patient denied any acute pedal disorder, redness, acute swelling, or recent injury.      The following portions of the patient's history were reviewed and updated as appropriate: allergies, current medications, past family history, past medical history, past social history, past surgical history and problem list.    REVIEW OF SYSTEMS:  GENERAL: No fever or chills  HEART: No chest pain, or palpitation  RESPIRATORY:  No acute SOB or cough  GI: No Nausea, vomit or diarrhea  NEUROLOGIC: No syncope or acute weakness    PHYSICAL EXAM:    /71 (BP Location: Left arm, Patient Position: Sitting, Cuff Size: Adult)   Pulse 71   Ht 5' 11\" (1.803 m) Comment: STATED  Wt 102 kg (225 lb)   BMI 31.38 kg/m²     VASCULAR EXAM  Posterior tibial artery absent bilateral  Dorsalis pedis artery +1 bilateral  The patient has class findings with skin atrophy, lack of digital hair, and nail dystrophy.    There is +1 lower extremity edema bilaterally.    Venous stasis skin changes noted BLE. No    NEUROLOGIC " EXAM  Sensation is intact to light touch. Yes   Sensation is intact to 10gm monofilament.  Vibratory sensation moderately diminished.     Tingling numb paresthesias noted bilateral.      No focal neurologic deficit.     DERMATOLOGIC EXAM:   Texture, Tone and Turgor are diminished bilateral.  The patient has dystrophic/hypertrophic toenails with yellow/white discoloration, onycholysis, and subungal debris.   Fungal odor noted.  Brittle nature noted.  Right foot nails severely dystrophic x3 with 0.4 cm ave thickness (1 2 and 4)  Left foot nails severely dystrophic x3 with 0.4 cm ave thickness (1 2 and 3)  Patient has hyperkeratotic lesions noted:  Right foot at subsecond MPJ approximately 1.5 cm² in size, preulcerative, hemorrhage and subtle maceration within callus noted.  Left foot at subsecond MPJ, similar to contralateral side in presentation.    Ulceration/wounds:  Partial depth breakdown distal tip of right great toe measuring 0.7 x 0.7 x 0.1 cm.  Hyperkeratotic tissue with slight maceration noted.  No signs of infection.  Scant bleeding with debridement.    No notable suspicious skin lesions.      MUSCULOSKELETAL EXAM:   No acute joint pain, edema, or redness.  No acute musculoskeletal problem.    Patient has deformity including hallux valgus with contracted hammertoes, and plantarflexed second metatarsal bilateral. Patient has minimal ambulation limitations.      Patient wears DM shoes? Yes    Risk Category/Class Findings: Q8 (B1, B2 ABC)  0 = No loss of protective sensation    A1)  Has the patient had a previous amputation of the foot or integral skeletal portion thereof? No  B1)  Does the patient have absent posterior tibial pulse? Yes  B3)  Does the patient have absent dorsalis pedis? No  B2)  Does the patient have three of the following? Yes           1.  Hair growth (increased or decreased), 2.  Nail changes (thickening) and 3.  Pigmentary changes (discoloring)  C)  Does the patient have two of the  following and one above? Yes            3.  Edema and 4.  Paraesthesias (abnormal spontaneous sensations in the feet)

## 2024-05-22 ENCOUNTER — TELEPHONE (OUTPATIENT)
Age: 84
End: 2024-05-22

## 2024-05-22 NOTE — TELEPHONE ENCOUNTER
Hammad reporting increased bleeding. He has small cut on his hand that is frequently bleeding. He experienced increased bleeding from a hemorrhoid and is developing blood blisters on his toes.  Pt is taking Xarelto 20 mg daily  I advised this is a side effect to Xarelto, but will send a message to Dr Holland.

## 2024-05-24 ENCOUNTER — REMOTE DEVICE CLINIC VISIT (OUTPATIENT)
Dept: CARDIOLOGY CLINIC | Facility: CLINIC | Age: 84
End: 2024-05-24
Payer: COMMERCIAL

## 2024-05-24 DIAGNOSIS — Z95.0 CARDIAC PACEMAKER IN SITU: Primary | ICD-10-CM

## 2024-05-24 PROCEDURE — 93294 REM INTERROG EVL PM/LDLS PM: CPT | Performed by: INTERNAL MEDICINE

## 2024-05-24 PROCEDURE — 93296 REM INTERROG EVL PM/IDS: CPT | Performed by: INTERNAL MEDICINE

## 2024-05-30 ENCOUNTER — OFFICE VISIT (OUTPATIENT)
Dept: PODIATRY | Facility: CLINIC | Age: 84
End: 2024-05-30
Payer: COMMERCIAL

## 2024-05-30 VITALS
BODY MASS INDEX: 31.84 KG/M2 | HEIGHT: 71 IN | SYSTOLIC BLOOD PRESSURE: 130 MMHG | HEART RATE: 72 BPM | DIASTOLIC BLOOD PRESSURE: 79 MMHG | WEIGHT: 227.4 LBS

## 2024-05-30 DIAGNOSIS — L84 PRE-ULCERATIVE CALLUSES: ICD-10-CM

## 2024-05-30 DIAGNOSIS — E11.40 TYPE 2 DIABETES MELLITUS WITH DIABETIC NEUROPATHY, WITH LONG-TERM CURRENT USE OF INSULIN (HCC): ICD-10-CM

## 2024-05-30 DIAGNOSIS — E11.621 DIABETIC ULCER OF TOE OF RIGHT FOOT ASSOCIATED WITH TYPE 2 DIABETES MELLITUS, LIMITED TO BREAKDOWN OF SKIN (HCC): Primary | ICD-10-CM

## 2024-05-30 DIAGNOSIS — Z79.4 TYPE 2 DIABETES MELLITUS WITH DIABETIC NEUROPATHY, WITH LONG-TERM CURRENT USE OF INSULIN (HCC): ICD-10-CM

## 2024-05-30 DIAGNOSIS — L97.511 DIABETIC ULCER OF TOE OF RIGHT FOOT ASSOCIATED WITH TYPE 2 DIABETES MELLITUS, LIMITED TO BREAKDOWN OF SKIN (HCC): Primary | ICD-10-CM

## 2024-05-30 PROCEDURE — 99212 OFFICE O/P EST SF 10 MIN: CPT | Performed by: PODIATRIST

## 2024-05-30 NOTE — PROGRESS NOTES
Assessment/Plan:   Excellent progress with no further breakdown or ulcerations noted.  Monitor right great toe closely for possible breakdown since last evaluation it was noted to be preulcerative.  Encouraged good diabetic blood sugar management and follow-up as scheduled for diabetic footcare.     Diagnoses and all orders for this visit:    Diabetic ulcer of toe of right foot associated with type 2 diabetes mellitus, limited to breakdown of skin (HCC)    Type 2 diabetes mellitus with diabetic neuropathy, with long-term current use of insulin (HCC)    Pre-ulcerative calluses          Subjective:     Patient ID: Hammad Montesinos is a 83 y.o. male.    HPI    Review of Systems   Constitutional: Negative.    HENT: Negative.     Eyes: Negative.    Respiratory: Negative.     Cardiovascular: Negative.    Gastrointestinal: Negative.    Endocrine: Negative.    Genitourinary: Negative.    Musculoskeletal: Negative.    Skin:  Positive for wound (Right foot subthird MPJ DFU has closed).        Preulcerative area distal tip of right great toe has improved and is less painful   Allergic/Immunologic: Negative.    Neurological: Negative.    Hematological: Negative.    Psychiatric/Behavioral: Negative.           Objective:     Physical Exam  Constitutional:       Appearance: Normal appearance.   HENT:      Head: Normocephalic.      Right Ear: Tympanic membrane normal.      Left Ear: Tympanic membrane normal.      Nose: No congestion.      Mouth/Throat:      Pharynx: No oropharyngeal exudate or posterior oropharyngeal erythema.   Eyes:      Conjunctiva/sclera: Conjunctivae normal.      Pupils: Pupils are equal, round, and reactive to light.   Cardiovascular:      Rate and Rhythm: Normal rate and regular rhythm.      Pulses:           Dorsalis pedis pulses are 1+ on the right side and 1+ on the left side.        Posterior tibial pulses are 0 on the right side and 0 on the left side.   Pulmonary:      Effort: Pulmonary effort is  normal.   Musculoskeletal:      Right foot: Decreased range of motion.      Left foot: Decreased range of motion.   Feet:      Right foot:      Protective Sensation: 10 sites tested.  7 sites sensed.      Skin integrity: Ulcer (Subthird MPJ ulcer has closed.  Hyperkeratotic lesion with hemorrhage noted in the callus tissue present.  No signs of infection.) present.      Left foot:      Protective Sensation: 10 sites tested.  7 sites sensed.   Skin:     General: Skin is warm and dry.      Capillary Refill: Capillary refill takes 2 to 3 seconds.      Coloration: Skin is not pale.      Findings: No bruising, erythema, lesion or rash.      Comments: Distal tip right great toe remains closed with no drainage or signs of breakdown.  Superficial hyperkeratotic eschar present.  Overall much better than last evaluation.   Neurological:      Mental Status: He is alert.      Cranial Nerves: No cranial nerve deficit.      Sensory: Sensory deficit present.      Motor: No weakness.      Gait: Gait normal.      Deep Tendon Reflexes: Reflexes normal.   Psychiatric:         Mood and Affect: Mood normal.         Behavior: Behavior normal.         Judgment: Judgment normal.

## 2024-06-04 ENCOUNTER — HOSPITAL ENCOUNTER (OUTPATIENT)
Dept: CT IMAGING | Facility: HOSPITAL | Age: 84
Discharge: HOME | End: 2024-06-04
Payer: COMMERCIAL

## 2024-06-04 ENCOUNTER — APPOINTMENT (OUTPATIENT)
Dept: RADIOLOGY | Facility: HOSPITAL | Age: 84
End: 2024-06-04
Payer: COMMERCIAL

## 2024-06-04 DIAGNOSIS — R91.8 OTHER NONSPECIFIC ABNORMAL FINDING OF LUNG FIELD: ICD-10-CM

## 2024-06-04 PROCEDURE — 71250 CT THORAX DX C-: CPT

## 2024-06-14 ENCOUNTER — TELEPHONE (OUTPATIENT)
Age: 84
End: 2024-06-14

## 2024-06-14 NOTE — TELEPHONE ENCOUNTER
Patient calling about his lab results. I read him the note from Jonn BLAIR about discussing results at 6/27 appointment. No further questions at this time.

## 2024-06-27 ENCOUNTER — OFFICE VISIT (OUTPATIENT)
Dept: ENDOCRINOLOGY | Facility: HOSPITAL | Age: 84
End: 2024-06-27
Payer: COMMERCIAL

## 2024-06-27 VITALS
WEIGHT: 225 LBS | DIASTOLIC BLOOD PRESSURE: 62 MMHG | OXYGEN SATURATION: 98 % | HEIGHT: 71 IN | SYSTOLIC BLOOD PRESSURE: 110 MMHG | HEART RATE: 61 BPM | BODY MASS INDEX: 31.5 KG/M2

## 2024-06-27 DIAGNOSIS — E11.40 TYPE 2 DIABETES MELLITUS WITH DIABETIC NEUROPATHY, UNSPECIFIED WHETHER LONG TERM INSULIN USE (HCC): Primary | ICD-10-CM

## 2024-06-27 PROCEDURE — 99214 OFFICE O/P EST MOD 30 MIN: CPT | Performed by: PHYSICIAN ASSISTANT

## 2024-06-27 NOTE — PATIENT INSTRUCTIONS
Monitor diet and maintain activity.     Continue Lantus 20 units twice a day.     Call the office if still having low blood sugars.    Continue NovoLog 18 unit with breakfast and lunch if eating those meals, and increase to 22 units with dinner.     Take 1 extra unit of insulin for every 50 points above 150.  151-200: 1 unit  201-250: 2 units  251-300: 3 units  301-350: 4 units  351-400: 5 units  400+: 6 units     Continue gabapentin to 200 mg at night.     Continue to use Dexcom to monitor blood sugar. Call the office with any concerns.     Follow up in 3 months with lab work prior to visit.

## 2024-06-27 NOTE — PROGRESS NOTES
Hammad Montesinos 83 y.o. male MRN: [id number]    Encounter: [id number]      Assessment & Plan     Assessment:  This is a 83 y.o.-year-old male with type 2 diabetes with hyperglycemic unawareness, neuropathy, hypertension and hyperlipidemia.    Plan:  1. Type 2 diabetes, insulin requiring: Most recent hemoglobin A1c was 8.1.  For his age and chronic medical conditions this is not bad, but I would like to try and get it back below 8.  Unfortunately he is having issues with his clarity breanne and we are unable to download a recent Dexcom report.  We do have a report from the end of April beginning of May and reviewing previous Dexcom downloads there is a similar trend.  Glucose levels typically significantly increased with dinner, so I would like to increase his NovoLog with dinner to 22 units plus sliding scale.  He will continue with Lantus 20 units daily and NovoLog 18 units with breakfast and lunch.  I asked him to contact the office in the meantime if there is any concerns with his blood sugar.  Once clarity breanne is fixed, I would like to see a download with 2 weeks worth of blood sugars so I can make further adjustments to his insulin.  Follow-up in 3 months with lab work completed prior to visit.     2. Hypoglycemic unawareness:  Has had several episodes of loss of consciousness due to hypoglycemia with ambulance called.  Currently utilizing Dexcom G7.     3. Diabetic neuropathy:  Stable. Diabetic foot exam is up-to-date.     4. Hypertension:  Normotensive in the office.  Kidney function remains stable with normal electrolytes.  May have a little bit of dehydration going on with this set of lab work.  Make sure to drink plenty water throughout the day.  Continue with current medication.  Repeat CMP prior to next office visit.     5. Hyperlipidemia:  Previous lipid panel was excellent.  Continue with current medications.  We will continue to monitor over time.    CC: Type 2 diabetes follow-up    History of Present  Illness     HPI:  Hammad Montesinos is a 83 year old male with type 2 diabetes, insulin requiring with neuropathy for 50 years, hyperlipidemia, hypertension for follow-up.  He was diagnosed with type 2 diabetes in his 30s.  He originally went on metformin therapy.  He was never on any other oral agents as metformin controlled his blood sugars for the 1st several years and then he was switched to insulin therapy.  He reports that Humalog insulin does not improve his blood sugars as well as NovoLog insulin does.  He is on insulin at home and takes Lantus insulin 20 units twice a day, and NovoLog 18 units with meals plus a variable sliding scale based on what he thinks he needs.  States with dinner he usually will take more insulin as he has noticed significant increases.  He denies any polyuria, polydipsia, nocturia and blurry vision.  He has once a night nocturia.  He denies chest pain or shortness of breath.  He has occasional shooting pains of the legs at night and has numbness of the feet to his knees bilaterally.  He denies retinopathy, heart attack, stroke and claudication but does admit to neuropathy and nephropathy.  He reports he was to Diabetes Education on diagnosis in several times since then has seen 1 on 1 with a dietitian and gone to diabetes classes.  Does pay attention to what he is eating and to eat a diabetic diet.  States when he is eating in his room he make smarter choices, but when he is eating in the dining abdi typically will eat more.        Hypoglycemic episodes: Rare episodes.  H/o of hypoglycemia causing hospitalization or intervention such as glucagon injection  or ambulance call  Yes. Has been loc in the past requiring 911 many years ago.  Hypoglycemia symptoms: sweating and and feel bad or no symptoms at all.  Treatment of hypoglycemia: either cookies or glucose tablets.  Glucagon:Yes.  Medic alert tag: recommended,Yes.      The patient's last eye exam was in March 2022 in [place] at  [facility] with no retinopathy.  The patient's last foot exam was in December 2023 at endocrine office.  His recent A1c completed May 6, 2024 was 8.1.     Blood Sugar/Glucometer/Pump/CGM review: Unable to download a recent Dexcom report.     He has hyperlipidemia and takes atorvastatin 20 mg daily.  He denies chest pain or shortness of breath.       He has hypertension and takes amlodipine 10 mg daily and losartan 100 mg daily along with furosemide 40 mg twice a day.  He denies headache or stroke-like symptoms.     Review of Systems   Constitutional:  Negative for activity change, appetite change, fatigue and unexpected weight change.   HENT:  Negative for trouble swallowing.    Eyes:  Positive for visual disturbance (Wears glasses).   Respiratory:  Positive for shortness of breath. Negative for chest tightness.    Cardiovascular:  Negative for chest pain, palpitations and leg swelling.   Gastrointestinal:  Negative for abdominal pain, diarrhea, nausea and vomiting.   Endocrine: Negative for cold intolerance, heat intolerance, polydipsia, polyphagia and polyuria.   Genitourinary:  Negative for frequency.   Musculoskeletal:  Positive for arthralgias, back pain and gait problem (Utilizes a walker).   Skin:  Negative for rash and wound.   Neurological:  Positive for tremors. Negative for dizziness, weakness, light-headedness, numbness and headaches.   Psychiatric/Behavioral:  Negative for dysphoric mood and sleep disturbance. The patient is not nervous/anxious.        Historical Information   Past Medical History:   Diagnosis Date   • Asthma    • Bradycardia    • Carcinoma, basal cell, skin     head and face and ear   • COVID-19 12/05/2022   • Dupuytren contracture     bilateral ring and right middle   • Gout    • Herniated lumbar intervertebral disc    • Sleep apnea      Past Surgical History:   Procedure Laterality Date   • APPENDECTOMY      age 20, ruptured   • CATARACT EXTRACTION, BILATERAL Bilateral    •  CERVICAL FUSION     • CYST REMOVAL Left 01/2023   • DUPUYTREN CONTRACTURE RELEASE Left     left ring finger   • LUMBAR LAMINECTOMY      belkis placed   • TONSILLECTOMY     • VASECTOMY       Social History   Social History     Substance and Sexual Activity   Alcohol Use Not Currently     Social History     Substance and Sexual Activity   Drug Use Never     Social History     Tobacco Use   Smoking Status Never   Smokeless Tobacco Never     Family History:   Family History   Problem Relation Age of Onset   • Hypertension Mother    • Diabetes type II Mother    • Hypertension Father    • Diabetes type II Brother    • Hypertension Brother    • Kidney disease Sister    • Hypertension Daughter    • GI problems Daughter        Meds/Allergies   Current Outpatient Medications   Medication Sig Dispense Refill   • allopurinol (ZYLOPRIM) 300 mg tablet Take 300 mg by mouth every 24 hours     • atorvastatin (LIPITOR) 20 mg tablet Take 20 mg by mouth daily     • azelastine (ASTELIN) 0.1 % nasal spray 2 sprays into each nostril 2 (two) times a day     • budesonide (PULMICORT) 0.5 mg/2 mL nebulizer solution Take 0.5 mg by nebulization 2 (two) times a day Rinse mouth after use.     • Cholecalciferol (Vitamin D3) 25 MCG (1000 UT) CAPS Take 1 capsule by mouth every 24 hours     • Coenzyme Q10 (Co Q10) 100 MG CAPS Take 100 mg by mouth in the morning     • Easy Comfort Pen Needles 33G X 4 MM MISC USE WITH INSULIN FIVE TIMES DAILY AS DIRECTED     • fluticasone (FLONASE) 50 mcg/act nasal spray 1 spray into each nostril 2 (two) times a day     • formoterol (PERFOROMIST) 20 MCG/2ML nebulizer solution Take 20 mcg by nebulization 2 (two) times a day     • gabapentin (NEURONTIN) 100 mg capsule Take 1 capsule (100 mg total) by mouth daily 180 capsule 0   • Glucosamine-Chondroitin 500-400 MG CAPS Take 1 capsule by mouth daily     • insulin aspart (NovoLOG FlexPen) 100 UNIT/ML injection pen INJECT 12 UNITS SUBCUTANEOUSLY with breakfast, LUNCH and with  dinner plus sliding scale. (UP  UNITS DAILY). 150-200 :1 unit 201-250:2 units 251-300:3 units 301-350: 4 units 351-400:5 units 400+: 6 units 45 mL 0   • Insulin Glargine Solostar (Lantus SoloStar) 100 UNIT/ML SOPN INJECT 20 UNITS SUBCUTANEOUSLY IN THE MORNING AND 20 UNITS IN THE EVENING. 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     • mupirocin (BACTROBAN) 2 % ointment Apply topically every other day With gauze 22 g 0   • Omega-3 Fatty Acids (Fish Oil) 360 MG CAPS Take by mouth     • OneTouch Verio test strip USE AS DIRECTED FOUR TIMES DAILY     • potassium chloride (K-DUR,KLOR-CON) 20 mEq tablet Take 1 tablet (20 mEq total) by mouth daily 30 tablet 0   • rivaroxaban (Xarelto) 20 mg tablet Take 1 tablet (20 mg total) by mouth daily with breakfast 30 tablet 5   • senna (SENOKOT) 8.6 mg Take 2 tablets (17.2 mg total) by mouth daily at bedtime as needed for constipation 30 tablet 0   • torsemide (DEMADEX) 20 mg tablet Take 2 tablets (40 mg total) by mouth every morning AND 1 tablet (20 mg total) daily after lunch. 90 tablet 2   • triamcinolone (KENALOG) 0.1 % cream 1 Application 2 (two) times a day To affected areas       No current facility-administered medications for this visit.     Allergies   Allergen Reactions   • Hydrochlorothiazide Other (See Comments)     SEVERE DIZZINESS   • Insulin Lispro Other (See Comments)     Cannot control blood sugars(Humalog)   • Lisinopril Other (See  "Comments)     Reports cough with use    • Minoxidil GI Intolerance   • Nifedipine Other (See Comments)     EDEMA LEGS   • Other Other (See Comments)     SEVERE DIZZINESS  Hydrap-es   • Prazosin Other (See Comments)     CHEST PAIN     • Venlafaxine Other (See Comments)     Made pt feel bad     • Doxycycline Rash   • Simvastatin Rash     PEDAL EDEMA         Objective   Vitals: Blood pressure 110/62, pulse 61, height 5' 11\" (1.803 m), weight 102 kg (225 lb), SpO2 98%.    Physical Exam  Vitals and nursing note reviewed.   Constitutional:       General: He is not in acute distress.     Appearance: Normal appearance. He is not diaphoretic.   HENT:      Head: Normocephalic and atraumatic.   Eyes:      General: No scleral icterus.     Extraocular Movements: Extraocular movements intact.      Conjunctiva/sclera: Conjunctivae normal.      Pupils: Pupils are equal, round, and reactive to light.   Neck:      Thyroid: No thyroid mass, thyromegaly or thyroid tenderness.   Cardiovascular:      Rate and Rhythm: Normal rate and regular rhythm.      Heart sounds: No murmur heard.  Pulmonary:      Effort: Pulmonary effort is normal. No respiratory distress.      Breath sounds: Normal breath sounds. No wheezing.   Musculoskeletal:      Cervical back: Normal range of motion.      Right lower leg: No edema.      Left lower leg: No edema.   Lymphadenopathy:      Cervical: No cervical adenopathy.   Skin:     General: Skin is warm and dry.   Neurological:      Mental Status: He is alert and oriented to person, place, and time. Mental status is at baseline.      Sensory: No sensory deficit.      Gait: Gait abnormal (Utilizes walker).   Psychiatric:         Mood and Affect: Mood normal.         Behavior: Behavior normal.         Thought Content: Thought content normal.         The history was obtained from the review of the chart, patient.    Lab Results:   Lab Results   Component Value Date/Time    Hemoglobin A1C 6.8 11/16/2023 12:00 AM    " "Hemoglobin A1C 8.1 08/03/2023 12:00 AM    WBC 6.54 01/08/2024 04:33 AM    WBC 6.88 01/07/2024 03:52 AM    WBC 6.32 01/06/2024 04:45 AM    Hemoglobin 11.7 (L) 01/08/2024 04:33 AM    Hemoglobin 11.3 (L) 01/07/2024 03:52 AM    Hemoglobin 10.9 (L) 01/06/2024 04:45 AM    Hematocrit 37.8 01/08/2024 04:33 AM    Hematocrit 36.1 (L) 01/07/2024 03:52 AM    Hematocrit 34.7 (L) 01/06/2024 04:45 AM    MCV 81 (L) 01/08/2024 04:33 AM    MCV 80 (L) 01/07/2024 03:52 AM    MCV 80 (L) 01/06/2024 04:45 AM    Platelets 191 01/08/2024 04:33 AM    Platelets 175 01/07/2024 03:52 AM    Platelets 154 01/06/2024 04:45 AM    BUN 59 (H) 01/08/2024 04:33 AM    BUN 56 (H) 01/07/2024 03:52 AM    BUN 51 (H) 01/06/2024 04:45 AM    Potassium 4.2 01/08/2024 04:33 AM    Potassium 3.7 01/07/2024 03:52 AM    Potassium 3.1 (L) 01/06/2024 04:45 AM    Chloride 96 01/08/2024 04:33 AM    Chloride 95 (L) 01/07/2024 03:52 AM    Chloride 97 01/06/2024 04:45 AM    CO2 31 01/08/2024 04:33 AM    CO2 33 (H) 01/07/2024 03:52 AM    CO2 33 (H) 01/06/2024 04:45 AM    Creatinine 1.47 (H) 01/08/2024 04:33 AM    Creatinine 1.43 (H) 01/07/2024 03:52 AM    Creatinine 1.37 (H) 01/06/2024 04:45 AM    AST 19 01/08/2024 04:33 AM    AST 20 01/07/2024 03:52 AM    AST 20 01/06/2024 04:45 AM    ALT 16 01/08/2024 04:33 AM    ALT 16 01/07/2024 03:52 AM    ALT 15 01/06/2024 04:45 AM    Total Protein 7.1 01/08/2024 04:33 AM    Total Protein 7.0 01/07/2024 03:52 AM    Total Protein 6.5 01/06/2024 04:45 AM    Albumin 3.6 01/08/2024 04:33 AM    Albumin 3.7 01/07/2024 03:52 AM    Albumin 3.4 (L) 01/06/2024 04:45 AM         Portions of the record may have been created with voice recognition software. Occasional wrong word or \"sound a like\" substitutions may have occurred due to the inherent limitations of voice recognition software. Read the chart carefully and recognize, using context, where substitutions have occurred.    "

## 2024-06-28 ENCOUNTER — TELEPHONE (OUTPATIENT)
Dept: ADMINISTRATIVE | Facility: OTHER | Age: 84
End: 2024-06-28

## 2024-06-28 NOTE — TELEPHONE ENCOUNTER
Upon review of the In Basket request and the patient's chart, initial outreach has been made via fax to facility. Please see Contacts section for details.     Thank you  Samm Curtis MA

## 2024-06-28 NOTE — LETTER
Diabetic Eye Exam Form    Date Requested: 24  Patient: Hammad Montesinos  Patient : [id number]   Referring Provider: Mina Zavala MD      DIABETIC Eye Exam Date _______________________________      Type of Exam MUST be documented for Diabetic Eye Exams. Please CHECK ONE.     Retinal Exam       Dilated Retinal Exam       OCT       Optomap-Iris Exam      Fundus Photography       Left Eye - Please check Retinopathy or No Retinopathy        Exam did show retinopathy    Exam did not show retinopathy       Right Eye - Please check Retinopathy or No Retinopathy       Exam did show retinopathy    Exam did not show retinopathy       Comments __________________________________________________________    Practice Providing Exam ______________________________________________    Exam Performed By (print name) _______________________________________      Provider Signature ___________________________________________________      These reports are needed for  compliance.  Please fax this completed form and a copy of the Diabetic Eye Exam report to our office located at [address] [place] [address] as soon as possible via Fax [phone] michelle [place]: Phone [phone]  We thank you for your assistance in treating our mutual patient.

## 2024-06-28 NOTE — TELEPHONE ENCOUNTER
----- Message from Mikaela BISHOP sent at 6/27/2024 12:06 PM EDT -----  Regarding: diabetic eye exam  06/27/24 12:07 PM    Koko, our patient Hammad Montesinos has had Diabetic Eye Exam completed/performed. Please assist in updating the patient chart by making an External outreach to [facility] facility located in [facility]. The date of service is 2024.    Thank you,  Mikaela Carver MA  [facility] [place]

## 2024-07-02 NOTE — TELEPHONE ENCOUNTER
Upon review of the In Basket request we were able to locate, review, and update the patient chart as requested for Diabetic Eye Exam.    Any additional questions or concerns should be emailed to the Practice Liaisons via the appropriate education email address, please do not reply via In Basket.    Thank you  Samm Curtis MA   [facility]

## 2024-08-20 ENCOUNTER — OFFICE VISIT (OUTPATIENT)
Dept: PODIATRY | Facility: CLINIC | Age: 84
End: 2024-08-20
Payer: COMMERCIAL

## 2024-08-20 VITALS
HEIGHT: 71 IN | WEIGHT: 226 LBS | BODY MASS INDEX: 31.64 KG/M2 | HEART RATE: 69 BPM | SYSTOLIC BLOOD PRESSURE: 118 MMHG | DIASTOLIC BLOOD PRESSURE: 74 MMHG

## 2024-08-20 DIAGNOSIS — E11.621 DIABETIC ULCER OF OTHER PART OF RIGHT FOOT ASSOCIATED WITH TYPE 2 DIABETES MELLITUS, WITH FAT LAYER EXPOSED (HCC): Primary | ICD-10-CM

## 2024-08-20 DIAGNOSIS — L84 CORNS AND CALLUS: ICD-10-CM

## 2024-08-20 DIAGNOSIS — Z79.4 TYPE 2 DIABETES MELLITUS WITH DIABETIC NEUROPATHY, WITH LONG-TERM CURRENT USE OF INSULIN (HCC): ICD-10-CM

## 2024-08-20 DIAGNOSIS — B35.1 ONYCHOMYCOSIS: ICD-10-CM

## 2024-08-20 DIAGNOSIS — L97.512 DIABETIC ULCER OF OTHER PART OF RIGHT FOOT ASSOCIATED WITH TYPE 2 DIABETES MELLITUS, WITH FAT LAYER EXPOSED (HCC): Primary | ICD-10-CM

## 2024-08-20 DIAGNOSIS — E11.40 TYPE 2 DIABETES MELLITUS WITH DIABETIC NEUROPATHY, WITH LONG-TERM CURRENT USE OF INSULIN (HCC): ICD-10-CM

## 2024-08-20 PROCEDURE — 11042 DBRDMT SUBQ TIS 1ST 20SQCM/<: CPT | Performed by: PODIATRIST

## 2024-08-20 PROCEDURE — 11055 PARING/CUTG B9 HYPRKER LES 1: CPT | Performed by: PODIATRIST

## 2024-08-20 PROCEDURE — 11721 DEBRIDE NAIL 6 OR MORE: CPT | Performed by: PODIATRIST

## 2024-08-20 RX ORDER — MUPIROCIN 20 MG/G
OINTMENT TOPICAL EVERY OTHER DAY
Qty: 22 G | Refills: 0 | Status: SHIPPED | OUTPATIENT
Start: 2024-08-20

## 2024-08-20 NOTE — PROGRESS NOTES
Patient ID: Hammad Montesinos is a 83 y.o. male Date of Birth 1940       Chief Complaint   Patient presents with   • Diabetes Type 2     Dfc         Allergies:  Hydrochlorothiazide, Insulin lispro, Lisinopril, Minoxidil, Nifedipine, Other, Prazosin, Venlafaxine, Doxycycline, and Simvastatin    Diagnosis:  1. 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, with fat layer exposed (HCC)  -     mupirocin (BACTROBAN) 2 % ointment; Apply topically every other day With alginate dressing  -     Ambulatory Referral to Wound Care; Future  -     Debridement Diabetic Ulcer Right;Plantar;Distal;Mid Foot  2. Type 2 diabetes mellitus with diabetic neuropathy, with long-term current use of insulin ([facility])  3. Onychomycosis  4. Corns and callus     Diagnosis ICD-10-CM Associated Orders   1. 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, with fat layer exposed (HCC)  E11.621 mupirocin (BACTROBAN) 2 % ointment    L97.512 Ambulatory Referral to Wound Care     Debridement Diabetic Ulcer Right;Plantar;Distal;Mid Foot      2. Type 2 diabetes mellitus with diabetic neuropathy, with long-term current use of insulin ([facility])  E11.40     Z79.4       3. Onychomycosis  B35.1       4. Corns and callus  L84            Assessment & Plan:  With father as opposed to represent follow-up in 2 weeks for ulcer check and then again in 3 months for diabetic footcare.  Patient reports placing new diabetic orthotic in shoe  Dry sterile dressing with antibiotic ointment and alginate applied right foot.  Mupirocin/alginate dressing to be changed every other day.  Call immediately if any signs of infection are noted.       Procedure:  11420, 98370  All mycotic toenails were reduced and debrided in length, width, and girth using a nail nipper and electric dremel.    All hyperkeratotic skin lesion(s) were sharply pared with a scalpel #10 blade with no evidence of ulceration beneath.     Patient tolerated procedure(s) well without complications.      Risk Category/Class Findings:  Q8(B1,B2abc)  3 = History of plantar ulceration, neuropathic fracture (Charcot foot) or amputation    A1)  Has the patient had a previous amputation of the foot or integral skeletal portion thereof? No  B1)  Does the patient have absent posterior tibial pulse? Yes  B3)  Does the patient have absent dorsalis pedis? No  B2)  Does the patient have three of the following? Yes           1.  Hair growth (increased or decreased), 2.  Nail changes (thickening), and 3.  Pigmentary changes (discoloring)  C)  Does the patient have two of the following and one above? No                     Subjective:   8/20/2024: 83-year-old male type II diabetic seen today for diabetic footcare visit concerned that his right foot has broken down again with ulceration.  Denies any fever or shortness of breath.    5/30/2024: 83-year-old type II diabetic seen today for follow-up chronic DFU which he reports has closed.  Reports no drainage for the past few days.    5/1/2024: 83-year-old type II diabetic seen today for follow-up chronic DFU right foot.  Reports excellent progress and feels the wound has finally closed.    4/10/2024: 83-year-old male seen today for follow-up chronic DFU right foot subthird MPJ.  Reports good progress since last visit with diminishing size and depth.    3/27/2024: 83-year-old type II diabetic presents for continued follow-up care of chronic DFU plantar right forefoot.  Reports no significant change since last visit with some area of slight discoloration that concerns him.    3/6/2024: 83-year-old male DM2 seen for follow-up of ulceration plantar right forefoot.  Reports good progress with diminishing size.    2/19/2024: 83-year-old male type II diabetic presents for follow-up of new recurrent ulceration involving his right forefoot.  Patient reports good progress with less swelling and redness since taking antibiotic.   Was seen last in my [place] office and empirically started on Augmentin 875 twice daily.    2/12/2024: 83-year-old male type II diabetic seen today for evaluation of new issue/ulceration right forefoot which recently broke down within the past 2 weeks.  This is the site of a previous ulceration that was present 2 years ago.  It is now more red and swollen and draining fluid.  Patient was recently hospitalized with CHF causing him to have a lot of swelling in his legs.        The following portions of the patient's history were reviewed and updated as appropriate:   Patient Active Problem List   Diagnosis   • Type 2 diabetes mellitus with hyperglycemia, with long-term current use of insulin ([facility])   • Mixed hyperlipidemia   • Diabetic polyneuropathy associated with type 2 diabetes mellitus ([facility])   • Primary hypertension   • Morbid (severe) obesity due to excess calories ([facility])   • 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 ([facility])   • 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 ([facility])   • Elevated parathyroid hormone   • Diabetic nephropathy associated with type 2 diabetes mellitus ([facility])   • Type 2 diabetes mellitus with diabetic neuropathy, unspecified whether long term insulin use ([facility])   • Pre-ulcerative calluses   • Acute on chronic diastolic heart failure ([facility])   • Elevated troponin   • Pulmonary hypertension (HCC)   • Paroxysmal A-fib ([facility])   • 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, with fat layer exposed ([facility])     Past Medical History:   Diagnosis Date   • Asthma    • Bradycardia    • Carcinoma, basal cell, skin     head and face and ear   • COVID-19 12/05/2022   • Dupuytren contracture     bilateral ring and right middle   • Gout    • Herniated lumbar intervertebral disc    • Sleep apnea      Past Surgical History:   Procedure Laterality Date   • APPENDECTOMY      age 20, ruptured   • CATARACT EXTRACTION, BILATERAL Bilateral     • CERVICAL FUSION     • CYST REMOVAL Left 01/2023   • DUPUYTREN CONTRACTURE RELEASE Left     left ring finger   • LUMBAR LAMINECTOMY      belkis placed   • TONSILLECTOMY     • VASECTOMY       Social History     Socioeconomic History   • Marital status: /Civil Union     Spouse name: None   • Number of children: None   • Years of education: None   • Highest education level: None   Occupational History   • Occupation: teacher     Comment: retired   Tobacco Use   • Smoking status: Never   • Smokeless tobacco: Never   Vaping Use   • Vaping status: Never Used   Substance and Sexual Activity   • Alcohol use: Not Currently   • Drug use: Never   • Sexual activity: None   Other Topics Concern   • None   Social History Narrative   • None     Social Determinants of Health     Financial Resource Strain: Not on file   Food Insecurity: No Food Insecurity (12/29/2023)    Hunger Vital Sign    • Worried About Running Out of Food in the Last Year: Never true    • Ran Out of Food in the Last Year: Never true   Transportation Needs: No Transportation Needs (12/29/2023)    PRAPARE - Transportation    • Lack of Transportation (Medical): No    • Lack of Transportation (Non-Medical): No   Physical Activity: Not on file   Stress: Not on file   Social Connections: Not on file   Intimate Partner Violence: Not on file   Housing Stability: Low Risk  (12/29/2023)    Housing Stability Vital Sign    • Unable to Pay for Housing in the Last Year: No    • Number of Times Moved in the Last Year: 1    • Homeless in the Last Year: No        Current Outpatient Medications:   •  allopurinol (ZYLOPRIM) 300 mg tablet, Take 300 mg by mouth every 24 hours, Disp: , Rfl:   •  atorvastatin (LIPITOR) 20 mg tablet, Take 20 mg by mouth daily, Disp: , Rfl:   •  azelastine (ASTELIN) 0.1 % nasal spray, 2 sprays into each nostril 2 (two) times a day, Disp: , Rfl:   •  budesonide (PULMICORT) 0.5 mg/2 mL nebulizer solution, Take 0.5 mg by nebulization 2 (two) times  a day Rinse mouth after use., Disp: , Rfl:   •  Cholecalciferol (Vitamin D3) 25 MCG (1000 UT) CAPS, Take 1 capsule by mouth every 24 hours, Disp: , Rfl:   •  Coenzyme Q10 (Co Q10) 100 MG CAPS, Take 100 mg by mouth in the morning, Disp: , Rfl:   •  Easy Comfort Pen Needles 33G X 4 MM MISC, USE WITH INSULIN FIVE TIMES DAILY AS DIRECTED, Disp: , Rfl:   •  fluticasone (FLONASE) 50 mcg/act nasal spray, 1 spray into each nostril 2 (two) times a day, Disp: , Rfl:   •  formoterol (PERFOROMIST) 20 MCG/2ML nebulizer solution, Take 20 mcg by nebulization 2 (two) times a day, Disp: , Rfl:   •  gabapentin (NEURONTIN) 100 mg capsule, Take 1 capsule (100 mg total) by mouth daily, Disp: 180 capsule, Rfl: 0  •  Glucosamine-Chondroitin 500-400 MG CAPS, Take 1 capsule by mouth daily, Disp: , Rfl:   •  insulin aspart (NovoLOG FlexPen) 100 UNIT/ML injection pen, INJECT 12 UNITS SUBCUTANEOUSLY with breakfast, LUNCH and with dinner plus sliding scale. (UP  UNITS DAILY). 150-200 :1 unit 201-250:2 units 251-300:3 units 301-350: 4 units 351-400:5 units 400+: 6 units, Disp: 45 mL, Rfl: 0  •  Insulin Glargine Solostar (Lantus SoloStar) 100 UNIT/ML SOPN, INJECT 20 UNITS SUBCUTANEOUSLY IN THE MORNING AND 20 UNITS IN THE EVENING., Disp: 30 mL, Rfl: 0  •  ipratropium-albuterol (DUO-NEB) 0.5-2.5 mg/3 mL nebulizer solution, Take 3 mL by nebulization 2 (two) times a day, Disp: , Rfl:   •  loratadine (CLARITIN) 10 mg tablet, Take 10 mg by mouth daily, Disp: , Rfl:   •  losartan (COZAAR) 100 MG tablet, Take 100 mg by mouth daily, Disp: , Rfl:   •  magnesium Oxide (MAG-OX) 400 mg TABS, Take 1 tablet (400 mg total) by mouth daily, Disp: 30 tablet, Rfl: 0  •  Multiple Vitamin (MULTIVITAMIN ADULT PO), every 24 hours, Disp: , Rfl:   •  mupirocin (BACTROBAN) 2 % ointment, Apply 1 Application topically every other day 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, Disp: , Rfl:   •  mupirocin (BACTROBAN) 2 % ointment, Apply  "topically every other day With gauze, Disp: 22 g, Rfl: 0  •  mupirocin (BACTROBAN) 2 % ointment, Apply topically every other day With alginate dressing, Disp: 22 g, Rfl: 0  •  Omega-3 Fatty Acids (Fish Oil) 360 MG CAPS, Take by mouth, Disp: , Rfl:   •  OneTouch Verio test strip, USE AS DIRECTED FOUR TIMES DAILY, Disp: , Rfl:   •  potassium chloride (K-DUR,KLOR-CON) 20 mEq tablet, Take 1 tablet (20 mEq total) by mouth daily, Disp: 30 tablet, Rfl: 0  •  rivaroxaban (Xarelto) 20 mg tablet, Take 1 tablet (20 mg total) by mouth daily with breakfast, Disp: 30 tablet, Rfl: 5  •  senna (SENOKOT) 8.6 mg, Take 2 tablets (17.2 mg total) by mouth daily at bedtime as needed for constipation, Disp: 30 tablet, Rfl: 0  •  torsemide (DEMADEX) 20 mg tablet, Take 2 tablets (40 mg total) by mouth every morning AND 1 tablet (20 mg total) daily after lunch., Disp: 90 tablet, Rfl: 2  •  triamcinolone (KENALOG) 0.1 % cream, 1 Application 2 (two) times a day To affected areas, Disp: , Rfl:   Family History   Problem Relation Age of Onset   • Hypertension Mother    • Diabetes type II Mother    • Hypertension Father    • Diabetes type II Brother    • Hypertension Brother    • Kidney disease Sister    • Hypertension Daughter    • GI problems Daughter       Review of Systems   Constitutional: Negative.    HENT: Negative.     Eyes: Negative.    Respiratory: Negative.     Cardiovascular: Negative.    Gastrointestinal: Negative.    Endocrine: Negative.    Genitourinary: Negative.    Musculoskeletal: Negative.    Skin:  Positive for wound (Right foot subthird MPJ recurrent DFU).        Dystrophic nails x 10    Allergic/Immunologic: Negative.    Neurological: Negative.    Hematological: Negative.    Psychiatric/Behavioral: Negative.           Objective:  /74 (BP Location: Left arm, Patient Position: Sitting, Cuff Size: Adult)   Pulse 69   Ht 5' 11\" (1.803 m) Comment: stated  Wt 103 kg (226 lb)   BMI 31.52 kg/m²     Physical " Exam  Constitutional:       Appearance: Normal appearance.   HENT:      Head: Normocephalic.      Right Ear: Tympanic membrane normal.      Left Ear: Tympanic membrane normal.      Nose: No congestion.      Mouth/Throat:      Pharynx: No oropharyngeal exudate or posterior oropharyngeal erythema.   Eyes:      Conjunctiva/sclera: Conjunctivae normal.      Pupils: Pupils are equal, round, and reactive to light.   Cardiovascular:      Rate and Rhythm: Normal rate and regular rhythm.      Pulses:           Dorsalis pedis pulses are 1+ on the right side and 1+ on the left side.        Posterior tibial pulses are 0 on the right side and 0 on the left side.   Pulmonary:      Effort: Pulmonary effort is normal.   Musculoskeletal:         General: Deformity present.      Right lower leg: Edema (+2) present.      Left lower leg: Edema (+2) present.      Comments: Plantarflexed third metatarsal bilateral   Feet:      Right foot:      Protective Sensation: 10 sites tested.  7 sites sensed.      Skin integrity: Ulcer (See comments) present.      Toenail Condition: Right toenails are abnormally thick. Fungal disease present.     Left foot:      Protective Sensation: 10 sites tested.  7 sites sensed.      Skin integrity: Callus and dry skin present.      Toenail Condition: Left toenails are abnormally thick. Fungal disease present.     Comments: Right foot: Full-thickness necrotic diabetic foot ulcer subthird MPJ, SQ depth, moderate hyperkeratotic margin with some subtle maceration . No signs of infection.  Wound measures postdebridement 1.0 x 1.0 x 0.2 cm.  Skin:     General: Skin is warm and dry.      Capillary Refill: Capillary refill takes 2 to 3 seconds.      Coloration: Skin is not pale.      Findings: Erythema present. No bruising, lesion or rash.      Comments: Texture tone and turgor are diminished with moderate atrophic changes bilateral.  No digital hair noted.  Skin is notably thin.    Severely dystrophic nails x 10  "consistent with mycosis.  Yellow discoloration, subungual debris, and brittle nature noted.  Average thickness of approximately 0.5-0.6 cm, mild tenderness with palpation of the nails.    Left foot subthird MPJ moderate size hyperkeratotic lesion, no breakdown, site of previous ulceration.  Hemorrhage noted within callus   Neurological:      Mental Status: He is alert.      Cranial Nerves: No cranial nerve deficit.      Sensory: Sensory deficit present.      Motor: No weakness.      Gait: Gait normal.      Deep Tendon Reflexes: Reflexes normal.   Psychiatric:         Mood and Affect: Mood normal.         Behavior: Behavior normal.         Judgment: Judgment normal.         Wound 05/20/24 Diabetic Ulcer Toe D1, great Anterior;Right (Active)       Debridement   Wound 08/20/24 Diabetic Ulcer Foot Right;Plantar;Distal;Mid    Universal Protocol:  procedure performed by consultantConsent: Verbal consent obtained.  Risks and benefits: risks, benefits and alternatives were discussed  Consent given by: patient  Time out: Immediately prior to procedure a \"time out\" was called to verify the correct patient, procedure, equipment, support staff and site/side marked as required.  Patient understanding: patient states understanding of the procedure being performed  Patient identity confirmed: verbally with patient    Debridement Details  Performed by: physician  Debridement type: surgical  Level of debridement: subcutaneous tissue  Pain control: lidocaine 4%      Post-debridement measurements  Length (cm): 1  Width (cm): 1  Depth (cm): 0.2  Percent debrided: 100%  Surface Area (cm^2): 1  Area Debrided (cm^2): 1  Volume (cm^3): 0.2    Tissue and other material debrided: subcutaneous tissue  Instrument(s) utilized: nippers and blade  Technique utilized: excisionalBleeding: small  Hemostasis obtained with: pressure  Procedural pain (0-10): 3  Post-procedural pain: 3   Response to treatment: procedure was tolerated well           " "      Wound Instructions:  Orders Placed This Encounter   Procedures   • Debridement Diabetic Ulcer Right;Plantar;Distal;Mid Foot     This order was created via procedure documentation   • Ambulatory Referral to Wound Care     Standing Status:   Future     Standing Expiration Date:   8/20/2025     Referral Priority:   Routine     Referral Type:   Consult - AMB     Referral Reason:   Specialty Services Required     Requested Specialty:   Wound Care     Number of Visits Requested:   1     Expiration Date:   8/20/2025         Julio César Mac DPM      Portions of the record may have been created with voice recognition software. Occasional wrong word or \"sound a like\" substitutions may have occurred due to the inherent limitations of voice recognition software. Read the chart carefully and recognize, using context, where substitutions have occurred.    "

## 2024-08-26 ENCOUNTER — REMOTE DEVICE CLINIC VISIT (OUTPATIENT)
Dept: CARDIOLOGY CLINIC | Facility: CLINIC | Age: 84
End: 2024-08-26
Payer: COMMERCIAL

## 2024-08-26 DIAGNOSIS — Z95.0 CARDIAC PACEMAKER IN SITU: Primary | ICD-10-CM

## 2024-08-26 PROCEDURE — 93294 REM INTERROG EVL PM/LDLS PM: CPT | Performed by: INTERNAL MEDICINE

## 2024-08-26 PROCEDURE — 93296 REM INTERROG EVL PM/IDS: CPT | Performed by: INTERNAL MEDICINE

## 2024-08-26 NOTE — PROGRESS NOTES
Results for orders placed or performed in visit on 08/26/24   Cardiac EP device report    Narrative    BIO DC/PM  BIOTRONIK TRANSMISSION: BATTERY VOLTAGE ADEQUATE (75%/OK). AP 1%  77% (>40%/DDD 60) ALL AVAILABLE LEAD PARAMETERS WITHIN NORMAL LIMITS. NO SIGNIFICANT HIGH RATE EPISODES. NORMAL DEVICE FUNCTION. AM

## 2024-08-28 ENCOUNTER — OFFICE VISIT (OUTPATIENT)
Dept: WOUND CARE | Facility: HOSPITAL | Age: 84
End: 2024-08-28
Payer: COMMERCIAL

## 2024-08-28 VITALS
SYSTOLIC BLOOD PRESSURE: 120 MMHG | RESPIRATION RATE: 16 BRPM | HEART RATE: 78 BPM | DIASTOLIC BLOOD PRESSURE: 60 MMHG | TEMPERATURE: 97.5 F

## 2024-08-28 DIAGNOSIS — E11.40 TYPE 2 DIABETES MELLITUS WITH DIABETIC NEUROPATHY, WITH LONG-TERM CURRENT USE OF INSULIN (HCC): ICD-10-CM

## 2024-08-28 DIAGNOSIS — L97.512 DIABETIC ULCER OF OTHER PART OF RIGHT FOOT ASSOCIATED WITH TYPE 2 DIABETES MELLITUS, WITH FAT LAYER EXPOSED (HCC): Primary | ICD-10-CM

## 2024-08-28 DIAGNOSIS — Z79.4 TYPE 2 DIABETES MELLITUS WITH DIABETIC NEUROPATHY, WITH LONG-TERM CURRENT USE OF INSULIN (HCC): ICD-10-CM

## 2024-08-28 DIAGNOSIS — E11.621 DIABETIC ULCER OF OTHER PART OF RIGHT FOOT ASSOCIATED WITH TYPE 2 DIABETES MELLITUS, WITH FAT LAYER EXPOSED (HCC): Primary | ICD-10-CM

## 2024-08-28 PROCEDURE — 99213 OFFICE O/P EST LOW 20 MIN: CPT | Performed by: PODIATRIST

## 2024-08-28 PROCEDURE — 11042 DBRDMT SUBQ TIS 1ST 20SQCM/<: CPT | Performed by: PODIATRIST

## 2024-08-28 NOTE — PROGRESS NOTES
Wound Procedure Treatment Diabetic Ulcer Right;Plantar;Distal;Mid Foot    Performed by: Sissy Hill RN  Authorized by: Sharron Jean-Baptiste DPM    Associated wounds:   Wound 08/20/24 Diabetic Ulcer Foot Right;Plantar;Distal;Mid  Wound cleansed with:  Soap and water  Applied to periwound:  Skin prep  Applied primary dressing:  Calcium alginate and Silver  Applied secondary dressing:  ABD and Cast padding  Dressing secured with:  Coban, Kerlix and Tubifast  Offloading device appllied:  Felt padding 1/4 inch and Surgical shoe  Comments:  Football dressing

## 2024-08-28 NOTE — PROGRESS NOTES
Patient ID: Hammad Montesinos is a 83 y.o. male Date of Birth 1940       Chief Complaint   Patient presents with    New Patient Visit     Right foot ulcer x about 10 days       Allergies  Hydrochlorothiazide, Insulin lispro, Lisinopril, Minoxidil, Nifedipine, Other, Prazosin, Venlafaxine, Doxycycline, and Simvastatin    Diagnosis:  1. 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, with fat layer exposed ([facility])  -     Wound cleansing and dressings Diabetic Ulcer Right;Plantar;Distal;Mid Foot; Future  2. Type 2 diabetes mellitus with diabetic neuropathy, with long-term current use of insulin ([facility])      Diagnosis ICD-10-CM Associated Orders   1. 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, with fat layer exposed ([facility])  E11.621 Wound cleansing and dressings Diabetic Ulcer Right;Plantar;Distal;Mid Foot    L97.512       2. Type 2 diabetes mellitus with diabetic neuropathy, with long-term current use of insulin ([facility])  E11.40     Z79.4            Assessment & Plan:  Diabetic Calhoun grade 1 ulceration submetatarsal head 2 right foot, ulceration was debrided through subcuticular tissues and dressed with Dermagran gauze, 1/4 inch felt offloading pad, dry dressing and football dressing, patient was dispensed a surgical shoe, he will leave this dry clean and intact, he may use a cast cover to shower, minimal weight-bear only for ADLs, he tends to use a walker and wheelchair anyway.  Patient is going to [place] for the Labor Day holiday weekend, he does not go down to the beach, I explained to take extra dressings in case his football does get wet and needs to be changed.  I reviewed patient's medical records, previous podiatry notes, pertinent blood work and imaging  We discussed the etiology of his wound with plantar prominent second metatarsal head, fat pad atrophy, dorsally dislocated second hammertoe, he did not bring his diabetic shoes and  "custom molded insert for the right as he has been in [employer] wedge shoe, he will bring to next visit for me to see if it needs any further modification.  Patient and wife understand and agree with the plan and he will follow-up in 1 week.    Debridement   Wound 08/20/24 Diabetic Ulcer Foot Right;Plantar;Distal;Mid    Universal Protocol:  procedure performed by consultantConsent: Verbal consent obtained.  Risks and benefits: risks, benefits and alternatives were discussed  Consent given by: patient  Time out: Immediately prior to procedure a \"time out\" was called to verify the correct patient, procedure, equipment, support staff and site/side marked as required.  Patient understanding: patient states understanding of the procedure being performed  Patient identity confirmed: verbally with patient    Debridement Details  Performed by: physician  Debridement type: surgical  Level of debridement: subcutaneous tissue  Pain control: none      Post-debridement measurements  Length (cm): 0.5  Width (cm): 0.5  Depth (cm): 0.3  Percent debrided: 100%  Surface Area (cm^2): 0.25  Area Debrided (cm^2): 0.25  Volume (cm^3): 0.08    Tissue and other material debrided: subcutaneous tissue  Devitalized tissue debrided: biofilm, callus, fibrin, necrotic debris and slough  Instrument(s) utilized: blade  Bleeding: small  Hemostasis obtained with: pressure  Procedural pain (0-10): insensate  Post-procedural pain: insensate   Response to treatment: procedure was tolerated well               Subjective:   Hammad presents today upon referral from Dr. Mac for evaluation care of right foot diabetic ulceration, he has had chronic callus here, the ulceration opens and closes, current treatment is mupirocin.  He does have diabetic shoes and custom molded inserts and recently updated his inserts.  Most recent HbA1c was 8.1 on 6/7/2024.        The following portions of the patient's history were reviewed and updated as appropriate:   Patient " Active Problem List   Diagnosis    Type 2 diabetes mellitus with hyperglycemia, with long-term current use of insulin ([facility])    Mixed hyperlipidemia    Diabetic polyneuropathy associated with type 2 diabetes mellitus ([facility])    Primary hypertension    Morbid (severe) obesity due to excess calories ([facility])    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 ([facility])    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 ([facility])    Elevated parathyroid hormone    Diabetic nephropathy associated with type 2 diabetes mellitus ([facility])    Type 2 diabetes mellitus with diabetic neuropathy, unspecified whether long term insulin use ([facility]CC)    Pre-ulcerative calluses    Acute on chronic diastolic heart failure (HCC)    Elevated troponin    Pulmonary hypertension (HCC)    Paroxysmal A-fib (HCC)    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, with fat layer exposed (HCC)     Past Medical History:   Diagnosis Date    Asthma     Bradycardia     Carcinoma, basal cell, skin     head and face and ear    COVID-19 12/05/2022    Dupuytren contracture     bilateral ring and right middle    Gout     Herniated lumbar intervertebral disc     Sleep apnea      Past Surgical History:   Procedure Laterality Date    APPENDECTOMY      age 20, ruptured    CATARACT EXTRACTION, BILATERAL Bilateral     CERVICAL FUSION      CYST REMOVAL Left 01/2023    DUPUYTREN CONTRACTURE RELEASE Left     left ring finger    LUMBAR LAMINECTOMY      belkis placed    TONSILLECTOMY      VASECTOMY       Social History     Socioeconomic History    Marital status: /Civil Union     Spouse name: None    Number of children: None    Years of education: None    Highest education level: None   Occupational History    Occupation: teacher     Comment: retired   Tobacco Use    Smoking status: Never    Smokeless tobacco: Never   Vaping Use    Vaping status: Never Used   Substance and Sexual Activity    Alcohol use: Not  Currently    Drug use: Never    Sexual activity: None   Other Topics Concern    None   Social History Narrative    None     Social Determinants of Health     Financial Resource Strain: Not on file   Food Insecurity: No Food Insecurity (12/29/2023)    Hunger Vital Sign     Worried About Running Out of Food in the Last Year: Never true     Ran Out of Food in the Last Year: Never true   Transportation Needs: No Transportation Needs (12/29/2023)    PRAPARE - Transportation     Lack of Transportation (Medical): No     Lack of Transportation (Non-Medical): No   Physical Activity: Not on file   Stress: Not on file   Social Connections: Not on file   Intimate Partner Violence: Not on file   Housing Stability: Low Risk  (12/29/2023)    Housing Stability Vital Sign     Unable to Pay for Housing in the Last Year: No     Number of Times Moved in the Last Year: 1     Homeless in the Last Year: No        Current Outpatient Medications:     allopurinol (ZYLOPRIM) 300 mg tablet, Take 300 mg by mouth every 24 hours, Disp: , Rfl:     atorvastatin (LIPITOR) 20 mg tablet, Take 20 mg by mouth daily, Disp: , Rfl:     azelastine (ASTELIN) 0.1 % nasal spray, 2 sprays into each nostril 2 (two) times a day, Disp: , Rfl:     budesonide (PULMICORT) 0.5 mg/2 mL nebulizer solution, Take 0.5 mg by nebulization 2 (two) times a day Rinse mouth after use., Disp: , Rfl:     Cholecalciferol (Vitamin D3) 25 MCG (1000 UT) CAPS, Take 1 capsule by mouth every 24 hours, Disp: , Rfl:     Coenzyme Q10 (Co Q10) 100 MG CAPS, Take 100 mg by mouth in the morning, Disp: , Rfl:     Easy Comfort Pen Needles 33G X 4 MM MISC, USE WITH INSULIN FIVE TIMES DAILY AS DIRECTED, Disp: , Rfl:     fluticasone (FLONASE) 50 mcg/act nasal spray, 1 spray into each nostril 2 (two) times a day, Disp: , Rfl:     formoterol (PERFOROMIST) 20 MCG/2ML nebulizer solution, Take 20 mcg by nebulization 2 (two) times a day, Disp: , Rfl:     gabapentin (NEURONTIN) 100 mg capsule, Take 1  capsule (100 mg total) by mouth daily, Disp: 180 capsule, Rfl: 0    Glucosamine-Chondroitin 500-400 MG CAPS, Take 1 capsule by mouth daily, Disp: , Rfl:     insulin aspart (NovoLOG FlexPen) 100 UNIT/ML injection pen, INJECT 12 UNITS SUBCUTANEOUSLY with breakfast, LUNCH and with dinner plus sliding scale. (UP  UNITS DAILY). 150-200 :1 unit 201-250:2 units 251-300:3 units 301-350: 4 units 351-400:5 units 400+: 6 units, Disp: 45 mL, Rfl: 0    Insulin Glargine Solostar (Lantus SoloStar) 100 UNIT/ML SOPN, INJECT 20 UNITS SUBCUTANEOUSLY IN THE MORNING AND 20 UNITS IN THE EVENING., Disp: 30 mL, Rfl: 0    ipratropium-albuterol (DUO-NEB) 0.5-2.5 mg/3 mL nebulizer solution, Take 3 mL by nebulization 2 (two) times a day, Disp: , Rfl:     loratadine (CLARITIN) 10 mg tablet, Take 10 mg by mouth daily, Disp: , Rfl:     losartan (COZAAR) 100 MG tablet, Take 100 mg by mouth daily, Disp: , Rfl:     magnesium Oxide (MAG-OX) 400 mg TABS, Take 1 tablet (400 mg total) by mouth daily, Disp: 30 tablet, Rfl: 0    Multiple Vitamin (MULTIVITAMIN ADULT PO), every 24 hours, Disp: , Rfl:     mupirocin (BACTROBAN) 2 % ointment, Apply 1 Application topically every other day 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, Disp: , Rfl:     mupirocin (BACTROBAN) 2 % ointment, Apply topically every other day With gauze, Disp: 22 g, Rfl: 0    mupirocin (BACTROBAN) 2 % ointment, Apply topically every other day With alginate dressing, Disp: 22 g, Rfl: 0    Omega-3 Fatty Acids (Fish Oil) 360 MG CAPS, Take by mouth, Disp: , Rfl:     OneTouch Verio test strip, USE AS DIRECTED FOUR TIMES DAILY, Disp: , Rfl:     potassium chloride (K-DUR,KLOR-CON) 20 mEq tablet, Take 1 tablet (20 mEq total) by mouth daily, Disp: 30 tablet, Rfl: 0    rivaroxaban (Xarelto) 20 mg tablet, Take 1 tablet (20 mg total) by mouth daily with breakfast, Disp: 30 tablet, Rfl: 5    senna (SENOKOT) 8.6 mg, Take 2 tablets (17.2 mg total) by mouth daily at  bedtime as needed for constipation, Disp: 30 tablet, Rfl: 0    torsemide (DEMADEX) 20 mg tablet, Take 2 tablets (40 mg total) by mouth every morning AND 1 tablet (20 mg total) daily after lunch., Disp: 90 tablet, Rfl: 2    triamcinolone (KENALOG) 0.1 % cream, 1 Application 2 (two) times a day To affected areas, Disp: , Rfl:   Family History   Problem Relation Age of Onset    Hypertension Mother     Diabetes type II Mother     Hypertension Father     Diabetes type II Brother     Hypertension Brother     Kidney disease Sister     Hypertension Daughter     GI problems Daughter        Review of Systems   Constitutional:  Negative for chills and fever.   HENT:  Negative for ear pain and sore throat.    Eyes:  Negative for pain and visual disturbance.   Respiratory:  Negative for cough and shortness of breath.    Cardiovascular:  Negative for chest pain and palpitations.   Gastrointestinal:  Negative for abdominal pain and vomiting.   Genitourinary:  Negative for dysuria and hematuria.   Musculoskeletal:  Positive for gait problem. Negative for arthralgias and back pain.   Skin:  Positive for color change and wound. Negative for rash.   Neurological:  Positive for numbness. Negative for seizures and syncope.   Psychiatric/Behavioral: Negative.     All other systems reviewed and are negative.      Objective:  /60   Pulse 78   Temp 97.5 °F (36.4 °C)   Resp 16     Physical Exam  Constitutional:       Appearance: Normal appearance. He is normal weight.   HENT:      Head: Normocephalic and atraumatic.      Right Ear: External ear normal.      Left Ear: External ear normal.      Nose: Nose normal.      Mouth/Throat:      Mouth: Mucous membranes are moist.      Pharynx: Oropharynx is clear.   Eyes:      Conjunctiva/sclera: Conjunctivae normal.      Pupils: Pupils are equal, round, and reactive to light.   Cardiovascular:      Pulses:           Dorsalis pedis pulses are 1+ on the right side and 1+ on the left side.         Posterior tibial pulses are 1+ on the right side and 1+ on the left side.   Pulmonary:      Effort: Pulmonary effort is normal.   Musculoskeletal:         General: Normal range of motion.      Cervical back: Normal range of motion.      Right lower leg: No edema.      Left lower leg: No edema.      Right foot: Prominent metatarsal heads present.      Left foot: Prominent metatarsal heads present.      Comments: Pes cavus foot type with fat pad atrophy submetatarsal heads 2 and 3   Feet:      Right foot:      Protective Sensation: 10 sites tested.  0 sites sensed.      Skin integrity: Ulcer present.      Toenail Condition: Right toenails are normal.      Left foot:      Protective Sensation: 10 sites tested.  0 sites sensed.      Skin integrity: Skin integrity normal.      Toenail Condition: Left toenails are normal.   Skin:     General: Skin is warm and dry.      Capillary Refill: Capillary refill takes less than 2 seconds.   Neurological:      General: No focal deficit present.      Mental Status: He is alert and oriented to person, place, and time. Mental status is at baseline.      Sensory: Sensory deficit present.      Coordination: Coordination abnormal.      Gait: Gait abnormal.      Deep Tendon Reflexes: Reflexes abnormal.   Psychiatric:         Mood and Affect: Mood normal.         Behavior: Behavior normal.         Thought Content: Thought content normal.         Judgment: Judgment normal.             Wound 08/20/24 Diabetic Ulcer Foot Right;Plantar;Distal;Mid (Active)   Wound Image Images linked 08/28/24 1339   Wound Description Pink 08/28/24 1318   Tash-wound Assessment Callus 08/28/24 1318   Wound Length (cm) 0.1 cm 08/28/24 1318   Wound Width (cm) 0.1 cm 08/28/24 1318   Wound Depth (cm) 0.1 cm 08/28/24 1318   Wound Surface Area (cm^2) 0.01 cm^2 08/28/24 1318   Wound Volume (cm^3) 0.001 cm^3 08/28/24 1318   Calculated Wound Volume (cm^3) 0 cm^3 08/28/24 1318   Drainage Amount Small 08/28/24 1318  "  Drainage Description Serosanguineous 08/28/24 1318   Non-staged Wound Description Full thickness 08/28/24 1318   Dressing Status Intact 08/28/24 1318                 Cardiac EP device report    Result Date: 8/26/2024  Narrative: BIO DC/PM BIOTRONIK TRANSMISSION: BATTERY VOLTAGE ADEQUATE (75%/OK). AP 1%  77% (>40%/DDD 60) ALL AVAILABLE LEAD PARAMETERS WITHIN NORMAL LIMITS. NO SIGNIFICANT HIGH RATE EPISODES. NORMAL DEVICE FUNCTION. AM       Wound Instructions:  Orders Placed This Encounter   Procedures    Wound cleansing and dressings Diabetic Ulcer Right;Plantar;Distal;Mid Foot     Right foot:    Shower no OR with cast protector    Leave bulky football dressing over right foot until follow up at [facility] in 1 week.  Bring backup dressing supplies with on vacation in case you need to remove bulky dressing.     Standing Status:   Future     Standing Expiration Date:   9/4/2024         Sharron Jean-Baptiste, SOHANM,DPM, FACFAS    Portions of the record may have been created with voice recognition software. Occasional wrong word or \"sound a like\" substitutions may have occurred due to the inherent limitations of voice recognition software. Read the chart carefully and recognize, using context, where substitutions have occurred.    "

## 2024-08-28 NOTE — PATIENT INSTRUCTIONS
Orders Placed This Encounter   Procedures    Wound cleansing and dressings Diabetic Ulcer Right;Plantar;Distal;Mid Foot     Right foot:    Shower no OR with cast protector    Leave bulky football dressing over right foot until follow up at [facility] in 1 week.  Bring backup dressing supplies with on vacation in case you need to remove bulky dressing.     Standing Status:   Future     Standing Expiration Date:   9/4/2024

## 2024-09-04 ENCOUNTER — TELEPHONE (OUTPATIENT)
Dept: WOUND CARE | Facility: HOSPITAL | Age: 84
End: 2024-09-04

## 2024-09-04 ENCOUNTER — OFFICE VISIT (OUTPATIENT)
Dept: WOUND CARE | Facility: HOSPITAL | Age: 84
End: 2024-09-04
Payer: COMMERCIAL

## 2024-09-04 VITALS
RESPIRATION RATE: 17 BRPM | DIASTOLIC BLOOD PRESSURE: 80 MMHG | HEART RATE: 72 BPM | SYSTOLIC BLOOD PRESSURE: 118 MMHG | TEMPERATURE: 98.7 F

## 2024-09-04 DIAGNOSIS — Z79.4 TYPE 2 DIABETES MELLITUS WITH DIABETIC NEUROPATHY, WITH LONG-TERM CURRENT USE OF INSULIN (HCC): ICD-10-CM

## 2024-09-04 DIAGNOSIS — E11.40 TYPE 2 DIABETES MELLITUS WITH DIABETIC NEUROPATHY, WITH LONG-TERM CURRENT USE OF INSULIN (HCC): ICD-10-CM

## 2024-09-04 DIAGNOSIS — E11.621 DIABETIC ULCER OF OTHER PART OF RIGHT FOOT ASSOCIATED WITH TYPE 2 DIABETES MELLITUS, WITH FAT LAYER EXPOSED (HCC): Primary | ICD-10-CM

## 2024-09-04 DIAGNOSIS — L97.512 DIABETIC ULCER OF OTHER PART OF RIGHT FOOT ASSOCIATED WITH TYPE 2 DIABETES MELLITUS, WITH FAT LAYER EXPOSED (HCC): Primary | ICD-10-CM

## 2024-09-04 PROCEDURE — 97597 DBRDMT OPN WND 1ST 20 CM/<: CPT | Performed by: PODIATRIST

## 2024-09-04 NOTE — PROGRESS NOTES
"Patient ID: Hammad Montesinos is a 83 y.o. male Date of Birth 1940       Chief Complaint   Patient presents with    Follow Up Wound Care Visit     Right foot ulcer       Allergies:  Hydrochlorothiazide, Insulin lispro, Lisinopril, Minoxidil, Nifedipine, Other, Prazosin, Venlafaxine, Doxycycline, and Simvastatin    Diagnosis:  1. 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)  2. Type 2 diabetes mellitus with diabetic neuropathy, with long-term current use of insulin ([facility])     Diagnosis ICD-10-CM Associated Orders   1. 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)  E11.621     L97.512       2. Type 2 diabetes mellitus with diabetic neuropathy, with long-term current use of insulin ([facility])  E11.40     Z79.4            Assessment & Plan:  Diabetic Calhoun grade 1 submetatarsal head 2 right foot, ulceration was debrided through selective tissues and dressed with Dermagran gauze, 1/4 inch felt offloading pad, dry dressing and football dressing, patient will leave this dry clean and intact, he will use surgical shoe to ambulate, cast cover to shower, minimal weight-bear only for ADLs.  Unfortunately patient and wife did not bring in his diabetic shoe for evaluation, I have asked him to do this at next visit to assure he has adequate offloading submet 2.  We discussed frequent elevation, low-sodium diet, proper protein intake, proper glycemic control, strict pressure and friction reduction, they understand and agree with the plan and will follow-up in 1 week.    Debridement   Wound 08/20/24 Diabetic Ulcer Foot Right;Plantar;Distal;Mid    Universal Protocol:  procedure performed by consultantConsent: Verbal consent obtained.  Risks and benefits: risks, benefits and alternatives were discussed  Consent given by: patient  Time out: Immediately prior to procedure a \"time out\" was called to " verify the correct patient, procedure, equipment, support staff and site/side marked as required.  Patient understanding: patient states understanding of the procedure being performed  Patient identity confirmed: verbally with patient    Debridement Details  Performed by: physician  Debridement type: selective  Pain control: lidocaine 4%      Post-debridement measurements  Length (cm): 0.1  Width (cm): 0.1  Depth (cm): 0.1  Percent debrided: 100%  Surface Area (cm^2): 0.01  Area Debrided (cm^2): 0.01  Volume (cm^3): 0    Devitalized tissue debrided: biofilm, callus and slough  Instrument(s) utilized: blade  Bleeding: small  Hemostasis obtained with: pressure and silver nitrate  Procedural pain (0-10): insensate  Post-procedural pain: insensate   Response to treatment: procedure was tolerated well               Subjective:   Hammad presents today for follow-up care of left right foot diabetic ulceration, he is tolerated football dressing well, he was able to go on vacation with his family to [place] and stay off of his foot.  He states his blood sugars are okay, he forgot to bring his diabetic shoes, no new complaints.          The following portions of the patient's history were reviewed and updated as appropriate:   Patient Active Problem List   Diagnosis    Type 2 diabetes mellitus with hyperglycemia, with long-term current use of insulin ([facility])    Mixed hyperlipidemia    Diabetic polyneuropathy associated with type 2 diabetes mellitus ([facility])    Primary hypertension    Morbid (severe) obesity due to excess calories ([facility])    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 ([facility])    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 ([facility])    Elevated parathyroid hormone    Diabetic nephropathy associated with type 2 diabetes mellitus ([facility])    Type 2 diabetes mellitus with diabetic neuropathy, unspecified whether long term insulin use ([facility])    Pre-ulcerative calluses    Acute on chronic  diastolic heart failure ([facility])    Elevated troponin    Pulmonary hypertension ([facility])    Paroxysmal A-fib ([facility])    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, with fat layer exposed ([facility])     Past Medical History:   Diagnosis Date    Asthma     Bradycardia     Carcinoma, basal cell, skin     head and face and ear    COVID-19 12/05/2022    Dupuytren contracture     bilateral ring and right middle    Gout     Herniated lumbar intervertebral disc     Sleep apnea      Past Surgical History:   Procedure Laterality Date    APPENDECTOMY      age 20, ruptured    CATARACT EXTRACTION, BILATERAL Bilateral     CERVICAL FUSION      CYST REMOVAL Left 01/2023    DUPUYTREN CONTRACTURE RELEASE Left     left ring finger    LUMBAR LAMINECTOMY      belkis placed    TONSILLECTOMY      VASECTOMY       Social History     Socioeconomic History    Marital status: /Civil Union     Spouse name: Not on file    Number of children: Not on file    Years of education: Not on file    Highest education level: Not on file   Occupational History    Occupation: teacher     Comment: retired   Tobacco Use    Smoking status: Never    Smokeless tobacco: Never   Vaping Use    Vaping status: Never Used   Substance and Sexual Activity    Alcohol use: Not Currently    Drug use: Never    Sexual activity: Not on file   Other Topics Concern    Not on file   Social History Narrative    Not on file     Social Determinants of Health     Financial Resource Strain: Not on file   Food Insecurity: No Food Insecurity (12/29/2023)    Hunger Vital Sign     Worried About Running Out of Food in the Last Year: Never true     Ran Out of Food in the Last Year: Never true   Transportation Needs: No Transportation Needs (12/29/2023)    PRAPARE - Transportation     Lack of Transportation (Medical): No     Lack of Transportation (Non-Medical): No   Physical Activity: Not on file   Stress: Not on file   Social Connections: Not on file    Intimate Partner Violence: Not on file   Housing Stability: Low Risk  (12/29/2023)    Housing Stability Vital Sign     Unable to Pay for Housing in the Last Year: No     Number of Times Moved in the Last Year: 1     Homeless in the Last Year: No        Current Outpatient Medications:     allopurinol (ZYLOPRIM) 300 mg tablet, Take 300 mg by mouth every 24 hours, Disp: , Rfl:     atorvastatin (LIPITOR) 20 mg tablet, Take 20 mg by mouth daily, Disp: , Rfl:     azelastine (ASTELIN) 0.1 % nasal spray, 2 sprays into each nostril 2 (two) times a day, Disp: , Rfl:     budesonide (PULMICORT) 0.5 mg/2 mL nebulizer solution, Take 0.5 mg by nebulization 2 (two) times a day Rinse mouth after use., Disp: , Rfl:     Cholecalciferol (Vitamin D3) 25 MCG (1000 UT) CAPS, Take 1 capsule by mouth every 24 hours, Disp: , Rfl:     Coenzyme Q10 (Co Q10) 100 MG CAPS, Take 100 mg by mouth in the morning, Disp: , Rfl:     Easy Comfort Pen Needles 33G X 4 MM MISC, USE WITH INSULIN FIVE TIMES DAILY AS DIRECTED, Disp: , Rfl:     fluticasone (FLONASE) 50 mcg/act nasal spray, 1 spray into each nostril 2 (two) times a day, Disp: , Rfl:     formoterol (PERFOROMIST) 20 MCG/2ML nebulizer solution, Take 20 mcg by nebulization 2 (two) times a day, Disp: , Rfl:     gabapentin (NEURONTIN) 100 mg capsule, Take 1 capsule (100 mg total) by mouth daily, Disp: 180 capsule, Rfl: 0    Glucosamine-Chondroitin 500-400 MG CAPS, Take 1 capsule by mouth daily, Disp: , Rfl:     insulin aspart (NovoLOG FlexPen) 100 UNIT/ML injection pen, INJECT 12 UNITS SUBCUTANEOUSLY with breakfast, LUNCH and with dinner plus sliding scale. (UP  UNITS DAILY). 150-200 :1 unit 201-250:2 units 251-300:3 units 301-350: 4 units 351-400:5 units 400+: 6 units, Disp: 45 mL, Rfl: 0    Insulin Glargine Solostar (Lantus SoloStar) 100 UNIT/ML SOPN, INJECT 20 UNITS SUBCUTANEOUSLY IN THE MORNING AND 20 UNITS IN THE EVENING., Disp: 30 mL, Rfl: 0    ipratropium-albuterol (DUO-NEB) 0.5-2.5  mg/3 mL nebulizer solution, Take 3 mL by nebulization 2 (two) times a day, Disp: , Rfl:     loratadine (CLARITIN) 10 mg tablet, Take 10 mg by mouth daily, Disp: , Rfl:     losartan (COZAAR) 100 MG tablet, Take 100 mg by mouth daily, Disp: , Rfl:     magnesium Oxide (MAG-OX) 400 mg TABS, Take 1 tablet (400 mg total) by mouth daily, Disp: 30 tablet, Rfl: 0    Multiple Vitamin (MULTIVITAMIN ADULT PO), every 24 hours, Disp: , Rfl:     mupirocin (BACTROBAN) 2 % ointment, Apply 1 Application topically every other day 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, Disp: , Rfl:     mupirocin (BACTROBAN) 2 % ointment, Apply topically every other day With gauze, Disp: 22 g, Rfl: 0    mupirocin (BACTROBAN) 2 % ointment, Apply topically every other day With alginate dressing, Disp: 22 g, Rfl: 0    Omega-3 Fatty Acids (Fish Oil) 360 MG CAPS, Take by mouth, Disp: , Rfl:     OneTouch Verio test strip, USE AS DIRECTED FOUR TIMES DAILY, Disp: , Rfl:     potassium chloride (K-DUR,KLOR-CON) 20 mEq tablet, Take 1 tablet (20 mEq total) by mouth daily, Disp: 30 tablet, Rfl: 0    rivaroxaban (Xarelto) 20 mg tablet, Take 1 tablet (20 mg total) by mouth daily with breakfast, Disp: 30 tablet, Rfl: 5    senna (SENOKOT) 8.6 mg, Take 2 tablets (17.2 mg total) by mouth daily at bedtime as needed for constipation, Disp: 30 tablet, Rfl: 0    torsemide (DEMADEX) 20 mg tablet, Take 2 tablets (40 mg total) by mouth every morning AND 1 tablet (20 mg total) daily after lunch., Disp: 90 tablet, Rfl: 2    triamcinolone (KENALOG) 0.1 % cream, 1 Application 2 (two) times a day To affected areas, Disp: , Rfl:   Family History   Problem Relation Age of Onset    Hypertension Mother     Diabetes type II Mother     Hypertension Father     Diabetes type II Brother     Hypertension Brother     Kidney disease Sister     Hypertension Daughter     GI problems Daughter        Review of Systems   Constitutional:  Negative for chills and  fever.   HENT:  Negative for ear pain and sore throat.    Eyes:  Negative for pain and visual disturbance.   Respiratory:  Negative for cough and shortness of breath.    Cardiovascular:  Negative for chest pain and palpitations.   Gastrointestinal:  Negative for abdominal pain and vomiting.   Genitourinary:  Negative for dysuria and hematuria.   Musculoskeletal:  Positive for gait problem. Negative for arthralgias and back pain.   Skin:  Positive for color change and wound. Negative for rash.   Neurological:  Positive for numbness. Negative for seizures and syncope.   Psychiatric/Behavioral: Negative.     All other systems reviewed and are negative.        Objective:  /80   Pulse 72   Temp 98.7 °F (37.1 °C)   Resp 17     Physical Exam  Constitutional:       Appearance: Normal appearance. He is normal weight.   HENT:      Head: Normocephalic and atraumatic.      Right Ear: External ear normal.      Left Ear: External ear normal.      Nose: Nose normal.      Mouth/Throat:      Mouth: Mucous membranes are moist.      Pharynx: Oropharynx is clear.   Eyes:      Conjunctiva/sclera: Conjunctivae normal.      Pupils: Pupils are equal, round, and reactive to light.   Cardiovascular:      Pulses: Normal pulses.           Dorsalis pedis pulses are 2+ on the right side and 2+ on the left side.        Posterior tibial pulses are 2+ on the right side and 2+ on the left side.   Pulmonary:      Effort: Pulmonary effort is normal.   Musculoskeletal:      Cervical back: Normal range of motion.      Right lower leg: No edema.      Left lower leg: No edema.   Skin:     General: Skin is warm and dry.      Capillary Refill: Capillary refill takes less than 2 seconds.   Neurological:      General: No focal deficit present.      Mental Status: He is alert and oriented to person, place, and time. Mental status is at baseline.      Sensory: Sensory deficit present.      Coordination: Coordination abnormal.      Gait: Gait abnormal.  "     Deep Tendon Reflexes: Reflexes abnormal.   Psychiatric:         Mood and Affect: Mood normal.         Behavior: Behavior normal.         Thought Content: Thought content normal.         Judgment: Judgment normal.           Wound 08/20/24 Diabetic Ulcer Foot Right;Plantar;Distal;Mid (Active)   Wound Image Images linked 09/04/24 0941   Wound Description Bleeding;Dry 09/04/24 0941   Tash-wound Assessment Dry;Callus 09/04/24 0941   Wound Length (cm) 0.1 cm 09/04/24 0941   Wound Width (cm) 0.1 cm 09/04/24 0941   Wound Depth (cm) 0.1 cm 09/04/24 0941   Wound Surface Area (cm^2) 0.01 cm^2 09/04/24 0941   Wound Volume (cm^3) 0.001 cm^3 09/04/24 0941   Calculated Wound Volume (cm^3) 0 cm^3 09/04/24 0941   Drainage Amount None 09/04/24 0941   Non-staged Wound Description Not applicable 09/04/24 0941   Dressing Status Intact;Dry 09/04/24 0941                Cardiac EP device report    Result Date: 8/26/2024  Narrative: BIO DC/PM BIOTRONIK TRANSMISSION: BATTERY VOLTAGE ADEQUATE (75%/OK). AP 1%  77% (>40%/DDD 60) ALL AVAILABLE LEAD PARAMETERS WITHIN NORMAL LIMITS. NO SIGNIFICANT HIGH RATE EPISODES. NORMAL DEVICE FUNCTION. AM       Wound Instructions:  No orders of the defined types were placed in this encounter.        Sharron Jean-Baptiste, DPM, DPM, FACFAS    Portions of the record may have been created with voice recognition software. Occasional wrong word or \"sound a like\" substitutions may have occurred due to the inherent limitations of voice recognition software. Read the chart carefully and recognize, using context, where substitutions have occurred.    "

## 2024-09-04 NOTE — PATIENT INSTRUCTIONS
Orders Placed This Encounter   Procedures    Wound cleansing and dressings Diabetic Ulcer Right;Plantar;Distal;Mid Foot     Right foot:     Shower no OR with cast protector     Leave bulky football dressing over right foot until follow up at [facility] in 1 week.    Bring shoe with you to your next appt     Standing Status:   Future     Standing Expiration Date:   9/11/2024

## 2024-09-04 NOTE — PROGRESS NOTES
Wound Procedure Treatment Diabetic Ulcer Right;Plantar;Distal;Mid Foot    Performed by: Sissy Hill RN  Authorized by: Sharron Jean-Baptiste DPM    Associated wounds:   Wound 08/20/24 Diabetic Ulcer Foot Right;Plantar;Distal;Mid  Wound cleansed with:  Soap and water  Applied to periwound:  Skin prep  Applied primary dressing:  Dermagran  Applied secondary dressing:  ABD and Cast padding  Dressing secured with:  Jennifer, Tape, Kerlix, Coban and Tubifast  Offloading device appllied:  Felt padding 1/4 inch

## 2024-09-04 NOTE — TELEPHONE ENCOUNTER
cld PT to reschedule his current appt he made today upon check out w/ dr. jacques. left mess offered new appt with Dr. Jean-Baptiste. Left curr. appt w/ georgie until PT calls flora

## 2024-09-16 ENCOUNTER — OFFICE VISIT (OUTPATIENT)
Dept: WOUND CARE | Facility: HOSPITAL | Age: 84
End: 2024-09-16
Payer: COMMERCIAL

## 2024-09-16 VITALS
HEART RATE: 60 BPM | SYSTOLIC BLOOD PRESSURE: 110 MMHG | DIASTOLIC BLOOD PRESSURE: 70 MMHG | TEMPERATURE: 99.3 F | RESPIRATION RATE: 18 BRPM

## 2024-09-16 DIAGNOSIS — E11.621 DIABETIC ULCER OF OTHER PART OF RIGHT FOOT ASSOCIATED WITH TYPE 2 DIABETES MELLITUS, WITH FAT LAYER EXPOSED (HCC): Primary | ICD-10-CM

## 2024-09-16 DIAGNOSIS — Z79.4 TYPE 2 DIABETES MELLITUS WITH DIABETIC NEUROPATHY, WITH LONG-TERM CURRENT USE OF INSULIN (HCC): ICD-10-CM

## 2024-09-16 DIAGNOSIS — E11.40 TYPE 2 DIABETES MELLITUS WITH DIABETIC NEUROPATHY, WITH LONG-TERM CURRENT USE OF INSULIN (HCC): ICD-10-CM

## 2024-09-16 DIAGNOSIS — L97.512 DIABETIC ULCER OF OTHER PART OF RIGHT FOOT ASSOCIATED WITH TYPE 2 DIABETES MELLITUS, WITH FAT LAYER EXPOSED (HCC): Primary | ICD-10-CM

## 2024-09-16 PROCEDURE — 99212 OFFICE O/P EST SF 10 MIN: CPT | Performed by: PODIATRIST

## 2024-09-16 NOTE — PROGRESS NOTES
Wound Procedure Treatment Diabetic Ulcer Right;Plantar;Distal;Mid Foot    Performed by: Cyn Burton RN  Authorized by: Julio César Mac DPM    Associated wounds:   Wound 08/20/24 Diabetic Ulcer Foot Right;Plantar;Distal;Mid  Wound cleansed with:  Soap and water  Applied to periwound:  Moisture lotion

## 2024-09-16 NOTE — PATIENT INSTRUCTIONS
Orders Placed This Encounter   Procedures    Wound cleansing and dressings Diabetic Ulcer Right;Plantar;Distal;Mid Foot     Your wound is healed. Continue good daily skin care and moisturize (not in between the toes). Monitor feet daily  Keep your follow up appointment at Dr Mac office  Call wound care center if your wound re-opens  Thank you for choosing [facility] wound care center     Standing Status:   Future     Standing Expiration Date:   9/16/2024    Wound Procedure Treatment Diabetic Ulcer Right;Plantar;Distal;Mid Foot     This order was created via procedure documentation

## 2024-09-16 NOTE — PROGRESS NOTES
Patient ID: Hammad Montesinos is a 83 y.o. male Date of Birth 1940       Chief Complaint   Patient presents with    Follow Up Wound Care Visit     Right foot wound        Allergies:  Hydrochlorothiazide, Insulin lispro, Lisinopril, Minoxidil, Nifedipine, Other, Prazosin, Venlafaxine, Doxycycline, and Simvastatin    Diagnosis:  1. 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 Type 2 diabetes mellitus with diabetic neuropathy, with long-term current use of insulin (HCC)  -     Wound cleansing and dressings Diabetic Ulcer Right;Plantar;Distal;Mid Foot; Future  -     Wound Procedure Treatment Diabetic Ulcer Right;Plantar;Distal;Mid Foot     Diagnosis ICD-10-CM Associated Orders   1. 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 Type 2 diabetes mellitus with diabetic neuropathy, with long-term current use of insulin (HCC)  E11.40 Wound cleansing and dressings Diabetic Ulcer Right;Plantar;Distal;Mid Foot    Z79.4 Wound Procedure Treatment Diabetic Ulcer Right;Plantar;Distal;Mid Foot           Assessment & Plan:  Wound has closed 100%, discharged to home in stable condition  Diabetic shoe orthotics evaluated and noted to fit adequately.  Follow-up for diabetic footcare as scheduled.  Call immediately if any signs of infection or recurrent breakdown is noted.    Subjective:   9/16/2024: 83-year-old male type II diabetic presents for continued follow-up care of chronic ulcer of right forefoot.  Reports excellent progress and feels the wound has  completely closed.    8/20/2024: 83-year-old male type II diabetic seen today for diabetic footcare visit concerned that his right foot has broken down again with ulceration.  Denies any fever or shortness of breath.    5/30/2024: 83-year-old type II diabetic seen today for follow-up chronic DFU which he reports has closed.  Reports no drainage for the past few days.    5/1/2024: 83-year-old type II diabetic seen today for follow-up chronic DFU right foot.  Reports excellent progress and feels the wound has finally closed.    4/10/2024: 83-year-old male seen today for follow-up chronic DFU right foot subthird MPJ.  Reports good progress since last visit with diminishing size and depth.    3/27/2024: 83-year-old type II diabetic presents for continued follow-up care of chronic DFU plantar right forefoot.  Reports no significant change since last visit with some area of slight discoloration that concerns him.    3/6/2024: 83-year-old male DM2 seen for follow-up of ulceration plantar right forefoot.  Reports good progress with diminishing size.    2/19/2024: 83-year-old male type II diabetic presents for follow-up of new recurrent ulceration involving his right forefoot.  Patient reports good progress with less swelling and redness since taking antibiotic.  Was seen last in my [place] office and empirically started on Augmentin 875 twice daily.    2/12/2024: 83-year-old male type II diabetic seen today for evaluation of new issue/ulceration right forefoot which recently broke down within the past 2 weeks.  This is the site of a previous ulceration that was present 2 years ago.  It is now more red and swollen and draining fluid.  Patient was recently hospitalized with CHF causing him to have a lot of swelling in his legs.      The following portions of the patient's history were reviewed and updated as appropriate:   Patient Active Problem List   Diagnosis    Type 2 diabetes mellitus with hyperglycemia, with  long-term current use of insulin (HCC)    Mixed hyperlipidemia    Diabetic polyneuropathy associated with type 2 diabetes mellitus (HCC)    Primary hypertension    Morbid (severe) obesity due to excess calories (HCC)    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 (HCC)    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 (HCC)    Elevated parathyroid hormone    Diabetic nephropathy associated with type 2 diabetes mellitus (HCC)    Type 2 diabetes mellitus with diabetic neuropathy, unspecified whether long term insulin use (HCC)    Pre-ulcerative calluses    Acute on chronic diastolic heart failure (HCC)    Elevated troponin    Pulmonary hypertension (HCC)    Paroxysmal A-fib (HCC)    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, with fat layer exposed (HCC)     Past Medical History:   Diagnosis Date    Asthma     Bradycardia     Carcinoma, basal cell, skin     head and face and ear    COVID-19 12/05/2022    Dupuytren contracture     bilateral ring and right middle    Gout     Herniated lumbar intervertebral disc     Sleep apnea      Past Surgical History:   Procedure Laterality Date    APPENDECTOMY      age 20, ruptured    CATARACT EXTRACTION, BILATERAL Bilateral     CERVICAL FUSION      CYST REMOVAL Left 01/2023    DUPUYTREN CONTRACTURE RELEASE Left     left ring finger    LUMBAR LAMINECTOMY      belkis placed    TONSILLECTOMY      VASECTOMY       Social History     Socioeconomic History    Marital status: /Civil Union     Spouse name: Not on file    Number of children: Not on file    Years of education: Not on file    Highest education level: Not on file   Occupational History    Occupation: teacher     Comment: retired   Tobacco Use    Smoking status: Never    Smokeless tobacco: Never   Vaping Use    Vaping status: Never Used   Substance and Sexual Activity    Alcohol use: Not Currently    Drug use: Never    Sexual activity: Not on file    Other Topics Concern    Not on file   Social History Narrative    Not on file     Social Determinants of Health     Financial Resource Strain: Not on file   Food Insecurity: No Food Insecurity (12/29/2023)    Hunger Vital Sign     Worried About Running Out of Food in the Last Year: Never true     Ran Out of Food in the Last Year: Never true   Transportation Needs: No Transportation Needs (12/29/2023)    PRAPARE - Transportation     Lack of Transportation (Medical): No     Lack of Transportation (Non-Medical): No   Physical Activity: Not on file   Stress: Not on file   Social Connections: Not on file   Intimate Partner Violence: Not on file   Housing Stability: Low Risk  (12/29/2023)    Housing Stability Vital Sign     Unable to Pay for Housing in the Last Year: No     Number of Times Moved in the Last Year: 1     Homeless in the Last Year: No        Current Outpatient Medications:     allopurinol (ZYLOPRIM) 300 mg tablet, Take 300 mg by mouth every 24 hours, Disp: , Rfl:     atorvastatin (LIPITOR) 20 mg tablet, Take 20 mg by mouth daily, Disp: , Rfl:     azelastine (ASTELIN) 0.1 % nasal spray, 2 sprays into each nostril 2 (two) times a day, Disp: , Rfl:     budesonide (PULMICORT) 0.5 mg/2 mL nebulizer solution, Take 0.5 mg by nebulization 2 (two) times a day Rinse mouth after use., Disp: , Rfl:     Cholecalciferol (Vitamin D3) 25 MCG (1000 UT) CAPS, Take 1 capsule by mouth every 24 hours, Disp: , Rfl:     Coenzyme Q10 (Co Q10) 100 MG CAPS, Take 100 mg by mouth in the morning, Disp: , Rfl:     Easy Comfort Pen Needles 33G X 4 MM MISC, USE WITH INSULIN FIVE TIMES DAILY AS DIRECTED, Disp: , Rfl:     fluticasone (FLONASE) 50 mcg/act nasal spray, 1 spray into each nostril 2 (two) times a day, Disp: , Rfl:     formoterol (PERFOROMIST) 20 MCG/2ML nebulizer solution, Take 20 mcg by nebulization 2 (two) times a day, Disp: , Rfl:     gabapentin (NEURONTIN) 100 mg capsule, Take 1 capsule (100 mg total) by mouth daily, Disp:  180 capsule, Rfl: 0    Glucosamine-Chondroitin 500-400 MG CAPS, Take 1 capsule by mouth daily, Disp: , Rfl:     insulin aspart (NovoLOG FlexPen) 100 UNIT/ML injection pen, INJECT 12 UNITS SUBCUTANEOUSLY with breakfast, LUNCH and with dinner plus sliding scale. (UP  UNITS DAILY). 150-200 :1 unit 201-250:2 units 251-300:3 units 301-350: 4 units 351-400:5 units 400+: 6 units, Disp: 45 mL, Rfl: 0    Insulin Glargine Solostar (Lantus SoloStar) 100 UNIT/ML SOPN, INJECT 20 UNITS SUBCUTANEOUSLY IN THE MORNING AND 20 UNITS IN THE EVENING., Disp: 30 mL, Rfl: 0    ipratropium-albuterol (DUO-NEB) 0.5-2.5 mg/3 mL nebulizer solution, Take 3 mL by nebulization 2 (two) times a day, Disp: , Rfl:     loratadine (CLARITIN) 10 mg tablet, Take 10 mg by mouth daily, Disp: , Rfl:     losartan (COZAAR) 100 MG tablet, Take 100 mg by mouth daily, Disp: , Rfl:     magnesium Oxide (MAG-OX) 400 mg TABS, Take 1 tablet (400 mg total) by mouth daily, Disp: 30 tablet, Rfl: 0    Multiple Vitamin (MULTIVITAMIN ADULT PO), every 24 hours, Disp: , Rfl:     mupirocin (BACTROBAN) 2 % ointment, Apply 1 Application topically every other day 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, Disp: , Rfl:     mupirocin (BACTROBAN) 2 % ointment, Apply topically every other day With gauze, Disp: 22 g, Rfl: 0    mupirocin (BACTROBAN) 2 % ointment, Apply topically every other day With alginate dressing, Disp: 22 g, Rfl: 0    Omega-3 Fatty Acids (Fish Oil) 360 MG CAPS, Take by mouth, Disp: , Rfl:     OneTouch Verio test strip, USE AS DIRECTED FOUR TIMES DAILY, Disp: , Rfl:     potassium chloride (K-DUR,KLOR-CON) 20 mEq tablet, Take 1 tablet (20 mEq total) by mouth daily, Disp: 30 tablet, Rfl: 0    rivaroxaban (Xarelto) 20 mg tablet, Take 1 tablet (20 mg total) by mouth daily with breakfast, Disp: 30 tablet, Rfl: 5    senna (SENOKOT) 8.6 mg, Take 2 tablets (17.2 mg total) by mouth daily at bedtime as needed for constipation, Disp: 30  tablet, Rfl: 0    torsemide (DEMADEX) 20 mg tablet, Take 2 tablets (40 mg total) by mouth every morning AND 1 tablet (20 mg total) daily after lunch., Disp: 90 tablet, Rfl: 2    triamcinolone (KENALOG) 0.1 % cream, 1 Application 2 (two) times a day To affected areas, Disp: , Rfl:   Family History   Problem Relation Age of Onset    Hypertension Mother     Diabetes type II Mother     Hypertension Father     Diabetes type II Brother     Hypertension Brother     Kidney disease Sister     Hypertension Daughter     GI problems Daughter       Review of Systems   Constitutional: Negative.    HENT: Negative.     Eyes: Negative.    Respiratory: Negative.     Cardiovascular: Negative.    Gastrointestinal: Negative.    Endocrine:        Type 2 diabetes   Genitourinary: Negative.    Musculoskeletal: Negative.    Skin:  Positive for wound.        Wound appears completely closed   Allergic/Immunologic: Negative.    Neurological:  Positive for numbness.   Hematological: Negative.    Psychiatric/Behavioral: Negative.           Objective:  /70   Pulse 60   Temp 99.3 °F (37.4 °C)   Resp 18     Physical Exam  Constitutional:       Appearance: Normal appearance.   HENT:      Head: Normocephalic.      Right Ear: Tympanic membrane normal.      Left Ear: Tympanic membrane normal.      Nose: No congestion.      Mouth/Throat:      Pharynx: No oropharyngeal exudate or posterior oropharyngeal erythema.   Eyes:      Conjunctiva/sclera: Conjunctivae normal.      Pupils: Pupils are equal, round, and reactive to light.   Cardiovascular:      Rate and Rhythm: Normal rate and regular rhythm.      Pulses:           Dorsalis pedis pulses are 1+ on the right side and 1+ on the left side.        Posterior tibial pulses are 0 on the right side and 0 on the left side.   Pulmonary:      Effort: Pulmonary effort is normal.   Musculoskeletal:      Right lower leg: Edema (+2) present.      Left lower leg: Edema (+2) present.      Right foot:  "Prominent metatarsal heads present.      Left foot: Prominent metatarsal heads present.   Feet:      Right foot:      Protective Sensation: 10 sites tested.  8 sites sensed.      Skin integrity: Ulcer (Right subthree MPJ ulceration has closed completely.) present. No erythema.      Left foot:      Protective Sensation: 10 sites tested.  9 sites sensed.      Skin integrity: Callus (Subthree) present.   Skin:     General: Skin is warm and dry.      Capillary Refill: Capillary refill takes 2 to 3 seconds.      Coloration: Skin is not pale.      Findings: No bruising, erythema, lesion or rash.   Neurological:      Mental Status: He is alert.      Cranial Nerves: No cranial nerve deficit.      Sensory: Sensory deficit present.      Motor: No weakness.      Gait: Gait normal.      Deep Tendon Reflexes: Reflexes normal.      Comments: Hypoesthesia and loss of protective sensation noted.  Consistent with advanced diabetic neuropathy   Psychiatric:         Mood and Affect: Mood normal.         Behavior: Behavior normal.         Judgment: Judgment normal.            Procedures             Wound Instructions:  Orders Placed This Encounter   Procedures    Wound cleansing and dressings Diabetic Ulcer Right;Plantar;Distal;Mid Foot     Your wound is healed. Continue good daily skin care and moisturize (not in between the toes). Monitor feet daily  Keep your follow up appointment at Dr Mac office  Call wound care center if your wound re-opens  Thank you for choosing [facility] wound care center     Standing Status:   Future     Standing Expiration Date:   9/16/2024    Wound Procedure Treatment Diabetic Ulcer Right;Plantar;Distal;Mid Foot     This order was created via procedure documentation         Julio César Mac DPM      Portions of the record may have been created with voice recognition software. Occasional wrong word or \"sound a like\" substitutions may have occurred due to the inherent limitations of voice " recognition software. Read the chart carefully and recognize, using context, where substitutions have occurred.

## 2024-09-20 ENCOUNTER — DOCUMENTATION (OUTPATIENT)
Dept: ENDOCRINOLOGY | Facility: HOSPITAL | Age: 84
End: 2024-09-20

## 2024-10-07 ENCOUNTER — TELEPHONE (OUTPATIENT)
Age: 84
End: 2024-10-07

## 2024-10-07 NOTE — TELEPHONE ENCOUNTER
Pt called said he spoke with [facility]  they told him they are waiting on info from the office.  Can u please check on this?    Pt has 10 days left of a sensor.

## 2024-10-07 NOTE — TELEPHONE ENCOUNTER
[facility] hasn't sent anything to us saying that anything is needed. I have refaxed his last note to [phone].

## 2024-10-11 NOTE — TELEPHONE ENCOUNTER
Patient called back to advise he received something from [facility] and they are saying his G7 will be delivered today. If he does not receive it, he will call us back to  the one we are holding for him. Patient is aware it would need to be by 3:30 today and we are not in over the weekend.

## 2024-10-11 NOTE — TELEPHONE ENCOUNTER
Email from the [facility] rep says:     The order shipped out on 10/07/2024. Here is the tracking number: [id number]

## 2024-10-11 NOTE — TELEPHONE ENCOUNTER
"Patient called bc [facility] told him they need \"authorization from provider\" to fill dexcom g7 sensor order. Can you follow up with [facility] for patient?    He already put on his last sensor. Are there any samples he can have?    Please follow up with patient. Thank you  "

## 2024-10-11 NOTE — TELEPHONE ENCOUNTER
Left message advising patient that I have a Dexcom G7 sensor that he can  today before 3:30. I also advised that I have emailed our [facility] rep to see if she can help with this.

## 2024-10-18 DIAGNOSIS — E11.40 TYPE 2 DIABETES MELLITUS WITH DIABETIC NEUROPATHY, UNSPECIFIED WHETHER LONG TERM INSULIN USE (HCC): ICD-10-CM

## 2024-10-18 RX ORDER — INSULIN ASPART 100 [IU]/ML
INJECTION, SOLUTION INTRAVENOUS; SUBCUTANEOUS
Qty: 45 ML | Refills: 1 | Status: SHIPPED | OUTPATIENT
Start: 2024-10-18

## 2024-10-28 ENCOUNTER — OFFICE VISIT (OUTPATIENT)
Dept: ENDOCRINOLOGY | Facility: HOSPITAL | Age: 84
End: 2024-10-28
Payer: COMMERCIAL

## 2024-10-28 VITALS
DIASTOLIC BLOOD PRESSURE: 62 MMHG | OXYGEN SATURATION: 94 % | SYSTOLIC BLOOD PRESSURE: 118 MMHG | HEIGHT: 71 IN | WEIGHT: 222 LBS | HEART RATE: 72 BPM | BODY MASS INDEX: 31.08 KG/M2

## 2024-10-28 DIAGNOSIS — E11.40 TYPE 2 DIABETES MELLITUS WITH DIABETIC NEUROPATHY, UNSPECIFIED WHETHER LONG TERM INSULIN USE (HCC): Primary | ICD-10-CM

## 2024-10-28 PROCEDURE — 99214 OFFICE O/P EST MOD 30 MIN: CPT | Performed by: PHYSICIAN ASSISTANT

## 2024-10-28 RX ORDER — IPRATROPIUM BROMIDE 21 UG/1
SPRAY, METERED NASAL
COMMUNITY
Start: 2024-10-14

## 2024-10-28 NOTE — PATIENT INSTRUCTIONS
Monitor diet and maintain activity.     Continue Lantus 20 units twice a day.     Call the office if still having low blood sugars.    Continue NovoLog 20 units with meals.     Take 1 extra unit of insulin for every 50 points above 150.  151-200: 1 unit  201-250: 2 units  251-300: 3 units  301-350: 4 units  351-400: 5 units  400+: 6 units    Try to reset the Clarity breanne to see if we can get an updated report.      Continue gabapentin to 200 mg at night.     Continue to use Dexcom to monitor blood sugar. Call the office with any concerns.     Follow up in 3 months with lab work prior to visit.

## 2024-10-28 NOTE — PROGRESS NOTES
Hammad Montesinos 84 y.o. male MRN: [id number]    Encounter: [id number]      Assessment & Plan     Assessment:  This is a 84 y.o.-year-old male with type 2 diabetes with hypoglycemic unawareness, neuropathy, hypertension and hyperlipidemia.    Plan:  1. Type 2 diabetes, insulin requiring: Most recent hemoglobin A1c was 7.6.  For his age and chronic medical conditions this is good. Unfortunately he is having issues with his clarity breanne and we are unable to download a recent Dexcom report.  He will continue with Lantus 20 units daily and NovoLog 20 unites with meals.  I asked him to contact the office in the meantime if there is any concerns with his blood sugar.  Once clarity breanne is fixed, I would like to see a download with 2 weeks worth of blood sugars so I can make further adjustments to his insulin.  Follow-up in 3 months with lab work completed prior to visit.     2. Hypoglycemic unawareness:  Has had several episodes of loss of consciousness due to hypoglycemia with ambulance called.  Currently utilizing Dexcom G7.     3. Diabetic neuropathy:  Stable. Diabetic foot exam is up-to-date.     4. Hypertension:  Normotensive in the office.  Kidney function remains stable with normal electrolytes.  May have a little bit of dehydration going on with this set of lab work.  Make sure to drink plenty water throughout the day.  Continue with current medication.  Repeat CMP prior to next office visit.     5. Hyperlipidemia:  Previous lipid panel was excellent.  Continue with current medications.  We will continue to monitor over time.    CC: Type 2 diabetes follow-up    History of Present Illness     HPI:  Hammad Montesinos is a 83 year old male with type 2 diabetes, insulin requiring with neuropathy for 50 years, hyperlipidemia, hypertension for follow-up.  He was diagnosed with type 2 diabetes in his 30s.  He originally went on metformin therapy.  He was never on any other oral agents as metformin controlled his blood  sugars for the 1st several years and then he was switched to insulin therapy.  He reports that Humalog insulin does not improve his blood sugars as well as NovoLog insulin does.  He is on insulin at home and takes Lantus insulin 20 units twice a day, and NovoLog 20 units.  Has a sliding scale but is afraid to use it because he does not want to go low.  States with dinner he usually will take more insulin as he has noticed significant increases.  He denies any polyuria, polydipsia, nocturia and blurry vision.  He has once a night nocturia.  He denies chest pain or shortness of breath.  He has occasional shooting pains of the legs at night and has numbness of the feet to his knees bilaterally.  He denies retinopathy, heart attack, stroke and claudication but does admit to neuropathy and nephropathy.  He reports he was to Diabetes Education on diagnosis in several times since then has seen 1 on 1 with a dietitian and gone to diabetes classes.  Does pay attention to what he is eating and to eat a diabetic diet.  States when he is eating in his room he make smarter choices, but when he is eating in the dining abdi typically will eat more.       Hypoglycemic episodes: Rare episodes.  H/o of hypoglycemia causing hospitalization or intervention such as glucagon injection  or ambulance call  Yes. Has been loc in the past requiring 911 many years ago.  Hypoglycemia symptoms: sweating and and feel bad or no symptoms at all.  Treatment of hypoglycemia: either cookies or glucose tablets.  Glucagon:Yes.  Medic alert tag: recommended,Yes.      The patient's last eye exam was in March 2022 in [place] at [facility] with no retinopathy.  The patient's last foot exam was in December 2023 at endocrine office.  His recent A1c completed number 12/2024 was 7.6.     Blood Sugar/Glucometer/Pump/CGM review: Unable to download a recent Dexcom report.  Average glucose levels over the past 2 weeks and been 210 according to his Dexcom breanne.   Review of blood sugar logs over the last 24 hours reveals glucose levels typically doing well overnight, but increase significantly with breakfast.     He has hyperlipidemia and takes atorvastatin 20 mg daily.  He denies chest pain or shortness of breath.       He has hypertension and takes amlodipine 10 mg daily and losartan 100 mg daily along with furosemide 40 mg twice a day.  He denies headache or stroke-like symptoms.     Review of Systems   Constitutional:  Negative for activity change, appetite change, fatigue and unexpected weight change.   HENT:  Negative for trouble swallowing.    Eyes:  Positive for visual disturbance (Wears glasses).   Respiratory:  Positive for shortness of breath. Negative for chest tightness.    Cardiovascular:  Negative for chest pain, palpitations and leg swelling.   Gastrointestinal:  Negative for abdominal pain, diarrhea, nausea and vomiting.   Endocrine: Negative for cold intolerance, heat intolerance, polydipsia, polyphagia and polyuria.   Genitourinary:  Negative for frequency.   Musculoskeletal:  Positive for arthralgias, back pain and gait problem (Utilizes a walker).   Skin:  Negative for rash and wound.   Neurological:  Positive for tremors. Negative for dizziness, weakness, light-headedness, numbness and headaches.   Psychiatric/Behavioral:  Negative for dysphoric mood and sleep disturbance. The patient is not nervous/anxious.        Historical Information   Past Medical History:   Diagnosis Date    Asthma     Bradycardia     Carcinoma, basal cell, skin     head and face and ear    COVID-19 12/05/2022    Dupuytren contracture     bilateral ring and right middle    Gout     Herniated lumbar intervertebral disc     Sleep apnea      Past Surgical History:   Procedure Laterality Date    APPENDECTOMY      age 20, ruptured    CATARACT EXTRACTION, BILATERAL Bilateral     CERVICAL FUSION      CYST REMOVAL Left 01/2023    DUPUYTREN CONTRACTURE RELEASE Left     left ring finger     LUMBAR LAMINECTOMY      belkis placed    TONSILLECTOMY      VASECTOMY       Social History   Social History     Substance and Sexual Activity   Alcohol Use Not Currently     Social History     Substance and Sexual Activity   Drug Use Never     Social History     Tobacco Use   Smoking Status Never   Smokeless Tobacco Never     Family History:   Family History   Problem Relation Age of Onset    Hypertension Mother     Diabetes type II Mother     Hypertension Father     Diabetes type II Brother     Hypertension Brother     Kidney disease Sister     Hypertension Daughter     GI problems Daughter        Meds/Allergies   Current Outpatient Medications   Medication Sig Dispense Refill    allopurinol (ZYLOPRIM) 300 mg tablet Take 300 mg by mouth every 24 hours      atorvastatin (LIPITOR) 20 mg tablet Take 20 mg by mouth daily      azelastine (ASTELIN) 0.1 % nasal spray 2 sprays into each nostril 2 (two) times a day      budesonide (PULMICORT) 0.5 mg/2 mL nebulizer solution Take 0.5 mg by nebulization 2 (two) times a day Rinse mouth after use.      Cholecalciferol (Vitamin D3) 25 MCG (1000 UT) CAPS Take 1 capsule by mouth every 24 hours      Coenzyme Q10 (Co Q10) 100 MG CAPS Take 100 mg by mouth in the morning      Easy Comfort Pen Needles 33G X 4 MM MISC USE WITH INSULIN FIVE TIMES DAILY AS DIRECTED      fluticasone (FLONASE) 50 mcg/act nasal spray 1 spray into each nostril 2 (two) times a day      formoterol (PERFOROMIST) 20 MCG/2ML nebulizer solution Take 20 mcg by nebulization 2 (two) times a day      gabapentin (NEURONTIN) 100 mg capsule Take 1 capsule (100 mg total) by mouth daily 180 capsule 0    Glucosamine-Chondroitin 500-400 MG CAPS Take 1 capsule by mouth daily      insulin aspart (NovoLOG FlexPen) 100 UNIT/ML injection pen INJECT 12 UNITS SUBCUTANEOUSLY with breakfast, LUNCH and with dinner plus sliding scale. (UP  UNITS DAILY). 150-200 :1 unit 201-250:2 units 251-300:3 units 301-350: 4 units 351-400:5 units  400+: 6 units 45 mL 1    Insulin Glargine Solostar (Lantus SoloStar) 100 UNIT/ML SOPN INJECT 20 UNITS SUBCUTANEOUSLY IN THE MORNING AND 20 UNITS IN THE EVENING. 30 mL 0    ipratropium-albuterol (DUO-NEB) 0.5-2.5 mg/3 mL nebulizer solution Take 3 mL by nebulization 2 (two) times a day      loratadine (CLARITIN) 10 mg tablet Take 10 mg by mouth daily      losartan (COZAAR) 100 MG tablet Take 100 mg by mouth daily      magnesium Oxide (MAG-OX) 400 mg TABS Take 1 tablet (400 mg total) by mouth daily 30 tablet 0    Multiple Vitamin (MULTIVITAMIN ADULT PO) every 24 hours      mupirocin (BACTROBAN) 2 % ointment Apply 1 Application topically every other day 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      mupirocin (BACTROBAN) 2 % ointment Apply topically every other day With gauze 22 g 0    mupirocin (BACTROBAN) 2 % ointment Apply topically every other day With alginate dressing 22 g 0    Omega-3 Fatty Acids (Fish Oil) 360 MG CAPS Take by mouth      OneTouch Verio test strip USE AS DIRECTED FOUR TIMES DAILY      potassium chloride (K-DUR,KLOR-CON) 20 mEq tablet Take 1 tablet (20 mEq total) by mouth daily 30 tablet 0    rivaroxaban (Xarelto) 20 mg tablet Take 1 tablet (20 mg total) by mouth daily with breakfast 30 tablet 5    senna (SENOKOT) 8.6 mg Take 2 tablets (17.2 mg total) by mouth daily at bedtime as needed for constipation 30 tablet 0    torsemide (DEMADEX) 20 mg tablet Take 2 tablets (40 mg total) by mouth every morning AND 1 tablet (20 mg total) daily after lunch. 90 tablet 2    triamcinolone (KENALOG) 0.1 % cream 1 Application 2 (two) times a day To affected areas       No current facility-administered medications for this visit.     Allergies   Allergen Reactions    Hydrochlorothiazide Other (See Comments)     SEVERE DIZZINESS    Insulin Lispro Other (See Comments)     Cannot control blood sugars(Humalog)    Lisinopril Other (See Comments)     Reports cough with use     Minoxidil  GI Intolerance    Nifedipine Other (See Comments)     EDEMA LEGS    Other Other (See Comments)     SEVERE DIZZINESS  Hydrap-es    Prazosin Other (See Comments)     CHEST PAIN      Venlafaxine Other (See Comments)     Made pt feel bad      Doxycycline Rash    Simvastatin Rash     PEDAL EDEMA         Objective   Vitals: There were no vitals taken for this visit.    Physical Exam  Vitals and nursing note reviewed.   Constitutional:       General: He is not in acute distress.     Appearance: Normal appearance. He is not diaphoretic.   HENT:      Head: Normocephalic and atraumatic.   Eyes:      General: No scleral icterus.     Extraocular Movements: Extraocular movements intact.      Conjunctiva/sclera: Conjunctivae normal.      Pupils: Pupils are equal, round, and reactive to light.   Neck:      Thyroid: No thyroid mass, thyromegaly or thyroid tenderness.   Cardiovascular:      Rate and Rhythm: Normal rate and regular rhythm.      Heart sounds: No murmur heard.  Pulmonary:      Effort: Pulmonary effort is normal. No respiratory distress.      Breath sounds: Normal breath sounds. No wheezing.   Musculoskeletal:      Cervical back: Normal range of motion.      Right lower leg: No edema.      Left lower leg: No edema.   Lymphadenopathy:      Cervical: No cervical adenopathy.   Skin:     General: Skin is warm and dry.   Neurological:      Mental Status: He is alert and oriented to person, place, and time.      Sensory: No sensory deficit.      Gait: Gait abnormal (Utilzing a walker).   Psychiatric:         Mood and Affect: Mood normal.         Behavior: Behavior normal.         Thought Content: Thought content normal.         The history was obtained from the review of the chart, patient.    Lab Results:   Lab Results   Component Value Date/Time    Hemoglobin A1C 6.8 11/16/2023 12:00 AM    WBC 6.54 01/08/2024 04:33 AM    WBC 6.88 01/07/2024 03:52 AM    WBC 6.32 01/06/2024 04:45 AM    Hemoglobin 11.7 (L) 01/08/2024 04:33 AM  "   Hemoglobin 11.3 (L) 01/07/2024 03:52 AM    Hemoglobin 10.9 (L) 01/06/2024 04:45 AM    Hematocrit 37.8 01/08/2024 04:33 AM    Hematocrit 36.1 (L) 01/07/2024 03:52 AM    Hematocrit 34.7 (L) 01/06/2024 04:45 AM    MCV 81 (L) 01/08/2024 04:33 AM    MCV 80 (L) 01/07/2024 03:52 AM    MCV 80 (L) 01/06/2024 04:45 AM    Platelets 191 01/08/2024 04:33 AM    Platelets 175 01/07/2024 03:52 AM    Platelets 154 01/06/2024 04:45 AM    BUN 59 (H) 01/08/2024 04:33 AM    BUN 56 (H) 01/07/2024 03:52 AM    BUN 51 (H) 01/06/2024 04:45 AM    Potassium 4.2 01/08/2024 04:33 AM    Potassium 3.7 01/07/2024 03:52 AM    Potassium 3.1 (L) 01/06/2024 04:45 AM    Chloride 96 01/08/2024 04:33 AM    Chloride 95 (L) 01/07/2024 03:52 AM    Chloride 97 01/06/2024 04:45 AM    CO2 31 01/08/2024 04:33 AM    CO2 33 (H) 01/07/2024 03:52 AM    CO2 33 (H) 01/06/2024 04:45 AM    Creatinine 1.47 (H) 01/08/2024 04:33 AM    Creatinine 1.43 (H) 01/07/2024 03:52 AM    Creatinine 1.37 (H) 01/06/2024 04:45 AM    AST 19 01/08/2024 04:33 AM    AST 20 01/07/2024 03:52 AM    AST 20 01/06/2024 04:45 AM    ALT 16 01/08/2024 04:33 AM    ALT 16 01/07/2024 03:52 AM    ALT 15 01/06/2024 04:45 AM    Total Protein 7.1 01/08/2024 04:33 AM    Total Protein 7.0 01/07/2024 03:52 AM    Total Protein 6.5 01/06/2024 04:45 AM    Albumin 3.6 01/08/2024 04:33 AM    Albumin 3.7 01/07/2024 03:52 AM    Albumin 3.4 (L) 01/06/2024 04:45 AM           Imaging Studies: Results Review Statement: No pertinent imaging studies reviewed.    Portions of the record may have been created with voice recognition software. Occasional wrong word or \"sound a like\" substitutions may have occurred due to the inherent limitations of voice recognition software. Read the chart carefully and recognize, using context, where substitutions have occurred.    "

## 2024-10-31 ENCOUNTER — OFFICE VISIT (OUTPATIENT)
Dept: GASTROENTEROLOGY | Facility: CLINIC | Age: 84
End: 2024-10-31
Payer: COMMERCIAL

## 2024-10-31 VITALS
SYSTOLIC BLOOD PRESSURE: 128 MMHG | HEIGHT: 71 IN | DIASTOLIC BLOOD PRESSURE: 76 MMHG | WEIGHT: 222 LBS | BODY MASS INDEX: 31.08 KG/M2

## 2024-10-31 DIAGNOSIS — K62.5 RECTAL BLEEDING: Primary | ICD-10-CM

## 2024-10-31 PROCEDURE — 99204 OFFICE O/P NEW MOD 45 MIN: CPT | Performed by: INTERNAL MEDICINE

## 2024-10-31 RX ORDER — SODIUM PHOSPHATE,MONO-DIBASIC 19G-7G/118
1 ENEMA (ML) RECTAL ONCE
Qty: 1 ENEMA | Refills: 0 | Status: SHIPPED | OUTPATIENT
Start: 2024-10-31 | End: 2024-11-05 | Stop reason: HOSPADM

## 2024-10-31 NOTE — PROGRESS NOTES
"  [facility] - Outpatient Consultation  Hammad Montesinos 84 y.o. male MRN: [id number]  Encounter: [id number]    ASSESSMENT AND PLAN:      1. Rectal bleeding  It seems that the blood came from a perirectal abscess and not actually per the rectum.  In any event, on his current exam there is no active bleeding.  I will set up a flexible sigmoidoscopy to be sure there is no hemorrhage from the actual bowel.  He prefers not to have sedation.  - Flexible Sigmoidoscopy; Future  - sodium phosphate-biphosphate (FLEET) 7-19 g 118 mL enema; Insert 1 enema into the rectum once for 1 dose Insert 1 enema into the rectum. Use as directed  Dispense: 1 enema; Refill: 0      Follow up Appointment: For flex sig    _______________________      Chief Complaint   Patient presents with    Rectal Bleeding     Patient has had hemorrhoid problems for years, past 3 days has bleeding non stop       HPI:   Hammad Montesinos is a 84 y.o. year old male with a PMH significant for A-fib on Xarelto who presents from a consultation from PCP for apparent rectal bleeding.  He states that he has had issues with hemorrhoids in the distant past and had them ablated in some fashion.  He said he was in his 20s when this happened.  He states that he has had 1 colonoscopy at age 49 and none since.  He states that ever since starting Xarelto about a year ago he has had intermittent rectal bleeding, however in the last few days it has been constant and even between bowel movements.  He also states that he has had \"boils\" open up on his abdomen and even in his perirectal area.    Historical Information   Past Medical History:   Diagnosis Date    Asthma     Bradycardia     Carcinoma, basal cell, skin     head and face and ear    Chronic kidney disease     Coronary artery disease     COVID-19 12/05/2022    Diabetes mellitus (HCC)     Dupuytren contracture     bilateral ring and right middle    Gout     Herniated lumbar " intervertebral disc     Hypertension     Sleep apnea      Past Surgical History:   Procedure Laterality Date    APPENDECTOMY      age 20, ruptured    CATARACT EXTRACTION, BILATERAL Bilateral     CERVICAL FUSION      CYST REMOVAL Left 01/2023    DUPUYTREN CONTRACTURE RELEASE Left     left ring finger    LUMBAR LAMINECTOMY      belkis placed    TONSILLECTOMY      VASECTOMY       Social History     Substance and Sexual Activity   Alcohol Use Not Currently     Social History     Substance and Sexual Activity   Drug Use Never     Social History     Tobacco Use   Smoking Status Never   Smokeless Tobacco Never     Family History   Problem Relation Age of Onset    Hypertension Mother     Diabetes type II Mother     Hypertension Father     Diabetes type II Brother     Hypertension Brother     Kidney disease Sister     Hypertension Daughter     GI problems Daughter        Meds/Allergies     Current Outpatient Medications:     allopurinol (ZYLOPRIM) 300 mg tablet    atorvastatin (LIPITOR) 20 mg tablet    azelastine (ASTELIN) 0.1 % nasal spray    budesonide (PULMICORT) 0.5 mg/2 mL nebulizer solution    Cholecalciferol (Vitamin D3) 25 MCG (1000 UT) CAPS    Coenzyme Q10 (Co Q10) 100 MG CAPS    Easy Comfort Pen Needles 33G X 4 MM MISC    fluticasone (FLONASE) 50 mcg/act nasal spray    formoterol (PERFOROMIST) 20 MCG/2ML nebulizer solution    gabapentin (NEURONTIN) 100 mg capsule    Glucosamine-Chondroitin 500-400 MG CAPS    insulin aspart (NovoLOG FlexPen) 100 UNIT/ML injection pen    Insulin Glargine Solostar (Lantus SoloStar) 100 UNIT/ML SOPN    ipratropium (ATROVENT) 0.03 % nasal spray    ipratropium-albuterol (DUO-NEB) 0.5-2.5 mg/3 mL nebulizer solution    loratadine (CLARITIN) 10 mg tablet    losartan (COZAAR) 100 MG tablet    magnesium Oxide (MAG-OX) 400 mg TABS    Multiple Vitamin (MULTIVITAMIN ADULT PO)    mupirocin (BACTROBAN) 2 % ointment    Omega-3 Fatty Acids (Fish Oil) 360 MG CAPS    OneTouch Verio test strip     "potassium chloride (K-DUR,KLOR-CON) 20 mEq tablet    rivaroxaban (Xarelto) 20 mg tablet    senna (SENOKOT) 8.6 mg    sodium phosphate-biphosphate (FLEET) 7-19 g 118 mL enema    torsemide (DEMADEX) 20 mg tablet    triamcinolone (KENALOG) 0.1 % cream    mupirocin (BACTROBAN) 2 % ointment    mupirocin (BACTROBAN) 2 % ointment    Allergies   Allergen Reactions    Hydrochlorothiazide Other (See Comments)     SEVERE DIZZINESS    Insulin Lispro Other (See Comments)     Cannot control blood sugars(Humalog)    Lisinopril Other (See Comments)     Reports cough with use     Minoxidil GI Intolerance    Nifedipine Other (See Comments)     EDEMA LEGS    Other Other (See Comments)     SEVERE DIZZINESS  Hydrap-es    Prazosin Other (See Comments)     CHEST PAIN      Venlafaxine Other (See Comments)     Made pt feel bad      Doxycycline Rash    Simvastatin Rash     PEDAL EDEMA         PHYSICAL EXAM:    Blood pressure 128/76, height 5' 11\" (1.803 m), weight 101 kg (222 lb). Body mass index is 30.96 kg/m².  General Appearance: NAD, cooperative, alert  Eyes: Anicteric  GI:  Soft, non-tender, non-distended; normal bowel sounds; no masses, no organomegaly   Rectal: Opened abscess with minimal drainage with some induration.  No blood per rectum.  No mass palpated  Musculoskeletal: No edema.  Skin:  No jaundice    Lab Results:   Lab Results   Component Value Date    WBC 6.54 01/08/2024    WBC 6.88 01/07/2024    WBC 6.32 01/06/2024    HGB 11.7 (L) 01/08/2024    HGB 11.3 (L) 01/07/2024    HGB 10.9 (L) 01/06/2024    MCV 81 (L) 01/08/2024     01/08/2024     01/07/2024     01/06/2024     Lab Results   Component Value Date    K 4.2 01/08/2024    CL 96 01/08/2024    CO2 31 01/08/2024    BUN 59 (H) 01/08/2024    CREATININE 1.47 (H) 01/08/2024    CALCIUM 9.8 01/08/2024    CORRECTEDCA 10.1 01/06/2024    AST 19 01/08/2024    AST 20 01/07/2024    AST 20 01/06/2024    ALT 16 01/08/2024    ALT 16 01/07/2024    ALT 15 01/06/2024    " "ALKPHOS 147 (H) 01/08/2024    ALKPHOS 132 (H) 01/07/2024    ALKPHOS 127 (H) 01/06/2024    EGFR 43 01/08/2024     No results found for: \"IRON\", \"TIBC\", \"FERRITIN\"  No results found for: \"LIPASE\"    Radiology Results:   No results found.  "

## 2024-10-31 NOTE — H&P (VIEW-ONLY)
"  [facility] - Outpatient Consultation  Hammad Montesinos 84 y.o. male MRN: [id number]  Encounter: [id number]    ASSESSMENT AND PLAN:      1. Rectal bleeding  It seems that the blood came from a perirectal abscess and not actually per the rectum.  In any event, on his current exam there is no active bleeding.  I will set up a flexible sigmoidoscopy to be sure there is no hemorrhage from the actual bowel.  He prefers not to have sedation.  - Flexible Sigmoidoscopy; Future  - sodium phosphate-biphosphate (FLEET) 7-19 g 118 mL enema; Insert 1 enema into the rectum once for 1 dose Insert 1 enema into the rectum. Use as directed  Dispense: 1 enema; Refill: 0      Follow up Appointment: For flex sig    _______________________      Chief Complaint   Patient presents with    Rectal Bleeding     Patient has had hemorrhoid problems for years, past 3 days has bleeding non stop       HPI:   Hammad Montesinos is a 84 y.o. year old male with a PMH significant for A-fib on Xarelto who presents from a consultation from PCP for apparent rectal bleeding.  He states that he has had issues with hemorrhoids in the distant past and had them ablated in some fashion.  He said he was in his 20s when this happened.  He states that he has had 1 colonoscopy at age 49 and none since.  He states that ever since starting Xarelto about a year ago he has had intermittent rectal bleeding, however in the last few days it has been constant and even between bowel movements.  He also states that he has had \"boils\" open up on his abdomen and even in his perirectal area.    Historical Information   Past Medical History:   Diagnosis Date    Asthma     Bradycardia     Carcinoma, basal cell, skin     head and face and ear    Chronic kidney disease     Coronary artery disease     COVID-19 12/05/2022    Diabetes mellitus (HCC)     Dupuytren contracture     bilateral ring and right middle    Gout     Herniated lumbar " intervertebral disc     Hypertension     Sleep apnea      Past Surgical History:   Procedure Laterality Date    APPENDECTOMY      age 20, ruptured    CATARACT EXTRACTION, BILATERAL Bilateral     CERVICAL FUSION      CYST REMOVAL Left 01/2023    DUPUYTREN CONTRACTURE RELEASE Left     left ring finger    LUMBAR LAMINECTOMY      belkis placed    TONSILLECTOMY      VASECTOMY       Social History     Substance and Sexual Activity   Alcohol Use Not Currently     Social History     Substance and Sexual Activity   Drug Use Never     Social History     Tobacco Use   Smoking Status Never   Smokeless Tobacco Never     Family History   Problem Relation Age of Onset    Hypertension Mother     Diabetes type II Mother     Hypertension Father     Diabetes type II Brother     Hypertension Brother     Kidney disease Sister     Hypertension Daughter     GI problems Daughter        Meds/Allergies     Current Outpatient Medications:     allopurinol (ZYLOPRIM) 300 mg tablet    atorvastatin (LIPITOR) 20 mg tablet    azelastine (ASTELIN) 0.1 % nasal spray    budesonide (PULMICORT) 0.5 mg/2 mL nebulizer solution    Cholecalciferol (Vitamin D3) 25 MCG (1000 UT) CAPS    Coenzyme Q10 (Co Q10) 100 MG CAPS    Easy Comfort Pen Needles 33G X 4 MM MISC    fluticasone (FLONASE) 50 mcg/act nasal spray    formoterol (PERFOROMIST) 20 MCG/2ML nebulizer solution    gabapentin (NEURONTIN) 100 mg capsule    Glucosamine-Chondroitin 500-400 MG CAPS    insulin aspart (NovoLOG FlexPen) 100 UNIT/ML injection pen    Insulin Glargine Solostar (Lantus SoloStar) 100 UNIT/ML SOPN    ipratropium (ATROVENT) 0.03 % nasal spray    ipratropium-albuterol (DUO-NEB) 0.5-2.5 mg/3 mL nebulizer solution    loratadine (CLARITIN) 10 mg tablet    losartan (COZAAR) 100 MG tablet    magnesium Oxide (MAG-OX) 400 mg TABS    Multiple Vitamin (MULTIVITAMIN ADULT PO)    mupirocin (BACTROBAN) 2 % ointment    Omega-3 Fatty Acids (Fish Oil) 360 MG CAPS    OneTouch Verio test strip     "potassium chloride (K-DUR,KLOR-CON) 20 mEq tablet    rivaroxaban (Xarelto) 20 mg tablet    senna (SENOKOT) 8.6 mg    sodium phosphate-biphosphate (FLEET) 7-19 g 118 mL enema    torsemide (DEMADEX) 20 mg tablet    triamcinolone (KENALOG) 0.1 % cream    mupirocin (BACTROBAN) 2 % ointment    mupirocin (BACTROBAN) 2 % ointment    Allergies   Allergen Reactions    Hydrochlorothiazide Other (See Comments)     SEVERE DIZZINESS    Insulin Lispro Other (See Comments)     Cannot control blood sugars(Humalog)    Lisinopril Other (See Comments)     Reports cough with use     Minoxidil GI Intolerance    Nifedipine Other (See Comments)     EDEMA LEGS    Other Other (See Comments)     SEVERE DIZZINESS  Hydrap-es    Prazosin Other (See Comments)     CHEST PAIN      Venlafaxine Other (See Comments)     Made pt feel bad      Doxycycline Rash    Simvastatin Rash     PEDAL EDEMA         PHYSICAL EXAM:    Blood pressure 128/76, height 5' 11\" (1.803 m), weight 101 kg (222 lb). Body mass index is 30.96 kg/m².  General Appearance: NAD, cooperative, alert  Eyes: Anicteric  GI:  Soft, non-tender, non-distended; normal bowel sounds; no masses, no organomegaly   Rectal: Opened abscess with minimal drainage with some induration.  No blood per rectum.  No mass palpated  Musculoskeletal: No edema.  Skin:  No jaundice    Lab Results:   Lab Results   Component Value Date    WBC 6.54 01/08/2024    WBC 6.88 01/07/2024    WBC 6.32 01/06/2024    HGB 11.7 (L) 01/08/2024    HGB 11.3 (L) 01/07/2024    HGB 10.9 (L) 01/06/2024    MCV 81 (L) 01/08/2024     01/08/2024     01/07/2024     01/06/2024     Lab Results   Component Value Date    K 4.2 01/08/2024    CL 96 01/08/2024    CO2 31 01/08/2024    BUN 59 (H) 01/08/2024    CREATININE 1.47 (H) 01/08/2024    CALCIUM 9.8 01/08/2024    CORRECTEDCA 10.1 01/06/2024    AST 19 01/08/2024    AST 20 01/07/2024    AST 20 01/06/2024    ALT 16 01/08/2024    ALT 16 01/07/2024    ALT 15 01/06/2024    " "ALKPHOS 147 (H) 01/08/2024    ALKPHOS 132 (H) 01/07/2024    ALKPHOS 127 (H) 01/06/2024    EGFR 43 01/08/2024     No results found for: \"IRON\", \"TIBC\", \"FERRITIN\"  No results found for: \"LIPASE\"    Radiology Results:   No results found.  "

## 2024-11-01 ENCOUNTER — TELEPHONE (OUTPATIENT)
Dept: GASTROENTEROLOGY | Facility: CLINIC | Age: 84
End: 2024-11-01

## 2024-11-01 ENCOUNTER — TELEPHONE (OUTPATIENT)
Age: 84
End: 2024-11-01

## 2024-11-01 NOTE — TELEPHONE ENCOUNTER
Scheduled date of sigmoidoscopy (as of today):11/05/2024  Physician performing sigmoidoscopy:Dr Jane  Location:[facility]  Instructions reviewed with patient by:1 fleet enema 1 hour prior to procedure  Clearances: Xarelto    No Sedation

## 2024-11-01 NOTE — TELEPHONE ENCOUNTER
Message sent to Courtney MORALES With ok to hold Xarelto. She needs to call pt to confirm he is ok to be done on Tuesday. She will give him the hold date.

## 2024-11-01 NOTE — TELEPHONE ENCOUNTER
Our mutual patient is scheduled for a procedure: Sigmoidoscopy    On 11/5/24    With: Dr. Jane    He/She is taking the following blood thinner: Xarelto    Can this be stopped 2 Days prior to the procedure?    Physician approving Clearance: Dr. Holland    Last dose will be:  11/2/24

## 2024-11-01 NOTE — TELEPHONE ENCOUNTER
Clearance received in separate encounter. Ok to hold Xarelto. Last dose is 11/2/24. Please let pt know when you call him to confirm the procedure for Tuesday. Thanks!

## 2024-11-04 NOTE — TELEPHONE ENCOUNTER
Patient called in requesting arrival time. Patient aware the endo center will be reaching out to patient between 2pm-6pm with arrival time.

## 2024-11-05 ENCOUNTER — HOSPITAL ENCOUNTER (OUTPATIENT)
Dept: GASTROENTEROLOGY | Facility: HOSPITAL | Age: 84
Setting detail: OUTPATIENT SURGERY
Discharge: HOME | End: 2024-11-05
Attending: INTERNAL MEDICINE
Payer: COMMERCIAL

## 2024-11-05 VITALS
TEMPERATURE: 97.5 F | HEART RATE: 69 BPM | BODY MASS INDEX: 30.66 KG/M2 | HEIGHT: 71 IN | OXYGEN SATURATION: 100 % | SYSTOLIC BLOOD PRESSURE: 119 MMHG | WEIGHT: 219 LBS | RESPIRATION RATE: 18 BRPM | DIASTOLIC BLOOD PRESSURE: 58 MMHG

## 2024-11-05 DIAGNOSIS — K62.5 RECTAL BLEEDING: ICD-10-CM

## 2024-11-05 LAB — GLUCOSE SERPL-MCNC: 174 MG/DL (ref 65–140)

## 2024-11-05 PROCEDURE — 82948 REAGENT STRIP/BLOOD GLUCOSE: CPT

## 2024-11-05 PROCEDURE — 45331 SIGMOIDOSCOPY AND BIOPSY: CPT | Performed by: INTERNAL MEDICINE

## 2024-11-05 PROCEDURE — 88305 TISSUE EXAM BY PATHOLOGIST: CPT | Performed by: STUDENT IN AN ORGANIZED HEALTH CARE EDUCATION/TRAINING PROGRAM

## 2024-11-08 PROCEDURE — 88305 TISSUE EXAM BY PATHOLOGIST: CPT | Performed by: STUDENT IN AN ORGANIZED HEALTH CARE EDUCATION/TRAINING PROGRAM

## 2024-11-21 ENCOUNTER — OFFICE VISIT (OUTPATIENT)
Dept: PODIATRY | Facility: CLINIC | Age: 84
End: 2024-11-21
Payer: COMMERCIAL

## 2024-11-21 VITALS
HEART RATE: 69 BPM | DIASTOLIC BLOOD PRESSURE: 83 MMHG | BODY MASS INDEX: 31.08 KG/M2 | WEIGHT: 222 LBS | HEIGHT: 71 IN | SYSTOLIC BLOOD PRESSURE: 123 MMHG

## 2024-11-21 DIAGNOSIS — B35.1 ONYCHOMYCOSIS: Primary | ICD-10-CM

## 2024-11-21 DIAGNOSIS — E11.40 TYPE 2 DIABETES MELLITUS WITH DIABETIC NEUROPATHY, WITH LONG-TERM CURRENT USE OF INSULIN (HCC): ICD-10-CM

## 2024-11-21 DIAGNOSIS — Z79.4 TYPE 2 DIABETES MELLITUS WITH DIABETIC NEUROPATHY, WITH LONG-TERM CURRENT USE OF INSULIN (HCC): ICD-10-CM

## 2024-11-21 DIAGNOSIS — L84 CORNS AND CALLUS: ICD-10-CM

## 2024-11-21 PROCEDURE — 11721 DEBRIDE NAIL 6 OR MORE: CPT | Performed by: PODIATRIST

## 2024-11-21 PROCEDURE — 11056 PARNG/CUTG B9 HYPRKR LES 2-4: CPT | Performed by: PODIATRIST

## 2024-11-26 ENCOUNTER — REMOTE DEVICE CLINIC VISIT (OUTPATIENT)
Dept: CARDIOLOGY CLINIC | Facility: CLINIC | Age: 84
End: 2024-11-26
Payer: COMMERCIAL

## 2024-11-26 DIAGNOSIS — Z95.0 CARDIAC PACEMAKER IN SITU: Primary | ICD-10-CM

## 2024-11-26 PROCEDURE — 93294 REM INTERROG EVL PM/LDLS PM: CPT | Performed by: INTERNAL MEDICINE

## 2024-11-26 PROCEDURE — 93296 REM INTERROG EVL PM/IDS: CPT | Performed by: INTERNAL MEDICINE

## 2024-11-26 NOTE — PROGRESS NOTES
"Results for orders placed or performed in visit on 11/26/24   Cardiac EP device report    Narrative    BIO DC/PM  BIOTRONIK TRANSMISSION: BATTERY STATUS \"75%.\" AP 1%  78%. ALL AVAILABLE LEAD PARAMETERS WITHIN NORMAL LIMITS. NO SIGNIFICANT HIGH RATE EPISODES. NORMAL DEVICE FUNCTION. NC         "

## 2024-11-28 ENCOUNTER — RESULTS FOLLOW-UP (OUTPATIENT)
Dept: CARDIOLOGY CLINIC | Facility: CLINIC | Age: 84
End: 2024-11-28

## 2024-12-04 NOTE — PROGRESS NOTES
PATIENT:  Hammad Montesinos  1940    ASSESSMENT:  1. Onychomycosis        2. Corns and callus        3. Type 2 diabetes mellitus with diabetic neuropathy, with long-term current use of insulin (HCC)                No orders of the defined types were placed in this encounter.         PLAN:  Disease prevention and related risk factors of diabetes were identified and discussed.    The patient was educated in proper foot wear for diabetics.    Educated in daily foot assessment and routine diabetic foot care.    Discussed the importance of controlling BS through diet and exercise.    The patient will follow up in 10 weeks for further diabetic foot exam and care.   Dispensed padding for right foot and advised patient to monitor that lesion closely for ulceration/breakdown.    Procedures: 66518, 77764  All mycotic toenails were reduced and debrided in length, width, and girth using a nail nipper and electric dremel.    All hyperkeratotic skin lesion(s) if present were sharply pared with a #10 scalpel with no evidence of ulceration/abscess.    Patient tolerated procedure(s) well without complications.    Procedures     HPI:  Hammad Montesinos is a 84 y.o.year old male seen for diabetic foot exam.  The patient has class findings with diabetes.   BS is under control.  The patient complained of thick toenails and painful lesions which have previously ulcerated.  Patient is concerned with possible breakdown of tip of his right great toe.  The patient denied any acute pedal disorder, redness, acute swelling, or recent injury.      The following portions of the patient's history were reviewed and updated as appropriate: allergies, current medications, past family history, past medical history, past social history, past surgical history and problem list.    REVIEW OF SYSTEMS:  GENERAL: No fever or chills  HEART: No chest pain, or palpitation  RESPIRATORY:  No acute SOB or cough  GI: No Nausea, vomit or diarrhea  NEUROLOGIC:  "No syncope or acute weakness    PHYSICAL EXAM:    /83 (BP Location: Left arm, Patient Position: Sitting, Cuff Size: Large)   Pulse 69   Ht 5' 11\" (1.803 m) Comment: stated  Wt 101 kg (222 lb)   BMI 30.96 kg/m²     VASCULAR EXAM  Posterior tibial artery absent bilateral  Dorsalis pedis artery +1 bilateral  The patient has class findings with skin atrophy, lack of digital hair, and nail dystrophy.    There is +1 lower extremity edema bilaterally.    Venous stasis skin changes noted BLE. No    NEUROLOGIC EXAM  Sensation is intact to light touch. Yes   Sensation is intact to 10gm monofilament.  Vibratory sensation moderately diminished.     Tingling numb paresthesias noted bilateral.      No focal neurologic deficit.     DERMATOLOGIC EXAM:   Texture, Tone and Turgor are diminished bilateral.  The patient has dystrophic/hypertrophic toenails with yellow/white discoloration, onycholysis, and subungal debris.   Fungal odor noted.  Brittle nature noted.  Right foot nails severely dystrophic x3 with 0.4 cm ave thickness (1 2 and 4)  Left foot nails severely dystrophic x3 with 0.4 cm ave thickness (1 2 and 3)  Patient has hyperkeratotic lesions noted:  Right foot at subsecond MPJ approximately 1.5 cm² in size, preulcerative, hemorrhage and subtle maceration within callus noted.  Left foot at subsecond MPJ, similar to contralateral side in presentation.    Ulceration/wounds:  None    No notable suspicious skin lesions.      MUSCULOSKELETAL EXAM:   No acute joint pain, edema, or redness.  No acute musculoskeletal problem.    Patient has deformity including hallux valgus with contracted hammertoes, and plantarflexed second metatarsal bilateral. Patient has minimal ambulation limitations.      Patient wears DM shoes? Yes    Risk Category/Class Findings: Q8 (B1, B2 ABC)  0 = No loss of protective sensation    A1)  Has the patient had a previous amputation of the foot or integral skeletal portion thereof? No  B1)  Does " the patient have absent posterior tibial pulse? Yes  B3)  Does the patient have absent dorsalis pedis? No  B2)  Does the patient have three of the following? Yes           1.  Hair growth (increased or decreased), 2.  Nail changes (thickening) and 3.  Pigmentary changes (discoloring)  C)  Does the patient have two of the following and one above? Yes            3.  Edema and 4.  Paraesthesias (abnormal spontaneous sensations in the feet)

## 2024-12-17 ENCOUNTER — DOCUMENTATION (OUTPATIENT)
Dept: ENDOCRINOLOGY | Facility: HOSPITAL | Age: 84
End: 2024-12-17

## 2025-01-13 ENCOUNTER — HOSPITAL ENCOUNTER (EMERGENCY)
Facility: HOSPITAL | Age: 85
Discharge: HOME | End: 2025-01-14
Attending: EMERGENCY MEDICINE
Payer: COMMERCIAL

## 2025-01-13 DIAGNOSIS — E11.40 TYPE 2 DIABETES MELLITUS WITH DIABETIC NEUROPATHY, UNSPECIFIED WHETHER LONG TERM INSULIN USE (HCC): ICD-10-CM

## 2025-01-13 DIAGNOSIS — I48.0 PAROXYSMAL A-FIB (HCC): ICD-10-CM

## 2025-01-13 DIAGNOSIS — Z79.4 TYPE 2 DIABETES MELLITUS WITH HYPERGLYCEMIA, WITH LONG-TERM CURRENT USE OF INSULIN (HCC): ICD-10-CM

## 2025-01-13 DIAGNOSIS — E11.65 TYPE 2 DIABETES MELLITUS WITH HYPERGLYCEMIA, WITH LONG-TERM CURRENT USE OF INSULIN (HCC): ICD-10-CM

## 2025-01-13 DIAGNOSIS — R07.9 CHEST PAIN, UNSPECIFIED TYPE: Primary | ICD-10-CM

## 2025-01-13 PROCEDURE — 99285 EMERGENCY DEPT VISIT HI MDM: CPT

## 2025-01-13 PROCEDURE — 93005 ELECTROCARDIOGRAM TRACING: CPT

## 2025-01-13 PROCEDURE — 99285 EMERGENCY DEPT VISIT HI MDM: CPT | Performed by: EMERGENCY MEDICINE

## 2025-01-14 ENCOUNTER — APPOINTMENT (EMERGENCY)
Dept: RADIOLOGY | Facility: HOSPITAL | Age: 85
End: 2025-01-14
Payer: COMMERCIAL

## 2025-01-14 ENCOUNTER — RESULTS FOLLOW-UP (OUTPATIENT)
Dept: EMERGENCY DEPT | Facility: HOSPITAL | Age: 85
End: 2025-01-14

## 2025-01-14 VITALS
BODY MASS INDEX: 30.38 KG/M2 | DIASTOLIC BLOOD PRESSURE: 58 MMHG | TEMPERATURE: 98.3 F | SYSTOLIC BLOOD PRESSURE: 126 MMHG | RESPIRATION RATE: 19 BRPM | HEART RATE: 69 BPM | OXYGEN SATURATION: 98 % | WEIGHT: 217 LBS | HEIGHT: 71 IN

## 2025-01-14 LAB
2HR DELTA HS TROPONIN: -4 NG/L
ALBUMIN SERPL BCG-MCNC: 3.8 G/DL (ref 3.5–5)
ALP SERPL-CCNC: 134 U/L (ref 34–104)
ALT SERPL W P-5'-P-CCNC: 13 U/L (ref 7–52)
ANION GAP SERPL CALCULATED.3IONS-SCNC: 10 MMOL/L (ref 4–13)
APTT PPP: 37 SECONDS (ref 23–34)
AST SERPL W P-5'-P-CCNC: 22 U/L (ref 13–39)
BASOPHILS # BLD AUTO: 0.06 THOUSANDS/ΜL (ref 0–0.1)
BASOPHILS NFR BLD AUTO: 1 % (ref 0–1)
BILIRUB SERPL-MCNC: 1.32 MG/DL (ref 0.2–1)
BNP SERPL-MCNC: 346 PG/ML (ref 0–100)
BUN SERPL-MCNC: 32 MG/DL (ref 5–25)
CALCIUM SERPL-MCNC: 9.2 MG/DL (ref 8.4–10.2)
CARDIAC TROPONIN I PNL SERPL HS: 46 NG/L (ref ?–50)
CARDIAC TROPONIN I PNL SERPL HS: 50 NG/L (ref ?–50)
CHLORIDE SERPL-SCNC: 100 MMOL/L (ref 96–108)
CO2 SERPL-SCNC: 27 MMOL/L (ref 21–32)
CREAT SERPL-MCNC: 1.27 MG/DL (ref 0.6–1.3)
EOSINOPHIL # BLD AUTO: 0.21 THOUSAND/ΜL (ref 0–0.61)
EOSINOPHIL NFR BLD AUTO: 3 % (ref 0–6)
ERYTHROCYTE [DISTWIDTH] IN BLOOD BY AUTOMATED COUNT: 16.3 % (ref 11.6–15.1)
GFR SERPL CREATININE-BSD FRML MDRD: 51 ML/MIN/1.73SQ M
GLUCOSE SERPL-MCNC: 224 MG/DL (ref 65–140)
HCT VFR BLD AUTO: 38.3 % (ref 36.5–49.3)
HGB BLD-MCNC: 12.1 G/DL (ref 12–17)
IMM GRANULOCYTES # BLD AUTO: 0.04 THOUSAND/UL (ref 0–0.2)
IMM GRANULOCYTES NFR BLD AUTO: 1 % (ref 0–2)
INR PPP: 1.66 (ref 0.85–1.19)
LYMPHOCYTES # BLD AUTO: 1.64 THOUSANDS/ΜL (ref 0.6–4.47)
LYMPHOCYTES NFR BLD AUTO: 20 % (ref 14–44)
MCH RBC QN AUTO: 26.7 PG (ref 26.8–34.3)
MCHC RBC AUTO-ENTMCNC: 31.6 G/DL (ref 31.4–37.4)
MCV RBC AUTO: 85 FL (ref 82–98)
MONOCYTES # BLD AUTO: 0.83 THOUSAND/ΜL (ref 0.17–1.22)
MONOCYTES NFR BLD AUTO: 10 % (ref 4–12)
NEUTROPHILS # BLD AUTO: 5.61 THOUSANDS/ΜL (ref 1.85–7.62)
NEUTS SEG NFR BLD AUTO: 65 % (ref 43–75)
NRBC BLD AUTO-RTO: 0 /100 WBCS
PLATELET # BLD AUTO: 153 THOUSANDS/UL (ref 149–390)
PMV BLD AUTO: 11.8 FL (ref 8.9–12.7)
POTASSIUM SERPL-SCNC: 3.6 MMOL/L (ref 3.5–5.3)
PROT SERPL-MCNC: 6.8 G/DL (ref 6.4–8.4)
PROTHROMBIN TIME: 20.1 SECONDS (ref 12.3–15)
RBC # BLD AUTO: 4.53 MILLION/UL (ref 3.88–5.62)
SODIUM SERPL-SCNC: 137 MMOL/L (ref 135–147)
WBC # BLD AUTO: 8.39 THOUSAND/UL (ref 4.31–10.16)

## 2025-01-14 PROCEDURE — 71045 X-RAY EXAM CHEST 1 VIEW: CPT

## 2025-01-14 PROCEDURE — 83880 ASSAY OF NATRIURETIC PEPTIDE: CPT | Performed by: EMERGENCY MEDICINE

## 2025-01-14 PROCEDURE — 84484 ASSAY OF TROPONIN QUANT: CPT | Performed by: EMERGENCY MEDICINE

## 2025-01-14 PROCEDURE — 36415 COLL VENOUS BLD VENIPUNCTURE: CPT | Performed by: EMERGENCY MEDICINE

## 2025-01-14 PROCEDURE — 85730 THROMBOPLASTIN TIME PARTIAL: CPT | Performed by: EMERGENCY MEDICINE

## 2025-01-14 PROCEDURE — 80053 COMPREHEN METABOLIC PANEL: CPT | Performed by: EMERGENCY MEDICINE

## 2025-01-14 PROCEDURE — 85610 PROTHROMBIN TIME: CPT | Performed by: EMERGENCY MEDICINE

## 2025-01-14 PROCEDURE — 85025 COMPLETE CBC W/AUTO DIFF WBC: CPT | Performed by: EMERGENCY MEDICINE

## 2025-01-14 RX ORDER — INSULIN ASPART 100 [IU]/ML
INJECTION, SOLUTION INTRAVENOUS; SUBCUTANEOUS
Qty: 45 ML | Refills: 1 | Status: SHIPPED | OUTPATIENT
Start: 2025-01-14

## 2025-01-14 NOTE — ED NOTES
Per cardiology notes patient has Biotronik pacemaker.  Dr. Shelby made aware     Jluis Callahan, RN  01/14/25 0041

## 2025-01-14 NOTE — DISCHARGE INSTRUCTIONS
You have been seen for left chest and back pain. Please continue your home medication regimen. Return to the emergency department if you develop worsening pain, trouble breathing or any other symptoms of concern. Please follow up with your PCP and cardiologist by calling the number provided.   No

## 2025-01-14 NOTE — ED PROVIDER NOTES
Time reflects when diagnosis was documented in both MDM as applicable and the Disposition within this note       Time User Action Codes Description Comment    1/14/2025  2:47 AM Rufino Shelby [R07.9] Chest pain, unspecified type     1/14/2025  2:47 AM Rufino Shelby [E11.65,  Z79.4] Type 2 diabetes mellitus with hyperglycemia, with long-term current use of insulin ([facility])     1/14/2025  2:47 AM Rufino Shelby [I48.0] Paroxysmal A-fib ([facility])           ED Disposition       ED Disposition   Discharge    Condition   Stable    Date/Time   Tue Jan 14, 2025  2:46 AM    Comment   Hammad Montesinos discharge to home/self care.                   Assessment & Plan       Medical Decision Making    84 y.o. male presenting for intermittent left-sided chest and back pain.  Will order CBC, CMP, troponin, EKG, CXR to evaluate for arrhythmia, ACS, PTX, pulmonary disease, widened mediastinum.  Patient compliant with xarelto, do not suspect PE.  No skin changes to suggest cellulitis or zoster.  Ddx would include pleurisy or MSK strain.    Reassessment: VSS, resting comfortably.  Reviewed lab results in detail with patient and daughter at bedside.  PM telemetry reviewed with [employer] rep, no evidence of ventricular arrhythmia.    After diagnostic lab testing and imaging, no definitive abnormality was noted that would explain the patient's symptoms. I explained to the patient that although no definitive diagnosis could be made today, the possibility exists that it may be too early in the disease process to diagnose serious illness.    Disposition: I have discussed with the patient our plan to discharge them from the ED and the patient is in agreement with this plan.     Discharge Plan: advised to continue current medication regimen. RTED precautions emphasized. The patient was provided a written after visit summary with strict RTED precautions.     Followup: I have discussed with the patient plan to follow up with their PCP  and cardiology. Contact information provided in AVS.    Amount and/or Complexity of Data Reviewed  Labs: ordered.  Radiology: ordered and independent interpretation performed.        ED Course as of 01/14/25 0722 Mon Jan 13, 2025 2354 Procedure Note: EKG  Date/Time: 01/13/25 11:54 PM   Interpreted by: Rufino Shelby DO  Indications / Diagnosis: Chest pain  ECG reviewed by me, the ED Provider: yes   The EKG demonstrates:  Rhythm: ventricular paced rhythm 102 BPM  Intervals: QT interval 505ms  Axis: left axis  QRS/Blocks: Normal QRS  ST Changes: No acute ST/T waves changes. No KI. TWI aVL.       Medications - No data to display    ED Risk Strat Scores   HEART Risk Score      Flowsheet Row Most Recent Value   Heart Score Risk Calculator    History 0 Filed at: 01/14/2025 0246   ECG 1 Filed at: 01/14/2025 0246   Age 2 Filed at: 01/14/2025 0246   Risk Factors 2 Filed at: 01/14/2025 0246   Troponin 2 Filed at: 01/14/2025 0246   HEART Score 7 Filed at: 01/14/2025 0246          HEART Risk Score      Flowsheet Row Most Recent Value   Heart Score Risk Calculator    History 0 Filed at: 01/14/2025 0246   ECG 1 Filed at: 01/14/2025 0246   Age 2 Filed at: 01/14/2025 0246   Risk Factors 2 Filed at: 01/14/2025 0246   Troponin 2 Filed at: 01/14/2025 0246   HEART Score 7 Filed at: 01/14/2025 0246                            SBIRT 22yo+      Flowsheet Row Most Recent Value   Initial Alcohol Screen: US AUDIT-C     1. How often do you have a drink containing alcohol? 0 Filed at: 01/14/2025 0000   2. How many drinks containing alcohol do you have on a typical day you are drinking?  0 Filed at: 01/14/2025 0000   3b. FEMALE Any Age, or MALE 65+: How often do you have 4 or more drinks on one occassion? 0 Filed at: 01/14/2025 0000   Audit-C Score 0 Filed at: 01/14/2025 0000   VIKKI: How many times in the past year have you...    Used an illegal drug or used a prescription medication for non-medical reasons? Never Filed at: 01/14/2025  0000                            History of Present Illness       Chief Complaint   Patient presents with    Chest Pain     Pt to ED via EMS from [facility] with c/o intermittent CP that radiates through to the back that started this morning. Denies SOB.        Past Medical History:   Diagnosis Date    Asthma     Bradycardia     Carcinoma, basal cell, skin     head and face and ear    CHF (congestive heart failure) (HCC)     Chronic kidney disease     Coronary artery disease     COVID-19 12/05/2022    Diabetes mellitus (HCC)     Dupuytren contracture     bilateral ring and right middle    Gout     Herniated lumbar intervertebral disc     Hypertension     Sleep apnea       Past Surgical History:   Procedure Laterality Date    APPENDECTOMY      age 20, ruptured    CATARACT EXTRACTION, BILATERAL Bilateral     CERVICAL FUSION      CYST REMOVAL Left 01/2023    DUPUYTREN CONTRACTURE RELEASE Left     left ring finger    INSERT / REPLACE / REMOVE PACEMAKER      LUMBAR LAMINECTOMY      belkis placed    TONSILLECTOMY      VASECTOMY        Family History   Problem Relation Age of Onset    Hypertension Mother     Diabetes type II Mother     Hypertension Father     Diabetes type II Brother     Hypertension Brother     Kidney disease Sister     Hypertension Daughter     GI problems Daughter       Social History     Tobacco Use    Smoking status: Never    Smokeless tobacco: Never   Vaping Use    Vaping status: Never Used   Substance Use Topics    Alcohol use: Not Currently    Drug use: Never      E-Cigarette/Vaping    E-Cigarette Use Never User       E-Cigarette/Vaping Substances    Nicotine No     THC No     CBD No     Flavoring No     Other No     Unknown No       I have reviewed and agree with the history as documented.     Hammad Montesinos is a 84 y.o. year old male with PMH of CAD, PAF on xarelto, CHF, HTN, CKD presenting to the [facility] ED for chest pain. Patient reporting intermittent left-sided chest and upper  "abdominal pain discomfort since awakening this morning. Discomfort described as a \"shock\" sensation which lasts for second when present. He occasionally felt lightheaded while walking today. Patient denies associated fall or trauma to the area. Patient denies fevers/chills. Patient denies cough or dyspnea. Patient denies N/V/abdominal pain/D.      History provided by:  Medical records and patient   used: No    Chest Pain  Associated symptoms: no abdominal pain, no cough, no fever, no nausea, no shortness of breath and not vomiting        Review of Systems   Constitutional:  Negative for fever.   HENT:  Negative for congestion.    Respiratory:  Negative for cough and shortness of breath.    Cardiovascular:  Positive for chest pain. Negative for leg swelling.   Gastrointestinal:  Negative for abdominal pain, diarrhea, nausea and vomiting.   Neurological:  Positive for light-headedness. Negative for syncope.   All other systems reviewed and are negative.          Objective       ED Triage Vitals [01/13/25 2351]   Temperature Pulse Blood Pressure Respirations SpO2 Patient Position - Orthostatic VS   98.3 °F (36.8 °C) 73 138/63 18 100 % --      Temp Source Heart Rate Source BP Location FiO2 (%) Pain Score    Oral Monitor Left arm -- 4      Vitals      Date and Time Temp Pulse SpO2 Resp BP Pain Score FACES Pain Rating User   01/14/25 0230 -- 69 98 % 19 126/58 -- -- SV   01/14/25 0030 -- 69 99 % 19 129/59 -- -- SV   01/13/25 2351 98.3 °F (36.8 °C) 73 100 % 18 138/63 4 -- AD            Physical Exam  Vitals and nursing note reviewed.   Constitutional:       General: He is not in acute distress.     Appearance: Normal appearance. He is well-developed. He is not ill-appearing, toxic-appearing or diaphoretic.   HENT:      Head: Normocephalic and atraumatic.      Nose: No congestion or rhinorrhea.   Eyes:      General:         Right eye: No discharge.         Left eye: No discharge.   Cardiovascular:      " Rate and Rhythm: Normal rate. Rhythm irregular.      Heart sounds: No murmur heard.  Pulmonary:      Effort: Pulmonary effort is normal. No respiratory distress.      Breath sounds: Normal breath sounds. No wheezing or rales.   Chest:      Chest wall: No tenderness or edema.      Comments: Left chest wall PM pouch nontender, no associated redness/warmth/crepitus  Abdominal:      General: There is no distension.      Palpations: Abdomen is soft.      Tenderness: There is no abdominal tenderness. There is no guarding or rebound.   Musculoskeletal:      Right lower leg: No edema.      Left lower leg: No edema.   Skin:     General: Skin is warm.      Capillary Refill: Capillary refill takes less than 2 seconds.      Findings: Rash is not scaling or vesicular.   Neurological:      Mental Status: He is alert and oriented to person, place, and time.   Psychiatric:         Mood and Affect: Mood normal.         Behavior: Behavior normal.         Results Reviewed       Procedure Component Value Units Date/Time    HS Troponin I 2hr [[id number]]  (Normal) Collected: 01/14/25 0131    Lab Status: Final result Specimen: Blood from Arm, Right Updated: 01/14/25 0200     hs TnI 2hr 46 ng/L      Delta 2hr hsTnI -4 ng/L     B-Type Natriuretic Peptide(BNP) [[id number]]  (Abnormal) Collected: 01/14/25 0002    Lab Status: Final result Specimen: Blood from Arm, Right Updated: 01/14/25 0036      pg/mL     HS Troponin 0hr (reflex protocol) [[id number]]  (Abnormal) Collected: 01/14/25 0002    Lab Status: Final result Specimen: Blood from Arm, Right Updated: 01/14/25 0032     hs TnI 0hr 50 ng/L     Comprehensive metabolic panel [[id number]]  (Abnormal) Collected: 01/14/25 0002    Lab Status: Final result Specimen: Blood from Arm, Right Updated: 01/14/25 0024     Sodium 137 mmol/L      Potassium 3.6 mmol/L      Chloride 100 mmol/L      CO2 27 mmol/L      ANION GAP 10 mmol/L      BUN 32 mg/dL      Creatinine 1.27 mg/dL      Glucose 224 mg/dL       Calcium 9.2 mg/dL      AST 22 U/L      ALT 13 U/L      Alkaline Phosphatase 134 U/L      Total Protein 6.8 g/dL      Albumin 3.8 g/dL      Total Bilirubin 1.32 mg/dL      eGFR 51 ml/min/1.73sq m     Narrative:      National Kid[employer] Foundation guidelines for Chronic Kidney Disease (CKD):     Stage 1 with normal or high GFR (GFR > 90 mL/min/1.73 square meters)    Stage 2 Mild CKD (GFR = 60-89 mL/min/1.73 square meters)    Stage 3A Moderate CKD (GFR = 45-59 mL/min/1.73 square meters)    Stage 3B Moderate CKD (GFR = 30-44 mL/min/1.73 square meters)    Stage 4 Severe CKD (GFR = 15-29 mL/min/1.73 square meters)    Stage 5 End Stage CKD (GFR <15 mL/min/1.73 square meters)  Note: GFR calculation is accurate only with a steady state creatinine    APTT [[id number]]  (Abnormal) Collected: 01/14/25 0002    Lab Status: Final result Specimen: Blood from Arm, Right Updated: 01/14/25 0024     PTT 37 seconds     Protime-INR [[id number]]  (Abnormal) Collected: 01/14/25 0002    Lab Status: Final result Specimen: Blood from Arm, Right Updated: 01/14/25 0024     Protime 20.1 seconds      INR 1.66    Narrative:      INR Therapeutic Range    Indication                                             INR Range      Atrial Fibrillation                                               2.0-3.0  Hypercoagulable State                                    2.0.2.3  Left Ventricular Asist Device                            2.0-3.0  Mechanical Heart Valve                                  -    Aortic(with afib, MI, embolism, HF, LA enlargement,    and/or coagulopathy)                                     2.0-3.0 (2.5-3.5)     Mitral                                                             2.5-3.5  Prosthetic/Bioprosthetic Heart Valve               2.0-3.0  Venous thromboembolism (VTE: VT, PE        2.0-3.0    CBC and differential [[id number]]  (Abnormal) Collected: 01/14/25 0002    Lab Status: Final result Specimen: Blood from Arm, Right Updated:  01/14/25 0010     WBC 8.39 Thousand/uL      RBC 4.53 Million/uL      Hemoglobin 12.1 g/dL      Hematocrit 38.3 %      MCV 85 fL      MCH 26.7 pg      MCHC 31.6 g/dL      RDW 16.3 %      MPV 11.8 fL      Platelets 153 Thousands/uL      nRBC 0 /100 WBCs      Segmented % 65 %      Immature Grans % 1 %      Lymphocytes % 20 %      Monocytes % 10 %      Eosinophils Relative 3 %      Basophils Relative 1 %      Absolute Neutrophils 5.61 Thousands/µL      Absolute Immature Grans 0.04 Thousand/uL      Absolute Lymphocytes 1.64 Thousands/µL      Absolute Monocytes 0.83 Thousand/µL      Eosinophils Absolute 0.21 Thousand/µL      Basophils Absolute 0.06 Thousands/µL             XR chest portable   ED Interpretation by Rufino Shelby DO (01/14 0246)   No acute cardiopulmonary disease.          Procedures    ED Medication and Procedure Management   Prior to Admission Medications   Prescriptions Last Dose Informant Patient Reported? Taking?   Albuterol Sulfate 108 (90 Base) MCG/ACT AEPB   No No   Sig: Inhale 2 puffs every 4 (four) hours as needed (SOB, wheezing, cough)   Cholecalciferol (Vitamin D3) 25 MCG (1000 UT) CAPS  Self Yes No   Sig: Take 1 capsule by mouth every 24 hours   Coenzyme Q10 (Co Q10) 100 MG CAPS  Self Yes No   Sig: Take 100 mg by mouth in the morning   Easy Comfort Pen Needles 33G X 4 MM MISC  Self Yes No   Sig: USE WITH INSULIN FIVE TIMES DAILY AS DIRECTED   Glucosamine-Chondroitin 500-400 MG CAPS  Self Yes No   Sig: Take 1 capsule by mouth daily   Insulin Glargine Solostar (Lantus SoloStar) 100 UNIT/ML SOPN  Self No No   Sig: INJECT 20 UNITS SUBCUTANEOUSLY IN THE MORNING AND 20 UNITS IN THE EVENING.   Multiple Vitamin (MULTIVITAMIN ADULT PO)  Self Yes No   Sig: every 24 hours   Omega-3 Fatty Acids (Fish Oil) 360 MG CAPS  Self Yes No   Sig: Take by mouth   OneTouch Verio test strip  Self Yes No   Sig: USE AS DIRECTED FOUR TIMES DAILY   allopurinol (ZYLOPRIM) 300 mg tablet  Self Yes No   Sig: Take 300 mg by  mouth every 24 hours   atorvastatin (LIPITOR) 20 mg tablet  Self Yes No   Sig: Take 20 mg by mouth daily   azelastine (ASTELIN) 0.1 % nasal spray  Self Yes No   Si sprays into each nostril 2 (two) times a day   budesonide (PULMICORT) 0.5 mg/2 mL nebulizer solution  Self Yes No   Sig: Take 0.5 mg by nebulization 2 (two) times a day Rinse mouth after use.   fluticasone (FLONASE) 50 mcg/act nasal spray  Self Yes No   Si spray into each nostril 2 (two) times a day   formoterol (PERFOROMIST) 20 MCG/2ML nebulizer solution  Self Yes No   Sig: Take 20 mcg by nebulization 2 (two) times a day   gabapentin (NEURONTIN) 100 mg capsule  Self No No   Sig: Take 1 capsule (100 mg total) by mouth daily   Patient taking differently: Take 100 mg by mouth 2 (two) times a day   insulin aspart (NovoLOG FlexPen) 100 UNIT/ML injection pen  Self No No   Sig: INJECT 12 UNITS SUBCUTANEOUSLY with breakfast, LUNCH and with dinner plus sliding scale. (UP  UNITS DAILY). 150-200 :1 unit 201-250:2 units 251-300:3 units 301-350: 4 units 351-400:5 units 400+: 6 units   ipratropium (ATROVENT) 0.03 % nasal spray  Self Yes No   Sig: INSTILL 2 SPRAYS IN EACH NOSTRIL TWICE DAILY   ipratropium-albuterol (DUO-NEB) 0.5-2.5 mg/3 mL nebulizer solution  Self Yes No   Sig: Take 3 mL by nebulization 2 (two) times a day   loratadine (CLARITIN) 10 mg tablet  Self Yes No   Sig: Take 10 mg by mouth daily   losartan (COZAAR) 100 MG tablet  Self Yes No   Sig: Take 100 mg by mouth daily   magnesium Oxide (MAG-OX) 400 mg TABS  Self No No   Sig: Take 1 tablet (400 mg total) by mouth daily   mupirocin (BACTROBAN) 2 % ointment  Self Yes No   Sig: Apply 1 Application topically every other day 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   mupirocin (BACTROBAN) 2 % ointment  Self No No   Sig: Apply topically every other day With gauze   mupirocin (BACTROBAN) 2 % ointment  Self No No   Sig: Apply topically every other day With  alginate dressing   potassium chloride (K-DUR,KLOR-CON) 20 mEq tablet  Self No No   Sig: Take 1 tablet (20 mEq total) by mouth daily   rivaroxaban (Xarelto) 20 mg tablet  Self No No   Sig: Take 1 tablet (20 mg total) by mouth daily with breakfast   senna (SENOKOT) 8.6 mg  Self No No   Sig: Take 2 tablets (17.2 mg total) by mouth daily at bedtime as needed for constipation   torsemide (DEMADEX) 20 mg tablet  Self No No   Sig: Take 2 tablets (40 mg total) by mouth every morning AND 1 tablet (20 mg total) daily after lunch.   triamcinolone (KENALOG) 0.1 % cream  Self Yes No   Si Application 2 (two) times a day To affected areas      Facility-Administered Medications: None     Discharge Medication List as of 2025  2:49 AM        CONTINUE these medications which have NOT CHANGED    Details   Albuterol Sulfate 108 (90 Base) MCG/ACT AEPB Inhale 2 puffs every 4 (four) hours as needed (SOB, wheezing, cough), Starting 2024, Normal      allopurinol (ZYLOPRIM) 300 mg tablet Take 300 mg by mouth every 24 hours, Historical Med      atorvastatin (LIPITOR) 20 mg tablet Take 20 mg by mouth daily, Starting 2022, Historical Med      azelastine (ASTELIN) 0.1 % nasal spray 2 sprays into each nostril 2 (two) times a day, Historical Med      budesonide (PULMICORT) 0.5 mg/2 mL nebulizer solution Take 0.5 mg by nebulization 2 (two) times a day Rinse mouth after use., Historical Med      Cholecalciferol (Vitamin D3) 25 MCG (1000 UT) CAPS Take 1 capsule by mouth every 24 hours, Historical Med      Coenzyme Q10 (Co Q10) 100 MG CAPS Take 100 mg by mouth in the morning, Historical Med      Easy Comfort Pen Needles 33G X 4 MM MISC USE WITH INSULIN FIVE TIMES DAILY AS DIRECTED, Historical Med      fluticasone (FLONASE) 50 mcg/act nasal spray 1 spray into each nostril 2 (two) times a day, Historical Med      formoterol (PERFOROMIST) 20 MCG/2ML nebulizer solution Take 20 mcg by nebulization 2 (two) times a day, Historical  Med      gabapentin (NEURONTIN) 100 mg capsule Take 1 capsule (100 mg total) by mouth daily, Starting Mon 1/8/2024, Normal      Glucosamine-Chondroitin 500-400 MG CAPS Take 1 capsule by mouth daily, Historical Med      insulin aspart (NovoLOG FlexPen) 100 UNIT/ML injection pen INJECT 12 UNITS SUBCUTANEOUSLY with breakfast, LUNCH and with dinner plus sliding scale. (UP  UNITS DAILY). 150-200 :1 unit 201-250:2 units 251-300:3 units 301-350: 4 units 351-400:5 units 400+: 6 units, Normal      Insulin Glargine Solostar (Lantus SoloStar) 100 UNIT/ML SOPN INJECT 20 UNITS SUBCUTANEOUSLY IN THE MORNING AND 20 UNITS IN THE EVENING., Normal      ipratropium (ATROVENT) 0.03 % nasal spray INSTILL 2 SPRAYS IN EACH NOSTRIL TWICE DAILY, Historical Med      ipratropium-albuterol (DUO-NEB) 0.5-2.5 mg/3 mL nebulizer solution Take 3 mL by nebulization 2 (two) times a day, Starting Tue 9/26/2023, Historical Med      loratadine (CLARITIN) 10 mg tablet Take 10 mg by mouth daily, Historical Med      losartan (COZAAR) 100 MG tablet Take 100 mg by mouth daily, Starting Tue 2/22/2022, Historical Med      magnesium Oxide (MAG-OX) 400 mg TABS Take 1 tablet (400 mg total) by mouth daily, Starting Tue 1/9/2024, Normal      Multiple Vitamin (MULTIVITAMIN ADULT PO) every 24 hours, Historical Med      !! 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 mupirocin (BACTROBAN) 2 % ointment Apply topically every other day With gauze, Starting Mon 2/12/2024, Normal      !! mupirocin (BACTROBAN) 2 % ointment Apply topically every other day With alginate dressing, Starting Tue 8/20/2024, Normal      Omega-3 Fatty Acids (Fish Oil) 360 MG CAPS Take by mouth, Historical Med      OneTouch Verio test strip USE AS DIRECTED FOUR TIMES DAILY, Historical Med      potassium chloride (K-DUR,KLOR-CON) 20 mEq  tablet Take 1 tablet (20 mEq total) by mouth daily, Starting Mon 1/8/2024, Normal      rivaroxaban (Xarelto) 20 mg tablet Take 1 tablet (20 mg total) by mouth daily with breakfast, Starting Thu 1/25/2024, Normal      senna (SENOKOT) 8.6 mg Take 2 tablets (17.2 mg total) by mouth daily at bedtime as needed for constipation, Starting Mon 1/8/2024, Normal      torsemide (DEMADEX) 20 mg tablet Multiple Dosages:Starting Mon 2/19/2024Take 2 tablets (40 mg total) by mouth every morning AND 1 tablet (20 mg total) daily after lunch., Normal      triamcinolone (KENALOG) 0.1 % cream 1 Application 2 (two) times a day To affected areas, Starting Fri 6/2/2023, Historical Med       !! - Potential duplicate medications found. Please discuss with provider.        No discharge procedures on file.  ED SEPSIS DOCUMENTATION   Time reflects when diagnosis was documented in both MDM as applicable and the Disposition within this note       Time User Action Codes Description Comment    1/14/2025  2:47 AM Rufino Shelby [R07.9] Chest pain, unspecified type     1/14/2025  2:47 AM Rufino Shelby [E11.65,  Z79.4] Type 2 diabetes mellitus with hyperglycemia, with long-term current use of insulin ([facility])     1/14/2025  2:47 AM Rufino Shelby [I48.0] Paroxysmal A-fib ([facility])                  Rufino Shelby, DO  01/15/25 1892

## 2025-01-16 ENCOUNTER — OFFICE VISIT (OUTPATIENT)
Dept: CARDIOLOGY CLINIC | Facility: CLINIC | Age: 85
End: 2025-01-16
Payer: COMMERCIAL

## 2025-01-16 VITALS
DIASTOLIC BLOOD PRESSURE: 64 MMHG | HEIGHT: 71 IN | WEIGHT: 217.9 LBS | SYSTOLIC BLOOD PRESSURE: 128 MMHG | BODY MASS INDEX: 30.51 KG/M2 | HEART RATE: 69 BPM

## 2025-01-16 DIAGNOSIS — I50.33 ACUTE ON CHRONIC DIASTOLIC (CONGESTIVE) HEART FAILURE (HCC): ICD-10-CM

## 2025-01-16 DIAGNOSIS — Z79.4 TYPE 2 DIABETES MELLITUS WITH DIABETIC NEUROPATHY, WITH LONG-TERM CURRENT USE OF INSULIN (HCC): ICD-10-CM

## 2025-01-16 DIAGNOSIS — E11.40 TYPE 2 DIABETES MELLITUS WITH DIABETIC NEUROPATHY, WITH LONG-TERM CURRENT USE OF INSULIN (HCC): ICD-10-CM

## 2025-01-16 DIAGNOSIS — N18.30 STAGE 3 CHRONIC KIDNEY DISEASE DUE TO TYPE 2 DIABETES MELLITUS (HCC): ICD-10-CM

## 2025-01-16 DIAGNOSIS — I27.20 PULMONARY HYPERTENSION (HCC): ICD-10-CM

## 2025-01-16 DIAGNOSIS — E66.01 MORBID (SEVERE) OBESITY DUE TO EXCESS CALORIES (HCC): ICD-10-CM

## 2025-01-16 DIAGNOSIS — I48.0 PAROXYSMAL A-FIB (HCC): Primary | ICD-10-CM

## 2025-01-16 DIAGNOSIS — E11.22 STAGE 3 CHRONIC KIDNEY DISEASE DUE TO TYPE 2 DIABETES MELLITUS (HCC): ICD-10-CM

## 2025-01-16 LAB
ATRIAL RATE: 94 BPM
QRS AXIS: -76 DEGREES
QRSD INTERVAL: 188 MS
QT INTERVAL: 422 MS
QTC INTERVAL: 550 MS
T WAVE AXIS: 89 DEGREES
VENTRICULAR RATE: 102 BPM

## 2025-01-16 PROCEDURE — 93010 ELECTROCARDIOGRAM REPORT: CPT | Performed by: INTERNAL MEDICINE

## 2025-01-16 PROCEDURE — 99214 OFFICE O/P EST MOD 30 MIN: CPT | Performed by: PHYSICIAN ASSISTANT

## 2025-01-16 NOTE — PROGRESS NOTES
Cardiology Office Follow Up  Hammad Montesinos  1940  [id number]      ASSESSMENT:  Atypical chest pain  Chronic HFpEF  SSS s/p Biotronik PPM in situ  Reported history of paroxysmal atrial fibrillation  Hypertension  Hyperlipidemia    PLAN:  Chest pain appears atypical for cardiac etiology; he is particularly sensitive to touch skin. Skin appears intact without evidence of rash.  Has full range of motion of his arm and shoulders without worsening. No sternal tenderness. No exertional change.  No other associated symptoms.  Will order cardiac CTA given atypical symptoms to evaluate his coronary arteries, he likely will have some degree of underlying CAD considering mild troponin elevation in the setting of CHF in the past. Will evaluate for any obstructive lesions.  He denies prior PCI or MI in the past.  Will message EP device clinic for sooner device check (already scheduled for next month) to assess for any underlying AF or arrhythmia given episodes of palpitations more recently.  Advised to follow-up with PCP in regards to possible shingles flare-up (skin tender to palpation along dermatome pattern however no visible rash). Reports he has had zoster series in the past but most recently as of 2007.   Continue remainder of cardiac regimen as ordered: Xarelto 20 mg daily, Lipitor 20 mg daily, losartan 100 mg, torsemide 40 mg / 20 mg daily, K. Dur 20 mill equivalents daily    RTO in 3 months or sooner if needed    Interval History/ HPI:   Hammad Montesinos is a very pleasant 84-year-old with history of chronic HFpEF, hypertension, hyperlipidemia, remote history of paroxysmal atrial fibrillation, s/p Biotronik PPM in situ who presents for ER follow-up visit for chest pain.     Presented to [facility] ED on 1/13/2025 with intermittent left-sided shooting pain of his left chest which wrapped around his left axilla to his subscapular area.  EKG showed V paced rhythm at 102 bpm  High-sensitivity troponins were 50  and 46 respectively   compared to 816 during his previous admission  CBC and CMP were grossly unremarkable    Advised for cardiology follow-up today for further evaluation     Patient continues to have shooting/stinging like chest discomfort. Reports he is actually more active now taking care of his wife. He uses a walker at baseline due to polyneuropathy but sometimes can walk on his own distances. Denies any worsening chest discomfort with exerting himself.  He does have palpitations on occasion which are random and short-lived.  He did have 1 episode of lightheadedness but attributes this to low glucose level at the time.  No other syncopal or presyncopal symptoms. Denies any shortness of breath, orthopnea, PND, lower extremity edema. He brought in his weight log and weights continue to trend downward. Reports taking his medications every day without ill side effects.    Vitals:  128/64  69  217lbs    Past Medical History:   Diagnosis Date    Asthma     Bradycardia     Carcinoma, basal cell, skin     head and face and ear    CHF (congestive heart failure) (HCC)     Chronic kidney disease     Coronary artery disease     COVID-19 12/05/2022    Diabetes mellitus (HCC)     Dupuytren contracture     bilateral ring and right middle    Gout     Herniated lumbar intervertebral disc     Hypertension     Sleep apnea      Social History     Socioeconomic History    Marital status: /Civil Union     Spouse name: Not on file    Number of children: Not on file    Years of education: Not on file    Highest education level: Not on file   Occupational History    Occupation: teacher     Comment: retired   Tobacco Use    Smoking status: Never    Smokeless tobacco: Never   Vaping Use    Vaping status: Never Used   Substance and Sexual Activity    Alcohol use: Not Currently    Drug use: Never    Sexual activity: Not Currently     Birth control/protection: Male Sterilization   Other Topics Concern    Not on file   Social  History Narrative    Not on file     Social Drivers of Health     Financial Resource Strain: Not on file   Food Insecurity: No Food Insecurity (12/29/2023)    Nursing - Inadequate Food Risk Classification     Worried About Running Out of Food in the Last Year: Never true     Ran Out of Food in the Last Year: Never true     Ran Out of Food in the Last Year: Not on file   Transportation Needs: Unmet Transportation Needs (4/12/2024)    OASIS : Transportation     Lack of Transportation (Medical): Yes     Lack of Transportation (Non-Medical): No     Patient Unable or Declines to Respond: No   Physical Activity: Not on file   Stress: Not on file   Social Connections: Not on file   Intimate Partner Violence: Not on file   Housing Stability: Low Risk  (12/29/2023)    Housing Stability Vital Sign     Unable to Pay for Housing in the Last Year: No     Number of Times Moved in the Last Year: 1     Homeless in the Last Year: No      Family History   Problem Relation Age of Onset    Hypertension Mother     Diabetes type II Mother     Hypertension Father     Diabetes type II Brother     Hypertension Brother     Kidney disease Sister     Hypertension Daughter     GI problems Daughter      Past Surgical History:   Procedure Laterality Date    APPENDECTOMY      age 20, ruptured    CATARACT EXTRACTION, BILATERAL Bilateral     CERVICAL FUSION      CYST REMOVAL Left 01/2023    DUPUYTREN CONTRACTURE RELEASE Left     left ring finger    INSERT / REPLACE / REMOVE PACEMAKER      LUMBAR LAMINECTOMY      belkis placed    TONSILLECTOMY      VASECTOMY         Current Outpatient Medications:     Albuterol Sulfate 108 (90 Base) MCG/ACT AEPB, Inhale 2 puffs every 4 (four) hours as needed (SOB, wheezing, cough), Disp: 1 each, Rfl: 3    allopurinol (ZYLOPRIM) 300 mg tablet, Take 300 mg by mouth every 24 hours, Disp: , Rfl:     atorvastatin (LIPITOR) 20 mg tablet, Take 20 mg by mouth daily, Disp: , Rfl:     azelastine (ASTELIN) 0.1 % nasal spray,  2 sprays into each nostril 2 (two) times a day, Disp: , Rfl:     budesonide (PULMICORT) 0.5 mg/2 mL nebulizer solution, Take 0.5 mg by nebulization 2 (two) times a day Rinse mouth after use., Disp: , Rfl:     Cholecalciferol (Vitamin D3) 25 MCG (1000 UT) CAPS, Take 1 capsule by mouth every 24 hours, Disp: , Rfl:     Coenzyme Q10 (Co Q10) 100 MG CAPS, Take 100 mg by mouth in the morning, Disp: , Rfl:     Easy Comfort Pen Needles 33G X 4 MM MISC, USE WITH INSULIN FIVE TIMES DAILY AS DIRECTED, Disp: , Rfl:     fluticasone (FLONASE) 50 mcg/act nasal spray, 1 spray into each nostril 2 (two) times a day, Disp: , Rfl:     formoterol (PERFOROMIST) 20 MCG/2ML nebulizer solution, Take 20 mcg by nebulization 2 (two) times a day, Disp: , Rfl:     gabapentin (NEURONTIN) 100 mg capsule, Take 1 capsule (100 mg total) by mouth daily, Disp: 180 capsule, Rfl: 0    Glucosamine-Chondroitin 500-400 MG CAPS, Take 1 capsule by mouth daily, Disp: , Rfl:     insulin aspart (NovoLOG FlexPen) 100 UNIT/ML injection pen, INJECT 12 UNITS SUBCUTANEOUSLY with breakfast, LUNCH and with dinner plus sliding scale. (UP  UNITS DAILY). 150-200: 1 unit; 201-250: 2 units; 251-300: 3 units; 301-350: 4 units; 351-400: 5 units; 400+: 6 units, Disp: 45 mL, Rfl: 1    Insulin Glargine Solostar (Lantus SoloStar) 100 UNIT/ML SOPN, INJECT 20 UNITS SUBCUTANEOUSLY IN THE MORNING AND 20 UNITS IN THE EVENING., Disp: 30 mL, Rfl: 0    ipratropium (ATROVENT) 0.03 % nasal spray, INSTILL 2 SPRAYS IN EACH NOSTRIL TWICE DAILY, Disp: , Rfl:     ipratropium-albuterol (DUO-NEB) 0.5-2.5 mg/3 mL nebulizer solution, Take 3 mL by nebulization 2 (two) times a day, Disp: , Rfl:     loratadine (CLARITIN) 10 mg tablet, Take 10 mg by mouth daily, Disp: , Rfl:     losartan (COZAAR) 100 MG tablet, Take 100 mg by mouth daily, Disp: , Rfl:     magnesium Oxide (MAG-OX) 400 mg TABS, Take 1 tablet (400 mg total) by mouth daily, Disp: 30 tablet, Rfl: 0    Multiple Vitamin (MULTIVITAMIN  ADULT PO), every 24 hours, Disp: , Rfl:     mupirocin (BACTROBAN) 2 % ointment, Apply topically every other day With alginate dressing, Disp: 22 g, Rfl: 0    Omega-3 Fatty Acids (Fish Oil) 360 MG CAPS, Take by mouth, Disp: , Rfl:     OneTouch Verio test strip, USE AS DIRECTED FOUR TIMES DAILY, Disp: , Rfl:     potassium chloride (K-DUR,KLOR-CON) 20 mEq tablet, Take 1 tablet (20 mEq total) by mouth daily, Disp: 30 tablet, Rfl: 0    rivaroxaban (Xarelto) 20 mg tablet, Take 1 tablet (20 mg total) by mouth daily with breakfast, Disp: 30 tablet, Rfl: 5    senna (SENOKOT) 8.6 mg, Take 2 tablets (17.2 mg total) by mouth daily at bedtime as needed for constipation, Disp: 30 tablet, Rfl: 0    torsemide (DEMADEX) 20 mg tablet, Take 2 tablets (40 mg total) by mouth every morning AND 1 tablet (20 mg total) daily after lunch., Disp: 90 tablet, Rfl: 2    triamcinolone (KENALOG) 0.1 % cream, 1 Application 2 (two) times a day To affected areas, Disp: , Rfl:       Review of Systems:  Review of Systems   Constitutional:  Negative for appetite change, chills, diaphoresis, fatigue and fever.   Respiratory:  Negative for cough, chest tightness and shortness of breath.    Cardiovascular:  Positive for chest pain. Negative for palpitations and leg swelling.   Gastrointestinal:  Negative for diarrhea, nausea and vomiting.   Endocrine: Negative for cold intolerance and heat intolerance.   Genitourinary:  Negative for difficulty urinating, dysuria and enuresis.   Musculoskeletal:  Positive for gait problem. Negative for arthralgias and back pain.   Allergic/Immunologic: Negative for environmental allergies and food allergies.   Neurological:  Positive for dizziness and numbness. Negative for facial asymmetry and headaches.   Hematological:  Negative for adenopathy. Does not bruise/bleed easily.   Psychiatric/Behavioral:  Negative for agitation, behavioral problems and confusion.          Physical Exam:  Physical Exam  Constitutional:        General: He is not in acute distress.     Appearance: He is well-developed.   HENT:      Right Ear: External ear normal.      Left Ear: External ear normal.   Eyes:      Pupils: Pupils are equal, round, and reactive to light.   Cardiovascular:      Rate and Rhythm: Normal rate and regular rhythm.      Heart sounds: Normal heart sounds. No murmur heard.     No friction rub. No gallop.   Pulmonary:      Effort: Pulmonary effort is normal.      Breath sounds: Normal breath sounds.   Abdominal:      Palpations: Abdomen is soft.   Musculoskeletal:         General: Normal range of motion.      Cervical back: Normal range of motion.   Skin:     General: Skin is warm and dry.   Neurological:      Mental Status: He is alert and oriented to person, place, and time.      Deep Tendon Reflexes: Reflexes are normal and symmetric.   Psychiatric:         Behavior: Behavior normal.         Thought Content: Thought content normal.         Judgment: Judgment normal.         This note was completed in part utilizing M-Modal Fluency Direct Software.  Grammatical errors, random word insertions, spelling mistakes, and incomplete sentences can be an occasional consequence of this system secondary to software limitations, ambient noise, and hardware issues.  If you have any questions or concerns about the content, text, or information contained within the body of this dictation, please contact the provider for clarification.

## 2025-01-17 ENCOUNTER — RESULTS FOLLOW-UP (OUTPATIENT)
Dept: CARDIOLOGY CLINIC | Facility: CLINIC | Age: 85
End: 2025-01-17

## 2025-01-21 ENCOUNTER — RESULTS FOLLOW-UP (OUTPATIENT)
Dept: ENDOCRINOLOGY | Facility: HOSPITAL | Age: 85
End: 2025-01-21

## 2025-01-21 DIAGNOSIS — I48.0 PAROXYSMAL A-FIB (HCC): Primary | ICD-10-CM

## 2025-01-21 RX ORDER — METOPROLOL TARTRATE 100 MG/1
100 TABLET ORAL EVERY 12 HOURS SCHEDULED
Qty: 1 TABLET | Refills: 0 | Status: SHIPPED | OUTPATIENT
Start: 2025-01-21

## 2025-01-27 ENCOUNTER — APPOINTMENT (EMERGENCY)
Dept: RADIOLOGY | Facility: HOSPITAL | Age: 85
End: 2025-01-27
Payer: COMMERCIAL

## 2025-01-27 ENCOUNTER — APPOINTMENT (EMERGENCY)
Dept: CT IMAGING | Facility: HOSPITAL | Age: 85
End: 2025-01-27
Payer: COMMERCIAL

## 2025-01-27 ENCOUNTER — HOSPITAL ENCOUNTER (OUTPATIENT)
Facility: HOSPITAL | Age: 85
Setting detail: OBSERVATION
Discharge: HOME HEALTH | End: 2025-01-28
Attending: EMERGENCY MEDICINE | Admitting: INTERNAL MEDICINE
Payer: COMMERCIAL

## 2025-01-27 ENCOUNTER — HOSPITAL ENCOUNTER (OUTPATIENT)
Dept: CT IMAGING | Facility: HOSPITAL | Age: 85
Discharge: HOME | End: 2025-01-27

## 2025-01-27 DIAGNOSIS — R53.1 GENERALIZED WEAKNESS: Primary | ICD-10-CM
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
## 2025-01-27 DIAGNOSIS — I63.9 CVA (CEREBRAL VASCULAR ACCIDENT) (HCC): ICD-10-CM

## 2025-01-27 DIAGNOSIS — R73.9 HYPERGLYCEMIA: ICD-10-CM

## 2025-01-27 DIAGNOSIS — R29.90 STROKE-LIKE SYMPTOMS: ICD-10-CM

## 2025-01-27 LAB
2HR DELTA HS TROPONIN: -6 NG/L
4HR DELTA HS TROPONIN: -12 NG/L
ALBUMIN SERPL BCG-MCNC: 3.8 G/DL (ref 3.5–5)
ALP SERPL-CCNC: 110 U/L (ref 34–104)
ALT SERPL W P-5'-P-CCNC: 12 U/L (ref 7–52)
ANION GAP SERPL CALCULATED.3IONS-SCNC: 7 MMOL/L (ref 4–13)
APTT PPP: 32 SECONDS (ref 23–34)
AST SERPL W P-5'-P-CCNC: 16 U/L (ref 13–39)
BACTERIA UR QL AUTO: NORMAL /HPF
BASOPHILS # BLD AUTO: 0.05 THOUSANDS/ΜL (ref 0–0.1)
BASOPHILS NFR BLD AUTO: 1 % (ref 0–1)
BILIRUB SERPL-MCNC: 1.57 MG/DL (ref 0.2–1)
BILIRUB UR QL STRIP: NEGATIVE
BNP SERPL-MCNC: 609 PG/ML (ref 0–100)
BUN SERPL-MCNC: 22 MG/DL (ref 5–25)
CALCIUM SERPL-MCNC: 9.7 MG/DL (ref 8.4–10.2)
CARDIAC TROPONIN I PNL SERPL HS: 37 NG/L (ref ?–50)
CARDIAC TROPONIN I PNL SERPL HS: 43 NG/L (ref ?–50)
CARDIAC TROPONIN I PNL SERPL HS: 49 NG/L (ref ?–50)
CHLORIDE SERPL-SCNC: 99 MMOL/L (ref 96–108)
CLARITY UR: CLEAR
CO2 SERPL-SCNC: 28 MMOL/L (ref 21–32)
COLOR UR: YELLOW
CREAT SERPL-MCNC: 0.87 MG/DL (ref 0.6–1.3)
EOSINOPHIL # BLD AUTO: 0.21 THOUSAND/ΜL (ref 0–0.61)
EOSINOPHIL NFR BLD AUTO: 2 % (ref 0–6)
ERYTHROCYTE [DISTWIDTH] IN BLOOD BY AUTOMATED COUNT: 17.2 % (ref 11.6–15.1)
FLUAV RNA RESP QL NAA+PROBE: NEGATIVE
FLUBV RNA RESP QL NAA+PROBE: NEGATIVE
FOLATE SERPL-MCNC: >22.3 NG/ML
GFR SERPL CREATININE-BSD FRML MDRD: 78 ML/MIN/1.73SQ M
GLUCOSE SERPL-MCNC: 203 MG/DL (ref 65–140)
GLUCOSE SERPL-MCNC: 246 MG/DL (ref 65–140)
GLUCOSE SERPL-MCNC: 278 MG/DL (ref 65–140)
GLUCOSE SERPL-MCNC: 332 MG/DL (ref 65–140)
GLUCOSE UR STRIP-MCNC: ABNORMAL MG/DL
HCT VFR BLD AUTO: 41.2 % (ref 36.5–49.3)
HGB BLD-MCNC: 12.8 G/DL (ref 12–17)
HGB UR QL STRIP.AUTO: NEGATIVE
IMM GRANULOCYTES # BLD AUTO: 0.06 THOUSAND/UL (ref 0–0.2)
IMM GRANULOCYTES NFR BLD AUTO: 1 % (ref 0–2)
INR PPP: 1.27 (ref 0.85–1.19)
KETONES UR STRIP-MCNC: NEGATIVE MG/DL
LEUKOCYTE ESTERASE UR QL STRIP: NEGATIVE
LYMPHOCYTES # BLD AUTO: 1.49 THOUSANDS/ΜL (ref 0.6–4.47)
LYMPHOCYTES NFR BLD AUTO: 17 % (ref 14–44)
MAGNESIUM SERPL-MCNC: 2.1 MG/DL (ref 1.9–2.7)
MCH RBC QN AUTO: 26.8 PG (ref 26.8–34.3)
MCHC RBC AUTO-ENTMCNC: 31.1 G/DL (ref 31.4–37.4)
MCV RBC AUTO: 86 FL (ref 82–98)
MONOCYTES # BLD AUTO: 0.65 THOUSAND/ΜL (ref 0.17–1.22)
MONOCYTES NFR BLD AUTO: 8 % (ref 4–12)
NEUTROPHILS # BLD AUTO: 6.25 THOUSANDS/ΜL (ref 1.85–7.62)
NEUTS SEG NFR BLD AUTO: 71 % (ref 43–75)
NITRITE UR QL STRIP: NEGATIVE
NON-SQ EPI CELLS URNS QL MICRO: NORMAL /HPF
NRBC BLD AUTO-RTO: 0 /100 WBCS
PH UR STRIP.AUTO: 7 [PH]
PLATELET # BLD AUTO: 194 THOUSANDS/UL (ref 149–390)
PMV BLD AUTO: 12.2 FL (ref 8.9–12.7)
POTASSIUM SERPL-SCNC: 4.8 MMOL/L (ref 3.5–5.3)
PROT SERPL-MCNC: 6.7 G/DL (ref 6.4–8.4)
PROT UR STRIP-MCNC: ABNORMAL MG/DL
PROTHROMBIN TIME: 16.4 SECONDS (ref 12.3–15)
RBC # BLD AUTO: 4.77 MILLION/UL (ref 3.88–5.62)
RBC #/AREA URNS AUTO: NORMAL /HPF
RSV RNA RESP QL NAA+PROBE: NEGATIVE
SARS-COV-2 RNA RESP QL NAA+PROBE: NEGATIVE
SODIUM SERPL-SCNC: 134 MMOL/L (ref 135–147)
SP GR UR STRIP.AUTO: <1.005 (ref 1–1.03)
TSH SERPL DL<=0.05 MIU/L-ACNC: 2.08 UIU/ML (ref 0.45–4.5)
TSH SERPL DL<=0.05 MIU/L-ACNC: 2.1 UIU/ML (ref 0.45–4.5)
UROBILINOGEN UR STRIP-ACNC: <2 MG/DL
VIT B12 SERPL-MCNC: 838 PG/ML (ref 180–914)
WBC # BLD AUTO: 8.71 THOUSAND/UL (ref 4.31–10.16)
WBC #/AREA URNS AUTO: NORMAL /HPF

## 2025-01-27 PROCEDURE — 82607 VITAMIN B-12: CPT | Performed by: INTERNAL MEDICINE

## 2025-01-27 PROCEDURE — 81001 URINALYSIS AUTO W/SCOPE: CPT | Performed by: PHYSICIAN ASSISTANT

## 2025-01-27 PROCEDURE — 0241U HB NFCT DS VIR RESP RNA 4 TRGT: CPT | Performed by: INTERNAL MEDICINE

## 2025-01-27 PROCEDURE — 99285 EMERGENCY DEPT VISIT HI MDM: CPT

## 2025-01-27 PROCEDURE — 84443 ASSAY THYROID STIM HORMONE: CPT | Performed by: PHYSICIAN ASSISTANT

## 2025-01-27 PROCEDURE — 84484 ASSAY OF TROPONIN QUANT: CPT | Performed by: PHYSICIAN ASSISTANT

## 2025-01-27 PROCEDURE — 84443 ASSAY THYROID STIM HORMONE: CPT | Performed by: INTERNAL MEDICINE

## 2025-01-27 PROCEDURE — 80053 COMPREHEN METABOLIC PANEL: CPT | Performed by: PHYSICIAN ASSISTANT

## 2025-01-27 PROCEDURE — 94660 CPAP INITIATION&MGMT: CPT

## 2025-01-27 PROCEDURE — 71045 X-RAY EXAM CHEST 1 VIEW: CPT

## 2025-01-27 PROCEDURE — 82746 ASSAY OF FOLIC ACID SERUM: CPT | Performed by: INTERNAL MEDICINE

## 2025-01-27 PROCEDURE — 99223 1ST HOSP IP/OBS HIGH 75: CPT | Performed by: INTERNAL MEDICINE

## 2025-01-27 PROCEDURE — 70498 CT ANGIOGRAPHY NECK: CPT

## 2025-01-27 PROCEDURE — 82948 REAGENT STRIP/BLOOD GLUCOSE: CPT

## 2025-01-27 PROCEDURE — 99285 EMERGENCY DEPT VISIT HI MDM: CPT | Performed by: PHYSICIAN ASSISTANT

## 2025-01-27 PROCEDURE — 94760 N-INVAS EAR/PLS OXIMETRY 1: CPT

## 2025-01-27 PROCEDURE — 83735 ASSAY OF MAGNESIUM: CPT | Performed by: PHYSICIAN ASSISTANT

## 2025-01-27 PROCEDURE — 85025 COMPLETE CBC W/AUTO DIFF WBC: CPT | Performed by: PHYSICIAN ASSISTANT

## 2025-01-27 PROCEDURE — 99205 OFFICE O/P NEW HI 60 MIN: CPT | Performed by: STUDENT IN AN ORGANIZED HEALTH CARE EDUCATION/TRAINING PROGRAM

## 2025-01-27 PROCEDURE — 70496 CT ANGIOGRAPHY HEAD: CPT

## 2025-01-27 PROCEDURE — 85730 THROMBOPLASTIN TIME PARTIAL: CPT | Performed by: PHYSICIAN ASSISTANT

## 2025-01-27 PROCEDURE — 36415 COLL VENOUS BLD VENIPUNCTURE: CPT | Performed by: PHYSICIAN ASSISTANT

## 2025-01-27 PROCEDURE — 85610 PROTHROMBIN TIME: CPT | Performed by: PHYSICIAN ASSISTANT

## 2025-01-27 PROCEDURE — 93005 ELECTROCARDIOGRAM TRACING: CPT

## 2025-01-27 PROCEDURE — 83880 ASSAY OF NATRIURETIC PEPTIDE: CPT | Performed by: PHYSICIAN ASSISTANT

## 2025-01-27 RX ORDER — IPRATROPIUM BROMIDE 21 UG/1
1 SPRAY, METERED NASAL 2 TIMES DAILY
Status: DISCONTINUED | OUTPATIENT
Start: 2025-01-27 | End: 2025-01-27 | Stop reason: SDUPTHER

## 2025-01-27 RX ORDER — LORATADINE 10 MG/1
10 TABLET ORAL DAILY
Status: DISCONTINUED | OUTPATIENT
Start: 2025-01-28 | End: 2025-01-28 | Stop reason: HOSPADM

## 2025-01-27 RX ORDER — TORSEMIDE 20 MG/1
20 TABLET ORAL
Status: DISCONTINUED | OUTPATIENT
Start: 2025-01-27 | End: 2025-01-27

## 2025-01-27 RX ORDER — POTASSIUM CHLORIDE 1500 MG/1
20 TABLET, EXTENDED RELEASE ORAL DAILY
Status: DISCONTINUED | OUTPATIENT
Start: 2025-01-28 | End: 2025-01-27

## 2025-01-27 RX ORDER — METOPROLOL TARTRATE 1 MG/ML
2.5 INJECTION, SOLUTION INTRAVENOUS EVERY 6 HOURS PRN
Status: DISCONTINUED | OUTPATIENT
Start: 2025-01-27 | End: 2025-01-28 | Stop reason: HOSPADM

## 2025-01-27 RX ORDER — INSULIN GLARGINE 100 [IU]/ML
20 INJECTION, SOLUTION SUBCUTANEOUS EVERY 12 HOURS SCHEDULED
Status: DISCONTINUED | OUTPATIENT
Start: 2025-01-27 | End: 2025-01-28 | Stop reason: HOSPADM

## 2025-01-27 RX ORDER — LOSARTAN POTASSIUM 25 MG/1
100 TABLET ORAL DAILY
Status: DISCONTINUED | OUTPATIENT
Start: 2025-01-28 | End: 2025-01-27

## 2025-01-27 RX ORDER — BUDESONIDE 0.5 MG/2ML
0.5 INHALANT ORAL 2 TIMES DAILY
Status: DISCONTINUED | OUTPATIENT
Start: 2025-01-27 | End: 2025-01-28 | Stop reason: HOSPADM

## 2025-01-27 RX ORDER — UBIDECARENONE 30 MG
100 CAPSULE ORAL DAILY
Status: DISCONTINUED | OUTPATIENT
Start: 2025-01-27 | End: 2025-01-28 | Stop reason: HOSPADM

## 2025-01-27 RX ORDER — ATORVASTATIN CALCIUM 20 MG/1
20 TABLET, FILM COATED ORAL DAILY
Status: DISCONTINUED | OUTPATIENT
Start: 2025-01-28 | End: 2025-01-28 | Stop reason: HOSPADM

## 2025-01-27 RX ORDER — METOPROLOL TARTRATE 50 MG
100 TABLET ORAL EVERY 12 HOURS SCHEDULED
Status: DISCONTINUED | OUTPATIENT
Start: 2025-01-27 | End: 2025-01-27

## 2025-01-27 RX ORDER — TORSEMIDE 20 MG/1
40 TABLET ORAL EVERY MORNING
Status: DISCONTINUED | OUTPATIENT
Start: 2025-01-28 | End: 2025-01-27

## 2025-01-27 RX ORDER — ALBUTEROL SULFATE 90 UG/1
1 INHALANT RESPIRATORY (INHALATION) EVERY 6 HOURS PRN
Status: DISCONTINUED | OUTPATIENT
Start: 2025-01-27 | End: 2025-01-28 | Stop reason: HOSPADM

## 2025-01-27 RX ORDER — AZELASTINE 1 MG/ML
2 SPRAY, METERED NASAL 2 TIMES DAILY
Status: DISCONTINUED | OUTPATIENT
Start: 2025-01-27 | End: 2025-01-28 | Stop reason: HOSPADM

## 2025-01-27 RX ORDER — ALLOPURINOL 300 MG/1
300 TABLET ORAL EVERY 24 HOURS
Status: DISCONTINUED | OUTPATIENT
Start: 2025-01-27 | End: 2025-01-28 | Stop reason: HOSPADM

## 2025-01-27 RX ORDER — GABAPENTIN 100 MG/1
100 CAPSULE ORAL DAILY
Status: DISCONTINUED | OUTPATIENT
Start: 2025-01-28 | End: 2025-01-28 | Stop reason: HOSPADM

## 2025-01-27 RX ORDER — INSULIN LISPRO 100 [IU]/ML
1-5 INJECTION, SOLUTION INTRAVENOUS; SUBCUTANEOUS
Status: DISCONTINUED | OUTPATIENT
Start: 2025-01-27 | End: 2025-01-28 | Stop reason: HOSPADM

## 2025-01-27 RX ADMIN — AZELASTINE HYDROCHLORIDE 2 SPRAY: 137 SPRAY, METERED NASAL at 18:57

## 2025-01-27 RX ADMIN — BUDESONIDE 0.5 MG: 0.5 INHALANT RESPIRATORY (INHALATION) at 18:58

## 2025-01-27 RX ADMIN — IOHEXOL 85 ML: 350 INJECTION, SOLUTION INTRAVENOUS at 13:56

## 2025-01-27 RX ADMIN — Medication 100 MG: at 18:56

## 2025-01-27 RX ADMIN — ALLOPURINOL 300 MG: 100 TABLET ORAL at 20:18

## 2025-01-27 RX ADMIN — INSULIN GLARGINE 20 UNITS: 100 INJECTION, SOLUTION SUBCUTANEOUS at 20:18

## 2025-01-27 RX ADMIN — INSULIN LISPRO 1 UNITS: 100 INJECTION, SOLUTION INTRAVENOUS; SUBCUTANEOUS at 18:27

## 2025-01-27 NOTE — H&P
"H&P - Hospitalist   Name: Hammad Montesinos 84 y.o. male I MRN: [id number]  Unit/Bed#: Z2 H2 I Date of Admission: 1/27/2025   Date of Service: 1/27/2025 I Hospital Day: 0     Assessment & Plan  Stroke-like symptoms  - Presenting with weakness and fall  Admitted under stroke pathway  MRI brain  Echo  Lipid Panel  Hemoglobin A1c  Atorvastatin 40 mg  Permissive HTN, labetalol if SBP > 180  Continue telemetry  PT/OT/ST  Frequent neuro checks. Continue to monitor and notify neurology with any changes.   - Medical management and supportive care per primary team. Correction of any metabolic or infectious disturbances.   Type 2 diabetes mellitus with hyperglycemia, with long-term current use of insulin ([facility])  Lab Results   Component Value Date    HGBA1C 6.8 11/16/2023       Recent Labs     01/27/25  1444   POCGLU 246*     Repeat hemoglobin A1c  Continue Lantus twice daily  SSI  Daily Accu-Cheks  hypoglycemia protocol  Mixed hyperlipidemia  Continue atorvastatin  Check lipid panel  Primary hypertension  Hold torsemide, losartan, metoprolol for permissive hypertension  Fall  Has peripheral neuropathy  Check TSH, B12, folate    PT/OT evaluation  Upon MRI  Check COVID/flu/RSV for complete test      VTE Pharmacologic Prophylaxis:   Moderate Risk (Score 3-4) - Pharmacological DVT Prophylaxis Ordered: rivaroxaban (Xarelto).  Code Status: Prior   Discussion with family: Updated  (daughter) at bedside.    Anticipated Length of Stay: Patient will be admitted on an observation basis with an anticipated length of stay of less than 2 midnights secondary to stroke like symptoms.    History of Present Illness   Chief Complaint:   Chief Complaint   Patient presents with    Weakness - Generalized     Pt coming from home for increased weakness. Pt family reports he was suppose to have CTA done today. Pt family concerned he had moments of intermittent slurred speech and \"just not acting normal.\"        Hammad Montesinos is a 84 " "y.o. male with a PMH of pulmonary hypertension, diabetes, hyperlipidemia, hypertension who presents with generalized weakness, falls, forgetfulness for the past 1 week. Patient had 2 falls recently, one today.  Daughter bedside who is an NP gives history that patient has been very sharp and has been forgetful for the past 1 to 2 weeks which is a quick change compared to his baseline.  Noted\" something is wrong\".  He was seen in the ED for chest pain 1/13, cardiology saw patient and office, atypical chest pain.  Patient was seen by PCP on Thursday and was started on gabapentin, Valtrex for possible shingles which caused atypical chest pain.  Daughter noted he was weak even prior to starting Valtrex and gabapentin.  After starting medications, weakness got worse and daughters noted he was more confused over phone.  After talking to PCP, medications were discontinued during weekend.  He had a fall that brought him to the ED.     Today, patient declines chest pain, lightheadedness, palpitations, headache or syncope.  Noted worsening peripheral neuropathy, states he has to wake up in the middle of the night to urinate, declines chills, night sweats, fever    Review of Systems   Constitutional:  Positive for activity change and fatigue.   HENT: Negative.     Respiratory: Negative.     Cardiovascular:  Positive for chest pain (musculoskeletal).   Gastrointestinal: Negative.    Musculoskeletal:  Positive for gait problem.   Neurological:  Positive for speech difficulty and weakness.       Historical Information   Past Medical History:   Diagnosis Date    Asthma     Bradycardia     Carcinoma, basal cell, skin     head and face and ear    CHF (congestive heart failure) (HCC)     Chronic kidney disease     Coronary artery disease     COVID-19 12/05/2022    Diabetes mellitus (HCC)     Dupuytren contracture     bilateral ring and right middle    Gout     Herniated lumbar intervertebral disc     Hypertension     Sleep apnea  "     Past Surgical History:   Procedure Laterality Date    APPENDECTOMY      age 20, ruptured    CATARACT EXTRACTION, BILATERAL Bilateral     CERVICAL FUSION      CYST REMOVAL Left 01/2023    DUPUYTREN CONTRACTURE RELEASE Left     left ring finger    INSERT / REPLACE / REMOVE PACEMAKER      LUMBAR LAMINECTOMY      belkis placed    TONSILLECTOMY      VASECTOMY       Social History     Tobacco Use    Smoking status: Never    Smokeless tobacco: Never   Vaping Use    Vaping status: Never Used   Substance and Sexual Activity    Alcohol use: Not Currently    Drug use: Never    Sexual activity: Not Currently     Birth control/protection: Male Sterilization     E-Cigarette/Vaping    E-Cigarette Use Never User      E-Cigarette/Vaping Substances    Nicotine No     THC No     CBD No     Flavoring No     Other No     Unknown No      Family History   Problem Relation Age of Onset    Hypertension Mother     Diabetes type II Mother     Hypertension Father     Diabetes type II Brother     Hypertension Brother     Kidney disease Sister     Hypertension Daughter     GI problems Daughter      Social History:  Marital Status: /Civil Union   Occupation: Worked as a   Patient Pre-hospital Living Situation: Assisted Living  Patient Pre-hospital Level of Mobility: walks with walker  Patient Pre-hospital Diet Restrictions: none    Meds/Allergies   I have reviewed home medications with patient personally.  Prior to Admission medications    Medication Sig Start Date End Date Taking? Authorizing Provider   Albuterol Sulfate 108 (90 Base) MCG/ACT AEPB Inhale 2 puffs every 4 (four) hours as needed (SOB, wheezing, cough) 11/26/24   Montrellleon High DO   allopurinol (ZYLOPRIM) 300 mg tablet Take 300 mg by mouth every 24 hours    Historical Provider, MD   atorvastatin (LIPITOR) 20 mg tablet Take 20 mg by mouth daily 2/22/22   Historical Provider, MD   azelastine (ASTELIN) 0.1 % nasal spray 2 sprays into each nostril 2 (two) times a  day    Historical Provider, MD   budesonide (PULMICORT) 0.5 mg/2 mL nebulizer solution Take 0.5 mg by nebulization 2 (two) times a day Rinse mouth after use.    Historical Provider, MD   Cholecalciferol (Vitamin D3) 25 MCG (1000 UT) CAPS Take 1 capsule by mouth every 24 hours    Historical Provider, MD   Coenzyme Q10 (Co Q10) 100 MG CAPS Take 100 mg by mouth in the morning    Historical Provider, MD   Easy Comfort Pen Needles 33G X 4 MM MISC USE WITH INSULIN FIVE TIMES DAILY AS DIRECTED 10/25/22   Historical Provider, MD   fluticasone (FLONASE) 50 mcg/act nasal spray 1 spray into each nostril 2 (two) times a day    Historical Provider, MD   formoterol (PERFOROMIST) 20 MCG/2ML nebulizer solution Take 20 mcg by nebulization 2 (two) times a day    Historical Provider, MD   gabapentin (NEURONTIN) 100 mg capsule Take 1 capsule (100 mg total) by mouth daily 1/8/24   Ara Leigh MD   Glucosamine-Chondroitin 500-400 MG CAPS Take 1 capsule by mouth daily    Historical Provider, MD   insulin aspart (NovoLOG FlexPen) 100 UNIT/ML injection pen INJECT 12 UNITS SUBCUTANEOUSLY with breakfast, LUNCH and with dinner plus sliding scale. (UP  UNITS DAILY). 150-200: 1 unit; 201-250: 2 units; 251-300: 3 units; 301-350: 4 units; 351-400: 5 units; 400+: 6 units 1/14/25   Jonn Rossi PA-C   Insulin Glargine Solostar (Lantus SoloStar) 100 UNIT/ML SOPN INJECT 20 UNITS SUBCUTANEOUSLY IN THE MORNING AND 20 UNITS IN THE EVENING. 3/22/24   Jonn Rossi PA-C   ipratropium (ATROVENT) 0.03 % nasal spray INSTILL 2 SPRAYS IN EACH NOSTRIL TWICE DAILY 10/14/24   Historical Provider, MD   ipratropium-albuterol (DUO-NEB) 0.5-2.5 mg/3 mL nebulizer solution Take 3 mL by nebulization 2 (two) times a day 9/26/23   Historical Provider, MD   loratadine (CLARITIN) 10 mg tablet Take 10 mg by mouth daily    Historical Provider, MD   losartan (COZAAR) 100 MG tablet Take 100 mg by mouth daily 2/22/22   Historical Provider, MD   magnesium  Oxide (MAG-OX) 400 mg TABS Take 1 tablet (400 mg total) by mouth daily 1/9/24   Ara Leigh MD   metoprolol tartrate (LOPRESSOR) 100 mg tablet Take 1 tablet (100 mg total) by mouth every 12 (twelve) hours Take 90 minutes prior to coronary CTA. 1/21/25   Jayy Babcock PA-C   Multiple Vitamin (MULTIVITAMIN ADULT PO) every 24 hours    Historical Provider, MD   mupirocin (BACTROBAN) 2 % ointment Apply topically every other day With alginate dressing 8/20/24   Julio César Mac DPM   Omega-3 Fatty Acids (Fish Oil) 360 MG CAPS Take by mouth    Historical Provider, MD   OneTouch Verio test strip USE AS DIRECTED FOUR TIMES DAILY 2/10/22   Historical Provider, MD   potassium chloride (K-DUR,KLOR-CON) 20 mEq tablet Take 1 tablet (20 mEq total) by mouth daily 1/8/24   Ara Leigh MD   rivaroxaban (Xarelto) 20 mg tablet Take 1 tablet (20 mg total) by mouth daily with breakfast 1/25/24   Justus Holland MD   senna (SENOKOT) 8.6 mg Take 2 tablets (17.2 mg total) by mouth daily at bedtime as needed for constipation 1/8/24   Ara Leigh MD   torsemide (DEMADEX) 20 mg tablet Take 2 tablets (40 mg total) by mouth every morning AND 1 tablet (20 mg total) daily after lunch. 2/19/24   Justus Holland MD   triamcinolone (KENALOG) 0.1 % cream 1 Application 2 (two) times a day To affected areas 6/2/23   Historical Provider, MD     Allergies   Allergen Reactions    Hydrochlorothiazide Other (See Comments)     SEVERE DIZZINESS    Insulin Lispro Other (See Comments)     Cannot control blood sugars(Humalog)    Lisinopril Other (See Comments)     Reports cough with use     Minoxidil GI Intolerance    Nifedipine Other (See Comments)     EDEMA LEGS    Other Other (See Comments)     SEVERE DIZZINESS  Hydrap-es    Prazosin Other (See Comments)     CHEST PAIN      Venlafaxine Other (See Comments)     Made pt feel bad      Doxycycline Rash    Simvastatin Rash     PEDAL EDEMA         Objective :  Temp:  [98.6 °F (37 °C)] 98.6 °F (37 °C)  HR:  [69-80]  80  BP: (143-151)/(76-81) 143/81  Resp:  [16-20] 16  SpO2:  [97 %-98 %] 98 %  O2 Device: None (Room air)    Physical Exam     Gen.-Patient comfortable, appears dry  Neck- Supple. No thyromegaly or lymphadenopathy  Lungs-Clear bilaterally without any wheeze or rales   Heart S1-S2, regular rate and rhythm, no murmurs  Abdomen-soft nontender, no organomegaly. Bowel sounds present  Extremities-no cyanosi,  clubbing or edema  Skin- no rash  Neuro-nonfocal, no sensation noted bilaterally upto shin    Lines/Drains:            Lab Results: I have reviewed the following results:  Results from last 7 days   Lab Units 01/27/25  1140   WBC Thousand/uL 8.71   HEMOGLOBIN g/dL 12.8   HEMATOCRIT % 41.2   PLATELETS Thousands/uL 194   SEGS PCT % 71   LYMPHO PCT % 17   MONO PCT % 8   EOS PCT % 2     Results from last 7 days   Lab Units 01/27/25  1140   SODIUM mmol/L 134*   POTASSIUM mmol/L 4.8   CHLORIDE mmol/L 99   CO2 mmol/L 28   BUN mg/dL 22   CREATININE mg/dL 0.87   ANION GAP mmol/L 7   CALCIUM mg/dL 9.7   ALBUMIN g/dL 3.8   TOTAL BILIRUBIN mg/dL 1.57*   ALK PHOS U/L 110*   ALT U/L 12   AST U/L 16   GLUCOSE RANDOM mg/dL 332*     Results from last 7 days   Lab Units 01/27/25  1140   INR  1.27*     Results from last 7 days   Lab Units 01/27/25  1444   POC GLUCOSE mg/dl 246*     Lab Results   Component Value Date    HGBA1C 6.8 11/16/2023    HGBA1C 8.1 08/03/2023    HGBA1C 7.1 03/23/2023           Imaging Results Review: I reviewed radiology reports from this admission including: CT head.  Other Study Results Review: EKG was reviewed.     Administrative Statements   I have spent a total time of 75 minutes in caring for this patient on the day of the visit/encounter including Diagnostic results, Impressions, Counseling / Coordination of care, Documenting in the medical record, Reviewing / ordering tests, medicine, procedures  , Obtaining or reviewing history  , and Communicating with other healthcare professionals .    ** Please Note:  This note has been constructed using a voice recognition system. **

## 2025-01-27 NOTE — ASSESSMENT & PLAN NOTE
84 year old male with history of PAF on Xarelto, SSS with PPM placement, CHF, HTN, HLD, DM.    Presents on 1/27 due to few days of generalized weakness at home, intermittent dysarthric speech, L facial droop, confusion/AMS.  No focal deficits on exam, differential includes cerebrovascular event (as daughter is unsure if patient took his medications including Xarelto compliantly over the past few days), versus systemic symptoms related to underlying infectious/metabolic derangement.    Neuroimaging:  CTA head/neck: no acute intracranial pathology, no LVO; moderate to severe athero in R vertebral artery, minimal R V3 irregularity    Plan:  -Admit under acute ischemic stroke pathway:  -MRI brain without contrast (has PPM placed just few years ago; if not MRI conditional, then consider repeat CT head in 24 hours)  -2D echocardiogram  -Continue home Xarelto; no need for aspirin/plavix  -Lipitor 40 mg daily  -SBP goal 120-180  -Check hemoglobin A1C and lipid panel  -Neuro checks  -Telemetry monitoring  -Provide stroke education  -Therapy evaluations (PT/OT/ST)  -Monitoring for other metabolic/infectious derangements:   -CBC without WBC elevation   -CMP with hypergylcemia (chronic per patient's family), elevated total bilirubin/alk phos   -UA negative for infection   -CXR negative

## 2025-01-27 NOTE — ASSESSMENT & PLAN NOTE
- Presenting with weakness and fall  Admitted under stroke pathway  MRI brain  Echo  Lipid Panel  Hemoglobin A1c  Atorvastatin 40 mg  Permissive HTN, labetalol if SBP > 180  Continue telemetry  PT/OT/ST  Frequent neuro checks. Continue to monitor and notify neurology with any changes.   - Medical management and supportive care per primary team. Correction of any metabolic or infectious disturbances.

## 2025-01-27 NOTE — ED PROVIDER NOTES
Time reflects when diagnosis was documented in both MDM as applicable and the Disposition within this note       Time User Action Codes Description Comment    1/27/2025  2:57 PM Nadine Chiu Add [R53.1] Generalized weakness     1/27/2025  2:57 PM Nadine Chiu Add [I63.9] CVA (cerebral vascular accident) (HCC)     1/27/2025  2:57 PM Nadine Chiu Add [R73.9] Hyperglycemia     1/27/2025  3:38 PM SrinivasanicanorgonzálezSemaj Add [R29.90] Stroke-like symptoms           ED Disposition       ED Disposition   Admit    Condition   Stable    Date/Time   Mon Jan 27, 2025  2:56 PM    Comment   Case was discussed with DR. Suarez and the patient's admission status was agreed to be Admission Status: observation status to the service of Dr. Suarez .               Assessment & Plan       Medical Decision Making  Patient with generalized weakness, facial droop, slurred speech for over 2 days, will order labs, UA, CTA to r/o CVA, electrolyte abnormality, anemia, UTI.  Patient with abnormality on CTA, d/w neuro, will admit along stroke pathway.      Amount and/or Complexity of Data Reviewed  Labs: ordered.  Radiology: ordered.  ECG/medicine tests: ordered and independent interpretation performed.  Discussion of management or test interpretation with external provider(s): D/w neuro.     Risk  Prescription drug management.  Decision regarding hospitalization.        ED Course as of 01/27/25 1737   Mon Jan 27, 2025   1434 Discussed with Dr. Freire from neuro, will admit along stroke pathway.        Medications   albuterol (PROVENTIL HFA,VENTOLIN HFA) inhaler 1 puff (has no administration in time range)   allopurinol (ZYLOPRIM) tablet 300 mg (has no administration in time range)   atorvastatin (LIPITOR) tablet 20 mg (has no administration in time range)   azelastine (ASTELIN) 0.1 % nasal spray 2 spray (has no administration in time range)   budesonide (PULMICORT) inhalation solution 0.5 mg (has no administration in time  range)   Cholecalciferol (VITAMIN D3) tablet 400 Units (has no administration in time range)   co-enzyme Q-10 capsule 100 mg (has no administration in time range)   gabapentin (NEURONTIN) capsule 100 mg (has no administration in time range)   insulin glargine (LANTUS) subcutaneous injection 20 Units 0.2 mL (has no administration in time range)   ipratropium (ATROVENT) 0.03 % nasal spray 1 spray (has no administration in time range)   loratadine (CLARITIN) tablet 10 mg (has no administration in time range)   multivitamin stress formula tablet 1 tablet (has no administration in time range)   rivaroxaban (XARELTO) tablet 20 mg (has no administration in time range)   insulin lispro (HumALOG/ADMELOG) 100 units/mL subcutaneous injection 1-5 Units (has no administration in time range)   iohexol (OMNIPAQUE) 350 MG/ML injection (MULTI-DOSE) 100 mL (85 mL Intravenous Given 1/27/25 1356)       ED Risk Strat Scores                          SBIRT 20yo+      Flowsheet Row Most Recent Value   Initial Alcohol Screen: US AUDIT-C     1. How often do you have a drink containing alcohol? 0 Filed at: 01/27/2025 1114   2. How many drinks containing alcohol do you have on a typical day you are drinking?  0 Filed at: 01/27/2025 1114   3a. Male UNDER 65: How often do you have five or more drinks on one occasion? 0 Filed at: 01/27/2025 1114   3b. FEMALE Any Age, or MALE 65+: How often do you have 4 or more drinks on one occassion? 0 Filed at: 01/27/2025 1114   Audit-C Score 0 Filed at: 01/27/2025 1114   VIKKI: How many times in the past year have you...    Used an illegal drug or used a prescription medication for non-medical reasons? Never Filed at: 01/27/2025 1114                            History of Present Illness       Chief Complaint   Patient presents with    Weakness - Generalized     Pt coming from home for increased weakness. Pt family reports he was suppose to have CTA done today. Pt family concerned he had moments of intermittent  "slurred speech and \"just not acting normal.\"       Past Medical History:   Diagnosis Date    Asthma     Bradycardia     Carcinoma, basal cell, skin     head and face and ear    CHF (congestive heart failure) (HCC)     Chronic kidney disease     Coronary artery disease     COVID-19 12/05/2022    Diabetes mellitus (HCC)     Dupuytren contracture     bilateral ring and right middle    Gout     Herniated lumbar intervertebral disc     Hypertension     Sleep apnea       Past Surgical History:   Procedure Laterality Date    APPENDECTOMY      age 20, ruptured    CATARACT EXTRACTION, BILATERAL Bilateral     CERVICAL FUSION      CYST REMOVAL Left 01/2023    DUPUYTREN CONTRACTURE RELEASE Left     left ring finger    INSERT / REPLACE / REMOVE PACEMAKER      LUMBAR LAMINECTOMY      belkis placed    TONSILLECTOMY      VASECTOMY        Family History   Problem Relation Age of Onset    Hypertension Mother     Diabetes type II Mother     Hypertension Father     Diabetes type II Brother     Hypertension Brother     Kidney disease Sister     Hypertension Daughter     GI problems Daughter       Social History     Tobacco Use    Smoking status: Never    Smokeless tobacco: Never   Vaping Use    Vaping status: Never Used   Substance Use Topics    Alcohol use: Not Currently    Drug use: Never      E-Cigarette/Vaping    E-Cigarette Use Never User       E-Cigarette/Vaping Substances    Nicotine No     THC No     CBD No     Flavoring No     Other No     Unknown No       I have reviewed and agree with the history as documented.     HPI  Patient is an 83 y/o M with h/o afib on xarelto, CHF, HTN, asthma, DM that was brought to the ED by daughter from assisted living for generalized weakness, decreased concentration and multiple falls over the past couple days.  Daughter states patient was seen last week for chest pain, PCP felt it was due to postherpetic neuralgia and increased gabapentin.  Daughter states he appears to have a left facial droop " and slurred speech.  He has had decreased concentration.  Symptoms have been present for 2 days.     Review of Systems   Constitutional:  Negative for chills and fever.   HENT: Negative.     Eyes:  Negative for visual disturbance.   Respiratory:  Negative for cough and shortness of breath.    Cardiovascular:  Positive for chest pain. Negative for palpitations and leg swelling.   Gastrointestinal:  Negative for diarrhea, nausea and vomiting.   Genitourinary:  Negative for dysuria.   Skin:  Negative for color change, pallor and rash.   Neurological:  Positive for facial asymmetry, speech difficulty and weakness (generalized). Negative for dizziness, light-headedness and numbness.   Psychiatric/Behavioral:  Positive for decreased concentration. Negative for confusion.    All other systems reviewed and are negative.          Objective       ED Triage Vitals   Temperature Pulse Blood Pressure Respirations SpO2 Patient Position - Orthostatic VS   01/27/25 1110 01/27/25 1110 01/27/25 1110 01/27/25 1110 01/27/25 1110 01/27/25 1110   98.6 °F (37 °C) 69 151/77 20 97 % Sitting      Temp Source Heart Rate Source BP Location FiO2 (%) Pain Score    01/27/25 1110 01/27/25 1110 01/27/25 1110 -- 01/27/25 1312    Temporal Monitor Right arm  No Pain      Vitals      Date and Time Temp Pulse SpO2 Resp BP Pain Score FACES Pain Rating User   01/27/25 1509 -- 80 98 % 16 143/81 No Pain -- [facility]   01/27/25 1312 -- 73 98 % 18 148/76 No Pain -- [facility]   01/27/25 1110 98.6 °F (37 °C) 69 97 % 20 151/77 -- -- CM            Physical Exam  Vitals and nursing note reviewed.   Constitutional:       General: He is not in acute distress.     Appearance: Normal appearance. He is well-developed and well-groomed. He is not ill-appearing or diaphoretic.   HENT:      Head: Normocephalic and atraumatic.      Right Ear: Hearing normal.      Left Ear: Hearing normal.      Nose: Nose normal.      Mouth/Throat:      Mouth: Mucous membranes are moist.      Pharynx:  Oropharynx is clear.   Eyes:      General: No visual field deficit.     Conjunctiva/sclera: Conjunctivae normal.      Pupils: Pupils are equal.   Cardiovascular:      Rate and Rhythm: Normal rate and regular rhythm.   Pulmonary:      Effort: Pulmonary effort is normal.      Breath sounds: Normal breath sounds.   Abdominal:      General: Abdomen is flat. Bowel sounds are normal.      Palpations: Abdomen is soft.      Tenderness: There is no abdominal tenderness.   Musculoskeletal:         General: Normal range of motion.      Cervical back: Normal range of motion.      Right lower leg: Edema (trace) present.      Left lower leg: Edema (trace) present.   Skin:     General: Skin is warm and dry.      Coloration: Skin is not jaundiced or pale.      Findings: No rash.   Neurological:      Mental Status: He is alert and oriented to person, place, and time.      GCS: GCS eye subscore is 4. GCS verbal subscore is 4. GCS motor subscore is 6.      Cranial Nerves: Dysarthria and facial asymmetry present. No cranial nerve deficit.      Sensory: Sensation is intact. No sensory deficit.      Motor: Motor function is intact. No weakness, tremor, abnormal muscle tone or pronator drift.      Coordination: Coordination is intact. Finger-Nose-Finger Test and Heel to Shin Test normal.   Psychiatric:         Behavior: Behavior is cooperative.         Results Reviewed       Procedure Component Value Units Date/Time    TSH, 3rd generation [[id number]]     Lab Status: No result Specimen: Blood     Vitamin B12 [[id number]]     Lab Status: No result Specimen: Blood     Folate [[id number]]     Lab Status: No result Specimen: Blood     Hemoglobin A1c w/EAG Estimation (Prechecked if no A1C within 90 days) [[id number]]     Lab Status: No result Specimen: Blood     HS Troponin I 4hr [[id number]]  (Normal) Collected: 01/27/25 1620    Lab Status: Final result Specimen: Blood from Arm, Right Updated: 01/27/25 1656     hs TnI 4hr 37 ng/L      Delta 4hr  hsTnI -12 ng/L     Fingerstick Glucose (POCT) [[id number]]  (Abnormal) Collected: 01/27/25 1444    Lab Status: Final result Specimen: Blood Updated: 01/27/25 1444     POC Glucose 246 mg/dl     Urine Microscopic [[id number]]  (Normal) Collected: 01/27/25 1405    Lab Status: Final result Specimen: Urine, Clean Catch Updated: 01/27/25 1438     RBC, UA 0-1 /hpf      WBC, UA None Seen /hpf      Epithelial Cells None Seen /hpf      Bacteria, UA None Seen /hpf     HS Troponin I 2hr [[id number]]  (Normal) Collected: 01/27/25 1355    Lab Status: Final result Specimen: Blood from Arm, Right Updated: 01/27/25 1426     hs TnI 2hr 43 ng/L      Delta 2hr hsTnI -6 ng/L     UA w Reflex to Microscopic w Reflex to Culture [[id number]]  (Abnormal) Collected: 01/27/25 1405    Lab Status: Final result Specimen: Urine, Clean Catch Updated: 01/27/25 1417     Color, UA Yellow     Clarity, UA Clear     Specific Gravity, UA <1.005     pH, UA 7.0     Leukocytes, UA Negative     Nitrite, UA Negative     Protein, UA 30 (1+) mg/dl      Glucose,  (3/10%) mg/dl      Ketones, UA Negative mg/dl      Urobilinogen, UA <2.0 mg/dl      Bilirubin, UA Negative     Occult Blood, UA Negative    B-Type Natriuretic Peptide(BNP) [[id number]]  (Abnormal) Collected: 01/27/25 1140    Lab Status: Final result Specimen: Blood from Arm, Right Updated: 01/27/25 1245      pg/mL     TSH, 3rd generation with Free T4 reflex [[id number]]  (Normal) Collected: 01/27/25 1140    Lab Status: Final result Specimen: Blood from Arm, Right Updated: 01/27/25 1229     TSH 3RD GENERATON 2.104 uIU/mL     HS Troponin 0hr (reflex protocol) [[id number]]  (Normal) Collected: 01/27/25 1140    Lab Status: Final result Specimen: Blood from Arm, Right Updated: 01/27/25 1215     hs TnI 0hr 49 ng/L     Comprehensive metabolic panel [[id number]]  (Abnormal) Collected: 01/27/25 1140    Lab Status: Final result Specimen: Blood from Arm, Right Updated: 01/27/25 1207     Sodium 134 mmol/L       Potassium 4.8 mmol/L      Chloride 99 mmol/L      CO2 28 mmol/L      ANION GAP 7 mmol/L      BUN 22 mg/dL      Creatinine 0.87 mg/dL      Glucose 332 mg/dL      Calcium 9.7 mg/dL      AST 16 U/L      ALT 12 U/L      Alkaline Phosphatase 110 U/L      Total Protein 6.7 g/dL      Albumin 3.8 g/dL      Total Bilirubin 1.57 mg/dL      eGFR 78 ml/min/1.73sq m     Narrative:      National Kidney Disease Foundation guidelines for Chronic Kidney Disease (CKD):     Stage 1 with normal or high GFR (GFR > 90 mL/min/1.73 square meters)    Stage 2 Mild CKD (GFR = 60-89 mL/min/1.73 square meters)    Stage 3A Moderate CKD (GFR = 45-59 mL/min/1.73 square meters)    Stage 3B Moderate CKD (GFR = 30-44 mL/min/1.73 square meters)    Stage 4 Severe CKD (GFR = 15-29 mL/min/1.73 square meters)    Stage 5 End Stage CKD (GFR <15 mL/min/1.73 square meters)  Note: GFR calculation is accurate only with a steady state creatinine    Magnesium [[id number]]  (Normal) Collected: 01/27/25 1140    Lab Status: Final result Specimen: Blood from Arm, Right Updated: 01/27/25 1207     Magnesium 2.1 mg/dL     Protime-INR [[id number]]  (Abnormal) Collected: 01/27/25 1140    Lab Status: Final result Specimen: Blood from Arm, Right Updated: 01/27/25 1206     Protime 16.4 seconds      INR 1.27    Narrative:      INR Therapeutic Range    Indication                                             INR Range      Atrial Fibrillation                                               2.0-3.0  Hypercoagulable State                                    2.0.2.3  Left Ventricular Asist Device                            2.0-3.0  Mechanical Heart Valve                                  -    Aortic(with afib, MI, embolism, HF, LA enlargement,    and/or coagulopathy)                                     2.0-3.0 (2.5-3.5)     Mitral                                                             2.5-3.5  Prosthetic/Bioprosthetic Heart Valve               2.0-3.0  Venous thromboembolism (VTE:  VT, PE        2.0-3.0    APTT [[id number]]  (Normal) Collected: 01/27/25 1140    Lab Status: Final result Specimen: Blood from Arm, Right Updated: 01/27/25 1206     PTT 32 seconds     CBC and differential [[id number]]  (Abnormal) Collected: 01/27/25 1140    Lab Status: Final result Specimen: Blood from Arm, Right Updated: 01/27/25 1152     WBC 8.71 Thousand/uL      RBC 4.77 Million/uL      Hemoglobin 12.8 g/dL      Hematocrit 41.2 %      MCV 86 fL      MCH 26.8 pg      MCHC 31.1 g/dL      RDW 17.2 %      MPV 12.2 fL      Platelets 194 Thousands/uL      nRBC 0 /100 WBCs      Segmented % 71 %      Immature Grans % 1 %      Lymphocytes % 17 %      Monocytes % 8 %      Eosinophils Relative 2 %      Basophils Relative 1 %      Absolute Neutrophils 6.25 Thousands/µL      Absolute Immature Grans 0.06 Thousand/uL      Absolute Lymphocytes 1.49 Thousands/µL      Absolute Monocytes 0.65 Thousand/µL      Eosinophils Absolute 0.21 Thousand/µL      Basophils Absolute 0.05 Thousands/µL             CTA head and neck with and without contrast   Final Interpretation by Esequiel Richardson MD (01/27 2394)      CT Brain:  No acute intracranial CT abnormality.      CT Angiography:      1.  Patent major vessels of the [place] without high-grade stenosis.  No aneurysm.   2.  Moderate to severe atherosclerotic stenosis in the right vertebral artery origin. No significant stenosis in the cervical carotid or left vertebral artery.   3.  Minimal focal irregularity in the proximal V3 segment of the right vertebral artery, could be developmental versus sequela of prior injury. No significant stenosis.                     Workstation performed: [id number]         XR chest 1 view portable   Final Interpretation by Rafael Morales MD (01/27 0536)      No acute cardiopulmonary disease.            Workstation performed: [id number]         MRI Inpatient Order    (Results Pending)       ECG 12 Lead Documentation Only    Date/Time: 1/27/2025 11:56  AM    Performed by: Nadine Chiu PA-C  Authorized by: Nadine Chiu PA-C    Indications / Diagnosis:  Weakness  Patient location:  ED  Rate:     ECG rate:  73  Rhythm:     Rhythm: paced        ED Medication and Procedure Management   Prior to Admission Medications   Prescriptions Last Dose Informant Patient Reported? Taking?   Albuterol Sulfate 108 (90 Base) MCG/ACT AEPB   No No   Sig: Inhale 2 puffs every 4 (four) hours as needed (SOB, wheezing, cough)   Cholecalciferol (Vitamin D3) 25 MCG (1000 UT) CAPS  Self Yes No   Sig: Take 1 capsule by mouth every 24 hours   Coenzyme Q10 (Co Q10) 100 MG CAPS  Self Yes No   Sig: Take 100 mg by mouth in the morning   Easy Comfort Pen Needles 33G X 4 MM MISC  Self Yes No   Sig: USE WITH INSULIN FIVE TIMES DAILY AS DIRECTED   Glucosamine-Chondroitin 500-400 MG CAPS  Self Yes No   Sig: Take 1 capsule by mouth daily   Insulin Glargine Solostar (Lantus SoloStar) 100 UNIT/ML SOPN  Self No No   Sig: INJECT 20 UNITS SUBCUTANEOUSLY IN THE MORNING AND 20 UNITS IN THE EVENING.   Multiple Vitamin (MULTIVITAMIN ADULT PO)  Self Yes No   Sig: every 24 hours   Omega-3 Fatty Acids (Fish Oil) 360 MG CAPS  Self Yes No   Sig: Take by mouth   OneTouch Verio test strip  Self Yes No   Sig: USE AS DIRECTED FOUR TIMES DAILY   allopurinol (ZYLOPRIM) 300 mg tablet  Self Yes No   Sig: Take 300 mg by mouth every 24 hours   atorvastatin (LIPITOR) 20 mg tablet  Self Yes No   Sig: Take 20 mg by mouth daily   azelastine (ASTELIN) 0.1 % nasal spray  Self Yes No   Si sprays into each nostril 2 (two) times a day   budesonide (PULMICORT) 0.5 mg/2 mL nebulizer solution  Self Yes No   Sig: Take 0.5 mg by nebulization 2 (two) times a day Rinse mouth after use.   fluticasone (FLONASE) 50 mcg/act nasal spray  Self Yes No   Si spray into each nostril 2 (two) times a day   formoterol (PERFOROMIST) 20 MCG/2ML nebulizer solution  Self Yes No   Sig: Take 20 mcg by nebulization 2 (two) times a  day   gabapentin (NEURONTIN) 100 mg capsule  Self No No   Sig: Take 1 capsule (100 mg total) by mouth daily   insulin aspart (NovoLOG FlexPen) 100 UNIT/ML injection pen   No No   Sig: INJECT 12 UNITS SUBCUTANEOUSLY with breakfast, LUNCH and with dinner plus sliding scale. (UP  UNITS DAILY). 150-200: 1 unit; 201-250: 2 units; 251-300: 3 units; 301-350: 4 units; 351-400: 5 units; 400+: 6 units   ipratropium (ATROVENT) 0.03 % nasal spray  Self Yes No   Sig: INSTILL 2 SPRAYS IN EACH NOSTRIL TWICE DAILY   ipratropium-albuterol (DUO-NEB) 0.5-2.5 mg/3 mL nebulizer solution  Self Yes No   Sig: Take 3 mL by nebulization 2 (two) times a day   loratadine (CLARITIN) 10 mg tablet  Self Yes No   Sig: Take 10 mg by mouth daily   losartan (COZAAR) 100 MG tablet  Self Yes No   Sig: Take 100 mg by mouth daily   magnesium Oxide (MAG-OX) 400 mg TABS  Self No No   Sig: Take 1 tablet (400 mg total) by mouth daily   metoprolol tartrate (LOPRESSOR) 100 mg tablet   No No   Sig: Take 1 tablet (100 mg total) by mouth every 12 (twelve) hours Take 90 minutes prior to coronary CTA.   mupirocin (BACTROBAN) 2 % ointment  Self No No   Sig: Apply topically every other day With alginate dressing   potassium chloride (K-DUR,KLOR-CON) 20 mEq tablet  Self No No   Sig: Take 1 tablet (20 mEq total) by mouth daily   rivaroxaban (Xarelto) 20 mg tablet  Self No No   Sig: Take 1 tablet (20 mg total) by mouth daily with breakfast   senna (SENOKOT) 8.6 mg  Self No No   Sig: Take 2 tablets (17.2 mg total) by mouth daily at bedtime as needed for constipation   torsemide (DEMADEX) 20 mg tablet  Self No No   Sig: Take 2 tablets (40 mg total) by mouth every morning AND 1 tablet (20 mg total) daily after lunch.   triamcinolone (KENALOG) 0.1 % cream  Self Yes No   Si Application 2 (two) times a day To affected areas      Facility-Administered Medications: None     Patient's Medications   Discharge Prescriptions    No medications on file     No discharge  procedures on file.  ED SEPSIS DOCUMENTATION   Time reflects when diagnosis was documented in both MDM as applicable and the Disposition within this note       Time User Action Codes Description Comment    1/27/2025  2:57 PM Nadine Chiu Add [R53.1] Generalized weakness     1/27/2025  2:57 PM Nadine Chiu Add [I63.9] CVA (cerebral vascular accident) (HCC)     1/27/2025  2:57 PM Nadine Chiu Add [R73.9] Hyperglycemia     1/27/2025  3:38 PM Semaj Perez Add [R29.90] Stroke-like symptoms                  Nadine Chiu PA-C  01/27/25 4810

## 2025-01-27 NOTE — ASSESSMENT & PLAN NOTE
Lab Results   Component Value Date    HGBA1C 6.8 11/16/2023       Recent Labs     01/27/25  1444   POCGLU 246*     Repeat hemoglobin A1c  Continue Lantus twice daily  SSI  Daily Accu-Cheks  hypoglycemia protocol

## 2025-01-27 NOTE — ED NOTES
Pt's pacemaker:  Biotronik Edora 8 YOLANDA [phone]  Irma S 45 [phone]  Irma KUO 53 [phone]     Ruby Woodard RN  01/27/25 4525

## 2025-01-27 NOTE — ASSESSMENT & PLAN NOTE
Has peripheral neuropathy  Check TSH, B12, folate    PT/OT evaluation  Upon MRI  Check COVID/flu/RSV for complete test

## 2025-01-27 NOTE — ED NOTES
Pt asked to use the bathroom to urinate. Explained to pt that since he has been weak and having several falls recently, we would need to use a urinal. Pt states he will not use a urinal. Again offered to assist pt with urinal and pt declined.      Ruby Woodard RN  01/27/25 4286

## 2025-01-27 NOTE — CONSULTS
Consultation - Neurology   Name: Hammad Montesinos 84 y.o. male I MRN: [id number]  Unit/Bed#: Z2 H2 I Date of Admission: 1/27/2025   Date of Service: 1/27/2025 I Hospital Day: 0   Consults  Physician Requesting Evaluation: Samm Coleman DO   Reason for Evaluation / Principal Problem:     Assessment & Plan  Stroke-like symptoms  84 year old male with history of PAF on Xarelto, SSS with PPM placement, CHF, HTN, HLD, DM.    Presents on 1/27 due to few days of generalized weakness at home, intermittent dysarthric speech, L facial droop, confusion/AMS.  No focal deficits on exam, differential includes cerebrovascular event (as daughter is unsure if patient took his medications including Xarelto compliantly over the past few days), versus systemic symptoms related to underlying infectious/metabolic derangement.    Neuroimaging:  CTA head/neck: no acute intracranial pathology, no LVO; moderate to severe athero in R vertebral artery, minimal R V3 irregularity    Plan:  -Admit under acute ischemic stroke pathway:  -MRI brain without contrast (has PPM placed just few years ago; if not MRI conditional, then consider repeat CT head in 24 hours)  -2D echocardiogram  -Continue home Xarelto; no need for aspirin/plavix  -Lipitor 40 mg daily  -SBP goal 120-180  -Check hemoglobin A1C and lipid panel  -Neuro checks  -Telemetry monitoring  -Provide stroke education  -Therapy evaluations (PT/OT/ST)  -Monitoring for other metabolic/infectious derangements:   -CBC without WBC elevation   -CMP with hypergylcemia (chronic per patient's family), elevated total bilirubin/alk phos   -UA negative for infection   -CXR negative    Discussed plan of care with attending neurologist.    Recommendations for outpatient neurological follow up have yet to be determined.    History of Present Illness   Hammad Montesinos is a 84 y.o.  male with history as mentioned above in assessment who neurology is asked to evaluate in regards to the  above.    History is obtained via discussion with patient as well as his daughter at bedside this afternoon.  Patient has been noted by family over the past several days to be experiencing generalized weakness, several falls at his living facility, as well as onset of dysarthric speech at times over the weekend.  They have also noticed he is not as alert and Khine as he typically is (patient agrees and states he has been more sleepy than normal).  Given this, he was brought to the ED today for further evaluation/workup.    Patient/daughter confirm he was seen in the ED approximately 2 weeks ago for left-sided chest pain, and discharged.  There was concern the changes were related to his medication as the gabapentin he was taking for pain was increased this past Friday from 200 mg daily to 600 mg daily (although it was abruptly stopped over the weekend).  Patient typically is quite sharp, self manages his medications, and is fully ambulatory at his living facility.    No prior neurology notes in EPIC; no pertinent prior head imaging.    Review of Systems   Constitutional:  Positive for fatigue. Negative for chills, diaphoresis, fever and unexpected weight change.   Eyes:  Negative for photophobia and visual disturbance.   Cardiovascular: Negative.    Gastrointestinal: Negative.    Musculoskeletal: Negative.    Skin: Negative.    Neurological:  Positive for speech difficulty, weakness, numbness (chronic from LE neuropathy) and headaches. Negative for dizziness, tremors, seizures, syncope, facial asymmetry and light-headedness.        I have reviewed the patient's PMH, PSH, Social History, Family History, Meds, and Allergies    Objective :  Temp:  [98.6 °F (37 °C)] 98.6 °F (37 °C)  HR:  [69-73] 73  BP: (148-151)/(76-77) 148/76  Resp:  [18-20] 18  SpO2:  [97 %-98 %] 98 %  O2 Device: None (Room air)    Physical Exam  Constitutional:       Appearance: Normal appearance.   HENT:      Head: Normocephalic and atraumatic.    Eyes:      Extraocular Movements: Extraocular movements intact.      Conjunctiva/sclera: Conjunctivae normal.      Pupils: Pupils are equal, round, and reactive to light.   Cardiovascular:      Rate and Rhythm: Normal rate.   Abdominal:      General: There is no distension.   Musculoskeletal:         General: Normal range of motion.      Cervical back: Normal range of motion and neck supple.   Skin:     General: Skin is warm and dry.   Neurological:      General: No focal deficit present.      Motor: Motor strength is normal.  Psychiatric:         Speech: Speech normal.     Neurological Exam  Mental Status  Awake, alert and oriented to person, place and time. Oriented to person, place and time. Recent and remote memory are intact. Speech is normal. Language is fluent with no aphasia. Attention and concentration are normal.  Awake, alert, fully oriented to self, month/year, nearly the date.  Names objects, colors and repeats phrases/reads NIH card sentences without aphasia nor dysarthria.  Perform simple calculations (number of quarters in 1.75)  spells world forwards correctly, almost spills backwards correctly.  Following commands reliably.    Cranial Nerves  CN II: Visual acuity is normal. Visual fields full to confrontation.  CN III, IV, VI: Extraocular movements intact bilaterally. Pupils equal round and reactive to light bilaterally.  CN V: Facial sensation is normal.  CN VII: Full and symmetric facial movement.  CN VIII: Hearing is normal.  CN IX, X: Palate elevates symmetrically    Motor  Normal muscle bulk throughout. Normal muscle tone. Strength is 5/5 throughout all four extremities.  5/5 UE and LE strength throughout; no obvious focal deficit on examination..    Sensory  Distal length dependent sensory diminishment/neuropathy in the bilateral lower extremities (from shins distally).    Reflexes  Trace/diminished DTRs, no UMN signs.    Coordination  Right: Finger-to-nose normal.Left: Finger-to-nose  normal.    Gait    Deferred for safety. .        Lab Results: I have reviewed the following results:CBC:   Results from last 7 days   Lab Units 01/27/25  1140   WBC Thousand/uL 8.71   RBC Million/uL 4.77   HEMOGLOBIN g/dL 12.8   HEMATOCRIT % 41.2   MCV fL 86   PLATELETS Thousands/uL 194   , BMP/CMP:   Results from last 7 days   Lab Units 01/27/25  1140   SODIUM mmol/L 134*   POTASSIUM mmol/L 4.8   CHLORIDE mmol/L 99   CO2 mmol/L 28   BUN mg/dL 22   CREATININE mg/dL 0.87   CALCIUM mg/dL 9.7   AST U/L 16   ALT U/L 12   ALK PHOS U/L 110*   EGFR ml/min/1.73sq m 78   , Vitamin B12:   , HgBA1C:   , TSH:   Results from last 7 days   Lab Units 01/27/25  1140   TSH 3RD GENERATON uIU/mL 2.104   , Coagulation:   Results from last 7 days   Lab Units 01/27/25  1140   INR  1.27*   , Lipid Profile:   , Ammonia:   , Urinalysis:   Results from last 7 days   Lab Units 01/27/25  1405   COLOR UA  Yellow   CLARITY UA  Clear   SPEC GRAV UA  <1.005*   PH UA  7.0   LEUKOCYTES UA  Negative   NITRITE UA  Negative   GLUCOSE UA mg/dl 300 (3/10%)*   KETONES UA mg/dl Negative   BILIRUBIN UA  Negative   BLOOD UA  Negative     Recent Labs     01/27/25  1140   WBC 8.71   HGB 12.8   HCT 41.2      SODIUM 134*   K 4.8   CL 99   CO2 28   BUN 22   CREATININE 0.87   GLUC 332*   MG 2.1     Imaging Results Review: I personally reviewed the following image studies in PACS and associated radiology reports.  CTA head and neck with and without contrast   Final Result by Esequiel Richardson MD (01/27 4429)      CT Brain:  No acute intracranial CT abnormality.      CT Angiography:      1.  Patent major vessels of the [place] without high-grade stenosis.  No aneurysm.   2.  Moderate to severe atherosclerotic stenosis in the right vertebral artery origin. No significant stenosis in the cervical carotid or left vertebral artery.   3.  Minimal focal irregularity in the proximal V3 segment of the right vertebral artery, could be developmental versus sequela  of prior injury. No significant stenosis.                     Workstation performed: [id number]         XR chest 1 view portable   Final Result by Rafael Morales MD (01/27 2914)      No acute cardiopulmonary disease.            Workstation performed: [id number]           .  Other Study Results Review: No additional pertinent studies reviewed.    VTE Prophylaxis: Sequential compression device (Venodyne)  and Xarelto    Please see attending's attestation for total time spent/billing. Discussed plan of care with patient and primary team: stroke workup, MRI brain, echo, continue Xarelto/statin, neuro checks, supportive care, therapies.     Please note dictation software was used in the formulation of this note. Please keep that in mind in light of any grammatical errors.

## 2025-01-28 ENCOUNTER — APPOINTMENT (OUTPATIENT)
Dept: MRI IMAGING | Facility: HOSPITAL | Age: 85
End: 2025-01-28
Payer: COMMERCIAL

## 2025-01-28 ENCOUNTER — APPOINTMENT (OUTPATIENT)
Dept: NON INVASIVE DIAGNOSTICS | Facility: HOSPITAL | Age: 85
End: 2025-01-28
Payer: COMMERCIAL

## 2025-01-28 ENCOUNTER — TELEPHONE (OUTPATIENT)
Dept: OTHER | Facility: OTHER | Age: 85
End: 2025-01-28

## 2025-01-28 ENCOUNTER — PATIENT MESSAGE (OUTPATIENT)
Dept: CARDIOLOGY CLINIC | Facility: CLINIC | Age: 85
End: 2025-01-28

## 2025-01-28 VITALS
WEIGHT: 220.46 LBS | TEMPERATURE: 97.6 F | HEART RATE: 69 BPM | RESPIRATION RATE: 20 BRPM | HEIGHT: 70 IN | BODY MASS INDEX: 31.56 KG/M2 | SYSTOLIC BLOOD PRESSURE: 134 MMHG | DIASTOLIC BLOOD PRESSURE: 71 MMHG | OXYGEN SATURATION: 100 %

## 2025-01-28 PROBLEM — R53.1 GENERALIZED WEAKNESS: Status: ACTIVE | Noted: 2025-01-28

## 2025-01-28 LAB
ANION GAP SERPL CALCULATED.3IONS-SCNC: 6 MMOL/L (ref 4–13)
ATRIAL RATE: 35 BPM
BASOPHILS # BLD AUTO: 0.05 THOUSANDS/ΜL (ref 0–0.1)
BASOPHILS NFR BLD AUTO: 1 % (ref 0–1)
BUN SERPL-MCNC: 20 MG/DL (ref 5–25)
CALCIUM SERPL-MCNC: 9.5 MG/DL (ref 8.4–10.2)
CHLORIDE SERPL-SCNC: 104 MMOL/L (ref 96–108)
CHOLEST SERPL-MCNC: 111 MG/DL (ref ?–200)
CO2 SERPL-SCNC: 24 MMOL/L (ref 21–32)
CREAT SERPL-MCNC: 0.75 MG/DL (ref 0.6–1.3)
EOSINOPHIL # BLD AUTO: 0.24 THOUSAND/ΜL (ref 0–0.61)
EOSINOPHIL NFR BLD AUTO: 3 % (ref 0–6)
ERYTHROCYTE [DISTWIDTH] IN BLOOD BY AUTOMATED COUNT: 17.5 % (ref 11.6–15.1)
EST. AVERAGE GLUCOSE BLD GHB EST-MCNC: 214 MG/DL
GFR SERPL CREATININE-BSD FRML MDRD: 83 ML/MIN/1.73SQ M
GLUCOSE P FAST SERPL-MCNC: 225 MG/DL (ref 65–99)
GLUCOSE SERPL-MCNC: 182 MG/DL (ref 65–140)
GLUCOSE SERPL-MCNC: 215 MG/DL (ref 65–140)
GLUCOSE SERPL-MCNC: 225 MG/DL (ref 65–140)
GLUCOSE SERPL-MCNC: 239 MG/DL (ref 65–140)
HBA1C MFR BLD: 9.1 %
HCT VFR BLD AUTO: 38.7 % (ref 36.5–49.3)
HDLC SERPL-MCNC: 39 MG/DL
HGB BLD-MCNC: 12.3 G/DL (ref 12–17)
IMM GRANULOCYTES # BLD AUTO: 0.04 THOUSAND/UL (ref 0–0.2)
IMM GRANULOCYTES NFR BLD AUTO: 1 % (ref 0–2)
LDLC SERPL CALC-MCNC: 55 MG/DL (ref 0–100)
LYMPHOCYTES # BLD AUTO: 1.71 THOUSANDS/ΜL (ref 0.6–4.47)
LYMPHOCYTES NFR BLD AUTO: 22 % (ref 14–44)
MCH RBC QN AUTO: 27.2 PG (ref 26.8–34.3)
MCHC RBC AUTO-ENTMCNC: 31.8 G/DL (ref 31.4–37.4)
MCV RBC AUTO: 86 FL (ref 82–98)
MONOCYTES # BLD AUTO: 0.75 THOUSAND/ΜL (ref 0.17–1.22)
MONOCYTES NFR BLD AUTO: 10 % (ref 4–12)
NEUTROPHILS # BLD AUTO: 5.13 THOUSANDS/ΜL (ref 1.85–7.62)
NEUTS SEG NFR BLD AUTO: 63 % (ref 43–75)
NRBC BLD AUTO-RTO: 0 /100 WBCS
PLATELET # BLD AUTO: 207 THOUSANDS/UL (ref 149–390)
PMV BLD AUTO: 12.2 FL (ref 8.9–12.7)
POTASSIUM SERPL-SCNC: 4.4 MMOL/L (ref 3.5–5.3)
QRS AXIS: -78 DEGREES
QRSD INTERVAL: 204 MS
QT INTERVAL: 480 MS
QTC INTERVAL: 529 MS
RBC # BLD AUTO: 4.52 MILLION/UL (ref 3.88–5.62)
SODIUM SERPL-SCNC: 134 MMOL/L (ref 135–147)
T WAVE AXIS: 94 DEGREES
TRIGL SERPL-MCNC: 84 MG/DL (ref ?–150)
VENTRICULAR RATE: 73 BPM
WBC # BLD AUTO: 7.92 THOUSAND/UL (ref 4.31–10.16)

## 2025-01-28 PROCEDURE — 85025 COMPLETE CBC W/AUTO DIFF WBC: CPT | Performed by: INTERNAL MEDICINE

## 2025-01-28 PROCEDURE — 99232 SBSQ HOSP IP/OBS MODERATE 35: CPT | Performed by: STUDENT IN AN ORGANIZED HEALTH CARE EDUCATION/TRAINING PROGRAM

## 2025-01-28 PROCEDURE — 82948 REAGENT STRIP/BLOOD GLUCOSE: CPT

## 2025-01-28 PROCEDURE — 94640 AIRWAY INHALATION TREATMENT: CPT

## 2025-01-28 PROCEDURE — 87081 CULTURE SCREEN ONLY: CPT | Performed by: INTERNAL MEDICINE

## 2025-01-28 PROCEDURE — 94760 N-INVAS EAR/PLS OXIMETRY 1: CPT

## 2025-01-28 PROCEDURE — 93306 TTE W/DOPPLER COMPLETE: CPT

## 2025-01-28 PROCEDURE — 70551 MRI BRAIN STEM W/O DYE: CPT

## 2025-01-28 PROCEDURE — 99239 HOSP IP/OBS DSCHRG MGMT >30: CPT | Performed by: INTERNAL MEDICINE

## 2025-01-28 PROCEDURE — 93306 TTE W/DOPPLER COMPLETE: CPT | Performed by: INTERNAL MEDICINE

## 2025-01-28 PROCEDURE — 93010 ELECTROCARDIOGRAM REPORT: CPT | Performed by: INTERNAL MEDICINE

## 2025-01-28 PROCEDURE — NC001 PR NO CHARGE: Performed by: STUDENT IN AN ORGANIZED HEALTH CARE EDUCATION/TRAINING PROGRAM

## 2025-01-28 PROCEDURE — 83036 HEMOGLOBIN GLYCOSYLATED A1C: CPT | Performed by: INTERNAL MEDICINE

## 2025-01-28 PROCEDURE — 97163 PT EVAL HIGH COMPLEX 45 MIN: CPT

## 2025-01-28 PROCEDURE — 97167 OT EVAL HIGH COMPLEX 60 MIN: CPT

## 2025-01-28 PROCEDURE — 80048 BASIC METABOLIC PNL TOTAL CA: CPT | Performed by: INTERNAL MEDICINE

## 2025-01-28 PROCEDURE — 80061 LIPID PANEL: CPT | Performed by: INTERNAL MEDICINE

## 2025-01-28 RX ADMIN — LORATADINE 10 MG: 10 TABLET ORAL at 08:09

## 2025-01-28 RX ADMIN — CHOLECALCIFEROL (VITAMIN D3) 10 MCG (400 UNIT) TABLET 400 UNITS: at 08:09

## 2025-01-28 RX ADMIN — INSULIN LISPRO 2 UNITS: 100 INJECTION, SOLUTION INTRAVENOUS; SUBCUTANEOUS at 17:48

## 2025-01-28 RX ADMIN — AZELASTINE HYDROCHLORIDE 2 SPRAY: 137 SPRAY, METERED NASAL at 08:10

## 2025-01-28 RX ADMIN — GABAPENTIN 100 MG: 100 CAPSULE ORAL at 08:09

## 2025-01-28 RX ADMIN — RIVAROXABAN 20 MG: 10 TABLET, FILM COATED ORAL at 08:09

## 2025-01-28 RX ADMIN — BUDESONIDE 0.5 MG: 0.5 INHALANT RESPIRATORY (INHALATION) at 07:44

## 2025-01-28 RX ADMIN — INSULIN GLARGINE 20 UNITS: 100 INJECTION, SOLUTION SUBCUTANEOUS at 08:08

## 2025-01-28 RX ADMIN — ALLOPURINOL 300 MG: 100 TABLET ORAL at 17:48

## 2025-01-28 RX ADMIN — Medication 100 MG: at 08:08

## 2025-01-28 RX ADMIN — AZELASTINE HYDROCHLORIDE 2 SPRAY: 137 SPRAY, METERED NASAL at 17:48

## 2025-01-28 RX ADMIN — INSULIN LISPRO 2 UNITS: 100 INJECTION, SOLUTION INTRAVENOUS; SUBCUTANEOUS at 11:59

## 2025-01-28 RX ADMIN — INSULIN LISPRO 1 UNITS: 100 INJECTION, SOLUTION INTRAVENOUS; SUBCUTANEOUS at 08:10

## 2025-01-28 RX ADMIN — B-COMPLEX W/ C & FOLIC ACID TAB 1 TABLET: TAB at 08:08

## 2025-01-28 RX ADMIN — ATORVASTATIN CALCIUM 20 MG: 20 TABLET, FILM COATED ORAL at 08:09

## 2025-01-28 NOTE — PLAN OF CARE
Problem: PHYSICAL THERAPY ADULT  Goal: Performs mobility at highest level of function for planned discharge setting.  See evaluation for individualized goals.  Description: Treatment/Interventions: Functional transfer training, LE strengthening/ROM, Therapeutic exercise, Endurance training, Cognitive reorientation, Patient/family training, Equipment eval/education, Bed mobility, Gait training          See flowsheet documentation for full assessment, interventions and recommendations.  1/28/2025 1623 by Ca Guthrie, PT  Note:    Problem List: Impaired balance, Decreased mobility, Impaired vision  Assessment: Hammad Montesinos is a 84 y.o. Male who presents to [facility] on 1/27/2025 from home ([facility]) w/ c/o multiple falls and diagnosis of stroke-like symptoms. Orders for PT eval and treat received. Pt presents w/ comorbidities of DMII w/ diabetic polyneuropathy, HTN, HLD, Afib. At baseline, pt mobilizes modified I w/ rollator, and reports 3 falls in the last 6 months. Upon evaluation, pt presents w/ the following deficits: weakness, impaired balance, impaired hearing, impaired vision, and decreased endurance. Upon eval, pt requires modified I for bed mobility, supervision for transfers, and supervision for gait. Based on this PT evaluation today, patient's discharge recommendation is for Level III - Low Rehab Resource Intensity (PT @ ILF). During this admission, pt would benefit from continued skilled inpatient PT in the acute care setting in order to address the abovementioned deficits to maximize function and mobility before DC from acute care.        Rehab Resource Intensity Level, PT: III (Minimum Resource Intensity)    See flowsheet documentation for full assessment.

## 2025-01-28 NOTE — PLAN OF CARE
Problem: OCCUPATIONAL THERAPY ADULT  Goal: Performs self-care activities at highest level of function for planned discharge setting.  See evaluation for individualized goals.  Description: Treatment Interventions: ADL retraining, Functional transfer training, UE strengthening/ROM, Endurance training, Equipment evaluation/education, Patient/family training, Compensatory technique education, Continued evaluation          See flowsheet documentation for full assessment, interventions and recommendations.   Note: Limitation: Decreased ADL status, Decreased endurance, Decreased self-care trans, Decreased high-level ADLs  Prognosis: Good  Assessment: Pt is a 84 y.o. male seen for OT evaluation at [facility], admitted 1/27/2025 w/ Stroke-like symptoms. Extensive chart review completed by OT. Comorbidities affecting the pt's functional performance include a significant PMH of: T2DM with neuropathy, hyperlipidemia, hypertension, fall, stage 3 CKD, hypoglycemia, elevated parathyroid hormone, CHF, A-fib, diabetic ulcer of R foot. Personal factors affecting pt at time of IE include: difficulty performing ADLS and health management . PTA pt at [facility] was  IND w/ ADLs and A w/ IADLS, and IND with functional mobility with use of rollator. Upon OT IE pt demonstrated  Yeimi for bed mobility, supervision for functional transfers, supervision for functional mobility, Yeimi for UB ADLs and supervision for LB ADLS 2* the following deficits impacting occupational performance: weakness, decreased balance, and decreased tolerance. Pt demonstrates the need for continued skilled OT tx focused on grooming, bathing/shower, toilet hygiene, dressing, functional mobility, and clothing management during their stay in the hospital to address the stated deficits and maximize their level of functional independence w/ their ADLS and functional mobility. The patient's raw score on the AM-PAC Daily Activity inpatient short form is 21, standardized score  is 44.27, greater than 39.4. Patients at this level are likely to benefit from DC to home. However, please refer to therapist recommendation for discharge planning given other factors that may influence destination. At this time, OT recommendations at time of discharge are DC with Level III - Low Rehab Resource Intensity resources (OOPT).     Rehab Resource Intensity Level, OT: III (Minimum Resource Intensity) (HHOT)

## 2025-01-28 NOTE — OCCUPATIONAL THERAPY NOTE
Occupational Therapy Evaluation     Patient Name: Hammad Montesinos  Today's Date: 1/28/2025  Problem List  Principal Problem:    Stroke-like symptoms  Active Problems:    Type 2 diabetes mellitus with hyperglycemia, with long-term current use of insulin (HCC)    Mixed hyperlipidemia    Primary hypertension    Fall    Past Medical History  Past Medical History:   Diagnosis Date    Asthma     Bradycardia     Carcinoma, basal cell, skin     head and face and ear    CHF (congestive heart failure) (HCC)     Chronic kidney disease     Coronary artery disease     COVID-19 12/05/2022    Diabetes mellitus (HCC)     Dupuytren contracture     bilateral ring and right middle    Gout     Herniated lumbar intervertebral disc     Hypertension     Sleep apnea      Past Surgical History  Past Surgical History:   Procedure Laterality Date    APPENDECTOMY      age 20, ruptured    CATARACT EXTRACTION, BILATERAL Bilateral     CERVICAL FUSION      CYST REMOVAL Left 01/2023    DUPUYTREN CONTRACTURE RELEASE Left     left ring finger    INSERT / REPLACE / REMOVE PACEMAKER      LUMBAR LAMINECTOMY      belkis placed    TONSILLECTOMY      VASECTOMY             01/28/25 1416   OT Last Visit   OT Visit Date 01/28/25   Note Type   Note type Evaluation   Pain Assessment   Pain Assessment Tool 0-10   Pain Score No Pain   Restrictions/Precautions   Weight Bearing Precautions Per Order No   Other Precautions Chair Alarm;Bed Alarm;Telemetry;Fall Risk;Hard of hearing   Home Living   Type of Home Other (Comment)  ([facility])   Home Layout One level   Home Equipment Walker;Other (Comment)  (Rollator; adjustable bed)   Prior Function   Level of Cash Independent with ADLs;Independent with functional mobility;Needs assistance with IADLS   Lives With Spouse   Receives Help From Family   IADLs Family/Friend/Other provides transportation;Family/Friend/Other provides meals;Independent with medication management  (Pt IND with med  "managment; per chart daughter had checked his med log and pt has been compliant with all meds)   Falls in the last 6 months 1 to 4  (3)   Vocational Retired   Lifestyle   Autonomy Pt states PTA he was IND with ADLs and ambulatory with rollator walker; pt states he would walk to dinning abdi for meals   Reciprocal Relationships family and spouse   Service to Others Retired [employer]    General   Family/Caregiver Present No   Additional General Comments MRI brain (-)   Subjective   Subjective \"My phone is in the drawer, they put it there before I went down to MRI.\"   ADL   Eating Assistance 7  Independent   Grooming Assistance 7  Independent   UB Bathing Assistance 6  Modified Independent   LB Bathing Assistance 5  Supervision/Setup   UB Dressing Assistance 6  Modified independent   LB Dressing Assistance 5  Supervision/Setup   Toileting Assistance  5  Supervision/Setup   Bed Mobility   Supine to Sit 6  Modified independent   Transfers   Sit to Stand 5  Supervision   Additional items Assist x 1;Armrests   Stand to Sit 5  Supervision   Additional items Assist x 1;Armrests   Functional Mobility   Functional Mobility 5  Supervision   Additional Comments x1   Additional items Rolling walker   Balance   Static Sitting Good   Dynamic Sitting Fair +   Static Standing Fair -   Dynamic Standing Fair -   Activity Tolerance   Activity Tolerance Patient tolerated treatment well   Medical Staff Made Aware PT Ca   Nurse Made Aware MD Chris Rajan   RUEDNA Assessment   RUE Assessment WFL   LUE Assessment   LUE Assessment WFL   Hand Function   Gross Motor Coordination Functional   Fine Motor Coordination Functional   Vision-Basic Assessment   Current Vision Wears glasses all the time   Psychosocial   Psychosocial (WDL) WDL   Cognition   Overall Cognitive Status WFL   Arousal/Participation Alert;Cooperative   Attention Within functional limits   Orientation Level Oriented X4   Memory Within functional limits "   Following Commands Follows one step commands with increased time or repetition   Comments Pt willing to partcipate in OT evaluation; pt is very plesant to work with   Assessment   Limitation Decreased ADL status;Decreased endurance;Decreased self-care trans;Decreased high-level ADLs   Prognosis Good   Assessment Pt is a 84 y.o. male seen for OT evaluation at [facility], admitted 1/27/2025 w/ Stroke-like symptoms. Extensive chart review completed by OT. Comorbidities affecting the pt's functional performance include a significant PMH of: T2DM with neuropathy, hyperlipidemia, hypertension, fall, stage 3 CKD, hypoglycemia, elevated parathyroid hormone, CHF, A-fib, diabetic ulcer of R foot. Personal factors affecting pt at time of IE include: difficulty performing ADLS and health management . PTA pt at [facility] was  IND w/ ADLs and A w/ IADLS, and IND with functional mobility with use of rollator. Upon OT IE pt demonstrated  Yeimi for bed mobility, supervision for functional transfers, supervision for functional mobility, Yeimi for UB ADLs and supervision for LB ADLS 2* the following deficits impacting occupational performance: weakness, decreased balance, and decreased tolerance. Pt demonstrates the need for continued skilled OT tx focused on grooming, bathing/shower, toilet hygiene, dressing, functional mobility, and clothing management during their stay in the hospital to address the stated deficits and maximize their level of functional independence w/ their ADLS and functional mobility. The patient's raw score on the AM-PAC Daily Activity inpatient short form is 21, standardized score is 44.27, greater than 39.4. Patients at this level are likely to benefit from DC to home. However, please refer to therapist recommendation for discharge planning given other factors that may influence destination. At this time, OT recommendations at time of discharge are DC with Level III - Low Rehab Resource Intensity resources  (OOPT).   Goals   Patient Goals Be stronger   Plan   Treatment Interventions ADL retraining;Functional transfer training;UE strengthening/ROM;Endurance training;Equipment evaluation/education;Patient/family training;Compensatory technique education;Continued evaluation   Goal Expiration Date 02/07/25   OT Treatment Day 0   OT Frequency 1-2x/wk   Discharge Recommendation   Rehab Resource Intensity Level, OT III (Minimum Resource Intensity)  (HHOT)   AM-PAC Daily Activity Inpatient   Lower Body Dressing 3   Bathing 3   Toileting 3   Upper Body Dressing 4   Grooming 4   Eating 4   Daily Activity Raw Score 21   Daily Activity Standardized Score (Calc for Raw Score >=11) 44.27   AM-PAC Applied Cognition Inpatient   Following a Speech/Presentation 3   Understanding Ordinary Conversation 4   Taking Medications 3   Remembering Where Things Are Placed or Put Away 3   Remembering List of 4-5 Errands 3   Taking Care of Complicated Tasks 3   Applied Cognition Raw Score 19   Applied Cognition Standardized Score 39.77   End of Consult   Education Provided Yes   Patient Position at End of Consult Bedside chair;Bed/Chair alarm activated;All needs within reach   Nurse Communication Nurse aware of consult       Pt will complete the following goals within 10 days:     Pt will complete UB bathing and dressing with Yeimi .    Pt will complete LB bathing and dressing with Yeimi  & DME PRN.    Pt will complete toileting w/ Yeimi w/ G hygiene/thoroughness using DME PRN    Pt will improve functional mobility during ADL/IADL/leisure tasks to Yeimi using DME as needed w/ G balance/safety.    Pt will perform functional transfers with Yeimi in order to facilitate completion of  ADL routine.    Pt will increase standing tolerance to 15 minutes in order to complete ADL routine.    Pt will identify 3-5 fall risks to ensure safety upon discharge.      Pt will improve balance by 1/2 grade to facilitate completion of ADL tasks.       Pt will complete bed  mobility with Yeimi, with HOB elevated & use of side rails PRN to prep for purposeful tasks    Pt will demonstrate G carryover of pt/caregiver education and training as appropriate w/ Mod I w/o cues w/ good tolerance.    Remedios Granados, OTR/L

## 2025-01-28 NOTE — CONSULTS
Duplicate order, please see original consult completed on 1/27/2025 by neurology AP Leonel Buitrago PA-C and attending neurologist Dr. Arik Freire.

## 2025-01-28 NOTE — ASSESSMENT & PLAN NOTE
Hammad Montesinos is a 84 y.o. male with paroxysmal A-fib on Xarelto, SSS s/p PPM placement, CHF, HTN, HLD, DM type II who presented to [facility] ED on 1/27/2025 with a few days of generalized weakness at home, intermittent dysarthric speech, L facial droop, confusion/AMS.      Workup:  - CTA head/neck:   Unremarkable for acute intracranial abnormalities or large vessel occlusion  Moderate to severe atherosclerosis in right vertebral artery origin  Minimal focal irregularity in proximal right V3 segment without significant stenosis  - Hyperglycemic on presentation, 332  - Alk phos elevated on presentation, 110  - Lipid panel: Total cholesterol 111, triglycerides 84, HDL 39, LDL 55  - Labs WNL: Troponin, magnesium, TSH, vitamin B12, folate, UA, COVID/flu/RSV    Patient much improved, daughter at bedside reports patient is close to baseline.    Etiology remains unclear.  Differential includes cerebrovascular event vs systemic symptoms related to underlying infectious/metabolic derangement vs reaction in the setting of his wife's hospitalization. Daughter reports that she checked the patient's medication planner, admitting to compliance with medications.    Plan:  - MRI brain wo contrast pending (has PPM placed just few years ago; if not MRI conditional, then consider repeat CT head in 24 hours)  - Echocardiogram pending  - Pending: Hemoglobin A1c  - Continue home Xarelto  - No need for aspirin/plavix at this time   - Continue atorvastatin 20 daily  - Continue home Gabapentin   - SBP goal 120-140  - Goal euglycemia, normothermia   - Frequent neuro checks  - Telemetry monitoring  - Stroke education  - PT/OT/speech  - Monitoring for other metabolic/infectious derangements  - Conservative management of delirium   Minimize noise, maintain day/night cycle, provide frequent redirection and reorientation, avoid restraints if possible, etc.  Avoid deliriogenic medications   - Medical management and supportive care per primary  team, notify with changes

## 2025-01-28 NOTE — SPEECH THERAPY NOTE
"Speech Language/Pathology  Order received, chart reviewed. Pt passed nursing dysphagia screen and has been tolerating regular texture diet, thin liquids, and PO meds. Spoke w/ RN, RN denies concerns for dysphagia, aspiration, and/or speech/language deficits. Spoke w/ pt. Pt denies dysphagia, odynophagia, globus sensation, s/s aspiration, and/or speech/language deficits at this time. Pt's daughter present throughout encounter stating he has \"waxing waning slurred speech.\" No gross speech/language deficits observed during conversation. MRI pending, SLP to monitor MRI results, if neg (-) SLP to s/o as no skilled intervention is required, if MRI pos (+) SLP to f/u for completion of clinical swallow evaluation as able and appropriate.    "

## 2025-01-28 NOTE — CASE MANAGEMENT
Case Management Discharge Planning Note    Patient name Hammad Montesinos  Location /-01 MRN [id number]  : 1940 Date 2025       Current Admission Date: 2025  Current Admission Diagnosis:Stroke-like symptoms   Patient Active Problem List    Diagnosis Date Noted Date Diagnosed    Stroke-like symptoms 2025     Fall 2025     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, with fat layer exposed ([facility]) 2024     Acute on chronic diastolic heart failure ([facility]) 2023     Elevated troponin 2023     Pulmonary hypertension ([facility]) 2023     Paroxysmal A-fib ([facility]) 2023     Type 2 diabetes mellitus with diabetic neuropathy, unspecified whether long term insulin use ([facility]) 2023     Pre-ulcerative calluses 2023     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 ([facility]) 2022     Elevated parathyroid hormone 2022     Diabetic nephropathy associated with type 2 diabetes mellitus ([facility]) 2022     Type 2 diabetes mellitus with hyperglycemia, with long-term current use of insulin ([facility]) 2022     Mixed hyperlipidemia 2022     Diabetic polyneuropathy associated with type 2 diabetes mellitus ([facility]) 2022     Primary hypertension 2022     Morbid (severe) obesity due to excess calories ([facility]) 2022     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 ([facility]) 2022       LOS (days): 0  Geometric Mean LOS (GMLOS) (days):   Days to GMLOS:     OBJECTIVE:            Current admission status: Observation   Preferred Pharmacy[place]   [facility] [address]  [address] [address]  Phone: [phone] Fax: [phone]    Primary Care Provider: Mina Zavala MD    Primary Insurance: [facility]  Secondary Insurance:     DISCHARGE DETAILS:                                Requested  Home Health Care         Is the patient interested in HHC at discharge?: Yes  Home Health Discipline requested:: Nursing, Occupational Therapy, Physical Therapy  HHA External Referral Reason (only applicable if external HHA name selected): Patient has established relationship with provider  Home Health Follow-Up Provider:: PCP  Home Health Services Needed:: Gait/ADL Training, Evaluate Functional Status and Safety  Homebound Criteria Met:: Uses an Assist Device (i.e. cane, walker, etc)  Supporting Clincal Findings:: Limited Endurance          Met with patient to discuss PT/OT's recommendation for HHC, He is agreeable and is happy to be returning to his apt with his wife. Referral for HHC made via Aidin, wait availability and patient preference.

## 2025-01-28 NOTE — ASSESSMENT & PLAN NOTE
Has peripheral neuropathy  Check TSH, B12, folate    PT/OT evaluation- Level- 3,   Check COVID/flu/RSV for complete test

## 2025-01-28 NOTE — DISCHARGE SUMMARY
Discharge Summary - Hospitalist   Name: Hammad Montesinos 84 y.o. male I MRN: [id number]  Unit/Bed#: -01 I Date of Admission: 1/27/2025   Date of Service: 1/28/2025 I Hospital Day: 0     Assessment & Plan  Stroke-like symptoms  - Presenting with weakness and fall  Admitted under stroke pathway  MRI brain- normal  Echo  Lipid Panel  Hemoglobin A1c elevated to 9.1  Atorvastatin 40 mg  Continue telemetry  PT/OT/ST  Frequent neuro checks. Continue to monitor and notify neurology with any changes.   - Medical management and supportive care per primary team. Correction of any metabolic or infectious disturbances.   Type 2 diabetes mellitus with hyperglycemia, with long-term current use of insulin ([facility])  Lab Results   Component Value Date    HGBA1C 9.1 (H) 01/28/2025       Recent Labs     01/27/25  1755 01/27/25  2207 01/28/25  0715 01/28/25  1030   POCGLU 203* 278* 182* 215*     Repeat hemoglobin A1c-->9.1  Continue Lantus twice daily  SSI  Daily Accu-Cheks  hypoglycemia protocol  Mixed hyperlipidemia  Continue atorvastatin  Check lipid panel  Primary hypertension  Hold torsemide, losartan, metoprolol for permissive hypertension  Fall  Has peripheral neuropathy  Check TSH, B12, folate    PT/OT evaluation- Level- 3,   Check COVID/flu/RSV for complete test  Generalized weakness       Medical Problems       Resolved Problems  Date Reviewed: 1/27/2025   None       Discharging Physician / Practitioner: Semaj Perez MD  PCP: Mina Zavala MD  Admission Date:   Admission Orders (From admission, onward)       Ordered        01/27/25 1457  Place in Observation  Once                          Discharge Date: 01/28/25    Consultations During Hospital Stay:  Neurology    Significant Findings / Test Results:   MRI brain wo contrast  Result Date: 1/28/2025  Impression: 1. No acute infarction, intracranial image or mass effect. 2. Mild, chronic microangiopathy. Workstation performed: [id number]     CTA head and neck with  "and without contrast  Result Date: 1/27/2025  Impression: CT Brain:  No acute intracranial CT abnormality. CT Angiography: 1.  Patent major vessels of the [place] without high-grade stenosis.  No aneurysm. 2.  Moderate to severe atherosclerotic stenosis in the right vertebral artery origin. No significant stenosis in the cervical carotid or left vertebral artery. 3.  Minimal focal irregularity in the proximal V3 segment of the right vertebral artery, could be developmental versus sequela of prior injury. No significant stenosis. Workstation performed: [id number]     XR chest 1 view portable  Result Date: 1/27/2025  Impression: No acute cardiopulmonary disease. Workstation performed: [id number]       XR chest 1 view portable  Result Date: 1/27/2025  Impression No acute cardiopulmonary disease. Workstation performed: [id number]           Test Results Pending at Discharge (will require follow up):   ECHO report      Reason for Admission:   Chief Complaint   Patient presents with    Weakness - Generalized     Pt coming from home for increased weakness. Pt family reports he was suppose to have CTA done today. Pt family concerned he had moments of intermittent slurred speech and \"just not acting normal.\"        Hospital Course:   Hammad Montesinos is a 84 y.o. male patient who originally presented to the hospital on 1/27/2025 due to generalized weakness. Patient was damitted under stroke protocol. CTA negative for stroke. MRI done, negative for stroke. Likely weakness attributed to combination of Valtrex, gabapentin and uncontrolled glucose?. Patient was evlauted by PT/OT, recommending level- 3 resources. CM assisted with home PT/OT setup    Patient will be dcd home to follow up with PCP. He has endocrinology appointment coming up next week for diabetes follow up.        Please see above list of diagnoses and related plan for additional information.     Condition at Discharge: stable    Discharge Day Visit / " "Exam:   Subjective:  feels weak but very close to baseline  Vitals: Blood Pressure: 134/71 (01/28/25 1733)  Pulse: 69 (01/28/25 1733)  Temperature: 97.6 °F (36.4 °C) (01/28/25 1418)  Temp Source: Oral (01/28/25 1107)  Respirations: 20 (01/28/25 1418)  Height: 5' 10\" (177.8 cm) (01/28/25 1733)  Weight - Scale: 100 kg (220 lb 7.4 oz) (01/28/25 1733)  SpO2: 100 % (01/28/25 1733)  Physical Exam     Gen.-Patient comfortable   Neck- Supple. No thyromegaly or lymphadenopathy  Lungs-Clear bilaterally without any wheeze or rales   Heart S1-S2, regular rate and rhythm, no murmurs  Abdomen-soft nontender, no organomegaly. Bowel sounds present  Extremities-no cyanosi,  clubbing or edema  Skin- no rash  Neuro-nonfocal     Discussion with Family: Updated  (daughter) via phone.    Discharge instructions/Information to patient and family:   See after visit summary for information provided to patient and family.      Provisions for Follow-Up Care:  See after visit summary for information related to follow-up care and any pertinent home health orders.      Mobility at time of Discharge:   Basic Mobility Inpatient Raw Score: 19  JH-HLM Goal: 6: Walk 10 steps or more  JH-HLM Achieved: 7: Walk 25 feet or more  HLM Goal achieved. Continue to encourage appropriate mobility.     Disposition:   Home with VNA Services (Reminder: Complete face to face encounter)    Planned Readmission: none    Discharge Medications:  See after visit summary for reconciled discharge medications provided to patient and/or family.      Administrative Statements   Discharge Statement:  I have spent a total time of 33 minutes in caring for this patient on the day of the visit/encounter. >30 minutes of time was spent on: Diagnostic results, Impressions, Counseling / Coordination of care, Documenting in the medical record, Reviewing / ordering tests, medicine, procedures  , and Communicating with other healthcare professionals .    **Please Note: This " note may have been constructed using a voice recognition system**

## 2025-01-28 NOTE — PHYSICAL THERAPY NOTE
PHYSICAL THERAPY EVALUATION NOTE      Patient Name: Hammad Montesinos  Today's Date: 2025    AGE:   84 y.o.  Mrn:   [id number]  ADMIT DX:  Weakness generalized [R53.1]  Hyperglycemia [R73.9]  CVA (cerebral vascular accident) (HCC) [I63.9]  Generalized weakness [R53.1]  Stroke-like symptoms [R29.90]    Past Medical History:   Diagnosis Date    Asthma     Bradycardia     Carcinoma, basal cell, skin     head and face and ear    CHF (congestive heart failure) (HCC)     Chronic kidney disease     Coronary artery disease     COVID-19 2022    Diabetes mellitus (HCC)     Dupuytren contracture     bilateral ring and right middle    Gout     Herniated lumbar intervertebral disc     Hypertension     Sleep apnea      Length Of Stay: 0  PHYSICAL THERAPY EVALUATION :   Patient's identity confirmed via 2 patient identifiers (full name and ) at start of session       25 1415   PT Last Visit   PT Visit Date 25   Note Type   Note type Evaluation   Pain Assessment   Pain Assessment Tool 0-10   Pain Score No Pain   Restrictions/Precautions   Weight Bearing Precautions Per Order No   Other Precautions Chair Alarm;Bed Alarm;Telemetry;Fall Risk;Visual impairment;Hard of hearing   Home Living   Type of Home Other (Comment)  ([facility])   Home Layout One level   Home Equipment Walker;Other (Comment)  (Rollator; adjustable bed)   Additional Comments Pt reports using rollator at all times, walks to dining abdi   Prior Function   Level of [place] Independent with functional mobility;Independent with ADLs   Lives With Spouse   Receives Help From Family   Falls in the last 6 months 1 to 4  (3)   Vocational Retired  ([employer] )   Comments Hammad reports his wife just returned to their apartment after being at [facility] for a hip fx   General   Family/Caregiver Present No   Cognition   Overall Cognitive Status  WFL   Arousal/Participation Alert   Attention Within functional limits   Orientation Level Oriented X4   Following Commands Follows one step commands with increased time or repetition   Comments Hammad is pleasant and agreeable to participate in PT evaluation. A&Ox4, even able to recall where his phone was in the drawer next to the bed.   RLE Assessment   RLE Assessment WFL   Strength RLE   RLE Overall Strength 4-/5   LLE Assessment   LLE Assessment WFL   Strength LLE   LLE Overall Strength 4-/5   Vision-Basic Assessment   Current Vision Wears glasses all the time   Light Touch   RLE Light Touch Impaired   RLE Light Touch Comments Known peripheral neuropathy, donned shoes for evaluation   LLE Light Touch Impaired   LLE Light Touch Comments Known peripheral neuropathy, donned shoes for evaluation   Bed Mobility   Supine to Sit 6  Modified independent   Transfers   Sit to Stand 5  Supervision   Stand to Sit 5  Supervision   Ambulation/Elevation   Gait pattern Decreased foot clearance;Short stride  (mild shuffling)   Gait Assistance 5  Supervision  (distant S)   Assistive Device Other (Comment)  (Own Rollator)   Distance 100 ft   Ambulation/Elevation Additional Comments Requires no physical A; no LOB or unsteadiness   Balance   Static Sitting Good   Dynamic Sitting Fair   Static Standing Fair +   Ambulatory Fair +   Activity Tolerance   Activity Tolerance Patient tolerated treatment well   Medical Staff Made Aware Dr. Chris Donnelly   Nurse Made Aware VIKASH German   Assessment   Problem List Impaired balance;Decreased mobility;Impaired vision   Assessment Hammad Montesinos is a 84 y.o. Male who presents to [facility] on 1/27/2025 from home (ILF) w/ c/o multiple falls and diagnosis of stroke-like symptoms. Orders for PT eval and treat received. Pt presents w/ comorbidities of DMII w/ diabetic polyneuropathy, HTN, HLD, Afib. At baseline, pt mobilizes modified I w/ rollator, and reports 3 falls in the last 6 months. Upon  evaluation, pt presents w/ the following deficits: weakness, impaired balance, impaired hearing, impaired vision, and decreased endurance. Upon eval, pt requires modified I for bed mobility, supervision for transfers, and supervision for gait. Based on this PT evaluation today, patient's discharge recommendation is for Level III - Low Rehab Resource Intensity (PT @ ILF). During this admission, pt would benefit from continued skilled inpatient PT in the acute care setting in order to address the abovementioned deficits to maximize function and mobility before DC from acute care.   Goals   Patient Goals to get stronger, go home   STG Expiration Date 02/07/25   Short Term Goal #1 Patient will: Perform all bed mobility tasks independently to improve pt's independence w/ repositioning for decrease risk of skin breakdown, Perform all transfers modified independent consistently from various height surfaces in order to improve I w/ engagement w/ real-world environments/situations, Ambulate at least 200 ft. with roller walker modified independent w/o LOB to facilitate return and engagement w/ previous living environment, and Increase all balance 1/2 grade to decrease risk for falls   PT Treatment Day 0   Plan   Treatment/Interventions Functional transfer training;LE strengthening/ROM;Therapeutic exercise;Endurance training;Cognitive reorientation;Patient/family training;Equipment eval/education;Bed mobility;Gait training   PT Frequency 1-2x/wk   Discharge Recommendation   Rehab Resource Intensity Level, PT III (Minimum Resource Intensity)   AM-PAC Basic Mobility Inpatient   Turning in Flat Bed Without Bedrails 4   Lying on Back to Sitting on Edge of Flat Bed Without Bedrails 4   Moving Bed to Chair 3   Standing Up From Chair Using Arms 3   Walk in Room 3   Climb 3-5 Stairs With Railing 2   Basic Mobility Inpatient Raw Score 19   Basic Mobility Standardized Score 42.48   [facility] Highest Level Of Mobility   [facility] Goal 6:  Walk 10 steps or more   -HLM Achieved 7: Walk 25 feet or more         The patient's AM-PAC Basic Mobility Inpatient Short Form Raw Score is 19, Standardized Score is 42.48. A standardized score greater than 38.32 (raw score of 16) suggests the patient may benefit from discharge to home which may not coincide with above PT recommendations. However please refer to therapist recommendation for discharge planning given other factors that may influence destination.    Pt would benefit from skilled inpatient PT during this admission in order to facilitate progress towards goals to maximize functional independence    Ca Guthrie PT, DPT

## 2025-01-28 NOTE — PROGRESS NOTES
Patient:  TITA MANUEL    MRN:  [id number]    Aidin Request ID:  [id number]    Level of care reserved:  Home Health Agency    Partner Reserved:  [facility], [place] [address] [phone]    Clinical needs requested:    Geography searched:  [id number]    Start of Service:    Request sent:  3:03pm EST on 1/28/2025 by Gloria Almaraz    Partner reserved:  3:49pm EST on 1/28/2025 by Gloria Almaraz    Choice list shared:

## 2025-01-28 NOTE — CASE MANAGEMENT
Case Management Discharge Planning Note    Patient name Hammad Montesinos  Location /-01 MRN [id number]  : 1940 Date 2025       Current Admission Date: 2025  Current Admission Diagnosis:Stroke-like symptoms   Patient Active Problem List    Diagnosis Date Noted Date Diagnosed    Generalized weakness 2025     Stroke-like symptoms 2025     Fall 2025     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, with fat layer exposed ([facility]) 2024     Acute on chronic diastolic heart failure ([facility]) 2023     Elevated troponin 2023     Pulmonary hypertension ([facility]) 2023     Paroxysmal A-fib ([facility]) 2023     Type 2 diabetes mellitus with diabetic neuropathy, unspecified whether long term insulin use ([facility]) 2023     Pre-ulcerative calluses 2023     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 ([facility]) 2022     Elevated parathyroid hormone 2022     Diabetic nephropathy associated with type 2 diabetes mellitus ([facility]) 2022     Type 2 diabetes mellitus with hyperglycemia, with long-term current use of insulin ([facility]) 2022     Mixed hyperlipidemia 2022     Diabetic polyneuropathy associated with type 2 diabetes mellitus ([facility]) 2022     Primary hypertension 2022     Morbid (severe) obesity due to excess calories ([facility]) 2022     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 ([facility]) 2022       LOS (days): 0  Geometric Mean LOS (GMLOS) (days):   Days to GMLOS:     OBJECTIVE:            Current admission status: Observation   Preferred Pharmacy:   [facility] [address]  [address] [address]  Phone: [phone] Fax: [phone]    Primary Care Provider: Mina Zavala MD    Primary Insurance: [facility]  Secondary Insurance:     DISCHARGE DETAILS:                                 Requested Home Health Care         Is the patient interested in HHC at discharge?: Yes  Home Health Discipline requested:: Nursing, Occupational Therapy, Physical Therapy  Home Health Agency Name:: [facility]  HHA External Referral Reason (only applicable if external HHA name selected): Patient has established relationship with provider  Home Health Follow-Up Provider:: PCP  Home Health Services Needed:: Gait/ADL Training, Evaluate Functional Status and Safety  Homebound Criteria Met:: Uses an Assist Device (i.e. cane, walker, etc)  Supporting Clincal Findings:: Limited Endurance          List of accepting HHC agencies given to patient, his preference is [facility]. [facility] reserved on Aidin and their contact information added to patient's AVS.His daughter will transport him home.  Attempted to reach out to LESLIE Avina for independent living at the [facility] [place] at [phone] and [phone], was told she left at 3 pm and [facility] director for Independent living was in a meeting.Left MOM for patient's daughter, Ila at [phone].

## 2025-01-28 NOTE — PROGRESS NOTES
Progress Note - Neurology   Name: Hammad Montesinos 84 y.o. male I MRN: [id number]  Unit/Bed#: -01 I Date of Admission: 1/27/2025   Date of Service: 1/28/2025 I Hospital Day: 0     Assessment & Plan  Stroke-like symptoms  Hammad Montesinos is a 84 y.o. male with paroxysmal A-fib on Xarelto, SSS s/p PPM placement, CHF, HTN, HLD, DM type II who presented to [facility] ED on 1/27/2025 with a few days of generalized weakness at home, intermittent dysarthric speech, L facial droop, confusion/AMS.      Workup:  - CTA head/neck:   Unremarkable for acute intracranial abnormalities or large vessel occlusion  Moderate to severe atherosclerosis in right vertebral artery origin  Minimal focal irregularity in proximal right V3 segment without significant stenosis  - Hyperglycemic on presentation, 332  - Alk phos elevated on presentation, 110  - Lipid panel: Total cholesterol 111, triglycerides 84, HDL 39, LDL 55  - Labs WNL: Troponin, magnesium, TSH, vitamin B12, folate, UA, COVID/flu/RSV    Patient much improved, daughter at bedside reports patient is close to baseline.    Etiology remains unclear.  Differential includes cerebrovascular event vs systemic symptoms related to underlying infectious/metabolic derangement vs reaction in the setting of his wife's hospitalization. Daughter reports that she checked the patient's medication planner, admitting to compliance with medications.    Plan:  - MRI brain wo contrast pending (has PPM placed just few years ago; if not MRI conditional, then consider repeat CT head in 24 hours)  - Echocardiogram pending  - Pending: Hemoglobin A1c  - Continue home Xarelto  - No need for aspirin/plavix at this time   - Continue atorvastatin 20 daily  - Continue home Gabapentin   - SBP goal 120-140  - Goal euglycemia, normothermia   - Frequent neuro checks  - Telemetry monitoring  - Stroke education  - PT/OT/speech  - Monitoring for other metabolic/infectious derangements  - Conservative  management of delirium   Minimize noise, maintain day/night cycle, provide frequent redirection and reorientation, avoid restraints if possible, etc.  Avoid deliriogenic medications   - Medical management and supportive care per primary team, notify with changes  Type 2 diabetes mellitus with hyperglycemia, with long-term current use of insulin ([facility])  Lab Results   Component Value Date    HGBA1C 6.8 11/16/2023       Recent Labs     01/27/25  1444 01/27/25  1755 01/27/25  2207 01/28/25  0715   POCGLU 246* 203* 278* 182*       Blood Sugar Average: Last 72 hrs:  (P) 227.25    Recommendations for outpatient neurological follow up have yet to be determined.    Subjective   Today patient states he feels okay. Patient denies neck stiffness and headaches. He does admits to generalized weakness. He also reports shooting pain across his back.  Daughter at bedside reports that they were recently concerned about a postherpetic neuralgia, denying any rashes.  He states that he is sleeping well both in the hospital and at his independent facility. Daughter at bedside also reports patient is sharp as a tack at baseline and manages his own medications using a med planner. Daughter states that she checked his planner and it looks like patient has been compliant with his medications.     Objective :  Temp:  [97.5 °F (36.4 °C)-98.6 °F (37 °C)] 98 °F (36.7 °C)  HR:  [65-80] 69  BP: (121-151)/(71-82) 124/72  Resp:  [16-20] 18  SpO2:  [96 %-100 %] 96 %  O2 Device: None (Room air)  Nasal Cannula O2 Flow Rate (L/min):  [2 L/min] 2 L/min    Physical Exam  Vitals and nursing note reviewed.   Constitutional:       General: He is not in acute distress.     Appearance: He is obese. He is not ill-appearing, toxic-appearing or diaphoretic.   HENT:      Head: Normocephalic and atraumatic.   Eyes:      General: No scleral icterus.        Right eye: No discharge.         Left eye: No discharge.      Conjunctiva/sclera: Conjunctivae normal.   Neck:       Comments: No nuchal rigidity   Musculoskeletal:         General: Swelling (Swelling in bilateral ankles) present. Normal range of motion.      Cervical back: Normal range of motion and neck supple.   Skin:     General: Skin is warm and dry.      Coloration: Skin is not jaundiced or pale.      Findings: Bruising (Right forearm) present.   Neurological:      Mental Status: He is alert.      Motor: Motor strength is normal.  Psychiatric:         Mood and Affect: Mood normal.         Behavior: Behavior normal.         Thought Content: Thought content normal.         Judgment: Judgment normal.       Neurological Exam  Mental Status  Alert.  Patient is alert, sitting up in bed, accompanied by daughter   Oriented to person, place, month, and year   Able to name all objects provided   Follows central and appendicular commands   Answers all questions appropriately   No dysarthria or aphasia noted .    Cranial Nerves  Primary gaze midline, conjugate gaze noted   No gaze preference or forced gaze deviation   No visual field deficits noted   EOMs intacts, no evidence of nystagmus   Facial sensation to light touch intact   Left facial nasolabial flattening noted at rest, resolves with smiling   Hearing intact   Tongue midline, no deviation or lacerations .    Motor  Normal muscle bulk throughout. Normal muscle tone. Strength is 5/5 throughout all four extremities.  Able to elevate BUE off the bed and maintains antigravity .    Sensory  Light touch is normal in upper and lower extremities.     Reflexes  No involuntary movements or rhythmic seizure like activity noted throughout exam .    Coordination  Right: Finger-to-nose normal.Left: Finger-to-nose normal.      Lab Results: I have personally reviewed pertinent reports.  Recent Results (from the past 24 hours)   CBC and differential    Collection Time: 01/27/25 11:40 AM   Result Value Ref Range    WBC 8.71 4.31 - 10.16 Thousand/uL    RBC 4.77 3.88 - 5.62 Million/uL     "Hemoglobin 12.8 12.0 - 17.0 g/dL    Hematocrit 41.2 36.5 - 49.3 %    MCV 86 82 - 98 fL    MCH 26.8 26.8 - 34.3 pg    MCHC 31.1 (L) 31.4 - 37.4 g/dL    RDW 17.2 (H) 11.6 - 15.1 %    MPV 12.2 8.9 - 12.7 fL    Platelets 194 149 - 390 Thousands/uL    nRBC 0 /100 WBCs    Segmented % 71 43 - 75 %    Immature Grans % 1 0 - 2 %    Lymphocytes % 17 14 - 44 %    Monocytes % 8 4 - 12 %    Eosinophils Relative 2 0 - 6 %    Basophils Relative 1 0 - 1 %    Absolute Neutrophils 6.25 1.85 - 7.62 Thousands/µL    Absolute Immature Grans 0.06 0.00 - 0.20 Thousand/uL    Absolute Lymphocytes 1.49 0.60 - 4.47 Thousands/µL    Absolute Monocytes 0.65 0.17 - 1.22 Thousand/µL    Eosinophils Absolute 0.21 0.00 - 0.61 Thousand/µL    Basophils Absolute 0.05 0.00 - 0.10 Thousands/µL   Protime-INR    Collection Time: 01/27/25 11:40 AM   Result Value Ref Range    Protime 16.4 (H) 12.3 - 15.0 seconds    INR 1.27 (H) 0.85 - 1.19   APTT    Collection Time: 01/27/25 11:40 AM   Result Value Ref Range    PTT 32 23 - 34 seconds   Comprehensive metabolic panel    Collection Time: 01/27/25 11:40 AM   Result Value Ref Range    Sodium 134 (L) 135 - 147 mmol/L    Potassium 4.8 3.5 - 5.3 mmol/L    Chloride 99 96 - 108 mmol/L    CO2 28 21 - 32 mmol/L    ANION GAP 7 4 - 13 mmol/L    BUN 22 5 - 25 mg/dL    Creatinine 0.87 0.60 - 1.30 mg/dL    Glucose 332 (H) 65 - 140 mg/dL    Calcium 9.7 8.4 - 10.2 mg/dL    AST 16 13 - 39 U/L    ALT 12 7 - 52 U/L    Alkaline Phosphatase 110 (H) 34 - 104 U/L    Total Protein 6.7 6.4 - 8.4 g/dL    Albumin 3.8 3.5 - 5.0 g/dL    Total Bilirubin 1.57 (H) 0.20 - 1.00 mg/dL    eGFR 78 ml/min/1.73sq m   HS Troponin 0hr (reflex protocol)    Collection Time: 01/27/25 11:40 AM   Result Value Ref Range    hs TnI 0hr 49 \"Refer to ACS Flowchart\"- see link ng/L   Magnesium    Collection Time: 01/27/25 11:40 AM   Result Value Ref Range    Magnesium 2.1 1.9 - 2.7 mg/dL   TSH, 3rd generation with Free T4 reflex    Collection Time: 01/27/25 11:40 " "AM   Result Value Ref Range    TSH 3RD GENERATON 2.104 0.450 - 4.500 uIU/mL   B-Type Natriuretic Peptide(BNP)    Collection Time: 01/27/25 11:40 AM   Result Value Ref Range     (H) 0 - 100 pg/mL   Vitamin B12    Collection Time: 01/27/25 11:40 AM   Result Value Ref Range    Vitamin B-12 838 180 - 914 pg/mL   Folate    Collection Time: 01/27/25 11:40 AM   Result Value Ref Range    Folate >22.3 >5.9 ng/mL   TSH, 3rd generation    Collection Time: 01/27/25 11:40 AM   Result Value Ref Range    TSH 3RD GENERATON 2.082 0.450 - 4.500 uIU/mL   ECG 12 lead    Collection Time: 01/27/25 11:56 AM   Result Value Ref Range    Ventricular Rate 73 BPM    Atrial Rate 35 BPM    AL Interval  ms    QRSD Interval 204 ms    QT Interval 480 ms    QTC Interval 529 ms    P Axis  degrees    QRS Axis -78 degrees    T Wave [place] 94 degrees   HS Troponin I 2hr    Collection Time: 01/27/25  1:55 PM   Result Value Ref Range    hs TnI 2hr 43 \"Refer to ACS Flowchart\"- see link ng/L    Delta 2hr hsTnI -6 <20 ng/L   UA w Reflex to Microscopic w Reflex to Culture    Collection Time: 01/27/25  2:05 PM    Specimen: Urine, Clean Catch   Result Value Ref Range    Color, UA Yellow     Clarity, UA Clear     Specific Gravity, UA <1.005 (L) 1.005 - 1.030    pH, UA 7.0 4.5, 5.0, 5.5, 6.0, 6.5, 7.0, 7.5, 8.0    Leukocytes, UA Negative Negative    Nitrite, UA Negative Negative    Protein, UA 30 (1+) (A) Negative mg/dl    Glucose,  (3/10%) (A) Negative mg/dl    Ketones, UA Negative Negative mg/dl    Urobilinogen, UA <2.0 <2.0 mg/dl mg/dl    Bilirubin, UA Negative Negative    Occult Blood, UA Negative Negative   Urine Microscopic    Collection Time: 01/27/25  2:05 PM   Result Value Ref Range    RBC, UA 0-1 None Seen, 0-1, 1-2, 2-4, 0-5 /hpf    WBC, UA None Seen None Seen, 0-1, 1-2, 0-5, 2-4 /hpf    Epithelial Cells None Seen None Seen, Occasional /hpf    Bacteria, UA None Seen None Seen, Occasional /hpf   Fingerstick Glucose (POCT)    Collection Time: " "01/27/25  2:44 PM   Result Value Ref Range    POC Glucose 246 (H) 65 - 140 mg/dl   HS Troponin I 4hr    Collection Time: 01/27/25  4:20 PM   Result Value Ref Range    hs TnI 4hr 37 \"Refer to ACS Flowchart\"- see link ng/L    Delta 4hr hsTnI -12 <20 ng/L   Fingerstick Glucose (POCT)    Collection Time: 01/27/25  5:55 PM   Result Value Ref Range    POC Glucose 203 (H) 65 - 140 mg/dl   COVID/FLU/RSV    Collection Time: 01/27/25  9:36 PM    Specimen: Nose; Nares   Result Value Ref Range    SARS-CoV-2 Negative Negative    INFLUENZA A PCR Negative Negative    INFLUENZA B PCR Negative Negative    RSV PCR Negative Negative   Fingerstick Glucose (POCT)    Collection Time: 01/27/25 10:07 PM   Result Value Ref Range    POC Glucose 278 (H) 65 - 140 mg/dl   Basic metabolic panel    Collection Time: 01/28/25  4:15 AM   Result Value Ref Range    Sodium 134 (L) 135 - 147 mmol/L    Potassium 4.4 3.5 - 5.3 mmol/L    Chloride 104 96 - 108 mmol/L    CO2 24 21 - 32 mmol/L    ANION GAP 6 4 - 13 mmol/L    BUN 20 5 - 25 mg/dL    Creatinine 0.75 0.60 - 1.30 mg/dL    Glucose 225 (H) 65 - 140 mg/dL    Glucose, Fasting 225 (H) 65 - 99 mg/dL    Calcium 9.5 8.4 - 10.2 mg/dL    eGFR 83 ml/min/1.73sq m   CBC and differential    Collection Time: 01/28/25  4:15 AM   Result Value Ref Range    WBC 7.92 4.31 - 10.16 Thousand/uL    RBC 4.52 3.88 - 5.62 Million/uL    Hemoglobin 12.3 12.0 - 17.0 g/dL    Hematocrit 38.7 36.5 - 49.3 %    MCV 86 82 - 98 fL    MCH 27.2 26.8 - 34.3 pg    MCHC 31.8 31.4 - 37.4 g/dL    RDW 17.5 (H) 11.6 - 15.1 %    MPV 12.2 8.9 - 12.7 fL    Platelets 207 149 - 390 Thousands/uL    nRBC 0 /100 WBCs    Segmented % 63 43 - 75 %    Immature Grans % 1 0 - 2 %    Lymphocytes % 22 14 - 44 %    Monocytes % 10 4 - 12 %    Eosinophils Relative 3 0 - 6 %    Basophils Relative 1 0 - 1 %    Absolute Neutrophils 5.13 1.85 - 7.62 Thousands/µL    Absolute Immature Grans 0.04 0.00 - 0.20 Thousand/uL    Absolute Lymphocytes 1.71 0.60 - 4.47 " Thousands/µL    Absolute Monocytes 0.75 0.17 - 1.22 Thousand/µL    Eosinophils Absolute 0.24 0.00 - 0.61 Thousand/µL    Basophils Absolute 0.05 0.00 - 0.10 Thousands/µL   Lipid Panel with Direct LDL reflex    Collection Time: 01/28/25  4:15 AM   Result Value Ref Range    Cholesterol 111 See Comment mg/dL    Triglycerides 84 See Comment mg/dL    HDL, Direct 39 (L) >=40 mg/dL    LDL Calculated 55 0 - 100 mg/dL   Fingerstick Glucose (POCT)    Collection Time: 01/28/25  7:15 AM   Result Value Ref Range    POC Glucose 182 (H) 65 - 140 mg/dl     Imaging Studies: I have personally reviewed pertinent reports and I have personally reviewed pertinent films in PACS.    EKG, Pathology, and Other Studies: I have personally reviewed pertinent reports.    VTE Pharmacologic Prophylaxis: Xarelto    Dictation voice to text software has been used in the creation of this document.  Please consider this in light of any contextual or grammatical errors.

## 2025-01-28 NOTE — PLAN OF CARE
Problem: Potential for Falls  Goal: Patient will remain free of falls  Description: INTERVENTIONS:  - Educate patient/family on patient safety including physical limitations  - Instruct patient to call for assistance with activity   - Consult OT/PT to assist with strengthening/mobility   - Keep Call bell within reach  - Keep bed low and locked with side rails adjusted as appropriate  - Keep care items and personal belongings within reach  - Initiate and maintain comfort rounds  - Make Fall Risk Sign visible to staff  - Offer Toileting every 2 Hours, in advance of need  - Initiate/Maintain bed alarm  - Obtain necessary fall risk management equipment: rails  - Apply yellow socks and bracelet for high fall risk patients  - Consider moving patient to room near nurses station  Outcome: Progressing

## 2025-01-28 NOTE — CASE MANAGEMENT
Case Management Assessment & Discharge Planning Note    Patient name Hammad Montesinos  Location /-01 MRN [id number]  : 1940 Date 2025       Current Admission Date: 2025  Current Admission Diagnosis:Stroke-like symptoms   Patient Active Problem List    Diagnosis Date Noted Date Diagnosed    Stroke-like symptoms 2025     Fall 2025     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, with fat layer exposed ([facility]) 2024     Acute on chronic diastolic heart failure ([facility]) 2023     Elevated troponin 2023     Pulmonary hypertension ([facility]) 2023     Paroxysmal A-fib ([facility]) 2023     Type 2 diabetes mellitus with diabetic neuropathy, unspecified whether long term insulin use ([facility]) 2023     Pre-ulcerative calluses 2023     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 ([facility]) 2022     Elevated parathyroid hormone 2022     Diabetic nephropathy associated with type 2 diabetes mellitus ([facility]) 2022     Type 2 diabetes mellitus with hyperglycemia, with long-term current use of insulin ([facility]) 2022     Mixed hyperlipidemia 2022     Diabetic polyneuropathy associated with type 2 diabetes mellitus ([facility]) 2022     Primary hypertension 2022     Morbid (severe) obesity due to excess calories ([facility]) 2022     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 ([facility]) 2022       LOS (days): 0  Geometric Mean LOS (GMLOS) (days):   Days to GMLOS:     OBJECTIVE:              Current admission status: Observation       [place] Pharmacy[place]   [facility] [place] [address]  [address] [facility] [address]  Phone: [phone] Fax: [phone]    Primary Care Provider: Mina Zavala MD    Primary Insurance: [facility]  Secondary Insurance:     ASSESSMENT:  Active Health Care  Proxies       Maci Montesinos Health Care Agent - Spouse   Primary Phone: [phone] (Mobile)                 Advance Directives  Does patient have a Health Care POA?: Yes  Does patient have Advance Directives?: Yes  Advance Directives: Living will, Power of  for health care         Readmission Root Cause  30 Day Readmission: No    Patient Information  Admitted from:: Facility  Mental Status: Alert  During Assessment patient was accompanied by: Not accompanied during assessment  Assessment information provided by:: Patient  Primary Caregiver: Self  Support Systems: Self, Family members  What city do you live in?: [place]  Home entry access options. Select all that apply.: No steps to enter home  Type of Current Residence: Facility  Upon entering residence, is there a bedroom on the main floor (no further steps)?: Yes  Upon entering residence, is there a bathroom on the main floor (no further steps)?: Yes  Living Arrangements: Lives w/ Spouse/significant other  Is patient a ?: No    Activities of Daily Living Prior to Admission  Functional Status: Independent  Ambulates independently?: Yes  Does patient use assisted devices?: Yes  Assisted Devices (DME) used: Rollator  Does patient currently own DME?: Yes  What DME does the patient currently own?: Rollator  Does patient have a history of Outpatient Therapy (PT/OT)?: Yes  Does the patient have a history of Short-Term Rehab?: No  Does patient have a [facility] of [facility]?: [facility]  Does patient currently have HHC?: No         Patient Information Continued  Income Source: Pension/assisted  Does patient have prescription coverage?: Yes  Does patient receive dialysis treatments?: No  Does patient have a history of substance abuse?: No  Does patient have a history of Mental Health Diagnosis?: No         Means of Transportation  Means of Transport to [facility] [place]:: Family transport          DISCHARGE DETAILS:    Discharge planning  discussed with:: patient        CM contacted family/caregiver?: Yes (daughter, yusuf)  Were Treatment Team discharge recommendations reviewed with patient/caregiver?: Yes  Did patient/caregiver verbalize understanding of patient care needs?: Yes  Were patient/caregiver advised of the risks associated with not following Treatment Team discharge recommendations?: Yes          Met with patient to review the role of CM and discuss needs patient may have prior to discharge. Patient reports rsiding with his wif in a 1st floor apt at [facility] [place]. He reports being independent of ADL's and uses a rollater. He has received HHC and OP PT. He denies SNF /D&A/BHU admission. He is waiting for PT/OT to see and is agreeable to HHC or SNF ehab at [place] if PT/OT deems he needs it. His family transports.

## 2025-01-28 NOTE — ASSESSMENT & PLAN NOTE
- Presenting with weakness and fall  Admitted under stroke pathway  MRI brain- normal  Echo  Lipid Panel  Hemoglobin A1c elevated to 9.1  Atorvastatin 40 mg  Continue telemetry  PT/OT/ST  Frequent neuro checks. Continue to monitor and notify neurology with any changes.   - Medical management and supportive care per primary team. Correction of any metabolic or infectious disturbances.

## 2025-01-28 NOTE — DISCHARGE INSTR - AVS FIRST PAGE
Please continue metoprolol and torsemide    Please stop taking losartan    Check your blood pressure daily, if it is lower than  110/80, please hold your BP medications. Please follow up with your primary care doctor for further adjustment of medication

## 2025-01-28 NOTE — ASSESSMENT & PLAN NOTE
Lab Results   Component Value Date    HGBA1C 6.8 11/16/2023       Recent Labs     01/27/25  1444 01/27/25  1755 01/27/25  2207 01/28/25  0715   POCGLU 246* 203* 278* 182*       Blood Sugar Average: Last 72 hrs:  (P) 227.25

## 2025-01-28 NOTE — NURSING NOTE
Inpatient arrived for MRI study of brain. Cardiology order for pacemaker programming change to MRI sure scan signed by ALEX Preciado PA-C cardiology.      Baseline Biotron[employer] Pacer Mode DDD CLS low rate 60 upper track 130. Settings changed by Michael Esparza representative, remotely to mode VOO rate 90 for scan. Patient continuously monitored during MRI by RN. See vitals flow sheet.  Patient tolerated scan well. Pacer set in automatic program mode and will revert back to baseline settings of DDD CLS 60/130 automatically when patient exits magnet room per Michael Esparza rep. Interrogation and order to be scanned into patient chart.

## 2025-01-28 NOTE — ASSESSMENT & PLAN NOTE
Lab Results   Component Value Date    HGBA1C 9.1 (H) 01/28/2025       Recent Labs     01/27/25  1755 01/27/25  2207 01/28/25  0715 01/28/25  1030   POCGLU 203* 278* 182* 215*     Repeat hemoglobin A1c-->9.1  Continue Lantus twice daily  SSI  Daily Accu-Cheks  hypoglycemia protocol

## 2025-01-29 ENCOUNTER — RESULTS FOLLOW-UP (OUTPATIENT)
Dept: CARDIOLOGY CLINIC | Facility: CLINIC | Age: 85
End: 2025-01-29

## 2025-01-29 ENCOUNTER — TELEPHONE (OUTPATIENT)
Age: 85
End: 2025-01-29

## 2025-01-29 LAB
AORTIC ROOT: 4 CM
AORTIC VALVE MEAN VELOCITY: 9 M/S
ASCENDING AORTA: 3.1 CM
AV AREA BY CONTINUOUS VTI: 2.5 CM2
AV AREA PEAK VELOCITY: 2.9 CM2
AV LVOT MEAN GRADIENT: 1 MMHG
AV LVOT PEAK GRADIENT: 2 MMHG
AV MEAN PRESS GRAD SYS DOP V1V2: 4 MMHG
AV ORIFICE AREA US: 2.53 CM2
AV PEAK GRADIENT: 8 MMHG
AV VELOCITY RATIO: 0.48
BSA FOR ECHO PROCEDURE: 2.18 M2
DOP CALC AO VTI: 32.9 CM
DOP CALC LVOT AREA: 5.31
DOP CALC LVOT DIAMETER: 2.6 CM
DOP CALC LVOT PEAK VEL VTI: 15.7 CM
DOP CALC LVOT PEAK VEL: 0.77 M/S
DOP CALC LVOT STROKE INDEX: 37.9 ML/M2
DOP CALC LVOT STROKE VOLUME: 83.31
E WAVE DECELERATION TIME: 352 MS
FRACTIONAL SHORTENING: 21 (ref 28–44)
INTERVENTRICULAR SEPTUM IN DIASTOLE (PARASTERNAL SHORT AXIS VIEW): 1.4 CM
INTERVENTRICULAR SEPTUM: 1.4 CM (ref 0.6–1.1)
LA/AORTA RATIO 2D: 1.27
LAAS-AP2: 34.7 CM2
LAAS-AP4: 38.6 CM2
LEFT ATRIUM SIZE: 5.1 CM
LEFT ATRIUM VOLUME (MOD BIPLANE): 153 ML
LEFT ATRIUM VOLUME INDEX (MOD BIPLANE): 69.9 ML/M2
LEFT INTERNAL DIMENSION IN SYSTOLE: 4.4 CM (ref 2.1–4)
LEFT VENTRICLE DIASTOLIC VOLUME (MOD BIPLANE): 160 ML
LEFT VENTRICLE DIASTOLIC VOLUME INDEX (MOD BIPLANE): 73.4 ML/M2
LEFT VENTRICLE SYSTOLIC VOLUME (MOD BIPLANE): 107 ML
LEFT VENTRICLE SYSTOLIC VOLUME INDEX (MOD BIPLANE): 49.1 ML/M2
LEFT VENTRICULAR INTERNAL DIMENSION IN DIASTOLE: 5.6 CM (ref 3.5–6)
LEFT VENTRICULAR POSTERIOR WALL IN END DIASTOLE: 1.9 CM
LEFT VENTRICULAR STROKE VOLUME: 64 ML
LV EF BIPLANE MOD: 33 %
LV EF US.2D.A4C+ESTIMATED: 37 %
LVSV (TEICH): 64 ML
MITRAL REGURGITATION PEAK VELOCITY: 4.97 M/S
MITRAL VALVE MEAN INFLOW VELOCITY: 3.67 M/S
MITRAL VALVE REGURGITANT PEAK GRADIENT: 99 MMHG
MRSA NOSE QL CULT: NORMAL
MV E'TISSUE VEL-LAT: 9 CM/S
MV E'TISSUE VEL-SEP: 8 CM/S
MV PEAK E VEL: 101 CM/S
MV STENOSIS PRESSURE HALF TIME: 103 MS
MV VALVE AREA P 1/2 METHOD: 2.14
RIGHT VENTRICLE ID DIMENSION: 3.3 CM
SL CV DOP CALC MV VTI RETROGRADE: 168 CM
SL CV LV EF: 35
SL CV MV MEAN GRADIENT RETROGRADE: 61 MMHG
SL CV PED ECHO LEFT VENTRICLE DIASTOLIC VOLUME (MOD BIPLANE) 2D: 154 ML
SL CV PED ECHO LEFT VENTRICLE SYSTOLIC VOLUME (MOD BIPLANE) 2D: 90 ML
TR MAX PG: 24 MMHG
TR PEAK VELOCITY: 2.5 M/S
TRICUSPID ANNULAR PLANE SYSTOLIC EXCURSION: 1.5 CM
TRICUSPID VALVE PEAK REGURGITATION VELOCITY: 2.45 M/S

## 2025-01-29 NOTE — CASE MANAGEMENT
Case Management Progress Note    Patient name Hammad Montesinos  Location /-01 MRN [id number]  : 1940 Date 2025       LOS (days): 0  Geometric Mean LOS (GMLOS) (days):   Days to GMLOS:        OBJECTIVE:        Current admission status: Observation  [facility] [address] [address]  Phone: [phone] Fax: [phone]    Primary Care Provider: Mina Zavala MD    Primary Insurance: [facility]  Secondary Insurance:     PROGRESS NOTE:    Notification made to OP CM Handoff: [facility] OP CM regarding discharge planning and disposition.     Message left for office staff.

## 2025-01-29 NOTE — TELEPHONE ENCOUNTER
Per Dr. Holland, appt with AP ASAP to discuss. Spoke to pt's daughter, appt made for tomorrow am.

## 2025-01-29 NOTE — TELEPHONE ENCOUNTER
Caller: Maci Montesinos/wife    Doctor and/or Office: Dr. Mac/Bubba    #: [phone]    [place]: Appointment/Maci calling as she CX Hammad's appt for 1/30/25 by mistake. She is asking he be seen 1/30 or 1/31 as his daughter will be here from [place] to drive him to this appt-wife cannot drive him. Appt is for nail/callous care, but she feels it is for medication issues as  was just released from hospital. Please call back and advise as I have nothing to offer until March, and she was not happy with that answer. Thanks

## 2025-01-29 NOTE — TELEPHONE ENCOUNTER
Received call from patient's daughter regarding his medications. He was prescribed 1 tablet of metoprolol 100mg to take prior to a CT scan he had done earlier this month. However, when he was in the hospital, the medication was added as a regular med for him, and he was taken off of Losartan. His daughter wants to confirm that this was not a mistake, as he was only ever given on tablet of the metoprolol, so he will need a new script placed if he needs to start taking this regularly.

## 2025-01-29 NOTE — TELEPHONE ENCOUNTER
Patient wife  is calling because she accidental cancelled her   appointment that was scheduled 01/30/2025 at 2:15 p.m. Patient is requesting a call back to reschedule. Please follow up and advise. Thank you in advance. If unable to reach wife please contact patient.

## 2025-01-30 ENCOUNTER — OFFICE VISIT (OUTPATIENT)
Dept: CARDIOLOGY CLINIC | Facility: CLINIC | Age: 85
End: 2025-01-30
Payer: COMMERCIAL

## 2025-01-30 VITALS
SYSTOLIC BLOOD PRESSURE: 140 MMHG | WEIGHT: 225 LBS | BODY MASS INDEX: 32.21 KG/M2 | DIASTOLIC BLOOD PRESSURE: 60 MMHG | HEART RATE: 64 BPM | HEIGHT: 70 IN

## 2025-01-30 DIAGNOSIS — I48.0 PAROXYSMAL A-FIB (HCC): ICD-10-CM

## 2025-01-30 DIAGNOSIS — I50.9 ACUTE EXACERBATION OF CHF (CONGESTIVE HEART FAILURE) (HCC): ICD-10-CM

## 2025-01-30 PROCEDURE — 99214 OFFICE O/P EST MOD 30 MIN: CPT | Performed by: PHYSICIAN ASSISTANT

## 2025-01-30 RX ORDER — SPIRONOLACTONE 25 MG/1
25 TABLET ORAL DAILY
Qty: 30 TABLET | Refills: 11 | Status: SHIPPED | OUTPATIENT
Start: 2025-01-30 | End: 2025-02-07

## 2025-01-30 RX ORDER — TORSEMIDE 20 MG/1
TABLET ORAL
Qty: 120 TABLET | Refills: 11 | Status: SHIPPED | OUTPATIENT
Start: 2025-01-30

## 2025-01-30 RX ORDER — METOPROLOL TARTRATE 100 MG/1
100 TABLET ORAL ONCE
Qty: 1 TABLET | Refills: 0 | Status: SHIPPED | OUTPATIENT
Start: 2025-01-30 | End: 2025-01-30 | Stop reason: SDUPTHER

## 2025-01-30 RX ORDER — VALACYCLOVIR HYDROCHLORIDE 1 G/1
1000 TABLET, FILM COATED ORAL 3 TIMES DAILY
COMMUNITY
Start: 2025-01-23

## 2025-01-30 RX ORDER — METOPROLOL SUCCINATE 25 MG/1
25 TABLET, EXTENDED RELEASE ORAL DAILY
Qty: 30 TABLET | Refills: 11 | Status: SHIPPED | OUTPATIENT
Start: 2025-01-30

## 2025-01-30 NOTE — PROGRESS NOTES
Cardiology Office Follow Up  Hammad Montesinos  1940  [id number]      ASSESSMENT:  Mild acute on chronic HFrEF  New cardiomyopathy, EF 35%  SSS s/p Biotronik PPM in situ  Paroxysmal atrial fibrillation -- 100% AF burden on EP device report  Hypertension  Hyperlipidemia    PLAN:  Mildly overloaded on exam with +1 LE edema, noted increased work of breathing. Wts up form last ->225lbs.   Increase torsemide to 40mg BID   Add spirolactone 25mg QD and Toprol XL 25mg QD  Ok to hold Losartan for now, possibly resume at next visit provided renal function stable and BP allows.   Follow up on coronary CTA results to exclude ischemic component of CMP  Repeat BMP next week with provider follow up to assess volume status   Daily weights advised, salt/ fluid restrictions  Continue Xarelto 20 mg daily, Lipitor 20 mg daily    Interval History/ HPI:   Hammad Montesinos is an 84-year-old male with history of chronic HFpEF, hypertension, hyperlipidemia, paroxysmal atrial fibrillation, SSS s/p Biotronik PPM in situ presents for follow-up visit.  Last OV 1/16/2025 for ED follow-up visit c/o atypical L-sided shooting like chest pain ordered for coronary CTA which is still currently pending. Advised to follow up with PCP in regards to possible shingles flare up.   EP device interrogation today showing 100% AF burden. Patient advised to make appointment today to discuss.   Recently admitted to [facility] 1/27-1/28 for LE weakness and fall. Stroke workup overall negative. Deemed secondary to medications (Possibly SE of Gabapentin increase). Rx adjusted.   Feels overall better today. Feels more stable. No further falls at home. Has been c/w taking Xarelto.     Past Medical History:   Diagnosis Date    Asthma     Bradycardia     Carcinoma, basal cell, skin     head and face and ear    CHF (congestive heart failure) (HCC)     Chronic kidney disease     Coronary artery disease     COVID-19 12/05/2022    Diabetes mellitus (HCC)     Dupuytren  contracture     bilateral ring and right middle    Gout     Herniated lumbar intervertebral disc     Hypertension     Sleep apnea      Social History     Socioeconomic History    Marital status: /Civil Union     Spouse name: Not on file    Number of children: Not on file    Years of education: Not on file    Highest education level: Not on file   Occupational History    Occupation: teacher     Comment: retired   Tobacco Use    Smoking status: Never    Smokeless tobacco: Never   Vaping Use    Vaping status: Never Used   Substance and Sexual Activity    Alcohol use: Not Currently    Drug use: Never    Sexual activity: Not Currently     Birth control/protection: Male Sterilization   Other Topics Concern    Not on file   Social History Narrative    Not on file     Social Drivers of Health     Financial Resource Strain: Not on file   Food Insecurity: No Food Insecurity (1/28/2025)    Nursing - Inadequate Food Risk Classification     Worried About Running Out of Food in the Last Year: Not on file     Ran Out of Food in the Last Year: Not on file     Ran Out of Food in the Last Year: Never true   Transportation Needs: No Transportation Needs (1/28/2025)    Nursing - Transportation Risk Classification     Lack of Transportation: Not on file     Lack of Transportation: No   Physical Activity: Not on file   Stress: Not on file   Social Connections: Not on file   Intimate Partner Violence: Unknown (1/28/2025)    Nursing IPS     Feels Physically and Emotionally Safe: Not on file     Physically Hurt by Someone: Not on file     Humiliated or Emotionally Abused by Someone: Not on file     Physically Hurt by Someone: No     Hurt or Threatened by Someone: No   Housing Stability: Unknown (1/28/2025)    Nursing: Inadequate Housing Risk Classification     Has Housing: Not on file     Worried About Losing Housing: Not on file     Unable to Get Utilities: Not on file     Unable to Pay for Housing in the Last Year: No     Has  Housin      Family History   Problem Relation Age of Onset    Hypertension Mother     Diabetes type II Mother     Hypertension Father     Diabetes type II Brother     Hypertension Brother     Kidney disease Sister     Hypertension Daughter     GI problems Daughter      Past Surgical History:   Procedure Laterality Date    APPENDECTOMY      age 20, ruptured    CATARACT EXTRACTION, BILATERAL Bilateral     CERVICAL FUSION      CYST REMOVAL Left 2023    DUPUYTREN CONTRACTURE RELEASE Left     left ring finger    INSERT / REPLACE / REMOVE PACEMAKER      LUMBAR LAMINECTOMY      belkis placed    TONSILLECTOMY      VASECTOMY         Current Outpatient Medications:     valACYclovir (VALTREX) 1,000 mg tablet, Take 1,000 mg by mouth 3 (three) times a day, Disp: , Rfl:     Albuterol Sulfate 108 (90 Base) MCG/ACT AEPB, Inhale 2 puffs every 4 (four) hours as needed (SOB, wheezing, cough), Disp: 1 each, Rfl: 3    allopurinol (ZYLOPRIM) 300 mg tablet, Take 300 mg by mouth every 24 hours, Disp: , Rfl:     atorvastatin (LIPITOR) 20 mg tablet, Take 20 mg by mouth daily, Disp: , Rfl:     azelastine (ASTELIN) 0.1 % nasal spray, 2 sprays into each nostril 2 (two) times a day, Disp: , Rfl:     budesonide (PULMICORT) 0.5 mg/2 mL nebulizer solution, Take 0.5 mg by nebulization 2 (two) times a day Rinse mouth after use., Disp: , Rfl:     Cholecalciferol (Vitamin D3) 25 MCG (1000 UT) CAPS, Take 1 capsule by mouth every 24 hours, Disp: , Rfl:     Coenzyme Q10 (Co Q10) 100 MG CAPS, Take 100 mg by mouth in the morning, Disp: , Rfl:     Easy Comfort Pen Needles 33G X 4 MM MISC, USE WITH INSULIN FIVE TIMES DAILY AS DIRECTED, Disp: , Rfl:     fluticasone (FLONASE) 50 mcg/act nasal spray, 1 spray into each nostril 2 (two) times a day, Disp: , Rfl:     formoterol (PERFOROMIST) 20 MCG/2ML nebulizer solution, Take 20 mcg by nebulization 2 (two) times a day, Disp: , Rfl:     gabapentin (NEURONTIN) 100 mg capsule, Take 1 capsule (100 mg total) by  mouth daily, Disp: 180 capsule, Rfl: 0    Glucosamine-Chondroitin 500-400 MG CAPS, Take 1 capsule by mouth daily, Disp: , Rfl:     insulin aspart (NovoLOG FlexPen) 100 UNIT/ML injection pen, INJECT 12 UNITS SUBCUTANEOUSLY with breakfast, LUNCH and with dinner plus sliding scale. (UP  UNITS DAILY). 150-200: 1 unit; 201-250: 2 units; 251-300: 3 units; 301-350: 4 units; 351-400: 5 units; 400+: 6 units, Disp: 45 mL, Rfl: 1    Insulin Glargine Solostar (Lantus SoloStar) 100 UNIT/ML SOPN, INJECT 20 UNITS SUBCUTANEOUSLY IN THE MORNING AND 20 UNITS IN THE EVENING., Disp: 30 mL, Rfl: 0    ipratropium (ATROVENT) 0.03 % nasal spray, INSTILL 2 SPRAYS IN EACH NOSTRIL TWICE DAILY, Disp: , Rfl:     ipratropium-albuterol (DUO-NEB) 0.5-2.5 mg/3 mL nebulizer solution, Take 3 mL by nebulization 2 (two) times a day, Disp: , Rfl:     loratadine (CLARITIN) 10 mg tablet, Take 10 mg by mouth daily, Disp: , Rfl:     magnesium Oxide (MAG-OX) 400 mg TABS, Take 1 tablet (400 mg total) by mouth daily, Disp: 30 tablet, Rfl: 0    metoprolol tartrate (LOPRESSOR) 100 mg tablet, Take 1 tablet (100 mg total) by mouth every 12 (twelve) hours Take 90 minutes prior to coronary CTA., Disp: 1 tablet, Rfl: 0    Multiple Vitamin (MULTIVITAMIN ADULT PO), every 24 hours, Disp: , Rfl:     mupirocin (BACTROBAN) 2 % ointment, Apply topically every other day With alginate dressing, Disp: 22 g, Rfl: 0    Omega-3 Fatty Acids (Fish Oil) 360 MG CAPS, Take by mouth, Disp: , Rfl:     OneTouch Verio test strip, USE AS DIRECTED FOUR TIMES DAILY, Disp: , Rfl:     potassium chloride (K-DUR,KLOR-CON) 20 mEq tablet, Take 1 tablet (20 mEq total) by mouth daily, Disp: 30 tablet, Rfl: 0    rivaroxaban (Xarelto) 20 mg tablet, Take 1 tablet (20 mg total) by mouth daily with breakfast, Disp: 30 tablet, Rfl: 5    senna (SENOKOT) 8.6 mg, Take 2 tablets (17.2 mg total) by mouth daily at bedtime as needed for constipation, Disp: 30 tablet, Rfl: 0    torsemide (DEMADEX) 20 mg  tablet, Take 2 tablets (40 mg total) by mouth every morning AND 1 tablet (20 mg total) daily after lunch., Disp: 90 tablet, Rfl: 2    triamcinolone (KENALOG) 0.1 % cream, 1 Application 2 (two) times a day To affected areas, Disp: , Rfl:     Review of Systems:  Review of Systems   Constitutional:  Negative for appetite change, chills, diaphoresis, fatigue and fever.   Respiratory:  Positive for shortness of breath. Negative for cough and chest tightness.    Cardiovascular:  Positive for leg swelling. Negative for chest pain and palpitations.   Gastrointestinal:  Negative for diarrhea, nausea and vomiting.   Endocrine: Negative for cold intolerance and heat intolerance.   Genitourinary:  Negative for difficulty urinating, dysuria and enuresis.   Musculoskeletal:  Negative for arthralgias, back pain and gait problem.   Allergic/Immunologic: Negative for environmental allergies and food allergies.   Neurological:  Negative for dizziness, facial asymmetry and headaches.   Hematological:  Negative for adenopathy. Does not bruise/bleed easily.   Psychiatric/Behavioral:  Negative for agitation, behavioral problems and confusion.      Physical Exam:  Physical Exam  Constitutional:       Appearance: He is well-developed.   HENT:      Right Ear: External ear normal.      Left Ear: External ear normal.   Eyes:      Pupils: Pupils are equal, round, and reactive to light.   Cardiovascular:      Rate and Rhythm: Normal rate and regular rhythm.      Heart sounds: Normal heart sounds. No murmur heard.     No friction rub. No gallop.   Pulmonary:      Effort: Pulmonary effort is normal.      Breath sounds: Normal breath sounds.   Abdominal:      Palpations: Abdomen is soft.   Musculoskeletal:         General: Normal range of motion.      Cervical back: Normal range of motion.      Right lower leg: Edema present.      Left lower leg: Edema present.   Skin:     General: Skin is warm and dry.   Neurological:      Mental Status: He is  alert and oriented to person, place, and time.      Deep Tendon Reflexes: Reflexes are normal and symmetric.   Psychiatric:         Behavior: Behavior normal.         Thought Content: Thought content normal.         Judgment: Judgment normal.       This note was completed in part utilizing M-Modal Fluency Direct Software.  Grammatical errors, random word insertions, spelling mistakes, and incomplete sentences can be an occasional consequence of this system secondary to software limitations, ambient noise, and hardware issues.  If you have any questions or concerns about the content, text, or information contained within the body of this dictation, please contact the provider for clarification.

## 2025-02-04 ENCOUNTER — OFFICE VISIT (OUTPATIENT)
Dept: PODIATRY | Facility: CLINIC | Age: 85
End: 2025-02-04
Payer: COMMERCIAL

## 2025-02-04 VITALS — HEIGHT: 70 IN | BODY MASS INDEX: 31.07 KG/M2 | WEIGHT: 217 LBS

## 2025-02-04 DIAGNOSIS — E11.621 DIABETIC ULCER OF OTHER PART OF RIGHT FOOT ASSOCIATED WITH TYPE 2 DIABETES MELLITUS, LIMITED TO BREAKDOWN OF SKIN (HCC): Primary | ICD-10-CM

## 2025-02-04 DIAGNOSIS — L97.511 DIABETIC ULCER OF OTHER PART OF RIGHT FOOT ASSOCIATED WITH TYPE 2 DIABETES MELLITUS, LIMITED TO BREAKDOWN OF SKIN (HCC): Primary | ICD-10-CM

## 2025-02-04 DIAGNOSIS — Z79.4 TYPE 2 DIABETES MELLITUS WITH DIABETIC NEUROPATHY, WITH LONG-TERM CURRENT USE OF INSULIN (HCC): ICD-10-CM

## 2025-02-04 DIAGNOSIS — B35.1 ONYCHOMYCOSIS: ICD-10-CM

## 2025-02-04 DIAGNOSIS — E11.40 TYPE 2 DIABETES MELLITUS WITH DIABETIC NEUROPATHY, WITH LONG-TERM CURRENT USE OF INSULIN (HCC): ICD-10-CM

## 2025-02-04 DIAGNOSIS — L84 CORNS AND CALLUS: ICD-10-CM

## 2025-02-04 PROCEDURE — 11721 DEBRIDE NAIL 6 OR MORE: CPT | Performed by: PODIATRIST

## 2025-02-04 PROCEDURE — RECHECK: Performed by: PODIATRIST

## 2025-02-04 PROCEDURE — 97597 DBRDMT OPN WND 1ST 20 CM/<: CPT | Performed by: PODIATRIST

## 2025-02-04 PROCEDURE — 11055 PARING/CUTG B9 HYPRKER LES 1: CPT | Performed by: PODIATRIST

## 2025-02-06 NOTE — TELEPHONE ENCOUNTER
----- Message from Jayy Babcock PA-C sent at 2/6/2025  2:13 PM EST -----  Can we call this patient and ensure he is taking his Xarelto.  We did find A-fib on his last device interrogation.  Thank you.  ----- Message -----  From: Neto Lou MD  Sent: 1/29/2025   8:02 AM EST  To: Jayy Babcock PA-C    Normal device function.  Afib noted on device  Seen in hospital 1/27/25 for concerns of stroke symptoms  No AC on recent med list, Xarelto listed in past  Per EMR - Cardiologist notified

## 2025-02-07 ENCOUNTER — OFFICE VISIT (OUTPATIENT)
Dept: CARDIOLOGY CLINIC | Facility: CLINIC | Age: 85
End: 2025-02-07
Payer: COMMERCIAL

## 2025-02-07 ENCOUNTER — OFFICE VISIT (OUTPATIENT)
Dept: ENDOCRINOLOGY | Facility: HOSPITAL | Age: 85
End: 2025-02-07
Payer: COMMERCIAL

## 2025-02-07 VITALS
HEART RATE: 55 BPM | BODY MASS INDEX: 31.55 KG/M2 | SYSTOLIC BLOOD PRESSURE: 124 MMHG | WEIGHT: 220.4 LBS | DIASTOLIC BLOOD PRESSURE: 64 MMHG | HEIGHT: 70 IN

## 2025-02-07 VITALS
HEIGHT: 70 IN | DIASTOLIC BLOOD PRESSURE: 62 MMHG | HEART RATE: 75 BPM | SYSTOLIC BLOOD PRESSURE: 114 MMHG | BODY MASS INDEX: 31.64 KG/M2 | WEIGHT: 221 LBS

## 2025-02-07 DIAGNOSIS — I27.20 PULMONARY HYPERTENSION (HCC): ICD-10-CM

## 2025-02-07 DIAGNOSIS — I48.0 PAROXYSMAL A-FIB (HCC): Primary | ICD-10-CM

## 2025-02-07 DIAGNOSIS — I50.32 CHRONIC DIASTOLIC CHF (CONGESTIVE HEART FAILURE) (HCC): ICD-10-CM

## 2025-02-07 DIAGNOSIS — E11.40 TYPE 2 DIABETES MELLITUS WITH DIABETIC NEUROPATHY, UNSPECIFIED WHETHER LONG TERM INSULIN USE (HCC): Primary | ICD-10-CM

## 2025-02-07 DIAGNOSIS — Z95.0 CARDIAC PACEMAKER IN SITU: ICD-10-CM

## 2025-02-07 PROCEDURE — 95251 CONT GLUC MNTR ANALYSIS I&R: CPT | Performed by: PHYSICIAN ASSISTANT

## 2025-02-07 PROCEDURE — 99214 OFFICE O/P EST MOD 30 MIN: CPT

## 2025-02-07 PROCEDURE — 99214 OFFICE O/P EST MOD 30 MIN: CPT | Performed by: PHYSICIAN ASSISTANT

## 2025-02-07 RX ORDER — INSULIN ASPART 100 [IU]/ML
INJECTION, SOLUTION INTRAVENOUS; SUBCUTANEOUS
Qty: 45 ML | Refills: 1 | Status: ON HOLD | OUTPATIENT
Start: 2025-02-07

## 2025-02-07 NOTE — PATIENT INSTRUCTIONS
Monitor diet and maintain activity.     Continue Lantus 20 units twice a day.    Increase NovoLog to 25 units with meals.     Take 1 extra unit of insulin for every 50 points above 150.  151-200: 1 unit  201-250: 2 units  251-300: 3 units  301-350: 4 units  351-400: 5 units  400+: 6 units     Call the office in 2 weeks so we can download Dexcom and make adjustments if needed.     Continue gabapentin to 200 mg at night.     Continue to use Dexcom to monitor blood sugar. Call the office with any concerns.     Follow up in 3 months with lab work prior to visit.

## 2025-02-07 NOTE — PROGRESS NOTES
Cardiology Follow Up    Hammad Montesinos  1940  [id number]  [facility]  [place]  [facility] [id number] [phone]    1. Paroxysmal A-fib (HCC)        2. Chronic diastolic CHF (congestive heart failure) (HCC)  Basic metabolic panel      3. Cardiac pacemaker in situ        4. Pulmonary hypertension (HCC)          Discussion/Summary:    1.  Chest pain: Recent visit to [facility] on 1/13/2025 with intermittent left-sided shooting pain of his left chest, troponins negative, EKG with 100% V pacing.  Ordered for cardiac CTA, yet to complete.    Patient has been having difficulty  scheduling the CTA.  It will be scheduled today upon leaving.  Spoke with patient about taking an extra metoprolol on the day of his procedure.  Patient describes left sided chest shooting pain lasting seconds, occurs 1-2 times daily.  Not associated with any other symptoms or activities.  Will help patient schedule CTA    2.  Paroxysmal A-fib  Rate control: Toprol XL 25 mg daily , heart rate in the 70s today.  Anticoagulation: Xarelto 20 mg daily -reports that he is compliant.  Patient  reports an episode of dizziness after  a busy day with physical therapy when he pushed himself.  This episode was self-limiting and resolved within a minute and he has had no further episodes.  Denies palpitations, syncope or presyncopal episodes.  Continue current therapy    3.  HFpEF:  TTE 1/28/2025: EF 35%, moderate global hypokinesis with regional variation, abnormal diastolic inflow pattern question due to atrial fibs, reduced RV function, biatrial dilatation with severe left atrial dilatation, likely moderate to severe MR, mild TR  GDMT: Toprol XL 25 mg daily, spironolactone 25 mg daily, torsemide 40 mg daily.    Torsemide increased at last visit, along with addition of spironolactone.    Repeat BMP: potassium 4.1,  bun  39/creat 1.39  - baseline creatinine appears around  1  Patient's volume status appears almost euvolemic, weight at baseline per patient, 211 to 214 pounds per self report.  He states it was 211 this morning on his home scale.  It was 220 here down 5 kg since last visit. Lungs clear with 1+ bilateral pitting edema     spironolactone DC'd.    Will continue torsemide as ordered and recheck BMP in 1 week  Instructed patient on daily weights, and contact the office with weight gain (3 pounds in 1 day, 5 pounds in a week), low-sodium diet.  Would consider starting Jardiance at next visit if patient stable on other medical therapy.     4.  Permanent pacemaker: Implanted Biotronik permanent pacemaker secondary to sick sinus syndrome.  Last EP device interrogation showed 100% A-fib burden    5.  Hypertension:  Maintained on metoprolol succinate 25 mg daily, previously on Cozaar but held in the setting of PERRY.  Continue on current therapy    6.  Hyperlipidemia:  Last lipid profile 1/28/2025: Total cholesterol 111 HDL 39 LDL 55     Patient will follow-up in 1 month or sooner if necessary.    Interval History:   Hammad Montesinos is a 84 y.o. male with a past medical history as below.  He is a patient of Dr. Carcamo and was last seen in our office on 1/30/2025.  During patient's last visit torsemide was increased secondary to volume overload and patient was begun on spironolactone along with Toprol.  Repeat BMP showed an increase in his creatinine to 1.39.    Patient doing well otherwise.  He has a injury to his lower back sacral area after his recent fall and reports difficulty sitting.  He has been working with physical therapy and Occupational Therapy and has noticed improvements in his stamina and exercise capability.  Denies shortness of breath with activities.  He does still endorse shooting left-sided chest pain which occurs for couple of seconds, 1-2 times daily, without any associated symptoms, occurs at both rest and  with activity.  He was previously ordered for a cardiac CTA for this and has had difficulty scheduling the CTA.  He has a appointment with endocrinology later today.    Medical Problems       Problem List       Type 2 diabetes mellitus with hyperglycemia, with long-term current use of insulin ([facility])        Mixed hyperlipidemia    Diabetic polyneuropathy associated with type 2 diabetes mellitus ([facility])        Primary hypertension    Morbid (severe) obesity due to excess calories ([facility])    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 ([facility])        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 (HCC)        Elevated parathyroid hormone    Diabetic nephropathy associated with type 2 diabetes mellitus (HCC)        Type 2 diabetes mellitus with diabetic neuropathy, unspecified whether long term insulin use (HCC)        Pre-ulcerative calluses    Acute on chronic diastolic heart failure (HCC)        Elevated troponin    Pulmonary hypertension ([facility])    Paroxysmal A-fib ([facility])    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, with fat layer exposed ([facility])        Stroke-like symptoms    Fall    Generalized weakness        Past Medical History:   Diagnosis Date    Asthma     Bradycardia     Carcinoma, basal cell, skin     head and face and ear    CHF (congestive heart failure) ([facility])     Chronic kidney disease     Coronary artery disease     COVID-19 12/05/2022    Diabetes mellitus ([facility])     Dupuytren contracture     bilateral ring and right middle    Gout     Herniated lumbar intervertebral disc     Hypertension     Sleep apnea      Social History     Socioeconomic History    Marital status: /Civil Union     Spouse name: Not on file    Number of children: Not on file    Years of education: Not on file    Highest education level: Not on file   Occupational History    Occupation: teacher     Comment: retired   Tobacco Use    Smoking status: Never     Smokeless tobacco: Never   Vaping Use    Vaping status: Never Used   Substance and Sexual Activity    Alcohol use: Not Currently    Drug use: Never    Sexual activity: Not Currently     Birth control/protection: Male Sterilization   Other Topics Concern    Not on file   Social History Narrative    Not on file     Social Drivers of Health     Financial Resource Strain: Not on file   Food Insecurity: No Food Insecurity (2025)    Nursing - Inadequate Food Risk Classification     Worried About Running Out of Food in the Last Year: Not on file     Ran Out of Food in the Last Year: Not on file     Ran Out of Food in the Last Year: Never true   Transportation Needs: No Transportation Needs (2025)    Nursing - Transportation Risk Classification     Lack of Transportation: Not on file     Lack of Transportation: No   Physical Activity: Not on file   Stress: Not on file   Social Connections: Not on file   Intimate Partner Violence: Unknown (2025)    Nursing IPS     Feels Physically and Emotionally Safe: Not on file     Physically Hurt by Someone: Not on file     Humiliated or Emotionally Abused by Someone: Not on file     Physically Hurt by Someone: No     Hurt or Threatened by Someone: No   Housing Stability: Unknown (2025)    Nursing: Inadequate Housing Risk Classification     Has Housing: Not on file     Worried About Losing Housing: Not on file     Unable to Get Utilities: Not on file     Unable to Pay for Housing in the Last Year: No     Has Housin      Family History   Problem Relation Age of Onset    Hypertension Mother     Diabetes type II Mother     Hypertension Father     Diabetes type II Brother     Hypertension Brother     Kidney disease Sister     Hypertension Daughter     GI problems Daughter      Past Surgical History:   Procedure Laterality Date    APPENDECTOMY      age 20, ruptured    CATARACT EXTRACTION, BILATERAL Bilateral     CERVICAL FUSION      CYST REMOVAL Left 2023     DUPUYTREN CONTRACTURE RELEASE Left     left ring finger    INSERT / REPLACE / REMOVE PACEMAKER      LUMBAR LAMINECTOMY      belkis placed    TONSILLECTOMY      VASECTOMY         Current Outpatient Medications:     Albuterol Sulfate 108 (90 Base) MCG/ACT AEPB, Inhale 2 puffs every 4 (four) hours as needed (SOB, wheezing, cough), Disp: 1 each, Rfl: 3    allopurinol (ZYLOPRIM) 300 mg tablet, Take 300 mg by mouth every 24 hours, Disp: , Rfl:     atorvastatin (LIPITOR) 20 mg tablet, Take 20 mg by mouth daily, Disp: , Rfl:     azelastine (ASTELIN) 0.1 % nasal spray, 2 sprays into each nostril 2 (two) times a day, Disp: , Rfl:     budesonide (PULMICORT) 0.5 mg/2 mL nebulizer solution, Take 0.5 mg by nebulization 2 (two) times a day Rinse mouth after use., Disp: , Rfl:     Cholecalciferol (Vitamin D3) 25 MCG (1000 UT) CAPS, Take 1 capsule by mouth every 24 hours, Disp: , Rfl:     Coenzyme Q10 (Co Q10) 100 MG CAPS, Take 100 mg by mouth in the morning, Disp: , Rfl:     fluticasone (FLONASE) 50 mcg/act nasal spray, 1 spray into each nostril 2 (two) times a day, Disp: , Rfl:     formoterol (PERFOROMIST) 20 MCG/2ML nebulizer solution, Take 20 mcg by nebulization 2 (two) times a day, Disp: , Rfl:     gabapentin (NEURONTIN) 100 mg capsule, Take 1 capsule (100 mg total) by mouth daily, Disp: 180 capsule, Rfl: 0    Glucosamine-Chondroitin 500-400 MG CAPS, Take 1 capsule by mouth daily, Disp: , Rfl:     insulin aspart (NovoLOG FlexPen) 100 UNIT/ML injection pen, INJECT 12 UNITS SUBCUTANEOUSLY with breakfast, LUNCH and with dinner plus sliding scale. (UP  UNITS DAILY). 150-200: 1 unit; 201-250: 2 units; 251-300: 3 units; 301-350: 4 units; 351-400: 5 units; 400+: 6 units, Disp: 45 mL, Rfl: 1    Insulin Glargine Solostar (Lantus SoloStar) 100 UNIT/ML SOPN, INJECT 20 UNITS SUBCUTANEOUSLY IN THE MORNING AND 20 UNITS IN THE EVENING., Disp: 30 mL, Rfl: 0    ipratropium (ATROVENT) 0.03 % nasal spray, INSTILL 2 SPRAYS IN EACH NOSTRIL TWICE  DAILY, Disp: , Rfl:     ipratropium-albuterol (DUO-NEB) 0.5-2.5 mg/3 mL nebulizer solution, Take 3 mL by nebulization 2 (two) times a day, Disp: , Rfl:     loratadine (CLARITIN) 10 mg tablet, Take 10 mg by mouth daily, Disp: , Rfl:     magnesium Oxide (MAG-OX) 400 mg TABS, Take 1 tablet (400 mg total) by mouth daily, Disp: 30 tablet, Rfl: 0    metoprolol succinate (TOPROL-XL) 25 mg 24 hr tablet, Take 1 tablet (25 mg total) by mouth daily, Disp: 30 tablet, Rfl: 11    Multiple Vitamin (MULTIVITAMIN ADULT PO), every 24 hours, Disp: , Rfl:     Omega-3 Fatty Acids (Fish Oil) 360 MG CAPS, Take by mouth, Disp: , Rfl:     potassium chloride (K-DUR,KLOR-CON) 20 mEq tablet, Take 1 tablet (20 mEq total) by mouth daily, Disp: 30 tablet, Rfl: 0    rivaroxaban (Xarelto) 20 mg tablet, Take 1 tablet (20 mg total) by mouth daily with breakfast, Disp: 30 tablet, Rfl: 5    senna (SENOKOT) 8.6 mg, Take 2 tablets (17.2 mg total) by mouth daily at bedtime as needed for constipation, Disp: 30 tablet, Rfl: 0    torsemide (DEMADEX) 20 mg tablet, Take 2 tablets (40 mg total) by mouth every morning AND 2 tablets (40 mg total) daily after lunch., Disp: 120 tablet, Rfl: 11    Easy Comfort Pen Needles 33G X 4 MM MISC, USE WITH INSULIN FIVE TIMES DAILY AS DIRECTED, Disp: , Rfl:     mupirocin (BACTROBAN) 2 % ointment, Apply topically every other day With alginate dressing, Disp: 22 g, Rfl: 0    OneTouch Verio test strip, USE AS DIRECTED FOUR TIMES DAILY, Disp: , Rfl:     triamcinolone (KENALOG) 0.1 % cream, 1 Application 2 (two) times a day To affected areas, Disp: , Rfl:     valACYclovir (VALTREX) 1,000 mg tablet, Take 1,000 mg by mouth 3 (three) times a day, Disp: , Rfl:   Allergies   Allergen Reactions    Hydrochlorothiazide Other (See Comments)     SEVERE DIZZINESS    Insulin Lispro Other (See Comments)     Cannot control blood sugars(Humalog)    Lisinopril Other (See Comments)     Reports cough with use     Minoxidil GI Intolerance     "Nifedipine Other (See Comments)     EDEMA LEGS    Other Other (See Comments)     SEVERE DIZZINESS  Hydrap-es    Prazosin Other (See Comments)     CHEST PAIN      Venlafaxine Other (See Comments)     Made pt feel bad      Doxycycline Rash    Simvastatin Rash     PEDAL EDEMA         Labs:     Chemistry        Component Value Date/Time    K 4.4 01/28/2025 0415     01/28/2025 0415    CO2 24 01/28/2025 0415    BUN 20 01/28/2025 0415    CREATININE 0.75 01/28/2025 0415        Component Value Date/Time    CALCIUM 9.5 01/28/2025 0415    ALKPHOS 110 (H) 01/27/2025 1140    AST 16 01/27/2025 1140    ALT 12 01/27/2025 1140            No results found for: \"CHOL\"  Lab Results   Component Value Date    HDL 39 (L) 01/28/2025     Lab Results   Component Value Date    LDLCALC 55 01/28/2025     Lab Results   Component Value Date    TRIG 84 01/28/2025     Imaging:    Cardiac EP device report  Result Date: 1/29/2025  Narrative: BIO DC/PM NON-BILLABLE BIOTRONIK TRANSMISSION: BATTERY VOLTAGE ADEQUATE (OK~70%) ALL AVAILABLE LEAD PARAMETERS WITHIN NORMAL LIMITS. ALERT FOR AF W /AF IN PROGRESS (HX OF SAME); PT RECENTLY IN HOSP AND PT IS NOT ON AC. AF BURDEN: 100%. TASK DR. TURNER & JAMES-PA-C TO EVAL . NORMAL DEVICE FUNCTION.      MRI brain wo contrast  Result Date: 1/28/2025  Impression: 1. No acute infarction, intracranial image or mass effect. 2. Mild, chronic microangiopathy.     CTA head and neck with and without contrast  Result Date: 1/27/2025  Impression: CT Brain:  No acute intracranial CT abnormality. CT Angiography: 1.  Patent major vessels of the [place] without high-grade stenosis.  No aneurysm. 2.  Moderate to severe atherosclerotic stenosis in the right vertebral artery origin. No significant stenosis in the cervical carotid or left vertebral artery. 3.  Minimal focal irregularity in the proximal V3 segment of the right vertebral artery, could be developmental versus sequela of prior injury. No significant stenosis. " "Workstation performed: [id number]     XR chest 1 view portable  Result Date: 2025  Impression: No acute cardiopulmonary disease. Workstation performed: [id number]     XR chest portable  Result Date: 2025  Impression: Possible mild interstitial edema. Resident: Calixto Watts I, the attending radiologist, have reviewed the images and agree with the final report above. Workstation performed: [id number]     Review of Systems   Cardiovascular:  Positive for chest pain. Negative for dyspnea on exertion, irregular heartbeat, leg swelling, near-syncope, orthopnea, palpitations, paroxysmal nocturnal dyspnea and syncope.   Respiratory:  Negative for cough and shortness of breath.    Musculoskeletal:  Positive for back pain and myalgias.   Neurological:  Positive for light-headedness and weakness. Negative for focal weakness, headaches and loss of balance.       Vitals:    25 0807   BP: 114/62   Pulse: 75     Vitals:    25 0807   Weight: 100 kg (221 lb)     Height: 5' 10\" (177.8 cm)   Body mass index is 31.71 kg/m².    Physical Exam:  Physical Exam  Constitutional:       Appearance: Normal appearance.   HENT:      Head: Normocephalic and atraumatic.      Nose: Nose normal.      Mouth/Throat:      Mouth: Mucous membranes are moist.   Neck:      Vascular: No carotid bruit.   Cardiovascular:      Rate and Rhythm: Normal rate. Rhythm irregular.      Pulses: Normal pulses.           Radial pulses are 2+ on the right side and 2+ on the left side.      Heart sounds: Normal heart sounds.   Pulmonary:      Effort: Pulmonary effort is normal.   Abdominal:      General: Bowel sounds are normal.      Palpations: Abdomen is soft.   Musculoskeletal:      Right lower le+ Pitting Edema present.      Left lower le+ Pitting Edema present.   Skin:     General: Skin is warm.      Capillary Refill: Capillary refill takes less than 2 seconds.   Neurological:      General: No focal deficit present.      Mental Status: " He is alert and oriented to person, place, and time. Mental status is at baseline.

## 2025-02-07 NOTE — PROGRESS NOTES
Hammad Montesinos 84 y.o. male MRN: [id number]    Encounter: [id number]      Assessment & Plan     Assessment:  This is a 84 y.o.-year-old male with type 2 diabetes with hypoglycemic unawareness, neuropathy, hypertension and hyperlipidemia.    Plan:  1. Type 2 diabetes, insulin requiring: Most recent hemoglobin A1c was 9.1.  Review of his Dexcom shows that glucose levels for the most part are consistently running high.  At this time I would like him to continue with Lantus 20 units twice a day, but I want to increase his NovoLog to 25 units with meals plus sliding scale.  Continue utilizing Dexcom to monitor glucose levels.  Contact the office in 2 weeks so we can download Dexcom and make adjustments in insulin if needed.  Contact the office with any concerns or questions.  Follow-up in 3 months with labwork completed prior to visit.     2. Hypoglycemic unawareness:  Has had several episodes of loss of consciousness due to hypoglycemia with ambulance called.  Currently utilizing Dexcom G7.     3. Diabetic neuropathy:  Stable. Diabetic foot exam is up-to-date.     4. Hypertension:  Normotensive in the office.  Kidney function remains stable with normal electrolytes.  Did have a recent adjustment to blood pressure medication due to concerns of lowering creatinine.  Continue with current medication at this time.  Make sure to get CMP completed prior to next office visit.  Did inform him in helping keep glucose levels under control and help prevent further kidney damage.     5. Hyperlipidemia:  Previous lipid panel was excellent.  Continue with current medications.  Repeat lipid panel prior to next office visit.    CC: Type 2 diabetes follow-up    History of Present Illness     HPI:  Hammad Montesinos is a 83 year old male with type 2 diabetes, insulin requiring with neuropathy for 50 years, hyperlipidemia, hypertension for follow-up.  He was diagnosed with type 2 diabetes in his 30s.  He originally went on metformin  therapy.  He was never on any other oral agents as metformin controlled his blood sugars for the 1st several years and then he was switched to insulin therapy.  He reports that Humalog insulin does not improve his blood sugars as well as NovoLog insulin does.  He is on insulin at home and takes Lantus insulin 20 units twice a day, and NovoLog 20 units with meals plus sliding scale. He denies any polyuria, polydipsia, nocturia and blurry vision.  He has once a night nocturia.  He denies chest pain or shortness of breath.  He has occasional shooting pains of the legs at night and has numbness of the feet to his knees bilaterally.  He denies retinopathy, heart attack, stroke and claudication but does admit to neuropathy and nephropathy.  He reports he was to Diabetes Education on diagnosis in several times since then has seen 1 on 1 with a dietitian and gone to diabetes classes.  Does pay attention to what he is eating and to eat a diabetic diet.  States when he is eating in his room he make smarter choices, but when he is eating in the dining abdi typically will eat more.  He was admitted to the hospital at the end of January 2025 with concerns of strokelike symptoms.  Workup came back negative for stroke.     Hypoglycemic episodes: Rare episodes.  H/o of hypoglycemia causing hospitalization or intervention such as glucagon injection  or ambulance call  Yes. Has been loc in the past requiring 911 many years ago.  Hypoglycemia symptoms: sweating and and feel bad or no symptoms at all.  Treatment of hypoglycemia: either cookies or glucose tablets.  Glucagon:Yes.  Medic alert tag: recommended,Yes.      The patient's last eye exam was in March 2022 in [place] at 99 Fahrenheit with no retinopathy.  The patient's last foot exam was in December 2023 at endocrine office.  Most recent hemoglobin A1c was completed January 28, 2025 and was 9.1.     Blood Sugar/Glucometer/Pump/CGM review: Downloaded Dexcom from January 25  through February 7, 2025 reveals an average glucose level of 245 with standard deviation of 79.  He is in target range 26% time, and above target range 74% time.  Glucose levels typically start trending down starting 9 PM, lowest glucose levels are typically between 6 AM and 9 AM where he has breakfast and will have an increase in glucose levels.  They will start trending down around 2 PM, start increasing again with dinner at 5 PM.     He has hyperlipidemia and takes atorvastatin 20 mg daily.  He denies chest pain or shortness of breath.       He has hypertension and takes amlodipine 10 mg daily and losartan 100 mg daily along with furosemide 40 mg twice a day.  He denies headache or stroke-like symptoms.     Review of Systems   Constitutional:  Negative for activity change, appetite change, fatigue and unexpected weight change.   HENT:  Negative for trouble swallowing.    Eyes:  Positive for visual disturbance (Wears glasses).   Respiratory:  Positive for shortness of breath. Negative for chest tightness.    Cardiovascular:  Negative for chest pain, palpitations and leg swelling.   Gastrointestinal:  Negative for abdominal pain, diarrhea, nausea and vomiting.   Endocrine: Negative for cold intolerance, heat intolerance, polydipsia, polyphagia and polyuria.   Genitourinary:  Negative for frequency.   Musculoskeletal:  Positive for arthralgias, back pain and gait problem (Utilizes a walker).   Skin:  Negative for rash and wound.   Neurological:  Positive for tremors. Negative for dizziness, weakness, light-headedness, numbness and headaches.   Psychiatric/Behavioral:  Negative for dysphoric mood and sleep disturbance. The patient is not nervous/anxious.        Historical Information   Past Medical History:   Diagnosis Date   • Asthma    • Bradycardia    • Carcinoma, basal cell, skin     head and face and ear   • CHF (congestive heart failure) (HCC)    • Chronic kidney disease    • Coronary artery disease    •  COVID-19 12/05/2022   • Diabetes mellitus (HCC)    • Dupuytren contracture     bilateral ring and right middle   • Gout    • Herniated lumbar intervertebral disc    • Hypertension    • Sleep apnea      Past Surgical History:   Procedure Laterality Date   • APPENDECTOMY      age 20, ruptured   • CATARACT EXTRACTION, BILATERAL Bilateral    • CERVICAL FUSION     • CYST REMOVAL Left 01/2023   • DUPUYTREN CONTRACTURE RELEASE Left     left ring finger   • INSERT / REPLACE / REMOVE PACEMAKER     • LUMBAR LAMINECTOMY      belkis placed   • TONSILLECTOMY     • VASECTOMY       Social History   Social History     Substance and Sexual Activity   Alcohol Use Not Currently     Social History     Substance and Sexual Activity   Drug Use Never     Social History     Tobacco Use   Smoking Status Never   Smokeless Tobacco Never     Family History:   Family History   Problem Relation Age of Onset   • Hypertension Mother    • Diabetes type II Mother    • Hypertension Father    • Diabetes type II Brother    • Hypertension Brother    • Kidney disease Sister    • Hypertension Daughter    • GI problems Daughter        Meds/Allergies   Current Outpatient Medications   Medication Sig Dispense Refill   • Albuterol Sulfate 108 (90 Base) MCG/ACT AEPB Inhale 2 puffs every 4 (four) hours as needed (SOB, wheezing, cough) 1 each 3   • allopurinol (ZYLOPRIM) 300 mg tablet Take 300 mg by mouth every 24 hours     • atorvastatin (LIPITOR) 20 mg tablet Take 20 mg by mouth daily     • azelastine (ASTELIN) 0.1 % nasal spray 2 sprays into each nostril 2 (two) times a day     • budesonide (PULMICORT) 0.5 mg/2 mL nebulizer solution Take 0.5 mg by nebulization 2 (two) times a day Rinse mouth after use.     • Cholecalciferol (Vitamin D3) 25 MCG (1000 UT) CAPS Take 1 capsule by mouth every 24 hours     • Coenzyme Q10 (Co Q10) 100 MG CAPS Take 100 mg by mouth in the morning     • Easy Comfort Pen Needles 33G X 4 MM MISC USE WITH INSULIN FIVE TIMES DAILY AS DIRECTED      • fluticasone (FLONASE) 50 mcg/act nasal spray 1 spray into each nostril 2 (two) times a day     • formoterol (PERFOROMIST) 20 MCG/2ML nebulizer solution Take 20 mcg by nebulization 2 (two) times a day     • gabapentin (NEURONTIN) 100 mg capsule Take 1 capsule (100 mg total) by mouth daily 180 capsule 0   • Glucosamine-Chondroitin 500-400 MG CAPS Take 1 capsule by mouth daily     • insulin aspart (NovoLOG FlexPen) 100 UNIT/ML injection pen INJECT 25 UNITS SUBCUTANEOUSLY with breakfast, LUNCH and with dinner plus sliding scale. (UP  UNITS DAILY). 150-200: 1 unit; 201-250: 2 units; 251-300: 3 units; 301-350: 4 units; 351-400: 5 units; 400+: 6 units 45 mL 1   • Insulin Glargine Solostar (Lantus SoloStar) 100 UNIT/ML SOPN INJECT 20 UNITS SUBCUTANEOUSLY IN THE MORNING AND 20 UNITS IN THE EVENING. 30 mL 0   • ipratropium (ATROVENT) 0.03 % nasal spray INSTILL 2 SPRAYS IN EACH NOSTRIL TWICE DAILY     • ipratropium-albuterol (DUO-NEB) 0.5-2.5 mg/3 mL nebulizer solution Take 3 mL by nebulization 2 (two) times a day     • loratadine (CLARITIN) 10 mg tablet Take 10 mg by mouth daily     • magnesium Oxide (MAG-OX) 400 mg TABS Take 1 tablet (400 mg total) by mouth daily 30 tablet 0   • metoprolol succinate (TOPROL-XL) 25 mg 24 hr tablet Take 1 tablet (25 mg total) by mouth daily 30 tablet 11   • Multiple Vitamin (MULTIVITAMIN ADULT PO) every 24 hours     • mupirocin (BACTROBAN) 2 % ointment Apply topically every other day With alginate dressing 22 g 0   • Omega-3 Fatty Acids (Fish Oil) 360 MG CAPS Take by mouth     • OneTouch Verio test strip USE AS DIRECTED FOUR TIMES DAILY     • potassium chloride (K-DUR,KLOR-CON) 20 mEq tablet Take 1 tablet (20 mEq total) by mouth daily 30 tablet 0   • rivaroxaban (Xarelto) 20 mg tablet Take 1 tablet (20 mg total) by mouth daily with breakfast 30 tablet 5   • senna (SENOKOT) 8.6 mg Take 2 tablets (17.2 mg total) by mouth daily at bedtime as needed for constipation 30 tablet 0   •  "torsemide (DEMADEX) 20 mg tablet Take 2 tablets (40 mg total) by mouth every morning AND 2 tablets (40 mg total) daily after lunch. 120 tablet 11   • triamcinolone (KENALOG) 0.1 % cream 1 Application 2 (two) times a day To affected areas     • valACYclovir (VALTREX) 1,000 mg tablet Take 1,000 mg by mouth 3 (three) times a day       No current facility-administered medications for this visit.     Allergies   Allergen Reactions   • Hydrochlorothiazide Other (See Comments)     SEVERE DIZZINESS   • Insulin Lispro Other (See Comments)     Cannot control blood sugars(Humalog)   • Lisinopril Other (See Comments)     Reports cough with use    • Minoxidil GI Intolerance   • Nifedipine Other (See Comments)     EDEMA LEGS   • Other Other (See Comments)     SEVERE DIZZINESS  Hydrap-es   • Prazosin Other (See Comments)     CHEST PAIN     • Venlafaxine Other (See Comments)     Made pt feel bad     • Doxycycline Rash   • Simvastatin Rash     PEDAL EDEMA         Objective   Vitals: Blood pressure 124/64, pulse 55, height 5' 10\" (1.778 m), weight 100 kg (220 lb 6.4 oz).    Physical Exam  Vitals and nursing note reviewed.   Constitutional:       General: He is not in acute distress.     Appearance: Normal appearance. He is not diaphoretic.   HENT:      Head: Normocephalic and atraumatic.   Eyes:      General: No scleral icterus.     Extraocular Movements: Extraocular movements intact.      Conjunctiva/sclera: Conjunctivae normal.      Pupils: Pupils are equal, round, and reactive to light.   Cardiovascular:      Rate and Rhythm: Normal rate and regular rhythm.      Heart sounds: No murmur heard.  Pulmonary:      Effort: Pulmonary effort is normal. No respiratory distress.      Breath sounds: Normal breath sounds. No wheezing.   Musculoskeletal:      Cervical back: Normal range of motion.      Right lower leg: No edema.      Left lower leg: No edema.   Lymphadenopathy:      Cervical: No cervical adenopathy.   Skin:     General: Skin " is warm and dry.   Neurological:      Mental Status: He is alert and oriented to person, place, and time. Mental status is at baseline.      Sensory: No sensory deficit.      Gait: Gait abnormal (Utlizing a walker).   Psychiatric:         Mood and Affect: Mood normal.         Behavior: Behavior normal.         Thought Content: Thought content normal.         The history was obtained from the review of the chart, patient.    Lab Results:   Lab Results   Component Value Date/Time    Hemoglobin A1C 9.1 (H) 01/28/2025 04:15 AM    WBC 7.92 01/28/2025 04:15 AM    WBC 8.71 01/27/2025 11:40 AM    WBC 8.39 01/14/2025 12:02 AM    Hemoglobin 12.3 01/28/2025 04:15 AM    Hemoglobin 12.8 01/27/2025 11:40 AM    Hemoglobin 12.1 01/14/2025 12:02 AM    Hematocrit 38.7 01/28/2025 04:15 AM    Hematocrit 41.2 01/27/2025 11:40 AM    Hematocrit 38.3 01/14/2025 12:02 AM    MCV 86 01/28/2025 04:15 AM    MCV 86 01/27/2025 11:40 AM    MCV 85 01/14/2025 12:02 AM    Platelets 207 01/28/2025 04:15 AM    Platelets 194 01/27/2025 11:40 AM    Platelets 153 01/14/2025 12:02 AM    BUN 20 01/28/2025 04:15 AM    BUN 22 01/27/2025 11:40 AM    BUN 32 (H) 01/14/2025 12:02 AM    Potassium 4.4 01/28/2025 04:15 AM    Potassium 4.8 01/27/2025 11:40 AM    Potassium 3.6 01/14/2025 12:02 AM    Chloride 104 01/28/2025 04:15 AM    Chloride 99 01/27/2025 11:40 AM    Chloride 100 01/14/2025 12:02 AM    CO2 24 01/28/2025 04:15 AM    CO2 28 01/27/2025 11:40 AM    CO2 27 01/14/2025 12:02 AM    Creatinine 0.75 01/28/2025 04:15 AM    Creatinine 0.87 01/27/2025 11:40 AM    Creatinine 1.27 01/14/2025 12:02 AM    AST 16 01/27/2025 11:40 AM    AST 22 01/14/2025 12:02 AM    ALT 12 01/27/2025 11:40 AM    ALT 13 01/14/2025 12:02 AM    Total Protein 6.7 01/27/2025 11:40 AM    Total Protein 6.8 01/14/2025 12:02 AM    Albumin 3.8 01/27/2025 11:40 AM    Albumin 3.8 01/14/2025 12:02 AM    HDL, Direct 39 (L) 01/28/2025 04:15 AM    Triglycerides 84 01/28/2025 04:15 AM  "          Imaging Studies: Results Review Statement: No pertinent imaging studies reviewed.    Portions of the record may have been created with voice recognition software. Occasional wrong word or \"sound a like\" substitutions may have occurred due to the inherent limitations of voice recognition software. Read the chart carefully and recognize, using context, where substitutions have occurred.    "

## 2025-02-12 DIAGNOSIS — E11.40 TYPE 2 DIABETES MELLITUS WITH DIABETIC NEUROPATHY, UNSPECIFIED WHETHER LONG TERM INSULIN USE (HCC): ICD-10-CM

## 2025-02-13 ENCOUNTER — TELEPHONE (OUTPATIENT)
Age: 85
End: 2025-02-13

## 2025-02-13 ENCOUNTER — APPOINTMENT (OUTPATIENT)
Dept: LAB | Facility: HOSPITAL | Age: 85
DRG: 871 | End: 2025-02-13
Payer: COMMERCIAL

## 2025-02-13 ENCOUNTER — HOSPITAL ENCOUNTER (OUTPATIENT)
Dept: RADIOLOGY | Facility: HOSPITAL | Age: 85
DRG: 871 | End: 2025-02-13
Payer: COMMERCIAL

## 2025-02-13 DIAGNOSIS — R06.02 SHORTNESS OF BREATH: Primary | ICD-10-CM

## 2025-02-13 DIAGNOSIS — R06.02 SHORTNESS OF BREATH: ICD-10-CM

## 2025-02-13 LAB
ANION GAP SERPL CALCULATED.3IONS-SCNC: 11 MMOL/L (ref 4–13)
BUN SERPL-MCNC: 33 MG/DL (ref 5–25)
CALCIUM SERPL-MCNC: 9 MG/DL (ref 8.4–10.2)
CHLORIDE SERPL-SCNC: 93 MMOL/L (ref 96–108)
CO2 SERPL-SCNC: 27 MMOL/L (ref 21–32)
CREAT SERPL-MCNC: 1.44 MG/DL (ref 0.6–1.3)
GFR SERPL CREATININE-BSD FRML MDRD: 43 ML/MIN/1.73SQ M
GLUCOSE SERPL-MCNC: 286 MG/DL (ref 65–140)
POTASSIUM SERPL-SCNC: 3.8 MMOL/L (ref 3.5–5.3)
SODIUM SERPL-SCNC: 131 MMOL/L (ref 135–147)

## 2025-02-13 PROCEDURE — 71046 X-RAY EXAM CHEST 2 VIEWS: CPT

## 2025-02-13 RX ORDER — INSULIN GLARGINE 100 [IU]/ML
INJECTION, SOLUTION SUBCUTANEOUS
Qty: 30 ML | Refills: 1 | Status: ON HOLD | OUTPATIENT
Start: 2025-02-13 | End: 2025-02-20

## 2025-02-13 NOTE — TELEPHONE ENCOUNTER
Juli Galvan was able to schedule a 9 am visit with Dr. Holland tomorrow.    Monitor Summary  SB-SR  48-78  .16/.08/.40  Rare PAC

## 2025-02-13 NOTE — TELEPHONE ENCOUNTER
Pt daughter Ila called stating pt presenting with same symptoms prior to admission with HF.  A chronic persistent cough has returned, increased SOB, and LE edema, poor appetite, feeling poorly  Unknown weight  Pt was just seen 2/7/25  Pt is on his way with shuttle bus for a CTA for 11 am and daughter asking if he can complete a chest xray while he is there since transportation is difficult.  Please advise  Ila c/b# [phone]

## 2025-02-13 NOTE — TELEPHONE ENCOUNTER
"The daughter Ila called back to make sure that the patient's labs and chest x ray were reviewed. She also wanted to report that the patient finally admitted to a 10 lb weight gain in the past week. He went from 211lbs to 221lbs today. The fluid retention is mostly in his ankles, not in the upper thigh or abdomen like it had been in the past. Pt is visibly SOB (\"panting\"), with a cough and wheezing, however he will not go to the ER.   Pt is currently taking Torsemide 40mg BID. Advised  Ela provider would be informed of testing completion and weight gain and would call back with further recommendations.     Ila call back # [phone]   "

## 2025-02-13 NOTE — TELEPHONE ENCOUNTER
Spoke w daughter. NORBERTO Galvan to see what we can do if we need to. Pt did have recent chest xr done in [place]

## 2025-02-14 ENCOUNTER — APPOINTMENT (INPATIENT)
Dept: RADIOLOGY | Facility: HOSPITAL | Age: 85
DRG: 871 | End: 2025-02-14
Payer: COMMERCIAL

## 2025-02-14 ENCOUNTER — APPOINTMENT (EMERGENCY)
Dept: RADIOLOGY | Facility: HOSPITAL | Age: 85
DRG: 871 | End: 2025-02-14
Payer: COMMERCIAL

## 2025-02-14 ENCOUNTER — HOSPITAL ENCOUNTER (INPATIENT)
Facility: HOSPITAL | Age: 85
LOS: 6 days | Discharge: HOME HEALTH | DRG: 871 | End: 2025-02-20
Attending: EMERGENCY MEDICINE | Admitting: INTERNAL MEDICINE
Payer: COMMERCIAL

## 2025-02-14 DIAGNOSIS — I50.23 ACUTE ON CHRONIC SYSTOLIC CONGESTIVE HEART FAILURE (HCC): ICD-10-CM

## 2025-02-14 DIAGNOSIS — L97.512 DIABETIC ULCER OF OTHER PART OF RIGHT FOOT ASSOCIATED WITH TYPE 2 DIABETES MELLITUS, WITH FAT LAYER EXPOSED (HCC): ICD-10-CM

## 2025-02-14 DIAGNOSIS — A41.9 SEPSIS (HCC): Primary | ICD-10-CM

## 2025-02-14 DIAGNOSIS — I50.20 HFREF (HEART FAILURE WITH REDUCED EJECTION FRACTION) (HCC): ICD-10-CM

## 2025-02-14 DIAGNOSIS — J10.1 INFLUENZA A: ICD-10-CM

## 2025-02-14 DIAGNOSIS — I50.41 ACUTE COMBINED SYSTOLIC (CONGESTIVE) AND DIASTOLIC (CONGESTIVE) HEART FAILURE (HCC): ICD-10-CM

## 2025-02-14 DIAGNOSIS — E11.40 TYPE 2 DIABETES MELLITUS WITH DIABETIC NEUROPATHY, UNSPECIFIED WHETHER LONG TERM INSULIN USE (HCC): ICD-10-CM

## 2025-02-14 DIAGNOSIS — R79.89 ELEVATED TROPONIN: ICD-10-CM

## 2025-02-14 DIAGNOSIS — J18.9 PNEUMONIA: ICD-10-CM

## 2025-02-14 DIAGNOSIS — R79.89 ELEVATED TROPONIN LEVEL NOT DUE TO ACUTE CORONARY SYNDROME: ICD-10-CM

## 2025-02-14 DIAGNOSIS — I50.9 CHF (CONGESTIVE HEART FAILURE) (HCC): ICD-10-CM

## 2025-02-14 DIAGNOSIS — E11.621 DIABETIC ULCER OF OTHER PART OF RIGHT FOOT ASSOCIATED WITH TYPE 2 DIABETES MELLITUS, WITH FAT LAYER EXPOSED (HCC): ICD-10-CM

## 2025-02-14 LAB
2HR DELTA HS TROPONIN: 69 NG/L
4HR DELTA HS TROPONIN: 158 NG/L
ALBUMIN SERPL BCG-MCNC: 3.7 G/DL (ref 3.5–5)
ALP SERPL-CCNC: 138 U/L (ref 34–104)
ALT SERPL W P-5'-P-CCNC: 17 U/L (ref 7–52)
ANION GAP SERPL CALCULATED.3IONS-SCNC: 8 MMOL/L (ref 4–13)
APTT PPP: 49 SECONDS (ref 23–34)
AST SERPL W P-5'-P-CCNC: 30 U/L (ref 13–39)
ATRIAL RATE: 93 BPM
BASOPHILS # BLD AUTO: 0.01 THOUSANDS/ΜL (ref 0–0.1)
BASOPHILS NFR BLD AUTO: 0 % (ref 0–1)
BILIRUB SERPL-MCNC: 1.9 MG/DL (ref 0.2–1)
BNP SERPL-MCNC: 954 PG/ML (ref 0–100)
BUN SERPL-MCNC: 38 MG/DL (ref 5–25)
CALCIUM SERPL-MCNC: 9 MG/DL (ref 8.4–10.2)
CARDIAC TROPONIN I PNL SERPL HS: 123 NG/L (ref ?–50)
CARDIAC TROPONIN I PNL SERPL HS: 192 NG/L (ref ?–50)
CARDIAC TROPONIN I PNL SERPL HS: 281 NG/L (ref ?–50)
CHLORIDE SERPL-SCNC: 95 MMOL/L (ref 96–108)
CO2 SERPL-SCNC: 28 MMOL/L (ref 21–32)
CREAT SERPL-MCNC: 1.39 MG/DL (ref 0.6–1.3)
EOSINOPHIL # BLD AUTO: 0.02 THOUSAND/ΜL (ref 0–0.61)
EOSINOPHIL NFR BLD AUTO: 0 % (ref 0–6)
ERYTHROCYTE [DISTWIDTH] IN BLOOD BY AUTOMATED COUNT: 18.3 % (ref 11.6–15.1)
FLUAV RNA RESP QL NAA+PROBE: POSITIVE
FLUBV RNA RESP QL NAA+PROBE: NEGATIVE
GFR SERPL CREATININE-BSD FRML MDRD: 45 ML/MIN/1.73SQ M
GLUCOSE SERPL-MCNC: 243 MG/DL (ref 65–140)
GLUCOSE SERPL-MCNC: 349 MG/DL (ref 65–140)
GLUCOSE SERPL-MCNC: 64 MG/DL (ref 65–140)
GLUCOSE SERPL-MCNC: 72 MG/DL (ref 65–140)
HCT VFR BLD AUTO: 36.9 % (ref 36.5–49.3)
HGB BLD-MCNC: 11.4 G/DL (ref 12–17)
IMM GRANULOCYTES # BLD AUTO: 0.08 THOUSAND/UL (ref 0–0.2)
IMM GRANULOCYTES NFR BLD AUTO: 1 % (ref 0–2)
INR PPP: 2.99 (ref 0.85–1.19)
LACTATE SERPL-SCNC: 1.5 MMOL/L (ref 0.5–2)
LACTATE SERPL-SCNC: 2.1 MMOL/L (ref 0.5–2)
LYMPHOCYTES # BLD AUTO: 0.48 THOUSANDS/ΜL (ref 0.6–4.47)
LYMPHOCYTES NFR BLD AUTO: 7 % (ref 14–44)
MCH RBC QN AUTO: 26.4 PG (ref 26.8–34.3)
MCHC RBC AUTO-ENTMCNC: 30.9 G/DL (ref 31.4–37.4)
MCV RBC AUTO: 85 FL (ref 82–98)
MONOCYTES # BLD AUTO: 0.66 THOUSAND/ΜL (ref 0.17–1.22)
MONOCYTES NFR BLD AUTO: 10 % (ref 4–12)
NEUTROPHILS # BLD AUTO: 5.6 THOUSANDS/ΜL (ref 1.85–7.62)
NEUTS SEG NFR BLD AUTO: 82 % (ref 43–75)
NRBC BLD AUTO-RTO: 0 /100 WBCS
PLATELET # BLD AUTO: 112 THOUSANDS/UL (ref 149–390)
PMV BLD AUTO: 11.8 FL (ref 8.9–12.7)
POTASSIUM SERPL-SCNC: 4.7 MMOL/L (ref 3.5–5.3)
PR INTERVAL: 112 MS
PROCALCITONIN SERPL-MCNC: 0.2 NG/ML
PROT SERPL-MCNC: 6.5 G/DL (ref 6.4–8.4)
PROTHROMBIN TIME: 31.2 SECONDS (ref 12.3–15)
QRS AXIS: -85 DEGREES
QRSD INTERVAL: 176 MS
QT INTERVAL: 410 MS
QTC INTERVAL: 504 MS
RBC # BLD AUTO: 4.32 MILLION/UL (ref 3.88–5.62)
RSV RNA RESP QL NAA+PROBE: NEGATIVE
SARS-COV-2 RNA RESP QL NAA+PROBE: NEGATIVE
SODIUM SERPL-SCNC: 131 MMOL/L (ref 135–147)
T WAVE AXIS: 90 DEGREES
VENTRICULAR RATE: 91 BPM
WBC # BLD AUTO: 6.85 THOUSAND/UL (ref 4.31–10.16)

## 2025-02-14 PROCEDURE — 99223 1ST HOSP IP/OBS HIGH 75: CPT | Performed by: INTERNAL MEDICINE

## 2025-02-14 PROCEDURE — 93005 ELECTROCARDIOGRAM TRACING: CPT

## 2025-02-14 PROCEDURE — 82948 REAGENT STRIP/BLOOD GLUCOSE: CPT

## 2025-02-14 PROCEDURE — 85730 THROMBOPLASTIN TIME PARTIAL: CPT | Performed by: PHYSICIAN ASSISTANT

## 2025-02-14 PROCEDURE — 87040 BLOOD CULTURE FOR BACTERIA: CPT | Performed by: PHYSICIAN ASSISTANT

## 2025-02-14 PROCEDURE — 0241U HB NFCT DS VIR RESP RNA 4 TRGT: CPT | Performed by: PHYSICIAN ASSISTANT

## 2025-02-14 PROCEDURE — 83605 ASSAY OF LACTIC ACID: CPT | Performed by: PHYSICIAN ASSISTANT

## 2025-02-14 PROCEDURE — 99285 EMERGENCY DEPT VISIT HI MDM: CPT | Performed by: PHYSICIAN ASSISTANT

## 2025-02-14 PROCEDURE — 93010 ELECTROCARDIOGRAM REPORT: CPT | Performed by: INTERNAL MEDICINE

## 2025-02-14 PROCEDURE — 84484 ASSAY OF TROPONIN QUANT: CPT | Performed by: INTERNAL MEDICINE

## 2025-02-14 PROCEDURE — 71045 X-RAY EXAM CHEST 1 VIEW: CPT

## 2025-02-14 PROCEDURE — 94640 AIRWAY INHALATION TREATMENT: CPT

## 2025-02-14 PROCEDURE — 94660 CPAP INITIATION&MGMT: CPT

## 2025-02-14 PROCEDURE — 83880 ASSAY OF NATRIURETIC PEPTIDE: CPT | Performed by: PHYSICIAN ASSISTANT

## 2025-02-14 PROCEDURE — 85025 COMPLETE CBC W/AUTO DIFF WBC: CPT | Performed by: PHYSICIAN ASSISTANT

## 2025-02-14 PROCEDURE — 94760 N-INVAS EAR/PLS OXIMETRY 1: CPT

## 2025-02-14 PROCEDURE — 84484 ASSAY OF TROPONIN QUANT: CPT | Performed by: PHYSICIAN ASSISTANT

## 2025-02-14 PROCEDURE — 99285 EMERGENCY DEPT VISIT HI MDM: CPT

## 2025-02-14 PROCEDURE — 83605 ASSAY OF LACTIC ACID: CPT | Performed by: INTERNAL MEDICINE

## 2025-02-14 PROCEDURE — 84145 PROCALCITONIN (PCT): CPT | Performed by: PHYSICIAN ASSISTANT

## 2025-02-14 PROCEDURE — 87081 CULTURE SCREEN ONLY: CPT | Performed by: INTERNAL MEDICINE

## 2025-02-14 PROCEDURE — 36415 COLL VENOUS BLD VENIPUNCTURE: CPT | Performed by: PHYSICIAN ASSISTANT

## 2025-02-14 PROCEDURE — 94664 DEMO&/EVAL PT USE INHALER: CPT

## 2025-02-14 PROCEDURE — 85610 PROTHROMBIN TIME: CPT | Performed by: PHYSICIAN ASSISTANT

## 2025-02-14 PROCEDURE — 80053 COMPREHEN METABOLIC PANEL: CPT | Performed by: PHYSICIAN ASSISTANT

## 2025-02-14 RX ORDER — BUDESONIDE 0.5 MG/2ML
0.5 INHALANT ORAL
Status: DISCONTINUED | OUTPATIENT
Start: 2025-02-14 | End: 2025-02-20 | Stop reason: HOSPADM

## 2025-02-14 RX ORDER — BUDESONIDE 0.5 MG/2ML
0.5 INHALANT ORAL 2 TIMES DAILY
Status: DISCONTINUED | OUTPATIENT
Start: 2025-02-14 | End: 2025-02-14

## 2025-02-14 RX ORDER — BUMETANIDE 0.25 MG/ML
1 INJECTION, SOLUTION INTRAMUSCULAR; INTRAVENOUS ONCE
Status: COMPLETED | OUTPATIENT
Start: 2025-02-14 | End: 2025-02-14

## 2025-02-14 RX ORDER — GABAPENTIN 100 MG/1
200 CAPSULE ORAL DAILY
Status: DISCONTINUED | OUTPATIENT
Start: 2025-02-14 | End: 2025-02-20 | Stop reason: HOSPADM

## 2025-02-14 RX ORDER — INSULIN GLARGINE 100 [IU]/ML
20 INJECTION, SOLUTION SUBCUTANEOUS EVERY 12 HOURS SCHEDULED
Status: DISCONTINUED | OUTPATIENT
Start: 2025-02-14 | End: 2025-02-15

## 2025-02-14 RX ORDER — IPRATROPIUM BROMIDE AND ALBUTEROL SULFATE 2.5; .5 MG/3ML; MG/3ML
3 SOLUTION RESPIRATORY (INHALATION) ONCE
Status: COMPLETED | OUTPATIENT
Start: 2025-02-14 | End: 2025-02-14

## 2025-02-14 RX ORDER — INSULIN LISPRO 100 [IU]/ML
1-6 INJECTION, SOLUTION INTRAVENOUS; SUBCUTANEOUS
Status: DISCONTINUED | OUTPATIENT
Start: 2025-02-14 | End: 2025-02-15

## 2025-02-14 RX ORDER — SODIUM CHLORIDE 9 MG/ML
75 INJECTION, SOLUTION INTRAVENOUS CONTINUOUS
Status: DISCONTINUED | OUTPATIENT
Start: 2025-02-14 | End: 2025-02-14

## 2025-02-14 RX ORDER — CEFTRIAXONE 2 G/50ML
2000 INJECTION, SOLUTION INTRAVENOUS ONCE
Status: COMPLETED | OUTPATIENT
Start: 2025-02-14 | End: 2025-02-14

## 2025-02-14 RX ORDER — ALBUTEROL SULFATE 0.83 MG/ML
2.5 SOLUTION RESPIRATORY (INHALATION) EVERY 4 HOURS PRN
Status: DISCONTINUED | OUTPATIENT
Start: 2025-02-14 | End: 2025-02-20 | Stop reason: HOSPADM

## 2025-02-14 RX ORDER — OSELTAMIVIR PHOSPHATE 30 MG/1
30 CAPSULE ORAL EVERY 12 HOURS SCHEDULED
Status: COMPLETED | OUTPATIENT
Start: 2025-02-14 | End: 2025-02-18

## 2025-02-14 RX ORDER — ONDANSETRON 2 MG/ML
4 INJECTION INTRAMUSCULAR; INTRAVENOUS EVERY 6 HOURS PRN
Status: DISCONTINUED | OUTPATIENT
Start: 2025-02-14 | End: 2025-02-14 | Stop reason: ALTCHOICE

## 2025-02-14 RX ORDER — LEVALBUTEROL INHALATION SOLUTION 1.25 MG/3ML
1.25 SOLUTION RESPIRATORY (INHALATION)
Status: DISCONTINUED | OUTPATIENT
Start: 2025-02-14 | End: 2025-02-20 | Stop reason: HOSPADM

## 2025-02-14 RX ORDER — GUAIFENESIN 600 MG/1
600 TABLET, EXTENDED RELEASE ORAL EVERY 12 HOURS SCHEDULED
Status: DISCONTINUED | OUTPATIENT
Start: 2025-02-14 | End: 2025-02-20 | Stop reason: HOSPADM

## 2025-02-14 RX ORDER — CEFTRIAXONE 2 G/50ML
2000 INJECTION, SOLUTION INTRAVENOUS EVERY 24 HOURS
Status: DISCONTINUED | OUTPATIENT
Start: 2025-02-14 | End: 2025-02-14

## 2025-02-14 RX ORDER — METOPROLOL SUCCINATE 25 MG/1
25 TABLET, EXTENDED RELEASE ORAL DAILY
Status: DISCONTINUED | OUTPATIENT
Start: 2025-02-14 | End: 2025-02-20 | Stop reason: HOSPADM

## 2025-02-14 RX ORDER — BUMETANIDE 0.25 MG/ML
2 INJECTION, SOLUTION INTRAMUSCULAR; INTRAVENOUS
Status: DISCONTINUED | OUTPATIENT
Start: 2025-02-15 | End: 2025-02-17

## 2025-02-14 RX ORDER — INSULIN LISPRO 100 [IU]/ML
1-6 INJECTION, SOLUTION INTRAVENOUS; SUBCUTANEOUS
Status: DISCONTINUED | OUTPATIENT
Start: 2025-02-14 | End: 2025-02-20 | Stop reason: HOSPADM

## 2025-02-14 RX ORDER — ACETAMINOPHEN 325 MG/1
650 TABLET ORAL ONCE
Status: COMPLETED | OUTPATIENT
Start: 2025-02-14 | End: 2025-02-14

## 2025-02-14 RX ORDER — ATORVASTATIN CALCIUM 20 MG/1
20 TABLET, FILM COATED ORAL DAILY
Status: DISCONTINUED | OUTPATIENT
Start: 2025-02-14 | End: 2025-02-20 | Stop reason: HOSPADM

## 2025-02-14 RX ORDER — INSULIN LISPRO 100 [IU]/ML
25 INJECTION, SOLUTION INTRAVENOUS; SUBCUTANEOUS
Status: DISCONTINUED | OUTPATIENT
Start: 2025-02-14 | End: 2025-02-15

## 2025-02-14 RX ORDER — ACETAMINOPHEN 325 MG/1
650 TABLET ORAL EVERY 6 HOURS PRN
Status: DISCONTINUED | OUTPATIENT
Start: 2025-02-14 | End: 2025-02-20 | Stop reason: HOSPADM

## 2025-02-14 RX ORDER — BUMETANIDE 0.25 MG/ML
2 INJECTION, SOLUTION INTRAMUSCULAR; INTRAVENOUS ONCE
Status: COMPLETED | OUTPATIENT
Start: 2025-02-14 | End: 2025-02-14

## 2025-02-14 RX ORDER — ALLOPURINOL 300 MG/1
300 TABLET ORAL EVERY 24 HOURS
Status: DISCONTINUED | OUTPATIENT
Start: 2025-02-14 | End: 2025-02-20 | Stop reason: HOSPADM

## 2025-02-14 RX ORDER — SENNOSIDES 8.6 MG
17.2 TABLET ORAL
Status: DISCONTINUED | OUTPATIENT
Start: 2025-02-14 | End: 2025-02-20 | Stop reason: HOSPADM

## 2025-02-14 RX ORDER — CEFTRIAXONE 2 G/50ML
2000 INJECTION, SOLUTION INTRAVENOUS EVERY 24 HOURS
Status: DISCONTINUED | OUTPATIENT
Start: 2025-02-15 | End: 2025-02-20 | Stop reason: HOSPADM

## 2025-02-14 RX ORDER — HYDROCODONE POLISTIREX AND CHLORPHENIRAMINE POLISTIREX 10; 8 MG/5ML; MG/5ML
5 SUSPENSION, EXTENDED RELEASE ORAL EVERY 12 HOURS PRN
Status: DISCONTINUED | OUTPATIENT
Start: 2025-02-14 | End: 2025-02-20 | Stop reason: HOSPADM

## 2025-02-14 RX ORDER — FLUTICASONE PROPIONATE 50 MCG
1 SPRAY, SUSPENSION (ML) NASAL DAILY
Status: DISCONTINUED | OUTPATIENT
Start: 2025-02-15 | End: 2025-02-20 | Stop reason: HOSPADM

## 2025-02-14 RX ORDER — OXYMETAZOLINE HYDROCHLORIDE 0.05 G/100ML
2 SPRAY NASAL EVERY 12 HOURS SCHEDULED
Status: COMPLETED | OUTPATIENT
Start: 2025-02-14 | End: 2025-02-17

## 2025-02-14 RX ADMIN — OXYMETAZOLINE HYDROCHLORIDE 2 SPRAY: 0.05 SPRAY NASAL at 22:37

## 2025-02-14 RX ADMIN — SODIUM CHLORIDE 250 ML: 0.9 INJECTION, SOLUTION INTRAVENOUS at 10:16

## 2025-02-14 RX ADMIN — SODIUM CHLORIDE 75 ML/HR: 0.9 INJECTION, SOLUTION INTRAVENOUS at 13:03

## 2025-02-14 RX ADMIN — CEFTRIAXONE 2000 MG: 2 INJECTION, SOLUTION INTRAVENOUS at 10:15

## 2025-02-14 RX ADMIN — OSELTAMIVIR 30 MG: 30 CAPSULE ORAL at 21:42

## 2025-02-14 RX ADMIN — OSELTAMIVIR 30 MG: 30 CAPSULE ORAL at 13:03

## 2025-02-14 RX ADMIN — GUAIFENESIN 600 MG: 600 TABLET, EXTENDED RELEASE ORAL at 13:55

## 2025-02-14 RX ADMIN — INSULIN LISPRO 25 UNITS: 100 INJECTION, SOLUTION INTRAVENOUS; SUBCUTANEOUS at 18:29

## 2025-02-14 RX ADMIN — ALBUTEROL SULFATE 2.5 MG: 2.5 SOLUTION RESPIRATORY (INHALATION) at 21:09

## 2025-02-14 RX ADMIN — BUMETANIDE 2 MG: 0.25 INJECTION INTRAMUSCULAR; INTRAVENOUS at 13:54

## 2025-02-14 RX ADMIN — ACETAMINOPHEN 650 MG: 325 TABLET, FILM COATED ORAL at 09:13

## 2025-02-14 RX ADMIN — INSULIN GLARGINE 20 UNITS: 100 INJECTION, SOLUTION SUBCUTANEOUS at 13:03

## 2025-02-14 RX ADMIN — INSULIN LISPRO 5 UNITS: 100 INJECTION, SOLUTION INTRAVENOUS; SUBCUTANEOUS at 18:27

## 2025-02-14 RX ADMIN — AZITHROMYCIN MONOHYDRATE 500 MG: 500 INJECTION, POWDER, LYOPHILIZED, FOR SOLUTION INTRAVENOUS at 10:49

## 2025-02-14 RX ADMIN — BUMETANIDE 1 MG: 0.25 INJECTION INTRAMUSCULAR; INTRAVENOUS at 10:13

## 2025-02-14 RX ADMIN — BUDESONIDE 0.5 MG: 0.5 INHALANT ORAL at 20:41

## 2025-02-14 RX ADMIN — GUAIFENESIN 600 MG: 600 TABLET, EXTENDED RELEASE ORAL at 21:42

## 2025-02-14 RX ADMIN — IPRATROPIUM BROMIDE AND ALBUTEROL SULFATE 3 ML: .5; 3 SOLUTION RESPIRATORY (INHALATION) at 09:14

## 2025-02-14 RX ADMIN — LEVALBUTEROL HYDROCHLORIDE 1.25 MG: 1.25 SOLUTION RESPIRATORY (INHALATION) at 20:41

## 2025-02-14 NOTE — ASSESSMENT & PLAN NOTE
Troponin I 123 on presentation  Patient denies anginal symptoms  Likely demand ischemia in the setting of sepsis  Trend troponin I until plateau  Monitor for anginal symptoms  Cardiology consultation appreciated

## 2025-02-14 NOTE — ED PROVIDER NOTES
Time reflects when diagnosis was documented in both MDM as applicable and the Disposition within this note       Time User Action Codes Description Comment    2/14/2025 10:09 AM Nadine Chiu Add [I50.9] CHF (congestive heart failure) (HCC)     2/14/2025 10:09 AM Nadine Chiu Add [J10.1] Influenza A     2/14/2025 10:09 AM Nadine Chiu Add [J18.9] Pneumonia     2/14/2025 10:09 AM Nadine Chiu Add [A41.9] Sepsis (HCC)     2/14/2025 10:24 AM Nadine Chiu Add [R79.89] Elevated troponin level not due to acute coronary syndrome           ED Disposition       ED Disposition   Admit    Condition   Stable    Date/Time   Fri Feb 14, 2025 10:09 AM    Comment   Case was discussed with Dr. Kong and the patient's admission status was agreed to be Admission Status: inpatient status to the service of Dr. Kong .               Assessment & Plan       Medical Decision Making  Patient with cough, SOB, fevers, will order labs, CXR, EKG to r/o pneumonia, covid/flu/rsv, CHF exacerbation.  Patient with elevated BNP, concern for CHF exacerbation.  Patient positive for influenza A and pneumonia on CXR, will admit for IV abx, patient meets sepsis criteria.     Amount and/or Complexity of Data Reviewed  Labs: ordered. Decision-making details documented in ED Course.  Radiology: ordered and independent interpretation performed.    Risk  OTC drugs.  Prescription drug management.  Decision regarding hospitalization.        ED Course as of 02/14/25 1036   Fri Feb 14, 2025   0949 The 30ml/kg fluid bolus was not given to the patient despite hypotension and/or significantly elevated lactate of = 4 and/or presence of septic shock due to: Heart Failure. The patient will be administered 250ml of crystalloid fluid instead. Orders for this have been placed in [facility]. The patient may receive additional colloid or crystalloid fluids thereafter based on clinical condition.     Nadine Chiu PA-C    2519  "Sodium(!): 131  Corrected sodium 133, which is baseline for patient.        Medications   cefTRIAXone (ROCEPHIN) IVPB (premix in dextrose) 2,000 mg 50 mL (2,000 mg Intravenous New Bag 2/14/25 1015)   azithromycin (ZITHROMAX) 500 mg in sodium chloride 0.9% 250mL IVPB 500 mg (has no administration in time range)   sodium chloride 0.9 % bolus 250 mL (250 mL Intravenous New Bag 2/14/25 1016)   ipratropium-albuterol (DUO-NEB) 0.5-2.5 mg/3 mL inhalation solution 3 mL (3 mL Nebulization Given 2/14/25 0914)   acetaminophen (TYLENOL) tablet 650 mg (650 mg Oral Given 2/14/25 0913)   bumetanide (BUMEX) injection 1 mg (1 mg Intravenous Given 2/14/25 1013)       ED Risk Strat Scores                          SBIRT 20yo+      Flowsheet Row Most Recent Value   Initial Alcohol Screen: US AUDIT-C     1. How often do you have a drink containing alcohol? 0 Filed at: 02/14/2025 0846   2. How many drinks containing alcohol do you have on a typical day you are drinking?  0 Filed at: 02/14/2025 0846   3a. Male UNDER 65: How often do you have five or more drinks on one occasion? 0 Filed at: 02/14/2025 0846   3b. FEMALE Any Age, or MALE 65+: How often do you have 4 or more drinks on one occassion? 0 Filed at: 02/14/2025 0846   Audit-C Score 0 Filed at: 02/14/2025 0846   VIKKI: How many times in the past year have you...    Used an illegal drug or used a prescription medication for non-medical reasons? Never Filed at: 02/14/2025 0846                            History of Present Illness       Chief Complaint   Patient presents with    Shortness of Breath     Pt reports cough and sob for the past couple days. Has had a 10 pound weight gain in the past week. Hx of chf. Had a chest xray done and was told it was \"clear\".        Past Medical History:   Diagnosis Date    Asthma     Bradycardia     Carcinoma, basal cell, skin     head and face and ear    CHF (congestive heart failure) (HCC)     Chronic kidney disease     Coronary artery disease     " COVID-19 12/05/2022    Diabetes mellitus (HCC)     Dupuytren contracture     bilateral ring and right middle    Gout     Herniated lumbar intervertebral disc     Hypertension     Sleep apnea       Past Surgical History:   Procedure Laterality Date    APPENDECTOMY      age 20, ruptured    CATARACT EXTRACTION, BILATERAL Bilateral     CERVICAL FUSION      CYST REMOVAL Left 01/2023    DUPUYTREN CONTRACTURE RELEASE Left     left ring finger    INSERT / REPLACE / REMOVE PACEMAKER      LUMBAR LAMINECTOMY      belkis placed    TONSILLECTOMY      VASECTOMY        Family History   Problem Relation Age of Onset    Hypertension Mother     Diabetes type II Mother     Hypertension Father     Diabetes type II Brother     Hypertension Brother     Kidney disease Sister     Hypertension Daughter     GI problems Daughter       Social History     Tobacco Use    Smoking status: Never    Smokeless tobacco: Never   Vaping Use    Vaping status: Never Used   Substance Use Topics    Alcohol use: Not Currently    Drug use: Never      E-Cigarette/Vaping    E-Cigarette Use Never User       E-Cigarette/Vaping Substances    Nicotine No     THC No     CBD No     Flavoring No     Other No     Unknown No       I have reviewed and agree with the history as documented.     Patient is an 83 y/o M with h/o asthma, CHF that presents to the ED with SOB, cough, weight gain x 1 week.  Patient states today he developed a fever.  He has been coughing up white phlegm.  He had a CXR yesterday which was normal.  Patient has nasal congestion.  He has been taking torsemide 40mg BID. NO sick contacts.       History provided by:  Patient and relative  Shortness of Breath  Associated symptoms: cough and fever    Associated symptoms: no chest pain, no rash and no vomiting        Review of Systems   Constitutional:  Positive for chills and fever.   HENT:  Positive for congestion.    Respiratory:  Positive for cough and shortness of breath.    Cardiovascular:  Positive for  leg swelling. Negative for chest pain.   Gastrointestinal:  Negative for diarrhea, nausea and vomiting.   Skin:  Negative for color change, pallor and rash.   Neurological:  Positive for weakness (generalized). Negative for dizziness, light-headedness and numbness.   Psychiatric/Behavioral:  Negative for confusion.    All other systems reviewed and are negative.          Objective       ED Triage Vitals [25 0847]   Temperature Pulse Blood Pressure Respirations SpO2 Patient Position - Orthostatic VS   (!) 102.7 °F (39.3 °C) 105 168/77 20 98 % Sitting      Temp Source Heart Rate Source BP Location FiO2 (%) Pain Score    Oral Monitor Right arm -- --      Vitals      Date and Time Temp Pulse SpO2 Resp BP Pain Score FACES Pain Rating User   25 0945 -- 104 97 % 16 141/73 -- -- MB   25 0847 102.7 °F (39.3 °C) 105 98 % 20 168/77 -- -- HR            Physical Exam  Vitals and nursing note reviewed.   Constitutional:       General: He is not in acute distress.     Appearance: He is well-developed. He is ill-appearing. He is not diaphoretic.   HENT:      Head: Normocephalic and atraumatic.      Right Ear: Hearing normal.      Left Ear: Hearing normal.      Nose: Nose normal.      Mouth/Throat:      Mouth: Mucous membranes are moist.   Eyes:      Conjunctiva/sclera: Conjunctivae normal.   Cardiovascular:      Rate and Rhythm: Normal rate and regular rhythm.      Heart sounds: Normal heart sounds.   Pulmonary:      Effort: Tachypnea present.      Breath sounds: Decreased breath sounds, wheezing and rhonchi present. No rales.   Musculoskeletal:      Cervical back: Normal range of motion and neck supple.      Right lower le+ Pitting Edema present.      Left lower le+ Pitting Edema present.   Skin:     General: Skin is warm and dry.      Coloration: Skin is not jaundiced or pale.      Findings: No rash.   Neurological:      General: No focal deficit present.      Mental Status: He is alert and oriented to  person, place, and time.      Motor: No weakness.   Psychiatric:         Mood and Affect: Mood normal.         Behavior: Behavior is cooperative.         Results Reviewed       Procedure Component Value Units Date/Time    Procalcitonin [[id number]]  (Normal) Collected: 02/14/25 0905    Lab Status: Final result Specimen: Blood from Arm, Right Updated: 02/14/25 1014     Procalcitonin 0.20 ng/ml     FLU/RSV/COVID - if FLU/RSV clinically relevant (2hr TAT) [[id number]]  (Abnormal) Collected: 02/14/25 0905    Lab Status: Final result Specimen: Nares from Nose Updated: 02/14/25 0956     SARS-CoV-2 Negative     INFLUENZA A PCR Positive     INFLUENZA B PCR Negative     RSV PCR Negative    Narrative:      This test has been performed using the CoV-2/Flu/RSV plus assay on the [employer] platform. This test has been validated by the  and verified by the performing laboratory.     This test is designed to amplify and detect the following: nucleocapsid (N), envelope (E), and RNA-dependent RNA polymerase (RdRP) genes of the SARS-CoV-2 genome; matrix (M), basic polymerase (PB2), and acidic protein (PA) segments of the influenza A genome; matrix (M) and non-structural protein (NS) segments of the influenza B genome, and the nucleocapsid genes of RSV A and RSV B.     Positive results are indicative of the presence of Flu A, Flu B, RSV, and/or SARS-CoV-2 RNA. Positive results for SARS-CoV-2 or suspected novel influenza should be reported to state, local, or federal health departments according to local reporting requirements.      All results should be assessed in conjunction with clinical presentation and other laboratory markers for clinical management.     FOR PEDIATRIC PATIENTS - copy/paste COVID Guidelines URL to browser: [email or web address]       B-Type Natriuretic Peptide(BNP) [[id number]]  (Abnormal) Collected: 02/14/25 0905    Lab Status: Final result  Specimen: Blood from Arm, Right Updated: 02/14/25 0954      pg/mL     Protime-INR [[id number]]  (Abnormal) Collected: 02/14/25 0905    Lab Status: Final result Specimen: Blood from Arm, Right Updated: 02/14/25 0949     Protime 31.2 seconds      INR 2.99    Narrative:      INR Therapeutic Range    Indication                                             INR Range      Atrial Fibrillation                                               2.0-3.0  Hypercoagulable State                                    2.0.2.3  Left Ventricular Asist Device                            2.0-3.0  Mechanical Heart Valve                                  -    Aortic(with afib, MI, embolism, HF, LA enlargement,    and/or coagulopathy)                                     2.0-3.0 (2.5-3.5)     Mitral                                                             2.5-3.5  Prosthetic/Bioprosthetic Heart Valve               2.0-3.0  Venous thromboembolism (VTE: VT, PE        2.0-3.0    APTT [[id number]]  (Abnormal) Collected: 02/14/25 0905    Lab Status: Final result Specimen: Blood from Arm, Right Updated: 02/14/25 0949     PTT 49 seconds     HS Troponin I 2hr [[id number]]     Lab Status: No result Specimen: Blood     HS Troponin 0hr (reflex protocol) [[id number]]  (Abnormal) Collected: 02/14/25 0905    Lab Status: Final result Specimen: Blood from Arm, Right Updated: 02/14/25 0947     hs TnI 0hr 123 ng/L     Comprehensive metabolic panel [[id number]]  (Abnormal) Collected: 02/14/25 0905    Lab Status: Final result Specimen: Blood from Arm, Right Updated: 02/14/25 0940     Sodium 131 mmol/L      Potassium 4.7 mmol/L      Chloride 95 mmol/L      CO2 28 mmol/L      ANION GAP 8 mmol/L      BUN 38 mg/dL      Creatinine 1.39 mg/dL      Glucose 243 mg/dL      Calcium 9.0 mg/dL      AST 30 U/L      ALT 17 U/L      Alkaline Phosphatase 138 U/L      Total Protein 6.5 g/dL      Albumin 3.7 g/dL      Total Bilirubin 1.90 mg/dL      eGFR 45 ml/min/1.73sq m      Narrative:      National Kid[employer] Disease Foundation guidelines for Chronic Kidney Disease (CKD):     Stage 1 with normal or high GFR (GFR > 90 mL/min/1.73 square meters)    Stage 2 Mild CKD (GFR = 60-89 mL/min/1.73 square meters)    Stage 3A Moderate CKD (GFR = 45-59 mL/min/1.73 square meters)    Stage 3B Moderate CKD (GFR = 30-44 mL/min/1.73 square meters)    Stage 4 Severe CKD (GFR = 15-29 mL/min/1.73 square meters)    Stage 5 End Stage CKD (GFR <15 mL/min/1.73 square meters)  Note: GFR calculation is accurate only with a steady state creatinine    Lactic acid, plasma (w/reflex if result > 2.0) [[id number]]  (Abnormal) Collected: 02/14/25 0905    Lab Status: Final result Specimen: Blood from Arm, Right Updated: 02/14/25 0939     LACTIC ACID 2.1 mmol/L     Narrative:      Result may be elevated if tourniquet was used during collection.    Lactic acid 2 Hours [[id number]]     Lab Status: No result Specimen: Blood     CBC and differential [[id number]]  (Abnormal) Collected: 02/14/25 0905    Lab Status: Final result Specimen: Blood from Arm, Right Updated: 02/14/25 0920     WBC 6.85 Thousand/uL      RBC 4.32 Million/uL      Hemoglobin 11.4 g/dL      Hematocrit 36.9 %      MCV 85 fL      MCH 26.4 pg      MCHC 30.9 g/dL      RDW 18.3 %      MPV 11.8 fL      Platelets 112 Thousands/uL      nRBC 0 /100 WBCs      Segmented % 82 %      Immature Grans % 1 %      Lymphocytes % 7 %      Monocytes % 10 %      Eosinophils Relative 0 %      Basophils Relative 0 %      Absolute Neutrophils 5.60 Thousands/µL      Absolute Immature Grans 0.08 Thousand/uL      Absolute Lymphocytes 0.48 Thousands/µL      Absolute Monocytes 0.66 Thousand/µL      Eosinophils Absolute 0.02 Thousand/µL      Basophils Absolute 0.01 Thousands/µL     Blood culture #1 [[id number]] Collected: 02/14/25 0905    Lab Status: In process Specimen: Blood from Arm, Right Updated: 02/14/25 0915    Blood culture #2 [[id number]] Collected: 02/14/25 0905    Lab Status: In  process Specimen: Blood from Arm, Right Updated: 02/14/25 0915            XR chest 1 view portable   ED Interpretation by Nadine Chiu PA-C (02/14 0917)   RLL pneumonia      Final Interpretation by Dav Garcia MD (02/14 1021)      Groundglass opacity in the right lower lung field which could represent developing pneumonia.   This was noted by the emergency department            Workstation performed: [id number]             ECG 12 Lead Documentation Only    Date/Time: 2/14/2025 9:07 AM    Performed by: Nadine Chiu PA-C  Authorized by: Nadine Chiu PA-C    Indications / Diagnosis:  SOB  ECG reviewed by me, the ED Provider: yes    Patient location:  ED  Previous ECG:     Previous ECG:  Compared to current    Similarity:  No change  Rate:     ECG rate:  91  Rhythm:     Rhythm: paced        ED Medication and Procedure Management   Prior to Admission Medications   Prescriptions Last Dose Informant Patient Reported? Taking?   Albuterol Sulfate 108 (90 Base) MCG/ACT AEPB  Self No No   Sig: Inhale 2 puffs every 4 (four) hours as needed (SOB, wheezing, cough)   Cholecalciferol (Vitamin D3) 25 MCG (1000 UT) CAPS  Self Yes No   Sig: Take 1 capsule by mouth every 24 hours   Coenzyme Q10 (Co Q10) 100 MG CAPS  Self Yes No   Sig: Take 100 mg by mouth in the morning   Easy Comfort Pen Needles 33G X 4 MM MISC  Self Yes No   Sig: USE WITH INSULIN FIVE TIMES DAILY AS DIRECTED   Glucosamine-Chondroitin 500-400 MG CAPS  Self Yes No   Sig: Take 1 capsule by mouth daily   Insulin Glargine Solostar (Lantus SoloStar) 100 UNIT/ML SOPN   No No   Sig: INJECT 20 UNITS SUBCUTANEOUSLY IN THE MORNING AND 20 UNITS IN THE EVENING.   Multiple Vitamin (MULTIVITAMIN ADULT PO)  Self Yes No   Sig: every 24 hours   Omega-3 Fatty Acids (Fish Oil) 360 MG CAPS  Self Yes No   Sig: Take by mouth   OneTouch Verio test strip  Self Yes No   Sig: USE AS DIRECTED FOUR TIMES DAILY   allopurinol (ZYLOPRIM) 300 mg tablet   Self Yes No   Sig: Take 300 mg by mouth every 24 hours   atorvastatin (LIPITOR) 20 mg tablet  Self Yes No   Sig: Take 20 mg by mouth daily   azelastine (ASTELIN) 0.1 % nasal spray  Self Yes No   Si sprays into each nostril 2 (two) times a day   budesonide (PULMICORT) 0.5 mg/2 mL nebulizer solution  Self Yes No   Sig: Take 0.5 mg by nebulization 2 (two) times a day Rinse mouth after use.   fluticasone (FLONASE) 50 mcg/act nasal spray  Self Yes No   Si spray into each nostril 2 (two) times a day   formoterol (PERFOROMIST) 20 MCG/2ML nebulizer solution  Self Yes No   Sig: Take 20 mcg by nebulization 2 (two) times a day   gabapentin (NEURONTIN) 100 mg capsule  Self No No   Sig: Take 1 capsule (100 mg total) by mouth daily   insulin aspart (NovoLOG FlexPen) 100 UNIT/ML injection pen   No No   Sig: INJECT 25 UNITS SUBCUTANEOUSLY with breakfast, LUNCH and with dinner plus sliding scale. (UP  UNITS DAILY). 150-200: 1 unit; 201-250: 2 units; 251-300: 3 units; 301-350: 4 units; 351-400: 5 units; 400+: 6 units   ipratropium (ATROVENT) 0.03 % nasal spray  Self Yes No   Sig: INSTILL 2 SPRAYS IN EACH NOSTRIL TWICE DAILY   ipratropium-albuterol (DUO-NEB) 0.5-2.5 mg/3 mL nebulizer solution  Self Yes No   Sig: Take 3 mL by nebulization 2 (two) times a day   loratadine (CLARITIN) 10 mg tablet  Self Yes No   Sig: Take 10 mg by mouth daily   magnesium Oxide (MAG-OX) 400 mg TABS  Self No No   Sig: Take 1 tablet (400 mg total) by mouth daily   metoprolol succinate (TOPROL-XL) 25 mg 24 hr tablet  Self No No   Sig: Take 1 tablet (25 mg total) by mouth daily   mupirocin (BACTROBAN) 2 % ointment  Self No No   Sig: Apply topically every other day With alginate dressing   potassium chloride (K-DUR,KLOR-CON) 20 mEq tablet  Self No No   Sig: Take 1 tablet (20 mEq total) by mouth daily   rivaroxaban (Xarelto) 20 mg tablet  Self No No   Sig: Take 1 tablet (20 mg total) by mouth daily with breakfast   senna (SENOKOT) 8.6 mg  Self No  No   Sig: Take 2 tablets (17.2 mg total) by mouth daily at bedtime as needed for constipation   torsemide (DEMADEX) 20 mg tablet  Self No No   Sig: Take 2 tablets (40 mg total) by mouth every morning AND 2 tablets (40 mg total) daily after lunch.   triamcinolone (KENALOG) 0.1 % cream  Self Yes No   Si Application 2 (two) times a day To affected areas   valACYclovir (VALTREX) 1,000 mg tablet  Self Yes No   Sig: Take 1,000 mg by mouth 3 (three) times a day      Facility-Administered Medications: None     Patient's Medications   Discharge Prescriptions    No medications on file     No discharge procedures on file.  ED SEPSIS DOCUMENTATION   Time reflects when diagnosis was documented in both MDM as applicable and the Disposition within this note       Time User Action Codes Description Comment    2025 10:09 AM Nadine Chiu [I50.9] CHF (congestive heart failure) ([facility])     2025 10:09 AM Nadine Chiu [J10.1] Influenza A     2025 10:09 AM Nadine Chiu [J18.9] Pneumonia     2025 10:09 AM Nadine Chiu [A41.9] Sepsis ([facility])     2025 10:24 AM Nadine Chiu [R79.89] Elevated troponin level not due to acute coronary syndrome            Initial Sepsis Screening       Row Name 25 0947                Is the patient's history suggestive of a new or worsening infection? Yes (Proceed)  -CD        Suspected source of infection pneumonia  -CD        Indicate SIRS criteria Tachycardia > 90 bpm;Hyperthemia > 38.3C (100.9F) OR Hypothermia <36C (96.8F)  -CD        Are two or more of the above signs & symptoms of infection both present and new to the patient? Yes (Proceed)  -CD        Assess for evidence of organ dysfunction: Are any of the below criteria present within 6 hours of suspected infection and SIRS criteria that are NOT considered to be chronic conditions? Lactate > 2.0  -CD        Date of presentation of severe sepsis 25  -CD        Time  "of presentation of severe sepsis 0948  -        Sepsis Note: Click \"NEXT\" below (NOT \"close\") to generate sepsis note based on above information. YES (proceed by clicking \"NEXT\")  -CD                  User Key  (r) = Recorded By, (t) = Taken By, (c) = Cosigned By      Initials Name Provider Type     Nadine Chiu PA-C Physician Assistant                  Default Flowsheet Data (Last 720 Hours)       Sepsis Reassess       Row Name 02/14/25 1035                   Repeat Volume Status and Tissue Perfusion Assessment Performed    Date of Reassessment: 02/14/25  -        Time of Reassessment: 1035  -CD        Sepsis Reassessment Note: Click \"NEXT\" below (NOT \"close\") to generate sepsis reassessment note. YES (proceed by clicking \"NEXT\")  -        Repeat Volume Status and Tissue Perfusion Assessment Performed --                  User Key  (r) = Recorded By, (t) = Taken By, (c) = Cosigned By      Initials Name Provider Type     Nadine Chiu PA-C Physician Assistant                     Nadine Chiu PA-C  02/14/25 1036    "

## 2025-02-14 NOTE — ASSESSMENT & PLAN NOTE
Present on admission as evidenced by tachycardia, tachypnea, hyperthermia  Likely secondary to influenza A and bacterial pneumonia  Initiate ceftriaxone and azithromycin for bacterial pneumonia  Tamiflu for influenza A  Contact and droplet precautions  Follow-up blood cultures  Trend fevers curve/WBC count/procalcitonin  De-escalate antibiotics as appropriate

## 2025-02-14 NOTE — ASSESSMENT & PLAN NOTE
Patient with reports of a 3-day onset of shortness of breath, PND.  Patient states this morning went to cardiologist appointment when his daughter picked him up to be increasingly tachypnea prompting a visit to the emergency room.

## 2025-02-14 NOTE — ASSESSMENT & PLAN NOTE
"Lab Results   Component Value Date    HGBA1C 9.1 (H) 01/28/2025       No results for input(s): \"POCGLU\" in the last 72 hours.    Blood Sugar Average: Last 72 hrs:    Poorly controlled on home insulin regimen  Continue Lantus 20 units every 12 hours  Lispro 25 units 3 times daily with meals  Sliding scale insulin with Accu-Cheks  Diabetic diet  Target blood glucose 140-180  "

## 2025-02-14 NOTE — RESPIRATORY THERAPY NOTE
RT Protocol Note  Hammad Montesinos 84 y.o. male MRN: [id number]  Unit/Bed#: -01 Encounter: [id number]    Assessment    Principal Problem:    Sepsis ([facility])  Active Problems:    Type 2 diabetes mellitus with hyperglycemia, with long-term current use of insulin (HCC)    Mixed hyperlipidemia    Elevated troponin    Paroxysmal A-fib ([facility])    Influenza A    HFrEF (heart failure with reduced ejection fraction) ([facility])      Home Pulmonary Medications:  Albuterol/pulmicort   02/14/25 1625   Respiratory Protocol   Protocol Initiated? Yes   Protocol Selection Respiratory   Language Barrier? No   Medical & Social History Reviewed? Yes   Diagnostic Studies Reviewed? Yes   Physical Assessment Performed? Yes   Respiratory Plan Mild Distress pathway   Respiratory Assessment   Assessment Type Assess only   General Appearance Awake;Alert   Respiratory Pattern Normal   Chest Assessment Chest expansion symmetrical   Bilateral Breath Sounds Expiratory wheezes   Cough Non-productive   Resp Comments pt takes pulmicort/albuterol at home/plan to order xopenex tid   O2 Device NC            Past Medical History:   Diagnosis Date    Asthma     Bradycardia     Carcinoma, basal cell, skin     head and face and ear    CHF (congestive heart failure) ([facility])     Chronic kidney disease     Coronary artery disease     COVID-19 12/05/2022    Diabetes mellitus ([facility])     Dupuytren contracture     bilateral ring and right middle    Gout     Herniated lumbar intervertebral disc     Hypertension     Sleep apnea      Social History     Socioeconomic History    Marital status: /Civil Union     Spouse name: None    Number of children: None    Years of education: None    Highest education level: None   Occupational History    Occupation: teacher     Comment: retired   Tobacco Use    Smoking status: Never    Smokeless tobacco: Never   Vaping Use    Vaping status: Never Used   Substance and Sexual Activity    Alcohol use: Not Currently    Drug use:  "Never    Sexual activity: Not Currently     Birth control/protection: Male Sterilization   Other Topics Concern    None   Social History Narrative    None     Social Drivers of Health     Financial Resource Strain: Not on file   Food Insecurity: No Food Insecurity (2025)    Nursing - Inadequate Food Risk Classification     Worried About Running Out of Food in the Last Year: Not on file     Ran Out of Food in the Last Year: Not on file     Ran Out of Food in the Last Year: Never true   Transportation Needs: Unmet Transportation Needs (2025)    Nursing - Transportation Risk Classification     Lack of Transportation: Not on file     Lack of Transportation: Yes   Physical Activity: Not on file   Stress: Not on file   Social Connections: Not on file   Intimate Partner Violence: Unknown (2025)    Nursing IPS     Feels Physically and Emotionally Safe: Not on file     Physically Hurt by Someone: Not on file     Humiliated or Emotionally Abused by Someone: Not on file     Physically Hurt by Someone: No     Hurt or Threatened by Someone: No   Housing Stability: Unknown (2025)    Nursing: Inadequate Housing Risk Classification     Has Housing: Not on file     Worried About Losing Housing: Not on file     Unable to Get Utilities: Not on file     Unable to Pay for Housing in the Last Year: No     Has Housin       Subjective         Objective    Physical Exam:   Assessment Type: (P) Assess only  General Appearance: (P) Awake, Alert  Respiratory Pattern: (P) Normal  Chest Assessment: (P) Chest expansion symmetrical  Bilateral Breath Sounds: (P) Expiratory wheezes  Cough: (P) Non-productive  O2 Device: (P) NC    Vitals:  Blood pressure 118/61, pulse 69, temperature 98.6 °F (37 °C), temperature source Oral, resp. rate 17, height 5' 10\" (1.778 m), weight 101 kg (222 lb 14.4 oz), SpO2 99%.          Imaging and other studies:     O2 Device: (P) NC     Plan    Respiratory Plan: (P) Mild Distress pathway    "     Resp Comments: (P) pt takes pulmicort/albuterol at home/plan to order xopenex tid

## 2025-02-14 NOTE — ASSESSMENT & PLAN NOTE
Rate control: Toprol XL 25 mg daily ,   Anticoagulation: Xarelto 20 mg daily -reports that he is compliant.  Implanted Biotronik permanent pacemaker secondary to sick sinus syndrome.  Last EP device interrogation showed 100% A-fib burden

## 2025-02-14 NOTE — CONSULTS
Consultation - Cardiology   Name: Hammad Montesinos 84 y.o. male I MRN: [id number]  Unit/Bed#: ED 12 I Date of Admission: 2/14/2025   Date of Service: 2/14/2025 I Hospital Day: 0   Inpatient consult to Cardiology  Consult performed by: DOUGLAS Briseno  Consult ordered by: Jonny Soto DO        Physician Requesting Evaluation: Jonny Soto DO   Reason for Evaluation / Principal Problem: elevated troponin     Assessment & Plan  Sepsis ([facility])  Patient with reports of a 3-day onset of shortness of breath, PND.  Patient states this morning went to cardiologist appointment when his daughter picked him up to be increasingly tachypnea prompting a visit to the emergency room.  Influenza A  Positive on PCR -management per primary team  HFrEF (heart failure with reduced ejection fraction) ([facility])  Wt Readings from Last 3 Encounters:   02/07/25 100 kg (220 lb 6.4 oz)   02/07/25 100 kg (221 lb)   02/04/25 98.4 kg (217 lb)   TTE 1/28/2025: EF 35%, moderate global hypokinesis with regional variation, abnormal diastolic inflow pattern question due to atrial fibs, reduced RV function, biatrial dilatation with severe left atrial dilatation, likely moderate to severe MR, mild TR  GDMT: Toprol XL 25 mg daily,  torsemide 40 mg twice daily.      Patient reports home dry weight is 211 to 214 pounds.  Patient reports a 10 pound weight gain in the last week, within increased abdominal girth.  Chest x-ray on arrival: Groundglass opacity in the right lower lobe  EKG:  Telemetry: V paced rhythm  Pertinent labs: Sodium 131, , troponin 123, creatinine 1.39, lactate 2.1  Elevated troponin  Initial Troponin 123  Patient denies chest pain  Has cardiac CTA ordered for Monday, 2/17/2025  Primary hypertension  Controlled with Toprol and torsemide  Paroxysmal A-fib ([facility])  Rate control: Toprol XL 25 mg daily ,   Anticoagulation: Xarelto 20 mg daily -reports that he is compliant.  Implanted Biotronik permanent pacemaker secondary  to sick sinus syndrome.  Last EP device interrogation showed 100% A-fib burden  Mixed hyperlipidemia  Controlled with atorvastatin 20 mg daily  Type 2 diabetes mellitus with hyperglycemia, with long-term current use of insulin (HCC)  Per primary team    Recommendations  Gentle diuresis in the setting of sepsis - additional 2mg Bumex now   Telemetry  Continue Toprol XL  Continue Xarelto  Strict I's and O's, daily weights    History of Present Illness   Hammad Montesinos is a 84 y.o. male with a past medical history of paroxysmal atrial fibrillation, hypertension, HFrEF, type 2 diabetes, stage III chronic kidney disease,  who presents with increasing dyspnea for the last 3 days.    Patient was seen in hospital follow-up on 2/7/2025 by cardiology.  BMP showed a slight increase in his creatinine and spironolactone which was started at previous visit was discontinued.  3 days ago patient began feeling increasing shortness of breath, reports a 10 pound weight gain on home scale.  Chest x-ray and BNP completed on 2/13/2025, and patient scheduled for an urgent follow-up.  His daughter picked him up for his follow-up and he where he was noted to be more dyspneic prompting evaluation in the emergency department    Upon arrival patient febrile, tachycardic and tachypneic.  Patient found to be influenza positive, and imaging reveals new right lower lobe ground glass opacity.  Given his weight gain 1 mg of IV Bumex was given in the emergency department      Review of Systems   Respiratory:  Positive for shortness of breath and wheezing. Negative for chest tightness.    Cardiovascular:  Positive for leg swelling. Negative for chest pain and palpitations.   Gastrointestinal:  Positive for blood in stool and diarrhea.   Genitourinary:  Negative for hematuria.   Neurological:  Negative for dizziness, syncope, weakness and light-headedness.     Medical History Review: I have reviewed the patient's PMH, PSH, Social History, Family  "History, Meds, and Allergies     Objective :  Temp:  [102.7 °F (39.3 °C)] 102.7 °F (39.3 °C)  HR:  [] 105  BP: (136-168)/(63-90) 138/63  Resp:  [16-28] 24  SpO2:  [95 %-98 %] 97 %  O2 Device: Nasal cannula      Physical Exam  Constitutional:       General: He is in acute distress.      Appearance: He is ill-appearing.   HENT:      Head: Normocephalic and atraumatic.      Mouth/Throat:      Mouth: Mucous membranes are moist.   Cardiovascular:      Rate and Rhythm: Normal rate. Rhythm irregular.      Pulses: Normal pulses.           Carotid pulses are 2+ on the right side and 2+ on the left side.     Heart sounds: Heart sounds are distant.      Comments: Trace Lower extremity edema   Pulmonary:      Effort: Respiratory distress present.      Breath sounds: Wheezing and rhonchi present.   Abdominal:      General: There is distension.      Palpations: Abdomen is soft.   Neurological:      Mental Status: He is alert.         Lab Results: I have reviewed the following results:Troponin,BNP:  .     02/14/25  0905   HSTNI0 123*   *      Results from last 7 days   Lab Units 02/14/25  0905   WBC Thousand/uL 6.85   HEMOGLOBIN g/dL 11.4*   HEMATOCRIT % 36.9   PLATELETS Thousands/uL 112*     Results from last 7 days   Lab Units 02/14/25  0905 02/13/25  1253   POTASSIUM mmol/L 4.7 3.8   CHLORIDE mmol/L 95* 93*   CO2 mmol/L 28 27   BUN mg/dL 38* 33*   CREATININE mg/dL 1.39* 1.44*   CALCIUM mg/dL 9.0 9.0     Results from last 7 days   Lab Units 02/14/25  0905   INR  2.99*   PTT seconds 49*     Lab Results   Component Value Date    HGBA1C 9.1 (H) 01/28/2025     No results found for: \"CKTOTAL\", \"CKMB\", \"CKMBINDEX\", \"TROPONINI\"    Imaging Results Review: I reviewed radiology reports from this admission including: chest xray.  Other Study Results Review: No additional pertinent studies reviewed.    VTE Prophylaxis: VTE covered by:  [START ON 2/15/2025] rivaroxaban, Oral         "

## 2025-02-14 NOTE — ASSESSMENT & PLAN NOTE
Influenza A + on 2/14/2025  Saturating well on room air  Initiate Tamiflu  Contact and droplet precautions  Monitor respiratory status

## 2025-02-14 NOTE — ASSESSMENT & PLAN NOTE
Wt Readings from Last 3 Encounters:   02/07/25 100 kg (220 lb 6.4 oz)   02/07/25 100 kg (221 lb)   02/04/25 98.4 kg (217 lb)   TTE 1/28/2025: EF 35%, moderate global hypokinesis with regional variation, abnormal diastolic inflow pattern question due to atrial fibs, reduced RV function, biatrial dilatation with severe left atrial dilatation, likely moderate to severe MR, mild TR  GDMT: Toprol XL 25 mg daily,  torsemide 40 mg twice daily.      Patient reports home dry weight is 211 to 214 pounds.  Patient reports a 10 pound weight gain in the last week, within increased abdominal girth.  Chest x-ray on arrival: Groundglass opacity in the right lower lobe  EKG:  Telemetry: V paced rhythm  Pertinent labs: Sodium 131, , troponin 123, creatinine 1.39, lactate 2.1

## 2025-02-14 NOTE — ASSESSMENT & PLAN NOTE
Not in acute exacerbation  Ejection fraction 35%  Hold home torsemide in the setting of sepsis and volume depletion  Caution with IV fluid hydration  Continue home beta-blocker  Cardiology consultation appreciated

## 2025-02-14 NOTE — H&P
"H&P - Hospitalist   Name: Hammad Montesinos 84 y.o. male I MRN: [id number]  Unit/Bed#: ED 12 I Date of Admission: 2/14/2025   Date of Service: 2/14/2025 I Hospital Day: 0     Assessment & Plan  Sepsis ([facility])  Present on admission as evidenced by tachycardia, tachypnea, hyperthermia  Likely secondary to influenza A and bacterial pneumonia  Initiate ceftriaxone and azithromycin for bacterial pneumonia  Tamiflu for influenza A  Contact and droplet precautions  Follow-up blood cultures  Trend fevers curve/WBC count/procalcitonin  De-escalate antibiotics as appropriate  Influenza A  Influenza A + on 2/14/2025  Saturating well on room air  Initiate Tamiflu  Contact and droplet precautions  Monitor respiratory status  HFrEF (heart failure with reduced ejection fraction) ([facility])  Not in acute exacerbation  Ejection fraction 35%  Hold home torsemide in the setting of sepsis and volume depletion  Caution with IV fluid hydration  Continue home beta-blocker  Cardiology consultation appreciated  Elevated troponin  Troponin I 123 on presentation  Patient denies anginal symptoms  Likely demand ischemia in the setting of sepsis  Trend troponin I until plateau  Monitor for anginal symptoms  Cardiology consultation appreciated  Paroxysmal A-fib ([facility])  Heart rates well-controlled  Continue home beta-blocker for rate control and Xarelto for CVA prophylaxis  Monitor heart rates  Type 2 diabetes mellitus with hyperglycemia, with long-term current use of insulin ([facility])  Lab Results   Component Value Date    HGBA1C 9.1 (H) 01/28/2025       No results for input(s): \"POCGLU\" in the last 72 hours.    Blood Sugar Average: Last 72 hrs:    Poorly controlled on home insulin regimen  Continue Lantus 20 units every 12 hours  Lispro 25 units 3 times daily with meals  Sliding scale insulin with Accu-Cheks  Diabetic diet  Target blood glucose 140-180  Mixed hyperlipidemia  Continue home statin therapy    VTE Pharmacologic Prophylaxis: VTE Score: 6 High " Risk (Score >/= 5) - Pharmacological DVT Prophylaxis Ordered: apixaban (Eliquis). Sequential Compression Devices Ordered.  Code Status: Level 1 - Full Code  Discussion with family: Updated  (wife) at bedside.    Anticipated Length of Stay: Patient will be admitted on an inpatient basis with an anticipated length of stay of greater than 2 midnights secondary to sepsis secondary to bacterial and viral pneumonia.    History of Present Illness   Chief Complaint: Shortness of breath    Hammad Montesinos is a 84 y.o. male with a PMH of insulin-dependent type 2 diabetes mellitus, HFrEF, hyperlipidemia, paroxysmal atrial fibrillation on Xarelto who presents with shortness of breath.  The patient was found to be influenza A positive in the ED.  The patient was also found to have a consolidation on chest x-ray.  In the ED he was found to be requiring 2 L of nasal cannula supplemental O2 and was also noted to be hypothermic.  Blood cultures were obtained and the patient was started on broad-spectrum IV antibiotics.  The patient's only complaints are cough and shortness of breath this time.  He will be admitted to the general medicine service for further evaluation and treatment of sepsis secondary to influenza A and bacterial pneumonia.    Review of Systems   Constitutional:  Negative for chills and fever.   HENT:  Negative for ear pain and sore throat.    Eyes:  Negative for pain and visual disturbance.   Respiratory:  Positive for cough and shortness of breath.    Cardiovascular:  Negative for chest pain and palpitations.   Gastrointestinal:  Negative for abdominal pain and vomiting.   Genitourinary:  Negative for dysuria and hematuria.   Musculoskeletal:  Negative for arthralgias and back pain.   Skin:  Negative for color change and rash.   Neurological:  Negative for seizures and syncope.   All other systems reviewed and are negative.      Historical Information   Past Medical History:   Diagnosis Date     Asthma     Bradycardia     Carcinoma, basal cell, skin     head and face and ear    CHF (congestive heart failure) (HCC)     Chronic kidney disease     Coronary artery disease     COVID-19 12/05/2022    Diabetes mellitus (HCC)     Dupuytren contracture     bilateral ring and right middle    Gout     Herniated lumbar intervertebral disc     Hypertension     Sleep apnea      Past Surgical History:   Procedure Laterality Date    APPENDECTOMY      age 20, ruptured    CATARACT EXTRACTION, BILATERAL Bilateral     CERVICAL FUSION      CYST REMOVAL Left 01/2023    DUPUYTREN CONTRACTURE RELEASE Left     left ring finger    INSERT / REPLACE / REMOVE PACEMAKER      LUMBAR LAMINECTOMY      belkis placed    TONSILLECTOMY      VASECTOMY       Social History     Tobacco Use    Smoking status: Never    Smokeless tobacco: Never   Vaping Use    Vaping status: Never Used   Substance and Sexual Activity    Alcohol use: Not Currently    Drug use: Never    Sexual activity: Not Currently     Birth control/protection: Male Sterilization     E-Cigarette/Vaping    E-Cigarette Use Never User      E-Cigarette/Vaping Substances    Nicotine No     THC No     CBD No     Flavoring No     Other No     Unknown No      Meds/Allergies   I have reviewed home medications using recent Epic encounter.  Prior to Admission medications    Medication Sig Start Date End Date Taking? Authorizing Provider   Albuterol Sulfate 108 (90 Base) MCG/ACT AEPB Inhale 2 puffs every 4 (four) hours as needed (SOB, wheezing, cough) 11/26/24   Montrell High DO   allopurinol (ZYLOPRIM) 300 mg tablet Take 300 mg by mouth every 24 hours    Historical Provider, MD   atorvastatin (LIPITOR) 20 mg tablet Take 20 mg by mouth daily 2/22/22   Historical Provider, MD   azelastine (ASTELIN) 0.1 % nasal spray 2 sprays into each nostril 2 (two) times a day    Historical Provider, MD   budesonide (PULMICORT) 0.5 mg/2 mL nebulizer solution Take 0.5 mg by nebulization 2 (two) times a day Rinse  mouth after use.    Historical Provider, MD   Cholecalciferol (Vitamin D3) 25 MCG (1000 UT) CAPS Take 1 capsule by mouth every 24 hours    Historical Provider, MD   Coenzyme Q10 (Co Q10) 100 MG CAPS Take 100 mg by mouth in the morning    Historical Provider, MD   Easy Comfort Pen Needles 33G X 4 MM MISC USE WITH INSULIN FIVE TIMES DAILY AS DIRECTED 10/25/22   Historical Provider, MD   fluticasone (FLONASE) 50 mcg/act nasal spray 1 spray into each nostril 2 (two) times a day    Historical Provider, MD   formoterol (PERFOROMIST) 20 MCG/2ML nebulizer solution Take 20 mcg by nebulization 2 (two) times a day    Historical Provider, MD   gabapentin (NEURONTIN) 100 mg capsule Take 1 capsule (100 mg total) by mouth daily 1/8/24   Ara Leigh MD   Glucosamine-Chondroitin 500-400 MG CAPS Take 1 capsule by mouth daily    Historical Provider, MD   insulin aspart (NovoLOG FlexPen) 100 UNIT/ML injection pen INJECT 25 UNITS SUBCUTANEOUSLY with breakfast, LUNCH and with dinner plus sliding scale. (UP  UNITS DAILY). 150-200: 1 unit; 201-250: 2 units; 251-300: 3 units; 301-350: 4 units; 351-400: 5 units; 400+: 6 units 2/7/25   Jonn Rossi PA-C   Insulin Glargine Solostar (Lantus SoloStar) 100 UNIT/ML SOPN INJECT 20 UNITS SUBCUTANEOUSLY IN THE MORNING AND 20 UNITS IN THE EVENING. 2/13/25   Jonn Rossi PA-C   ipratropium (ATROVENT) 0.03 % nasal spray INSTILL 2 SPRAYS IN EACH NOSTRIL TWICE DAILY 10/14/24   Historical Provider, MD   ipratropium-albuterol (DUO-NEB) 0.5-2.5 mg/3 mL nebulizer solution Take 3 mL by nebulization 2 (two) times a day 9/26/23   Historical Provider, MD   loratadine (CLARITIN) 10 mg tablet Take 10 mg by mouth daily    Historical Provider, MD   magnesium Oxide (MAG-OX) 400 mg TABS Take 1 tablet (400 mg total) by mouth daily 1/9/24   Ara Leigh MD   metoprolol succinate (TOPROL-XL) 25 mg 24 hr tablet Take 1 tablet (25 mg total) by mouth daily 1/30/25   Jayy Babcock PA-C   Multiple  Vitamin (MULTIVITAMIN ADULT PO) every 24 hours    Historical Provider, MD   mupirocin (BACTROBAN) 2 % ointment Apply topically every other day With alginate dressing 8/20/24   Julio César Mac DPM   Omega-3 Fatty Acids (Fish Oil) 360 MG CAPS Take by mouth    Historical Provider, MD   OneTouch Verio test strip USE AS DIRECTED FOUR TIMES DAILY 2/10/22   Historical Provider, MD   potassium chloride (K-DUR,KLOR-CON) 20 mEq tablet Take 1 tablet (20 mEq total) by mouth daily 1/8/24   Ara Leigh MD   rivaroxaban (Xarelto) 20 mg tablet Take 1 tablet (20 mg total) by mouth daily with breakfast 1/25/24   Justus Holland MD   senna (SENOKOT) 8.6 mg Take 2 tablets (17.2 mg total) by mouth daily at bedtime as needed for constipation 1/8/24   Ara Leigh MD   torsemide (DEMADEX) 20 mg tablet Take 2 tablets (40 mg total) by mouth every morning AND 2 tablets (40 mg total) daily after lunch. 1/30/25   Jayy Babcock PA-C   triamcinolone (KENALOG) 0.1 % cream 1 Application 2 (two) times a day To affected areas 6/2/23   Historical Provider, MD   valACYclovir (VALTREX) 1,000 mg tablet Take 1,000 mg by mouth 3 (three) times a day 1/23/25   Historical Provider, MD     Allergies   Allergen Reactions    Hydrochlorothiazide Other (See Comments)     SEVERE DIZZINESS    Insulin Lispro Other (See Comments)     Cannot control blood sugars(Humalog)    Lisinopril Other (See Comments)     Reports cough with use     Minoxidil GI Intolerance    Nifedipine Other (See Comments)     EDEMA LEGS    Other Other (See Comments)     SEVERE DIZZINESS  Hydrap-es    Prazosin Other (See Comments)     CHEST PAIN      Venlafaxine Other (See Comments)     Made pt feel bad      Doxycycline Rash    Simvastatin Rash     PEDAL EDEMA         Objective :  Temp:  [102.7 °F (39.3 °C)] 102.7 °F (39.3 °C)  HR:  [] 69  BP: (136-168)/() 151/106  Resp:  [16-28] 24  SpO2:  [95 %-98 %] 98 %  O2 Device: Nasal cannula  Nasal Cannula O2 Flow Rate (L/min):  [2 L/min] 2  L/min    Physical Exam  Vitals and nursing note reviewed.   Constitutional:       General: He is not in acute distress.     Appearance: He is well-developed.   HENT:      Head: Normocephalic and atraumatic.   Eyes:      Conjunctiva/sclera: Conjunctivae normal.   Cardiovascular:      Rate and Rhythm: Normal rate and regular rhythm.      Heart sounds: No murmur heard.  Pulmonary:      Effort: Pulmonary effort is normal. No respiratory distress.      Breath sounds: Normal breath sounds.   Abdominal:      Palpations: Abdomen is soft.      Tenderness: There is no abdominal tenderness.   Musculoskeletal:         General: No swelling.      Cervical back: Neck supple.   Skin:     General: Skin is warm and dry.      Capillary Refill: Capillary refill takes less than 2 seconds.   Neurological:      Mental Status: He is alert.   Psychiatric:         Mood and Affect: Mood normal.          Lab Results: I have reviewed the following results:  Results from last 7 days   Lab Units 02/14/25  0905   WBC Thousand/uL 6.85   HEMOGLOBIN g/dL 11.4*   HEMATOCRIT % 36.9   PLATELETS Thousands/uL 112*   SEGS PCT % 82*   LYMPHO PCT % 7*   MONO PCT % 10   EOS PCT % 0     Results from last 7 days   Lab Units 02/14/25  0905   SODIUM mmol/L 131*   POTASSIUM mmol/L 4.7   CHLORIDE mmol/L 95*   CO2 mmol/L 28   BUN mg/dL 38*   CREATININE mg/dL 1.39*   ANION GAP mmol/L 8   CALCIUM mg/dL 9.0   ALBUMIN g/dL 3.7   TOTAL BILIRUBIN mg/dL 1.90*   ALK PHOS U/L 138*   ALT U/L 17   AST U/L 30   GLUCOSE RANDOM mg/dL 243*     Results from last 7 days   Lab Units 02/14/25  0905   INR  2.99*         Lab Results   Component Value Date    HGBA1C 9.1 (H) 01/28/2025    HGBA1C 6.8 11/16/2023    HGBA1C 8.1 08/03/2023     Results from last 7 days   Lab Units 02/14/25  1113 02/14/25  0905   LACTIC ACID mmol/L 1.5 2.1*   PROCALCITONIN ng/ml  --  0.20       Imaging Results Review: I reviewed radiology reports from this admission including: chest xray.  Other Study Results  Review: No additional pertinent studies reviewed.    Administrative Statements   I have spent a total time of 65 minutes in caring for this patient on the day of the visit/encounter including Diagnostic results, Prognosis, Risks and benefits of tx options, Instructions for management, Patient and family education, Importance of tx compliance, Risk factor reductions, Impressions, Counseling / Coordination of care, Documenting in the medical record, Reviewing / ordering tests, medicine, procedures  , Obtaining or reviewing history  , and Communicating with other healthcare professionals .    ** Please Note: This note has been constructed using a voice recognition system. **

## 2025-02-14 NOTE — SEPSIS NOTE
"  Sepsis Note   Hammad Montesinos 84 y.o. male MRN: [id number]  Unit/Bed#: ED 12 Encounter: [id number]       Initial Sepsis Screening       Row Name 02/14/25 0947                Is the patient's history suggestive of a new or worsening infection? Yes (Proceed)  -CD        Suspected source of infection pneumonia  -CD        Indicate SIRS criteria Tachycardia > 90 bpm;Hyperthemia > 38.3C (100.9F) OR Hypothermia <36C (96.8F)  -CD        Are two or more of the above signs & symptoms of infection both present and new to the patient? Yes (Proceed)  -CD        Assess for evidence of organ dysfunction: Are any of the below criteria present within 6 hours of suspected infection and SIRS criteria that are NOT considered to be chronic conditions? Lactate > 2.0  -CD        Date of presentation of severe sepsis 02/14/25  -        Time of presentation of severe sepsis 0948  -CD        Sepsis Note: Click \"NEXT\" below (NOT \"close\") to generate sepsis note based on above information. YES (proceed by clicking \"NEXT\")  -CD                  User Key  (r) = Recorded By, (t) = Taken By, (c) = Cosigned By      Initials Name Provider Type    CD Nadine Chiu PA-C Physician Assistant                    Default Flowsheet Data (Last 720 Hours)       Sepsis Reassess       Row Name 02/14/25 1035                   Repeat Volume Status and Tissue Perfusion Assessment Performed    Date of Reassessment: 02/14/25  -        Time of Reassessment: 1035  -CD        Sepsis Reassessment Note: Click \"NEXT\" below (NOT \"close\") to generate sepsis reassessment note. YES (proceed by clicking \"NEXT\")  -CD        Repeat Volume Status and Tissue Perfusion Assessment Performed --                  User Key  (r) = Recorded By, (t) = Taken By, (c) = Cosigned By      Initials Name Provider Type    CD Nadine Chiu PA-C Physician Assistant                    There is no height or weight on file to calculate BMI.  Wt Readings from Last 1 " Encounters:   02/07/25 100 kg (220 lb 6.4 oz)        Ideal body weight: 73 kg (160 lb 15 oz)  Adjusted ideal body weight: 83.8 kg (184 lb 11.5 oz)

## 2025-02-14 NOTE — SEPSIS NOTE
"  Sepsis Note   Hammad Montesinos 84 y.o. male MRN: [id number]  Unit/Bed#: ED 12 Encounter: [id number]       Initial Sepsis Screening       Row Name 02/14/25 0947                Is the patient's history suggestive of a new or worsening infection? Yes (Proceed)  -CD        Suspected source of infection pneumonia  -CD        Indicate SIRS criteria Tachycardia > 90 bpm;Hyperthemia > 38.3C (100.9F) OR Hypothermia <36C (96.8F)  -CD        Are two or more of the above signs & symptoms of infection both present and new to the patient? Yes (Proceed)  -CD        Assess for evidence of organ dysfunction: Are any of the below criteria present within 6 hours of suspected infection and SIRS criteria that are NOT considered to be chronic conditions? Lactate > 2.0  -CD        Date of presentation of severe sepsis 02/14/25  -CD        Time of presentation of severe sepsis 0948  -CD        Sepsis Note: Click \"NEXT\" below (NOT \"close\") to generate sepsis note based on above information. YES (proceed by clicking \"NEXT\")  -CD                  User Key  (r) = Recorded By, (t) = Taken By, (c) = Cosigned By      Initials Name Provider Type    CD Nadine Chiu PA-C Physician Assistant                        There is no height or weight on file to calculate BMI.  Wt Readings from Last 1 Encounters:   02/07/25 100 kg (220 lb 6.4 oz)        Ideal body weight: 73 kg (160 lb 15 oz)  Adjusted ideal body weight: 83.8 kg (184 lb 11.5 oz)    "

## 2025-02-14 NOTE — ASSESSMENT & PLAN NOTE
Heart rates well-controlled  Continue home beta-blocker for rate control and Xarelto for CVA prophylaxis  Monitor heart rates

## 2025-02-15 PROBLEM — I50.23 ACUTE ON CHRONIC SYSTOLIC CONGESTIVE HEART FAILURE (HCC): Status: ACTIVE | Noted: 2025-02-14

## 2025-02-15 LAB
ANION GAP SERPL CALCULATED.3IONS-SCNC: 6 MMOL/L (ref 4–13)
BASOPHILS # BLD AUTO: 0.02 THOUSANDS/ΜL (ref 0–0.1)
BASOPHILS NFR BLD AUTO: 0 % (ref 0–1)
BUN SERPL-MCNC: 41 MG/DL (ref 5–25)
CALCIUM SERPL-MCNC: 8.6 MG/DL (ref 8.4–10.2)
CARDIAC TROPONIN I PNL SERPL HS: 194 NG/L (ref 8–18)
CHLORIDE SERPL-SCNC: 97 MMOL/L (ref 96–108)
CO2 SERPL-SCNC: 29 MMOL/L (ref 21–32)
CREAT SERPL-MCNC: 1.31 MG/DL (ref 0.6–1.3)
EOSINOPHIL # BLD AUTO: 0.06 THOUSAND/ΜL (ref 0–0.61)
EOSINOPHIL NFR BLD AUTO: 1 % (ref 0–6)
ERYTHROCYTE [DISTWIDTH] IN BLOOD BY AUTOMATED COUNT: 18.3 % (ref 11.6–15.1)
GFR SERPL CREATININE-BSD FRML MDRD: 49 ML/MIN/1.73SQ M
GLUCOSE SERPL-MCNC: 105 MG/DL (ref 65–140)
GLUCOSE SERPL-MCNC: 111 MG/DL (ref 65–140)
GLUCOSE SERPL-MCNC: 126 MG/DL (ref 65–140)
GLUCOSE SERPL-MCNC: 130 MG/DL (ref 65–140)
GLUCOSE SERPL-MCNC: 34 MG/DL (ref 65–140)
GLUCOSE SERPL-MCNC: 41 MG/DL (ref 65–140)
GLUCOSE SERPL-MCNC: 79 MG/DL (ref 65–140)
GLUCOSE SERPL-MCNC: 81 MG/DL (ref 65–140)
HCT VFR BLD AUTO: 33 % (ref 36.5–49.3)
HGB BLD-MCNC: 10.5 G/DL (ref 12–17)
IMM GRANULOCYTES # BLD AUTO: 0.02 THOUSAND/UL (ref 0–0.2)
IMM GRANULOCYTES NFR BLD AUTO: 0 % (ref 0–2)
LYMPHOCYTES # BLD AUTO: 0.65 THOUSANDS/ΜL (ref 0.6–4.47)
LYMPHOCYTES NFR BLD AUTO: 11 % (ref 14–44)
MAGNESIUM SERPL-MCNC: 2 MG/DL (ref 1.9–2.7)
MCH RBC QN AUTO: 27.1 PG (ref 26.8–34.3)
MCHC RBC AUTO-ENTMCNC: 31.8 G/DL (ref 31.4–37.4)
MCV RBC AUTO: 85 FL (ref 82–98)
MONOCYTES # BLD AUTO: 0.83 THOUSAND/ΜL (ref 0.17–1.22)
MONOCYTES NFR BLD AUTO: 14 % (ref 4–12)
MRSA NOSE QL CULT: NORMAL
NEUTROPHILS # BLD AUTO: 4.56 THOUSANDS/ΜL (ref 1.85–7.62)
NEUTS SEG NFR BLD AUTO: 74 % (ref 43–75)
NRBC BLD AUTO-RTO: 0 /100 WBCS
PHOSPHATE SERPL-MCNC: 3.3 MG/DL (ref 2.3–4.1)
PLATELET # BLD AUTO: 105 THOUSANDS/UL (ref 149–390)
PMV BLD AUTO: 12 FL (ref 8.9–12.7)
POTASSIUM SERPL-SCNC: 4 MMOL/L (ref 3.5–5.3)
PROCALCITONIN SERPL-MCNC: 1.34 NG/ML
RBC # BLD AUTO: 3.88 MILLION/UL (ref 3.88–5.62)
SODIUM SERPL-SCNC: 132 MMOL/L (ref 135–147)
WBC # BLD AUTO: 6.14 THOUSAND/UL (ref 4.31–10.16)

## 2025-02-15 PROCEDURE — 84484 ASSAY OF TROPONIN QUANT: CPT | Performed by: INTERNAL MEDICINE

## 2025-02-15 PROCEDURE — 84100 ASSAY OF PHOSPHORUS: CPT | Performed by: INTERNAL MEDICINE

## 2025-02-15 PROCEDURE — 94760 N-INVAS EAR/PLS OXIMETRY 1: CPT

## 2025-02-15 PROCEDURE — 99232 SBSQ HOSP IP/OBS MODERATE 35: CPT | Performed by: PHYSICIAN ASSISTANT

## 2025-02-15 PROCEDURE — 84145 PROCALCITONIN (PCT): CPT | Performed by: INTERNAL MEDICINE

## 2025-02-15 PROCEDURE — 80048 BASIC METABOLIC PNL TOTAL CA: CPT | Performed by: INTERNAL MEDICINE

## 2025-02-15 PROCEDURE — 83735 ASSAY OF MAGNESIUM: CPT | Performed by: INTERNAL MEDICINE

## 2025-02-15 PROCEDURE — 82948 REAGENT STRIP/BLOOD GLUCOSE: CPT

## 2025-02-15 PROCEDURE — 94640 AIRWAY INHALATION TREATMENT: CPT

## 2025-02-15 PROCEDURE — 85025 COMPLETE CBC W/AUTO DIFF WBC: CPT | Performed by: INTERNAL MEDICINE

## 2025-02-15 PROCEDURE — 99232 SBSQ HOSP IP/OBS MODERATE 35: CPT | Performed by: INTERNAL MEDICINE

## 2025-02-15 RX ORDER — INSULIN GLARGINE 100 [IU]/ML
10 INJECTION, SOLUTION SUBCUTANEOUS EVERY 12 HOURS SCHEDULED
Status: DISCONTINUED | OUTPATIENT
Start: 2025-02-15 | End: 2025-02-20 | Stop reason: HOSPADM

## 2025-02-15 RX ORDER — INSULIN LISPRO 100 [IU]/ML
10 INJECTION, SOLUTION INTRAVENOUS; SUBCUTANEOUS
Status: DISCONTINUED | OUTPATIENT
Start: 2025-02-15 | End: 2025-02-20 | Stop reason: HOSPADM

## 2025-02-15 RX ORDER — DEXTROSE MONOHYDRATE 25 G/50ML
INJECTION, SOLUTION INTRAVENOUS
Status: COMPLETED
Start: 2025-02-15 | End: 2025-02-15

## 2025-02-15 RX ORDER — INSULIN LISPRO 100 [IU]/ML
1-6 INJECTION, SOLUTION INTRAVENOUS; SUBCUTANEOUS
Status: DISCONTINUED | OUTPATIENT
Start: 2025-02-16 | End: 2025-02-20 | Stop reason: HOSPADM

## 2025-02-15 RX ORDER — DEXTROSE MONOHYDRATE 25 G/50ML
50 INJECTION, SOLUTION INTRAVENOUS ONCE
Status: COMPLETED | OUTPATIENT
Start: 2025-02-15 | End: 2025-02-15

## 2025-02-15 RX ADMIN — BUMETANIDE 2 MG: 0.25 INJECTION INTRAMUSCULAR; INTRAVENOUS at 08:20

## 2025-02-15 RX ADMIN — LEVALBUTEROL HYDROCHLORIDE 1.25 MG: 1.25 SOLUTION RESPIRATORY (INHALATION) at 14:40

## 2025-02-15 RX ADMIN — GABAPENTIN 200 MG: 100 CAPSULE ORAL at 08:21

## 2025-02-15 RX ADMIN — FLUTICASONE PROPIONATE 1 SPRAY: 50 SPRAY, METERED NASAL at 08:22

## 2025-02-15 RX ADMIN — METOPROLOL SUCCINATE 25 MG: 25 TABLET, FILM COATED, EXTENDED RELEASE ORAL at 08:21

## 2025-02-15 RX ADMIN — BUDESONIDE 0.5 MG: 0.5 INHALANT ORAL at 08:56

## 2025-02-15 RX ADMIN — BUMETANIDE 2 MG: 0.25 INJECTION INTRAMUSCULAR; INTRAVENOUS at 13:44

## 2025-02-15 RX ADMIN — OXYMETAZOLINE HYDROCHLORIDE 2 SPRAY: 0.05 SPRAY NASAL at 20:41

## 2025-02-15 RX ADMIN — ALBUTEROL SULFATE 2.5 MG: 2.5 SOLUTION RESPIRATORY (INHALATION) at 03:17

## 2025-02-15 RX ADMIN — INSULIN LISPRO 25 UNITS: 100 INJECTION, SOLUTION INTRAVENOUS; SUBCUTANEOUS at 08:23

## 2025-02-15 RX ADMIN — DEXTROSE MONOHYDRATE 50 ML: 25 INJECTION, SOLUTION INTRAVENOUS at 14:54

## 2025-02-15 RX ADMIN — INSULIN LISPRO 2 UNITS: 100 INJECTION, SOLUTION INTRAVENOUS; SUBCUTANEOUS at 23:58

## 2025-02-15 RX ADMIN — LEVALBUTEROL HYDROCHLORIDE 1.25 MG: 1.25 SOLUTION RESPIRATORY (INHALATION) at 08:56

## 2025-02-15 RX ADMIN — ALLOPURINOL 300 MG: 300 TABLET ORAL at 12:25

## 2025-02-15 RX ADMIN — ATORVASTATIN CALCIUM 20 MG: 20 TABLET, FILM COATED ORAL at 08:21

## 2025-02-15 RX ADMIN — BUDESONIDE 0.5 MG: 0.5 INHALANT ORAL at 20:17

## 2025-02-15 RX ADMIN — GUAIFENESIN 600 MG: 600 TABLET, EXTENDED RELEASE ORAL at 08:21

## 2025-02-15 RX ADMIN — INSULIN GLARGINE 20 UNITS: 100 INJECTION, SOLUTION SUBCUTANEOUS at 08:21

## 2025-02-15 RX ADMIN — INSULIN GLARGINE 10 UNITS: 100 INJECTION, SOLUTION SUBCUTANEOUS at 20:41

## 2025-02-15 RX ADMIN — GUAIFENESIN 600 MG: 600 TABLET, EXTENDED RELEASE ORAL at 20:41

## 2025-02-15 RX ADMIN — OXYMETAZOLINE HYDROCHLORIDE 2 SPRAY: 0.05 SPRAY NASAL at 08:24

## 2025-02-15 RX ADMIN — LEVALBUTEROL HYDROCHLORIDE 1.25 MG: 1.25 SOLUTION RESPIRATORY (INHALATION) at 20:17

## 2025-02-15 RX ADMIN — OSELTAMIVIR 30 MG: 30 CAPSULE ORAL at 20:41

## 2025-02-15 RX ADMIN — RIVAROXABAN 15 MG: 15 TABLET, FILM COATED ORAL at 08:20

## 2025-02-15 RX ADMIN — INSULIN LISPRO 25 UNITS: 100 INJECTION, SOLUTION INTRAVENOUS; SUBCUTANEOUS at 12:26

## 2025-02-15 RX ADMIN — CEFTRIAXONE 2000 MG: 2 INJECTION, SOLUTION INTRAVENOUS at 10:57

## 2025-02-15 RX ADMIN — DEXTROSE MONOHYDRATE 50 ML: 25 INJECTION, SOLUTION INTRAVENOUS at 14:55

## 2025-02-15 RX ADMIN — OSELTAMIVIR 30 MG: 30 CAPSULE ORAL at 08:21

## 2025-02-15 NOTE — ASSESSMENT & PLAN NOTE
Influenza A + on 2/14/2025  Saturating well on room air  Continue Tamiflu  Contact and droplet precautions  Monitor respiratory status

## 2025-02-15 NOTE — PROGRESS NOTES
Patient has a weighing scale at home plus a CHF booklet. Pt does not need any scale or the booklet as he already has them at home.

## 2025-02-15 NOTE — ASSESSMENT & PLAN NOTE
Present on admission as evidenced by tachycardia, tachypnea, hyperthermia  Likely secondary to influenza A and bacterial pneumonia  Continue ceftriaxone/azithromycin  Tamiflu for influenza A  Contact and droplet precautions  Follow-up blood cultures  Trend fevers curve/WBC count/procalcitonin  De-escalate antibiotics as appropriate

## 2025-02-15 NOTE — ASSESSMENT & PLAN NOTE
Wt Readings from Last 3 Encounters:   02/14/25 101 kg (222 lb 14.4 oz)   02/07/25 100 kg (220 lb 6.4 oz)   02/07/25 100 kg (221 lb)   TTE 1/28/2025: EF 35%, moderate global hypokinesis with regional variation, abnormal diastolic inflow pattern question due to atrial fibs, reduced RV function, biatrial dilatation with severe left atrial dilatation, likely moderate to severe MR, mild TR  GDMT: Toprol XL 25 mg daily,  torsemide 40 mg twice daily.    Patient reports home dry weight is 211 to 214 pounds.  Patient reports a 10 pound weight gain in the last week, within increased abdominal girth.  Chest x-ray on arrival: Groundglass opacity in the right lower lobe  Telemetry: V paced rhythm  Started on IV Bumex with relatively good response overnight and stable creatinine  Continue current regiment and reassess diuretic requirements in the morning  Ejection fraction has dropped from 50% down to 35%, and he will eventually require an ischemic evaluation, however would not proceed with this with active flu infection in the hospital, especially since he has no chest pain at the current time

## 2025-02-15 NOTE — ASSESSMENT & PLAN NOTE
Lab Results   Component Value Date    HGBA1C 9.1 (H) 01/28/2025       Recent Labs     02/15/25  1647 02/15/25  2358 02/16/25  0802 02/16/25  1009   POCGLU 105 224* 127 201*       Blood Sugar Average: Last 72 hrs:  (P) [id number]  Poorly controlled on home insulin regimen  Had asymptomatic blood sugars in the 40s yesterday, although today reports his appetite is better and he is hungrier today.  Decrease Lantus to 10 units every 12  Decrease lispro to 10 units 3 times daily with meals  Sliding scale insulin with Accu-Cheks  Diabetic diet  Target blood glucose 140-180

## 2025-02-15 NOTE — CASE MANAGEMENT
Case Management Assessment & Discharge Planning Note    Patient name Hammad Montesinos  Location /-01 MRN [id number]  : 1940 Date 2/15/2025       Current Admission Date: 2025  Current Admission Diagnosis:Sepsis ([facility])   Patient Active Problem List    Diagnosis Date Noted Date Diagnosed    Sepsis ([facility]) 2025     Influenza A 2025     Acute on chronic systolic congestive heart failure ([facility]) 2025     Generalized weakness 2025     Stroke-like symptoms 2025     Fall 2025     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, with fat layer exposed ([facility]) 2024     Acute on chronic diastolic heart failure ([facility]) 2023     Elevated troponin 2023     Pulmonary hypertension ([facility]) 2023     Paroxysmal A-fib ([facility]) 2023     Type 2 diabetes mellitus with diabetic neuropathy, unspecified whether long term insulin use ([facility]) 2023     Pre-ulcerative calluses 2023     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 ([facility]) 2022     Elevated parathyroid hormone 2022     Diabetic nephropathy associated with type 2 diabetes mellitus ([facility]) 2022     Type 2 diabetes mellitus with hyperglycemia, with long-term current use of insulin ([facility]) 2022     Mixed hyperlipidemia 2022     Diabetic polyneuropathy associated with type 2 diabetes mellitus ([facility]) 2022     Primary hypertension 2022     Morbid (severe) obesity due to excess calories ([facility]) 2022     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 ([facility]) 2022       LOS (days): 1  Geometric Mean LOS (GMLOS) (days): 4.9  Days to GMLOS:3.7     OBJECTIVE:    Risk of Unplanned Readmission Score: 24.93         Current admission status: Inpatient       [place]   [facility] [address]  [address]  [address]  Phone: [phone] Fax: [phone]    Primary Care Provider: Mina Zavala MD    Primary Insurance: [employer]  Secondary Insurance:     ASSESSMENT:  Active Health Care Proxies       Maci Montesinos Health Care Agent - Spouse   Primary Phone: [phone] (Mobile)                 Advance Directives  Does patient have a Health Care POA?: Yes              Patient Information  Admitted from:: Facility  Mental Status: Alert  During Assessment patient was accompanied by: Spouse  Assessment information provided by:: Spouse  Primary Caregiver: Self  Support Systems: Self, Family members, Spouse/significant other  County of Residence: [place]  [facility] do you live [facility]?: [place]  Home entry access options. Select all that apply.: No steps to enter home  Type of Current Residence: Apartment  Floor Level: 1  Upon entering residence, is there a bedroom on the main floor (no further steps)?: Yes  Upon entering residence, is there a bathroom on the main floor (no further steps)?: Yes  Living Arrangements: Lives w/ Spouse/significant other  Is patient a ?: No    Activities of Daily Living Prior to Admission  Functional Status: Independent  Completes ADLs independently?: Yes  Ambulates independently?: Yes  Does patient use assisted devices?: Yes  Assisted Devices (DME) used: Walker, CPAP  Does patient currently own DME?: Yes  What DME does the patient currently own?: Walker, CPAP  Does patient have a history of Outpatient Therapy (PT/OT)?: No  Does the patient have a history of Short-Term Rehab?: No  Does patient have a history of HHC?: No  Does patient currently have HHC?: No         Patient Information Continued  Income Source: Pension/[employer]  Does patient have prescription coverage?: Yes  Does patient receive dialysis treatments?: No  Does patient have a history of substance abuse?: No  Does patient have a history of Mental Health Diagnosis?: No         Means of  Transportation  Means of Transport to Appts:: Family transport          DISCHARGE DETAILS:    Discharge planning discussed with:: Pt's wife- Maci  Freedom of Choice: Yes     CM contacted family/caregiver?: Yes  Were Treatment Team discharge recommendations reviewed with patient/caregiver?: Yes  Did patient/caregiver verbalize understanding of patient care needs?: Yes  Were patient/caregiver advised of the risks associated with not following Treatment Team discharge recommendations?: Yes    Contacts  Patient Contacts: Ila- pt's daughter  Relationship to Patient:: Family  Contact Method: Phone  Phone Number: [phone]  Reason/Outcome: Discharge Planning          Additional Comments: Spoke with the pt's wife Maci to review CM role and possible discharge planinng needs.Pt has been living with his wife at [facility] with his wife. Pt uses a walker and CPAP at home. Pt has no hx wtih HHC or STR placement. Pt has no hx with D&A or  inpt stays. Spoke with the pt's daughter Ila who is the primary caregiver with transportation, medication management, and coordination of care. Pt's daughter has concerned with the pt's change mentation. CM to coordinate HHC if needed closer to d/c with the pt's daughter Ila.

## 2025-02-15 NOTE — ASSESSMENT & PLAN NOTE
Noted history of EF 35%  Cardiology following  Continue IV Bumex for CHF exacerbation  Planning for cardiac stress testing on Monday  Monitor intake and output, daily weights

## 2025-02-15 NOTE — ASSESSMENT & PLAN NOTE
Troponin I 123 on presentation  Patient denies anginal symptoms  Cardiology following, planning stress test on Monday

## 2025-02-15 NOTE — NURSING NOTE
Day shift RN passed off to night RN that patient refused the skin check and maybe now that the family has left the patient may reconsider the skin check

## 2025-02-15 NOTE — ASSESSMENT & PLAN NOTE
Patient with reports of a 3-day onset of shortness of breath, PND.    Patient states that he went to cardiologist appointment when his daughter picked him up to be increasingly tachypnec prompting a visit to the emergency room.

## 2025-02-15 NOTE — PROGRESS NOTES
Cardiology Progress Note - Hammad Montesinos 84 y.o. male MRN: [id number]    Unit/Bed#: -01 Encounter: [id number]        Assessment & Plan  Sepsis ([facility])  Patient with reports of a 3-day onset of shortness of breath, PND.    Patient states that he went to cardiologist appointment when his daughter picked him up to be increasingly tachypnec prompting a visit to the emergency room.  Influenza A  Positive on PCR   management per primary team  Acute on chronic systolic congestive heart failure (HCC)  Wt Readings from Last 3 Encounters:   02/14/25 101 kg (222 lb 14.4 oz)   02/07/25 100 kg (220 lb 6.4 oz)   02/07/25 100 kg (221 lb)   TTE 1/28/2025: EF 35%, moderate global hypokinesis with regional variation, abnormal diastolic inflow pattern question due to atrial fibs, reduced RV function, biatrial dilatation with severe left atrial dilatation, likely moderate to severe MR, mild TR  GDMT: Toprol XL 25 mg daily,  torsemide 40 mg twice daily.    Patient reports home dry weight is 211 to 214 pounds.  Patient reports a 10 pound weight gain in the last week, within increased abdominal girth.  Chest x-ray on arrival: Groundglass opacity in the right lower lobe  Telemetry: V paced rhythm  Started on IV Bumex with relatively good response overnight and stable creatinine  Continue current regiment and reassess diuretic requirements in the morning  Ejection fraction has dropped from 50% down to 35%, and he will eventually require an ischemic evaluation, however would not proceed with this with active flu infection in the hospital, especially since he has no chest pain at the current time  Elevated troponin  Initial Troponin 123  Patient denies chest pain  Has cardiac CTA ordered for Monday, 2/17/2025  Paroxysmal A-fib ([facility])  Rate control: Toprol XL 25 mg daily   Anticoagulation: Xarelto 20 mg daily -reports that he is compliant.  Implanted Biotronik permanent pacemaker secondary to sick sinus syndrome.  Last EP device  "interrogation showed 100% A-fib burden  Mixed hyperlipidemia  Controlled with atorvastatin 20 mg daily  Type 2 diabetes mellitus with hyperglycemia, with long-term current use of insulin (HCC)  Per primary team(P) 138    Subjective:   Patient seen and examined.  No significant events overnight.  ; pertinent negatives - chest pain, lower extremity edema, and palpitations.  Continues with shortness of breath.    Objective:     Vitals: Blood pressure 121/61, pulse 68, temperature 99.4 °F (37.4 °C), temperature source Oral, resp. rate 20, height 5' 10\" (1.778 m), weight 101 kg (222 lb 14.4 oz), SpO2 99%., Body mass index is 31.98 kg/m².,   Orthostatic Blood Pressures      Flowsheet Row Most Recent Value   Blood Pressure 121/61 filed at 02/15/2025 0722   Patient Position - Orthostatic VS Lying filed at 02/15/2025 0722              Intake/Output Summary (Last 24 hours) at 2/15/2025 0941  Last data filed at 2/15/2025 0833  Gross per 24 hour   Intake 1962 ml   Output 2400 ml   Net -438 ml       TELE: Occasional PVCs    Physical Exam:    GEN: Hammad Montesinos appears well, alert and oriented x 3, pleasant and cooperative   HEENT: pupils equal, round, and reactive to light; extraocular muscles intact  NECK: supple, no carotid bruits   HEART: Irregular rhythm, normal S1 and S2, + systolic murmur, no clicks, gallops or rubs   LUNGS: + Coarse breath sounds bilaterally with wheezing  ABDOMEN: normal bowel sounds, soft, no tenderness, + distention  EXTREMITIES: peripheral pulses normal; no clubbing, cyanosis, or edema  NEURO: no focal findings   SKIN: normal without suspicious lesions on exposed skin    Medications:      Current Facility-Administered Medications:     acetaminophen (TYLENOL) tablet 650 mg, 650 mg, Oral, Q6H PRN, Jonny Soto DO    albuterol inhalation solution 2.5 mg, 2.5 mg, Nebulization, Q4H PRN, Chan Kong MD, 2.5 mg at 02/15/25 0317    allopurinol (ZYLOPRIM) tablet 300 mg, 300 mg, Oral, Q24H, Jonny " DORIAN Soto DO    atorvastatin (LIPITOR) tablet 20 mg, 20 mg, Oral, Daily, Jonny Soto DO, 20 mg at 02/15/25 0821    budesonide (PULMICORT) inhalation solution 0.5 mg, 0.5 mg, Nebulization, BID, Chan Kong MD, 0.5 mg at 02/15/25 0856    bumetanide (BUMEX) injection 2 mg, 2 mg, Intravenous, BID (diuretic), Faustino Tijerina MD, 2 mg at 02/15/25 0820    cefTRIAXone (ROCEPHIN) IVPB (premix in dextrose) 2,000 mg 50 mL, 2,000 mg, Intravenous, Q24H, Jonny Soto DO    fluticasone (FLONASE) 50 mcg/act nasal spray 1 spray, 1 spray, Each Nare, Daily, Ebony Evans PA-C, 1 spray at 02/15/25 0822    gabapentin (NEURONTIN) capsule 200 mg, 200 mg, Oral, Daily, Jonny Soto DO, 200 mg at 02/15/25 0821    guaiFENesin (MUCINEX) 12 hr tablet 600 mg, 600 mg, Oral, Q12H TOVA, Jonny Soto DO, 600 mg at 02/15/25 0821    Hydrocod Israel-Chlorphe Israel ER (TUSSIONEX) ER suspension 5 mL, 5 mL, Oral, Q12H PRN, Jonny Soto DO    insulin glargine (LANTUS) subcutaneous injection 20 Units 0.2 mL, 20 Units, Subcutaneous, Q12H TOVA, Ebony Evans PA-C, 20 Units at 02/15/25 0821    insulin lispro (HumALOG/ADMELOG) 100 units/mL subcutaneous injection 1-6 Units, 1-6 Units, Subcutaneous, TID AC, 5 Units at 02/14/25 1827 **AND** Fingerstick Glucose (POCT), , , TID AC, Jonny Soto DO    insulin lispro (HumALOG/ADMELOG) 100 units/mL subcutaneous injection 1-6 Units, 1-6 Units, Subcutaneous, HS, Jonny Soto DO    insulin lispro (HumALOG/ADMELOG) 100 units/mL subcutaneous injection 25 Units, 25 Units, Subcutaneous, TID With Meals, Jonny Soto DO, 25 Units at 02/15/25 0823    levalbuterol (XOPENEX) inhalation solution 1.25 mg, 1.25 mg, Nebulization, TID, Chan Kong MD, 1.25 mg at 02/15/25 0856    metoprolol succinate (TOPROL-XL) 24 hr tablet 25 mg, 25 mg, Oral, Daily, Jonny Soto DO, 25 mg at 02/15/25 0821    oseltamivir (TAMIFLU) capsule 30 mg, 30 mg, Oral, Q12H TOVA, Jonny Soto DO, 30 mg at 02/15/25  0821    oxymetazoline (AFRIN) 0.05 % nasal spray 2 spray, 2 spray, Each Nare, Q12H [facility], Ebony Evans PA-C, 2 spray at 02/15/25 0824    rivaroxaban (XARELTO) tablet 15 mg, 15 mg, Oral, Daily With Breakfast, Jonny ALARCON Yorty, DO, 15 mg at 02/15/25 0820    senna (SENOKOT) tablet 17.2 mg, 17.2 mg, Oral, HS PRN, Jonny ALARCON Yorty, DO    trimethobenzamide (TIGAN) IM injection 200 mg, 200 mg, Intramuscular, Q12H PRN, Jonny ALARCON Yorty, DO     Labs & Results:        Results from last 7 days   Lab Units 02/15/25  0224 02/14/25  0905   WBC Thousand/uL 6.14 6.85   HEMOGLOBIN g/dL 10.5* 11.4*   HEMATOCRIT % 33.0* 36.9   PLATELETS Thousands/uL 105* 112*         Results from last 7 days   Lab Units 02/15/25  0224 02/14/25  0905 02/13/25  1253   POTASSIUM mmol/L 4.0 4.7 3.8   CHLORIDE mmol/L 97 95* 93*   CO2 mmol/L 29 28 27   BUN mg/dL 41* 38* 33*   CREATININE mg/dL 1.31* 1.39* 1.44*   CALCIUM mg/dL 8.6 9.0 9.0   ALK PHOS U/L  --  138*  --    ALT U/L  --  17  --    AST U/L  --  30  --      Results from last 7 days   Lab Units 02/14/25  0905   INR  2.99*   PTT seconds 49*     Results from last 7 days   Lab Units 02/15/25  0224   MAGNESIUM mg/dL 2.0       Echo: personally reviewed -EF 35% with moderate global hypokinesis.  Biatrial enlargement.  Moderate to severe mitral regurgitation    EKG personally reviewed by Berny Junior MD.

## 2025-02-15 NOTE — ASSESSMENT & PLAN NOTE
Lab Results   Component Value Date    HGBA1C 9.1 (H) 01/28/2025       Recent Labs     02/14/25 2122 02/14/25  2325 02/15/25  0030 02/15/25  0720   POCGLU 72 64* 79 126       Blood Sugar Average: Last 72 hrs:  (P) 138  Poorly controlled on home insulin regimen  Continue Lantus 20 units every 12 hours  Lispro 25 units 3 times daily with meals  Sliding scale insulin with Accu-Cheks  Diabetic diet  Target blood glucose 140-180

## 2025-02-15 NOTE — PROGRESS NOTES
Progress Note - Hospitalist   Name: Hammad Montesinos 84 y.o. male I MRN: [id number]  Unit/Bed#: -01 I Date of Admission: 2/14/2025   Date of Service: 2/15/2025 I Hospital Day: 1    Assessment & Plan  Sepsis (HCC)  Present on admission as evidenced by tachycardia, tachypnea, hyperthermia  Likely secondary to influenza A and bacterial pneumonia  Continue ceftriaxone/azithromycin  Tamiflu for influenza A  Contact and droplet precautions  Follow-up blood cultures  Trend fevers curve/WBC count/procalcitonin  De-escalate antibiotics as appropriate  Influenza A  Influenza A + on 2/14/2025  Saturating well on room air  Continue Tamiflu  Contact and droplet precautions  Monitor respiratory status  Acute on chronic systolic congestive heart failure (HCC)  Noted history of EF 35%  Cardiology following  Continue IV Bumex for CHF exacerbation  Planning for cardiac stress testing on Monday  Monitor intake and output, daily weights  Elevated troponin  Troponin I 123 on presentation  Patient denies anginal symptoms  Cardiology following, planning stress test on Monday  Paroxysmal A-fib ([facility])  Heart rates well-controlled  Continue home beta-blocker for rate control and Xarelto for CVA prophylaxis  Monitor heart rates  Type 2 diabetes mellitus with hyperglycemia, with long-term current use of insulin ([facility])  Lab Results   Component Value Date    HGBA1C 9.1 (H) 01/28/2025       Recent Labs     02/14/25  2122 02/14/25  2325 02/15/25  0030 02/15/25  0720   POCGLU 72 64* 79 126       Blood Sugar Average: Last 72 hrs:  (P) 138  Poorly controlled on home insulin regimen  Continue Lantus 20 units every 12 hours  Lispro 25 units 3 times daily with meals  Sliding scale insulin with Accu-Cheks  Diabetic diet  Target blood glucose 140-180  Mixed hyperlipidemia  Continue home statin therapy    Mobility:   Basic Mobility Inpatient Raw Score: 18  JH-HLM Goal: 6: Walk 10 steps or more  JH-HLM Achieved: 6: Walk 10 steps or more  JH-HLM Goal  achieved. Continue to encourage appropriate mobility.    VTE Pharmacologic Prophylaxis: VTE Score: 6 High Risk (Score >/= 5) - Pharmacological DVT Prophylaxis Ordered: rivaroxaban (Xarelto). Sequential Compression Devices Ordered.    Education and Discussions with Family / Patient: Updated  (daughter) via phone.    Current Length of Stay: 1 day(s)  Current Patient Status: Inpatient   Certification Statement: The patient will continue to require additional inpatient hospital stay due to respiratory failure secondary to influenza, CHF exacerbation with elevated troponins requiring ischemic eval  Discharge Plan: Anticipate discharge in 48-72 hrs to discharge location to be determined pending rehab evaluations.    Code Status: Level 1 - Full Code    Subjective:   Patient feeling better today, no overnight events.  He is less short of breath.  He is up eating in his chair.  Tachypnea has resolved.    Objective:     Vitals:   Temp (24hrs), Av.2 °F (37.3 °C), Min:98.3 °F (36.8 °C), Max:100.9 °F (38.3 °C)    Temp:  [98.3 °F (36.8 °C)-100.9 °F (38.3 °C)] 99.4 °F (37.4 °C)  HR:  [] 68  Resp:  [16-24] 20  BP: (116-151)/() 121/61  SpO2:  [96 %-100 %] 99 %  Body mass index is 31.98 kg/m².     Input and Output Summary (last 24 hours):     Intake/Output Summary (Last 24 hours) at 2/15/2025 0924  Last data filed at 2/15/2025 0833  Gross per 24 hour   Intake 1962 ml   Output 2400 ml   Net -438 ml       Physical Exam:   Physical Exam  Vitals and nursing note reviewed.   Constitutional:       General: He is not in acute distress.     Appearance: Normal appearance. He is ill-appearing.   HENT:      Head: Normocephalic and atraumatic.   Eyes:      General: No scleral icterus.        Right eye: No discharge.         Left eye: No discharge.   Cardiovascular:      Rate and Rhythm: Normal rate and regular rhythm.      Pulses: Normal pulses.      Heart sounds: No murmur heard.     No friction rub. No gallop.    Pulmonary:      Effort: Pulmonary effort is normal. No respiratory distress.      Breath sounds: Normal breath sounds. No wheezing, rhonchi or rales.   Abdominal:      General: Bowel sounds are normal. There is no distension.      Palpations: Abdomen is soft.      Tenderness: There is no abdominal tenderness. There is no guarding or rebound.   Musculoskeletal:         General: No swelling or deformity.      Cervical back: Neck supple.      Right lower leg: Edema present.      Left lower leg: Edema present.   Skin:     General: Skin is warm and dry.      Capillary Refill: Capillary refill takes less than 2 seconds.      Coloration: Skin is not jaundiced or pale.      Findings: No erythema or rash.   Neurological:      General: No focal deficit present.      Mental Status: He is alert and oriented to person, place, and time. Mental status is at baseline.   Psychiatric:         Mood and Affect: Mood normal.         Behavior: Behavior normal.          Additional Data:     Labs:  Results from last 7 days   Lab Units 02/15/25  0224   WBC Thousand/uL 6.14   HEMOGLOBIN g/dL 10.5*   HEMATOCRIT % 33.0*   PLATELETS Thousands/uL 105*   SEGS PCT % 74   LYMPHO PCT % 11*   MONO PCT % 14*   EOS PCT % 1     Results from last 7 days   Lab Units 02/15/25  0224 02/14/25  0905   SODIUM mmol/L 132* 131*   POTASSIUM mmol/L 4.0 4.7   CHLORIDE mmol/L 97 95*   CO2 mmol/L 29 28   BUN mg/dL 41* 38*   CREATININE mg/dL 1.31* 1.39*   ANION GAP mmol/L 6 8   CALCIUM mg/dL 8.6 9.0   ALBUMIN g/dL  --  3.7   TOTAL BILIRUBIN mg/dL  --  1.90*   ALK PHOS U/L  --  138*   ALT U/L  --  17   AST U/L  --  30   GLUCOSE RANDOM mg/dL 111 243*     Results from last 7 days   Lab Units 02/14/25  0905   INR  2.99*     Results from last 7 days   Lab Units 02/15/25  0720 02/15/25  0030 02/14/25  2325 02/14/25  2122 02/14/25  1722   POC GLUCOSE mg/dl 126 79 64* 72 349*         Results from last 7 days   Lab Units 02/15/25  0224 02/14/25  1113 02/14/25  0905   LACTIC  ACID mmol/L  --  1.5 2.1*   PROCALCITONIN ng/ml 1.34*  --  0.20       Imaging: Radiology Review: No additional pertinent studies reviewed.    Recent Cultures (last 7 days):   Results from last 7 days   Lab Units 02/14/25  0905   BLOOD CULTURE  Received in Microbiology Lab. Culture in Progress.  Received in Microbiology Lab. Culture in Progress.       Telemetry Orders (From admission, onward)               24 Hour Telemetry Monitoring  (ED Bridging Orders Panel)  Continuous x 24 Hours (Telem)        Expiring   Question:  Reason for 24 Hour Telemetry  Answer:  Decompensated CHF- and any one of the following: continuous diuretic infusion or total diuretic dose >200 mg daily, associated electrolyte derangement (I.e. K < 3.0), inotropic drip (continuous infusion), hx of ventricular arrhythmia, or new EF < 35%                        Last 24 Hours Medication List:   Current Facility-Administered Medications   Medication Dose Route Frequency Provider Last Rate    acetaminophen  650 mg Oral Q6H PRN Jonny Soto, DO      albuterol  2.5 mg Nebulization Q4H PRN Chan Kong MD      allopurinol  300 mg Oral Q24H Jonny C Edwinrty, DO      atorvastatin  20 mg Oral Daily Jonny DORIAN Soto, DO      budesonide  0.5 mg Nebulization BID Chan Kong MD      bumetanide  2 mg Intravenous BID (diuretic) Faustino Tijerina MD      cefTRIAXone  2,000 mg Intravenous Q24H Jonny Soto, DO      fluticasone  1 spray Each Nare Daily Ebony Evans PA-C      gabapentin  200 mg Oral Daily Jonny Pinedarty, DO      guaiFENesin  600 mg Oral Q12H [facility] Jonny Soto, DO      Hydrocod Israel-Chlorphe Israel ER  5 mL Oral Q12H PRN Jonny Soto, DO      insulin glargine  20 Units Subcutaneous Q12H [facility] Ebony Evans PA-C      insulin lispro  1-6 Units Subcutaneous TID AC Jonny Soto, DO      insulin lispro  1-6 Units Subcutaneous HS Jonny Soto, DO      insulin lispro  25 Units Subcutaneous TID With Meals Jonny Soto, DO       levalbuterol  1.25 mg Nebulization TID Chan Kong MD      metoprolol succinate  25 mg Oral Daily Jonny Soto,       oseltamivir  30 mg Oral Q12H TOVA Jonny Soto, DO      oxymetazoline  2 spray Each Nare Q12H TOVA Ebony Evans PA-C      rivaroxaban  15 mg Oral Daily With Breakfast Jonny Soto,       senna  17.2 mg Oral HS PRN Jonny Soto, DO      trimethobenzamide  200 mg Intramuscular Q12H PRN Jonny Soto, DO          Today, Patient Was Seen By: Rafael Guzman PA-C    **Please Note: This note may have been constructed using a voice recognition system.**

## 2025-02-15 NOTE — UTILIZATION REVIEW
" Initial Clinical Review    Admission: Date/Time/Statement:   Admission Orders (From admission, onward)       Ordered        02/14/25 1010  INPATIENT ADMISSION  Once                          Orders Placed This Encounter   Procedures    INPATIENT ADMISSION     Standing Status:   Standing     Number of Occurrences:   1     Level of Care:   Med Surg [16]     Estimated length of stay:   More than 2 Midnights     Certification:   I certify that inpatient services are medically necessary for this patient for a duration of greater than two midnights. See H&P and MD Progress Notes for additional information about the patient's course of treatment.     ED Arrival Information       Expected   -    Arrival   2/14/2025 08:38    Acuity   Emergent              Means of arrival   Wheelchair    Escorted by   Family Member    Service   Hospitalist    Admission type   Emergency              Arrival complaint   Fever,short of breath             Chief Complaint   Patient presents with    Shortness of Breath     Pt reports cough and sob for the past couple days. Has had a 10 pound weight gain in the past week. Hx of chf. Had a chest xray done and was told it was \"clear\".        Initial Presentation: 84 y.o. male to the ED from home with complaints of shortness of breath, cough for a few days. Admitted to inpatient for sepsis.  H/O asthma, CHF.  Has been coughing up phlegm.  CXR the day prior was WNL.  Has been taking torsemide at home. ON arrival, he appears ill, tachypneic, wheezing, rhonchi heard. Bilateral lower extremity edema present, febrile. Flu A +. . Troponin elevated. Lactic acid 2.1. CXR shows RLL pneumonia.  Started on IV abx. Blood cultures pending.  FStarted on Tamiflu.  EF 35%.  Hold torsemide, caution with IV fluids.  Cardiology consult. BB. Troponin 123 on arrival.     Cardiology:  Cardiomyopathy. LVEF dropped to 35% since last known 50%. Being worked up for ischemic etiology. He has no angina or ischemic " symptoms. Given a dose of IV bumex. Give additional dose. Wheezing, rhonchi heard in lungs.  Heart sounds distant.   Anticipated Length of Stay/Certification Statement: atient will be admitted on an inpatient basis with an anticipated length of stay of greater than 2 midnights secondary to sepsis secondary to bacterial and viral pneumonia.     Date: 2/15   Day 2:    Follow blood cultures. Continue iv abx, tamiflu, IV bumex.  Plan for cardiac testing Monday. Tachypnea resolved. Had a glucose reading ofr 41 today.  Then 34.  Given food, IV destrose.     Cardiology:Has had good response to bumex.  Do not plan for ischemic eval until flu infection has resolved.  No chest pain. Coarse breath sounds bilaterally with wheezing.     Date: 2/16  Day 3: Has surpassed a 2nd midnight with active treatments and services. Initially admitted to inpatient for flu A, pulse ox good on room air.  Continue tamiflu.  EF 35%. Continue IV bumex. Being followed by Cardiology.       ED Treatment-Medication Administration from 02/14/2025 0837 to 02/14/2025 1204         Date/Time Order Dose Route Action     02/14/2025 0914 ipratropium-albuterol (DUO-NEB) 0.5-2.5 mg/3 mL inhalation solution 3 mL 3 mL Nebulization Given     02/14/2025 0913 acetaminophen (TYLENOL) tablet 650 mg 650 mg Oral Given     02/14/2025 1015 cefTRIAXone (ROCEPHIN) IVPB (premix in dextrose) 2,000 mg 50 mL 2,000 mg Intravenous New Bag     02/14/2025 1049 azithromycin (ZITHROMAX) 500 mg in sodium chloride 0.9% 250mL IVPB 500 mg 500 mg Intravenous New Bag     02/14/2025 1013 bumetanide (BUMEX) injection 1 mg 1 mg Intravenous Given     02/14/2025 1016 sodium chloride 0.9 % bolus 250 mL 250 mL Intravenous New Bag            Scheduled Medications:  allopurinol, 300 mg, Oral, Q24H  atorvastatin, 20 mg, Oral, Daily  budesonide, 0.5 mg, Nebulization, BID  bumetanide, 2 mg, Intravenous, BID (diuretic)  cefTRIAXone, 2,000 mg, Intravenous, Q24H  fluticasone, 1 spray, Each Nare,  Daily  gabapentin, 200 mg, Oral, Daily  guaiFENesin, 600 mg, Oral, Q12H TOVA  insulin glargine, 10 Units, Subcutaneous, Q12H TOVA  insulin lispro, 1-6 Units, Subcutaneous, TID AC  insulin lispro, 1-6 Units, Subcutaneous, HS  insulin lispro, 10 Units, Subcutaneous, TID With Meals  levalbuterol, 1.25 mg, Nebulization, TID  metoprolol succinate, 25 mg, Oral, Daily  oseltamivir, 30 mg, Oral, Q12H TOVA  oxymetazoline, 2 spray, Each Nare, Q12H TOVA  rivaroxaban, 15 mg, Oral, Daily With Breakfast      Continuous IV Infusions:     PRN Meds:  acetaminophen, 650 mg, Oral, Q6H PRN  albuterol, 2.5 mg, Nebulization, Q4H PRN  Hydrocod Israel-Chlorphe Israel ER, 5 mL, Oral, Q12H PRN  senna, 17.2 mg, Oral, HS PRN  trimethobenzamide, 200 mg, Intramuscular, Q12H PRN      ED Triage Vitals   Temperature Pulse Respirations Blood Pressure SpO2 Pain Score   02/14/25 0847 02/14/25 0847 02/14/25 0847 02/14/25 0847 02/14/25 0847 02/14/25 1209   (!) 102.7 °F (39.3 °C) 105 20 168/77 98 % No Pain     Weight (last 2 days)       Date/Time Weight    02/16/25 0541 102 (224)    02/16/25 0517 102 (224)    02/15/25 0940 102 (223.8)    02/14/25 12:10:27 101 (222.9)            Vital Signs (last 3 days)       Date/Time Temp Pulse Resp BP MAP (mmHg) SpO2 FiO2 (%) Calculated FIO2 (%) - Nasal Cannula O2 Flow Rate (L/min) Nasal Cannula O2 Flow Rate (L/min) O2 Device O2 Interface Device Patient Position - Orthostatic VS Pain    02/16/25 11:51:46 98.3 °F (36.8 °C) 67 16 130/65 87 97 % -- -- -- -- -- -- -- --    02/16/25 0900 -- -- -- -- -- -- -- -- -- -- None (Room air) -- -- No Pain    02/16/25 0810 -- -- -- -- -- 95 % -- -- -- -- -- -- -- --    02/16/25 07:23:56 98.8 °F (37.1 °C) 69 17 126/64 85 95 % -- -- -- -- -- -- Lying --    02/16/25 03:45:50 98.1 °F (36.7 °C) 50 20 133/60 84 97 % -- -- -- -- CPAP -- Sitting --    02/15/25 2143 -- -- -- -- -- -- 21 -- -- -- CPAP Nasal pillows -- No Pain    02/15/25 21:32:40 97.9 °F (36.6 °C) 70 20 139/60 86 100 % -- -- -- --  CPAP -- Sitting --    02/15/25 2017 -- -- -- -- -- 98 % -- -- -- -- None (Room air) -- -- --    02/15/25 15:37:25 98.2 °F (36.8 °C) -- 22 149/70 96 -- -- -- -- -- None (Room air) -- Sitting --    02/15/25 15:35:14 98.2 °F (36.8 °C) 69 -- -- 101 96 % -- -- -- -- -- -- -- --    02/15/25 13:41:57 98.7 °F (37.1 °C) 67 20 121/61 81 99 % -- -- -- -- None (Room air) -- Sitting No Pain    02/15/25 0830 -- -- -- -- -- -- -- -- -- -- -- -- -- No Pain    02/15/25 07:22:07 99.4 °F (37.4 °C) 68 20 121/61 81 99 % -- 28 -- 2 L/min Nasal cannula -- Lying --    02/15/25 0317 -- -- -- -- -- -- -- 28 -- 2 L/min Nasal cannula -- -- --    02/14/25 2245 -- -- -- -- -- -- -- -- 2 L/min -- CPAP Full face mask -- --    02/14/25 21:43:51 98.6 °F (37 °C) 70 17 120/61 81 99 % -- 28 -- 2 L/min Nasal cannula -- Sitting --    02/14/25 2041 -- -- -- -- -- 99 % -- 28 -- 2 L/min Nasal cannula -- -- --    02/14/25 1931 -- -- -- -- -- -- -- -- -- -- -- -- -- No Pain    02/14/25 17:55:14 98.3 °F (36.8 °C) 71 19 117/62 80 100 % -- -- -- -- -- -- -- --    02/14/25 15:08:51 98.6 °F (37 °C) 69 17 118/61 80 99 % -- 28 -- 2 L/min Nasal cannula -- Sitting --    02/14/25 12:10:27 100.9 °F (38.3 °C) 70 20 116/60 79 96 % -- 28 -- 2 L/min Nasal cannula -- Sitting No Pain    02/14/25 1209 -- -- -- -- -- -- -- -- -- -- -- -- -- No Pain    02/14/25 1100 -- 69 24 151/106 124 98 % -- 28 -- 2 L/min Nasal cannula -- -- --    02/14/25 1030 -- 105 24 138/63 -- 97 % -- 28 -- 2 L/min Nasal cannula -- -- --    02/14/25 1000 -- 89 24 136/63 -- 96 % -- 28 -- 2 L/min Nasal cannula -- -- --    02/14/25 0945 -- 104 16 141/73 101 97 % -- 28 -- 2 L/min Nasal cannula -- -- --    02/14/25 0918 -- -- -- -- -- -- -- 28 -- 2 L/min Nasal cannula -- -- --    02/14/25 0915 -- 101 26 -- -- 96 % -- 28 -- 2 L/min Nasal cannula -- -- --    02/14/25 0900 -- 102 28 160/90 -- 95 % -- -- -- -- -- -- -- --    02/14/25 0847 102.7 °F (39.3 °C) 105 20 168/77 -- 98 % -- -- -- -- None (Room air) --  Sitting --              Pertinent Labs/Diagnostic Test Results:   Radiology:  XR chest portable   Final Interpretation by Ariel Gallo MD (02/15 1043)      Improved right basilar groundglass opacity.            Workstation performed: [id number]         XR chest 1 view portable   ED Interpretation by Nadine Chiu PA-C (02/14 0917)   RLL pneumonia      Final Interpretation by Dav Garcia MD (02/14 1021)      Groundglass opacity in the right lower lung field which could represent developing pneumonia.   This was noted by the emergency department            Workstation performed: [id number]           Cardiology:  ECG 12 lead   Final Result by Faustino Tijerina MD (02/14 1803)   AV dual-paced rhythm with occasional ventricular-paced complexes   Abnormal ECG   When compared with ECG of 27-Jan-2025 11:56,   Premature ventricular complexes are no longer Present   Vent. rate has increased by  18 bpm   Confirmed by Faustino Tijerina ([id number]) on 2/14/2025 6:03:44 PM        GI:  No orders to display       Results from last 7 days   Lab Units 02/14/25  0905   SARS-COV-2  Negative     Results from last 7 days   Lab Units 02/16/25  0351 02/15/25  0224 02/14/25  0905   WBC Thousand/uL 4.46 6.14 6.85   HEMOGLOBIN g/dL 11.0* 10.5* 11.4*   HEMATOCRIT % 34.4* 33.0* 36.9   PLATELETS Thousands/uL 119* 105* 112*   TOTAL NEUT ABS Thousands/µL 2.80 4.56 5.60         Results from last 7 days   Lab Units 02/16/25  0351 02/15/25  0224 02/14/25  0905 02/13/25  1253   SODIUM mmol/L 134* 132* 131* 131*   POTASSIUM mmol/L 3.5 4.0 4.7 3.8   CHLORIDE mmol/L 98 97 95* 93*   CO2 mmol/L 28 29 28 27   ANION GAP mmol/L 8 6 8 11   BUN mg/dL 39* 41* 38* 33*   CREATININE mg/dL 1.26 1.31* 1.39* 1.44*   EGFR ml/min/1.73sq m 51 49 45 43   CALCIUM mg/dL 8.6 8.6 9.0 9.0   MAGNESIUM mg/dL  --  2.0  --   --    PHOSPHORUS mg/dL  --  3.3  --   --      Results from last 7 days   Lab Units 02/14/25  0905   AST U/L 30   ALT U/L  17   ALK PHOS U/L 138*   TOTAL PROTEIN g/dL 6.5   ALBUMIN g/dL 3.7   TOTAL BILIRUBIN mg/dL 1.90*     Results from last 7 days   Lab Units 02/16/25  1009 02/16/25  0802 02/15/25  2358 02/15/25  1647 02/15/25  1533 02/15/25  1450 02/15/25  1426 02/15/25  1124 02/15/25  0720 02/15/25  0030 02/14/25  2325 02/14/25  2122   POC GLUCOSE mg/dl 201* 127 224* 105 81 34* 41* 130 126 79 64* 72     Results from last 7 days   Lab Units 02/16/25  0351 02/15/25  0224 02/14/25  0905 02/13/25  1253   GLUCOSE RANDOM mg/dL 145* 111 243* 286*         Results from last 7 days   Lab Units 02/14/25  1506 02/14/25  1113 02/14/25  0905   HS TNI 0HR ng/L  --   --  123*   HS TNI 2HR ng/L  --  192*  --    HSTNI D2 ng/L  --  69*  --    HS TNI 4HR ng/L 281*  --   --    HSTNI D4 ng/L 158*  --   --          Results from last 7 days   Lab Units 02/14/25  0905   PROTIME seconds 31.2*   INR  2.99*   PTT seconds 49*         Results from last 7 days   Lab Units 02/16/25  0351 02/15/25  0224 02/14/25  0905   PROCALCITONIN ng/ml 0.97* 1.34* 0.20     Results from last 7 days   Lab Units 02/14/25  1113 02/14/25  0905   LACTIC ACID mmol/L 1.5 2.1*             Results from last 7 days   Lab Units 02/14/25  0905   BNP pg/mL 954*         Results from last 7 days   Lab Units 02/14/25  0905   INFLUENZA A PCR  Positive*   INFLUENZA B PCR  Negative   RSV PCR  Negative       Results from last 7 days   Lab Units 02/14/25  0905   BLOOD CULTURE  No Growth at 24 hrs.  No Growth at 24 hrs.         Past Medical History:   Diagnosis Date    Asthma     Bradycardia     Carcinoma, basal cell, skin     head and face and ear    CHF (congestive heart failure) (HCC)     Chronic kidney disease     Coronary artery disease     COVID-19 12/05/2022    Diabetes mellitus (HCC)     Dupuytren contracture     bilateral ring and right middle    Gout     Herniated lumbar intervertebral disc     Hypertension     Sleep apnea      Present on Admission:   Sepsis (HCC)   Mixed hyperlipidemia    Elevated troponin   Influenza A   Acute on chronic systolic congestive heart failure (HCC)   Paroxysmal A-fib (HCC)      Admitting Diagnosis: CHF (congestive heart failure) (HCC) [I50.9]  Pneumonia [J18.9]  Influenza A [J10.1]  Elevated troponin [R79.89]  Fever [R50.9]  Sepsis (HCC) [A41.9]  Elevated troponin level not due to acute coronary syndrome [R79.89]  Age/Sex: 84 y.o. male    Network Utilization Review Department  ATTENTION: Please call with any questions or concerns to [phone] and carefully listen to the prompts so that you are directed to the right person. All voicemails are confidential.   For Discharge needs, contact Care Management DC Support Team at [phone] opt. [phone]  Send all requests for admission clinical reviews, approved or denied determinations and any other requests to dedicated fax number below belonging to the campus where the patient is receiving treatment. List of dedicated fax numbers for the Facilities:  FACILITY NAME UR FAX NUMBER   ADMISSION DENIALS (Administrative/Medical Necessity) [phone]   DISCHARGE SUPPORT TEAM (NETWORK) [phone]   PARENT CHILD HEALTH (Maternity/NICU/Pediatrics) [phone]   [facility] [phone]   [facility] [phone]   [facility] [phone]   [facility] [phone]   [facility] [phone]   [facility] [phone]   [facility] [phone]   [facility] [phone]   [facility] [phone]   [facility] [phone]   [facility] [phone]   [facility] [phone]

## 2025-02-15 NOTE — NURSING NOTE
Patient refused full body skin assessment. Patient did describe boil that is reoccurring above sacrum.

## 2025-02-15 NOTE — ASSESSMENT & PLAN NOTE
Rate control: Toprol XL 25 mg daily   Anticoagulation: Xarelto 20 mg daily -reports that he is compliant.  Implanted Biotronik permanent pacemaker secondary to sick sinus syndrome.  Last EP device interrogation showed 100% A-fib burden

## 2025-02-15 NOTE — PLAN OF CARE
Problem: Potential for Falls  Goal: Patient will remain free of falls  Description: INTERVENTIONS:  - Educate patient/family on patient safety including physical limitations  - Instruct patient to call for assistance with activity   - Consult OT/PT to assist with strengthening/mobility   - Keep Call bell within reach  - Keep bed low and locked with side rails adjusted as appropriate  - Keep care items and personal belongings within reach  - Initiate and maintain comfort rounds  - Make Fall Risk Sign visible to staff  - Offer Toileting every 2  Hours, in advance of need  - Initiate/Maintain bed alarm  - Obtain necessary fall risk management equipment: socks  - Apply yellow socks and bracelet for high fall risk patients  - Consider moving patient to room near nurses station  Outcome: Progressing

## 2025-02-15 NOTE — PROGRESS NOTES
Patient's own glucometer beeped stating low blood  sugar.Rechecked the blood sugar and it showed 41mg/dl. Patient received 3 orange juices and an ice cream. Rechecked blood sugar but dropped lower to 34mg/dl. Patient got IV dextrose given. AINSLEY Hall aware.

## 2025-02-16 LAB
ANION GAP SERPL CALCULATED.3IONS-SCNC: 8 MMOL/L (ref 4–13)
BASOPHILS # BLD AUTO: 0.02 THOUSANDS/ΜL (ref 0–0.1)
BASOPHILS NFR BLD AUTO: 0 % (ref 0–1)
BUN SERPL-MCNC: 39 MG/DL (ref 5–25)
CALCIUM SERPL-MCNC: 8.6 MG/DL (ref 8.4–10.2)
CHLORIDE SERPL-SCNC: 98 MMOL/L (ref 96–108)
CO2 SERPL-SCNC: 28 MMOL/L (ref 21–32)
CREAT SERPL-MCNC: 1.26 MG/DL (ref 0.6–1.3)
EOSINOPHIL # BLD AUTO: 0.08 THOUSAND/ΜL (ref 0–0.61)
EOSINOPHIL NFR BLD AUTO: 2 % (ref 0–6)
ERYTHROCYTE [DISTWIDTH] IN BLOOD BY AUTOMATED COUNT: 18.3 % (ref 11.6–15.1)
GFR SERPL CREATININE-BSD FRML MDRD: 51 ML/MIN/1.73SQ M
GLUCOSE SERPL-MCNC: 106 MG/DL (ref 65–140)
GLUCOSE SERPL-MCNC: 127 MG/DL (ref 65–140)
GLUCOSE SERPL-MCNC: 145 MG/DL (ref 65–140)
GLUCOSE SERPL-MCNC: 201 MG/DL (ref 65–140)
GLUCOSE SERPL-MCNC: 224 MG/DL (ref 65–140)
GLUCOSE SERPL-MCNC: 88 MG/DL (ref 65–140)
HCT VFR BLD AUTO: 34.4 % (ref 36.5–49.3)
HGB BLD-MCNC: 11 G/DL (ref 12–17)
IMM GRANULOCYTES # BLD AUTO: 0.01 THOUSAND/UL (ref 0–0.2)
IMM GRANULOCYTES NFR BLD AUTO: 0 % (ref 0–2)
LYMPHOCYTES # BLD AUTO: 0.88 THOUSANDS/ΜL (ref 0.6–4.47)
LYMPHOCYTES NFR BLD AUTO: 20 % (ref 14–44)
MCH RBC QN AUTO: 27 PG (ref 26.8–34.3)
MCHC RBC AUTO-ENTMCNC: 32 G/DL (ref 31.4–37.4)
MCV RBC AUTO: 85 FL (ref 82–98)
MONOCYTES # BLD AUTO: 0.67 THOUSAND/ΜL (ref 0.17–1.22)
MONOCYTES NFR BLD AUTO: 15 % (ref 4–12)
NEUTROPHILS # BLD AUTO: 2.8 THOUSANDS/ΜL (ref 1.85–7.62)
NEUTS SEG NFR BLD AUTO: 63 % (ref 43–75)
NRBC BLD AUTO-RTO: 0 /100 WBCS
PLATELET # BLD AUTO: 119 THOUSANDS/UL (ref 149–390)
PMV BLD AUTO: 11.5 FL (ref 8.9–12.7)
POTASSIUM SERPL-SCNC: 3.5 MMOL/L (ref 3.5–5.3)
PROCALCITONIN SERPL-MCNC: 0.97 NG/ML
RBC # BLD AUTO: 4.07 MILLION/UL (ref 3.88–5.62)
SODIUM SERPL-SCNC: 134 MMOL/L (ref 135–147)
WBC # BLD AUTO: 4.46 THOUSAND/UL (ref 4.31–10.16)

## 2025-02-16 PROCEDURE — 85025 COMPLETE CBC W/AUTO DIFF WBC: CPT | Performed by: INTERNAL MEDICINE

## 2025-02-16 PROCEDURE — 99232 SBSQ HOSP IP/OBS MODERATE 35: CPT | Performed by: PHYSICIAN ASSISTANT

## 2025-02-16 PROCEDURE — 84145 PROCALCITONIN (PCT): CPT | Performed by: STUDENT IN AN ORGANIZED HEALTH CARE EDUCATION/TRAINING PROGRAM

## 2025-02-16 PROCEDURE — 80048 BASIC METABOLIC PNL TOTAL CA: CPT | Performed by: INTERNAL MEDICINE

## 2025-02-16 PROCEDURE — 94760 N-INVAS EAR/PLS OXIMETRY 1: CPT

## 2025-02-16 PROCEDURE — 94640 AIRWAY INHALATION TREATMENT: CPT

## 2025-02-16 PROCEDURE — 82948 REAGENT STRIP/BLOOD GLUCOSE: CPT

## 2025-02-16 RX ADMIN — INSULIN LISPRO 10 UNITS: 100 INJECTION, SOLUTION INTRAVENOUS; SUBCUTANEOUS at 11:59

## 2025-02-16 RX ADMIN — INSULIN LISPRO 2 UNITS: 100 INJECTION, SOLUTION INTRAVENOUS; SUBCUTANEOUS at 12:00

## 2025-02-16 RX ADMIN — LEVALBUTEROL HYDROCHLORIDE 1.25 MG: 1.25 SOLUTION RESPIRATORY (INHALATION) at 08:09

## 2025-02-16 RX ADMIN — LEVALBUTEROL HYDROCHLORIDE 1.25 MG: 1.25 SOLUTION RESPIRATORY (INHALATION) at 20:47

## 2025-02-16 RX ADMIN — OSELTAMIVIR 30 MG: 30 CAPSULE ORAL at 21:55

## 2025-02-16 RX ADMIN — OXYMETAZOLINE HYDROCHLORIDE 2 SPRAY: 0.05 SPRAY NASAL at 08:51

## 2025-02-16 RX ADMIN — INSULIN LISPRO 10 UNITS: 100 INJECTION, SOLUTION INTRAVENOUS; SUBCUTANEOUS at 08:50

## 2025-02-16 RX ADMIN — LEVALBUTEROL HYDROCHLORIDE 1.25 MG: 1.25 SOLUTION RESPIRATORY (INHALATION) at 14:34

## 2025-02-16 RX ADMIN — GUAIFENESIN 600 MG: 600 TABLET, EXTENDED RELEASE ORAL at 21:55

## 2025-02-16 RX ADMIN — METOPROLOL SUCCINATE 25 MG: 25 TABLET, FILM COATED, EXTENDED RELEASE ORAL at 08:50

## 2025-02-16 RX ADMIN — BUMETANIDE 2 MG: 0.25 INJECTION INTRAMUSCULAR; INTRAVENOUS at 12:00

## 2025-02-16 RX ADMIN — INSULIN GLARGINE 10 UNITS: 100 INJECTION, SOLUTION SUBCUTANEOUS at 08:50

## 2025-02-16 RX ADMIN — ATORVASTATIN CALCIUM 20 MG: 20 TABLET, FILM COATED ORAL at 08:51

## 2025-02-16 RX ADMIN — ALLOPURINOL 300 MG: 300 TABLET ORAL at 11:59

## 2025-02-16 RX ADMIN — GUAIFENESIN 600 MG: 600 TABLET, EXTENDED RELEASE ORAL at 08:51

## 2025-02-16 RX ADMIN — GABAPENTIN 200 MG: 100 CAPSULE ORAL at 08:50

## 2025-02-16 RX ADMIN — BUMETANIDE 2 MG: 0.25 INJECTION INTRAMUSCULAR; INTRAVENOUS at 06:10

## 2025-02-16 RX ADMIN — FLUTICASONE PROPIONATE 1 SPRAY: 50 SPRAY, METERED NASAL at 08:52

## 2025-02-16 RX ADMIN — BUDESONIDE 0.5 MG: 0.5 INHALANT ORAL at 20:47

## 2025-02-16 RX ADMIN — RIVAROXABAN 15 MG: 15 TABLET, FILM COATED ORAL at 08:51

## 2025-02-16 RX ADMIN — INSULIN LISPRO 10 UNITS: 100 INJECTION, SOLUTION INTRAVENOUS; SUBCUTANEOUS at 17:33

## 2025-02-16 RX ADMIN — OXYMETAZOLINE HYDROCHLORIDE 2 SPRAY: 0.05 SPRAY NASAL at 21:56

## 2025-02-16 RX ADMIN — CEFTRIAXONE 2000 MG: 2 INJECTION, SOLUTION INTRAVENOUS at 12:00

## 2025-02-16 RX ADMIN — BUDESONIDE 0.5 MG: 0.5 INHALANT ORAL at 08:09

## 2025-02-16 RX ADMIN — OSELTAMIVIR 30 MG: 30 CAPSULE ORAL at 08:50

## 2025-02-16 NOTE — PLAN OF CARE
Problem: PAIN - ADULT  Goal: Verbalizes/displays adequate comfort level or baseline comfort level  Description: Interventions:  - Encourage patient to monitor pain and request assistance  - Assess pain using appropriate pain scale  - Administer analgesics based on type and severity of pain and evaluate response  - Implement non-pharmacological measures as appropriate and evaluate response  - Consider cultural and social influences on pain and pain management  - Notify physician/advanced practitioner if interventions unsuccessful or patient reports new pain  Outcome: Progressing     Problem: INFECTION - ADULT  Goal: Absence or prevention of progression during hospitalization  Description: INTERVENTIONS:  - Assess and monitor for signs and symptoms of infection  - Monitor lab/diagnostic results  - Monitor all insertion sites, i.e. indwelling lines, tubes, and drains  - Monitor endotracheal if appropriate and nasal secretions for changes in amount and color  - Worth appropriate cooling/warming therapies per order  - Administer medications as ordered  - Instruct and encourage patient and family to use good hand hygiene technique  - Identify and instruct in appropriate isolation precautions for identified infection/condition  Outcome: Progressing  Goal: Absence of fever/infection during neutropenic period  Description: INTERVENTIONS:  - Monitor WBC    Outcome: Progressing

## 2025-02-16 NOTE — PLAN OF CARE

## 2025-02-16 NOTE — PROGRESS NOTES
Progress Note - Hospitalist   Name: Hammad Montesinos 84 y.o. male I MRN: [id number]  Unit/Bed#: -01 I Date of Admission: 2/14/2025   Date of Service: 2/16/2025 I Hospital Day: 2    Assessment & Plan  Sepsis (HCC)  Present on admission as evidenced by tachycardia, tachypnea, hyperthermia  Likely secondary to influenza A and bacterial pneumonia  Continue ceftriaxone.  Tamiflu for influenza A  Contact and droplet precautions  Blood cultures no growth x 24 hours  Trend fevers curve/WBC count/procalcitonin  Influenza A  Influenza A + on 2/14/2025  Saturating well on room air  Continue Tamiflu x 5 days  Contact and droplet precautions  Monitor respiratory status  Acute on chronic systolic congestive heart failure (HCC)  Noted history of EF 35%  Cardiology following  Continue IV Bumex 2 mg twice daily for CHF exacerbation  Planning for cardiac stress testing on Monday  Monitor intake and output, daily weights  Elevated troponin  Troponin I 123 on presentation  Patient denies anginal symptoms  Cardiology following, planning stress test on Monday  Paroxysmal A-fib ([facility])  Heart rates well-controlled  Continue home beta-blocker for rate control and Xarelto for CVA prophylaxis  Monitor heart rates  Type 2 diabetes mellitus with hyperglycemia, with long-term current use of insulin ([facility])  Lab Results   Component Value Date    HGBA1C 9.1 (H) 01/28/2025       Recent Labs     02/15/25  1647 02/15/25  2358 02/16/25  0802 02/16/25  1009   POCGLU 105 224* 127 201*       Blood Sugar Average: Last 72 hrs:  (P) [id number]  Poorly controlled on home insulin regimen  Had asymptomatic blood sugars in the 40s yesterday, although today reports his appetite is better and he is hungrier today.  Decrease Lantus to 10 units every 12  Decrease lispro to 10 units 3 times daily with meals  Sliding scale insulin with Accu-Cheks  Diabetic diet  Target blood glucose 140-180  Mixed hyperlipidemia  Continue home statin  therapy    Mobility:   Basic Mobility Inpatient Raw Score: 22  JH-HLM Goal: 7: Walk 25 feet or more  JH-HLM Achieved: 7: Walk 25 feet or more  JH-HLM Goal achieved. Continue to encourage appropriate mobility.    VTE Pharmacologic Prophylaxis: VTE Score: 6 High Risk (Score >/= 5) - Pharmacological DVT Prophylaxis Ordered: rivaroxaban (Xarelto). Sequential Compression Devices Ordered.    Education and Discussions with Family / Patient: Updated  (wife) via phone.  Called daughter on phone as well.    Current Length of Stay: 2 day(s)  Current Patient Status: Inpatient   Certification Statement: The patient will continue to require additional inpatient hospital stay due to CHF exacerbation on IV Bumex pending cardiac stress testing  Discharge Plan: Anticipate discharge in 48-72 hrs to discharge location to be determined pending rehab evaluations.    Code Status: Level 1 - Full Code    Subjective:   No overnight events reported.  He feels hungry today and his breathing has improved.  He is not quite back to his respiratory status baseline but overall does note improvement    Objective:     Vitals:   Temp (24hrs), Av.3 °F (36.8 °C), Min:97.9 °F (36.6 °C), Max:98.8 °F (37.1 °C)    Temp:  [97.9 °F (36.6 °C)-98.8 °F (37.1 °C)] 98.8 °F (37.1 °C)  HR:  [50-70] 69  Resp:  [17-22] 17  BP: (121-149)/(60-70) 126/64  SpO2:  [95 %-100 %] 95 %  Body mass index is 32.14 kg/m².     Input and Output Summary (last 24 hours):     Intake/Output Summary (Last 24 hours) at 2025 1143  Last data filed at 2025 1014  Gross per 24 hour   Intake 1421 ml   Output 2740 ml   Net -1319 ml       Physical Exam:   Physical Exam  Vitals and nursing note reviewed.   Constitutional:       General: He is not in acute distress.     Appearance: Normal appearance. He is not ill-appearing.   HENT:      Head: Normocephalic and atraumatic.   Eyes:      General: No scleral icterus.        Right eye: No discharge.         Left eye: No  discharge.   Cardiovascular:      Rate and Rhythm: Normal rate and regular rhythm.      Pulses: Normal pulses.      Heart sounds: No murmur heard.     No friction rub. No gallop.   Pulmonary:      Effort: Pulmonary effort is normal. No respiratory distress.      Breath sounds: Normal breath sounds. No wheezing, rhonchi or rales.   Abdominal:      General: Bowel sounds are normal. There is no distension.      Palpations: Abdomen is soft.      Tenderness: There is no abdominal tenderness. There is no guarding or rebound.   Musculoskeletal:         General: No swelling or deformity.      Cervical back: Neck supple.      Right lower leg: Edema present.      Left lower leg: Edema present.   Skin:     General: Skin is warm and dry.      Capillary Refill: Capillary refill takes less than 2 seconds.      Coloration: Skin is not jaundiced or pale.      Findings: No erythema or rash.   Neurological:      General: No focal deficit present.      Mental Status: He is alert and oriented to person, place, and time. Mental status is at baseline.   Psychiatric:         Mood and Affect: Mood normal.         Behavior: Behavior normal.          Additional Data:     Labs:  Results from last 7 days   Lab Units 02/16/25  0351   WBC Thousand/uL 4.46   HEMOGLOBIN g/dL 11.0*   HEMATOCRIT % 34.4*   PLATELETS Thousands/uL 119*   SEGS PCT % 63   LYMPHO PCT % 20   MONO PCT % 15*   EOS PCT % 2     Results from last 7 days   Lab Units 02/16/25  0351 02/15/25  0224 02/14/25  0905   SODIUM mmol/L 134*   < > 131*   POTASSIUM mmol/L 3.5   < > 4.7   CHLORIDE mmol/L 98   < > 95*   CO2 mmol/L 28   < > 28   BUN mg/dL 39*   < > 38*   CREATININE mg/dL 1.26   < > 1.39*   ANION GAP mmol/L 8   < > 8   CALCIUM mg/dL 8.6   < > 9.0   ALBUMIN g/dL  --   --  3.7   TOTAL BILIRUBIN mg/dL  --   --  1.90*   ALK PHOS U/L  --   --  138*   ALT U/L  --   --  17   AST U/L  --   --  30   GLUCOSE RANDOM mg/dL 145*   < > 243*    < > = values in this interval not displayed.      Results from last 7 days   Lab Units 02/14/25  0905   INR  2.99*     Results from last 7 days   Lab Units 02/16/25  1009 02/16/25  0802 02/15/25  2358 02/15/25  1647 02/15/25  1533 02/15/25  1450 02/15/25  1426 02/15/25  1124 02/15/25  0720 02/15/25  0030 02/14/25  2325 02/14/25  2122   POC GLUCOSE mg/dl 201* 127 224* 105 81 34* 41* 130 126 79 64* 72         Results from last 7 days   Lab Units 02/16/25  0351 02/15/25  0224 02/14/25  1113 02/14/25  0905   LACTIC ACID mmol/L  --   --  1.5 2.1*   PROCALCITONIN ng/ml 0.97* 1.34*  --  0.20       Imaging: Radiology Review: No additional pertinent studies reviewed.    Recent Cultures (last 7 days):   Results from last 7 days   Lab Units 02/14/25  0905   BLOOD CULTURE  No Growth at 24 hrs.  No Growth at 24 hrs.       Telemetry Orders (From admission, onward)               24 Hour Telemetry Monitoring  Continuous x 24 Hours ([facility])        Expiring   Question:  Reason for 24 Hour Telemetry  Answer:  Decompensated CHF- and any one of the following: continuous diuretic infusion or total diuretic dose >200 mg daily, associated electrolyte derangement (I.e. K < 3.0), inotropic drip (continuous infusion), hx of ventricular arrhythmia, or new EF < 35%                        Last 24 Hours Medication List:   Current Facility-Administered Medications   Medication Dose Route Frequency Provider Last Rate    acetaminophen  650 mg Oral Q6H PRN Jonny Soto, DO      albuterol  2.5 mg Nebulization Q4H PRN Chan Kong MD      allopurinol  300 mg Oral Q24H Jonny DORIAN Soto, DO      atorvastatin  20 mg Oral Daily Jonnykamini Soto, DO      budesonide  0.5 mg Nebulization BID Chan Kong MD      bumetanide  2 mg Intravenous BID (diuretic) Faustino Tijerina MD      cefTRIAXone  2,000 mg Intravenous Q24H Jonny Soto, DO 2,000 mg (02/15/25 1057)    fluticasone  1 spray Each Nare Daily Ebony Hantz, PA-C      gabapentin  200 mg Oral Daily DO Oanh Ying   600 mg Oral Q12H [facility] Jonny Soto, DO      Hydrocod Israel-Chlorphe Israel ER  5 mL Oral Q12H PRN Jonny oSto, DO      insulin glargine  10 Units Subcutaneous Q12H TOVA Rafael Guzman PA-C      insulin lispro  1-6 Units Subcutaneous HS Jonny Soto, DO      insulin lispro  1-6 Units Subcutaneous TID  Chan Kong MD      insulin lispro  10 Units Subcutaneous TID With Meals Rafael Guzman PA-C      levalbuterol  1.25 mg Nebulization TID Chan Kong MD      metoprolol succinate  25 mg Oral Daily Jonny Soto, DO      oseltamivir  30 mg Oral Q12H [facility] Jonny Soto, DO      oxymetazoline  2 spray Each Nare Q12H TOVA Ebony Evans PA-C      rivaroxaban  15 mg Oral Daily With Breakfast Jonny Soto,       senna  17.2 mg Oral HS PRN Jonny Soto, DO      trimethobenzamide  200 mg Intramuscular Q12H PRN Jonny Soto, DO          Today, Patient Was Seen By: Rafael Guzman PA-C    **Please Note: This note may have been constructed using a voice recognition system.**

## 2025-02-16 NOTE — ASSESSMENT & PLAN NOTE
Noted history of EF 35%  Cardiology following  Continue IV Bumex 2 mg twice daily for CHF exacerbation  Planning for cardiac stress testing on Monday  Monitor intake and output, daily weights

## 2025-02-16 NOTE — ASSESSMENT & PLAN NOTE
Influenza A + on 2/14/2025  Saturating well on room air  Continue Tamiflu x 5 days  Contact and droplet precautions  Monitor respiratory status

## 2025-02-16 NOTE — ASSESSMENT & PLAN NOTE
Present on admission as evidenced by tachycardia, tachypnea, hyperthermia  Likely secondary to influenza A and bacterial pneumonia  Continue ceftriaxone.  Tamiflu for influenza A  Contact and droplet precautions  Blood cultures no growth x 24 hours  Trend fevers curve/WBC count/procalcitonin

## 2025-02-17 ENCOUNTER — HOSPITAL ENCOUNTER (OUTPATIENT)
Dept: CT IMAGING | Facility: HOSPITAL | Age: 85
Discharge: HOME | End: 2025-02-17

## 2025-02-17 LAB
ANION GAP SERPL CALCULATED.3IONS-SCNC: 9 MMOL/L (ref 4–13)
BASOPHILS # BLD AUTO: 0.03 THOUSANDS/ΜL (ref 0–0.1)
BASOPHILS NFR BLD AUTO: 1 % (ref 0–1)
BUN SERPL-MCNC: 35 MG/DL (ref 5–25)
CALCIUM SERPL-MCNC: 8.5 MG/DL (ref 8.4–10.2)
CHLORIDE SERPL-SCNC: 97 MMOL/L (ref 96–108)
CO2 SERPL-SCNC: 30 MMOL/L (ref 21–32)
CREAT SERPL-MCNC: 1.18 MG/DL (ref 0.6–1.3)
EOSINOPHIL # BLD AUTO: 0.14 THOUSAND/ΜL (ref 0–0.61)
EOSINOPHIL NFR BLD AUTO: 3 % (ref 0–6)
ERYTHROCYTE [DISTWIDTH] IN BLOOD BY AUTOMATED COUNT: 17.9 % (ref 11.6–15.1)
GFR SERPL CREATININE-BSD FRML MDRD: 55 ML/MIN/1.73SQ M
GLUCOSE SERPL-MCNC: 145 MG/DL (ref 65–140)
GLUCOSE SERPL-MCNC: 146 MG/DL (ref 65–140)
GLUCOSE SERPL-MCNC: 191 MG/DL (ref 65–140)
GLUCOSE SERPL-MCNC: 195 MG/DL (ref 65–140)
GLUCOSE SERPL-MCNC: 89 MG/DL (ref 65–140)
GLUCOSE SERPL-MCNC: 93 MG/DL (ref 65–140)
HCT VFR BLD AUTO: 35.2 % (ref 36.5–49.3)
HGB BLD-MCNC: 11 G/DL (ref 12–17)
IMM GRANULOCYTES # BLD AUTO: 0.02 THOUSAND/UL (ref 0–0.2)
IMM GRANULOCYTES NFR BLD AUTO: 0 % (ref 0–2)
LYMPHOCYTES # BLD AUTO: 1.16 THOUSANDS/ΜL (ref 0.6–4.47)
LYMPHOCYTES NFR BLD AUTO: 24 % (ref 14–44)
MCH RBC QN AUTO: 26.6 PG (ref 26.8–34.3)
MCHC RBC AUTO-ENTMCNC: 31.3 G/DL (ref 31.4–37.4)
MCV RBC AUTO: 85 FL (ref 82–98)
MONOCYTES # BLD AUTO: 0.55 THOUSAND/ΜL (ref 0.17–1.22)
MONOCYTES NFR BLD AUTO: 11 % (ref 4–12)
NEUTROPHILS # BLD AUTO: 3.01 THOUSANDS/ΜL (ref 1.85–7.62)
NEUTS SEG NFR BLD AUTO: 61 % (ref 43–75)
NRBC BLD AUTO-RTO: 0 /100 WBCS
PLATELET # BLD AUTO: 131 THOUSANDS/UL (ref 149–390)
PMV BLD AUTO: 11.7 FL (ref 8.9–12.7)
POTASSIUM SERPL-SCNC: 3.4 MMOL/L (ref 3.5–5.3)
RBC # BLD AUTO: 4.14 MILLION/UL (ref 3.88–5.62)
SODIUM SERPL-SCNC: 136 MMOL/L (ref 135–147)
WBC # BLD AUTO: 4.91 THOUSAND/UL (ref 4.31–10.16)

## 2025-02-17 PROCEDURE — 94640 AIRWAY INHALATION TREATMENT: CPT

## 2025-02-17 PROCEDURE — 97530 THERAPEUTIC ACTIVITIES: CPT

## 2025-02-17 PROCEDURE — 80048 BASIC METABOLIC PNL TOTAL CA: CPT | Performed by: INTERNAL MEDICINE

## 2025-02-17 PROCEDURE — 99232 SBSQ HOSP IP/OBS MODERATE 35: CPT | Performed by: HOSPITALIST

## 2025-02-17 PROCEDURE — 85025 COMPLETE CBC W/AUTO DIFF WBC: CPT | Performed by: INTERNAL MEDICINE

## 2025-02-17 PROCEDURE — 94760 N-INVAS EAR/PLS OXIMETRY 1: CPT

## 2025-02-17 PROCEDURE — 82948 REAGENT STRIP/BLOOD GLUCOSE: CPT

## 2025-02-17 PROCEDURE — 99232 SBSQ HOSP IP/OBS MODERATE 35: CPT | Performed by: INTERNAL MEDICINE

## 2025-02-17 PROCEDURE — 97167 OT EVAL HIGH COMPLEX 60 MIN: CPT

## 2025-02-17 RX ORDER — BUMETANIDE 0.25 MG/ML
3 INJECTION, SOLUTION INTRAMUSCULAR; INTRAVENOUS
Status: DISCONTINUED | OUTPATIENT
Start: 2025-02-17 | End: 2025-02-19

## 2025-02-17 RX ORDER — SPIRONOLACTONE 25 MG/1
25 TABLET ORAL DAILY
Status: DISCONTINUED | OUTPATIENT
Start: 2025-02-17 | End: 2025-02-20 | Stop reason: HOSPADM

## 2025-02-17 RX ADMIN — INSULIN GLARGINE 10 UNITS: 100 INJECTION, SOLUTION SUBCUTANEOUS at 20:57

## 2025-02-17 RX ADMIN — BUMETANIDE 2 MG: 0.25 INJECTION INTRAMUSCULAR; INTRAVENOUS at 06:37

## 2025-02-17 RX ADMIN — GUAIFENESIN 600 MG: 600 TABLET, EXTENDED RELEASE ORAL at 20:57

## 2025-02-17 RX ADMIN — INSULIN LISPRO 10 UNITS: 100 INJECTION, SOLUTION INTRAVENOUS; SUBCUTANEOUS at 12:15

## 2025-02-17 RX ADMIN — CEFTRIAXONE 2000 MG: 2 INJECTION, SOLUTION INTRAVENOUS at 09:10

## 2025-02-17 RX ADMIN — METOPROLOL SUCCINATE 25 MG: 25 TABLET, FILM COATED, EXTENDED RELEASE ORAL at 09:10

## 2025-02-17 RX ADMIN — LEVALBUTEROL HYDROCHLORIDE 1.25 MG: 1.25 SOLUTION RESPIRATORY (INHALATION) at 08:41

## 2025-02-17 RX ADMIN — ALLOPURINOL 300 MG: 300 TABLET ORAL at 11:35

## 2025-02-17 RX ADMIN — LEVALBUTEROL HYDROCHLORIDE 1.25 MG: 1.25 SOLUTION RESPIRATORY (INHALATION) at 21:04

## 2025-02-17 RX ADMIN — RIVAROXABAN 15 MG: 15 TABLET, FILM COATED ORAL at 09:10

## 2025-02-17 RX ADMIN — SPIRONOLACTONE 25 MG: 25 TABLET, FILM COATED ORAL at 11:35

## 2025-02-17 RX ADMIN — GABAPENTIN 200 MG: 100 CAPSULE ORAL at 09:10

## 2025-02-17 RX ADMIN — GUAIFENESIN 600 MG: 600 TABLET, EXTENDED RELEASE ORAL at 09:10

## 2025-02-17 RX ADMIN — INSULIN GLARGINE 10 UNITS: 100 INJECTION, SOLUTION SUBCUTANEOUS at 09:05

## 2025-02-17 RX ADMIN — INSULIN LISPRO 2 UNITS: 100 INJECTION, SOLUTION INTRAVENOUS; SUBCUTANEOUS at 12:15

## 2025-02-17 RX ADMIN — OSELTAMIVIR 30 MG: 30 CAPSULE ORAL at 09:11

## 2025-02-17 RX ADMIN — BUMETANIDE 3 MG: 0.25 INJECTION INTRAMUSCULAR; INTRAVENOUS at 12:17

## 2025-02-17 RX ADMIN — INSULIN LISPRO 2 UNITS: 100 INJECTION, SOLUTION INTRAVENOUS; SUBCUTANEOUS at 21:00

## 2025-02-17 RX ADMIN — OXYMETAZOLINE HYDROCHLORIDE 2 SPRAY: 0.05 SPRAY NASAL at 09:08

## 2025-02-17 RX ADMIN — INSULIN LISPRO 10 UNITS: 100 INJECTION, SOLUTION INTRAVENOUS; SUBCUTANEOUS at 09:06

## 2025-02-17 RX ADMIN — OSELTAMIVIR 30 MG: 30 CAPSULE ORAL at 20:57

## 2025-02-17 RX ADMIN — LEVALBUTEROL HYDROCHLORIDE 1.25 MG: 1.25 SOLUTION RESPIRATORY (INHALATION) at 13:52

## 2025-02-17 RX ADMIN — BUDESONIDE 0.5 MG: 0.5 INHALANT ORAL at 08:41

## 2025-02-17 RX ADMIN — SENNOSIDES 17.2 MG: 8.6 TABLET, FILM COATED ORAL at 21:29

## 2025-02-17 RX ADMIN — ATORVASTATIN CALCIUM 20 MG: 20 TABLET, FILM COATED ORAL at 09:10

## 2025-02-17 RX ADMIN — BUDESONIDE 0.5 MG: 0.5 INHALANT ORAL at 21:03

## 2025-02-17 RX ADMIN — FLUTICASONE PROPIONATE 1 SPRAY: 50 SPRAY, METERED NASAL at 09:08

## 2025-02-17 NOTE — PROGRESS NOTES
Progress Note - Hospitalist   Name: Hammad Montesinos 84 y.o. male I MRN: [id number]  Unit/Bed#: -01 I Date of Admission: 2/14/2025   Date of Service: 2/17/2025 I Hospital Day: 3    Assessment & Plan  Sepsis (HCC)  Present on admission as evidenced by tachycardia, tachypnea, hyperthermia  Likely secondary to influenza A and bacterial pneumonia  Continue ceftriaxone.  Tamiflu for influenza A  Contact and droplet precautions  Blood cultures NGTD  Trend fevers curve/WBC count  Influenza A  Influenza A + on 2/14/2025  Saturating well on room air  Continue Tamiflu x 5 days  Contact and droplet precautions  Monitor respiratory status  Acute on chronic systolic congestive heart failure (HCC)  Noted history of EF 35%  Cardiology following  Continue IV Bumex 2 mg twice daily for CHF exacerbation  Ischemic evaluation per cardiology  Monitor intake and output, daily weights  Elevated troponin  Suspect secondary to nonischemic myocardial injury  Defer ischemic evaluation to cardiology  Patient denies anginal symptoms  Paroxysmal A-fib (HCC)  Heart rates well-controlled  Continue home beta-blocker for rate control and Xarelto for CVA prophylaxis  Monitor heart rates  Type 2 diabetes mellitus with hyperglycemia, with long-term current use of insulin ([facility])  Lab Results   Component Value Date    HGBA1C 9.1 (H) 01/28/2025       Recent Labs     02/16/25  1656 02/16/25  2055 02/17/25  0755 02/17/25  1116   POCGLU 106 88 145* 191*       Blood Sugar Average: Last 72 hrs:  (P) 127.7092772486917441  Poorly controlled on home insulin regimen  Had asymptomatic blood sugars in the 40s yesterday, although today reports his appetite is better and he is hungrier today.  Decrease Lantus to 10 units every 12  Decrease lispro to 10 units 3 times daily with meals  Sliding scale insulin with Accu-Cheks  Diabetic diet  Target blood glucose 140-180  Mixed hyperlipidemia  Continue home statin therapy    VTE Pharmacologic Prophylaxis: VTE Score: 6  High Risk (Score >/= 5) - Pharmacological DVT Prophylaxis Ordered: rivaroxaban (Xarelto). Sequential Compression Devices Ordered.    Mobility:   Basic Mobility Inpatient Raw Score: 23  JH-HLM Goal: 7: Walk 25 feet or more  JH-HLM Achieved: 7: Walk 25 feet or more  JH-HLM Goal achieved. Continue to encourage appropriate mobility.    Patient Centered Rounds: I performed bedside rounds with nursing staff today.   Discussions with Specialists or Other Care Team Provider: RAFFY,RN,cardiology    Current Length of Stay: 3 day(s)  Current Patient Status: Inpatient   Certification Statement: The patient will continue to require additional inpatient hospital stay due to IV diuresis and abx  Discharge Plan: Anticipate discharge in 48-72 hrs to discharge location to be determined pending rehab evaluations.    Code Status: Level 1 - Full Code    Subjective   Has some sob    Objective :  Temp:  [97.8 °F (36.6 °C)-98.8 °F (37.1 °C)] 97.8 °F (36.6 °C)  HR:  [56-71] 71  BP: (132-142)/(65-76) 137/74  Resp:  [16-18] 18  SpO2:  [97 %-100 %] 100 %  O2 Device: None (Room air)  FiO2 (%):  [21] 21    Body mass index is 32.05 kg/m².     Input and Output Summary (last 24 hours):     Intake/Output Summary (Last 24 hours) at 2/17/2025 1351  Last data filed at 2/17/2025 1226  Gross per 24 hour   Intake 1240 ml   Output 2825 ml   Net -1585 ml       Physical Exam  Vitals and nursing note reviewed.   Constitutional:       General: He is not in acute distress.     Appearance: Normal appearance. He is not ill-appearing.   HENT:      Head: Normocephalic and atraumatic.   Eyes:      General: No scleral icterus.        Right eye: No discharge.         Left eye: No discharge.   Cardiovascular:      Rate and Rhythm: Normal rate and regular rhythm.      Pulses: Normal pulses.      Heart sounds: No murmur heard.     No friction rub. No gallop.   Pulmonary:      Effort: Pulmonary effort is normal. No respiratory distress.      Breath sounds: Normal breath  sounds. No wheezing, rhonchi or rales.   Abdominal:      General: Bowel sounds are normal. There is no distension.      Palpations: Abdomen is soft.      Tenderness: There is no abdominal tenderness. There is no guarding or rebound.   Musculoskeletal:         General: No swelling or deformity.      Cervical back: Neck supple.      Right lower leg: Edema present.      Left lower leg: Edema present.   Skin:     General: Skin is warm and dry.      Capillary Refill: Capillary refill takes less than 2 seconds.      Coloration: Skin is not jaundiced or pale.      Findings: No erythema or rash.   Neurological:      General: No focal deficit present.      Mental Status: He is alert and oriented to person, place, and time. Mental status is at baseline.   Psychiatric:         Mood and Affect: Mood normal.         Behavior: Behavior normal.     Lines/Drains:        Telemetry:  Telemetry Orders (From admission, onward)               24 Hour Telemetry Monitoring  Continuous x 24 Hours ([facility])        Expiring   Question:  Reason for 24 Hour Telemetry  Answer:  Decompensated CHF- and any one of the following: continuous diuretic infusion or total diuretic dose >200 mg daily, associated electrolyte derangement (I.e. K < 3.0), inotropic drip (continuous infusion), hx of ventricular arrhythmia, or new EF < 35%                     Telemetry Reviewed: Normal Sinus Rhythm  Indication for Continued Telemetry Use: Acute CHF on >200 mg lasix/day or equivalent dose or with new reduced EF.                Lab Results: I have reviewed the following results:   Results from last 7 days   Lab Units 02/17/25  0338   WBC Thousand/uL 4.91   HEMOGLOBIN g/dL 11.0*   HEMATOCRIT % 35.2*   PLATELETS Thousands/uL 131*   SEGS PCT % 61   LYMPHO PCT % 24   MONO PCT % 11   EOS PCT % 3     Results from last 7 days   Lab Units 02/17/25  0338 02/15/25  0224 02/14/25  0905   SODIUM mmol/L 136   < > 131*   POTASSIUM mmol/L 3.4*   < > 4.7   CHLORIDE mmol/L 97   < >  95*   CO2 mmol/L 30   < > 28   BUN mg/dL 35*   < > 38*   CREATININE mg/dL 1.18   < > 1.39*   ANION GAP mmol/L 9   < > 8   CALCIUM mg/dL 8.5   < > 9.0   ALBUMIN g/dL  --   --  3.7   TOTAL BILIRUBIN mg/dL  --   --  1.90*   ALK PHOS U/L  --   --  138*   ALT U/L  --   --  17   AST U/L  --   --  30   GLUCOSE RANDOM mg/dL 146*   < > 243*    < > = values in this interval not displayed.     Results from last 7 days   Lab Units 02/14/25  0905   INR  2.99*     Results from last 7 days   Lab Units 02/17/25  1116 02/17/25  0755 02/16/25  2055 02/16/25  1656 02/16/25  1009 02/16/25  0802 02/15/25  2358 02/15/25  1647 02/15/25  1533 02/15/25  1450 02/15/25  1426 02/15/25  1124   POC GLUCOSE mg/dl 191* 145* 88 106 201* 127 224* 105 81 34* 41* 130         Results from last 7 days   Lab Units 02/16/25  0351 02/15/25  0224 02/14/25  1113 02/14/25  0905   LACTIC ACID mmol/L  --   --  1.5 2.1*   PROCALCITONIN ng/ml 0.97* 1.34*  --  0.20       Recent Cultures (last 7 days):   Results from last 7 days   Lab Units 02/14/25  0905   BLOOD CULTURE  No Growth at 48 hrs.  No Growth at 48 hrs.       Imaging Results Review: No pertinent imaging studies reviewed.  Other Study Results Review: No additional pertinent studies reviewed.    Last 24 Hours Medication List:     Current Facility-Administered Medications:     acetaminophen (TYLENOL) tablet 650 mg, Q6H PRN    albuterol inhalation solution 2.5 mg, Q4H PRN    allopurinol (ZYLOPRIM) tablet 300 mg, Q24H    atorvastatin (LIPITOR) tablet 20 mg, Daily    budesonide (PULMICORT) inhalation solution 0.5 mg, BID    bumetanide (BUMEX) injection 3 mg, BID (diuretic)    cefTRIAXone (ROCEPHIN) IVPB (premix in dextrose) 2,000 mg 50 mL, Q24H, Last Rate: 2,000 mg (02/17/25 0910)    fluticasone (FLONASE) 50 mcg/act nasal spray 1 spray, Daily    gabapentin (NEURONTIN) capsule 200 mg, Daily    guaiFENesin (MUCINEX) 12 hr tablet 600 mg, Q12H TOVA    Hydrocod Israel-Chlorphe Israel ER (TUSSIONEX) ER suspension 5 mL,  Q12H PRN    insulin glargine (LANTUS) subcutaneous injection 10 Units 0.1 mL, Q12H TOVA    insulin lispro (HumALOG/ADMELOG) 100 units/mL subcutaneous injection 1-6 Units, HS    insulin lispro (HumALOG/ADMELOG) 100 units/mL subcutaneous injection 1-6 Units, TID AC **AND** Fingerstick Glucose (POCT), 4x Daily AC and at bedtime    insulin lispro (HumALOG/ADMELOG) 100 units/mL subcutaneous injection 10 Units, TID With Meals    levalbuterol (XOPENEX) inhalation solution 1.25 mg, TID    metoprolol succinate (TOPROL-XL) 24 hr tablet 25 mg, Daily    oseltamivir (TAMIFLU) capsule 30 mg, Q12H TOVA    rivaroxaban (XARELTO) tablet 15 mg, Daily With Breakfast    senna (SENOKOT) tablet 17.2 mg, HS PRN    spironolactone (ALDACTONE) tablet 25 mg, Daily    trimethobenzamide (TIGAN) IM injection 200 mg, Q12H PRN    Administrative Statements   Today, Patient Was Seen By: Fausto Mcgill MD  I have spent a total time of 48 minutes in caring for this patient on the day of the visit/encounter including Diagnostic results, Prognosis, Risks and benefits of tx options, Instructions for management, Patient and family education, Importance of tx compliance, Risk factor reductions, Impressions, Counseling / Coordination of care, Documenting in the medical record, Reviewing/placing orders in the medical record (including tests, medications, and/or procedures), Obtaining or reviewing history  , and Communicating with other healthcare professionals .    **Please Note: This note may have been constructed using a voice recognition system.**

## 2025-02-17 NOTE — ASSESSMENT & PLAN NOTE
Present on admission as evidenced by tachycardia, tachypnea, hyperthermia  Likely secondary to influenza A and bacterial pneumonia  Continue ceftriaxone.  Tamiflu for influenza A  Contact and droplet precautions  Blood cultures NGTD  Trend fevers curve/WBC count

## 2025-02-17 NOTE — ASSESSMENT & PLAN NOTE
Wt Readings from Last 3 Encounters:   02/17/25 101 kg (223 lb 6.4 oz)   02/07/25 100 kg (220 lb 6.4 oz)   02/07/25 100 kg (221 lb)   TTE 1/28/2025: EF 35%, moderate global hypokinesis with regional variation, abnormal diastolic inflow pattern question due to atrial fibs, reduced RV function, biatrial dilatation with severe left atrial dilatation, likely moderate to severe MR, mild TR  GDMT: Toprol XL 25 mg daily,  torsemide 40 mg twice daily.    Patient reports home dry weight is 211 to 214 pounds.  Patient reports a 10 pound weight gain in the last week, within increased abdominal girth.  Chest x-ray on arrival: Groundglass opacity in the right lower lobe  Telemetry: V paced rhythm  Started on IV Bumex with relatively good response overnight and stable creatinine  Continue current regiment and reassess diuretic requirements in the morning  Ejection fraction has dropped from 50% down to 35%, and he will eventually require an ischemic evaluation, however would not proceed with this with active flu infection in the hospital, especially since he has no chest pain at the current time

## 2025-02-17 NOTE — PLAN OF CARE

## 2025-02-17 NOTE — ASSESSMENT & PLAN NOTE
Suspect secondary to nonischemic myocardial injury  Defer ischemic evaluation to cardiology  Patient denies anginal symptoms

## 2025-02-17 NOTE — OCCUPATIONAL THERAPY NOTE
Occupational Therapy Evaluation and Treatment ([id number])     Patient Name: Hammad Montesinos  Today's Date: 2/17/2025  Problem List  Principal Problem:    Sepsis (HCC)  Active Problems:    Type 2 diabetes mellitus with hyperglycemia, with long-term current use of insulin (HCC)    Mixed hyperlipidemia    Elevated troponin    Paroxysmal A-fib (HCC)    Influenza A    Acute on chronic systolic congestive heart failure (HCC)    Past Medical History  Past Medical History:   Diagnosis Date    Asthma     Bradycardia     Carcinoma, basal cell, skin     head and face and ear    CHF (congestive heart failure) (HCC)     Chronic kidney disease     Coronary artery disease     COVID-19 12/05/2022    Diabetes mellitus (HCC)     Dupuytren contracture     bilateral ring and right middle    Gout     Herniated lumbar intervertebral disc     Hypertension     Sleep apnea      Past Surgical History  Past Surgical History:   Procedure Laterality Date    APPENDECTOMY      age 20, ruptured    CATARACT EXTRACTION, BILATERAL Bilateral     CERVICAL FUSION      CYST REMOVAL Left 01/2023    DUPUYTREN CONTRACTURE RELEASE Left     left ring finger    INSERT / REPLACE / REMOVE PACEMAKER      LUMBAR LAMINECTOMY      belkis placed    TONSILLECTOMY      VASECTOMY           02/17/25 0938   OT Last Visit   OT Visit Date 02/17/25   Note Type   Note type Evaluation   Pain Assessment   Pain Assessment Tool 0-10   Pain Score No Pain   Restrictions/Precautions   Weight Bearing Precautions Per Order No   Other Precautions Contact; Droplet;Cognitive;Chair Alarm;Bed Alarm;Fall Risk;Pain;Multiple lines   Home Living   Type of Home Apartment  ([facility])   Home Layout One level;Performs ADLs on one level;Able to live on main level with bedroom/bathroom   Bathroom Shower/Tub Walk-in shower   Bathroom Toilet Raised   Bathroom Equipment Grab bars in shower;Commode;Shower chair   Bathroom Accessibility Accessible   Home Equipment Other  "(Comment)  (rollator, adjustable bed)   Prior Function   Level of [place] Independent with functional mobility;Independent with ADLs;Needs assistance with IADLS   Lives With Spouse   Receives Help From Family   IADLs Family/Friend/Other provides transportation;Family/Friend/Other provides medication management;Family/Friend/Other provides meals   Falls in the last 6 months 1 to 4  (\"about 3 in the last couple of months.\")   Vocational Retired   Lifestyle   Autonomy Pt sates PTA he was IND with ADLs, A with IADLs, and  ambulatory with rollator   Reciprocal Relationships spouse and daughter   Service to Others Retired  - 34 years   Intrinsic Gratification read and watch tv  (in the processing of reading the bible in one year)   General   Family/Caregiver Present No   Subjective   Subjective \"I can't walk without my shoes.\"   ADL   Eating Assistance 7  Independent   Grooming Assistance 6  Modified Independent   UB Bathing Assistance 5  Supervision/Setup   LB Bathing Assistance 5  Supervision/Setup   UB Dressing Assistance 5  Supervision/Setup   LB Dressing Assistance 4  Minimal Assistance   LB Dressing Deficit Don/doff R shoe;Don/doff L shoe  (increased need for A for donning shoes 2* to increased edema of b/l feet)   Toileting Assistance  5  Supervision/Setup   Bed Mobility   Additional Comments Pt OOB in recliner chair upon arrival for OT evaluation   Transfers   Sit to Stand 5  Supervision   Stand to Sit 5  Supervision   Additional Comments Pt completed x2 STS from recliner chair. Pt with good hand placement during transfers   Functional Mobility   Functional Mobility 4  Minimal assistance   Additional Comments x1   Additional items Rolling walker  (Initally min Ax1 for CGA due to increased SOB. IV pole managment. Ambulated 85ft within room with seated rest break in middle due to SOB. Pt desat 79-84%, with increased ambualtion, able to recover to 96-98% with rest break.)   Balance "   Static Sitting Good   Dynamic Sitting Fair +   Static Standing Fair   Dynamic Standing Fair   Activity Tolerance   Activity Tolerance Patient tolerated treatment well;Patient limited by fatigue   Nurse Made Aware VIKASH Arias   RUE Assessment   RUE Assessment WFL   LUE Assessment   LUE Assessment WFL   Hand Function   Gross Motor Coordination Functional   Fine Motor Coordination Functional   Psychosocial   Psychosocial (WDL) WDL   Cognition   Overall Cognitive Status WFL   Arousal/Participation Alert;Cooperative   Attention Within functional limits   Orientation Level Oriented X4   Memory Within functional limits   Following Commands Follows one step commands with increased time or repetition   Comments Pt willing to participate in OT evaluation   Assessment   Limitation Decreased ADL status;Decreased Safe judgement during ADL;Decreased endurance;Decreased self-care trans;Decreased high-level ADLs   Prognosis Good   Assessment Pt is a 84 y.o. male seen for OT evaluation at [facility], admitted 2/14/2025 w/ Sepsis (HCC). Pt (+) flu. Extensive chart review completed by OT. Comorbidities affecting the pt's functional performance include a significant PMH of: Hyperlipidemia, A-fib, CHF, T2DM, fall, generalized weakness, pulmonary hypertension, hypoglycemia, stage 3 CKD, diabetic neuropathy, elevated parathyroid hormone. Personal factors affecting pt at time of IE include: difficulty performing ADLS, flat affect, and health management . PTA pt was living at [facility], was  IND w/ ADLs and A w/ IADLS, and IND with functional mobility with use of rollator, (-) driving, and lives with spouse. Upon OT IE pt demonstrated supervision for functional transfers, min Ax1 for CGA for functional mobility with RW, supervision for UB ADLs and supervision/min for LB ADLS (increased need for A due to edema of b/l feet) 2* the following deficits impacting occupational performance: weakness, decreased tolerance,  decreased safety awareness, and increased pain. Based on OT IE, pt demonstrates the need for continued skilled OT tx focused on eating, grooming, bathing/shower, toilet hygiene, dressing, functional mobility, and clothing management during their stay in the hospital to address the stated deficits and maximize their level of functional independence w/ their ADLS and functional mobility. The patient's raw score on the -PAC Daily Activity inpatient short form is 21, standardized score is 44.27, greater than 39.4. Patients at this level are likely to benefit from DC to home. However, please refer to therapist recommendation for discharge planning given other factors that may influence destination. At this time, OT recommendations at time of discharge are DC with Level III - Low Rehab Resource Intensity resources (HHOT).   Goals   Patient Goals To feel better   Plan   Treatment Interventions ADL retraining;Functional transfer training;Endurance training;Patient/family training;Fine motor coordination activities;Compensatory technique education;Continued evaluation   Goal Expiration Date 02/27/25   OT Treatment Day 0   OT Frequency 1-2x/wk   Discharge Recommendation   Rehab Resource Intensity Level, OT III (Minimum Resource Intensity)  (HHOT)   AM-PAC Daily Activity Inpatient   Lower Body Dressing 3   Bathing 3   Toileting 3   Upper Body Dressing 4   Grooming 4   Eating 4   Daily Activity Raw Score 21   Daily Activity Standardized Score (Calc for Raw Score >=11) 44.27   AM-PAC Applied Cognition Inpatient   Following a Speech/Presentation 3   Understanding Ordinary Conversation 4   Taking Medications 3   Remembering Where Things Are Placed or Put Away 3   Remembering List of 4-5 Errands 3   Taking Care of Complicated Tasks 3   Applied Cognition Raw Score 19   Applied Cognition Standardized Score 39.77   Additional Treatment Session   Start Time 0947   End Time 0957   Treatment Assessment Pt seen for additional treatment  session with focus on transfers, functional mobility, standing tolerance. Pt stood at recliner and completed 2 sets x10 calf rises. Seated rest break required due to increased SOB. Pt completed 2 additional STS transfers from recliner chair with supervision. Pt ambulated within room 80ft with min Ax1 for CGA; pt with CAGLE (SpO2  with increased activity; rebounds quickly to 96-98% with rest). Pt returned seated to recliner chair at end of session. Will continue to follow during pt's IP stay.   Additional Treatment Day 1   End of Consult   Education Provided Yes   Patient Position at End of Consult Bedside chair;Bed/Chair alarm activated;All needs within reach   Nurse Communication Nurse aware of consult       Pt will complete the following goals within 10 days:     Pt will complete grooming with IND.    Pt will complete UB bathing and dressing with IND.    Pt will complete LB bathing and dressing with Yeimi & DME PRN.    Pt will complete toileting w/ Yeimi w/ G hygiene/thoroughness using DME PRN    Pt will improve functional mobility during ADL/IADL/leisure tasks to Yeimi using DME as needed w/ G balance/safety.    Pt will perform functional transfers with Yeimi in order to facilitate completion of  ADL routine.    Pt will increase standing tolerance to 15+ minutes in order to complete ADL routine.    Pt will identify 3-5 fall risks to ensure safety upon discharge.    Pt will verbalize and demonstrate understanding in use of compensatory techniques and use of long handled AE for increased safety and independence during LB ADL tasks.    Pt will demonstrate G carryover of pt/caregiver education and training as appropriate w/ Mod I w/o cues w/ good tolerance.    Remedios Granados (Makenzie), OTR/L

## 2025-02-17 NOTE — ASSESSMENT & PLAN NOTE
Lab Results   Component Value Date    HGBA1C 9.1 (H) 01/28/2025       Recent Labs     02/16/25  1656 02/16/25 2055 02/17/25  0755 02/17/25  1116   POCGLU 106 88 145* 191*       Blood Sugar Average: Last 72 hrs:  (P) [id number]  Poorly controlled on home insulin regimen  Had asymptomatic blood sugars in the 40s yesterday, although today reports his appetite is better and he is hungrier today.  Decrease Lantus to 10 units every 12  Decrease lispro to 10 units 3 times daily with meals  Sliding scale insulin with Accu-Cheks  Diabetic diet  Target blood glucose 140-180

## 2025-02-17 NOTE — PLAN OF CARE

## 2025-02-17 NOTE — PLAN OF CARE
Problem: OCCUPATIONAL THERAPY ADULT  Goal: Performs self-care activities at highest level of function for planned discharge setting.  See evaluation for individualized goals.  Description: Treatment Interventions: ADL retraining, Functional transfer training, Endurance training, Patient/family training, Fine motor coordination activities, Compensatory technique education, Continued evaluation          See flowsheet documentation for full assessment, interventions and recommendations.   Note: Limitation: Decreased ADL status, Decreased Safe judgement during ADL, Decreased endurance, Decreased self-care trans, Decreased high-level ADLs  Prognosis: Good  Assessment: Pt is a 84 y.o. male seen for OT evaluation at [facility], admitted 2/14/2025 w/ Sepsis (HCC). Pt (+) flu. Extensive chart review completed by OT. Comorbidities affecting the pt's functional performance include a significant PMH of: Hyperlipidemia, A-fib, CHF, T2DM, fall, generalized weakness, pulmonary hypertension, hypoglycemia, stage 3 CKD, diabetic neuropathy, elevated parathyroid hormone. Personal factors affecting pt at time of IE include: difficulty performing ADLS, flat affect, and health management . PTA pt was living at [facility], was  IND w/ ADLs and A w/ IADLS, and IND with functional mobility with use of rollator, (-) driving, and lives with spouse. Upon OT IE pt demonstrated supervision for functional transfers, min Ax1 for CGA for functional mobility with RW, supervision for UB ADLs and supervision/min for LB ADLS (increased need for A due to edema of b/l feet) 2* the following deficits impacting occupational performance: weakness, decreased tolerance, decreased safety awareness, and increased pain. Based on OT IE, pt demonstrates the need for continued skilled OT tx focused on eating, grooming, bathing/shower, toilet hygiene, dressing, functional mobility, and clothing management during their stay in the hospital to  address the stated deficits and maximize their level of functional independence w/ their ADLS and functional mobility. The patient's raw score on the AM-PAC Daily Activity inpatient short form is 21, standardized score is 44.27, greater than 39.4. Patients at this level are likely to benefit from DC to home. However, please refer to therapist recommendation for discharge planning given other factors that may influence destination. At this time, OT recommendations at time of discharge are DC with Level III - Low Rehab Resource Intensity resources (HHOT).     Rehab Resource Intensity Level, OT: III (Minimum Resource Intensity) (HHOT)

## 2025-02-17 NOTE — PROGRESS NOTES
Cardiology Progress Note   Hammad Montesinos 84 y.o. male MRN: [id number]    Unit/Bed#: -01 Encounter: [id number]      Assessment & Plan  Sepsis (Formerly [facility])  Patient with reports of a 3-day onset of shortness of breath, PND.    Patient states that he went to cardiologist appointment when his daughter picked him up to be increasingly tachypnec prompting a visit to the emergency room.  Influenza A  Positive on PCR   management per primary team  Acute on chronic systolic congestive heart failure (HCC)  Wt Readings from Last 3 Encounters:   02/17/25 101 kg (223 lb 6.4 oz)   02/07/25 100 kg (220 lb 6.4 oz)   02/07/25 100 kg (221 lb)   TTE 1/28/2025: EF 35%, moderate global hypokinesis with regional variation, abnormal diastolic inflow pattern question due to atrial fibs, reduced RV function, biatrial dilatation with severe left atrial dilatation, likely moderate to severe MR, mild TR  GDMT: Toprol XL 25 mg daily,  torsemide 40 mg twice daily.    Patient reports home dry weight is 211 to 214 pounds.  Patient reports a 10 pound weight gain in the last week, within increased abdominal girth.  Chest x-ray on arrival: Groundglass opacity in the right lower lobe  Telemetry: V paced rhythm  Started on IV Bumex with relatively good response overnight and stable creatinine  Continue current regiment and reassess diuretic requirements in the morning  Ejection fraction has dropped from 50% down to 35%, and he will eventually require an ischemic evaluation, however would not proceed with this with active flu infection in the hospital, especially since he has no chest pain at the current time  Elevated troponin  Initial Troponin 123  Patient denies chest pain  Has cardiac CTA ordered for Monday, 2/17/2025  Paroxysmal A-fib (Former[facility])  Rate control: Toprol XL 25 mg daily   Anticoagulation: Xarelto 20 mg daily -reports that he is compliant.  Implanted Biotronik permanent pacemaker secondary to sick sinus syndrome.  Last EP device  "interrogation showed 100% A-fib burden  Mixed hyperlipidemia  Controlled with atorvastatin 20 mg daily  Type 2 diabetes mellitus with hyperglycemia, with long-term current use of insulin (HCC)  Per primary team(P) 123.25    Plan:  Per patient, LE edema slightly worse today compared to yesterday. Made only -990mL overnight. Cr remains stable.   Increase IV Bumex 2mg -> 3mg BID   Resume Aldactone 25mg QD  Follow BMP daily, standing weights, strict I/O monitoring.    EF dropped to 25% on latest echo. Previously ordered for coronary CTA to eval for significant CAD however, noted with increased calcium burden may not be ideal study. Switched to Pharm MPI which he should recive once recovered from this hospitalization.     Refer to EP for outpatient evaluation to assess for BIV upgrade which may help him from a CHF perspective.    Subjective:   Patient seen and examined.     Objective:     Vitals: Blood pressure 139/76, pulse 56, temperature 97.8 °F (36.6 °C), temperature source Oral, resp. rate 18, height 5' 10\" (1.778 m), weight 101 kg (223 lb 6.4 oz), SpO2 98%., Body mass index is 32.05 kg/m².,   Orthostatic Blood Pressures      Flowsheet Row Most Recent Value   Blood Pressure 139/76 filed at 02/17/2025 0849   Patient Position - Orthostatic VS Lying filed at 02/17/2025 0341              Intake/Output Summary (Last 24 hours) at 2/17/2025 1136  Last data filed at 2/17/2025 1058  Gross per 24 hour   Intake 1480 ml   Output 3275 ml   Net -1795 ml     Physical Exam:  GEN: Hammad Montesinos appears well, alert and oriented x 3, pleasant and cooperative   HEENT: Sclera anicteric, conjunctivae pink, mucous membranes moist. Oropharynx clear.   NECK: supple, no significant JVD, Trachea midline, no thyromegaly.   HEART: regular rhythm, normal S1 and S2, no murmurs, clicks, gallops or rubs   LUNGS: R>L rhonchi, wheezes. Bilateral rales at lung bases.   ABDOMEN: Soft, nontender, nondistended  EXTREMITIES: Skin warm and well " perfused, no clubbing, cyanosis, +2 LE pedal edema.   NEURO: No focal findings. Normal speech. Mood and affect normal.   SKIN: Normal without suspicious lesions on exposed skin.    Medications:    Current Facility-Administered Medications:     acetaminophen (TYLENOL) tablet 650 mg, 650 mg, Oral, Q6H PRN, Jonny DORIAN Pinedarty, DO    albuterol inhalation solution 2.5 mg, 2.5 mg, Nebulization, Q4H PRN, Chan Kong MD, 2.5 mg at 02/15/25 0317    allopurinol (ZYLOPRIM) tablet 300 mg, 300 mg, Oral, Q24H, Jonny ALARCON Yorty, DO, 300 mg at 02/17/25 1135    atorvastatin (LIPITOR) tablet 20 mg, 20 mg, Oral, Daily, Jonny Pinedarty, DO, 20 mg at 02/17/25 0910    budesonide (PULMICORT) inhalation solution 0.5 mg, 0.5 mg, Nebulization, BID, Chan Kong MD, 0.5 mg at 02/17/25 0841    bumetanide (BUMEX) injection 3 mg, 3 mg, Intravenous, BID (diuretic), Jayy Babcock PA-C    cefTRIAXone (ROCEPHIN) IVPB (premix in dextrose) 2,000 mg 50 mL, 2,000 mg, Intravenous, Q24H, Jonny Pinedarty, DO, Last Rate: 100 mL/hr at 02/17/25 0910, 2,000 mg at 02/17/25 0910    fluticasone (FLONASE) 50 mcg/act nasal spray 1 spray, 1 spray, Each Nare, Daily, Ebony Evans PA-C, 1 spray at 02/17/25 0908    gabapentin (NEURONTIN) capsule 200 mg, 200 mg, Oral, Daily, Jonny C Yorty, DO, 200 mg at 02/17/25 0910    guaiFENesin (MUCINEX) 12 hr tablet 600 mg, 600 mg, Oral, Q12H TOVA, Jonny C Yorty, DO, 600 mg at 02/17/25 0910    Hydrocod Israel-Chlorphe Israel ER (TUSSIONEX) ER suspension 5 mL, 5 mL, Oral, Q12H PRN, Jonny C Yorty, DO    insulin glargine (LANTUS) subcutaneous injection 10 Units 0.1 mL, 10 Units, Subcutaneous, Q12H [facility], Rafael Guzman PA-C, 10 Units at 02/17/25 0905    insulin lispro (HumALOG/ADMELOG) 100 units/mL subcutaneous injection 1-6 Units, 1-6 Units, Subcutaneous, HS, Jonny Soto DO, 2 Units at 02/15/25 2358    insulin lispro (HumALOG/ADMELOG) 100 units/mL subcutaneous injection 1-6 Units, 1-6 Units, Subcutaneous, TID AC, 2 Units  at 02/16/25 1200 **AND** Fingerstick Glucose (POCT), , , 4x Daily AC and at bedtime, Chan Kong MD    insulin lispro (HumALOG/ADMELOG) 100 units/mL subcutaneous injection 10 Units, 10 Units, Subcutaneous, TID With Meals, Rafael Guzman PA-C, 10 Units at 02/17/25 0906    levalbuterol (XOPENEX) inhalation solution 1.25 mg, 1.25 mg, Nebulization, TID, Chan Kong MD, 1.25 mg at 02/17/25 0841    metoprolol succinate (TOPROL-XL) 24 hr tablet 25 mg, 25 mg, Oral, Daily, Jonny Soto DO, 25 mg at 02/17/25 0910    oseltamivir (TAMIFLU) capsule 30 mg, 30 mg, Oral, Q12H TOAV, Rafael Guzman PA-C, 30 mg at 02/17/25 0911    rivaroxaban (XARELTO) tablet 15 mg, 15 mg, Oral, Daily With Breakfast, Jonny Soto DO, 15 mg at 02/17/25 0910    senna (SENOKOT) tablet 17.2 mg, 17.2 mg, Oral, HS PRN, Jonny Soto DO    spironolactone (ALDACTONE) tablet 25 mg, 25 mg, Oral, Daily, Jayy Babcock PA-C, 25 mg at 02/17/25 1135    trimethobenzamide (TIGAN) IM injection 200 mg, 200 mg, Intramuscular, Q12H PRN, Jonny Soto DO     Labs & Results:        Results from last 7 days   Lab Units 02/17/25  0338 02/16/25  0351 02/15/25  0224   WBC Thousand/uL 4.91 4.46 6.14   HEMOGLOBIN g/dL 11.0* 11.0* 10.5*   HEMATOCRIT % 35.2* 34.4* 33.0*   PLATELETS Thousands/uL 131* 119* 105*         Results from last 7 days   Lab Units 02/17/25 0338 02/16/25  0351 02/15/25  0224 02/14/25  0905   POTASSIUM mmol/L 3.4* 3.5 4.0 4.7   CHLORIDE mmol/L 97 98 97 95*   CO2 mmol/L 30 28 29 28   BUN mg/dL 35* 39* 41* 38*   CREATININE mg/dL 1.18 1.26 1.31* 1.39*   CALCIUM mg/dL 8.5 8.6 8.6 9.0   ALK PHOS U/L  --   --   --  138*   ALT U/L  --   --   --  17   AST U/L  --   --   --  30     Results from last 7 days   Lab Units 02/14/25  0905   INR  2.99*   PTT seconds 49*     Results from last 7 days   Lab Units 02/15/25  0224   MAGNESIUM mg/dL 2.0

## 2025-02-17 NOTE — ASSESSMENT & PLAN NOTE
Noted history of EF 35%  Cardiology following  Continue IV Bumex 2 mg twice daily for CHF exacerbation  Ischemic evaluation per cardiology  Monitor intake and output, daily weights

## 2025-02-18 ENCOUNTER — HOME HEALTH ADMISSION (OUTPATIENT)
Dept: HOME HEALTH SERVICES | Facility: HOME HEALTHCARE | Age: 85
End: 2025-02-18
Payer: COMMERCIAL

## 2025-02-18 LAB
ANION GAP SERPL CALCULATED.3IONS-SCNC: 9 MMOL/L (ref 4–13)
ANISOCYTOSIS BLD QL SMEAR: PRESENT
BASOPHILS # BLD MANUAL: 0.06 THOUSAND/UL (ref 0–0.1)
BASOPHILS NFR MAR MANUAL: 1 % (ref 0–1)
BUN SERPL-MCNC: 33 MG/DL (ref 5–25)
CALCIUM SERPL-MCNC: 8.6 MG/DL (ref 8.4–10.2)
CHLORIDE SERPL-SCNC: 94 MMOL/L (ref 96–108)
CO2 SERPL-SCNC: 31 MMOL/L (ref 21–32)
CREAT SERPL-MCNC: 1.15 MG/DL (ref 0.6–1.3)
EOSINOPHIL # BLD MANUAL: 0.06 THOUSAND/UL (ref 0–0.4)
EOSINOPHIL NFR BLD MANUAL: 1 % (ref 0–6)
ERYTHROCYTE [DISTWIDTH] IN BLOOD BY AUTOMATED COUNT: 17.9 % (ref 11.6–15.1)
GFR SERPL CREATININE-BSD FRML MDRD: 57 ML/MIN/1.73SQ M
GLUCOSE SERPL-MCNC: 118 MG/DL (ref 65–140)
GLUCOSE SERPL-MCNC: 162 MG/DL (ref 65–140)
GLUCOSE SERPL-MCNC: 184 MG/DL (ref 65–140)
GLUCOSE SERPL-MCNC: 203 MG/DL (ref 65–140)
GLUCOSE SERPL-MCNC: 214 MG/DL (ref 65–140)
GLUCOSE SERPL-MCNC: 84 MG/DL (ref 65–140)
HCT VFR BLD AUTO: 35.7 % (ref 36.5–49.3)
HGB BLD-MCNC: 11.1 G/DL (ref 12–17)
LYMPHOCYTES # BLD AUTO: 2.1 THOUSAND/UL (ref 0.6–4.47)
LYMPHOCYTES # BLD AUTO: 29 % (ref 14–44)
MCH RBC QN AUTO: 26.5 PG (ref 26.8–34.3)
MCHC RBC AUTO-ENTMCNC: 31.1 G/DL (ref 31.4–37.4)
MCV RBC AUTO: 85 FL (ref 82–98)
MONOCYTES # BLD AUTO: 0.51 THOUSAND/UL (ref 0–1.22)
MONOCYTES NFR BLD: 9 % (ref 4–12)
NEUTROPHILS # BLD MANUAL: 2.95 THOUSAND/UL (ref 1.85–7.62)
NEUTS SEG NFR BLD AUTO: 52 % (ref 43–75)
PLATELET # BLD AUTO: 145 THOUSANDS/UL (ref 149–390)
PLATELET BLD QL SMEAR: ABNORMAL
PMV BLD AUTO: 11.2 FL (ref 8.9–12.7)
POTASSIUM SERPL-SCNC: 3 MMOL/L (ref 3.5–5.3)
RBC # BLD AUTO: 4.19 MILLION/UL (ref 3.88–5.62)
RBC MORPH BLD: PRESENT
SODIUM SERPL-SCNC: 134 MMOL/L (ref 135–147)
VARIANT LYMPHS # BLD AUTO: 8 %
WBC # BLD AUTO: 5.68 THOUSAND/UL (ref 4.31–10.16)

## 2025-02-18 PROCEDURE — 85007 BL SMEAR W/DIFF WBC COUNT: CPT | Performed by: HOSPITALIST

## 2025-02-18 PROCEDURE — 85027 COMPLETE CBC AUTOMATED: CPT | Performed by: HOSPITALIST

## 2025-02-18 PROCEDURE — 94760 N-INVAS EAR/PLS OXIMETRY 1: CPT

## 2025-02-18 PROCEDURE — 94640 AIRWAY INHALATION TREATMENT: CPT

## 2025-02-18 PROCEDURE — 99232 SBSQ HOSP IP/OBS MODERATE 35: CPT | Performed by: HOSPITALIST

## 2025-02-18 PROCEDURE — 80048 BASIC METABOLIC PNL TOTAL CA: CPT | Performed by: HOSPITALIST

## 2025-02-18 PROCEDURE — 82948 REAGENT STRIP/BLOOD GLUCOSE: CPT

## 2025-02-18 PROCEDURE — 99232 SBSQ HOSP IP/OBS MODERATE 35: CPT | Performed by: INTERNAL MEDICINE

## 2025-02-18 RX ORDER — ECHINACEA PURPUREA EXTRACT 125 MG
1 TABLET ORAL
Status: DISCONTINUED | OUTPATIENT
Start: 2025-02-18 | End: 2025-02-20 | Stop reason: HOSPADM

## 2025-02-18 RX ORDER — POTASSIUM CHLORIDE 1500 MG/1
40 TABLET, EXTENDED RELEASE ORAL 2 TIMES DAILY
Status: COMPLETED | OUTPATIENT
Start: 2025-02-18 | End: 2025-02-18

## 2025-02-18 RX ORDER — POTASSIUM CHLORIDE 1500 MG/1
20 TABLET, EXTENDED RELEASE ORAL DAILY
Status: DISCONTINUED | OUTPATIENT
Start: 2025-02-18 | End: 2025-02-20 | Stop reason: HOSPADM

## 2025-02-18 RX ADMIN — INSULIN LISPRO 1 UNITS: 100 INJECTION, SOLUTION INTRAVENOUS; SUBCUTANEOUS at 11:41

## 2025-02-18 RX ADMIN — BUMETANIDE 3 MG: 0.25 INJECTION INTRAMUSCULAR; INTRAVENOUS at 13:10

## 2025-02-18 RX ADMIN — INSULIN LISPRO 1 UNITS: 100 INJECTION, SOLUTION INTRAVENOUS; SUBCUTANEOUS at 08:16

## 2025-02-18 RX ADMIN — FLUTICASONE PROPIONATE 1 SPRAY: 50 SPRAY, METERED NASAL at 08:21

## 2025-02-18 RX ADMIN — INSULIN LISPRO 2 UNITS: 100 INJECTION, SOLUTION INTRAVENOUS; SUBCUTANEOUS at 21:18

## 2025-02-18 RX ADMIN — GUAIFENESIN 600 MG: 600 TABLET, EXTENDED RELEASE ORAL at 21:18

## 2025-02-18 RX ADMIN — INSULIN GLARGINE 10 UNITS: 100 INJECTION, SOLUTION SUBCUTANEOUS at 21:18

## 2025-02-18 RX ADMIN — POTASSIUM CHLORIDE 40 MEQ: 1500 TABLET, EXTENDED RELEASE ORAL at 17:34

## 2025-02-18 RX ADMIN — INSULIN GLARGINE 10 UNITS: 100 INJECTION, SOLUTION SUBCUTANEOUS at 08:17

## 2025-02-18 RX ADMIN — BUDESONIDE 0.5 MG: 0.5 INHALANT ORAL at 07:40

## 2025-02-18 RX ADMIN — LEVALBUTEROL HYDROCHLORIDE 1.25 MG: 1.25 SOLUTION RESPIRATORY (INHALATION) at 19:57

## 2025-02-18 RX ADMIN — POTASSIUM CHLORIDE 20 MEQ: 1500 TABLET, EXTENDED RELEASE ORAL at 10:24

## 2025-02-18 RX ADMIN — GUAIFENESIN 600 MG: 600 TABLET, EXTENDED RELEASE ORAL at 08:19

## 2025-02-18 RX ADMIN — INSULIN LISPRO 10 UNITS: 100 INJECTION, SOLUTION INTRAVENOUS; SUBCUTANEOUS at 08:15

## 2025-02-18 RX ADMIN — OSELTAMIVIR 30 MG: 30 CAPSULE ORAL at 08:19

## 2025-02-18 RX ADMIN — ALLOPURINOL 300 MG: 300 TABLET ORAL at 10:24

## 2025-02-18 RX ADMIN — SALINE NASAL SPRAY 1 SPRAY: 1.5 SOLUTION NASAL at 22:15

## 2025-02-18 RX ADMIN — RIVAROXABAN 15 MG: 15 TABLET, FILM COATED ORAL at 08:19

## 2025-02-18 RX ADMIN — CEFTRIAXONE 2000 MG: 2 INJECTION, SOLUTION INTRAVENOUS at 10:25

## 2025-02-18 RX ADMIN — POTASSIUM CHLORIDE 40 MEQ: 1500 TABLET, EXTENDED RELEASE ORAL at 10:24

## 2025-02-18 RX ADMIN — LEVALBUTEROL HYDROCHLORIDE 1.25 MG: 1.25 SOLUTION RESPIRATORY (INHALATION) at 14:45

## 2025-02-18 RX ADMIN — METOPROLOL SUCCINATE 25 MG: 25 TABLET, FILM COATED, EXTENDED RELEASE ORAL at 08:19

## 2025-02-18 RX ADMIN — LEVALBUTEROL HYDROCHLORIDE 1.25 MG: 1.25 SOLUTION RESPIRATORY (INHALATION) at 07:40

## 2025-02-18 RX ADMIN — SPIRONOLACTONE 25 MG: 25 TABLET, FILM COATED ORAL at 08:19

## 2025-02-18 RX ADMIN — GABAPENTIN 200 MG: 100 CAPSULE ORAL at 08:19

## 2025-02-18 RX ADMIN — BUDESONIDE 0.5 MG: 0.5 INHALANT ORAL at 19:57

## 2025-02-18 RX ADMIN — INSULIN LISPRO 10 UNITS: 100 INJECTION, SOLUTION INTRAVENOUS; SUBCUTANEOUS at 11:41

## 2025-02-18 RX ADMIN — OSELTAMIVIR 30 MG: 30 CAPSULE ORAL at 21:18

## 2025-02-18 RX ADMIN — BUMETANIDE 3 MG: 0.25 INJECTION INTRAMUSCULAR; INTRAVENOUS at 06:05

## 2025-02-18 RX ADMIN — ATORVASTATIN CALCIUM 20 MG: 20 TABLET, FILM COATED ORAL at 08:19

## 2025-02-18 RX ADMIN — INSULIN LISPRO 10 UNITS: 100 INJECTION, SOLUTION INTRAVENOUS; SUBCUTANEOUS at 17:34

## 2025-02-18 NOTE — ASSESSMENT & PLAN NOTE
Noted history of EF 35%  Cardiology following  Continue IV Bumex 2 mg twice daily for CHF exacerbation; diuresing well  Ischemic evaluation per cardiology  Monitor intake and output, daily weights

## 2025-02-18 NOTE — CASE MANAGEMENT
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, with fat layer exposed ([facility]) 2024     Acute on chronic diastolic heart failure ([facility]) 2023     Elevated troponin 2023     Pulmonary hypertension ([facility]) 2023     Paroxysmal A-fib ([facility]) 2023     Type 2 diabetes mellitus with diabetic neuropathy, unspecified whether long term insulin use ([facility]) 2023     Pre-ulcerative calluses 2023     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 ([facility]) 2022     Elevated parathyroid hormone 2022     Diabetic nephropathy associated with type 2 diabetes mellitus ([facility]) 2022     Type 2 diabetes mellitus with hyperglycemia, with long-term current use of insulin ([facility]) 2022     Mixed hyperlipidemia 2022     Diabetic polyneuropathy associated with type 2 diabetes mellitus ([facility]) 2022     Primary hypertension 2022     Morbid (severe) obesity due to excess calories ([facility]) 2022     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 ([facility]) 2022       LOS (days): 4  Geometric Mean LOS (GMLOS) (days): 4.9  Days to GMLOS:0.9     OBJECTIVE:  Risk of Unplanned Readmission Score: 20.25         Current admission status: Inpatient   [place]   [facility] [address]  [address] [address]  Phone:  [phone] Fax: [phone]    Primary Care Provider: Mina Zavala MD    Primary Insurance: [employer] [facility]  Secondary Insurance:     DISCHARGE DETAILS:                                          Other Referral/Resources/Interventions Provided:  Interventions: [facility]  Referral Comments: [facility] reserved in [place], per pt preference. Called daughter and left VM.         Treatment Team Recommendation: Home with Home Health Care  Discharge Destination Plan:: Home with Home Health Care                                IMM Given (Date):: 02/18/25  IMM Given to:: Patient   IMM reviewed with patient, patient agrees with discharge determination.

## 2025-02-18 NOTE — CASE MANAGEMENT
Case Management Discharge Planning Note    Patient name Hammad Montesinos  Location /-01 MRN [id number]  : 1940 Date 2025       Current Admission Date: 2025  Current Admission Diagnosis:Sepsis ([facility])   Patient Active Problem List    Diagnosis Date Noted Date Diagnosed    Sepsis ([facility]) 2025     Influenza A 2025     Acute on chronic systolic congestive heart failure ([facility]) 2025     Generalized weakness 2025     Stroke-like symptoms 2025     Fall 2025     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, with fat layer exposed ([facility]) 2024     Acute on chronic diastolic heart failure ([facility]) 2023     Elevated troponin 2023     Pulmonary hypertension ([facility]) 2023     Paroxysmal A-fib ([facility]) 2023     Type 2 diabetes mellitus with diabetic neuropathy, unspecified whether long term insulin use ([facility]) 2023     Pre-ulcerative calluses 2023     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 ([facility]) 2022     Elevated parathyroid hormone 2022     Diabetic nephropathy associated with type 2 diabetes mellitus ([facility]) 2022     Type 2 diabetes mellitus with hyperglycemia, with long-term current use of insulin ([facility]) 2022     Mixed hyperlipidemia 2022     Diabetic polyneuropathy associated with type 2 diabetes mellitus ([facility]) 2022     Primary hypertension 2022     Morbid (severe) obesity due to excess calories ([facility]) 2022     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 ([facility]) 2022       LOS (days): 4  Geometric Mean LOS (GMLOS) (days): 4.9  Days to GMLOS:1     OBJECTIVE:  Risk of Unplanned Readmission Score: 20.2         Current admission status: Inpatient   Preferred Pharmacy:   [facility] [facility] [address]  [address] [address]  Phone: [phone]  Fax: [phone]    Primary Care Provider: Mina Zavala MD    Primary Insurance: [employer] [facility]  Secondary Insurance:     DISCHARGE DETAILS:                                Requested Home Health Care         Is the patient interested in HHC at discharge?: Yes  Home Health Discipline requested:: Physical Therapy, Occupational Therapy, Nursing  Home Health Agency Name:: Other  HHA External Referral Reason (only applicable if external HHA name selected): Patient has established relationship with provider  Home Health Follow-Up Provider:: PCP  Home Health Services Needed:: Gait/ADL Training, Heart Failure Management, Evaluate Functional Status and Safety, Strengthening/Theraputic Exercises to Improve Function  Homebound Criteria Met:: Requires the Assistance of Another Person for Safe Ambulation or to Leave the Home  Supporting Clincal Findings:: Limited Endurance, Fatigues Easliy in Short Distances         Other Referral/Resources/Interventions Provided:  Referral Comments: OT rec is HHC. Referrals out for HHC for home PTOT and SN. Need to obtain choice. CM to follow.

## 2025-02-18 NOTE — ASSESSMENT & PLAN NOTE
Wt Readings from Last 3 Encounters:   02/18/25 100 kg (221 lb)   02/07/25 100 kg (220 lb 6.4 oz)   02/07/25 100 kg (221 lb)   TTE 1/28/2025: EF 35%, moderate global hypokinesis with regional variation, abnormal diastolic inflow pattern question due to atrial fibs, reduced RV function, biatrial dilatation with severe left atrial dilatation, likely moderate to severe MR, mild TR  GDMT: Toprol XL 25 mg daily,  torsemide 40 mg twice daily.    Patient reports home dry weight is 211 to 214 pounds.  Patient reports a 10 pound weight gain in the last week, within increased abdominal girth.  Chest x-ray on arrival: Groundglass opacity in the right lower lobe  Telemetry: V paced rhythm  Started on IV Bumex with relatively good response overnight and stable creatinine  Continue current regiment and reassess diuretic requirements in the morning  Ejection fraction has dropped from 50% down to 35%, and he will eventually require an ischemic evaluation, however would not proceed with this with active flu infection in the hospital, especially since he has no chest pain at the current time

## 2025-02-18 NOTE — PHYSICAL THERAPY NOTE
Physical Therapy Screen    Patient Name: Hammad Montesinos    Today's Date: 2/18/2025     Problem List  Principal Problem:    Sepsis (HCC)  Active Problems:    Type 2 diabetes mellitus with hyperglycemia, with long-term current use of insulin (HCC)    Mixed hyperlipidemia    Elevated troponin    Paroxysmal A-fib (HCC)    Influenza A    Acute on chronic systolic congestive heart failure (HCC)       Past Medical History  Past Medical History:   Diagnosis Date    Asthma     Bradycardia     Carcinoma, basal cell, skin     head and face and ear    CHF (congestive heart failure) (HCC)     Chronic kidney disease     Coronary artery disease     COVID-19 12/05/2022    Diabetes mellitus (HCC)     Dupuytren contracture     bilateral ring and right middle    Gout     Herniated lumbar intervertebral disc     Hypertension     Sleep apnea         Past Surgical History  Past Surgical History:   Procedure Laterality Date    APPENDECTOMY      age 20, ruptured    CATARACT EXTRACTION, BILATERAL Bilateral     CERVICAL FUSION      CYST REMOVAL Left 01/2023    DUPUYTREN CONTRACTURE RELEASE Left     left ring finger    INSERT / REPLACE / REMOVE PACEMAKER      LUMBAR LAMINECTOMY      belkis placed    TONSILLECTOMY      VASECTOMY          02/18/25 1459   PT Last Visit   PT Visit Date 02/18/25   Note Type   Note type Screen   Additional Comments Chart review completed. Patient with an AMPAC of 23. Per RN, patient is ambulatory without physical assistance. No inpatient P.T. needs. D/C P.T. orders     Kathie Wright

## 2025-02-18 NOTE — ASSESSMENT & PLAN NOTE
Lab Results   Component Value Date    HGBA1C 9.1 (H) 01/28/2025       Recent Labs     02/17/25  2046 02/18/25  0702 02/18/25  1113 02/18/25  1317   POCGLU 195* 184* 162* 84       Blood Sugar Average: Last 72 hrs:  (P) 124.25  Poorly controlled on home insulin regimen  Had asymptomatic blood sugars in the 40s at 1 point, now appears to be improving   Decrease Lantus to 10 units every 12  Decrease lispro to 10 units 3 times daily with meals  Sliding scale insulin with Accu-Cheks  Diabetic diet  Target blood glucose 140-180

## 2025-02-18 NOTE — PLAN OF CARE

## 2025-02-18 NOTE — PROGRESS NOTES
.Cardiology Progress Note   Hammad Montesinos 84 y.o. male MRN: [id number]  Unit/Bed#: -01 Encounter: [id number]    Assessment & Plan  Sepsis ([facility])  Patient with reports of a 3-day onset of shortness of breath, PND.    Patient states that he went to cardiologist appointment when his daughter picked him up to be increasingly tachypnec prompting a visit to the emergency room.  Influenza A  Positive on PCR   management per primary team  Acute on chronic systolic congestive heart failure (HCC)  Wt Readings from Last 3 Encounters:   02/18/25 100 kg (221 lb)   02/07/25 100 kg (220 lb 6.4 oz)   02/07/25 100 kg (221 lb)   TTE 1/28/2025: EF 35%, moderate global hypokinesis with regional variation, abnormal diastolic inflow pattern question due to atrial fibs, reduced RV function, biatrial dilatation with severe left atrial dilatation, likely moderate to severe MR, mild TR  GDMT: Toprol XL 25 mg daily,  torsemide 40 mg twice daily.    Patient reports home dry weight is 211 to 214 pounds.  Patient reports a 10 pound weight gain in the last week, within increased abdominal girth.  Chest x-ray on arrival: Groundglass opacity in the right lower lobe  Telemetry: V paced rhythm  Started on IV Bumex with relatively good response overnight and stable creatinine  Continue current regiment and reassess diuretic requirements in the morning  Ejection fraction has dropped from 50% down to 35%, and he will eventually require an ischemic evaluation, however would not proceed with this with active flu infection in the hospital, especially since he has no chest pain at the current time  Elevated troponin  Initial Troponin 123  Patient denies chest pain  Has cardiac CTA ordered for Monday, 2/17/2025  Paroxysmal A-fib ([facility])  Rate control: Toprol XL 25 mg daily   Anticoagulation: Xarelto 20 mg daily -reports that he is compliant.  Implanted Biotronik permanent pacemaker secondary to sick sinus syndrome.  Last EP device interrogation showed  "100% A-fib burden  Mixed hyperlipidemia  Controlled with atorvastatin 20 mg daily  Type 2 diabetes mellitus with hyperglycemia, with long-term current use of insulin ([facility])  Per primary team(P) [phone]    Plan:  Hammad still has lower extremity edema which may be more chronic in nature.  Currently has baseline weights.  Will continue IV Bumex 3 mg for 1 more dose.    Plan to switch to PO Bumex at 2 mg twice daily from tomorrow with Aldactone 25 mg daily  Continue monitoring overnight; if stable tomorrow likely discharge from a CV standpoint.  Will refer to EP for outpatient evaluation to discuss BiV upgrade which may help him from a CHF perspective.  His EF is 25% but declines consideration for WCD  Influenza A management as per primary team    Subjective:   Patient seen and examined. Overnight events reviewed. Patient denies any acute complaints at this time.     Objective:     Vitals: Blood pressure 132/71, pulse 71, temperature 97.9 °F (36.6 °C), temperature source Oral, resp. rate 17, height 5' 10\" (1.778 m), weight 100 kg (221 lb), SpO2 96%., Body mass index is 31.71 kg/m².,   Orthostatic Blood Pressures      Flowsheet Row Most Recent Value   Blood Pressure 132/71 filed at 02/18/2025 0703   Patient Position - Orthostatic VS Lying filed at 02/18/2025 0703              Intake/Output Summary (Last 24 hours) at 2/18/2025 1206  Last data filed at 2/18/2025 1139  Gross per 24 hour   Intake 2170 ml   Output 3825 ml   Net -1655 ml         Physical Exam:    GEN: Hammad Montesinos appears well, alert and oriented x 3, pleasant and cooperative   HEENT: Sclera anicteric, conjunctivae pink, mucous membranes moist. Oropharynx clear.   NECK: supple, no significant JVD, Trachea midline, no thyromegaly.   HEART: regular rhythm, normal S1 and S2, no murmurs, clicks, gallops or rubs   LUNGS: Bilateral rhonchi and mild scattered wheezes  ABDOMEN: Soft, nontender, nondistended  EXTREMITIES: Skin warm and well perfused, no " clubbing, cyanosis, or +1 pedal edema.  NEURO: No focal findings. Normal speech. Mood and affect normal.   SKIN: Normal without suspicious lesions on exposed skin.      Medications:      Current Facility-Administered Medications:     acetaminophen (TYLENOL) tablet 650 mg, 650 mg, Oral, Q6H PRN, Jonny C Yorty, DO    albuterol inhalation solution 2.5 mg, 2.5 mg, Nebulization, Q4H PRN, Chan Kong MD, 2.5 mg at 02/15/25 0317    allopurinol (ZYLOPRIM) tablet 300 mg, 300 mg, Oral, Q24H, Jonny Pinedarty, DO, 300 mg at 02/18/25 1024    atorvastatin (LIPITOR) tablet 20 mg, 20 mg, Oral, Daily, Jonny Pinedarty, DO, 20 mg at 02/18/25 0819    budesonide (PULMICORT) inhalation solution 0.5 mg, 0.5 mg, Nebulization, BID, Chan Kong MD, 0.5 mg at 02/18/25 0740    bumetanide (BUMEX) injection 3 mg, 3 mg, Intravenous, BID (diuretic), Jayy Babcock PA-C, 3 mg at 02/18/25 0605    cefTRIAXone (ROCEPHIN) IVPB (premix in dextrose) 2,000 mg 50 mL, 2,000 mg, Intravenous, Q24H, Jonny Pinedarty, DO, Last Rate: 100 mL/hr at 02/18/25 1025, 2,000 mg at 02/18/25 1025    fluticasone (FLONASE) 50 mcg/act nasal spray 1 spray, 1 spray, Each Nare, Daily, Ebony Evans PA-C, 1 spray at 02/18/25 0821    gabapentin (NEURONTIN) capsule 200 mg, 200 mg, Oral, Daily, Jonny ALARCON Yorty, DO, 200 mg at 02/18/25 0819    guaiFENesin (MUCINEX) 12 hr tablet 600 mg, 600 mg, Oral, Q12H TOVA, Jonny ALARCON Yorty, DO, 600 mg at 02/18/25 0819    Hydrocod Israel-Chlorphe Israel ER (TUSSIONEX) ER suspension 5 mL, 5 mL, Oral, Q12H PRN, Jonny Soto DO    insulin glargine (LANTUS) subcutaneous injection 10 Units 0.1 mL, 10 Units, Subcutaneous, Q12H Rafael BERNARDO PA-C, 10 Units at 02/18/25 0817    insulin lispro (HumALOG/ADMELOG) 100 units/mL subcutaneous injection 1-6 Units, 1-6 Units, Subcutaneous, HS, Jonny Soto DO, 2 Units at 02/17/25 2100    insulin lispro (HumALOG/ADMELOG) 100 units/mL subcutaneous injection 1-6 Units, 1-6 Units, Subcutaneous,  TID AC, 1 Units at 02/18/25 1141 **AND** Fingerstick Glucose (POCT), , , 4x Daily AC and at bedtime, Chan Kong MD    insulin lispro (HumALOG/ADMELOG) 100 units/mL subcutaneous injection 10 Units, 10 Units, Subcutaneous, TID With Meals, Rafael Guzman PA-C, 10 Units at 02/18/25 1141    levalbuterol (XOPENEX) inhalation solution 1.25 mg, 1.25 mg, Nebulization, TID, Chan Kong MD, 1.25 mg at 02/18/25 0740    metoprolol succinate (TOPROL-XL) 24 hr tablet 25 mg, 25 mg, Oral, Daily, Jonny Soto DO, 25 mg at 02/18/25 0819    oseltamivir (TAMIFLU) capsule 30 mg, 30 mg, Oral, Q12H TOVA, Rafael Guzman PA-C, 30 mg at 02/18/25 0819    potassium chloride (Klor-Con M20) CR tablet 20 mEq, 20 mEq, Oral, Daily, Jayy Babcock PA-C, 20 mEq at 02/18/25 1024    potassium chloride (Klor-Con M20) CR tablet 40 mEq, 40 mEq, Oral, BID, Fausto Mcgill MD, 40 mEq at 02/18/25 1024    rivaroxaban (XARELTO) tablet 15 mg, 15 mg, Oral, Daily With Breakfast, Jonny Soto DO, 15 mg at 02/18/25 0819    senna (SENOKOT) tablet 17.2 mg, 17.2 mg, Oral, HS PRN, Jonny Soto DO, 17.2 mg at 02/17/25 2129    spironolactone (ALDACTONE) tablet 25 mg, 25 mg, Oral, Daily, Jayy Babcock PA-C, 25 mg at 02/18/25 0819    trimethobenzamide (TIGAN) IM injection 200 mg, 200 mg, Intramuscular, Q12H PRN, Jonny Soto DO     Labs & Results:        Results from last 7 days   Lab Units 02/18/25  0359 02/17/25  0338 02/16/25  0351   WBC Thousand/uL 5.68 4.91 4.46   HEMOGLOBIN g/dL 11.1* 11.0* 11.0*   HEMATOCRIT % 35.7* 35.2* 34.4*   PLATELETS Thousands/uL 145* 131* 119*         Results from last 7 days   Lab Units 02/18/25  0359 02/17/25  0338 02/16/25  0351 02/15/25  0224 02/14/25  0905   POTASSIUM mmol/L 3.0* 3.4* 3.5   < > 4.7   CHLORIDE mmol/L 94* 97 98   < > 95*   CO2 mmol/L 31 30 28   < > 28   BUN mg/dL 33* 35* 39*   < > 38*   CREATININE mg/dL 1.15 1.18 1.26   < > 1.39*   CALCIUM mg/dL 8.6 8.5 8.6   < > 9.0   ALK PHOS U/L  --    --   --   --  138*   ALT U/L  --   --   --   --  17   AST U/L  --   --   --   --  30    < > = values in this interval not displayed.     Results from last 7 days   Lab Units 02/14/25  0905   INR  2.99*   PTT seconds 49*     Results from last 7 days   Lab Units 02/15/25  0224   MAGNESIUM mg/dL 2.0

## 2025-02-18 NOTE — PROGRESS NOTES
Progress Note - Hospitalist   Name: Hammad Montesinos 84 y.o. male I MRN: [id number]  Unit/Bed#: -01 I Date of Admission: 2/14/2025   Date of Service: 2/18/2025 I Hospital Day: 4    Assessment & Plan  Sepsis (HCC)  Present on admission as evidenced by tachycardia, tachypnea, hyperthermia  Likely secondary to influenza A and bacterial pneumonia  Continue ceftriaxone.  Tamiflu for influenza A  Contact and droplet precautions  Blood cultures NGTD  Trend fevers curve/WBC count  Influenza A  Influenza A + on 2/14/2025  Saturating well on room air  Continue Tamiflu x 5 days  Contact and droplet precautions  Monitor respiratory status  Acute on chronic systolic congestive heart failure (HCC)  Noted history of EF 35%  Cardiology following  Continue IV Bumex 2 mg twice daily for CHF exacerbation; diuresing well  Ischemic evaluation per cardiology  Monitor intake and output, daily weights  Elevated troponin  Suspect secondary to nonischemic myocardial injury  Defer ischemic evaluation to cardiology  Patient denies anginal symptoms  Paroxysmal A-fib ([facility])  Heart rates well-controlled  Continue home beta-blocker for rate control and Xarelto for CVA prophylaxis  Monitor heart rates  Type 2 diabetes mellitus with hyperglycemia, with long-term current use of insulin ([facility])  Lab Results   Component Value Date    HGBA1C 9.1 (H) 01/28/2025       Recent Labs     02/17/25  2046 02/18/25  0702 02/18/25  1113 02/18/25  1317   POCGLU 195* 184* 162* 84       Blood Sugar Average: Last 72 hrs:  (P) 124.25  Poorly controlled on home insulin regimen  Had asymptomatic blood sugars in the 40s at 1 point, now appears to be improving   Decrease Lantus to 10 units every 12  Decrease lispro to 10 units 3 times daily with meals  Sliding scale insulin with Accu-Cheks  Diabetic diet  Target blood glucose 140-180  Mixed hyperlipidemia  Continue home statin therapy    VTE Pharmacologic Prophylaxis: VTE Score: 6 High Risk (Score >/= 5) -  Pharmacological DVT Prophylaxis Ordered: rivaroxaban (Xarelto). Sequential Compression Devices Ordered.    Mobility:   Basic Mobility Inpatient Raw Score: 23  JH-HLM Goal: 7: Walk 25 feet or more  JH-HLM Achieved: 7: Walk 25 feet or more  JH-HLM Goal achieved. Continue to encourage appropriate mobility.    Patient Centered Rounds: I performed bedside rounds with nursing staff today.   Discussions with Specialists or Other Care Team Provider: RN, CM,cardiology    Communication with family: Son-in-law over the phone    Current Length of Stay: 4 day(s)  Current Patient Status: Inpatient   Certification Statement: The patient will continue to require additional inpatient hospital stay due to ongoing IV diuresis.  Discharge Plan: Anticipate discharge in 24-48 hrs to home with home services.    Code Status: Level 1 - Full Code    Subjective   Denies pain    Objective :  Temp:  [97.5 °F (36.4 °C)-98 °F (36.7 °C)] 97.9 °F (36.6 °C)  HR:  [51-73] 59  BP: (118-136)/(65-72) 118/65  Resp:  [16-19] 17  SpO2:  [91 %-100 %] 96 %  O2 Device: None (Room air)    Body mass index is 31.71 kg/m².     Input and Output Summary (last 24 hours):     Intake/Output Summary (Last 24 hours) at 2/18/2025 1343  Last data filed at 2/18/2025 1139  Gross per 24 hour   Intake 1810 ml   Output 3325 ml   Net -1515 ml       Physical Exam    Vitals and nursing note reviewed.   Constitutional:       General: He is not in acute distress.     Appearance: Normal appearance. He is not ill-appearing.   HENT:      Head: Normocephalic and atraumatic.   Eyes:      General: No scleral icterus.        Right eye: No discharge.         Left eye: No discharge.   Cardiovascular:      Rate and Rhythm: Normal rate and regular rhythm.      Pulses: Normal pulses.      Heart sounds: No murmur heard.     No friction rub. No gallop.   Pulmonary:      Effort: Pulmonary effort is normal. No respiratory distress.      Breath sounds: Normal breath sounds. No wheezing, rhonchi or  rales.   Abdominal:      General: Bowel sounds are normal. There is no distension.      Palpations: Abdomen is soft.      Tenderness: There is no abdominal tenderness. There is no guarding or rebound.   Musculoskeletal:         General: No swelling or deformity.      Cervical back: Neck supple.      Right lower leg: Edema present.      Left lower leg: Edema present.   Skin:     General: Skin is warm and dry.      Capillary Refill: Capillary refill takes less than 2 seconds.      Coloration: Skin is not jaundiced or pale.      Findings: No erythema or rash.   Neurological:      General: No focal deficit present.      Mental Status: He is alert and oriented to person, place, and time. Mental status is at baseline.   Psychiatric:         Mood and Affect: Mood normal.         Behavior: Behavior normal.      Lines/Drains:        Telemetry:  Telemetry Orders (From admission, onward)               24 Hour Telemetry Monitoring  Continuous x 24 Hours (Telem)        Expiring   Question:  Reason for 24 Hour Telemetry  Answer:  Decompensated CHF- and any one of the following: continuous diuretic infusion or total diuretic dose >200 mg daily, associated electrolyte derangement (I.e. K < 3.0), inotropic drip (continuous infusion), hx of ventricular arrhythmia, or new EF < 35%                     Telemetry Reviewed: Normal Sinus Rhythm  Indication for Continued Telemetry Use: Arrthymias requiring medical therapy               Lab Results: I have reviewed the following results:   Results from last 7 days   Lab Units 02/18/25  0359 02/17/25  0338   WBC Thousand/uL 5.68 4.91   HEMOGLOBIN g/dL 11.1* 11.0*   HEMATOCRIT % 35.7* 35.2*   PLATELETS Thousands/uL 145* 131*   SEGS PCT %  --  61   LYMPHO PCT % 29 24   MONO PCT % 9 11   EOS PCT % 1 3     Results from last 7 days   Lab Units 02/18/25  0359 02/15/25  0224 02/14/25  0905   SODIUM mmol/L 134*   < > 131*   POTASSIUM mmol/L 3.0*   < > 4.7   CHLORIDE mmol/L 94*   < > 95*   CO2 mmol/L  31   < > 28   BUN mg/dL 33*   < > 38*   CREATININE mg/dL 1.15   < > 1.39*   ANION GAP mmol/L 9   < > 8   CALCIUM mg/dL 8.6   < > 9.0   ALBUMIN g/dL  --   --  3.7   TOTAL BILIRUBIN mg/dL  --   --  1.90*   ALK PHOS U/L  --   --  138*   ALT U/L  --   --  17   AST U/L  --   --  30   GLUCOSE RANDOM mg/dL 214*   < > 243*    < > = values in this interval not displayed.     Results from last 7 days   Lab Units 02/14/25  0905   INR  2.99*     Results from last 7 days   Lab Units 02/18/25  1317 02/18/25  1113 02/18/25  0702 02/17/25  2046 02/17/25  1759 02/17/25  1646 02/17/25  1116 02/17/25  0755 02/16/25  2055 02/16/25  1656 02/16/25  1009 02/16/25  0802   POC GLUCOSE mg/dl 84 162* 184* 195* 89 93 191* 145* 88 106 201* 127         Results from last 7 days   Lab Units 02/16/25  0351 02/15/25  0224 02/14/25  1113 02/14/25  0905   LACTIC ACID mmol/L  --   --  1.5 2.1*   PROCALCITONIN ng/ml 0.97* 1.34*  --  0.20       Recent Cultures (last 7 days):   Results from last 7 days   Lab Units 02/14/25  0905   BLOOD CULTURE  No Growth at 72 hrs.  No Growth at 72 hrs.       Imaging Results Review: No pertinent imaging studies reviewed.  Other Study Results Review: No additional pertinent studies reviewed.    Last 24 Hours Medication List:     Current Facility-Administered Medications:     acetaminophen (TYLENOL) tablet 650 mg, Q6H PRN    albuterol inhalation solution 2.5 mg, Q4H PRN    allopurinol (ZYLOPRIM) tablet 300 mg, Q24H    atorvastatin (LIPITOR) tablet 20 mg, Daily    budesonide (PULMICORT) inhalation solution 0.5 mg, BID    bumetanide (BUMEX) injection 3 mg, BID (diuretic)    cefTRIAXone (ROCEPHIN) IVPB (premix in dextrose) 2,000 mg 50 mL, Q24H, Last Rate: 2,000 mg (02/18/25 1025)    fluticasone (FLONASE) 50 mcg/act nasal spray 1 spray, Daily    gabapentin (NEURONTIN) capsule 200 mg, Daily    guaiFENesin (MUCINEX) 12 hr tablet 600 mg, Q12H TOVA    Hydrocod Israel-Chlorphe Israel ER (TUSSIONEX) ER suspension 5 mL, Q12H PRN     insulin glargine (LANTUS) subcutaneous injection 10 Units 0.1 mL, Q12H TOVA    insulin lispro (HumALOG/ADMELOG) 100 units/mL subcutaneous injection 1-6 Units, HS    insulin lispro (HumALOG/ADMELOG) 100 units/mL subcutaneous injection 1-6 Units, TID AC **AND** Fingerstick Glucose (POCT), 4x Daily AC and at bedtime    insulin lispro (HumALOG/ADMELOG) 100 units/mL subcutaneous injection 10 Units, TID With Meals    levalbuterol (XOPENEX) inhalation solution 1.25 mg, TID    metoprolol succinate (TOPROL-XL) 24 hr tablet 25 mg, Daily    oseltamivir (TAMIFLU) capsule 30 mg, Q12H TOVA    potassium chloride (Klor-Con M20) CR tablet 20 mEq, Daily    potassium chloride (Klor-Con M20) CR tablet 40 mEq, BID    rivaroxaban (XARELTO) tablet 15 mg, Daily With Breakfast    senna (SENOKOT) tablet 17.2 mg, HS PRN    spironolactone (ALDACTONE) tablet 25 mg, Daily    trimethobenzamide (TIGAN) IM injection 200 mg, Q12H PRN    Administrative Statements   Today, Patient Was Seen By: Fausto Mcgill MD  I have spent a total time of 46 minutes in caring for this patient on the day of the visit/encounter including Diagnostic results, Prognosis, Risks and benefits of tx options, Instructions for management, Patient and family education, Importance of tx compliance, Risk factor reductions, Impressions, Counseling / Coordination of care, Documenting in the medical record, Reviewing/placing orders in the medical record (including tests, medications, and/or procedures), Obtaining or reviewing history  , and Communicating with other healthcare professionals .    **Please Note: This note may have been constructed using a voice recognition system.**

## 2025-02-18 NOTE — PROGRESS NOTES
Patient:  TITA MANUEL    MRN:  [id number]    Aidin Request ID:  [id number]    Level of care reserved:  Home Health Agency    Partner Reserved:  [facility], [place] [address] [phone]    Clinical needs requested:  physical therapy, occupational therapy    Geography searched:  [phone]    Start of Service:    Request sent:  3:21pm EST on 2/17/2025 by Vernon Barry    Partner reserved:  10:17am EST on 2/18/2025 by Vernon Barry    Choice list shared:  7:42am EST on 2/18/2025 by Vernon Barry

## 2025-02-18 NOTE — PLAN OF CARE
Problem: PAIN - ADULT  Goal: Verbalizes/displays adequate comfort level or baseline comfort level  Description: Interventions:  - Encourage patient to monitor pain and request assistance  - Assess pain using appropriate pain scale  - Administer analgesics based on type and severity of pain and evaluate response  - Implement non-pharmacological measures as appropriate and evaluate response  - Consider cultural and social influences on pain and pain management  - Notify physician/advanced practitioner if interventions unsuccessful or patient reports new pain  Outcome: Progressing     Problem: INFECTION - ADULT  Goal: Absence or prevention of progression during hospitalization  Description: INTERVENTIONS:  - Assess and monitor for signs and symptoms of infection  - Monitor lab/diagnostic results  - Monitor all insertion sites, i.e. indwelling lines, tubes, and drains  - Monitor endotracheal if appropriate and nasal secretions for changes in amount and color  - [place] appropriate cooling/warming therapies per order  - Administer medications as ordered  - Instruct and encourage patient and family to use good hand hygiene technique  - Identify and instruct in appropriate isolation precautions for identified infection/condition  Outcome: Progressing  Goal: Absence of fever/infection during neutropenic period  Description: INTERVENTIONS:  - Monitor WBC    Outcome: Progressing

## 2025-02-19 LAB
ANION GAP SERPL CALCULATED.3IONS-SCNC: 8 MMOL/L (ref 4–13)
BACTERIA BLD CULT: NORMAL
BACTERIA BLD CULT: NORMAL
BUN SERPL-MCNC: 30 MG/DL (ref 5–25)
CALCIUM SERPL-MCNC: 8.9 MG/DL (ref 8.4–10.2)
CHLORIDE SERPL-SCNC: 99 MMOL/L (ref 96–108)
CO2 SERPL-SCNC: 30 MMOL/L (ref 21–32)
CREAT SERPL-MCNC: 1.04 MG/DL (ref 0.6–1.3)
ERYTHROCYTE [DISTWIDTH] IN BLOOD BY AUTOMATED COUNT: 17.8 % (ref 11.6–15.1)
GFR SERPL CREATININE-BSD FRML MDRD: 65 ML/MIN/1.73SQ M
GLUCOSE SERPL-MCNC: 112 MG/DL (ref 65–140)
GLUCOSE SERPL-MCNC: 121 MG/DL (ref 65–140)
GLUCOSE SERPL-MCNC: 130 MG/DL (ref 65–140)
GLUCOSE SERPL-MCNC: 158 MG/DL (ref 65–140)
GLUCOSE SERPL-MCNC: 182 MG/DL (ref 65–140)
HCT VFR BLD AUTO: 36.2 % (ref 36.5–49.3)
HGB BLD-MCNC: 11.3 G/DL (ref 12–17)
MCH RBC QN AUTO: 26.2 PG (ref 26.8–34.3)
MCHC RBC AUTO-ENTMCNC: 31.2 G/DL (ref 31.4–37.4)
MCV RBC AUTO: 84 FL (ref 82–98)
PLATELET # BLD AUTO: 164 THOUSANDS/UL (ref 149–390)
PMV BLD AUTO: 11.3 FL (ref 8.9–12.7)
POTASSIUM SERPL-SCNC: 3.6 MMOL/L (ref 3.5–5.3)
RBC # BLD AUTO: 4.31 MILLION/UL (ref 3.88–5.62)
SODIUM SERPL-SCNC: 137 MMOL/L (ref 135–147)
WBC # BLD AUTO: 6.11 THOUSAND/UL (ref 4.31–10.16)

## 2025-02-19 PROCEDURE — 85027 COMPLETE CBC AUTOMATED: CPT | Performed by: HOSPITALIST

## 2025-02-19 PROCEDURE — 99232 SBSQ HOSP IP/OBS MODERATE 35: CPT | Performed by: HOSPITALIST

## 2025-02-19 PROCEDURE — 80048 BASIC METABOLIC PNL TOTAL CA: CPT | Performed by: HOSPITALIST

## 2025-02-19 PROCEDURE — 94640 AIRWAY INHALATION TREATMENT: CPT

## 2025-02-19 PROCEDURE — 82948 REAGENT STRIP/BLOOD GLUCOSE: CPT

## 2025-02-19 PROCEDURE — 94760 N-INVAS EAR/PLS OXIMETRY 1: CPT

## 2025-02-19 PROCEDURE — 99232 SBSQ HOSP IP/OBS MODERATE 35: CPT | Performed by: INTERNAL MEDICINE

## 2025-02-19 RX ORDER — BUMETANIDE 1 MG/1
2 TABLET ORAL 2 TIMES DAILY
Status: DISCONTINUED | OUTPATIENT
Start: 2025-02-19 | End: 2025-02-20 | Stop reason: HOSPADM

## 2025-02-19 RX ORDER — BUMETANIDE 1 MG/1
3 TABLET ORAL 2 TIMES DAILY
Status: DISCONTINUED | OUTPATIENT
Start: 2025-02-19 | End: 2025-02-19

## 2025-02-19 RX ADMIN — SALINE NASAL SPRAY 1 SPRAY: 1.5 SOLUTION NASAL at 21:45

## 2025-02-19 RX ADMIN — INSULIN LISPRO 1 UNITS: 100 INJECTION, SOLUTION INTRAVENOUS; SUBCUTANEOUS at 21:45

## 2025-02-19 RX ADMIN — ALLOPURINOL 300 MG: 300 TABLET ORAL at 12:14

## 2025-02-19 RX ADMIN — INSULIN LISPRO 10 UNITS: 100 INJECTION, SOLUTION INTRAVENOUS; SUBCUTANEOUS at 17:47

## 2025-02-19 RX ADMIN — BUMETANIDE 3 MG: 0.25 INJECTION INTRAMUSCULAR; INTRAVENOUS at 08:25

## 2025-02-19 RX ADMIN — GUAIFENESIN 600 MG: 600 TABLET, EXTENDED RELEASE ORAL at 21:45

## 2025-02-19 RX ADMIN — CEFTRIAXONE 2000 MG: 2 INJECTION, SOLUTION INTRAVENOUS at 09:17

## 2025-02-19 RX ADMIN — INSULIN LISPRO 10 UNITS: 100 INJECTION, SOLUTION INTRAVENOUS; SUBCUTANEOUS at 12:15

## 2025-02-19 RX ADMIN — METOPROLOL SUCCINATE 25 MG: 25 TABLET, FILM COATED, EXTENDED RELEASE ORAL at 08:33

## 2025-02-19 RX ADMIN — INSULIN LISPRO 10 UNITS: 100 INJECTION, SOLUTION INTRAVENOUS; SUBCUTANEOUS at 08:30

## 2025-02-19 RX ADMIN — ATORVASTATIN CALCIUM 20 MG: 20 TABLET, FILM COATED ORAL at 08:33

## 2025-02-19 RX ADMIN — LEVALBUTEROL HYDROCHLORIDE 1.25 MG: 1.25 SOLUTION RESPIRATORY (INHALATION) at 07:36

## 2025-02-19 RX ADMIN — LEVALBUTEROL HYDROCHLORIDE 1.25 MG: 1.25 SOLUTION RESPIRATORY (INHALATION) at 19:57

## 2025-02-19 RX ADMIN — POTASSIUM CHLORIDE 20 MEQ: 1500 TABLET, EXTENDED RELEASE ORAL at 08:33

## 2025-02-19 RX ADMIN — FLUTICASONE PROPIONATE 1 SPRAY: 50 SPRAY, METERED NASAL at 08:35

## 2025-02-19 RX ADMIN — INSULIN GLARGINE 10 UNITS: 100 INJECTION, SOLUTION SUBCUTANEOUS at 21:45

## 2025-02-19 RX ADMIN — GABAPENTIN 200 MG: 100 CAPSULE ORAL at 08:33

## 2025-02-19 RX ADMIN — BUDESONIDE 0.5 MG: 0.5 INHALANT ORAL at 19:57

## 2025-02-19 RX ADMIN — RIVAROXABAN 15 MG: 15 TABLET, FILM COATED ORAL at 08:33

## 2025-02-19 RX ADMIN — BUDESONIDE 0.5 MG: 0.5 INHALANT ORAL at 07:36

## 2025-02-19 RX ADMIN — SPIRONOLACTONE 25 MG: 25 TABLET, FILM COATED ORAL at 08:33

## 2025-02-19 RX ADMIN — GUAIFENESIN 600 MG: 600 TABLET, EXTENDED RELEASE ORAL at 08:33

## 2025-02-19 RX ADMIN — INSULIN GLARGINE 10 UNITS: 100 INJECTION, SOLUTION SUBCUTANEOUS at 08:31

## 2025-02-19 RX ADMIN — INSULIN LISPRO 1 UNITS: 100 INJECTION, SOLUTION INTRAVENOUS; SUBCUTANEOUS at 12:15

## 2025-02-19 RX ADMIN — BUMETANIDE 2 MG: 1 TABLET ORAL at 17:50

## 2025-02-19 RX ADMIN — LEVALBUTEROL HYDROCHLORIDE 1.25 MG: 1.25 SOLUTION RESPIRATORY (INHALATION) at 13:46

## 2025-02-19 NOTE — ASSESSMENT & PLAN NOTE
Noted history of EF 35%  Cardiology following  Transitioning to oral diuretics.  Ischemic evaluation per cardiology as outpt  Monitor intake and output, daily weights

## 2025-02-19 NOTE — ASSESSMENT & PLAN NOTE
Wt Readings from Last 3 Encounters:   02/19/25 101 kg (222 lb 9.6 oz)   02/07/25 100 kg (220 lb 6.4 oz)   02/07/25 100 kg (221 lb)   TTE 1/28/2025: EF 35%, moderate global hypokinesis with regional variation, abnormal diastolic inflow pattern question due to atrial fibs, reduced RV function, biatrial dilatation with severe left atrial dilatation, likely moderate to severe MR, mild TR  GDMT: Toprol XL 25 mg daily,  torsemide 40 mg twice daily.    Patient reports home dry weight is 211 to 214 pounds.  Patient reports a 10 pound weight gain in the last week, within increased abdominal girth.  Chest x-ray on arrival: Groundglass opacity in the right lower lobe  Telemetry: V paced rhythm  Started on IV Bumex with relatively good response overnight and stable creatinine  Continue current regiment and reassess diuretic requirements in the morning  Ejection fraction has dropped from 50% down to 35%, and he will eventually require an ischemic evaluation, however would not proceed with this with active flu infection in the hospital, especially since he has no chest pain at the current time

## 2025-02-19 NOTE — PLAN OF CARE
Problem: Potential for Falls  Goal: Patient will remain free of falls  Description: INTERVENTIONS:  - Educate patient/family on patient safety including physical limitations  - Instruct patient to call for assistance with activity   - Consult OT/PT to assist with strengthening/mobility   - Keep Call bell within reach  - Keep bed low and locked with side rails adjusted as appropriate  - Keep care items and personal belongings within reach  - Initiate and maintain comfort rounds  - Make Fall Risk Sign visible to staff  - Offer Toileting every 2 Hours, in advance of need  - Initiate/Maintain bed alarm  - Obtain necessary fall risk management equipment: socks  - Apply yellow socks and bracelet for high fall risk patients  - Consider moving patient to room near nurses station  Outcome: Progressing     Problem: PAIN - ADULT  Goal: Verbalizes/displays adequate comfort level or baseline comfort level  Description: Interventions:  - Encourage patient to monitor pain and request assistance  - Assess pain using appropriate pain scale  - Administer analgesics based on type and severity of pain and evaluate response  - Implement non-pharmacological measures as appropriate and evaluate response  - Consider cultural and social influences on pain and pain management  - Notify physician/advanced practitioner if interventions unsuccessful or patient reports new pain  Outcome: Progressing     Problem: INFECTION - ADULT  Goal: Absence or prevention of progression during hospitalization  Description: INTERVENTIONS:  - Assess and monitor for signs and symptoms of infection  - Monitor lab/diagnostic results  - Monitor all insertion sites, i.e. indwelling lines, tubes, and drains  - Monitor endotracheal if appropriate and nasal secretions for changes in amount and color  - [place] appropriate cooling/warming therapies per order  - Administer medications as ordered  - Instruct and encourage patient and family to use good hand hygiene  technique  - Identify and instruct in appropriate isolation precautions for identified infection/condition  Outcome: Progressing  Goal: Absence of fever/infection during neutropenic period  Description: INTERVENTIONS:  - Monitor WBC    Outcome: Progressing     Problem: SAFETY ADULT  Goal: Patient will remain free of falls  Description: INTERVENTIONS:  - Educate patient/family on patient safety including physical limitations  - Instruct patient to call for assistance with activity   - Consult OT/PT to assist with strengthening/mobility   - Keep Call bell within reach  - Keep bed low and locked with side rails adjusted as appropriate  - Keep care items and personal belongings within reach  - Initiate and maintain comfort rounds  - Make Fall Risk Sign visible to staff  - Offer Toileting every 2 Hours, in advance of need  - Initiate/Maintain bed alarm  - Obtain necessary fall risk management equipment: socks  - Apply yellow socks and bracelet for high fall risk patients  - Consider moving patient to room near nurses station  Outcome: Progressing  Goal: Maintain or return to baseline ADL function  Description: INTERVENTIONS:  -  Assess patient's ability to carry out ADLs; assess patient's baseline for ADL function and identify physical deficits which impact ability to perform ADLs (bathing, care of mouth/teeth, toileting, grooming, dressing, etc.)  - Assess/evaluate cause of self-care deficits   - Assess range of motion  - Assess patient's mobility; develop plan if impaired  - Assess patient's need for assistive devices and provide as appropriate  - Encourage maximum independence but intervene and supervise when necessary  - Involve family in performance of ADLs  - Assess for home care needs following discharge   - Consider OT consult to assist with ADL evaluation and planning for discharge  - Provide patient education as appropriate  Outcome: Progressing  Goal: Maintains/Returns to pre admission functional  level  Description: INTERVENTIONS:  - Perform AM-PAC 6 Click Basic Mobility/ Daily Activity assessment daily.  - Set and communicate daily mobility goal to care team and patient/family/caregiver.   - Collaborate with rehabilitation services on mobility goals if consulted  - Perform Range of Motion 3 times a day.  - Reposition patient every 2 hours.  - Dangle patient 3 times a day  - Stand patient 3 times a day  - Ambulate patient 3 times a day  - Out of bed to chair 3 times a day   - Out of bed for meals 3 times a day  - Out of bed for toileting  - Record patient progress and toleration of activity level   Outcome: Progressing     Problem: Knowledge Deficit  Goal: Patient/family/caregiver demonstrates understanding of disease process, treatment plan, medications, and discharge instructions  Description: Complete learning assessment and assess knowledge base.  Interventions:  - Provide teaching at level of understanding  - Provide teaching via preferred learning methods  Outcome: Progressing

## 2025-02-19 NOTE — PROGRESS NOTES
Progress Note - Hospitalist   Name: Hammad Montesinos 84 y.o. male I MRN: [id number]  Unit/Bed#: -01 I Date of Admission: 2/14/2025   Date of Service: 2/19/2025 I Hospital Day: 5     Assessment & Plan  Sepsis (HCC)  Present on admission as evidenced by tachycardia, tachypnea, hyperthermia  Likely secondary to influenza A and bacterial pneumonia  Continue ceftriaxone.  Tamiflu for influenza A  Contact and droplet precautions  Blood cultures NGTD  Trend fevers curve/WBC count  Influenza A  Influenza A + on 2/14/2025  Saturating well on room air  Continue Tamiflu x 5 days  Contact and droplet precautions  Monitor respiratory status  Acute on chronic systolic congestive heart failure (HCC)  Noted history of EF 35%  Cardiology following  Transitioning to oral diuretics.  Ischemic evaluation per cardiology as outpt  Monitor intake and output, daily weights  Elevated troponin  Suspect secondary to nonischemic myocardial injury  Defer ischemic evaluation to cardiology  Patient denies anginal symptoms  Paroxysmal A-fib ([facility])  Heart rates well-controlled  Continue home beta-blocker for rate control and Xarelto for CVA prophylaxis  Monitor heart rates  Type 2 diabetes mellitus with hyperglycemia, with long-term current use of insulin ([facility])  Lab Results   Component Value Date    HGBA1C 9.1 (H) 01/28/2025       Recent Labs     02/18/25  1630 02/18/25  2030 02/19/25  0727 02/19/25  1112   POCGLU 118 203* 121 182*       Blood Sugar Average: Last 72 hrs:  (P) 143.0625  Poorly controlled on home insulin regimen  Had asymptomatic blood sugars in the 40s at 1 point, now appears to be improving   Decrease Lantus to 10 units every 12  Decrease lispro to 10 units 3 times daily with meals  Sliding scale insulin with Accu-Cheks  Diabetic diet  Target blood glucose 140-180  Mixed hyperlipidemia  Continue home statin therapy   VTE  Prophylaxis:   Pharmacologic: in place  Mechanical VTE Prophylaxis in Place: Yes    Patient Centered  Rounds: I have performed bedside rounds with nursing staff today.    Discussions with Specialists or Other Care Team Provider: case management    Education and Discussions with Family / Patient: yes    Mobility:   Basic Mobility Inpatient Raw Score: 23  JH-HLM Goal: 7: Walk 25 feet or more  JH-HLM Achieved: 7: Walk 25 feet or more      Current Length of Stay: 5 day(s)    Current Patient Status: Inpatient        Code Status: Level 1 - Full Code    Discharge Plan: Pt will require continued inpatient hospitalization.    Subjective:     Pt states he did have some dyspnea last night  Now better  States LE edema is better    Patient is seen and examined at bedside.  All other ROS are negative.    Objective:     Vitals:   Temp (24hrs), Av.9 °F (36.6 °C), Min:97.8 °F (36.6 °C), Max:98.1 °F (36.7 °C)    Temp:  [97.8 °F (36.6 °C)-98.1 °F (36.7 °C)] 97.8 °F (36.6 °C)  HR:  [66] 66  Resp:  [18-19] 19  BP: (121-135)/(65-71) 135/65  SpO2:  [90 %-100 %] 95 %  Body mass index is 31.94 kg/m².     Input and Output Summary (last 24 hours):       Intake/Output Summary (Last 24 hours) at 2025 1446  Last data filed at 2025 1411  Gross per 24 hour   Intake 1210 ml   Output 2375 ml   Net -1165 ml       Physical Exam:       GEN: No acute distress, comfortable  HEEENT: No JVD, PERRLA, no scleral icterus  RESP: Lungs clear to auscultation bilaterally  CV: RRR, +s1/s2   ABD: SOFT NON TENDER, POSITIVE BOWEL SOUNDS, NO DISTENTION  PSYCH: CALM  NEURO: Mentation baseline, NO FOCAL DEFICITS  SKIN: NO RASH  EXTREM:  EDEMA present - improved per report    Additional Data:     Labs:    Results from last 7 days   Lab Units 25  0437 25  0359 25  0338   WBC Thousand/uL 6.11 5.68 4.91   HEMOGLOBIN g/dL 11.3* 11.1* 11.0*   HEMATOCRIT % 36.2* 35.7* 35.2*   PLATELETS Thousands/uL 164 145* 131*   SEGS PCT %  --   --  61   LYMPHO PCT %  --  29 24   MONO PCT %  --  9 11   EOS PCT %  --  1 3     Results from last 7 days   Lab  Units 02/19/25  0437 02/15/25  0224 02/14/25  0905   SODIUM mmol/L 137   < > 131*   POTASSIUM mmol/L 3.6   < > 4.7   CHLORIDE mmol/L 99   < > 95*   CO2 mmol/L 30   < > 28   BUN mg/dL 30*   < > 38*   CREATININE mg/dL 1.04   < > 1.39*   ANION GAP mmol/L 8   < > 8   CALCIUM mg/dL 8.9   < > 9.0   ALBUMIN g/dL  --   --  3.7   TOTAL BILIRUBIN mg/dL  --   --  1.90*   ALK PHOS U/L  --   --  138*   ALT U/L  --   --  17   AST U/L  --   --  30   GLUCOSE RANDOM mg/dL 112   < > 243*    < > = values in this interval not displayed.     Results from last 7 days   Lab Units 02/14/25  0905   INR  2.99*     Results from last 7 days   Lab Units 02/19/25  1112 02/19/25  0727 02/18/25  2030 02/18/25  1630 02/18/25  1317 02/18/25  1113 02/18/25  0702 02/17/25  2046 02/17/25  1759 02/17/25  1646 02/17/25  1116 02/17/25  0755   POC GLUCOSE mg/dl 182* 121 203* 118 84 162* 184* 195* 89 93 191* 145*         Results from last 7 days   Lab Units 02/16/25  0351 02/15/25  0224 02/14/25  1113 02/14/25  0905   LACTIC ACID mmol/L  --   --  1.5 2.1*   PROCALCITONIN ng/ml 0.97* 1.34*  --  0.20       Lines/Drains:  Invasive Devices       Peripheral Intravenous Line  Duration             Peripheral IV 02/17/25 Dorsal (posterior);Right Forearm 2 days                    Telemetry:        * I Have Reviewed All Lab Data Listed Above.           Imaging:     Results for orders placed during the hospital encounter of 02/14/25    XR chest portable    Narrative  XR CHEST PORTABLE    INDICATION: SOB.    COMPARISON: Earlier today    FINDINGS:    Left-sided dual-lead cardiac device.    Improved right basilar groundglass opacity. No pneumothorax or pleural effusion.    Normal cardiomediastinal silhouette.    Bones are unremarkable for age.    Normal upper abdomen.    Impression  Improved right basilar groundglass opacity.        Workstation performed: [id number]    Results for orders placed during the hospital encounter of 02/13/25    XR chest pa and  lateral    Narrative  CHEST    INDICATION:   Shortness of breath.    COMPARISON:  None.    EXAM PERFORMED/VIEWS:  XR CHEST PA AND LATERAL    FINDINGS:    Cardiomediastinal silhouette appears unremarkable. Dual-chamber pacemaker adequately position.    The lungs are clear.  No pneumothorax or pleural effusion.    Osseous structures appear within normal limits for patient age.    Impression  No acute cardiopulmonary disease.  Pacemaker.  No significant change from 1/27/2025          Electronically signed: 02/13/2025 02:53 PM Lenny Davies MD      *I have reviewed all imaging reports listed above      Recent Cultures (last 7 days):     Results from last 7 days   Lab Units 02/14/25  0905   BLOOD CULTURE  No Growth After 4 Days.  No Growth After 4 Days.       Last 24 Hours Medication List:   Current Facility-Administered Medications   Medication Dose Route Frequency Provider Last Rate    acetaminophen  650 mg Oral Q6H PRN Jonny Soto, DO      albuterol  2.5 mg Nebulization Q4H PRN Chan Kong MD      allopurinol  300 mg Oral Q24H Jonny Soto, DO      atorvastatin  20 mg Oral Daily Jonny DORIAN Soto, DO      budesonide  0.5 mg Nebulization BID Chan Kong MD      bumetanide  2 mg Oral BID Jayy Babcock PA-C      cefTRIAXone  2,000 mg Intravenous Q24H Jonny DORIAN Soto, DO 2,000 mg (02/19/25 0917)    fluticasone  1 spray Each Nare Daily Ebony Evans PA-C      gabapentin  200 mg Oral Daily Jonny DORIAN Yorty, DO      guaiFENesin  600 mg Oral Q12H [facility] Jonny Soto, DO      Hydrocod Israel-Chlorphe Israel ER  5 mL Oral Q12H PRN Jonny Pinedartmp, DO      insulin glargine  10 Units Subcutaneous Q12H [facility] Rafael Guzman PA-C      insulin lispro  1-6 Units Subcutaneous HS Jonny Soto, DO      insulin lispro  1-6 Units Subcutaneous TID AC Chan Kong MD      insulin lispro  10 Units Subcutaneous TID With Meals Rafael Guzman PA-C      levalbuterol  1.25 mg Nebulization TID Chan Kong MD       metoprolol succinate  25 mg Oral Daily Jonny Soto, DO      potassium chloride  20 mEq Oral Daily Jayy Babcock PA-C      rivaroxaban  15 mg Oral Daily With Breakfast Jonny Soto DO      senna  17.2 mg Oral HS PRN Jonny Soto DO      sodium chloride  1 spray Each Nare Q1H PRN Ernesto Davies PA-C      spironolactone  25 mg Oral Daily Jayy Babcock PA-C      trimethobenzamide  200 mg Intramuscular Q12H PRN Jonny Soto DO          Today, Patient Was Seen By: Madhavi Cesar MD    ** Please Note: Dictation voice to text software may have been used in the creation of this document. **

## 2025-02-19 NOTE — PLAN OF CARE
Problem: PAIN - ADULT  Goal: Verbalizes/displays adequate comfort level or baseline comfort level  Description: Interventions:  - Encourage patient to monitor pain and request assistance  - Assess pain using appropriate pain scale  - Administer analgesics based on type and severity of pain and evaluate response  - Implement non-pharmacological measures as appropriate and evaluate response  - Consider cultural and social influences on pain and pain management  - Notify physician/advanced practitioner if interventions unsuccessful or patient reports new pain  Outcome: Progressing     Problem: INFECTION - ADULT  Goal: Absence or prevention of progression during hospitalization  Description: INTERVENTIONS:  - Assess and monitor for signs and symptoms of infection  - Monitor lab/diagnostic results  - Monitor all insertion sites, i.e. indwelling lines, tubes, and drains  - Monitor endotracheal if appropriate and nasal secretions for changes in amount and color  - [place] appropriate cooling/warming therapies per order  - Administer medications as ordered  - Instruct and encourage patient and family to use good hand hygiene technique  - Identify and instruct in appropriate isolation precautions for identified infection/condition  Outcome: Progressing  Goal: Absence of fever/infection during neutropenic period  Description: INTERVENTIONS:  - Monitor WBC    Outcome: Progressing

## 2025-02-19 NOTE — ASSESSMENT & PLAN NOTE
Lab Results   Component Value Date    HGBA1C 9.1 (H) 01/28/2025       Recent Labs     02/18/25  1630 02/18/25  2030 02/19/25  0727 02/19/25  1112   POCGLU 118 203* 121 182*       Blood Sugar Average: Last 72 hrs:  (P) 143.0625  Poorly controlled on home insulin regimen  Had asymptomatic blood sugars in the 40s at 1 point, now appears to be improving   Decrease Lantus to 10 units every 12  Decrease lispro to 10 units 3 times daily with meals  Sliding scale insulin with Accu-Cheks  Diabetic diet  Target blood glucose 140-180   26yo  at 40w0d, GBS negative, undergoing IOL  -hold pitocin for now  -Continuous EFM and toco  -IV fluids  -Pain management PRN    Discussed with Dr. Brown and Dr. Dr. Bynum

## 2025-02-20 VITALS
DIASTOLIC BLOOD PRESSURE: 61 MMHG | RESPIRATION RATE: 18 BRPM | HEIGHT: 70 IN | WEIGHT: 221.4 LBS | OXYGEN SATURATION: 96 % | HEART RATE: 71 BPM | BODY MASS INDEX: 31.7 KG/M2 | SYSTOLIC BLOOD PRESSURE: 119 MMHG | TEMPERATURE: 97.6 F

## 2025-02-20 LAB
ALBUMIN SERPL BCG-MCNC: 3.5 G/DL (ref 3.5–5)
ALP SERPL-CCNC: 152 U/L (ref 34–104)
ALT SERPL W P-5'-P-CCNC: 25 U/L (ref 7–52)
ANION GAP SERPL CALCULATED.3IONS-SCNC: 9 MMOL/L (ref 4–13)
AST SERPL W P-5'-P-CCNC: 26 U/L (ref 13–39)
BASOPHILS # BLD AUTO: 0.04 THOUSANDS/ΜL (ref 0–0.1)
BASOPHILS NFR BLD AUTO: 1 % (ref 0–1)
BILIRUB SERPL-MCNC: 0.91 MG/DL (ref 0.2–1)
BUN SERPL-MCNC: 34 MG/DL (ref 5–25)
CALCIUM SERPL-MCNC: 9.1 MG/DL (ref 8.4–10.2)
CHLORIDE SERPL-SCNC: 97 MMOL/L (ref 96–108)
CO2 SERPL-SCNC: 29 MMOL/L (ref 21–32)
CREAT SERPL-MCNC: 1.25 MG/DL (ref 0.6–1.3)
EOSINOPHIL # BLD AUTO: 0.3 THOUSAND/ΜL (ref 0–0.61)
EOSINOPHIL NFR BLD AUTO: 5 % (ref 0–6)
ERYTHROCYTE [DISTWIDTH] IN BLOOD BY AUTOMATED COUNT: 17.9 % (ref 11.6–15.1)
GFR SERPL CREATININE-BSD FRML MDRD: 52 ML/MIN/1.73SQ M
GLUCOSE SERPL-MCNC: 112 MG/DL (ref 65–140)
GLUCOSE SERPL-MCNC: 137 MG/DL (ref 65–140)
GLUCOSE SERPL-MCNC: 238 MG/DL (ref 65–140)
HCT VFR BLD AUTO: 36.1 % (ref 36.5–49.3)
HGB BLD-MCNC: 11.3 G/DL (ref 12–17)
IMM GRANULOCYTES # BLD AUTO: 0.06 THOUSAND/UL (ref 0–0.2)
IMM GRANULOCYTES NFR BLD AUTO: 1 % (ref 0–2)
LYMPHOCYTES # BLD AUTO: 1.26 THOUSANDS/ΜL (ref 0.6–4.47)
LYMPHOCYTES NFR BLD AUTO: 20 % (ref 14–44)
MCH RBC QN AUTO: 26.4 PG (ref 26.8–34.3)
MCHC RBC AUTO-ENTMCNC: 31.3 G/DL (ref 31.4–37.4)
MCV RBC AUTO: 84 FL (ref 82–98)
MONOCYTES # BLD AUTO: 0.61 THOUSAND/ΜL (ref 0.17–1.22)
MONOCYTES NFR BLD AUTO: 10 % (ref 4–12)
NEUTROPHILS # BLD AUTO: 4.18 THOUSANDS/ΜL (ref 1.85–7.62)
NEUTS SEG NFR BLD AUTO: 63 % (ref 43–75)
NRBC BLD AUTO-RTO: 0 /100 WBCS
PLATELET # BLD AUTO: 193 THOUSANDS/UL (ref 149–390)
PMV BLD AUTO: 11.3 FL (ref 8.9–12.7)
POTASSIUM SERPL-SCNC: 3.8 MMOL/L (ref 3.5–5.3)
PROT SERPL-MCNC: 6.8 G/DL (ref 6.4–8.4)
RBC # BLD AUTO: 4.28 MILLION/UL (ref 3.88–5.62)
SODIUM SERPL-SCNC: 135 MMOL/L (ref 135–147)
WBC # BLD AUTO: 6.45 THOUSAND/UL (ref 4.31–10.16)

## 2025-02-20 PROCEDURE — 99239 HOSP IP/OBS DSCHRG MGMT >30: CPT | Performed by: HOSPITALIST

## 2025-02-20 PROCEDURE — 85025 COMPLETE CBC W/AUTO DIFF WBC: CPT | Performed by: HOSPITALIST

## 2025-02-20 PROCEDURE — 80053 COMPREHEN METABOLIC PANEL: CPT | Performed by: HOSPITALIST

## 2025-02-20 PROCEDURE — 94760 N-INVAS EAR/PLS OXIMETRY 1: CPT

## 2025-02-20 PROCEDURE — 94640 AIRWAY INHALATION TREATMENT: CPT

## 2025-02-20 PROCEDURE — 82948 REAGENT STRIP/BLOOD GLUCOSE: CPT

## 2025-02-20 RX ORDER — BUMETANIDE 2 MG/1
2 TABLET ORAL 2 TIMES DAILY
Qty: 60 TABLET | Refills: 0 | Status: SHIPPED | OUTPATIENT
Start: 2025-02-20

## 2025-02-20 RX ORDER — CEFPODOXIME PROXETIL 200 MG/1
200 TABLET, FILM COATED ORAL 2 TIMES DAILY
Qty: 10 TABLET | Refills: 0 | Status: SHIPPED | OUTPATIENT
Start: 2025-02-20 | End: 2025-02-25

## 2025-02-20 RX ORDER — SPIRONOLACTONE 25 MG/1
25 TABLET ORAL DAILY
Qty: 30 TABLET | Refills: 0 | Status: SHIPPED | OUTPATIENT
Start: 2025-02-20

## 2025-02-20 RX ORDER — INSULIN GLARGINE 100 [IU]/ML
INJECTION, SOLUTION SUBCUTANEOUS
Start: 2025-02-20

## 2025-02-20 RX ADMIN — INSULIN LISPRO 3 UNITS: 100 INJECTION, SOLUTION INTRAVENOUS; SUBCUTANEOUS at 12:23

## 2025-02-20 RX ADMIN — SPIRONOLACTONE 25 MG: 25 TABLET, FILM COATED ORAL at 09:00

## 2025-02-20 RX ADMIN — SALINE NASAL SPRAY 1 SPRAY: 1.5 SOLUTION NASAL at 02:57

## 2025-02-20 RX ADMIN — METOPROLOL SUCCINATE 25 MG: 25 TABLET, FILM COATED, EXTENDED RELEASE ORAL at 08:59

## 2025-02-20 RX ADMIN — BUDESONIDE 0.5 MG: 0.5 INHALANT ORAL at 09:37

## 2025-02-20 RX ADMIN — BUMETANIDE 2 MG: 1 TABLET ORAL at 09:00

## 2025-02-20 RX ADMIN — RIVAROXABAN 15 MG: 15 TABLET, FILM COATED ORAL at 09:00

## 2025-02-20 RX ADMIN — POTASSIUM CHLORIDE 20 MEQ: 1500 TABLET, EXTENDED RELEASE ORAL at 09:00

## 2025-02-20 RX ADMIN — LEVALBUTEROL HYDROCHLORIDE 1.25 MG: 1.25 SOLUTION RESPIRATORY (INHALATION) at 09:37

## 2025-02-20 RX ADMIN — INSULIN LISPRO 10 UNITS: 100 INJECTION, SOLUTION INTRAVENOUS; SUBCUTANEOUS at 09:01

## 2025-02-20 RX ADMIN — ALLOPURINOL 300 MG: 300 TABLET ORAL at 12:23

## 2025-02-20 RX ADMIN — GUAIFENESIN 600 MG: 600 TABLET, EXTENDED RELEASE ORAL at 09:00

## 2025-02-20 RX ADMIN — FLUTICASONE PROPIONATE 1 SPRAY: 50 SPRAY, METERED NASAL at 09:02

## 2025-02-20 RX ADMIN — INSULIN LISPRO 10 UNITS: 100 INJECTION, SOLUTION INTRAVENOUS; SUBCUTANEOUS at 12:24

## 2025-02-20 RX ADMIN — CEFTRIAXONE 2000 MG: 2 INJECTION, SOLUTION INTRAVENOUS at 09:02

## 2025-02-20 RX ADMIN — LEVALBUTEROL HYDROCHLORIDE 1.25 MG: 1.25 SOLUTION RESPIRATORY (INHALATION) at 14:40

## 2025-02-20 RX ADMIN — ATORVASTATIN CALCIUM 20 MG: 20 TABLET, FILM COATED ORAL at 09:00

## 2025-02-20 RX ADMIN — INSULIN GLARGINE 10 UNITS: 100 INJECTION, SOLUTION SUBCUTANEOUS at 09:00

## 2025-02-20 RX ADMIN — GABAPENTIN 200 MG: 100 CAPSULE ORAL at 09:00

## 2025-02-20 NOTE — ASSESSMENT & PLAN NOTE
Lab Results   Component Value Date    HGBA1C 9.1 (H) 01/28/2025       Recent Labs     02/19/25  1112 02/19/25  1649 02/19/25  2138 02/20/25  0818   POCGLU 182* 130 158* 137       Blood Sugar Average: Last 72 hrs:  (P) 146.9753453803236769  Poorly controlled on home insulin regimen  Had asymptomatic blood sugars in the 40s at 1 point, now appears to be improving   Decrease Lantus to 10 units every 12  Decrease lispro to 10 units 3 times daily with meals  Sliding scale insulin with Accu-Cheks  Diabetic diet  Target blood glucose 140-180

## 2025-02-20 NOTE — ASSESSMENT & PLAN NOTE
Noted history of EF 35%  Cardiology following  Transitioned to oral diuretics.  Ischemic evaluation per cardiology as outpt  Monitor intake and output, daily weights

## 2025-02-20 NOTE — DISCHARGE SUMMARY
Discharge Summary - Hospitalist   Name: Hammad Montesinos 84 y.o. male I MRN: [id number]  Unit/Bed#: -01 I Date of Admission: 2/14/2025   Date of Service: 2/20/2025 I Hospital Day: 6     Assessment & Plan  Sepsis (HCC)  Present on admission as evidenced by tachycardia, tachypnea, hyperthermia  Likely secondary to influenza A and bacterial pneumonia  Continue ceftriaxone.  Tamiflu for influenza A  Contact and droplet precautions  Blood cultures NGTD  Trend fevers curve/WBC count  Influenza A  Influenza A + on 2/14/2025  Saturating well on room air  Continue Tamiflu x 5 days  Contact and droplet precautions  Monitor respiratory status  Acute on chronic systolic congestive heart failure (HCC)  Noted history of EF 35%  Cardiology following  Transitioned to oral diuretics.  Ischemic evaluation per cardiology as outpt  Monitor intake and output, daily weights  Elevated troponin  Suspect secondary to nonischemic myocardial injury  Defer ischemic evaluation to cardiology  Patient denies anginal symptoms  Paroxysmal A-fib ([facility])  Heart rates well-controlled  Continue home beta-blocker for rate control and Xarelto for CVA prophylaxis  Monitor heart rates  Type 2 diabetes mellitus with hyperglycemia, with long-term current use of insulin ([facility])  Lab Results   Component Value Date    HGBA1C 9.1 (H) 01/28/2025       Recent Labs     02/19/25  1112 02/19/25  1649 02/19/25  2138 02/20/25  0818   POCGLU 182* 130 158* 137       Blood Sugar Average: Last 72 hrs:  (P) [id number]  Poorly controlled on home insulin regimen  Had asymptomatic blood sugars in the 40s at 1 point, now appears to be improving   Decrease Lantus to 10 units every 12  Decrease lispro to 10 units 3 times daily with meals  Sliding scale insulin with Accu-Cheks  Diabetic diet  Target blood glucose 140-180  Mixed hyperlipidemia  Continue home statin therapy    Hospital Course:     Hammad Montesinos is a 84 y.o. male patient who originally presented to  the hospital on   Admission Orders (From admission, onward)       Ordered        02/14/25 1010  INPATIENT ADMISSION  Once                         due to The patient was admitted to the hospital for acute decompensated heart failure. Under cardiology guidance, the patient was diuresed via IV diuretics. The patient was transitioned to oral diuretics and is felt to be euvolemic at the time of release from the hospital.  The patient does not have chest pain, shortness of breath, or acute medical complaints. The patient is agreeable to following up with PCP and cardiology at the time of release from the hospital.  Pt also treated for influenza as above.       Please see above list of diagnoses and related plan for additional information.     Physical Exam:    GEN: No acute distress, comfortable  HEEENT: No JVD, PERRLA, no scleral icterus  RESP: Lungs clear to auscultation bilaterally  CV: RRR, +s1/s2   ABD: SOFT NON TENDER, POSITIVE BOWEL SOUNDS, NO DISTENTION  PSYCH: CALM  NEURO: [place] baseline, NO FOCAL DEFICITS  SKIN: NO RASH  EXTREM: NO EDEMA    CONSULTING PROVIDERS   IP CONSULT TO CASE MANAGEMENT  IP CONSULT TO CARDIOLOGY    PROCEDURES PERFORMED  * No surgery found *    RADIOLOGY RESULTS  XR chest portable  Result Date: 2/15/2025  Narrative: XR CHEST PORTABLE INDICATION: SOB. COMPARISON: Earlier today FINDINGS: Left-sided dual-lead cardiac device. Improved right basilar groundglass opacity. No pneumothorax or pleural effusion. Normal cardiomediastinal silhouette. Bones are unremarkable for age. Normal upper abdomen.     Impression: Improved right basilar groundglass opacity. Workstation performed: [id number]     XR chest 1 view portable  Result Date: 2/14/2025  Narrative: XR CHEST PORTABLE INDICATION: cough, fever. COMPARISON: 2/13/2025 FINDINGS: Left chest wall intracardiac device with intact lead(s). No abandoned lead(s). There is groundglass opacity in the right lower lung field. Left lung is clear. No  pneumothorax or pleural effusion. Normal cardiomediastinal silhouette. Degenerative arthritis at the acromioclavicular joints. Normal upper abdomen.     Impression: Groundglass opacity in the right lower lung field which could represent developing pneumonia. This was noted by the emergency department Workstation performed: [id number]     XR chest pa and lateral  Result Date: 2/13/2025  Narrative: CHEST INDICATION:   Shortness of breath. COMPARISON:  None. EXAM PERFORMED/VIEWS:  XR CHEST PA AND LATERAL FINDINGS: Cardiomediastinal silhouette appears unremarkable. Dual-chamber pacemaker adequately position. The lungs are clear.  No pneumothorax or pleural effusion. Osseous structures appear within normal limits for patient age.     Impression: No acute cardiopulmonary disease. Pacemaker. No significant change from 1/27/2025 Electronically signed: 02/13/2025 02:53 PM Lenny Davies MD    Echo complete w/ contrast if indicated  Result Date: 1/29/2025  Narrative:   Left Ventricle: Left ventricular cavity size is mildly dilated. Wall thickness is moderately increased. The left ventricular ejection fraction is 35%. Systolic function is moderately reduced. There is moderate global hypokinesis with regional variation. Abnormal diastolic inflow patterns due to atrial fibrillation.   IVS: There is abnormal septal motion consistent with right ventricular pacing.   Right Ventricle: Right ventricle is not well visualized. Right ventricular cavity size is normal. Systolic function is reduced.   Left Atrium: The atrium is severely dilated.   Right Atrium: The atrium is mildly dilated.   Mitral Valve: The anterior and posterior leaflets are tethered. There is mild diffuse thickening. There is mild calcification. There is likely moderate to severe regurgitation with an eccentrically directed jet.   Tricuspid Valve: There is mild regurgitation.     Cardiac EP device report  Result Date: 1/29/2025  Narrative: BIO DC/PM NON-BILLABLE  BIOTRONIK TRANSMISSION: BATTERY VOLTAGE ADEQUATE (OK~70%) ALL AVAILABLE LEAD PARAMETERS WITHIN NORMAL LIMITS. ALERT FOR AF W /AF IN PROGRESS (HX OF SAME); PT RECENTLY IN HOSP AND PT IS NOT ON AC. AF BURDEN: 100%. TASK DRFelipe TURNER & ERIC TO EVAL . NORMAL DEVICE FUNCTION.      MRI brain wo contrast  Result Date: 1/28/2025  Narrative: MRI BRAIN WITHOUT CONTRAST INDICATION: stroke. COMPARISON:   1/27/2025 TECHNIQUE:  Multiplanar, multisequence imaging of the brain was performed. IMAGE QUALITY:  Diagnostic. FINDINGS: BRAIN PARENCHYMA:  There is no discrete mass, mass effect or midline shift. There is no intracranial hemorrhage.  There is no evidence of acute infarction and diffusion imaging is unremarkable. Small scattered hyperintensities on T2/FLAIR imaging are noted in the periventricular and subcortical white matter demonstrating an appearance that is statistically most likely to represent mild microangiopathic change. In the right frontal lobe towards the vertex series 4 image 22 there is a small stellate FLAIR hyperintensity with underlying linear branching elements on susceptibility weighted image series 5, image 18 possibly a small developmental venous angioma, a variant in normal parenchymal venous drainage. No acute intracranial hemorrhage. VENTRICLES: Ventricles and extra-axial CSF spaces are prominent commensurate with the degree of volume loss.  No hydrocephalus.  No acute intraventricular hemorrhage. SELLA AND PITUITARY GLAND:  Normal. ORBITS: Bilateral lens implants noted PARANASAL SINUSES:  Normal. VASCULATURE:  Evaluation of the major intracranial vasculature demonstrates appropriate flow voids. CALVARIUM AND SKULL BASE:  Normal. EXTRACRANIAL SOFT TISSUES: Ventral cervical fusion hardware C3-C5 noted.     Impression: 1. No acute infarction, intracranial image or mass effect. 2. Mild, chronic microangiopathy. Workstation performed: [id number]     CTA head and neck with and without contrast  Result Date:  1/27/2025  Narrative: CTA NECK AND BRAIN WITH AND WITHOUT CONTRAST INDICATION: facial droop, slurred speech x 2 days. COMPARISON:   None. TECHNIQUE:  Routine CT imaging of the Brain without contrast.Post contrast imaging was performed after administration of iodinated contrast through the neck and brain. Post contrast axial 0.625 mm images timed to opacify the arterial system.  3D rendering was performed on an independent workstation.   MIP reconstructions performed. Coronal and sagittal reconstructions were performed of the non contrast portion of the brain. Radiation dose length product (DLP) for this visit:  1431.64 mGy-cm .  This examination, like all CT scans performed in the [facility], was performed utilizing techniques to minimize radiation dose exposure, including the use of iterative reconstruction and automated exposure control. IV Contrast:  85 mL of iohexol (OMNIPAQUE) IMAGE QUALITY:   Diagnostic FINDINGS: NONCONTRAST BRAIN PARENCHYMA:No intracranial mass, mass effect or midline shift. No CT signs of acute infarction.  No acute parenchymal hemorrhage. VENTRICLES AND EXTRA-AXIAL SPACES:Normal for the patient's age. VISUALIZED ORBITS: Normal. PARANASAL SINUSES: Mild ethmoidal sinus mucosal thickening. CTA NECK ARCH AND GREAT VESSELS: Mild atherosclerotic changes. Patent great vessel origins without significant stenosis. RIGHT VERTEBRAL ARTERY: Moderate to severe stenosis at the origin. Extracranial segments are patent. There is minimal focal irregularity in the proximal V3 segment seen on series 5 image 291. LEFT VERTEBRAL ARTERY: Patent origin.  Patent extracranial segments. RIGHT CAROTID: Mild atherosclerotic changes in the carotid bifurcation. No significant stenosis. LEFT CAROTID: Unremarkable No stenosis. NASCET criteria was used to determine the degree of internal carotid artery diameter stenosis. CTA BRAIN: INTERNAL CAROTID ARTERIES: Mild atherosclerotic changes involving bilateral  cavernous and supraclinoid segments. No significant stenosis. ANTERIOR CIRCULATION:  No significant stenosis. MIDDLE CEREBRAL ARTERY CIRCULATION: No high-grade stenosis. DISTAL VERTEBRAL ARTERIES: No significant stenosis. BASILAR ARTERY:No significant stenosis. POSTERIOR CEREBRAL ARTERIES: No high-grade stenosis. VENOUS STRUCTURES:  Normal. NON VASCULAR ANATOMY BONY STRUCTURES: Prior ACDF at C3-C6. No acute or aggressive appearing osseous abnormality. SOFT TISSUES OF THE NECK:  Normal. THORACIC INLET: Reticulation in the right upper lobe is similar to chest CT 6/4/2024. Partially imaged left subclavian cardiac pacer.     Impression: CT Brain:  No acute intracranial CT abnormality. CT Angiography: 1.  Patent major vessels of the [place] without high-grade stenosis.  No aneurysm. 2.  Moderate to severe atherosclerotic stenosis in the right vertebral artery origin. No significant stenosis in the cervical carotid or left vertebral artery. 3.  Minimal focal irregularity in the proximal V3 segment of the right vertebral artery, could be developmental versus sequela of prior injury. No significant stenosis. Workstation performed: [id number]     XR chest 1 view portable  Result Date: 1/27/2025  Narrative: XR CHEST PORTABLE INDICATION: weakness. COMPARISON: Chest radiograph January 14, 2025 FINDINGS: Left chest wall intracardiac device with intact lead(s). No abandoned lead(s). Clear lungs. No pneumothorax or pleural effusion. Enlarged cardiac silhouette, unchanged. Bones are unremarkable for age. Normal upper abdomen.     Impression: No acute cardiopulmonary disease. Workstation performed: [id number]       LABS  Results from last 7 days   Lab Units 02/20/25  0259 02/19/25  0437 02/18/25  0359 02/17/25  0338 02/16/25  0351 02/15/25  0224 02/14/25  0905   WBC Thousand/uL 6.45 6.11 5.68 4.91 4.46 6.14 6.85   HEMOGLOBIN g/dL 11.3* 11.3* 11.1* 11.0* 11.0* 10.5* 11.4*   HEMATOCRIT % 36.1* 36.2* 35.7* 35.2* 34.4* 33.0*  "36.9   MCV fL 84 84 85 85 85 85 85   TOTAL NEUT ABS Thousand/uL  --   --  2.95  --   --   --   --    PLATELETS Thousands/uL 193 164 145* 131* 119* 105* 112*   INR   --   --   --   --   --   --  2.99*     Results from last 7 days   Lab Units 02/20/25  0259 02/19/25  0437 02/18/25  0359 02/17/25  0338 02/16/25  0351 02/15/25  0224 02/14/25  0905 02/13/25  1253   SODIUM mmol/L 135 137 134* 136 134* 132* 131* 131*   POTASSIUM mmol/L 3.8 3.6 3.0* 3.4* 3.5 4.0 4.7 3.8   CHLORIDE mmol/L 97 99 94* 97 98 97 95* 93*   CO2 mmol/L 29 30 31 30 28 29 28 27   BUN mg/dL 34* 30* 33* 35* 39* 41* 38* 33*   CREATININE mg/dL 1.25 1.04 1.15 1.18 1.26 1.31* 1.39* 1.44*   CALCIUM mg/dL 9.1 8.9 8.6 8.5 8.6 8.6 9.0 9.0   ALBUMIN g/dL 3.5  --   --   --   --   --  3.7  --    TOTAL BILIRUBIN mg/dL 0.91  --   --   --   --   --  1.90*  --    ALK PHOS U/L 152*  --   --   --   --   --  138*  --    ALT U/L 25  --   --   --   --   --  17  --    AST U/L 26  --   --   --   --   --  30  --    EGFR ml/min/1.73sq m 52 65 57 55 51 49 45 43   GLUCOSE RANDOM mg/dL 112 112 214* 146* 145* 111 243* 286*     Results from last 7 days   Lab Units 02/14/25  1506 02/14/25  1113 02/14/25  0905   HS TNI 0HR ng/L  --   --  123*   HS TNI 2HR ng/L  --  192*  --    HS TNI 4HR ng/L 281*  --   --               Results from last 7 days   Lab Units 02/20/25  0818 02/19/25  2138 02/19/25  1649 02/19/25  1112 02/19/25  0727 02/18/25  2030 02/18/25  1630 02/18/25  1317 02/18/25  1113 02/18/25  0702   POC GLUCOSE mg/dl 137 158* 130 182* 121 203* 118 84 162* 184*             Results from last 7 days   Lab Units 02/16/25  0351 02/15/25  0224 02/14/25  1113 02/14/25  0905   LACTIC ACID mmol/L  --   --  1.5 2.1*   PROCALCITONIN ng/ml 0.97*   < >  --  0.20    < > = values in this interval not displayed.           Cultures:         Invalid input(s): \"URIBILINOGEN\"        Results from last 7 days   Lab Units 02/14/25  0905   BLOOD CULTURE  No Growth After 5 Days.  No Growth After 5 " Days.   INFLUENZA A PCR  Positive*         Condition at Discharge:  good      Discharge instructions/Information to patient and family:   See after visit summary for information provided to patient and family.  The above hospital course, results, incidental findings, need for follow up was discussed in detail with the patient and/or family.    Provisions for Follow-Up Care:  See after visit summary for information related to follow-up care and any pertinent home health orders.      Disposition:     Home       Discharge Statement:  I spent 38 minutes discharging the patient. This time was spent on the day of discharge. I had direct contact with the patient on the day of discharge. Greater than 50% of the total time was spent examining patient, answering all patient questions, arranging and discussing plan of care with patient as well as directly providing post-discharge instructions.  Additional time then spent on discharge activities.    Discharge Medications:  See after visit summary for reconciled discharge medications provided to patient and family.      ** Please Note: This note has been constructed using a voice recognition system **

## 2025-02-20 NOTE — PLAN OF CARE
Problem: Potential for Falls  Goal: Patient will remain free of falls  Description: INTERVENTIONS:  - Educate patient/family on patient safety including physical limitations  - Instruct patient to call for assistance with activity   - Consult OT/PT to assist with strengthening/mobility   - Keep Call bell within reach  - Keep bed low and locked with side rails adjusted as appropriate  - Keep care items and personal belongings within reach  - Initiate and maintain comfort rounds  - Make Fall Risk Sign visible to staff  - Offer Toileting every 2 Hours, in advance of need  - Initiate/Maintain bed alarm  - Obtain necessary fall risk management equipment: socks  - Apply yellow socks and bracelet for high fall risk patients  - Consider moving patient to room near nurses station  Outcome: Progressing     Problem: PAIN - ADULT  Goal: Verbalizes/displays adequate comfort level or baseline comfort level  Description: Interventions:  - Encourage patient to monitor pain and request assistance  - Assess pain using appropriate pain scale  - Administer analgesics based on type and severity of pain and evaluate response  - Implement non-pharmacological measures as appropriate and evaluate response  - Consider cultural and social influences on pain and pain management  - Notify physician/advanced practitioner if interventions unsuccessful or patient reports new pain  Outcome: Progressing     Problem: INFECTION - ADULT  Goal: Absence or prevention of progression during hospitalization  Description: INTERVENTIONS:  - Assess and monitor for signs and symptoms of infection  - Monitor lab/diagnostic results  - Monitor all insertion sites, i.e. indwelling lines, tubes, and drains  - Monitor endotracheal if appropriate and nasal secretions for changes in amount and color  - [place] appropriate cooling/warming therapies per order  - Administer medications as ordered  - Instruct and encourage patient and family to use good hand hygiene  technique  - Identify and instruct in appropriate isolation precautions for identified infection/condition  Outcome: Progressing  Goal: Absence of fever/infection during neutropenic period  Description: INTERVENTIONS:  - Monitor WBC    Outcome: Progressing     Problem: SAFETY ADULT  Goal: Patient will remain free of falls  Description: INTERVENTIONS:  - Educate patient/family on patient safety including physical limitations  - Instruct patient to call for assistance with activity   - Consult OT/PT to assist with strengthening/mobility   - Keep Call bell within reach  - Keep bed low and locked with side rails adjusted as appropriate  - Keep care items and personal belongings within reach  - Initiate and maintain comfort rounds  - Make Fall Risk Sign visible to staff  - Offer Toileting every 2 Hours, in advance of need  - Initiate/Maintain bed alarm  - Obtain necessary fall risk management equipment: socks  - Apply yellow socks and bracelet for high fall risk patients  - Consider moving patient to room near nurses station  Outcome: Progressing  Goal: Maintain or return to baseline ADL function  Description: INTERVENTIONS:  -  Assess patient's ability to carry out ADLs; assess patient's baseline for ADL function and identify physical deficits which impact ability to perform ADLs (bathing, care of mouth/teeth, toileting, grooming, dressing, etc.)  - Assess/evaluate cause of self-care deficits   - Assess range of motion  - Assess patient's mobility; develop plan if impaired  - Assess patient's need for assistive devices and provide as appropriate  - Encourage maximum independence but intervene and supervise when necessary  - Involve family in performance of ADLs  - Assess for home care needs following discharge   - Consider OT consult to assist with ADL evaluation and planning for discharge  - Provide patient education as appropriate  Outcome: Progressing  Goal: Maintains/Returns to pre admission functional  level  Description: INTERVENTIONS:  - Perform AM-PAC 6 Click Basic Mobility/ Daily Activity assessment daily.  - Set and communicate daily mobility goal to care team and patient/family/caregiver.   - Collaborate with rehabilitation services on mobility goals if consulted  - Perform Range of Motion 3 times a day.  - Reposition patient every 2 hours.  - Dangle patient 3 times a day  - Stand patient 3 times a day  - Ambulate patient 3 times a day  - Out of bed to chair 3 times a day   - Out of bed for meals 3 times a day  - Out of bed for toileting  - Record patient progress and toleration of activity level   Outcome: Progressing

## 2025-02-20 NOTE — PLAN OF CARE
Problem: Potential for Falls  Goal: Patient will remain free of falls  Description: INTERVENTIONS:  - Educate patient/family on patient safety including physical limitations  - Instruct patient to call for assistance with activity   - Consult OT/PT to assist with strengthening/mobility   - Keep Call bell within reach  - Keep bed low and locked with side rails adjusted as appropriate  - Keep care items and personal belongings within reach  - Initiate and maintain comfort rounds  - Make Fall Risk Sign visible to staff  - Offer Toileting every 2 Hours, in advance of need  - Initiate/Maintain bed alarm  - Obtain necessary fall risk management equipment: socks  - Apply yellow socks and bracelet for high fall risk patients  - Consider moving patient to room near nurses station  Outcome: Progressing     Problem: PAIN - ADULT  Goal: Verbalizes/displays adequate comfort level or baseline comfort level  Description: Interventions:  - Encourage patient to monitor pain and request assistance  - Assess pain using appropriate pain scale  - Administer analgesics based on type and severity of pain and evaluate response  - Implement non-pharmacological measures as appropriate and evaluate response  - Consider cultural and social influences on pain and pain management  - Notify physician/advanced practitioner if interventions unsuccessful or patient reports new pain  Outcome: Progressing     Problem: INFECTION - ADULT  Goal: Absence or prevention of progression during hospitalization  Description: INTERVENTIONS:  - Assess and monitor for signs and symptoms of infection  - Monitor lab/diagnostic results  - Monitor all insertion sites, i.e. indwelling lines, tubes, and drains  - Monitor endotracheal if appropriate and nasal secretions for changes in amount and color  - [place] appropriate cooling/warming therapies per order  - Administer medications as ordered  - Instruct and encourage patient and family to use good hand hygiene  technique  - Identify and instruct in appropriate isolation precautions for identified infection/condition  Outcome: Progressing  Goal: Absence of fever/infection during neutropenic period  Description: INTERVENTIONS:  - Monitor WBC    Outcome: Progressing     Problem: DISCHARGE PLANNING  Goal: Discharge to home or other facility with appropriate resources  Description: INTERVENTIONS:  - Identify barriers to discharge w/patient and caregiver  - Arrange for needed discharge resources and transportation as appropriate  - Identify discharge learning needs (meds, wound care, etc.)  - Arrange for interpretive services to assist at discharge as needed  - Refer to Case Management Department for coordinating discharge planning if the patient needs post-hospital services based on physician/advanced practitioner order or complex needs related to functional status, cognitive ability, or social support system  Outcome: Progressing     Problem: Knowledge Deficit  Goal: Patient/family/caregiver demonstrates understanding of disease process, treatment plan, medications, and discharge instructions  Description: Complete learning assessment and assess knowledge base.  Interventions:  - Provide teaching at level of understanding  - Provide teaching via preferred learning methods  Outcome: Progressing     Problem: Prexisting or High Potential for Compromised Skin Integrity  Goal: Skin integrity is maintained or improved  Description: INTERVENTIONS:  - Identify patients at risk for skin breakdown  - Assess and monitor skin integrity  - Assess and monitor nutrition and hydration status  - Monitor labs   - Assess for incontinence   - Turn and reposition patient  - Assist with mobility/ambulation  - Relieve pressure over bony prominences  - Avoid friction and shearing  - Provide appropriate hygiene as needed including keeping skin clean and dry  - Evaluate need for skin moisturizer/barrier cream  - Collaborate with interdisciplinary team    - Patient/family teaching  - Consider wound care consult   Outcome: Progressing

## 2025-02-21 NOTE — UTILIZATION REVIEW
NOTIFICATION OF ADMISSION DISCHARGE   This is a Notification of Discharge from [facility]. Please be advised that this patient has been discharge from our facility. Below you will find the admission and discharge date and time including the patient’s disposition.   UTILIZATION REVIEW CONTACT:  Iris Ndiaye  Utilization   Network Utilization Review Department  Phone: [phone] x carefully listen to the prompts. All voicemails are confidential.  Email: [email or web address]     ADMISSION INFORMATION  PRESENTATION DATE: 2/14/2025  8:41 AM  OBERVATION ADMISSION DATE: N/A  INPATIENT ADMISSION DATE: 2/14/25 10:10 AM   DISCHARGE DATE: 2/20/2025  5:24 PM   DISPOSITION:Home with Home Health Care    Network Utilization Review Department  ATTENTION: Please call with any questions or concerns to [phone] and carefully listen to the prompts so that you are directed to the right person. All voicemails are confidential.   For Discharge needs, contact Care Management DC Support Team at [phone] opt. 2  Send all requests for admission clinical reviews, approved or denied determinations and any other requests to dedicated fax number below belonging to the campus where the patient is receiving treatment. List of dedicated fax numbers for the Facilities:  FACILITY NAME UR FAX NUMBER   ADMISSION DENIALS (Administrative/Medical Necessity) [phone]   DISCHARGE SUPPORT TEAM ([facility]) [phone]   PARENT CHILD HEALTH (Maternity/NICU/Pediatrics) [phone]   [facility] [phone]   [facility] [phone]   [facility] [phone]   [facility] [phone]   [facility] [phone]   [facility] [phone]   [facility] [phone]   [facility]  [place] [phone]   [facility] [phone]   [facility] [phone]   General acute hospital [phone]   [facility] [phone]

## 2025-02-21 NOTE — CASE MANAGEMENT
Case Management Progress Note    Patient name Hammad Montesinos  Location /-01 MRN [id number]  : 1940 Date 2025       LOS (days): 6  Geometric Mean LOS (GMLOS) (days): 4.9  Days to GMLOS:-1.4        OBJECTIVE:        Current admission status: Inpatient  Preferred Pharmacy:   [facility]- [address] [address]  Phone: [phone] Fax: [phone]    Primary Care Provider: Mina Zavala MD    Primary Insurance: [employer] [facility]  Secondary Insurance:     PROGRESS NOTE:    Notification made to OP CM Handoff: [facility] OP CM regarding discharge planning and disposition.   Message left for office staff

## 2025-02-22 ENCOUNTER — HOME CARE VISIT (OUTPATIENT)
Dept: HOME HEALTH SERVICES | Facility: HOME HEALTHCARE | Age: 85
End: 2025-02-22
Payer: COMMERCIAL

## 2025-02-22 VITALS
OXYGEN SATURATION: 98 % | DIASTOLIC BLOOD PRESSURE: 74 MMHG | HEART RATE: 64 BPM | SYSTOLIC BLOOD PRESSURE: 124 MMHG | TEMPERATURE: 98.6 F | RESPIRATION RATE: 20 BRPM

## 2025-02-22 PROCEDURE — 400013 VN SOC

## 2025-02-22 PROCEDURE — G0299 HHS/HOSPICE OF RN EA 15 MIN: HCPCS

## 2025-02-25 ENCOUNTER — HOME CARE VISIT (OUTPATIENT)
Dept: HOME HEALTH SERVICES | Facility: HOME HEALTHCARE | Age: 85
End: 2025-02-25
Payer: COMMERCIAL

## 2025-02-25 VITALS
DIASTOLIC BLOOD PRESSURE: 78 MMHG | BODY MASS INDEX: 30.71 KG/M2 | TEMPERATURE: 97.9 F | RESPIRATION RATE: 18 BRPM | HEART RATE: 70 BPM | OXYGEN SATURATION: 97 % | SYSTOLIC BLOOD PRESSURE: 140 MMHG | WEIGHT: 214 LBS

## 2025-02-25 PROCEDURE — G0299 HHS/HOSPICE OF RN EA 15 MIN: HCPCS

## 2025-02-26 ENCOUNTER — REMOTE DEVICE CLINIC VISIT (OUTPATIENT)
Dept: CARDIOLOGY CLINIC | Facility: CLINIC | Age: 85
End: 2025-02-26
Payer: COMMERCIAL

## 2025-02-26 ENCOUNTER — HOME CARE VISIT (OUTPATIENT)
Dept: HOME HEALTH SERVICES | Facility: HOME HEALTHCARE | Age: 85
End: 2025-02-26
Payer: COMMERCIAL

## 2025-02-26 VITALS — SYSTOLIC BLOOD PRESSURE: 130 MMHG | OXYGEN SATURATION: 98 % | DIASTOLIC BLOOD PRESSURE: 68 MMHG | HEART RATE: 82 BPM

## 2025-02-26 DIAGNOSIS — Z95.0 CARDIAC PACEMAKER IN SITU: Primary | ICD-10-CM

## 2025-02-26 PROCEDURE — G0152 HHCP-SERV OF OT,EA 15 MIN: HCPCS

## 2025-02-26 PROCEDURE — 93294 REM INTERROG EVL PM/LDLS PM: CPT | Performed by: STUDENT IN AN ORGANIZED HEALTH CARE EDUCATION/TRAINING PROGRAM

## 2025-02-26 PROCEDURE — 93296 REM INTERROG EVL PM/IDS: CPT | Performed by: STUDENT IN AN ORGANIZED HEALTH CARE EDUCATION/TRAINING PROGRAM

## 2025-02-26 NOTE — PROGRESS NOTES
"Results for orders placed or performed in visit on 02/26/25   Cardiac EP device report    Narrative    BIO DC/PM  BIOTRONIK TRANSMISSION: BATTERY STATUS \"OK\" (70% REMAINING BATTERY LIFE). AP-1%, -88% (>40% DDD-CLS@60PPM). ALL AVAILABLE LEAD PARAMETERS WITHIN NORMAL LIMITS. NO NEW SIGNIFICANT HIGH RATE EPISODES. PT IN % OF TIME & ON XARELTO & METOPROLOL. NORMAL DEVICE FUNCTION. GV        "

## 2025-02-27 ENCOUNTER — RESULTS FOLLOW-UP (OUTPATIENT)
Dept: NON INVASIVE DIAGNOSTICS | Facility: HOSPITAL | Age: 85
End: 2025-02-27

## 2025-02-27 ENCOUNTER — HOME CARE VISIT (OUTPATIENT)
Dept: HOME HEALTH SERVICES | Facility: HOME HEALTHCARE | Age: 85
End: 2025-02-27
Payer: COMMERCIAL

## 2025-02-27 VITALS — SYSTOLIC BLOOD PRESSURE: 124 MMHG | DIASTOLIC BLOOD PRESSURE: 70 MMHG

## 2025-02-27 PROCEDURE — G0151 HHCP-SERV OF PT,EA 15 MIN: HCPCS

## 2025-02-28 ENCOUNTER — HOME CARE VISIT (OUTPATIENT)
Dept: HOME HEALTH SERVICES | Facility: HOME HEALTHCARE | Age: 85
End: 2025-02-28
Payer: COMMERCIAL

## 2025-02-28 VITALS
BODY MASS INDEX: 30.71 KG/M2 | SYSTOLIC BLOOD PRESSURE: 130 MMHG | WEIGHT: 214 LBS | RESPIRATION RATE: 18 BRPM | TEMPERATURE: 98.2 F | DIASTOLIC BLOOD PRESSURE: 70 MMHG | HEART RATE: 70 BPM | OXYGEN SATURATION: 97 %

## 2025-02-28 PROCEDURE — G0300 HHS/HOSPICE OF LPN EA 15 MIN: HCPCS

## 2025-03-03 ENCOUNTER — HOME CARE VISIT (OUTPATIENT)
Dept: HOME HEALTH SERVICES | Facility: HOME HEALTHCARE | Age: 85
End: 2025-03-03
Payer: COMMERCIAL

## 2025-03-03 DIAGNOSIS — E11.40 TYPE 2 DIABETES MELLITUS WITH DIABETIC NEUROPATHY, UNSPECIFIED WHETHER LONG TERM INSULIN USE (HCC): ICD-10-CM

## 2025-03-03 RX ORDER — INSULIN GLARGINE 100 [IU]/ML
INJECTION, SOLUTION SUBCUTANEOUS
Qty: 15 ML | Refills: 1 | Status: SHIPPED | OUTPATIENT
Start: 2025-03-03

## 2025-03-04 ENCOUNTER — HOME CARE VISIT (OUTPATIENT)
Dept: HOME HEALTH SERVICES | Facility: HOME HEALTHCARE | Age: 85
End: 2025-03-04
Payer: COMMERCIAL

## 2025-03-04 VITALS
SYSTOLIC BLOOD PRESSURE: 120 MMHG | WEIGHT: 211 LBS | HEART RATE: 67 BPM | TEMPERATURE: 98.4 F | BODY MASS INDEX: 30.28 KG/M2 | OXYGEN SATURATION: 93 % | DIASTOLIC BLOOD PRESSURE: 68 MMHG | RESPIRATION RATE: 16 BRPM

## 2025-03-04 PROCEDURE — G0299 HHS/HOSPICE OF RN EA 15 MIN: HCPCS

## 2025-03-04 NOTE — CASE COMMUNICATION
the patient is seeing Mandy on Friday. Today complained of leg cramps last night. The SN will only se the patient 1 x this week because we cannot se him on the same day as a doctors appointment, so just wanted to make you aware before the appointment in case it continues.     Thank you,   Krista Carson, RN

## 2025-03-07 ENCOUNTER — HOME CARE VISIT (OUTPATIENT)
Dept: HOME HEALTH SERVICES | Facility: HOME HEALTHCARE | Age: 85
End: 2025-03-07
Payer: COMMERCIAL

## 2025-03-07 ENCOUNTER — OFFICE VISIT (OUTPATIENT)
Dept: CARDIOLOGY CLINIC | Facility: CLINIC | Age: 85
End: 2025-03-07
Payer: COMMERCIAL

## 2025-03-07 VITALS
SYSTOLIC BLOOD PRESSURE: 134 MMHG | HEIGHT: 70 IN | DIASTOLIC BLOOD PRESSURE: 62 MMHG | BODY MASS INDEX: 30.21 KG/M2 | WEIGHT: 211 LBS | HEART RATE: 80 BPM

## 2025-03-07 DIAGNOSIS — I48.19 PERSISTENT ATRIAL FIBRILLATION (HCC): ICD-10-CM

## 2025-03-07 DIAGNOSIS — I50.9 CHF (CONGESTIVE HEART FAILURE) (HCC): ICD-10-CM

## 2025-03-07 DIAGNOSIS — E11.22 STAGE 3 CHRONIC KIDNEY DISEASE DUE TO TYPE 2 DIABETES MELLITUS (HCC): ICD-10-CM

## 2025-03-07 DIAGNOSIS — N18.30 STAGE 3 CHRONIC KIDNEY DISEASE DUE TO TYPE 2 DIABETES MELLITUS (HCC): ICD-10-CM

## 2025-03-07 DIAGNOSIS — I50.20 HEART FAILURE WITH REDUCED EJECTION FRACTION (HCC): Primary | ICD-10-CM

## 2025-03-07 PROCEDURE — 99213 OFFICE O/P EST LOW 20 MIN: CPT

## 2025-03-07 RX ORDER — BUMETANIDE 2 MG/1
2 TABLET ORAL 2 TIMES DAILY
Qty: 180 TABLET | Refills: 3 | Status: SHIPPED | OUTPATIENT
Start: 2025-03-07

## 2025-03-07 RX ORDER — LOSARTAN POTASSIUM 100 MG/1
1 TABLET ORAL DAILY
COMMUNITY
Start: 2025-02-12

## 2025-03-07 RX ORDER — BUMETANIDE 2 MG/1
2 TABLET ORAL 2 TIMES DAILY
Qty: 180 TABLET | Refills: 3 | Status: SHIPPED | OUTPATIENT
Start: 2025-03-07 | End: 2025-03-07

## 2025-03-07 NOTE — PROGRESS NOTES
Cardiology Follow Up    Hammad Montesinos  1940  [id number]  [facility]  [facility] [address]  [facility] [address]  [phone]    1. Heart failure with reduced ejection fraction (HCC)  bumetanide (BUMEX) 2 mg tablet    DISCONTINUED: bumetanide (BUMEX) 2 mg tablet      2. CHF (congestive heart failure) (HCC)  Ambulatory referral to Cardiac Electrophysiology    Echo follow up/limited w/ contrast if indicated      3. Persistent atrial fibrillation (HCC)        4. 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  GDMT: Toprol-XL 25 mg daily, Bumex 2 mg twice daily, spironolactone 25 mg daily    Upon review of the medications patient was taking both Bumex and torsemide.  Discontinue torsemide and will maintain patient on Bumex 2 mg twice daily.  Patient had lab work drawn on 3/5/2025 through his primary care after complaints of leg cramps earlier this week.  Lab work unable to be reviewed at this time, will follow-up with [facility].  Weight stable this week, with weights of 211 213 at home.  Instructed patient on continuing daily weights and reporting weight gain of more than 5 pounds in 1 week or 3 pounds in 1 day    Will repeat limited echo at this time  Nuclear medicine stress test ordered at last visit, patient will complete  Follow-up on lab work done at [facility] -no medication changes at this time  Previous referral in place for EP follow-up to assess BiV pacemaker,    2.)  Persistent A-fib  AC: Xarelto 20 mg daily  Rate control: Toprol XL 25 mg daily  Patient with a history of sick sinus syndrome has a Biotronik permanent pacemaker  placed,  Last pacemaker check showed 100% A-fib burden, V paced 80% of the time.  Continue Xarelto and Toprol as ordered    Will return in about 1 month or sooner if necessary    Interval History:   Hammad Montesinos is a 84 y.o. male with a past medical history of paroxysmal A-fib, hypertension, HFrEF, type 2 diabetes, stage III chronic kidney disease who presents posthospital follow-up.  Hammad is a patient of Dr. Carcamo and was last seen in the office 2/7/2025 patient was admitted to [facility] on 2/14/2025  with symptoms of acute heart failure in the setting of influenza.  He was diuresed with IV Bumex and discharged on oral Bumex on 2/20.    Since Hammad has been home he has been overall feeling well.  He is tachypneic at baseline and he feels as though his breathing is back to normal.  He does complain of sinus congestion but is able to clear his sinuses with saline.    Hammad denies chest pain, chest pressure, lightheadedness, dizziness, presyncope or syncopal episodes at home.    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)    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 (HCC)    Elevated parathyroid hormone    Diabetic nephropathy associated with type 2 diabetes mellitus (HCC)    Type 2 diabetes mellitus with diabetic neuropathy, unspecified whether long term insulin use (HCC)    Pre-ulcerative calluses    Acute on chronic diastolic heart failure (HCC)    Elevated troponin    Pulmonary hypertension (HCC)    Paroxysmal A-fib (HCC)    Diabetic ulcer of other part of right foot  associated with type 2 diabetes mellitus, with fat layer exposed (HCC)    Stroke-like symptoms    Fall    Generalized weakness    Sepsis (HCC)    Influenza A        Past Medical History:   Diagnosis Date    Asthma     Bradycardia     Carcinoma, basal cell, skin     head and face and ear    CHF (congestive heart failure) (HCC)     Chronic kidney disease     Coronary artery disease     COVID-19 12/05/2022    Diabetes mellitus (HCC)     Dupuytren contracture     bilateral ring and right middle    Gout     Herniated lumbar intervertebral disc     Hypertension     Sleep apnea      Social History     Socioeconomic History    Marital status: /Civil Union     Spouse name: Not on file    Number of children: Not on file    Years of education: Not on file    Highest education level: Not on file   Occupational History    Occupation: teacher     Comment: retired   Tobacco Use    Smoking status: Never    Smokeless tobacco: Never   Vaping Use    Vaping status: Never Used   Substance and Sexual Activity    Alcohol use: Not Currently    Drug use: Never    Sexual activity: Not Currently     Birth control/protection: Male Sterilization   Other Topics Concern    Not on file   Social History Narrative    Not on file     Social Drivers of Health     Financial Resource Strain: Not on file   Food Insecurity: No Food Insecurity (2/14/2025)    Nursing - Inadequate Food Risk Classification     Worried About Running Out of Food in the Last Year: Not on file     Ran Out of Food in the Last Year: Not on file     Ran Out of Food in the Last Year: Never true   Transportation Needs: No Transportation Needs (2/22/2025)    OASIS : Transportation     Lack of Transportation (Medical): No     Lack of Transportation (Non-Medical): No     Patient Unable or Declines to Respond: No   Recent Concern: Transportation Needs - Unmet Transportation Needs (2/14/2025)    Nursing - Transportation Risk Classification     Lack of Transportation: Not on  file     Lack of Transportation: Yes   Physical Activity: Not on file   Stress: Not on file   Social Connections: Not on file   Intimate Partner Violence: Unknown (2025)    Nursing IPS     Feels Physically and Emotionally Safe: Not on file     Physically Hurt by Someone: Not on file     Humiliated or Emotionally Abused by Someone: Not on file     Physically Hurt by Someone: No     Hurt or Threatened by Someone: No   Housing Stability: Unknown (2025)    Nursing: Inadequate Housing Risk Classification     Has Housing: Not on file     Worried About Losing Housing: Not on file     Unable to Get Utilities: Not on file     Unable to Pay for Housing in the Last Year: No     Has Housin      Family History   Problem Relation Age of Onset    Hypertension Mother     Diabetes type II Mother     Hypertension Father     Diabetes type II Brother     Hypertension Brother     Kidney disease Sister     Hypertension Daughter     GI problems Daughter      Past Surgical History:   Procedure Laterality Date    APPENDECTOMY      age 20, ruptured    CATARACT EXTRACTION, BILATERAL Bilateral     CERVICAL FUSION      CYST REMOVAL Left 2023    DUPUYTREN CONTRACTURE RELEASE Left     left ring finger    INSERT / REPLACE / REMOVE PACEMAKER      LUMBAR LAMINECTOMY      belkis placed    TONSILLECTOMY      VASECTOMY         Current Outpatient Medications:     bumetanide (BUMEX) 2 mg tablet, Take 1 tablet (2 mg total) by mouth 2 (two) times a day Take when your wake up and then again early afternoon, Disp: 180 tablet, Rfl: 3    losartan (COZAAR) 100 MG tablet, Take 1 tablet by mouth in the morning, Disp: , Rfl:     Albuterol Sulfate 108 (90 Base) MCG/ACT AEPB, Inhale 2 puffs every 4 (four) hours as needed (SOB, wheezing, cough), Disp: 1 each, Rfl: 3    allopurinol (ZYLOPRIM) 300 mg tablet, Take 300 mg by mouth every 24 hours, Disp: , Rfl:     atorvastatin (LIPITOR) 20 mg tablet, Take 20 mg by mouth daily, Disp: , Rfl:     azelastine  (ASTELIN) 0.1 % nasal spray, 2 sprays into each nostril 2 (two) times a day, Disp: , Rfl:     budesonide (PULMICORT) 0.5 mg/2 mL nebulizer solution, Take 0.5 mg by nebulization 2 (two) times a day Rinse mouth after use., Disp: , Rfl:     Cholecalciferol (Vitamin D3) 25 MCG (1000 UT) CAPS, Take 1 capsule by mouth every 24 hours, Disp: , Rfl:     Coenzyme Q10 (Co Q10) 100 MG CAPS, Take 100 mg by mouth in the morning, Disp: , Rfl:     Easy Comfort Pen Needles 33G X 4 MM MISC, USE WITH INSULIN FIVE TIMES DAILY AS DIRECTED, Disp: , Rfl:     fluticasone (FLONASE) 50 mcg/act nasal spray, 1 spray into each nostril 2 (two) times a day, Disp: , Rfl:     formoterol (PERFOROMIST) 20 MCG/2ML nebulizer solution, Take 20 mcg by nebulization 2 (two) times a day, Disp: , Rfl:     gabapentin (NEURONTIN) 100 mg capsule, Take 1 capsule (100 mg total) by mouth daily, Disp: 180 capsule, Rfl: 0    Glucosamine-Chondroitin 500-400 MG CAPS, Take 1 capsule by mouth daily, Disp: , Rfl:     insulin aspart (NovoLOG FlexPen) 100 UNIT/ML injection pen, INJECT 25 UNITS SUBCUTANEOUSLY with breakfast, LUNCH and with dinner plus sliding scale. (UP  UNITS DAILY). 150-200: 1 unit; 201-250: 2 units; 251-300: 3 units; 301-350: 4 units; 351-400: 5 units; 400+: 6 units, Disp: 45 mL, Rfl: 1    Insulin Glargine Solostar (Lantus SoloStar) 100 UNIT/ML SOPN, INJECT 10 UNITS SUBCUTANEOUSLY IN THE MORNING AND 10 UNITS IN THE EVENING., Disp: 15 mL, Rfl: 1    ipratropium (ATROVENT) 0.03 % nasal spray, INSTILL 2 SPRAYS IN EACH NOSTRIL TWICE DAILY, Disp: , Rfl:     ipratropium-albuterol (DUO-NEB) 0.5-2.5 mg/3 mL nebulizer solution, Take 3 mL by nebulization 2 (two) times a day, Disp: , Rfl:     loratadine (CLARITIN) 10 mg tablet, Take 10 mg by mouth daily, Disp: , Rfl:     magnesium Oxide (MAG-OX) 400 mg TABS, Take 1 tablet (400 mg total) by mouth daily, Disp: 30 tablet, Rfl: 0    metoprolol succinate (TOPROL-XL) 25 mg 24 hr tablet, Take 1 tablet (25 mg total)  by mouth daily, Disp: 30 tablet, Rfl: 11    Multiple Vitamin (MULTIVITAMIN ADULT PO), Take 1 tablet by mouth daily, Disp: , Rfl:     mupirocin (BACTROBAN) 2 % ointment, Apply topically every other day With alginate dressing (Patient not taking: Reported on 2/22/2025), Disp: 22 g, Rfl: 0    Omega-3 Fatty Acids (Fish Oil) 360 MG CAPS, Take 1 capsule by mouth daily, Disp: , Rfl:     OneTouch Verio test strip, USE AS DIRECTED FOUR TIMES DAILY, Disp: , Rfl:     potassium chloride (K-DUR,KLOR-CON) 20 mEq tablet, Take 1 tablet (20 mEq total) by mouth daily, Disp: 30 tablet, Rfl: 0    rivaroxaban (Xarelto) 20 mg tablet, Take 1 tablet (20 mg total) by mouth daily with breakfast, Disp: 30 tablet, Rfl: 5    senna (SENOKOT) 8.6 mg, Take 2 tablets (17.2 mg total) by mouth daily at bedtime as needed for constipation, Disp: 30 tablet, Rfl: 0    spironolactone (ALDACTONE) 25 mg tablet, Take 1 tablet (25 mg total) by mouth daily, Disp: 30 tablet, Rfl: 0    triamcinolone (KENALOG) 0.1 % cream, Apply 1 Application topically 2 (two) times a day To affected areas bilateral feet , Disp: , Rfl:     valACYclovir (VALTREX) 1,000 mg tablet, Take 1,000 mg by mouth 3 (three) times a day, Disp: , Rfl:   Allergies   Allergen Reactions    Hydrochlorothiazide Other (See Comments)     SEVERE DIZZINESS    Insulin Lispro Other (See Comments)     Cannot control blood sugars(Humalog)    Lisinopril Other (See Comments)     Reports cough with use     Minoxidil GI Intolerance    Nifedipine Other (See Comments)     EDEMA LEGS    Other Other (See Comments)     SEVERE DIZZINESS  Hydrap-es    Prazosin Other (See Comments)     CHEST PAIN      Venlafaxine Other (See Comments)     Made pt feel bad      Doxycycline Rash    Simvastatin Rash     PEDAL EDEMA         Labs:     Chemistry        Component Value Date/Time    K 3.8 02/20/2025 0259    CL 97 02/20/2025 0259    CO2 29 02/20/2025 0259    BUN 34 (H) 02/20/2025 0259    CREATININE 1.25 02/20/2025 0259       "  Component Value Date/Time    CALCIUM 9.1 02/20/2025 0259    ALKPHOS 152 (H) 02/20/2025 0259    AST 26 02/20/2025 0259    ALT 25 02/20/2025 0259            No results found for: \"CHOL\"  Lab Results   Component Value Date    HDL 39 (L) 01/28/2025     Lab Results   Component Value Date    LDLCALC 55 01/28/2025     Lab Results   Component Value Date    TRIG 84 01/28/2025     No results found for: \"CHOLHDL\"    Imaging: Cardiac EP device report  Result Date: 2/26/2025  Narrative: BIO DC/PM BIOTRONIK TRANSMISSION: BATTERY STATUS \"OK\" (70% REMAINING BATTERY LIFE). AP-1%, -88% (>40% DDD-CLS@60PPM). ALL AVAILABLE LEAD PARAMETERS WITHIN NORMAL LIMITS. NO NEW SIGNIFICANT HIGH RATE EPISODES. PT IN % OF TIME & ON XARELTO & METOPROLOL. NORMAL DEVICE FUNCTION. GV     XR chest portable  Result Date: 2/15/2025  Impression: Improved right basilar groundglass opacity    XR chest 1 view portable  Result Date: 2/14/2025  Impression: Groundglass opacity in the right lower lung field which could represent developing pneumonia. This was noted by the emergency department    XR chest pa and lateral  Result Date: 2/13/2025  Impression: No acute cardiopulmonary disease. Pacemaker. No significant change from 1/27/2025 Electronically signed: 02/13/2025 02:53 PM Lenny Davies MD      Review of Systems   Constitutional: Negative for malaise/fatigue, weight gain and weight loss.   HENT:  Positive for congestion.    Cardiovascular:  Positive for dyspnea on exertion and leg swelling. Negative for chest pain, irregular heartbeat, near-syncope, orthopnea, palpitations, paroxysmal nocturnal dyspnea and syncope.   Respiratory:  Positive for cough and shortness of breath.    Gastrointestinal:  Negative for hematemesis, hematochezia and melena.   Genitourinary:  Negative for hematuria.   Neurological:  Negative for dizziness and light-headedness.       Vitals:    03/07/25 0845   BP: 134/62   Pulse: 80     Vitals:    03/07/25 0845   Weight: 95.7 " "kg (211 lb)     Height: 5' 10\" (177.8 cm)   Body mass index is 30.28 kg/m².    Physical Exam:  Physical Exam  Constitutional:       Appearance: Normal appearance. He is obese.   HENT:      Head: Normocephalic and atraumatic.      Mouth/Throat:      Mouth: Mucous membranes are moist.   Cardiovascular:      Rate and Rhythm: Normal rate and regular rhythm.   Pulmonary:      Effort: Tachypnea present.      Breath sounds: Normal breath sounds.   Abdominal:      General: Bowel sounds are normal.      Palpations: Abdomen is soft.   Musculoskeletal:      Cervical back: Normal range of motion.      Right lower leg: Edema present.      Left lower leg: Edema present.   Skin:     General: Skin is warm.      Capillary Refill: Capillary refill takes less than 2 seconds.   Neurological:      General: No focal deficit present.      Mental Status: He is alert and oriented to person, place, and time. Mental status is at baseline.         "

## 2025-03-09 NOTE — PROGRESS NOTES
"   PATIENT:  Hammad Montesinos  1940    ASSESSMENT:  1. 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, limited to breakdown of skin (HCC)  Debridement Diabetic Ulcer Right;Plantar;Distal Plantar      2. Onychomycosis        3. Corns and callus        4. Type 2 diabetes mellitus with diabetic neuropathy, with long-term current use of insulin (HCC)                  Orders Placed This Encounter   Procedures   • Debridement Diabetic Ulcer Right;Plantar;Distal Plantar          PLAN:  Disease prevention and related risk factors of diabetes were identified and discussed.    The patient was educated in proper foot wear for diabetics.    Educated in daily foot assessment and routine diabetic foot care.    Discussed the importance of controlling BS through diet and exercise.    The patient will follow up in 10 weeks for further diabetic foot exam and care.   Dry sterile dressing with alginate applied to right forefoot.  Patient to change dressing every other day alginate/mupirocin ointment.  Patient advised to limit walking to a minimum.  Follow-up in 2-3 weeks for ulcer check.    Procedures: 71219, 42219  All mycotic toenails were reduced and debrided in length, width, and girth using a nail nipper and electric dremel.    All hyperkeratotic skin lesion(s) if present were sharply pared with a #10 scalpel with no evidence of ulceration/abscess.    Patient tolerated procedure(s) well without complications.    Debridement   Wound 02/04/25 Diabetic Ulcer Plantar Right;Plantar;Distal    Universal Protocol:  procedure performed by consultantConsent: Verbal consent obtained.  Risks and benefits: risks, benefits and alternatives were discussed  Consent given by: patient  Time out: Immediately prior to procedure a \"time out\" was called to verify the correct patient, procedure, equipment, support staff and site/side marked as required.  Patient understanding: patient states understanding of " "the procedure being performed  Patient identity confirmed: verbally with patient    Debridement Details  Performed by: physician  Debridement type: selective  Pain control: none      Post-debridement measurements  Length (cm): 1  Width (cm): 1  Depth (cm): 0.1  Percent debrided: 10%  Surface Area (cm^2): 1  Area Debrided (cm^2): 0.1  Volume (cm^3): 0.1    Devitalized tissue debrided: callus and slough  Instrument(s) utilized: blade and curette  Bleeding: small  Hemostasis obtained with: pressure  Procedural pain (0-10): 2  Post-procedural pain: 2   Response to treatment: procedure was tolerated well         HPI:  Hammad Montesinos is a 84 y.o.year old male seen for diabetic foot exam.  The patient has class findings with diabetes.   BS is under control.  The patient complained of thick toenails and painful lesions which have previously ulcerated.  Patient is concerned with possible recurrent ulceration beneath right third toe joint..  The patient denied any acute pedal disorder, redness, acute swelling, or recent injury.      The following portions of the patient's history were reviewed and updated as appropriate: allergies, current medications, past family history, past medical history, past social history, past surgical history and problem list.    REVIEW OF SYSTEMS:  GENERAL: No fever or chills  HEART: No chest pain, or palpitation  RESPIRATORY:  No acute SOB or cough  GI: No Nausea, vomit or diarrhea  NEUROLOGIC: No syncope or acute weakness    PHYSICAL EXAM:    Ht 5' 10\" (1.778 m) Comment: stated  Wt 98.4 kg (217 lb)   BMI 31.14 kg/m²     VASCULAR EXAM  Posterior tibial artery absent bilateral  Dorsalis pedis artery +1 bilateral  The patient has class findings with skin atrophy, lack of digital hair, and nail dystrophy.    There is +1 lower extremity edema bilaterally.    Venous stasis skin changes noted BLE. No    NEUROLOGIC EXAM  Sensation is intact to light touch. Yes   Sensation is intact to 10gm " monofilament.  Vibratory sensation moderately diminished.     Tingling numb paresthesias noted bilateral.      No focal neurologic deficit.     DERMATOLOGIC EXAM:   Texture, Tone and Turgor are diminished bilateral.  The patient has dystrophic/hypertrophic toenails with yellow/white discoloration, onycholysis, and subungal debris.   Fungal odor noted.  Brittle nature noted.  Right foot nails severely dystrophic x3 with 0.4 cm ave thickness (1 2 and 4)  Left foot nails severely dystrophic x3 with 0.4 cm ave thickness (1 2 and 3)  Patient has hyperkeratotic lesions noted:  Right foot at subsecond MPJ has now ulcerated as noted below  Left foot at subsecond MPJ, similar to contralateral side in presentation.    Ulceration/wounds:  Right foot: Partial depth hyperkeratotic/necrotic breakdown subthird MPJ right foot.  Slight maceration, scant spotty serosanguineous drainage, no signs of infection.    No notable suspicious skin lesions.      MUSCULOSKELETAL EXAM:   No acute joint pain, edema, or redness.  No acute musculoskeletal problem.    Patient has deformity including hallux valgus with contracted hammertoes, and plantarflexed second metatarsal bilateral. Patient has minimal ambulation limitations.      Patient wears DM shoes? Yes    Risk Category/Class Findings: Q8 (B1, B2 ABC)  0 = No loss of protective sensation    A1)  Has the patient had a previous amputation of the foot or integral skeletal portion thereof? No  B1)  Does the patient have absent posterior tibial pulse? Yes  B3)  Does the patient have absent dorsalis pedis? No  B2)  Does the patient have three of the following? Yes           1.  Hair growth (increased or decreased), 2.  Nail changes (thickening) and 3.  Pigmentary changes (discoloring)  C)  Does the patient have two of the following and one above? Yes            3.  Edema and 4.  Paraesthesias (abnormal spontaneous sensations in the feet)

## 2025-03-11 ENCOUNTER — HOME CARE VISIT (OUTPATIENT)
Dept: HOME HEALTH SERVICES | Facility: HOME HEALTHCARE | Age: 85
End: 2025-03-11
Payer: COMMERCIAL

## 2025-03-11 VITALS
DIASTOLIC BLOOD PRESSURE: 60 MMHG | OXYGEN SATURATION: 98 % | HEART RATE: 77 BPM | SYSTOLIC BLOOD PRESSURE: 120 MMHG | TEMPERATURE: 98 F

## 2025-03-11 PROCEDURE — G0299 HHS/HOSPICE OF RN EA 15 MIN: HCPCS

## 2025-03-13 ENCOUNTER — HOSPITAL ENCOUNTER (OUTPATIENT)
Dept: NON INVASIVE DIAGNOSTICS | Facility: HOSPITAL | Age: 85
Discharge: HOME | End: 2025-03-13
Payer: COMMERCIAL

## 2025-03-13 ENCOUNTER — HOSPITAL ENCOUNTER (OUTPATIENT)
Dept: NUCLEAR MEDICINE | Facility: HOSPITAL | Age: 85
End: 2025-03-13
Attending: INTERNAL MEDICINE
Payer: COMMERCIAL

## 2025-03-13 ENCOUNTER — RESULTS FOLLOW-UP (OUTPATIENT)
Dept: NON INVASIVE DIAGNOSTICS | Facility: HOSPITAL | Age: 85
End: 2025-03-13

## 2025-03-13 ENCOUNTER — HOSPITAL ENCOUNTER (OUTPATIENT)
Dept: NON INVASIVE DIAGNOSTICS | Facility: HOSPITAL | Age: 85
Discharge: HOME | End: 2025-03-13
Attending: INTERNAL MEDICINE
Payer: COMMERCIAL

## 2025-03-13 DIAGNOSIS — I50.20 HFREF (HEART FAILURE WITH REDUCED EJECTION FRACTION) (HCC): ICD-10-CM

## 2025-03-13 DIAGNOSIS — I50.41 ACUTE COMBINED SYSTOLIC (CONGESTIVE) AND DIASTOLIC (CONGESTIVE) HEART FAILURE (HCC): ICD-10-CM

## 2025-03-13 DIAGNOSIS — I50.9 CHF (CONGESTIVE HEART FAILURE) (HCC): ICD-10-CM

## 2025-03-13 LAB
AORTIC ROOT: 3.7 CM
DOP CALC LVOT AREA: 4.52
DOP CALC LVOT DIAMETER: 2.4 CM
FRACTIONAL SHORTENING: 6 (ref 28–44)
INTERVENTRICULAR SEPTUM IN DIASTOLE (PARASTERNAL SHORT AXIS VIEW): 0.9 CM
INTERVENTRICULAR SEPTUM: 0.9 CM (ref 0.6–1.1)
LA/AORTA RATIO 2D: 1.38
LEFT ATRIUM SIZE: 5.1 CM
LEFT INTERNAL DIMENSION IN SYSTOLE: 6 CM (ref 2.1–4)
LEFT VENTRICLE DIASTOLIC VOLUME (MOD BIPLANE): 209 ML
LEFT VENTRICLE SYSTOLIC VOLUME (MOD BIPLANE): 113 ML
LEFT VENTRICULAR INTERNAL DIMENSION IN DIASTOLE: 6.4 CM (ref 3.5–6)
LEFT VENTRICULAR POSTERIOR WALL IN END DIASTOLE: 1.3 CM
LEFT VENTRICULAR STROKE VOLUME: 30 ML
LVSV (TEICH): 30 ML
SL CV LV EF: 40
SL CV PED ECHO LEFT VENTRICLE DIASTOLIC VOLUME (MOD BIPLANE) 2D: 208 ML
SL CV PED ECHO LEFT VENTRICLE SYSTOLIC VOLUME (MOD BIPLANE) 2D: 178 ML
SL CV REST NUCLEAR ISOTOPE DOSE: 11 MCI
SL CV STRESS NUCLEAR ISOTOPE DOSE: 33 MCI
SPECT HRT GATED+EF W RNC IV: 33 %
STRESS ANGINA INDEX: 0
STRESS BASELINE HR: 77 BPM
STRESS POST PEAK BP: 128 MMHG
STRESS/REST PERFUSION RATIO: 1.02
TRICUSPID ANNULAR PLANE SYSTOLIC EXCURSION: 2.3 CM

## 2025-03-13 PROCEDURE — 93017 CV STRESS TEST TRACING ONLY: CPT

## 2025-03-13 PROCEDURE — A9502 TC99M TETROFOSMIN: HCPCS

## 2025-03-13 PROCEDURE — 93018 CV STRESS TEST I&R ONLY: CPT | Performed by: INTERNAL MEDICINE

## 2025-03-13 PROCEDURE — 93016 CV STRESS TEST SUPVJ ONLY: CPT | Performed by: INTERNAL MEDICINE

## 2025-03-13 PROCEDURE — 93308 TTE F-UP OR LMTD: CPT

## 2025-03-13 PROCEDURE — 93308 TTE F-UP OR LMTD: CPT | Performed by: INTERNAL MEDICINE

## 2025-03-13 PROCEDURE — 78452 HT MUSCLE IMAGE SPECT MULT: CPT

## 2025-03-13 PROCEDURE — 78452 HT MUSCLE IMAGE SPECT MULT: CPT | Performed by: INTERNAL MEDICINE

## 2025-03-13 RX ORDER — REGADENOSON 0.08 MG/ML
0.4 INJECTION, SOLUTION INTRAVENOUS ONCE
Status: COMPLETED | OUTPATIENT
Start: 2025-03-13 | End: 2025-03-13

## 2025-03-13 RX ADMIN — REGADENOSON 0.4 MG: 0.08 INJECTION, SOLUTION INTRAVENOUS at 12:47

## 2025-03-14 LAB
CHEST PAIN STATEMENT: NORMAL
MAX DIASTOLIC BP: 68 MMHG
MAX PREDICTED HEART RATE: 135 BPM
PROTOCOL NAME: NORMAL
REASON FOR TERMINATION: NORMAL
STRESS POST EXERCISE DUR MIN: 3 MIN
STRESS POST EXERCISE DUR SEC: 0 SEC
STRESS POST PEAK HR: 89 BPM
STRESS POST PEAK SYSTOLIC BP: 122 MMHG
TARGET HR FORMULA: NORMAL
TEST INDICATION: NORMAL

## 2025-03-16 PROBLEM — J10.1 INFLUENZA A: Status: RESOLVED | Noted: 2025-02-14 | Resolved: 2025-03-16

## 2025-03-16 PROBLEM — A41.9 SEPSIS (HCC): Status: RESOLVED | Noted: 2025-02-14 | Resolved: 2025-03-16

## 2025-03-28 ENCOUNTER — OFFICE VISIT (OUTPATIENT)
Dept: CARDIOLOGY CLINIC | Facility: CLINIC | Age: 85
End: 2025-03-28
Payer: COMMERCIAL

## 2025-03-28 VITALS
DIASTOLIC BLOOD PRESSURE: 63 MMHG | BODY MASS INDEX: 30.72 KG/M2 | WEIGHT: 214.6 LBS | HEART RATE: 70 BPM | HEIGHT: 70 IN | SYSTOLIC BLOOD PRESSURE: 120 MMHG

## 2025-03-28 DIAGNOSIS — I10 PRIMARY HYPERTENSION: ICD-10-CM

## 2025-03-28 DIAGNOSIS — I50.33 ACUTE ON CHRONIC DIASTOLIC HEART FAILURE (HCC): Primary | ICD-10-CM

## 2025-03-28 DIAGNOSIS — I27.20 PULMONARY HYPERTENSION (HCC): ICD-10-CM

## 2025-03-28 DIAGNOSIS — I48.0 PAROXYSMAL A-FIB (HCC): ICD-10-CM

## 2025-03-28 PROCEDURE — 99214 OFFICE O/P EST MOD 30 MIN: CPT | Performed by: PHYSICIAN ASSISTANT

## 2025-03-28 NOTE — PROGRESS NOTES
Cardiology Office Follow Up  Hammad Montesinos  1940  [id number]      ASSESSMENT:  Chronic HFrEF  Cardiomyopathy, EF 40%, possibly mixed etiology  Persistent atrial fibrillation  SSS s/p Biotronik PPM  Hyperlipidemia  Type 2 DM    Diagnostics:   Echo 1/28/25:    Left Ventricle: Left ventricular cavity size is mildly dilated. Wall thickness is moderately increased. The left ventricular ejection fraction is 35%. Systolic function is moderately reduced. There is moderate global hypokinesis with regional variation. Abnormal diastolic inflow patterns due to atrial fibrillation.    IVS: There is abnormal septal motion consistent with right ventricular pacing.    Right Ventricle: Right ventricle is not well visualized. Right ventricular cavity size is normal. Systolic function is reduced.    Left Atrium: The atrium is severely dilated.    Right Atrium: The atrium is mildly dilated.    Mitral Valve: The anterior and posterior leaflets are tethered. There is mild diffuse thickening. There is mild calcification. There is likely moderate to severe regurgitation with an eccentrically directed jet.    Tricuspid Valve: There is mild regurgitation.    Limited echo 3/13/2025:    Left Ventricle: Left ventricular cavity size is moderately dilated. Wall thickness is normal. The left ventricular ejection fraction is 40%. Systolic function is moderately reduced. There is moderate global hypokinesis.    Right Ventricle: Right ventricular cavity size is normal. Systolic function is normal.    Left Atrium: The atrium is moderately dilated.    Right Atrium: The atrium is normal in size.    Mitral Valve: There is mild regurgitation.    Tricuspid Valve: There is mild regurgitation.    Pharm MPI 3/13/2025:    Stress ECG: A pharmacological stress test was performed using regadenoson. The patient experienced no angina during the test. The patient reached the end of the protocol. The patient reported no symptoms during the stress test.     "Stress ECG: No ST deviation is noted. The ECG was not diagnostic due to pharmacological (vasodilator) stress. The ECG was uninterpretable due to paced rhythm.    Perfusion Defect Conclusion: The rest end diastolic cavity size is severely enlarged.    Stress Function: Left ventricular function post-stress is abnormal. Global function is moderately reduced. Stress ejection fraction is 33%.    Perfusion: There is a left ventricular perfusion defect that is large in size with severe reduction in uptake present in the entire inferior location(s) that is fixed. Assessment is limited due to prominent diaphragmatic attenuation artifact.    Stress Combined Conclusion: Left ventricular perfusion is abnormal. Findings are consistent with infarction. There is no evidence of prasanna-infarction ischemia.     Abnormal study. 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     PLAN:  Hammad is symptomatically doing well. His echo shows persistently low EF but slightly improved after ~2 months. 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 I do not believe he would benefit from LHC at this point in the absence of symptoms and reversible defects on perfusion imaging. He may need more time to recover from his recent hospitalization for flu A and CHF.     He appears euvolemic at present time on his current dose. Reports \"best I've felt in a long time\". Working with PT 3x weekly and able to walk 150ft via walker and 10-15mins of stationary bike without symptoms.     We can repeat echo after another 90 days of GDMT to assess for further EF recovery.     Continue with current CV Rx regimen:  Aldactone 25 mg daily  Xarelto 20 mg daily  Toprol XL 25 mg daily  Losartan 100 mg daily  Bumex 2 mg twice daily  K-Dur 20 meq daily  Lipitor 20 mg daily    Follow up planned with Dr Holland next month. Call sooner with any issues or concerns.     Interval History/ HPI:   Hammad Montesinos is a 84-year-old male with past medical history noted " above who presents for testing results review for echo and Pharm MPI.  Was admitted recently with acute hypoxic respiratory failure secondary to flu a infection and CHF.  Inpatient echocardiogram showed EF down to 35% which was new. He was placed on PO Bumex on discharge.  Ischemic evaluation to be done once recovered from flu standpoint.  He saw my colleague DOUGLAS Fisher on 3/7/2025 and was arranged for repeat limited echocardiogram and pharmacologic MPI.  Presents today for results review.  Hammad feels very much improved since his hospitalization.  Denies LE edema, orthopnea, PND.  Denies dyspnea including with exertion at PT 3 times a week.  He denies any chest pain, heaviness or pressure.  Weights have been stable at home as well.     Vitals:  120/63  70  214lbs    Past Medical History:   Diagnosis Date    Asthma     Bradycardia     Carcinoma, basal cell, skin     head and face and ear    CHF (congestive heart failure) (HCC)     Chronic kidney disease     Coronary artery disease     COVID-19 12/05/2022    Diabetes mellitus (HCC)     Dupuytren contracture     bilateral ring and right middle    Gout     Herniated lumbar intervertebral disc     Hypertension     Sleep apnea      Social History     Socioeconomic History    Marital status: /Civil Union     Spouse name: Not on file    Number of children: Not on file    Years of education: Not on file    Highest education level: Not on file   Occupational History    Occupation: teacher     Comment: retired   Tobacco Use    Smoking status: Never    Smokeless tobacco: Never   Vaping Use    Vaping status: Never Used   Substance and Sexual Activity    Alcohol use: Not Currently    Drug use: Never    Sexual activity: Not Currently     Birth control/protection: Male Sterilization   Other Topics Concern    Not on file   Social History Narrative    Not on file     Social Drivers of Health     Financial Resource Strain: Not on file   Food Insecurity: No Food  Insecurity (2025)    Nursing - Inadequate Food Risk Classification     Worried About Running Out of Food in the Last Year: Not on file     Ran Out of Food in the Last Year: Not on file     Ran Out of Food in the Last Year: Never true   Transportation Needs: No Transportation Needs (3/11/2025)    OASIS : Transportation     Lack of Transportation (Medical): No     Lack of Transportation (Non-Medical): No     Patient Unable or Declines to Respond: No   Recent Concern: Transportation Needs - Unmet Transportation Needs (2025)    Nursing - Transportation Risk Classification     Lack of Transportation: Not on file     Lack of Transportation: Yes   Physical Activity: Not on file   Stress: Not on file   Social Connections: Not on file   Intimate Partner Violence: Unknown (2025)    Nursing IPS     Feels Physically and Emotionally Safe: Not on file     Physically Hurt by Someone: Not on file     Humiliated or Emotionally Abused by Someone: Not on file     Physically Hurt by Someone: No     Hurt or Threatened by Someone: No   Housing Stability: Unknown (2025)    Nursing: Inadequate Housing Risk Classification     Has Housing: Not on file     Worried About Losing Housing: Not on file     Unable to Get Utilities: Not on file     Unable to Pay for Housing in the Last Year: No     Has Housin      Family History   Problem Relation Age of Onset    Hypertension Mother     Diabetes type II Mother     Hypertension Father     Diabetes type II Brother     Hypertension Brother     Kidney disease Sister     Hypertension Daughter     GI problems Daughter      Past Surgical History:   Procedure Laterality Date    APPENDECTOMY      age 20, ruptured    CATARACT EXTRACTION, BILATERAL Bilateral     CERVICAL FUSION      CYST REMOVAL Left 2023    DUPUYTREN CONTRACTURE RELEASE Left     left ring finger    INSERT / REPLACE / REMOVE PACEMAKER      LUMBAR LAMINECTOMY      belkis placed    TONSILLECTOMY      VASECTOMY          Current Outpatient Medications:     Albuterol Sulfate 108 (90 Base) MCG/ACT AEPB, Inhale 2 puffs every 4 (four) hours as needed (SOB, wheezing, cough), Disp: 1 each, Rfl: 3    allopurinol (ZYLOPRIM) 300 mg tablet, Take 300 mg by mouth every 24 hours, Disp: , Rfl:     atorvastatin (LIPITOR) 20 mg tablet, Take 20 mg by mouth daily, Disp: , Rfl:     azelastine (ASTELIN) 0.1 % nasal spray, 2 sprays into each nostril 2 (two) times a day, Disp: , Rfl:     budesonide (PULMICORT) 0.5 mg/2 mL nebulizer solution, Take 0.5 mg by nebulization 2 (two) times a day Rinse mouth after use., Disp: , Rfl:     bumetanide (BUMEX) 2 mg tablet, Take 1 tablet (2 mg total) by mouth 2 (two) times a day Take when your wake up and then again early afternoon, Disp: 180 tablet, Rfl: 3    Cholecalciferol (Vitamin D3) 25 MCG (1000 UT) CAPS, Take 1 capsule by mouth every 24 hours, Disp: , Rfl:     Coenzyme Q10 (Co Q10) 100 MG CAPS, Take 100 mg by mouth in the morning, Disp: , Rfl:     Easy Comfort Pen Needles 33G X 4 MM MISC, USE WITH INSULIN FIVE TIMES DAILY AS DIRECTED, Disp: , Rfl:     fluticasone (FLONASE) 50 mcg/act nasal spray, 1 spray into each nostril 2 (two) times a day, Disp: , Rfl:     formoterol (PERFOROMIST) 20 MCG/2ML nebulizer solution, Take 20 mcg by nebulization 2 (two) times a day, Disp: , Rfl:     gabapentin (NEURONTIN) 100 mg capsule, Take 1 capsule (100 mg total) by mouth daily, Disp: 180 capsule, Rfl: 0    Glucosamine-Chondroitin 500-400 MG CAPS, Take 1 capsule by mouth daily, Disp: , Rfl:     insulin aspart (NovoLOG FlexPen) 100 UNIT/ML injection pen, INJECT 25 UNITS SUBCUTANEOUSLY with breakfast, LUNCH and with dinner plus sliding scale. (UP  UNITS DAILY). 150-200: 1 unit; 201-250: 2 units; 251-300: 3 units; 301-350: 4 units; 351-400: 5 units; 400+: 6 units, Disp: 45 mL, Rfl: 1    Insulin Glargine Solostar (Lantus SoloStar) 100 UNIT/ML SOPN, INJECT 10 UNITS SUBCUTANEOUSLY IN THE MORNING AND 10 UNITS IN THE  EVENING., Disp: 15 mL, Rfl: 1    ipratropium (ATROVENT) 0.03 % nasal spray, INSTILL 2 SPRAYS IN EACH NOSTRIL TWICE DAILY, Disp: , Rfl:     ipratropium-albuterol (DUO-NEB) 0.5-2.5 mg/3 mL nebulizer solution, Take 3 mL by nebulization 2 (two) times a day, Disp: , Rfl:     loratadine (CLARITIN) 10 mg tablet, Take 10 mg by mouth daily, Disp: , Rfl:     losartan (COZAAR) 100 MG tablet, Take 1 tablet by mouth in the morning, Disp: , Rfl:     magnesium Oxide (MAG-OX) 400 mg TABS, Take 1 tablet (400 mg total) by mouth daily, Disp: 30 tablet, Rfl: 0    metoprolol succinate (TOPROL-XL) 25 mg 24 hr tablet, Take 1 tablet (25 mg total) by mouth daily, Disp: 30 tablet, Rfl: 11    Multiple Vitamin (MULTIVITAMIN ADULT PO), Take 1 tablet by mouth daily, Disp: , Rfl:     Omega-3 Fatty Acids (Fish Oil) 360 MG CAPS, Take 1 capsule by mouth daily, Disp: , Rfl:     OneTouch Verio test strip, USE AS DIRECTED FOUR TIMES DAILY, Disp: , Rfl:     potassium chloride (K-DUR,KLOR-CON) 20 mEq tablet, Take 1 tablet (20 mEq total) by mouth daily, Disp: 30 tablet, Rfl: 0    rivaroxaban (Xarelto) 20 mg tablet, Take 1 tablet (20 mg total) by mouth daily with breakfast, Disp: 30 tablet, Rfl: 5    senna (SENOKOT) 8.6 mg, Take 2 tablets (17.2 mg total) by mouth daily at bedtime as needed for constipation, Disp: 30 tablet, Rfl: 0    spironolactone (ALDACTONE) 25 mg tablet, Take 1 tablet (25 mg total) by mouth daily, Disp: 30 tablet, Rfl: 0    triamcinolone (KENALOG) 0.1 % cream, Apply 1 Application topically 2 (two) times a day To affected areas bilateral feet , Disp: , Rfl:     valACYclovir (VALTREX) 1,000 mg tablet, Take 1,000 mg by mouth 3 (three) times a day, Disp: , Rfl:     mupirocin (BACTROBAN) 2 % ointment, Apply topically every other day With alginate dressing (Patient not taking: Reported on 2/22/2025), Disp: 22 g, Rfl: 0    Review of Systems:  Review of Systems   Constitutional:  Negative for appetite change, chills, diaphoresis, fatigue and  fever.   Respiratory:  Negative for cough, chest tightness and shortness of breath.    Cardiovascular:  Negative for chest pain, palpitations and leg swelling.   Gastrointestinal:  Negative for diarrhea, nausea and vomiting.   Endocrine: Negative for cold intolerance and heat intolerance.   Genitourinary:  Negative for difficulty urinating, dysuria and enuresis.   Musculoskeletal:  Negative for arthralgias, back pain and gait problem.   Allergic/Immunologic: Negative for environmental allergies and food allergies.   Neurological:  Negative for dizziness, facial asymmetry and headaches.   Hematological:  Negative for adenopathy. Does not bruise/bleed easily.   Psychiatric/Behavioral:  Negative for agitation, behavioral problems and confusion.      Physical Exam:  Physical Exam  Constitutional:       Appearance: He is well-developed.   HENT:      Right Ear: External ear normal.      Left Ear: External ear normal.   Eyes:      Pupils: Pupils are equal, round, and reactive to light.   Cardiovascular:      Rate and Rhythm: Normal rate. Rhythm irregular.      Heart sounds: Normal heart sounds. No murmur heard.     No friction rub. No gallop.   Pulmonary:      Effort: Pulmonary effort is normal.      Breath sounds: Normal breath sounds.   Abdominal:      Palpations: Abdomen is soft.   Musculoskeletal:         General: Normal range of motion.      Cervical back: Normal range of motion.   Skin:     General: Skin is warm and dry.   Neurological:      Mental Status: He is alert and oriented to person, place, and time.      Deep Tendon Reflexes: Reflexes are normal and symmetric.   Psychiatric:         Behavior: Behavior normal.         Thought Content: Thought content normal.         Judgment: Judgment normal.       This note was completed in part utilizing [employer] Direct Software.  Grammatical errors, random word insertions, spelling mistakes, and incomplete sentences can be an occasional consequence of this system  secondary to software limitations, ambient noise, and hardware issues.  If you have any questions or concerns about the content, text, or information contained within the body of this dictation, please contact the provider for clarification.

## 2025-03-28 NOTE — PROGRESS NOTES
"Cardiology Office Follow Up  Hammad Montesinos  1940  [id number]      ASSESSMENT:  ***    PLAN:  ***    Interval History/ HPI:   ***     PT 3x weekly. Uses a walker but fairly functional can walk about 150ft and work foot pedals for about 10minutes without symptoms.  Feels \"the best I have in a long time\"   Echo and stress test reviewed.     Vitals:  ***    Past Medical History:   Diagnosis Date    Asthma     Bradycardia     Carcinoma, basal cell, skin     head and face and ear    CHF (congestive heart failure) (HCC)     Chronic kidney disease     Coronary artery disease     COVID-19 12/05/2022    Diabetes mellitus (HCC)     Dupuytren contracture     bilateral ring and right middle    Gout     Herniated lumbar intervertebral disc     Hypertension     Sleep apnea      Social History     Socioeconomic History    Marital status: /Civil Union     Spouse name: Not on file    Number of children: Not on file    Years of education: Not on file    Highest education level: Not on file   Occupational History    Occupation: teacher     Comment: retired   Tobacco Use    Smoking status: Never    Smokeless tobacco: Never   Vaping Use    Vaping status: Never Used   Substance and Sexual Activity    Alcohol use: Not Currently    Drug use: Never    Sexual activity: Not Currently     Birth control/protection: Male Sterilization   Other Topics Concern    Not on file   Social History Narrative    Not on file     Social Drivers of Health     Financial Resource Strain: Not on file   Food Insecurity: No Food Insecurity (2/14/2025)    Nursing - Inadequate Food Risk Classification     Worried About Running Out of Food in the Last Year: Not on file     Ran Out of Food in the Last Year: Not on file     Ran Out of Food in the Last Year: Never true   Transportation Needs: No Transportation Needs (3/11/2025)    OASIS : Transportation     Lack of Transportation (Medical): No     Lack of Transportation (Non-Medical): No     Patient " Unable or Declines to Respond: No   Recent Concern: Transportation Needs - Unmet Transportation Needs (2025)    Nursing - Transportation Risk Classification     Lack of Transportation: Not on file     Lack of Transportation: Yes   Physical Activity: Not on file   Stress: Not on file   Social Connections: Not on file   Intimate Partner Violence: Unknown (2025)    Nursing IPS     Feels Physically and Emotionally Safe: Not on file     Physically Hurt by Someone: Not on file     Humiliated or Emotionally Abused by Someone: Not on file     Physically Hurt by Someone: No     Hurt or Threatened by Someone: No   Housing Stability: Unknown (2025)    Nursing: Inadequate Housing Risk Classification     Has Housing: Not on file     Worried About Losing Housing: Not on file     Unable to Get Utilities: Not on file     Unable to Pay for Housing in the Last Year: No     Has Housin      Family History   Problem Relation Age of Onset    Hypertension Mother     Diabetes type II Mother     Hypertension Father     Diabetes type II Brother     Hypertension Brother     Kidney disease Sister     Hypertension Daughter     GI problems Daughter      Past Surgical History:   Procedure Laterality Date    APPENDECTOMY      age 20, ruptured    CATARACT EXTRACTION, BILATERAL Bilateral     CERVICAL FUSION      CYST REMOVAL Left 2023    DUPUYTREN CONTRACTURE RELEASE Left     left ring finger    INSERT / REPLACE / REMOVE PACEMAKER      LUMBAR LAMINECTOMY      belkis placed    TONSILLECTOMY      VASECTOMY         Current Outpatient Medications:     Albuterol Sulfate 108 (90 Base) MCG/ACT AEPB, Inhale 2 puffs every 4 (four) hours as needed (SOB, wheezing, cough), Disp: 1 each, Rfl: 3    allopurinol (ZYLOPRIM) 300 mg tablet, Take 300 mg by mouth every 24 hours, Disp: , Rfl:     atorvastatin (LIPITOR) 20 mg tablet, Take 20 mg by mouth daily, Disp: , Rfl:     azelastine (ASTELIN) 0.1 % nasal spray, 2 sprays into each nostril 2 (two)  times a day, Disp: , Rfl:     budesonide (PULMICORT) 0.5 mg/2 mL nebulizer solution, Take 0.5 mg by nebulization 2 (two) times a day Rinse mouth after use., Disp: , Rfl:     bumetanide (BUMEX) 2 mg tablet, Take 1 tablet (2 mg total) by mouth 2 (two) times a day Take when your wake up and then again early afternoon, Disp: 180 tablet, Rfl: 3    Cholecalciferol (Vitamin D3) 25 MCG (1000 UT) CAPS, Take 1 capsule by mouth every 24 hours, Disp: , Rfl:     Coenzyme Q10 (Co Q10) 100 MG CAPS, Take 100 mg by mouth in the morning, Disp: , Rfl:     Easy Comfort Pen Needles 33G X 4 MM MISC, USE WITH INSULIN FIVE TIMES DAILY AS DIRECTED, Disp: , Rfl:     fluticasone (FLONASE) 50 mcg/act nasal spray, 1 spray into each nostril 2 (two) times a day, Disp: , Rfl:     formoterol (PERFOROMIST) 20 MCG/2ML nebulizer solution, Take 20 mcg by nebulization 2 (two) times a day, Disp: , Rfl:     gabapentin (NEURONTIN) 100 mg capsule, Take 1 capsule (100 mg total) by mouth daily, Disp: 180 capsule, Rfl: 0    Glucosamine-Chondroitin 500-400 MG CAPS, Take 1 capsule by mouth daily, Disp: , Rfl:     insulin aspart (NovoLOG FlexPen) 100 UNIT/ML injection pen, INJECT 25 UNITS SUBCUTANEOUSLY with breakfast, LUNCH and with dinner plus sliding scale. (UP  UNITS DAILY). 150-200: 1 unit; 201-250: 2 units; 251-300: 3 units; 301-350: 4 units; 351-400: 5 units; 400+: 6 units, Disp: 45 mL, Rfl: 1    Insulin Glargine Solostar (Lantus SoloStar) 100 UNIT/ML SOPN, INJECT 10 UNITS SUBCUTANEOUSLY IN THE MORNING AND 10 UNITS IN THE EVENING., Disp: 15 mL, Rfl: 1    ipratropium (ATROVENT) 0.03 % nasal spray, INSTILL 2 SPRAYS IN EACH NOSTRIL TWICE DAILY, Disp: , Rfl:     ipratropium-albuterol (DUO-NEB) 0.5-2.5 mg/3 mL nebulizer solution, Take 3 mL by nebulization 2 (two) times a day, Disp: , Rfl:     loratadine (CLARITIN) 10 mg tablet, Take 10 mg by mouth daily, Disp: , Rfl:     losartan (COZAAR) 100 MG tablet, Take 1 tablet by mouth in the morning, Disp: , Rfl:      magnesium Oxide (MAG-OX) 400 mg TABS, Take 1 tablet (400 mg total) by mouth daily, Disp: 30 tablet, Rfl: 0    metoprolol succinate (TOPROL-XL) 25 mg 24 hr tablet, Take 1 tablet (25 mg total) by mouth daily, Disp: 30 tablet, Rfl: 11    Multiple Vitamin (MULTIVITAMIN ADULT PO), Take 1 tablet by mouth daily, Disp: , Rfl:     Omega-3 Fatty Acids (Fish Oil) 360 MG CAPS, Take 1 capsule by mouth daily, Disp: , Rfl:     OneTouch Verio test strip, USE AS DIRECTED FOUR TIMES DAILY, Disp: , Rfl:     potassium chloride (K-DUR,KLOR-CON) 20 mEq tablet, Take 1 tablet (20 mEq total) by mouth daily, Disp: 30 tablet, Rfl: 0    rivaroxaban (Xarelto) 20 mg tablet, Take 1 tablet (20 mg total) by mouth daily with breakfast, Disp: 30 tablet, Rfl: 5    senna (SENOKOT) 8.6 mg, Take 2 tablets (17.2 mg total) by mouth daily at bedtime as needed for constipation, Disp: 30 tablet, Rfl: 0    spironolactone (ALDACTONE) 25 mg tablet, Take 1 tablet (25 mg total) by mouth daily, Disp: 30 tablet, Rfl: 0    triamcinolone (KENALOG) 0.1 % cream, Apply 1 Application topically 2 (two) times a day To affected areas bilateral feet , Disp: , Rfl:     valACYclovir (VALTREX) 1,000 mg tablet, Take 1,000 mg by mouth 3 (three) times a day, Disp: , Rfl:     mupirocin (BACTROBAN) 2 % ointment, Apply topically every other day With alginate dressing (Patient not taking: Reported on 2/22/2025), Disp: 22 g, Rfl: 0      Review of Systems:  Review of Systems      Physical Exam:  Physical Exam    This note was completed in part utilizing M-Modal Fluency Direct Software.  Grammatical errors, random word insertions, spelling mistakes, and incomplete sentences can be an occasional consequence of this system secondary to software limitations, ambient noise, and hardware issues.  If you have any questions or concerns about the content, text, or information contained within the body of this dictation, please contact the provider for clarification.

## 2025-04-08 ENCOUNTER — TELEPHONE (OUTPATIENT)
Age: 85
End: 2025-04-08

## 2025-04-08 NOTE — TELEPHONE ENCOUNTER
Caller: Hammad Montesinos    Doctor and/or Office: Dr. Mac/Darya    #: [phone]    Escalation: Appointment Patient thinks he needs a sooner appt than what he currently has. The bottoms of his feet are sore and he feels its going to break open soon. Please call and advise. Thank you

## 2025-04-09 ENCOUNTER — TELEPHONE (OUTPATIENT)
Age: 85
End: 2025-04-09

## 2025-04-09 NOTE — TELEPHONE ENCOUNTER
Pt calling needs a refill on dexcom G7 sensors- just placed his last sensor today.       [facility] using new pharmacy for diabetes supplies.  [facility] [place] has no further info.       Please advise on refill  Pt would like an update

## 2025-04-10 NOTE — TELEPHONE ENCOUNTER
Spoke to patient and made him aware that it has to go to a DME. [facility]

## 2025-04-13 NOTE — PROGRESS NOTES
Consultation - Pulmonary Medicine   Name: Hammad Montesinos      : 1940      MRN: [id number]  Encounter Provider: Rufino Ahn MD  Encounter Date: 2025   Encounter department: [facility] [place]    Requesting Provider:   Montrell High Do  [address]  [place] [address]     Reason for Consult: asthma, sleep apnea:  Assessment & Plan  Chronic nonallergic rhinitis  Chronic nonallergic vasomotor rhinitis  Status post Neuromark procedure with ENT in  without sustained benefit    Currently using multiple nasal sprays, azelastine with most benefit  Will try montelukast  DuoNebs before bedtime  Reevaluate next visit, may need further ENT follow-up       Chronic cough  Referred by allergist.  Skin test negative for environmental allergens.  Office spirometry and allergy office FEV1 75%.  Trial of PPI was ineffective.  CT chest without significant parenchymal pathology.  Flexible laryngoscopy by ENT was unremarkable.  Not on contributing medications.      On DuoNeb, budesonide nebulizer twice a day  Nasal sprays-Flonase, ipratropium, azelastine    Likely related to nonallergic vasomotor rhinitis with postnasal drip.       Severe persistent asthma without complication  Establishing care here  Seems to have decent control of lower respiratory symptoms  Primary trigger for cough is upper airway rhinitis    -Continue Pulmicort/Perforomist twice daily nebulized  -Continue DuoNebs as needed, consider using before nighttime to help break up upper airway mucus  -Start montelukast at nighttime to see if that helps with rhinitis  Orders:    Ambulatory Referral to Pulmonology    montelukast (SINGULAIR) 10 mg tablet; Take 1 tablet (10 mg total) by mouth daily at bedtime    MC (obstructive sleep apnea)  History of MC diagnosed more than 20 years ago AHI more than 30.  Stable on CPAP    Deriving symptomatic benefit and fully compliant with CPAP  No issues cleaning  materials or getting resupplies  Uses distilled water and water chamber  Current machine is 1.5 years old  Needs new headgear    Compliance data  92%, 88%>4 hours, 90 days  Average use 8 hours 49 minutes  APAP 5-20 cm H2O EPR 3  Pressure median 8.1, P95 13.5  Leaks 95% 46.1  AHI 4.5    Resupply prescription provided, he needs new headgear, DME [facility].  Using nasal pillows  Orders:    PAP DME Resupply/Reorder    Chronic systolic congestive heart failure (HCC)  Wt Readings from Last 3 Encounters:   04/14/25 101 kg (222 lb 12.8 oz)   03/28/25 97.3 kg (214 lb 9.6 oz)   03/07/25 95.7 kg (211 lb)     LVEF 35-40%  On Bumex and Aldactone  Appears to be euvolemic on exam       Hilar lymphadenopathy  Probable infectious related.  Right lower paratracheal and subcarinal.  Decreased in size in 2024 CT.         Return in about 3 months (around 7/14/2025).  History of Present Illness   Hammad Montesinos is a 84 y.o. male with a history of diabetes mellitus, paroxysmal atrial fibrillation, hyperlipidemia, chronic systolic heart failure EF 35%, hilar lymphadenopathy, obstructive sleep apnea, chronic cough who is referred by allergy for pulmonary evaluation    The patient is referred by his allergist for ongoing respiratory and nasal symptoms following the snf of his previous pulmonologist.    He has a history of asthma for over 30 years, with no frequent exacerbations outside of those triggered by heart failure exacerbations and respiratory viral infections. He is currently managed on nebulized Pulmicort/formoterol twice daily and DuoNeb as needed, which he uses intermittently. He also uses albuterol inhalers when out in public.    His primary concern is persistent rhinitis, which has been evaluated by ENT in the past. He underwent the ClariFix (Neurolysis/NeuroMark) procedure with mild, temporary benefit. His current regimen includes Flonase, azelastine, and Atrovent nasal sprays, with azelastine providing the  most relief. Despite treatment, he continues to experience persistent nasal congestion, postnasal drip, and cough throughout the day, including just before bedtime.    He also has a longstanding history of obstructive sleep apnea, diagnosed over 20 years ago, and reports excellent adherence to CPAP therapy using a nasal mask. He notes significant benefit from CPAP, including improved sleep quality and reduced nasal drainage and congestion.    Environmental history includes growing up on a farm with extensive hay and animal exposure, building grain storage structures in his teens, and a long career as a schoolteacher. 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 He currently lives in a alf community, which has undergone recent renovations, and notes salome HVAC systems. He has no current pets, though previously owned cats, dogs, and a bird many years ago. He denies exposure to mold or water damage in his home.    In February 2025, he was admitted to [facility] for sepsis secondary to influenza A and bacterial pneumonia. Since that time, he has had ongoing mucus production and respiratory secretions, though his asthma control has otherwise remained stable.    Review of Systems    Aside from what is mentioned in the HPI, ROS is otherwise negative    Medical History Reviewed by provider this encounter:     .    Historical Information   Past Medical History:   Diagnosis Date    Asthma     Bradycardia     Carcinoma, basal cell, skin     head and face and ear    CHF (congestive heart failure) (HCC)     Chronic kidney disease     Coronary artery disease     COVID-19 12/05/2022    Diabetes mellitus (HCC)     Dupuytren contracture     bilateral ring and right middle    Gout     Herniated lumbar intervertebral disc     Hypertension     Sleep apnea      Past Surgical History:   Procedure Laterality Date    APPENDECTOMY      age 20, ruptured    CATARACT EXTRACTION, BILATERAL Bilateral     CERVICAL FUSION      CYST  "REMOVAL Left 01/2023    DUPUYTREN CONTRACTURE RELEASE Left     left ring finger    INSERT / REPLACE / REMOVE PACEMAKER      LUMBAR LAMINECTOMY      belkis placed    TONSILLECTOMY      VASECTOMY       Social History   Social History     Tobacco Use   Smoking Status Never   Smokeless Tobacco Never       Family History:   Family History   Problem Relation Age of Onset    Hypertension Mother     Diabetes type II Mother     Hypertension Father     Diabetes type II Brother     Hypertension Brother     Kidney disease Sister     Hypertension Daughter     GI problems Daughter        Objective   /62 (BP Location: Left arm, Patient Position: Sitting, Cuff Size: Large)   Pulse (!) 51   Ht 5' 10\" (1.778 m)   Wt 101 kg (222 lb 12.8 oz)   SpO2 99%   BMI 31.97 kg/m²      Physical Exam  Vitals and nursing note reviewed.   Constitutional:       General: He is not in acute distress.     Appearance: Normal appearance. He is well-developed. He is not ill-appearing, toxic-appearing or diaphoretic.   HENT:      Head: Normocephalic and atraumatic.      Nose: Congestion present.      Comments: erythema     Mouth/Throat:      Mouth: Mucous membranes are moist.      Pharynx: Oropharynx is clear. No oropharyngeal exudate.   Eyes:      Conjunctiva/sclera: Conjunctivae normal.   Cardiovascular:      Rate and Rhythm: Normal rate and regular rhythm.   Pulmonary:      Effort: Pulmonary effort is normal. No respiratory distress.      Breath sounds: Normal breath sounds. No stridor.   Abdominal:      Tenderness: There is no guarding.   Musculoskeletal:         General: No swelling.      Cervical back: Normal range of motion and neck supple. No rigidity.      Right lower leg: No edema.      Left lower leg: No edema.   Skin:     General: Skin is warm and dry.   Neurological:      General: No focal deficit present.      Mental Status: He is alert and oriented to person, place, and time. Mental status is at baseline.   Psychiatric:         Mood " "and Affect: Mood normal.       Diagnostic Data:  Labs: I personally reviewed the most recent laboratory data pertinent to today's visit.    Radiology results:  Radiology Results Review: I have reviewed the following images/report studies in PACS:  6//24 CT chest-coarse reticulations in the right upper lobe and lower lobes.  Hilar lymphadenopathy decreasing in size compared to prior imaging.    PFT/spirometry results:  No results found for: \"FEV1\", \"FVC\", \"ERH1UIM\", \"TLC\", \"DLCO\"     Other studies:  3/13/25 ECHO - LVEF 40%.  Global hypokinesis.  RV normal      Rufino Ahn MD      "

## 2025-04-14 ENCOUNTER — CONSULT (OUTPATIENT)
Age: 85
End: 2025-04-14
Payer: COMMERCIAL

## 2025-04-14 VITALS
DIASTOLIC BLOOD PRESSURE: 62 MMHG | BODY MASS INDEX: 31.9 KG/M2 | SYSTOLIC BLOOD PRESSURE: 118 MMHG | HEIGHT: 70 IN | HEART RATE: 51 BPM | WEIGHT: 222.8 LBS | OXYGEN SATURATION: 99 %

## 2025-04-14 DIAGNOSIS — I50.22 CHRONIC SYSTOLIC CONGESTIVE HEART FAILURE (HCC): ICD-10-CM

## 2025-04-14 DIAGNOSIS — G47.33 OSA (OBSTRUCTIVE SLEEP APNEA): ICD-10-CM

## 2025-04-14 DIAGNOSIS — J30.9 ALLERGIC RHINITIS, UNSPECIFIED SEASONALITY, UNSPECIFIED TRIGGER: ICD-10-CM

## 2025-04-14 DIAGNOSIS — J31.0 CHRONIC NONALLERGIC RHINITIS: ICD-10-CM

## 2025-04-14 DIAGNOSIS — J45.50 SEVERE PERSISTENT ASTHMA WITHOUT COMPLICATION: ICD-10-CM

## 2025-04-14 DIAGNOSIS — R05.3 CHRONIC COUGH: Primary | ICD-10-CM

## 2025-04-14 DIAGNOSIS — R59.0 HILAR LYMPHADENOPATHY: ICD-10-CM

## 2025-04-14 PROCEDURE — 99204 OFFICE O/P NEW MOD 45 MIN: CPT | Performed by: INTERNAL MEDICINE

## 2025-04-14 RX ORDER — MONTELUKAST SODIUM 10 MG/1
10 TABLET ORAL
Qty: 30 TABLET | Refills: 0 | Status: SHIPPED | OUTPATIENT
Start: 2025-04-14

## 2025-04-14 NOTE — TELEPHONE ENCOUNTER
The pt called asking how long it will take for his sensors to be delivered as the one he is wearing is only one left and will need to be changed on 4-19-25. Warm transfer to the office and spoke with Ansley who passed along it was approved by his insurance but the pt will need to call this number [phone] to [facility]. Passed the number along to the pt who is going to call.

## 2025-04-14 NOTE — ASSESSMENT & PLAN NOTE
History of MC diagnosed more than 20 years ago AHI more than 30.  Stable on CPAP    Deriving symptomatic benefit and fully compliant with CPAP  No issues cleaning materials or getting resupplies  Uses distilled water and water chamber  Current machine is 1.5 years old  Needs new headgear    Compliance data  92%, 88%>4 hours, 90 days  Average use 8 hours 49 minutes  APAP 5-20 cm H2O EPR 3  Pressure median 8.1, P95 13.5  Leaks 95% 46.1  AHI 4.5    Resupply prescription provided, he needs new headgear, DME [facility].  Using nasal pillows  Orders:    PAP DME Resupply/Reorder

## 2025-04-15 ENCOUNTER — OFFICE VISIT (OUTPATIENT)
Dept: PODIATRY | Facility: CLINIC | Age: 85
End: 2025-04-15
Payer: COMMERCIAL

## 2025-04-15 VITALS — HEIGHT: 70 IN | WEIGHT: 222 LBS | BODY MASS INDEX: 31.78 KG/M2

## 2025-04-15 DIAGNOSIS — E11.621 DIABETIC ULCER OF OTHER PART OF RIGHT FOOT ASSOCIATED WITH TYPE 2 DIABETES MELLITUS, WITH FAT LAYER EXPOSED (HCC): Primary | ICD-10-CM

## 2025-04-15 DIAGNOSIS — Z79.4 TYPE 2 DIABETES MELLITUS WITH DIABETIC NEUROPATHY, WITH LONG-TERM CURRENT USE OF INSULIN (HCC): ICD-10-CM

## 2025-04-15 DIAGNOSIS — L97.512 DIABETIC ULCER OF OTHER PART OF RIGHT FOOT ASSOCIATED WITH TYPE 2 DIABETES MELLITUS, WITH FAT LAYER EXPOSED (HCC): Primary | ICD-10-CM

## 2025-04-15 DIAGNOSIS — E11.40 TYPE 2 DIABETES MELLITUS WITH DIABETIC NEUROPATHY, WITH LONG-TERM CURRENT USE OF INSULIN (HCC): ICD-10-CM

## 2025-04-15 LAB
DME PARACHUTE DELIVERY DATE ACTUAL: NORMAL
DME PARACHUTE DELIVERY DATE EXPECTED: NORMAL
DME PARACHUTE DELIVERY DATE REQUESTED: NORMAL
DME PARACHUTE ITEM DESCRIPTION: NORMAL
DME PARACHUTE ITEM DESCRIPTION: NORMAL
DME PARACHUTE ORDER STATUS: NORMAL
DME PARACHUTE SUPPLIER NAME: NORMAL
DME PARACHUTE SUPPLIER PHONE: NORMAL

## 2025-04-15 PROCEDURE — 11042 DBRDMT SUBQ TIS 1ST 20SQCM/<: CPT | Performed by: PODIATRIST

## 2025-04-15 PROCEDURE — RECHECK: Performed by: PODIATRIST

## 2025-04-16 NOTE — PROGRESS NOTES
Patient ID: Hammad Montesinos is a 84 y.o. male Date of Birth 1940       Chief Complaint   Patient presents with   • Foot Ulcer     Diabetic ulcer right foot        Allergies:  Hydrochlorothiazide, Insulin lispro, Lisinopril, Minoxidil, Nifedipine, Other, Prazosin, Venlafaxine, Doxycycline, and Simvastatin    Diagnosis:  1. 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, with fat layer exposed (HCC)  -     Debridement Diabetic Ulcer Right;Plantar;Distal Plantar  2. Type 2 diabetes mellitus with diabetic neuropathy, with long-term current use of insulin ([facility])     Diagnosis ICD-10-CM Associated Orders   1. 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, with fat layer exposed ([facility])  E11.621 Debridement Diabetic Ulcer Right;Plantar;Distal Plantar    L97.512       2. Type 2 diabetes mellitus with diabetic neuropathy, with long-term current use of insulin ([facility])  E11.40     Z79.4            Assessment & Plan:  Right foot DFU subthird MPJ has reopened.  Full-thickness depth with fat layer exposed.  Excisional debridement full-thickness ulcer as noted below.  Bandaged with silver alginate and antibiotic ointment to be changed QOD by patient.  Call immediately if any signs of infection are noted.  Follow-up as scheduled in 1-1/2 weeks    Subjective:   4/15/2025: 84-year-old type II diabetic seen today for recurrent breakdown of the right forefoot.  Patient reports drainage over the past couple days.  Has been applying silver foam and limiting his activity.    2/4/2025: Hammad Montesinos is a 84 y.o.year old male seen for diabetic foot exam.  The patient has class findings with diabetes.   BS is under control.  The patient complained of thick toenails and painful lesions which have previously ulcerated.  Patient is concerned with possible recurrent ulceration beneath right third toe joint..  The patient denied any acute pedal disorder, redness,  acute swelling, or recent injury        The following portions of the patient's history were reviewed and updated as appropriate:   Patient Active Problem List   Diagnosis   • Type 2 diabetes mellitus with hyperglycemia, with long-term current use of insulin ([facility])   • Mixed hyperlipidemia   • Diabetic polyneuropathy associated with type 2 diabetes mellitus ([facility])   • Primary hypertension   • Morbid (severe) obesity due to excess calories ([facility])   • 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 ([facility])   • 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 ([facility])   • Elevated parathyroid hormone   • Diabetic nephropathy associated with type 2 diabetes mellitus ([facility])   • Type 2 diabetes mellitus with diabetic neuropathy, unspecified whether long term insulin use ([facility])   • Pre-ulcerative calluses   • Acute on chronic diastolic heart failure ([facility])   • Elevated troponin   • Pulmonary hypertension ([facility])   • Paroxysmal A-fib ([facility])   • 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, with fat layer exposed ([facility])   • Stroke-like symptoms   • Fall   • Generalized weakness   • Acute on chronic systolic congestive heart failure ([facility])   • MC (obstructive sleep apnea)     Past Medical History:   Diagnosis Date   • Asthma    • Bradycardia    • Carcinoma, basal cell, skin     head and face and ear   • CHF (congestive heart failure) ([facility])    • Chronic kidney disease    • Coronary artery disease    • COVID-19 12/05/2022   • Diabetes mellitus ([facility])    • Dupuytren contracture     bilateral ring and right middle   • Gout    • Herniated lumbar intervertebral disc    • Hypertension    • Sleep apnea      Past Surgical History:   Procedure Laterality Date   • APPENDECTOMY      age 20, ruptured   • CATARACT EXTRACTION, BILATERAL Bilateral    • CERVICAL FUSION     • CYST REMOVAL Left 01/2023   • DUPUYTREN CONTRACTURE RELEASE Left     left ring finger   • INSERT / REPLACE / REMOVE  PACEMAKER     • LUMBAR LAMINECTOMY      belkis placed   • TONSILLECTOMY     • VASECTOMY       Social History     Socioeconomic History   • Marital status: /Civil Union     Spouse name: None   • Number of children: None   • Years of education: None   • Highest education level: None   Occupational History   • Occupation: teacher     Comment: retired   Tobacco Use   • Smoking status: Never   • Smokeless tobacco: Never   Vaping Use   • Vaping status: Never Used   Substance and Sexual Activity   • Alcohol use: Not Currently   • Drug use: Never   • Sexual activity: Not Currently     Birth control/protection: Male Sterilization   Other Topics Concern   • None   Social History Narrative   • None     Social Drivers of Health     Financial Resource Strain: Not on file   Food Insecurity: No Food Insecurity (2/14/2025)    Nursing - Inadequate Food Risk Classification    • Worried About Running Out of Food in the Last Year: Not on file    • Ran Out of Food in the Last Year: Not on file    • Ran Out of Food in the Last Year: Never true   Transportation Needs: No Transportation Needs (3/11/2025)    OASIS : Transportation    • Lack of Transportation (Medical): No    • Lack of Transportation (Non-Medical): No    • Patient Unable or Declines to Respond: No   Recent Concern: Transportation Needs - Unmet Transportation Needs (2/14/2025)    Nursing - Transportation Risk Classification    • Lack of Transportation: Not on file    • Lack of Transportation: Yes   Physical Activity: Not on file   Stress: Not on file   Social Connections: Not on file   Intimate Partner Violence: Unknown (2/14/2025)    Nursing IPS    • Feels Physically and Emotionally Safe: Not on file    • Physically Hurt by Someone: Not on file    • Humiliated or Emotionally Abused by Someone: Not on file    • Physically Hurt by Someone: No    • Hurt or Threatened by Someone: No   Housing Stability: Unknown (2/14/2025)    Nursing: Inadequate Housing Risk  Classification    • Has Housing: Not on file    • Worried About Losing Housing: Not on file    • Unable to Get Utilities: Not on file    • Unable to Pay for Housing in the Last Year: No    • Has Housin        Current Outpatient Medications:   •  Albuterol Sulfate 108 (90 Base) MCG/ACT AEPB, Inhale 2 puffs every 4 (four) hours as needed (SOB, wheezing, cough), Disp: 1 each, Rfl: 3  •  allopurinol (ZYLOPRIM) 300 mg tablet, Take 300 mg by mouth every 24 hours, Disp: , Rfl:   •  atorvastatin (LIPITOR) 20 mg tablet, Take 20 mg by mouth daily, Disp: , Rfl:   •  azelastine (ASTELIN) 0.1 % nasal spray, 2 sprays into each nostril 2 (two) times a day, Disp: , Rfl:   •  budesonide (PULMICORT) 0.5 mg/2 mL nebulizer solution, Take 0.5 mg by nebulization 2 (two) times a day Rinse mouth after use., Disp: , Rfl:   •  bumetanide (BUMEX) 2 mg tablet, Take 1 tablet (2 mg total) by mouth 2 (two) times a day Take when your wake up and then again early afternoon, Disp: 180 tablet, Rfl: 3  •  Cholecalciferol (Vitamin D3) 25 MCG (1000 UT) CAPS, Take 1 capsule by mouth every 24 hours, Disp: , Rfl:   •  Coenzyme Q10 (Co Q10) 100 MG CAPS, Take 100 mg by mouth in the morning, Disp: , Rfl:   •  Easy Comfort Pen Needles 33G X 4 MM MISC, USE WITH INSULIN FIVE TIMES DAILY AS DIRECTED, Disp: , Rfl:   •  fluticasone (FLONASE) 50 mcg/act nasal spray, 1 spray into each nostril 2 (two) times a day, Disp: , Rfl:   •  formoterol (PERFOROMIST) 20 MCG/2ML nebulizer solution, Take 20 mcg by nebulization 2 (two) times a day, Disp: , Rfl:   •  gabapentin (NEURONTIN) 100 mg capsule, Take 1 capsule (100 mg total) by mouth daily, Disp: 180 capsule, Rfl: 0  •  Glucosamine-Chondroitin 500-400 MG CAPS, Take 1 capsule by mouth daily, Disp: , Rfl:   •  insulin aspart (NovoLOG FlexPen) 100 UNIT/ML injection pen, INJECT 25 UNITS SUBCUTANEOUSLY with breakfast, LUNCH and with dinner plus sliding scale. (UP  UNITS DAILY). 150-200: 1 unit; 201-250: 2 units;  251-300: 3 units; 301-350: 4 units; 351-400: 5 units; 400+: 6 units, Disp: 45 mL, Rfl: 1  •  Insulin Glargine Solostar (Lantus SoloStar) 100 UNIT/ML SOPN, INJECT 10 UNITS SUBCUTANEOUSLY IN THE MORNING AND 10 UNITS IN THE EVENING., Disp: 15 mL, Rfl: 1  •  ipratropium (ATROVENT) 0.03 % nasal spray, INSTILL 2 SPRAYS IN EACH NOSTRIL TWICE DAILY, Disp: , Rfl:   •  ipratropium-albuterol (DUO-NEB) 0.5-2.5 mg/3 mL nebulizer solution, Take 3 mL by nebulization 2 (two) times a day, Disp: , Rfl:   •  loratadine (CLARITIN) 10 mg tablet, Take 10 mg by mouth daily, Disp: , Rfl:   •  losartan (COZAAR) 100 MG tablet, Take 1 tablet by mouth in the morning, Disp: , Rfl:   •  magnesium Oxide (MAG-OX) 400 mg TABS, Take 1 tablet (400 mg total) by mouth daily, Disp: 30 tablet, Rfl: 0  •  metoprolol succinate (TOPROL-XL) 25 mg 24 hr tablet, Take 1 tablet (25 mg total) by mouth daily, Disp: 30 tablet, Rfl: 11  •  montelukast (SINGULAIR) 10 mg tablet, Take 1 tablet (10 mg total) by mouth daily at bedtime, Disp: 30 tablet, Rfl: 0  •  Multiple Vitamin (MULTIVITAMIN ADULT PO), Take 1 tablet by mouth daily, Disp: , Rfl:   •  mupirocin (BACTROBAN) 2 % ointment, Apply topically every other day With alginate dressing, Disp: 22 g, Rfl: 0  •  Omega-3 Fatty Acids (Fish Oil) 360 MG CAPS, Take 1 capsule by mouth daily, Disp: , Rfl:   •  OneTouch Verio test strip, USE AS DIRECTED FOUR TIMES DAILY, Disp: , Rfl:   •  potassium chloride (K-DUR,KLOR-CON) 20 mEq tablet, Take 1 tablet (20 mEq total) by mouth daily, Disp: 30 tablet, Rfl: 0  •  rivaroxaban (Xarelto) 20 mg tablet, Take 1 tablet (20 mg total) by mouth daily with breakfast, Disp: 30 tablet, Rfl: 5  •  senna (SENOKOT) 8.6 mg, Take 2 tablets (17.2 mg total) by mouth daily at bedtime as needed for constipation, Disp: 30 tablet, Rfl: 0  •  spironolactone (ALDACTONE) 25 mg tablet, Take 1 tablet (25 mg total) by mouth daily, Disp: 30 tablet, Rfl: 0  •  triamcinolone (KENALOG) 0.1 % cream, Apply 1  "Application topically 2 (two) times a day To affected areas bilateral feet , Disp: , Rfl:   •  valACYclovir (VALTREX) 1,000 mg tablet, Take 1,000 mg by mouth 3 (three) times a day, Disp: , Rfl:   Family History   Problem Relation Age of Onset   • Hypertension Mother    • Diabetes type II Mother    • Hypertension Father    • Diabetes type II Brother    • Hypertension Brother    • Kidney disease Sister    • Hypertension Daughter    • GI problems Daughter       Review of Systems   Constitutional: Negative.    HENT: Negative.     Eyes: Negative.    Respiratory: Negative.     Cardiovascular: Negative.    Gastrointestinal: Negative.    Endocrine:        Type 2 diabetes   Genitourinary: Negative.    Musculoskeletal: Negative.    Skin:  Positive for wound.        Recurrent DFU plantar right forefoot   Allergic/Immunologic: Negative.    Neurological:  Positive for numbness.   Hematological: Negative.    Psychiatric/Behavioral: Negative.           Objective:  Ht 5' 10\" (1.778 m) Comment: stated  Wt 101 kg (222 lb)   BMI 31.85 kg/m²     Physical Exam  Constitutional:       Appearance: Normal appearance.   HENT:      Head: Normocephalic.      Right Ear: Tympanic membrane normal.      Left Ear: Tympanic membrane normal.      Nose: No congestion.      Mouth/Throat:      Pharynx: No oropharyngeal exudate or posterior oropharyngeal erythema.   Eyes:      Conjunctiva/sclera: Conjunctivae normal.      Pupils: Pupils are equal, round, and reactive to light.   Cardiovascular:      Rate and Rhythm: Normal rate and regular rhythm.      Pulses:           Dorsalis pedis pulses are 1+ on the right side and 1+ on the left side.        Posterior tibial pulses are 0 on the right side and 0 on the left side.   Pulmonary:      Effort: Pulmonary effort is normal.   Musculoskeletal:      Right lower leg: Edema (+2) present.      Left lower leg: Edema (+2) present.      Right foot: Prominent metatarsal heads present.      Left foot: Prominent " "metatarsal heads present.   Feet:      Right foot:      Protective Sensation: 10 sites tested.  8 sites sensed.      Skin integrity: Ulcer (See comments) present. No erythema.      Left foot:      Protective Sensation: 10 sites tested.  9 sites sensed.      Skin integrity: Callus (Subthree) present.      Comments: Right foot subthird MPJ: Full-thickness DFU breakdown consistent with [place] 1 type ulceration.  No signs of infection.  Hyperkeratotic margins with necrotic nature maceration and malodor noted.  No purulent drainage noted.  Scant serosanguineous drainage.  Ulcer debrided down to healthy granular tissue.    Post-debridement measurements 0.6 x 0.7 x 0.2 cm.  Skin:     General: Skin is warm and dry.      Capillary Refill: Capillary refill takes 2 to 3 seconds.      Coloration: Skin is not pale.      Findings: No bruising, erythema, lesion or rash.   Neurological:      Mental Status: He is alert.      Cranial Nerves: No cranial nerve deficit.      Sensory: Sensory deficit present.      Motor: No weakness.      Gait: Gait normal.      Deep Tendon Reflexes: Reflexes normal.      Comments: Hypoesthesia and loss of protective sensation noted.  Consistent with advanced diabetic neuropathy   Psychiatric:         Mood and Affect: Mood normal.         Behavior: Behavior normal.         Judgment: Judgment normal.         Wound 02/22/25 MASD Buttocks Bilateral (Active)       Wound 02/04/25 Diabetic Ulcer Plantar Right;Plantar;Distal (Active)       Debridement   Wound 02/04/25 Diabetic Ulcer Plantar Right;Plantar;Distal     Date/Time: 4/15/2025 9:45 AM  Universal Protocol:  procedure performed by consultantConsent: Verbal consent obtained.  Risks and benefits: risks, benefits and alternatives were discussed  Consent given by: patient  Time out: Immediately prior to procedure a \"time out\" was called to verify the correct patient, procedure, equipment, support staff and site/side marked as required.  Patient " "understanding: patient states understanding of the procedure being performed  Patient identity confirmed: verbally with patient    Debridement Details  Performed by: physician  Debridement type: surgical  Level of debridement: subcutaneous tissue  Pain control: none    Post-debridement measurements  Length (cm): 0.7  Width (cm): 0.6  Depth (cm): 0.2  Percent debrided: 100%  Surface Area (cm^2): 0.42  Area Debrided (cm^2): 0.42  Volume (cm^3): 0.08    Tissue and other material debrided: subcutaneous tissue  Devitalized tissue debrided: callus, necrotic debris and slough  Instrument(s) utilized: nippers and blade  Technique utilized: excisional  Bleeding: medium  Hemostasis obtained with: pressure  Procedural pain (0-10): insensate  Post-procedural pain: insensate   Response to treatment: procedure was tolerated well                 Wound Instructions:  Orders Placed This Encounter   Procedures   • Debridement Diabetic Ulcer Right;Plantar;Distal Plantar     This order was created via procedure documentation         Julio César Mac DPM      Portions of the record may have been created with voice recognition software. Occasional wrong word or \"sound a like\" substitutions may have occurred due to the inherent limitations of voice recognition software. Read the chart carefully and recognize, using context, where substitutions have occurred.    "

## 2025-04-24 ENCOUNTER — PROCEDURE VISIT (OUTPATIENT)
Dept: PODIATRY | Facility: CLINIC | Age: 85
End: 2025-04-24
Payer: COMMERCIAL

## 2025-04-24 VITALS — WEIGHT: 226 LBS | BODY MASS INDEX: 32.35 KG/M2 | HEIGHT: 70 IN

## 2025-04-24 DIAGNOSIS — E11.40 TYPE 2 DIABETES MELLITUS WITH DIABETIC NEUROPATHY, WITH LONG-TERM CURRENT USE OF INSULIN (HCC): ICD-10-CM

## 2025-04-24 DIAGNOSIS — B35.1 ONYCHOMYCOSIS: ICD-10-CM

## 2025-04-24 DIAGNOSIS — E11.621 DIABETIC ULCER OF OTHER PART OF RIGHT FOOT ASSOCIATED WITH TYPE 2 DIABETES MELLITUS, WITH FAT LAYER EXPOSED (HCC): Primary | ICD-10-CM

## 2025-04-24 DIAGNOSIS — E11.40 TYPE 2 DIABETES MELLITUS WITH DIABETIC NEUROPATHY, UNSPECIFIED WHETHER LONG TERM INSULIN USE (HCC): ICD-10-CM

## 2025-04-24 DIAGNOSIS — L97.512 DIABETIC ULCER OF OTHER PART OF RIGHT FOOT ASSOCIATED WITH TYPE 2 DIABETES MELLITUS, WITH FAT LAYER EXPOSED (HCC): Primary | ICD-10-CM

## 2025-04-24 DIAGNOSIS — L84 CORNS AND CALLUS: ICD-10-CM

## 2025-04-24 DIAGNOSIS — Z79.4 TYPE 2 DIABETES MELLITUS WITH DIABETIC NEUROPATHY, WITH LONG-TERM CURRENT USE OF INSULIN (HCC): ICD-10-CM

## 2025-04-24 PROCEDURE — 11721 DEBRIDE NAIL 6 OR MORE: CPT | Performed by: PODIATRIST

## 2025-04-24 PROCEDURE — 11042 DBRDMT SUBQ TIS 1ST 20SQCM/<: CPT | Performed by: PODIATRIST

## 2025-04-24 PROCEDURE — RECHECK: Performed by: PODIATRIST

## 2025-04-24 PROCEDURE — 11056 PARNG/CUTG B9 HYPRKR LES 2-4: CPT | Performed by: PODIATRIST

## 2025-04-24 RX ORDER — INSULIN ASPART 100 [IU]/ML
INJECTION, SOLUTION INTRAVENOUS; SUBCUTANEOUS
Qty: 45 ML | Refills: 1 | Status: SHIPPED | OUTPATIENT
Start: 2025-04-24

## 2025-05-01 NOTE — PROGRESS NOTES
Patient ID: Hammad Montesinos is a 84 y.o. male Date of Birth 1940       Chief Complaint   Patient presents with   • Diabetes Type 2     Dfc with ulcer        Allergies:  Hydrochlorothiazide, Insulin lispro, Lisinopril, Minoxidil, Nifedipine, Other, Prazosin, Venlafaxine, Doxycycline, and Simvastatin    Diagnosis:  1. 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, with fat layer exposed (HCC)  -     Debridement Diabetic Ulcer Right;Plantar;Distal Plantar  2. Onychomycosis  3. Corns and callus  4. Type 2 diabetes mellitus with diabetic neuropathy, with long-term current use of insulin (Formerly [facility])     Diagnosis ICD-10-CM Associated Orders   1. 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, with fat layer exposed (Formerly [facility])  E11.621 Debridement Diabetic Ulcer Right;Plantar;Distal Plantar    L97.512       2. Onychomycosis  B35.1       3. Corns and callus  L84       4. Type 2 diabetes mellitus with diabetic neuropathy, with long-term current use of insulin (Formerly [facility])  E11.40     Z79.4            Assessment & Plan:  Chronic ulceration right foot subthird is improving and is smaller with less drainage.  Excisional debridement of ulceration right foot as noted below.  Continue with mupirocin/alginate dressing QOD right foot with forefoot offloading wedge shoe.  Debridement of mycotic nails and paring of benign hyperkeratotic lesions as noted below.  Follow-up in 2 weeks for ulcer care and 10 weeks for further diabetic footcare.    Subjective:   4/24/2025: 84-year-old type II diabetic seen for follow-up ulcer care and diabetic footcare.  Reports ulceration is only slightly smaller but still continues to drain.    4/15/2025: 84-year-old type II diabetic seen today for recurrent breakdown of the right forefoot.  Patient reports drainage over the past couple days.  Has been applying silver foam and limiting his activity.    2/4/2025: Hammad Montesinos is a 84  y.o.year old male seen for diabetic foot exam.  The patient has class findings with diabetes.   BS is under control.  The patient complained of thick toenails and painful lesions which have previously ulcerated.  Patient is concerned with possible recurrent ulceration beneath right third toe joint..  The patient denied any acute pedal disorder, redness, acute swelling, or recent injury        The following portions of the patient's history were reviewed and updated as appropriate:   Patient Active Problem List   Diagnosis   • Type 2 diabetes mellitus with hyperglycemia, with long-term current use of insulin (Formerly [facility])   • Mixed hyperlipidemia   • Diabetic polyneuropathy associated with type 2 diabetes mellitus ([facility]ly [facility])   • Primary hypertension   • Morbid (severe) obesity due to excess calories (Formerly [facility])   • 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 ([facility]ly [facility])   • 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 ([facility]ly [facility])   • Elevated parathyroid hormone   • Diabetic nephropathy associated with type 2 diabetes mellitus ([facility])   • Type 2 diabetes mellitus with diabetic neuropathy, unspecified whether long term insulin use ([facility]ly [facility])   • Pre-ulcerative calluses   • Acute on chronic diastolic heart failure (Formerly [facility])   • Elevated troponin   • Pulmonary hypertension ([facility]ly [facility])   • Paroxysmal A-fib (Formerly [facility])   • 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, with fat layer exposed ([facility])   • Stroke-like symptoms   • Fall   • Generalized weakness   • Acute on chronic systolic congestive heart failure ([facility])   • MC (obstructive sleep apnea)     Past Medical History:   Diagnosis Date   • Asthma    • Bradycardia    • Carcinoma, basal cell, skin     head and face and ear   • CHF (congestive heart failure) (Formerly [facility])    • Chronic kidney disease    • Coronary artery disease    • COVID-19 12/05/2022   • Diabetes mellitus ([facility]ly [facility])    • Dupuytren contracture     bilateral ring and right middle   • Gout     • Herniated lumbar intervertebral disc    • Hypertension    • Sleep apnea      Past Surgical History:   Procedure Laterality Date   • APPENDECTOMY      age 20, ruptured   • CATARACT EXTRACTION, BILATERAL Bilateral    • CERVICAL FUSION     • CYST REMOVAL Left 01/2023   • DUPUYTREN CONTRACTURE RELEASE Left     left ring finger   • INSERT / REPLACE / REMOVE PACEMAKER     • LUMBAR LAMINECTOMY      belkis placed   • TONSILLECTOMY     • VASECTOMY       Social History     Socioeconomic History   • Marital status: /Civil Union     Spouse name: None   • Number of children: None   • Years of education: None   • Highest education level: None   Occupational History   • Occupation: teacher     Comment: retired   Tobacco Use   • Smoking status: Never   • Smokeless tobacco: Never   Vaping Use   • Vaping status: Never Used   Substance and Sexual Activity   • Alcohol use: Not Currently   • Drug use: Never   • Sexual activity: Not Currently     Birth control/protection: Male Sterilization   Other Topics Concern   • None   Social History Narrative   • None     Social Drivers of Health     Financial Resource Strain: Not on file   Food Insecurity: No Food Insecurity (2/14/2025)    Nursing - Inadequate Food Risk Classification    • Worried About Running Out of Food in the Last Year: Not on file    • Ran Out of Food in the Last Year: Not on file    • Ran Out of Food in the Last Year: Never true   Transportation Needs: No Transportation Needs (3/11/2025)    OASIS : Transportation    • Lack of Transportation (Medical): No    • Lack of Transportation (Non-Medical): No    • Patient Unable or Declines to Respond: No   Recent Concern: Transportation Needs - Unmet Transportation Needs (2/14/2025)    Nursing - Transportation Risk Classification    • Lack of Transportation: Not on file    • Lack of Transportation: Yes   Physical Activity: Not on file   Stress: Not on file   Social Connections: Not on file   Intimate Partner Violence:  Unknown (2025)    Nursing IPS    • Feels Physically and Emotionally Safe: Not on file    • Physically Hurt by Someone: Not on file    • Humiliated or Emotionally Abused by Someone: Not on file    • Physically Hurt by Someone: No    • Hurt or Threatened by Someone: No   Housing Stability: Unknown (2025)    Nursing: Inadequate Housing Risk Classification    • Has Housing: Not on file    • Worried About Losing Housing: Not on file    • Unable to Get Utilities: Not on file    • Unable to Pay for Housing in the Last Year: No    • Has Housin        Current Outpatient Medications:   •  Albuterol Sulfate 108 (90 Base) MCG/ACT AEPB, Inhale 2 puffs every 4 (four) hours as needed (SOB, wheezing, cough), Disp: 1 each, Rfl: 3  •  allopurinol (ZYLOPRIM) 300 mg tablet, Take 300 mg by mouth every 24 hours, Disp: , Rfl:   •  atorvastatin (LIPITOR) 20 mg tablet, Take 20 mg by mouth daily, Disp: , Rfl:   •  azelastine (ASTELIN) 0.1 % nasal spray, 2 sprays into each nostril 2 (two) times a day, Disp: , Rfl:   •  budesonide (PULMICORT) 0.5 mg/2 mL nebulizer solution, Take 0.5 mg by nebulization 2 (two) times a day Rinse mouth after use., Disp: , Rfl:   •  bumetanide (BUMEX) 2 mg tablet, Take 1 tablet (2 mg total) by mouth 2 (two) times a day Take when your wake up and then again early afternoon, Disp: 180 tablet, Rfl: 3  •  Cholecalciferol (Vitamin D3) 25 MCG (1000 UT) CAPS, Take 1 capsule by mouth every 24 hours, Disp: , Rfl:   •  Coenzyme Q10 (Co Q10) 100 MG CAPS, Take 100 mg by mouth in the morning, Disp: , Rfl:   •  Easy Comfort Pen Needles 33G X 4 MM MISC, USE WITH INSULIN FIVE TIMES DAILY AS DIRECTED, Disp: , Rfl:   •  fluticasone (FLONASE) 50 mcg/act nasal spray, 1 spray into each nostril 2 (two) times a day, Disp: , Rfl:   •  formoterol (PERFOROMIST) 20 MCG/2ML nebulizer solution, Take 20 mcg by nebulization 2 (two) times a day, Disp: , Rfl:   •  gabapentin (NEURONTIN) 100 mg capsule, Take 1 capsule (100 mg total)  by mouth daily, Disp: 180 capsule, Rfl: 0  •  Glucosamine-Chondroitin 500-400 MG CAPS, Take 1 capsule by mouth daily, Disp: , Rfl:   •  Insulin Glargine Solostar (Lantus SoloStar) 100 UNIT/ML SOPN, INJECT 10 UNITS SUBCUTANEOUSLY IN THE MORNING AND 10 UNITS IN THE EVENING., Disp: 15 mL, Rfl: 1  •  ipratropium (ATROVENT) 0.03 % nasal spray, INSTILL 2 SPRAYS IN EACH NOSTRIL TWICE DAILY, Disp: , Rfl:   •  ipratropium-albuterol (DUO-NEB) 0.5-2.5 mg/3 mL nebulizer solution, Take 3 mL by nebulization 2 (two) times a day, Disp: , Rfl:   •  loratadine (CLARITIN) 10 mg tablet, Take 10 mg by mouth daily, Disp: , Rfl:   •  losartan (COZAAR) 100 MG tablet, Take 1 tablet by mouth in the morning, Disp: , Rfl:   •  magnesium Oxide (MAG-OX) 400 mg TABS, Take 1 tablet (400 mg total) by mouth daily, Disp: 30 tablet, Rfl: 0  •  metoprolol succinate (TOPROL-XL) 25 mg 24 hr tablet, Take 1 tablet (25 mg total) by mouth daily, Disp: 30 tablet, Rfl: 11  •  montelukast (SINGULAIR) 10 mg tablet, Take 1 tablet (10 mg total) by mouth daily at bedtime, Disp: 30 tablet, Rfl: 0  •  Multiple Vitamin (MULTIVITAMIN ADULT PO), Take 1 tablet by mouth daily, Disp: , Rfl:   •  mupirocin (BACTROBAN) 2 % ointment, Apply topically every other day With alginate dressing, Disp: 22 g, Rfl: 0  •  Omega-3 Fatty Acids (Fish Oil) 360 MG CAPS, Take 1 capsule by mouth daily, Disp: , Rfl:   •  OneTouch Verio test strip, USE AS DIRECTED FOUR TIMES DAILY, Disp: , Rfl:   •  potassium chloride (K-DUR,KLOR-CON) 20 mEq tablet, Take 1 tablet (20 mEq total) by mouth daily, Disp: 30 tablet, Rfl: 0  •  rivaroxaban (Xarelto) 20 mg tablet, Take 1 tablet (20 mg total) by mouth daily with breakfast, Disp: 30 tablet, Rfl: 5  •  senna (SENOKOT) 8.6 mg, Take 2 tablets (17.2 mg total) by mouth daily at bedtime as needed for constipation, Disp: 30 tablet, Rfl: 0  •  spironolactone (ALDACTONE) 25 mg tablet, Take 1 tablet (25 mg total) by mouth daily, Disp: 30 tablet, Rfl: 0  •   "triamcinolone (KENALOG) 0.1 % cream, Apply 1 Application topically 2 (two) times a day To affected areas bilateral feet , Disp: , Rfl:   •  valACYclovir (VALTREX) 1,000 mg tablet, Take 1,000 mg by mouth 3 (three) times a day, Disp: , Rfl:   •  insulin aspart (NovoLOG FlexPen) 100 UNIT/ML injection pen, INJECT 12 UNITS SUBCUTANEOUSLY with breakfast, LUNCH and with dinner plus sliding scale. (UP  UNITS DAILY). 150-200: 1 unit; 201-250: 2 units; 251-300: 3 units; 301-350: 4 units; 351-400: 5 units; 400+: 6 units, Disp: 45 mL, Rfl: 1  Family History   Problem Relation Age of Onset   • Hypertension Mother    • Diabetes type II Mother    • Hypertension Father    • Diabetes type II Brother    • Hypertension Brother    • Kidney disease Sister    • Hypertension Daughter    • GI problems Daughter       Review of Systems   Constitutional: Negative.    HENT: Negative.     Eyes: Negative.    Respiratory: Negative.     Cardiovascular: Negative.    Gastrointestinal: Negative.    Endocrine:        Type 2 diabetes   Genitourinary: Negative.    Musculoskeletal: Negative.    Skin:  Positive for wound.        chronic DFU plantar right forefoot and thick elongated toenails.   Allergic/Immunologic: Negative.    Neurological:  Positive for numbness.   Hematological: Negative.    Psychiatric/Behavioral: Negative.           Objective:  Ht 5' 10\" (1.778 m) Comment: stated  Wt 103 kg (226 lb)   BMI 32.43 kg/m²     Physical Exam  Constitutional:       Appearance: Normal appearance.   HENT:      Head: Normocephalic.      Right Ear: Tympanic membrane normal.      Left Ear: Tympanic membrane normal.      Nose: No congestion.      Mouth/Throat:      Pharynx: No oropharyngeal exudate or posterior oropharyngeal erythema.   Eyes:      Conjunctiva/sclera: Conjunctivae normal.      Pupils: Pupils are equal, round, and reactive to light.   Cardiovascular:      Rate and Rhythm: Normal rate and regular rhythm.      Pulses:           Dorsalis pedis " pulses are 1+ on the right side and 1+ on the left side.        Posterior tibial pulses are 0 on the right side and 0 on the left side.   Pulmonary:      Effort: Pulmonary effort is normal.   Musculoskeletal:      Right lower leg: Edema (+2) present.      Left lower leg: Edema (+2) present.      Right foot: Prominent metatarsal heads present.      Left foot: Prominent metatarsal heads present.   Feet:      Right foot:      Protective Sensation: 10 sites tested.  8 sites sensed.      Skin integrity: Ulcer (See comments) present. No erythema.      Left foot:      Protective Sensation: 10 sites tested.  9 sites sensed.      Skin integrity: Callus (Sub 3rd MPJ and tip of right great toe) present.      Comments: Right foot subthird MPJ: Full-thickness DFU breakdown consistent with Calhoun 1 type ulceration.  No signs of infection.  Hyperkeratotic margins with periwound hemorrhaging callus with less necrotic debris.  No purulent drainage or signs of infection noted.  Scant serosanguineous drainage.  Ulcer debrided down to healthy granular tissue.    Post-debridement measurements 0.7 x 0.7 x 0.2.  Skin:     General: Skin is warm and dry.      Capillary Refill: Capillary refill takes 2 to 3 seconds.      Coloration: Skin is not pale.      Findings: No bruising, erythema, lesion or rash.      Comments:     Severely dystrophic nails x 10 consistent with chronic nail mycosis.  Yellow discoloration, subungual debris, brittle nature, and distal onycholysis noted.    Hyperkeratotic lesions located left foot subthird MPJ and tip of right great toe.  Hemorrhaging callus noted consistent with a preulcerative lesion.   Neurological:      Mental Status: He is alert.      Cranial Nerves: No cranial nerve deficit.      Sensory: Sensory deficit present.      Motor: No weakness.      Gait: Gait normal.      Deep Tendon Reflexes: Reflexes normal.      Comments: Hypoesthesia and loss of protective sensation noted.  Consistent with advanced  "diabetic neuropathy   Psychiatric:         Mood and Affect: Mood normal.         Behavior: Behavior normal.         Judgment: Judgment normal.         Wound 02/22/25 MASD Buttocks Bilateral (Active)       Wound 02/04/25 Diabetic Ulcer Plantar Right;Plantar;Distal (Active)       Debridement   Wound 02/04/25 Diabetic Ulcer Plantar Right;Plantar;Distal     Date/Time: 4/24/2025 1:15 PM  Universal Protocol:  procedure performed by consultantConsent: Verbal consent obtained.  Risks and benefits: risks, benefits and alternatives were discussed  Consent given by: patient  Time out: Immediately prior to procedure a \"time out\" was called to verify the correct patient, procedure, equipment, support staff and site/side marked as required.  Patient understanding: patient states understanding of the procedure being performed  Patient identity confirmed: verbally with patient    Debridement Details  Performed by: physician  Debridement type: surgical  Level of debridement: subcutaneous tissue  Pain control: none    Post-debridement measurements  Length (cm): 0.7  Width (cm): 0.7  Depth (cm): 0.2  Percent debrided: 100%  Surface Area (cm^2): 0.49  Area Debrided (cm^2): 0.49  Volume (cm^3): 0.1    Tissue and other material debrided: subcutaneous tissue  Devitalized tissue debrided: callus, necrotic debris and slough  Instrument(s) utilized: nippers and blade  Technique utilized: excisional  Bleeding: medium  Hemostasis obtained with: pressure  Procedural pain (0-10): insensate  Post-procedural pain: insensate   Response to treatment: procedure was tolerated well          Procedure:  18830, 16334  All mycotic toenails were reduced and debrided in length, width, and girth using a nail nipper and electric dremel.    All hyperkeratotic skin lesion(s) were sharply pared with a scalpel #10 blade with no evidence of ulceration beneath.    Patient tolerated procedure(s) well without complications.      Risk Category/Class Findings:  Q8(B1, " "B2 ABC)  3 = History of plantar ulceration, neuropathic fracture (Charcot foot) or amputation    A1)  Has the patient had a previous amputation of the foot or integral skeletal portion thereof? No  B1)  Does the patient have absent posterior tibial pulse? Yes  B3)  Does the patient have absent dorsalis pedis? No  B2)  Does the patient have three of the following? Yes           1.  Hair growth (increased or decreased), 2.  Nail changes (thickening), and 3.  Pigmentary changes (discoloring)  C)  Does the patient have two of the following and one above? No                      Wound Instructions:  Orders Placed This Encounter   Procedures   • Debridement Diabetic Ulcer Right;Plantar;Distal Plantar     This order was created via procedure documentation           Julio César Mac DPM      Portions of the record may have been created with voice recognition software. Occasional wrong word or \"sound a like\" substitutions may have occurred due to the inherent limitations of voice recognition software. Read the chart carefully and recognize, using context, where substitutions have occurred.    "

## 2025-05-02 ENCOUNTER — RESULTS FOLLOW-UP (OUTPATIENT)
Dept: ENDOCRINOLOGY | Facility: HOSPITAL | Age: 85
End: 2025-05-02

## 2025-05-12 NOTE — TELEPHONE ENCOUNTER
49.1 Upon review of the In Basket request and the patient's chart, initial outreach has been made via fax, please see Contacts section for details      Att x1    Thank you  Genoveva Mcnamara

## 2025-05-15 ENCOUNTER — TELEPHONE (OUTPATIENT)
Age: 85
End: 2025-05-15

## 2025-05-15 NOTE — TELEPHONE ENCOUNTER
Left message for patient to call back.  How does he want to do the virtual appointment (MyChart, text or e-mail)?

## 2025-05-15 NOTE — TELEPHONE ENCOUNTER
Patient called in to ask if his appointment tomorrow with Kurt can be a virtual visit. He advised he is in the hospital and being moved to a facility after. He said he really doesn't want to have to reschedule.    Please call patient back to advise.

## 2025-05-16 ENCOUNTER — TELEMEDICINE (OUTPATIENT)
Dept: ENDOCRINOLOGY | Facility: HOSPITAL | Age: 85
End: 2025-05-16
Payer: COMMERCIAL

## 2025-05-16 DIAGNOSIS — E78.2 MIXED HYPERLIPIDEMIA: ICD-10-CM

## 2025-05-16 DIAGNOSIS — I10 PRIMARY HYPERTENSION: ICD-10-CM

## 2025-05-16 DIAGNOSIS — E11.40 TYPE 2 DIABETES MELLITUS WITH DIABETIC NEUROPATHY, UNSPECIFIED WHETHER LONG TERM INSULIN USE (HCC): Primary | ICD-10-CM

## 2025-05-16 PROCEDURE — 95251 CONT GLUC MNTR ANALYSIS I&R: CPT | Performed by: PHYSICIAN ASSISTANT

## 2025-05-16 PROCEDURE — 99214 OFFICE O/P EST MOD 30 MIN: CPT | Performed by: PHYSICIAN ASSISTANT

## 2025-05-16 NOTE — ASSESSMENT & PLAN NOTE
Unable to get blood pressure due to virtual visit.  Kidney function stable.  Continue with current medications.  Repeat CMP prior to next office visit.

## 2025-05-16 NOTE — PROGRESS NOTES
Virtual Regular VisitName: Hammad Montesinos      : 1940      MRN: [id number]  Encounter Provider: Jonn Rossi PA-C  Encounter Date: 2025   Encounter department: [facility] [place]  :  Assessment & Plan  Type 2 diabetes mellitus with diabetic neuropathy, unspecified whether long term insulin use (HCC)  A1c relatively stable.  Download of his Dexcom does show improvement since last office visit, but has been in the hospital in the 2 weeks prior were doing better than current 2 weeks.  I would expect his A1c to trend down without any significant changes.  Unfortunately it is hard for me to make recommendations with everything going on.  Once he is discharged from rehab I would like to see a download of his Dexcom in about 2 weeks.  He will continue with Lantus 20 units twice a day, NovoLog 12 units with meals plus sliding scale.  Contact the office with any concerns or questions.  Follow-up in 3 months with labwork completed prior to visit.  Lab Results   Component Value Date    HGBA1C 9.1 (H) 2025       Orders:  •  Comprehensive metabolic panel; Future  •  Hemoglobin A1C; Future  •  Lipid panel; Future    Primary hypertension  Unable to get blood pressure due to virtual visit.  Kidney function stable.  Continue with current medications.  Repeat CMP prior to next office visit.       Mixed hyperlipidemia  This lipid panel was excellent.  Continue with atorvastatin 20 mg daily.  Repeat lipid panel prior to next office visit.           History of Present Illness     HPI  Hammad Montesinos is a 84 year old male with type 2 diabetes, insulin requiring with neuropathy for 50 years, hyperlipidemia, hypertension for follow-up.  He was diagnosed with type 2 diabetes in his 30s.  He originally went on metformin therapy.  He was never on any other oral agents as metformin controlled his blood sugars for the 1st several years and then he was switched to  insulin therapy.  He reports that Humalog insulin does not improve his blood sugars as well as NovoLog insulin does.  He is on insulin at home and takes Lantus insulin 20 units twice a day, and NovoLog 20 units with meals plus sliding scale. He denies any polyuria, polydipsia, nocturia and blurry vision.  He has once a night nocturia.  He denies chest pain or shortness of breath.  He has occasional shooting pains of the legs at night and has numbness of the feet to his knees bilaterally.  He denies retinopathy, heart attack, stroke and claudication but does admit to neuropathy and nephropathy.  He reports he was to Diabetes Education on diagnosis in several times since then has seen 1 on 1 with a dietitian and gone to diabetes classes.  Does pay attention to what he is eating and to eat a diabetic diet.  States when he is eating in his room he make smarter choices, but when he is eating in the dining abdi typically will eat more.  Presented to [facility] recently for shortness of breath.  While in the hospital he was found to have a GI bleed leading to significant anemia.  Was given 8 pints of blood.  Unclear what the cause of the GI bleed was, but seems to be doing better.  Was discharged to [facility] for rehab.  Plans on being there for about a week.  States he has no control over his insulin at this time.      Hypoglycemic episodes: Rare episodes.  H/o of hypoglycemia causing hospitalization or intervention such as glucagon injection  or ambulance call  Yes. Has been loc in the past requiring 911 many years ago.  Hypoglycemia symptoms: sweating and and feel bad or no symptoms at all.  Treatment of hypoglycemia: either cookies or glucose tablets.  Glucagon:Yes.  Medic alert tag: recommended,Yes.      The patient's last eye exam was in March 2022 in [place] at [facility] with no retinopathy.  The patient's last foot exam was in December 2023 at endocrine office.  Most recent hemoglobin A1c was  completed May 1, 2025 and was 9.1.     Blood Sugar/Glucometer/Pump/CGM review: Downloaded Dexcom from 8 3 through May 16, 2025 reveals an average glucose level of 230 with standard deviation of 78.  He is in target range 28% time, and above target range 72% of the time.  Glucose levels start trending down roughly 9 PM, level out roughly 2 AM before increasing around 8 AM.  May slowly increase throughout the day for trending down overnight.     He has hyperlipidemia and takes atorvastatin 20 mg daily.  He denies chest pain or shortness of breath.       He has hypertension and takes amlodipine 10 mg daily and losartan 100 mg daily along with furosemide 40 mg twice a day.  He denies headache or stroke-like symptoms.  Review of Systems   Constitutional:  Negative for activity change, appetite change, fatigue and unexpected weight change.   HENT:  Negative for trouble swallowing.    Eyes:  Positive for visual disturbance (Wears glasses).   Respiratory:  Positive for shortness of breath. Negative for chest tightness.    Cardiovascular:  Negative for chest pain, palpitations and leg swelling.   Gastrointestinal:  Negative for abdominal pain, diarrhea, nausea and vomiting.   Endocrine: Negative for cold intolerance, heat intolerance, polydipsia, polyphagia and polyuria.   Genitourinary:  Negative for frequency.   Musculoskeletal:  Positive for arthralgias, back pain and gait problem (Utilizes a walker).   Skin:  Negative for rash and wound.   Neurological:  Positive for tremors. Negative for dizziness, weakness, light-headedness, numbness and headaches.   Psychiatric/Behavioral:  Negative for dysphoric mood and sleep disturbance. The patient is not nervous/anxious.        Objective   There were no vitals taken for this visit.    Physical Exam  Constitutional:       General: He is not in acute distress.     Appearance: Normal appearance. He is not diaphoretic.   HENT:      Head: Normocephalic and atraumatic.     Eyes:       Extraocular Movements: Extraocular movements intact.      Conjunctiva/sclera: Conjunctivae normal.     Pulmonary:      Breath sounds: No wheezing (No audible wheezes).     Musculoskeletal:      Cervical back: Normal range of motion.     Neurological:      Mental Status: He is alert and oriented to person, place, and time. Mental status is at baseline.     Psychiatric:         Mood and Affect: Mood normal.         Behavior: Behavior normal.         Thought Content: Thought content normal.         Administrative Statements   Encounter provider Jonn Rossi PA-C    The Patient is located at Home and in the following state in which I hold an active license [place].    The patient was identified by name and date of birth. Hammad Montesinos was informed that this is a telemedicine visit and that the visit is being conducted through the Epic Embedded platform. He agrees to proceed..  My office door was closed. No one else was in the room.  He acknowledged consent and understanding of privacy and security of the video platform. The patient has agreed to participate and understands they can discontinue the visit at any time.    I have spent a total time of 25 minutes in caring for this patient on the day of the visit/encounter including Diagnostic results, Prognosis, Risks and benefits of tx options, Instructions for management, Importance of tx compliance, Risk factor reductions, Impressions, Documenting in the medical record, and Reviewing/placing orders in the medical record (including tests, medications, and/or procedures), not including the time spent for establishing the audio/video connection.

## 2025-05-16 NOTE — ASSESSMENT & PLAN NOTE
This lipid panel was excellent.  Continue with atorvastatin 20 mg daily.  Repeat lipid panel prior to next office visit.

## 2025-05-16 NOTE — PATIENT INSTRUCTIONS
Continue Lantus 20 units twice a day.    Continue NovoLog 12 units with meals     Take 1 extra unit of insulin for every 50 points above 150.  151-200: 1 unit  201-250: 2 units  251-300: 3 units  301-350: 4 units  351-400: 5 units  400+: 6 units     Once he is discharged from rehab, I would like to see a Dexcom download in about 2 weeks and we can make adjustments to his insulin if needed     Continue gabapentin to 200 mg at night.     Continue to use Dexcom to monitor blood sugar. Call the office with any concerns.     Follow up in 3 months with lab work prior to visit.

## 2025-05-16 NOTE — ASSESSMENT & PLAN NOTE
A1c relatively stable.  Download of his Dexcom does show improvement since last office visit, but has been in the hospital in the 2 weeks prior were doing better than current 2 weeks.  I would expect his A1c to trend down without any significant changes.  Unfortunately it is hard for me to make recommendations with everything going on.  Once he is discharged from rehab I would like to see a download of his Dexcom in about 2 weeks.  He will continue with Lantus 20 units twice a day, NovoLog 12 units with meals plus sliding scale.  Contact the office with any concerns or questions.  Follow-up in 3 months with labwork completed prior to visit.  Lab Results   Component Value Date    HGBA1C 9.1 (H) 01/28/2025       Orders:  •  Comprehensive metabolic panel; Future  •  Hemoglobin A1C; Future  •  Lipid panel; Future

## 2025-05-19 ENCOUNTER — TELEPHONE (OUTPATIENT)
Age: 85
End: 2025-05-19

## 2025-05-19 NOTE — TELEPHONE ENCOUNTER
Caller: Cely at [facility]    Doctor and/or Office: Dr. Mac    #: [phone]    [place]: Care Requesting wound care orders to be faxed to [phone].Thank you

## 2025-05-22 ENCOUNTER — OFFICE VISIT (OUTPATIENT)
Dept: PODIATRY | Facility: CLINIC | Age: 85
End: 2025-05-22
Payer: COMMERCIAL

## 2025-05-22 VITALS — WEIGHT: 220 LBS | HEIGHT: 70 IN | BODY MASS INDEX: 31.5 KG/M2

## 2025-05-22 DIAGNOSIS — L97.512 DIABETIC ULCER OF OTHER PART OF RIGHT FOOT ASSOCIATED WITH TYPE 2 DIABETES MELLITUS, WITH FAT LAYER EXPOSED (HCC): Primary | ICD-10-CM

## 2025-05-22 DIAGNOSIS — E11.621 DIABETIC ULCER OF OTHER PART OF RIGHT FOOT ASSOCIATED WITH TYPE 2 DIABETES MELLITUS, WITH FAT LAYER EXPOSED (HCC): Primary | ICD-10-CM

## 2025-05-22 PROCEDURE — RECHECK: Performed by: PODIATRIST

## 2025-05-22 PROCEDURE — 11042 DBRDMT SUBQ TIS 1ST 20SQCM/<: CPT | Performed by: PODIATRIST

## 2025-05-23 ENCOUNTER — HOME HEALTH ADMISSION (OUTPATIENT)
Dept: HOME HEALTH SERVICES | Facility: HOME HEALTHCARE | Age: 85
End: 2025-05-23
Payer: COMMERCIAL

## 2025-05-23 NOTE — PROGRESS NOTES
Patient ID: Hammad Montesinos is a 84 y.o. male Date of Birth 1940       Chief Complaint   Patient presents with   • Wound     Right foot        Allergies:  Hydrochlorothiazide, Insulin lispro, Lisinopril, Minoxidil, Nifedipine, Other, Prazosin, Venlafaxine, Doxycycline, and Simvastatin    Diagnosis:  1. 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, with fat layer exposed (HCC)  E11.621 Debridement MASD Bilateral Buttocks    L97.512            Assessment & Plan:  Recurrent breakdown of ulceration right foot subthird MPJ.  Mupirocin/alginate dressing to be changed every other day.  Patient encouraged to minimize his ambulation to facilitate quicker healing of ulceration.  Follow-up in approximately 2 weeks.    Subjective:   5/22/2025: 84-year-old male seen today for follow-up chronic DFU right foot subthird MPJ.  Recent hospital discharge and now in skilled facility for rehab and conditioning.  Reports drainage from ulcer but only a very small amount.    4/24/2025: 84-year-old type II diabetic seen for follow-up ulcer care and diabetic footcare.  Reports ulceration is only slightly smaller but still continues to drain.    4/15/2025: 84-year-old type II diabetic seen today for recurrent breakdown of the right forefoot.  Patient reports drainage over the past couple days.  Has been applying silver foam and limiting his activity.    2/4/2025: Hammad Montesinos is a 84 y.o.year old male seen for diabetic foot exam.  The patient has class findings with diabetes.   BS is under control.  The patient complained of thick toenails and painful lesions which have previously ulcerated.  Patient is concerned with possible recurrent ulceration beneath right third toe joint..  The patient  "denied any acute pedal disorder, redness, acute swelling, or recent injury        The following portions of the patient's history were reviewed and updated as appropriate:   Problem List[1]  Past Medical History[2]  Past Surgical History[3]  Social History[4]   Current Medications[5]  Family History[6]   Review of Systems   Constitutional: Negative.    HENT: Negative.     Eyes: Negative.    Respiratory: Negative.     Cardiovascular: Negative.    Gastrointestinal: Negative.    Endocrine:        Type 2 diabetes   Genitourinary: Negative.    Musculoskeletal: Negative.    Skin:  Positive for wound.        chronic DFU plantar right forefoot and thick elongated toenails.   Allergic/Immunologic: Negative.    Neurological:  Positive for numbness.   Hematological: Negative.    Psychiatric/Behavioral: Negative.           Objective:  Ht 5' 10\" (1.778 m) Comment: stated  Wt 99.8 kg (220 lb)   BMI 31.57 kg/m²     Physical Exam  Constitutional:       Appearance: Normal appearance.   HENT:      Head: Normocephalic.      Right Ear: Tympanic membrane normal.      Left Ear: Tympanic membrane normal.      Nose: No congestion.      Mouth/Throat:      Pharynx: No oropharyngeal exudate or posterior oropharyngeal erythema.     Eyes:      Conjunctiva/sclera: Conjunctivae normal.      Pupils: Pupils are equal, round, and reactive to light.       Cardiovascular:      Rate and Rhythm: Normal rate and regular rhythm.      Pulses:           Dorsalis pedis pulses are 1+ on the right side and 1+ on the left side.        Posterior tibial pulses are 0 on the right side and 0 on the left side.   Pulmonary:      Effort: Pulmonary effort is normal.     Musculoskeletal:      Right lower leg: Edema (+2) present.      Left lower leg: Edema (+2) present.      Right foot: Prominent metatarsal heads present.      Left foot: Prominent metatarsal heads present.   Feet:      Right foot:      Protective Sensation: 10 sites tested.  8 sites sensed.      Skin " integrity: Ulcer (See comments) present. No erythema.      Left foot:      Protective Sensation: 10 sites tested.  9 sites sensed.      Skin integrity: Callus (Sub 3rd MPJ and tip of right great toe) present.      Comments: Right foot subthird MPJ: Full-thickness DFU breakdown consistent with Calhoun 1 type ulceration.  No signs of infection.  Hyperkeratotic margins with periwound hemorrhaging callus with less necrotic debris.  No signs of infection noted.  Scant serosanguineous drainage.  Ulcer debrided down to healthy granular tissue.    Post-debridement measurements 2.5 x 2.0 x 0.3 cm.    Skin:     General: Skin is warm and dry.      Capillary Refill: Capillary refill takes 2 to 3 seconds.      Coloration: Skin is not pale.      Findings: No bruising, erythema, lesion or rash.      Comments:     Severely dystrophic nails x 10 consistent with chronic nail mycosis.  Yellow discoloration, subungual debris, brittle nature, and distal onycholysis noted.    Hyperkeratotic lesions located left foot subthird MPJ and tip of right great toe.  Hemorrhaging callus noted consistent with a preulcerative lesion.     Neurological:      Mental Status: He is alert.      Cranial Nerves: No cranial nerve deficit.      Sensory: Sensory deficit present.      Motor: No weakness.      Gait: Gait normal.      Deep Tendon Reflexes: Reflexes normal.      Comments: Hypoesthesia and loss of protective sensation noted.  Consistent with advanced diabetic neuropathy   Psychiatric:         Mood and Affect: Mood normal.         Behavior: Behavior normal.         Judgment: Judgment normal.         Wound 02/22/25 MASD Buttocks Bilateral (Active)       Debridement   Wound 02/22/25 MASD Buttocks Bilateral     Date/Time: 5/22/2025 9:00 AM    Universal Protocol:  procedure performed by consultantConsent: Verbal consent obtained  Risks and benefits: risks, benefits and alternatives were discussed  Consent given by: patient  Time out: Immediately prior to  "procedure a \"time out\" was called to verify the correct patient, procedure, equipment, support staff and site/side marked as required.  Patient understanding: patient states understanding of the procedure being performed  Patient identity confirmed: verbally with patient    Debridement Details  Performed by: physician  Debridement type: surgical  Level of debridement: subcutaneous tissue  Pain control: lidocaine 4%    Post-debridement measurements  Length (cm): 2.5  Width (cm): 2  Depth (cm): 0.3  Percent debrided: 100%  Surface Area (cm^2): 3.93  Area Debrided (cm^2): 3.93  Volume (cm^3): 0.79    Tissue and other material debrided: subcutaneous tissue  Devitalized tissue debrided: callus, necrotic debris and slough  Instrument(s) utilized: nippers, curette and blade  Bleeding: medium  Hemostasis obtained with: pressure  Procedural pain (0-10): 3  Post-procedural pain: 3   Response to treatment: procedure was tolerated well                 Wound Instructions:  Orders Placed This Encounter   Procedures   • Debridement MASD Bilateral Buttocks     This order was created via procedure documentation         Julio César Mac DPM      Portions of the record may have been created with voice recognition software. Occasional wrong word or \"sound a like\" substitutions may have occurred due to the inherent limitations of voice recognition software. Read the chart carefully and recognize, using context, where substitutions have occurred.           [1]  Patient Active Problem List  Diagnosis   • Type 2 diabetes mellitus with hyperglycemia, with long-term current use of insulin (HCC)   • Mixed hyperlipidemia   • Diabetic polyneuropathy associated with type 2 diabetes mellitus (HCC)   • Primary hypertension   • Morbid (severe) obesity due to excess calories (HCC)   • 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 (HCC)   • 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 (HCC)   • Elevated " parathyroid hormone   • Diabetic nephropathy associated with type 2 diabetes mellitus (HCC)   • Type 2 diabetes mellitus with diabetic neuropathy, unspecified whether long term insulin use (HCC)   • Pre-ulcerative calluses   • Acute on chronic diastolic heart failure (HCC)   • Elevated troponin   • Pulmonary hypertension (HCC)   • Paroxysmal A-fib (HCC)   • 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, with fat layer exposed (HCC)   • Stroke-like symptoms   • Fall   • Generalized weakness   • Acute on chronic systolic congestive heart failure (HCC)   • MC (obstructive sleep apnea)   [2]  Past Medical History:  Diagnosis Date   • Asthma    • Bradycardia    • Callus    • Cancer ([facility])     Skin   • Carcinoma, basal cell, skin     head and face and ear   • CHF (congestive heart failure) ([facility])    • Chronic kidney disease    • Coronary artery disease    • COVID-19 12/05/2022   • Diabetes mellitus ([facility])    • Difficulty walking    • Dupuytren contracture     bilateral ring and right middle   • Gout    • Herniated lumbar intervertebral disc    • Hypertension    • Myocardial infarction ([facility])     Unknown   • Neuropathy in diabetes ([facility])    • Sleep apnea    [3]  Past Surgical History:  Procedure Laterality Date   • APPENDECTOMY      age 20, ruptured   • CATARACT EXTRACTION, BILATERAL Bilateral    • CERVICAL FUSION     • CYST REMOVAL Left 01/2023   • DUPUYTREN CONTRACTURE RELEASE Left     left ring finger   • INSERT / REPLACE / REMOVE PACEMAKER     • LUMBAR LAMINECTOMY      belkis placed   • TONSILLECTOMY     • VASECTOMY     [4]  Social History  Socioeconomic History   • Marital status: /Civil Union   Occupational History   • Occupation: teacher     Comment: retired   Tobacco Use   • Smoking status: Never   • Smokeless tobacco: Never   Vaping Use   • Vaping status: Never Used   Substance and Sexual Activity   • Alcohol use: Not Currently   • Drug use: Never   • Sexual activity: Not  Currently     Birth control/protection: Male Sterilization     Social Drivers of Health     Food Insecurity: No Food Insecurity (2025)    Nursing - Inadequate Food Risk Classification    • Ran Out of Food in the Last Year: Never true   Transportation Needs: No Transportation Needs (3/11/2025)    OASIS : Transportation    • Lack of Transportation (Medical): No    • Lack of Transportation (Non-Medical): No    • Patient Unable or Declines to Respond: No   Recent Concern: Transportation Needs - Unmet Transportation Needs (2025)    Nursing - Transportation Risk Classification    • Lack of Transportation: Yes   Intimate Partner Violence: Unknown (2025)    Nursing IPS    • Physically Hurt by Someone: No    • Hurt or Threatened by Someone: No   Housing Stability: Unknown (2025)    Nursing: Inadequate Housing Risk Classification    • Unable to Pay for Housing in the Last Year: No    • Has Housin   [5]    Current Outpatient Medications:   •  Albuterol Sulfate 108 (90 Base) MCG/ACT AEPB, Inhale 2 puffs every 4 (four) hours as needed (SOB, wheezing, cough), Disp: 1 each, Rfl: 3  •  allopurinol (ZYLOPRIM) 300 mg tablet, Take 300 mg by mouth every 24 hours, Disp: , Rfl:   •  atorvastatin (LIPITOR) 20 mg tablet, Take 20 mg by mouth in the morning., Disp: , Rfl:   •  azelastine (ASTELIN) 0.1 % nasal spray, 2 sprays into each nostril in the morning and 2 sprays in the evening., Disp: , Rfl:   •  budesonide (PULMICORT) 0.5 mg/2 mL nebulizer solution, Take 0.5 mg by nebulization in the morning and 0.5 mg in the evening. Rinse mouth after use., Disp: , Rfl:   •  bumetanide (BUMEX) 2 mg tablet, Take 1 tablet (2 mg total) by mouth 2 (two) times a day Take when your wake up and then again early afternoon, Disp: 180 tablet, Rfl: 3  •  Cholecalciferol (Vitamin D3) 25 MCG (1000 UT) CAPS, Take 1 capsule by mouth every 24 hours, Disp: , Rfl:   •  Coenzyme Q10 (Co Q10) 100 MG CAPS, Take 100 mg by mouth in the  morning, Disp: , Rfl:   •  Easy Comfort Pen Needles 33G X 4 MM MISC, , Disp: , Rfl:   •  fluticasone (FLONASE) 50 mcg/act nasal spray, 1 spray into each nostril in the morning and 1 spray before bedtime., Disp: , Rfl:   •  formoterol (PERFOROMIST) 20 MCG/2ML nebulizer solution, Take 20 mcg by nebulization in the morning and 20 mcg in the evening., Disp: , Rfl:   •  gabapentin (NEURONTIN) 100 mg capsule, Take 1 capsule (100 mg total) by mouth daily, Disp: 180 capsule, Rfl: 0  •  Glucosamine-Chondroitin 500-400 MG CAPS, Take 1 capsule by mouth in the morning., Disp: , Rfl:   •  insulin aspart (NovoLOG FlexPen) 100 UNIT/ML injection pen, INJECT 12 UNITS SUBCUTANEOUSLY with breakfast, LUNCH and with dinner plus sliding scale. (UP  UNITS DAILY). 150-200: 1 unit; 201-250: 2 units; 251-300: 3 units; 301-350: 4 units; 351-400: 5 units; 400+: 6 units, Disp: 45 mL, Rfl: 1  •  Insulin Glargine Solostar (Lantus SoloStar) 100 UNIT/ML SOPN, INJECT 10 UNITS SUBCUTANEOUSLY IN THE MORNING AND 10 UNITS IN THE EVENING. (Patient taking differently: Inject 20 Units under the skin in the morning and 20 Units before bedtime.), Disp: 15 mL, Rfl: 1  •  ipratropium (ATROVENT) 0.03 % nasal spray, , Disp: , Rfl:   •  ipratropium-albuterol (DUO-NEB) 0.5-2.5 mg/3 mL nebulizer solution, Take 3 mL by nebulization in the morning and 3 mL in the evening., Disp: , Rfl:   •  loratadine (CLARITIN) 10 mg tablet, Take 10 mg by mouth in the morning., Disp: , Rfl:   •  losartan (COZAAR) 100 MG tablet, Take 1 tablet by mouth in the morning, Disp: , Rfl:   •  magnesium Oxide (MAG-OX) 400 mg TABS, Take 1 tablet (400 mg total) by mouth daily, Disp: 30 tablet, Rfl: 0  •  metoprolol succinate (TOPROL-XL) 25 mg 24 hr tablet, Take 1 tablet (25 mg total) by mouth daily, Disp: 30 tablet, Rfl: 11  •  montelukast (SINGULAIR) 10 mg tablet, Take 1 tablet (10 mg total) by mouth daily at bedtime, Disp: 30 tablet, Rfl: 0  •  Multiple Vitamin (MULTIVITAMIN ADULT PO),  Take 1 tablet by mouth in the morning., Disp: , Rfl:   •  mupirocin (BACTROBAN) 2 % ointment, Apply topically every other day With alginate dressing, Disp: 22 g, Rfl: 0  •  Omega-3 Fatty Acids (Fish Oil) 360 MG CAPS, Take 1 capsule by mouth in the morning., Disp: , Rfl:   •  OneTouch Verio test strip, , Disp: , Rfl:   •  potassium chloride (K-DUR,KLOR-CON) 20 mEq tablet, Take 1 tablet (20 mEq total) by mouth daily, Disp: 30 tablet, Rfl: 0  •  senna (SENOKOT) 8.6 mg, Take 2 tablets (17.2 mg total) by mouth daily at bedtime as needed for constipation, Disp: 30 tablet, Rfl: 0  •  spironolactone (ALDACTONE) 25 mg tablet, Take 1 tablet (25 mg total) by mouth daily, Disp: 30 tablet, Rfl: 0  •  rivaroxaban (Xarelto) 20 mg tablet, Take 1 tablet (20 mg total) by mouth daily with breakfast (Patient not taking: Reported on 5/16/2025), Disp: 30 tablet, Rfl: 5  •  triamcinolone (KENALOG) 0.1 % cream, Apply 1 Application topically in the morning and 1 Application in the evening. To affected areas bilateral feet ., Disp: , Rfl:   •  valACYclovir (VALTREX) 1,000 mg tablet, Take 1,000 mg by mouth 3 (three) times a day, Disp: , Rfl: [6]  Family History  Problem Relation Name Age of Onset   • Hypertension Mother Althea    • Diabetes type II Mother Althea    • Diabetes Mother Althea    • Hypertension Father Bulmaro    • Cancer Father Bulmaro    • Diabetes type II Brother Abhay    • Hypertension Brother Abhay    • Heart disease Brother Abhay    • Kidney disease Sister Don’t know    • Hypertension Daughter amandeep    • GI problems Daughter yusuf

## 2025-05-27 ENCOUNTER — HOME CARE VISIT (OUTPATIENT)
Dept: HOME HEALTH SERVICES | Facility: HOME HEALTHCARE | Age: 85
End: 2025-05-27
Attending: FAMILY MEDICINE

## 2025-05-27 NOTE — CASE COMMUNICATION
Patient reports still hospitalized at [facility] and will not be discharged until earliest tomorrow afternoon.

## 2025-05-28 DIAGNOSIS — J30.9 ALLERGIC RHINITIS, UNSPECIFIED SEASONALITY, UNSPECIFIED TRIGGER: ICD-10-CM

## 2025-05-28 DIAGNOSIS — J45.50 SEVERE PERSISTENT ASTHMA WITHOUT COMPLICATION: ICD-10-CM

## 2025-05-29 PROBLEM — I27.20 PULMONARY HYPERTENSION (HCC): Status: RESOLVED | Noted: 2023-12-29 | Resolved: 2025-05-29

## 2025-05-29 PROBLEM — I50.33 ACUTE ON CHRONIC DIASTOLIC HEART FAILURE (HCC): Status: RESOLVED | Noted: 2023-12-29 | Resolved: 2025-05-29

## 2025-05-29 PROBLEM — R79.89 ELEVATED TROPONIN: Status: RESOLVED | Noted: 2023-12-29 | Resolved: 2025-05-29

## 2025-05-29 PROBLEM — Z95.0 CARDIAC PACEMAKER IN SITU: Chronic | Status: ACTIVE | Noted: 2025-05-29

## 2025-05-29 PROBLEM — R94.39 ABNORMAL NUCLEAR STRESS TEST: Chronic | Status: ACTIVE | Noted: 2025-01-01
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
## 2025-05-29 PROBLEM — I48.19 PERSISTENT ATRIAL FIBRILLATION (HCC): Chronic | Status: ACTIVE | Noted: 2023-12-29

## 2025-05-29 PROBLEM — I10 PRIMARY HYPERTENSION: Chronic | Status: ACTIVE | Noted: 2022-03-24

## 2025-05-29 PROBLEM — I50.22 CHRONIC HEART FAILURE WITH REDUCED EJECTION FRACTION (HFREF, <= 40%) (HCC): Chronic | Status: ACTIVE | Noted: 2025-02-14

## 2025-05-29 PROBLEM — E78.2 MIXED HYPERLIPIDEMIA: Chronic | Status: ACTIVE | Noted: 2022-03-24

## 2025-05-29 PROBLEM — E11.649 HYPOGLYCEMIA UNAWARENESS ASSOCIATED WITH TYPE 2 DIABETES MELLITUS (HCC): Status: RESOLVED | Noted: 2022-03-25 | Resolved: 2025-05-29

## 2025-05-29 PROBLEM — Z95.0 CARDIAC PACEMAKER IN SITU: Status: ACTIVE | Noted: 2025-05-29

## 2025-05-29 RX ORDER — MONTELUKAST SODIUM 10 MG/1
10 TABLET ORAL
Qty: 30 TABLET | Refills: 5 | Status: SHIPPED | OUTPATIENT
Start: 2025-05-29

## 2025-05-30 ENCOUNTER — REMOTE DEVICE CLINIC VISIT (OUTPATIENT)
Dept: CARDIOLOGY CLINIC | Facility: CLINIC | Age: 85
End: 2025-05-30
Payer: COMMERCIAL

## 2025-05-30 ENCOUNTER — HOME CARE VISIT (OUTPATIENT)
Dept: HOME HEALTH SERVICES | Facility: HOME HEALTHCARE | Age: 85
End: 2025-05-30
Payer: COMMERCIAL

## 2025-05-30 VITALS
RESPIRATION RATE: 18 BRPM | BODY MASS INDEX: 30.94 KG/M2 | SYSTOLIC BLOOD PRESSURE: 112 MMHG | HEART RATE: 88 BPM | OXYGEN SATURATION: 98 % | HEIGHT: 71 IN | WEIGHT: 221 LBS | DIASTOLIC BLOOD PRESSURE: 60 MMHG | TEMPERATURE: 97.5 F

## 2025-05-30 DIAGNOSIS — I48.91 ATRIAL FIBRILLATION, UNSPECIFIED TYPE (HCC): ICD-10-CM

## 2025-05-30 DIAGNOSIS — I49.5 SSS (SICK SINUS SYNDROME) (HCC): Primary | ICD-10-CM

## 2025-05-30 PROCEDURE — 93294 REM INTERROG EVL PM/LDLS PM: CPT | Performed by: INTERNAL MEDICINE

## 2025-05-30 PROCEDURE — 400013 VN SOC

## 2025-05-30 PROCEDURE — G0299 HHS/HOSPICE OF RN EA 15 MIN: HCPCS

## 2025-05-30 PROCEDURE — 93296 REM INTERROG EVL PM/IDS: CPT | Performed by: INTERNAL MEDICINE

## 2025-05-30 NOTE — PROGRESS NOTES
"Results for orders placed or performed in visit on 05/30/25   Cardiac EP device report    Narrative    BIO DC/PM  BIOTRONIK TRANSMISSION: BATTERY STATUS \"OK\"(70%). AP: 2%. : 84% (>40%~DDD-CLS/60). ALL AVAILABLE LEAD PARAMETERS WITHIN NORMAL LIMITS. AF IN PROGRESS (HX OF SAME). AF BURDEN: 100%. PT TAKES XARELTO, METOPORLOL SUCC. EF: 40% (ECHO 3/13/25). NORMAL DEVICE FUNCTION. CH        "

## 2025-05-31 ENCOUNTER — RESULTS FOLLOW-UP (OUTPATIENT)
Dept: CARDIOLOGY CLINIC | Facility: CLINIC | Age: 85
End: 2025-05-31

## 2025-06-02 ENCOUNTER — HOME CARE VISIT (OUTPATIENT)
Dept: HOME HEALTH SERVICES | Facility: HOME HEALTHCARE | Age: 85
End: 2025-06-02
Payer: COMMERCIAL

## 2025-06-02 VITALS — TEMPERATURE: 99 F | SYSTOLIC BLOOD PRESSURE: 118 MMHG | DIASTOLIC BLOOD PRESSURE: 64 MMHG | RESPIRATION RATE: 20 BRPM

## 2025-06-02 VITALS — DIASTOLIC BLOOD PRESSURE: 64 MMHG | SYSTOLIC BLOOD PRESSURE: 138 MMHG

## 2025-06-02 VITALS — SYSTOLIC BLOOD PRESSURE: 118 MMHG | DIASTOLIC BLOOD PRESSURE: 62 MMHG

## 2025-06-02 PROCEDURE — G0299 HHS/HOSPICE OF RN EA 15 MIN: HCPCS

## 2025-06-02 PROCEDURE — G0151 HHCP-SERV OF PT,EA 15 MIN: HCPCS

## 2025-06-02 PROCEDURE — G0152 HHCP-SERV OF OT,EA 15 MIN: HCPCS

## 2025-06-02 NOTE — PROGRESS NOTES
"[facility] Follow-up Visit  Admission Dx: \"rectal bleeding\"     Hammad Montesinos 84 y.o. male   MRN: [id number]  Encounter: [id number]    Assessment:  Patient Active Problem List    Diagnosis Date Noted    Cardiac pacemaker in situ 05/29/2025    History of tachycardia-bradycardia syndrome s/p DC PPM     MC (obstructive sleep apnea) 04/14/2025    Chronic heart failure with reduced ejection fraction (HFrEF, <= 40%) ([facility]) 02/14/2025    Generalized weakness 01/28/2025    Stroke-like symptoms 01/27/2025    Abnormal nuclear stress test 2025    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, with fat layer exposed ([facility]) 02/12/2024    Type 2 diabetes mellitus with diabetic neuropathy, unspecified whether long term insulin use ([facility]) 12/05/2023    Pre-ulcerative calluses 12/05/2023    Elevated parathyroid hormone 03/25/2022    Diabetic nephropathy associated with type 2 diabetes mellitus ([facility]) 03/25/2022    Type 2 diabetes mellitus with hyperglycemia, with long-term current use of insulin ([facility]) 03/24/2022    Mixed hyperlipidemia 03/24/2022    Diabetic polyneuropathy associated with type 2 diabetes mellitus ([facility]) 03/24/2022    Hypertension 03/24/2022    Morbid (severe) obesity due to excess calories ([facility]) 03/24/2022    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 ([facility]) 03/24/2022    Persistent atrial fibrillation ([facility]) 01/19/2014     Administrative Statements   The Patient is located at Home and in the following state in which I hold an active license [place]. The patient was identified by name and date of birth. Hammad Montesinos was informed that this is a telemedicine visit and that the visit is being conducted through Telephone.  My office door was closed. No one else was in the room.  He acknowledged consent and understanding of privacy and security of the platform. The patient has agreed to participate and understands they can discontinue the visit at " any time. I have spent a total time of 12 minutes in caring for this patient on the day of the visit/encounter including Instructions for management, Counseling / Coordination of care, Documenting in the medical record, and Obtaining or reviewing history  , not including the time spent for establishing the audio/video connection.    Today's Plan:  Continue current medications. Detailed cardiac med rec completed today.   No further bleeding events since being restarted on NOAC.  RTC in 3-6 months for routine follow-up; prefers [place] office.    Plan:  Chronic HFrEF; LVEF 40%   Etiology: ischemic? Nuc stress in 03/2025 suggestive of prior inferior MI. ?RV pacing.   Weight of 214 lbs on 03/28 ([facility] office). Today, weighs 221 lbs (at home).    Most recent BMP from 05/15/2025: sodium 136; potassium 3.9; BUN 22; creatinine 1.06; eGFR 69.    Guideline-Directed Medical Therapy:  --Beta Blocker: metoprolol succinate 25 mg daily.   --ARNi / ACEi / ARB: losartan 100 mg daily.  --Aldosterone Antagonist: spironolactone 25 mg daily.   --SGLT2 Inhibitor: No.    Volume Management:  --Diuretic: Bumex 2 mg BID.  --K supplementation: potassium 20 mEq daily.    Sudden Cardiac Death Risk Reduction:  --LVEF >35%.    Atrial fibrillation, persistent   [id number] = 6 (age, HF, HTN, DM, CAD).   Anticoagulation on Xarelto.   Rate control: BB as above.    Presumed CAD / abnormal stress test  Hypertension  Hyperlipidemia  History of tachy-hakeem syndrome: s/p Biotronik DC PPM.  Obstructive sleep apnea  Diabetes mellitus, type II  Asthma / reactive airway disease  Spinal stenosis, lumbar  Palliative care patient  History of GI bleeding    HPI:   Hammad Montesinos is an 84-year-old man with a PMH as above who presents to the office for hospital follow-up. Follows with Dr. Holland.     Admitted to [facility] from 05/05 to 05/15/2025 with recurrence of rectal bleeding. Had recent admission for the same with associated ABLA requiring  numerous transfusions. Started on losartan. NOAC restarted.     06/03/2025: Patient contacted for hospital follow-up. Feeling great today. No current cardiac complaints. Denies lightheadedness, dizziness, syncope, chest pain, palpitations, SOB, PND, and orthopnea. Since resuming Xarelto, he denies any further bleeding events. Continues to check weights routinely. Recently got power wheelchair.     Past Medical History[1]    Review of Systems   Constitutional:  Negative for activity change, appetite change, fatigue and unexpected weight change.   Respiratory:  Negative for cough, chest tightness and shortness of breath.    Cardiovascular:  Negative for chest pain, palpitations and leg swelling.   Gastrointestinal:  Negative for abdominal distention, abdominal pain and blood in stool.   Genitourinary:  Negative for decreased urine volume.   Neurological:  Negative for dizziness, syncope, weakness and light-headedness.   Psychiatric/Behavioral:  Negative for confusion and sleep disturbance. The patient is not nervous/anxious.      Allergies[2]    Current Medications[3]    Social History     Socioeconomic History    Marital status: /Civil Union     Spouse name: Not on file    Number of children: Not on file    Years of education: Not on file    Highest education level: Not on file   Occupational History    Occupation: teacher     Comment: retired   Tobacco Use    Smoking status: Never    Smokeless tobacco: Never   Vaping Use    Vaping status: Never Used   Substance and Sexual Activity    Alcohol use: Not Currently    Drug use: Never    Sexual activity: Not Currently     Birth control/protection: Male Sterilization   Other Topics Concern    Not on file   Social History Narrative    Not on file     Social Drivers of Health     Financial Resource Strain: Not on file   Food Insecurity: No Food Insecurity (2/14/2025)    Nursing - Inadequate Food Risk Classification     Worried About Running Out of Food in the Last  Year: Not on file     Ran Out of Food in the Last Year: Not on file     Ran Out of Food in the Last Year: Never true   Transportation Needs: No Transportation Needs (2025)    [place] : Transportation     Lack of Transportation (Medical): No     Lack of Transportation (Non-Medical): No     Patient Unable or Declines to Respond: No   Physical Activity: Not on file   Stress: Not on file   Social Connections: Not on file   Intimate Partner Violence: Unknown (2025)    Nursing IPS     Feels Physically and Emotionally Safe: Not on file     Physically Hurt by Someone: Not on file     Humiliated or Emotionally Abused by Someone: Not on file     Physically Hurt by Someone: No     Hurt or Threatened by Someone: No   Housing Stability: Unknown (2025)    Nursing: Inadequate Housing Risk Classification     Has Housing: Not on file     Worried About Losing Housing: Not on file     Unable to Get Utilities: Not on file     Unable to Pay for Housing in the Last Year: No     Has Housin     Family History[4]    Vitals:   Blood pressure 127/57, weight 100 kg (221 lb), SpO2 100%.    Wt Readings from Last 10 Encounters:   25 100 kg (221 lb)   25 100 kg (221 lb)   25 99.8 kg (220 lb)   25 103 kg (226 lb)   04/15/25 101 kg (222 lb)   25 101 kg (222 lb 12.8 oz)   25 97.3 kg (214 lb 9.6 oz)   25 95.7 kg (211 lb)   25 95.7 kg (211 lb)   25 97.1 kg (214 lb)     Vitals:    25 0958   BP: 127/57   BP Location: Left arm   Patient Position: Sitting   Cuff Size: Large   SpO2: 100%   Weight: 100 kg (221 lb)     Labs & Results:  Lab Results   Component Value Date    WBC 6.45 2025    HGB 11.3 (L) 2025    HCT 36.1 (L) 2025    MCV 84 2025     2025     Lab Results   Component Value Date    SODIUM 135 2025    K 3.8 2025    CL 97 2025    CO2 29 2025    BUN 34 (H) 2025    CREATININE 1.25 2025    GLUC 112  02/20/2025    CALCIUM 9.1 02/20/2025     Lab Results   Component Value Date    INR 2.99 (H) 02/14/2025    INR 1.27 (H) 01/27/2025    INR 1.66 (H) 01/14/2025    PROTIME 31.2 (H) 02/14/2025    PROTIME 16.4 (H) 01/27/2025    PROTIME 20.1 (H) 01/14/2025     Lab Results   Component Value Date     (H) 02/14/2025      Sierra Mahan PA-C       [1]   Past Medical History:  Diagnosis Date    Abnormal nuclear stress test 2025    Acute on chronic diastolic heart failure (HCC) 12/29/2023    Asthma     Bradycardia     Callus     Cancer ([facility])     Skin    Carcinoma, basal cell, skin     head and face and ear    CHF (congestive heart failure) ([facility])     Chronic heart failure with reduced ejection fraction (HFrEF, <= 40%) ([facility]) 02/14/2025    Chronic kidney disease     Coronary artery disease     COVID-19 12/05/2022    Diabetes mellitus ([facility])     Difficulty walking     Dupuytren contracture     bilateral ring and right middle    Fall 01/27/2025    Gout     Herniated lumbar intervertebral disc     History of tachycardia-bradycardia syndrome     Hypertension     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 ([facility]) 03/25/2022    Myocardial infarction ([facility])     Unknown    Neuropathy in diabetes ([facility])     Persistent atrial fibrillation ([facility]) 12/29/2023    Sleep apnea    [2]   Allergies  Allergen Reactions    Hydrochlorothiazide Other (See Comments)     SEVERE DIZZINESS    Insulin Lispro Other (See Comments)     Cannot control blood sugars(Humalog)    Lisinopril Other (See Comments)     Reports cough with use     Minoxidil GI Intolerance    Nifedipine Other (See Comments)     EDEMA LEGS    Other Other (See Comments)     SEVERE DIZZINESS  Hydrap-es    Prazosin Other (See Comments)     CHEST PAIN      Venlafaxine Other (See Comments)     Made pt feel bad      Doxycycline Rash    Simvastatin Rash     PEDAL EDEMA     [3]   Current Outpatient Medications:     Albuterol Sulfate 108 (90 Base) MCG/ACT AEPB, Inhale 2  puffs every 4 (four) hours as needed (SOB, wheezing, cough), Disp: 1 each, Rfl: 3    allopurinol (ZYLOPRIM) 300 mg tablet, Take 1 tablet by mouth every 24 hours, Disp: , Rfl:     atorvastatin (LIPITOR) 20 mg tablet, Take 1 tablet by mouth in the morning., Disp: , Rfl:     azelastine (ASTELIN) 0.1 % nasal spray, 2 sprays into each nostril in the morning and 2 sprays in the evening., Disp: , Rfl:     budesonide (PULMICORT) 0.5 mg/2 mL nebulizer solution, Take 0.5 mg by nebulization in the morning and 0.5 mg in the evening. Rinse mouth after use., Disp: , Rfl:     bumetanide (BUMEX) 2 mg tablet, Take 1 tablet (2 mg total) by mouth 2 (two) times a day Take when your wake up and then again early afternoon, Disp: 180 tablet, Rfl: 3    Cholecalciferol (Vitamin D3) 25 MCG (1000 UT) CAPS, Take 1 capsule by mouth every 24 hours, Disp: , Rfl:     Coenzyme Q10 (Co Q10) 100 MG CAPS, Take 1 capsule by mouth in the morning, Disp: , Rfl:     Easy Comfort Pen Needles 33G X 4 MM MISC, , Disp: , Rfl:     fluticasone (FLONASE) 50 mcg/act nasal spray, 1 spray into each nostril in the morning and 1 spray before bedtime., Disp: , Rfl:     formoterol (PERFOROMIST) 20 MCG/2ML nebulizer solution, Take 20 mcg by nebulization in the morning and 20 mcg in the evening., Disp: , Rfl:     gabapentin (NEURONTIN) 100 mg capsule, Take 1 capsule (100 mg total) by mouth daily, Disp: 180 capsule, Rfl: 0    Glucosamine-Chondroitin 500-400 MG CAPS, Take 1 capsule by mouth in the morning., Disp: , Rfl:     insulin aspart (NovoLOG FlexPen) 100 UNIT/ML injection pen, INJECT 12 UNITS SUBCUTANEOUSLY with breakfast, LUNCH and with dinner plus sliding scale. (UP  UNITS DAILY). 150-200: 1 unit; 201-250: 2 units; 251-300: 3 units; 301-350: 4 units; 351-400: 5 units; 400+: 6 units, Disp: 45 mL, Rfl: 1    Insulin Glargine Solostar (Lantus SoloStar) 100 UNIT/ML SOPN, INJECT 10 UNITS SUBCUTANEOUSLY IN THE MORNING AND 10 UNITS IN THE EVENING. (Patient taking  differently: Inject 20 Units under the skin in the morning and 20 Units before bedtime.), Disp: 15 mL, Rfl: 1    ipratropium (ATROVENT) 0.03 % nasal spray, 1 spray into each nostril twice a day, Disp: , Rfl:     ipratropium-albuterol (DUO-NEB) 0.5-2.5 mg/3 mL nebulizer solution, Take 3 mL by nebulization in the morning and 3 mL in the evening., Disp: , Rfl:     loratadine (CLARITIN) 10 mg tablet, Take 1 tablet by mouth in the morning., Disp: , Rfl:     losartan (COZAAR) 100 MG tablet, Take 1 tablet by mouth in the morning, Disp: , Rfl:     magnesium Oxide (MAG-OX) 400 mg TABS, Take 1 tablet (400 mg total) by mouth daily, Disp: 30 tablet, Rfl: 0    metoprolol succinate (TOPROL-XL) 25 mg 24 hr tablet, Take 1 tablet (25 mg total) by mouth daily, Disp: 30 tablet, Rfl: 11    montelukast (SINGULAIR) 10 mg tablet, Take 1 tablet (10 mg total) by mouth daily at bedtime, Disp: 30 tablet, Rfl: 5    Multiple Vitamin (MULTIVITAMIN ADULT PO), Take 1 tablet by mouth in the morning., Disp: , Rfl:     mupirocin (BACTROBAN) 2 % ointment, Apply topically every other day With alginate dressing (Patient taking differently: Apply 1 Application topically every other day apply to right plantar foot wound), Disp: 22 g, Rfl: 0    Omega-3 Fatty Acids (Fish Oil) 360 MG CAPS, Take 1 capsule by mouth in the morning., Disp: , Rfl:     OneTouch Verio test strip, , Disp: , Rfl:     pantoprazole (PROTONIX) 40 mg tablet, Take 1 tablet by mouth in the morning and 1 tablet before bedtime., Disp: , Rfl:     potassium chloride (K-DUR,KLOR-CON) 20 mEq tablet, Take 1 tablet (20 mEq total) by mouth daily, Disp: 30 tablet, Rfl: 0    spironolactone (ALDACTONE) 25 mg tablet, Take 1 tablet (25 mg total) by mouth daily, Disp: , Rfl:     triamcinolone (KENALOG) 0.1 % cream, Apply 1 Application topically in the morning and 1 Application in the evening. To affected areas bilateral feet ., Disp: , Rfl:     rivaroxaban (Xarelto) 20 mg tablet, Take 1 tablet (20 mg  total) by mouth daily with breakfast, Disp: 30 tablet, Rfl: 5    senna (SENOKOT) 8.6 mg, Take 2 tablets (17.2 mg total) by mouth daily at bedtime as needed for constipation (Patient not taking: Reported on 6/3/2025), Disp: 30 tablet, Rfl: 0    valACYclovir (VALTREX) 1,000 mg tablet, Take 1 tablet by mouth in the morning and 1 tablet in the evening and 1 tablet before bedtime. (Patient not taking: Reported on 6/3/2025), Disp: , Rfl:   [4]   Family History  Problem Relation Name Age of Onset    Hypertension Mother Althea     Diabetes type II Mother Althea     Diabetes Mother Althea     Hypertension Father Bulmaro     Cancer Father Bulmaro     Diabetes type II Brother Abhay     Hypertension Brother Abhay     Heart disease Brother Abhay     Kidney disease Sister Don’t know     Hypertension Daughter amandeep     GI problems Daughter yusuf

## 2025-06-03 ENCOUNTER — TELEMEDICINE (OUTPATIENT)
Dept: CARDIOLOGY CLINIC | Facility: CLINIC | Age: 85
End: 2025-06-03
Payer: COMMERCIAL

## 2025-06-03 ENCOUNTER — TELEPHONE (OUTPATIENT)
Dept: PODIATRY | Facility: CLINIC | Age: 85
End: 2025-06-03

## 2025-06-03 VITALS
SYSTOLIC BLOOD PRESSURE: 127 MMHG | OXYGEN SATURATION: 100 % | BODY MASS INDEX: 30.82 KG/M2 | DIASTOLIC BLOOD PRESSURE: 57 MMHG | WEIGHT: 221 LBS

## 2025-06-03 DIAGNOSIS — Z09 HOSPITAL DISCHARGE FOLLOW-UP: Primary | ICD-10-CM

## 2025-06-03 DIAGNOSIS — I50.22 CHRONIC HEART FAILURE WITH REDUCED EJECTION FRACTION (HFREF, <= 40%) (HCC): Chronic | ICD-10-CM

## 2025-06-03 PROCEDURE — 99213 OFFICE O/P EST LOW 20 MIN: CPT | Performed by: PHYSICIAN ASSISTANT

## 2025-06-03 RX ORDER — PANTOPRAZOLE SODIUM 40 MG/1
1 TABLET, DELAYED RELEASE ORAL 2 TIMES DAILY
COMMUNITY
Start: 2025-05-28

## 2025-06-03 RX ORDER — SPIRONOLACTONE 25 MG/1
25 TABLET ORAL DAILY
Start: 2025-06-03

## 2025-06-03 NOTE — TELEPHONE ENCOUNTER
Caller: Barry/Jessica    Doctor/Office: Dr Julio César Mac    #: [phone]      What needs to be faxed: Signed treatment notes from 5/22/25    ATTN to: Barry  Attention:  Jessica    Fax#: [phone]    Thank you!

## 2025-06-04 ENCOUNTER — HOME CARE VISIT (OUTPATIENT)
Dept: HOME HEALTH SERVICES | Facility: HOME HEALTHCARE | Age: 85
End: 2025-06-04
Payer: COMMERCIAL

## 2025-06-04 VITALS
SYSTOLIC BLOOD PRESSURE: 110 MMHG | OXYGEN SATURATION: 95 % | DIASTOLIC BLOOD PRESSURE: 60 MMHG | HEART RATE: 68 BPM | TEMPERATURE: 98.8 F | BODY MASS INDEX: 30.96 KG/M2 | RESPIRATION RATE: 16 BRPM | WEIGHT: 222 LBS

## 2025-06-04 PROCEDURE — G0300 HHS/HOSPICE OF LPN EA 15 MIN: HCPCS

## 2025-06-05 ENCOUNTER — TELEPHONE (OUTPATIENT)
Age: 85
End: 2025-06-05

## 2025-06-05 ENCOUNTER — HOME CARE VISIT (OUTPATIENT)
Dept: HOME HEALTH SERVICES | Facility: HOME HEALTHCARE | Age: 85
End: 2025-06-05
Payer: COMMERCIAL

## 2025-06-05 PROCEDURE — G0152 HHCP-SERV OF OT,EA 15 MIN: HCPCS

## 2025-06-06 ENCOUNTER — HOME CARE VISIT (OUTPATIENT)
Dept: HOME HEALTH SERVICES | Facility: HOME HEALTHCARE | Age: 85
End: 2025-06-06
Payer: COMMERCIAL

## 2025-06-06 VITALS
DIASTOLIC BLOOD PRESSURE: 50 MMHG | HEART RATE: 70 BPM | WEIGHT: 221.56 LBS | SYSTOLIC BLOOD PRESSURE: 108 MMHG | OXYGEN SATURATION: 99 % | TEMPERATURE: 98.1 F | BODY MASS INDEX: 30.9 KG/M2

## 2025-06-06 PROCEDURE — G0299 HHS/HOSPICE OF RN EA 15 MIN: HCPCS

## 2025-06-09 ENCOUNTER — OFFICE VISIT (OUTPATIENT)
Dept: WOUND CARE | Facility: HOSPITAL | Age: 85
End: 2025-06-09
Payer: COMMERCIAL

## 2025-06-09 ENCOUNTER — HOME CARE VISIT (OUTPATIENT)
Dept: HOME HEALTH SERVICES | Facility: HOME HEALTHCARE | Age: 85
End: 2025-06-09
Payer: COMMERCIAL

## 2025-06-09 VITALS
WEIGHT: 222 LBS | RESPIRATION RATE: 16 BRPM | SYSTOLIC BLOOD PRESSURE: 110 MMHG | HEART RATE: 60 BPM | HEIGHT: 71 IN | DIASTOLIC BLOOD PRESSURE: 56 MMHG | BODY MASS INDEX: 31.08 KG/M2 | TEMPERATURE: 97.5 F

## 2025-06-09 VITALS — SYSTOLIC BLOOD PRESSURE: 110 MMHG | DIASTOLIC BLOOD PRESSURE: 58 MMHG | HEART RATE: 74 BPM

## 2025-06-09 DIAGNOSIS — E11.40 TYPE 2 DIABETES MELLITUS WITH DIABETIC NEUROPATHY, WITH LONG-TERM CURRENT USE OF INSULIN (HCC): ICD-10-CM

## 2025-06-09 DIAGNOSIS — S81.812A SKIN TEAR OF LEFT LOWER LEG WITHOUT COMPLICATION, INITIAL ENCOUNTER: ICD-10-CM

## 2025-06-09 DIAGNOSIS — Z79.4 TYPE 2 DIABETES MELLITUS WITH DIABETIC NEUROPATHY, WITH LONG-TERM CURRENT USE OF INSULIN (HCC): ICD-10-CM

## 2025-06-09 DIAGNOSIS — E11.621 DIABETIC ULCER OF OTHER PART OF RIGHT FOOT ASSOCIATED WITH TYPE 2 DIABETES MELLITUS, WITH FAT LAYER EXPOSED (HCC): Primary | ICD-10-CM

## 2025-06-09 DIAGNOSIS — L97.512 DIABETIC ULCER OF OTHER PART OF RIGHT FOOT ASSOCIATED WITH TYPE 2 DIABETES MELLITUS, WITH FAT LAYER EXPOSED (HCC): Primary | ICD-10-CM

## 2025-06-09 PROCEDURE — 99214 OFFICE O/P EST MOD 30 MIN: CPT | Performed by: PODIATRIST

## 2025-06-09 PROCEDURE — G0152 HHCP-SERV OF OT,EA 15 MIN: HCPCS

## 2025-06-09 PROCEDURE — 99213 OFFICE O/P EST LOW 20 MIN: CPT | Performed by: PODIATRIST

## 2025-06-09 NOTE — ASSESSMENT & PLAN NOTE
Right foot subthird MPJ ulceration has closed completely.  Rx diabetic shoes with accommodation for metatarsal deformity that causes the ulceration  Monitor area closely for recurrent breakdown    Orders:  •  Wound cleansing and dressings Left;Proximal Pretibial; Future  •  Diabetic Shoe

## 2025-06-09 NOTE — PROGRESS NOTES
Wound Procedure Treatment Left;Proximal Pretibial    Performed by: Sissy Hill RN  Authorized by: Julio César Mac DPM  Associated wounds:   Wound 06/09/25 Traumatic Skin Tear Pretibial Left;Proximal    Wound cleansed with:  NSS   Applied primary dressing:  Silicone bordered foam

## 2025-06-09 NOTE — ASSESSMENT & PLAN NOTE
Disease prevention and related risk factors of diabetes were identified and discussed.    The patient was educated in proper foot wear for diabetics.    Educated in daily foot assessment and routine diabetic foot care.    Discussed the importance of controlling BS through diet and exercise.    Recommend patient follow up as scheduled for further diabetic foot exam and footcare.   Lab Results   Component Value Date    HGBA1C 9.1 (H) 01/28/2025       Orders:  •  Wound cleansing and dressings Left;Proximal Pretibial; Future  •  Wound Procedure Treatment Left;Proximal Pretibial  •  Diabetic Shoe

## 2025-06-09 NOTE — PATIENT INSTRUCTIONS
Orders Placed This Encounter   Procedures    Wound cleansing and dressings Left;Proximal Pretibial     Your right plantar foot wound is healed. Continue to wear your diabetic inserts to help offload pressure. Get fitted for new inserts.    Left leg skin tear:  Wash with soap and water  Cover with border silicone foam dressing  Change dressing 3 x week     Return to [facility] in 2-3 weeks     Standing Status:   Future     Expiration Date:   6/23/2025

## 2025-06-09 NOTE — PROGRESS NOTES
Patient ID: Hammad Montesinos is a 84 y.o. male Date of Birth 1940       Chief Complaint   Patient presents with   • New Patient Visit     Right plantar foot wound. [facility] has been changing dressings. Referred to [facility] by Dr. Mac. Traumatic skin tear to left shin today as well.       Allergies:  Hydrochlorothiazide, Insulin lispro, Lisinopril, Minoxidil, Nifedipine, Other, Prazosin, Venlafaxine, Doxycycline, and Simvastatin    Assessment & Plan  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, with fat layer exposed (HCC)  Right foot subthird MPJ ulceration has closed completely.  Rx diabetic shoes with accommodation for metatarsal deformity that causes the ulceration  Monitor area closely for recurrent breakdown    Orders:  •  Wound cleansing and dressings Left;Proximal Pretibial; Future  •  Diabetic Shoe    Skin tear of left lower leg without complication, initial encounter  New wound of unknown origin, patient suspects that he hit his leg against something.  Silicone bordered foam dressing to be changed every 2 or 3 days.  Follow-up in approximately 2-3 weeks.  Orders:  •  Wound cleansing and dressings Left;Proximal Pretibial; Future  •  Wound Procedure Treatment Left;Proximal Pretibial    Type 2 diabetes mellitus with diabetic neuropathy, with long-term current use of insulin (HCC)  Disease prevention and related risk factors of diabetes were identified and discussed.    The patient was educated in proper foot wear for diabetics.    Educated in daily foot assessment and routine diabetic foot care.    Discussed the importance of controlling BS through diet and exercise.    Recommend patient follow up as scheduled for further diabetic foot exam and footcare.   Lab Results   Component Value Date    HGBA1C 9.1 (H) 01/28/2025       Orders:  •  Wound cleansing and dressings Left;Proximal Pretibial; Future  •  Wound Procedure Treatment Left;Proximal Pretibial  •  Diabetic  Shoe      Subjective:   6/9/2025: 84-year-old male seen today for follow-up chronic DFU right foot reports new wound developed on left leg which she is not sure how it happened.  Reports good progress and feels the right foot may be closed or healed completely.    5/22/2025: 84-year-old male seen today for follow-up chronic DFU right foot subthird MPJ.  Recent hospital discharge and now in skilled facility for rehab and conditioning.  Reports drainage from ulcer but only a very small amount.    4/24/2025: 84-year-old type II diabetic seen for follow-up ulcer care and diabetic footcare.  Reports ulceration is only slightly smaller but still continues to drain.    4/15/2025: 84-year-old type II diabetic seen today for recurrent breakdown of the right forefoot.  Patient reports drainage over the past couple days.  Has been applying silver foam and limiting his activity.    2/4/2025: Hammad Montesinos is a 84 y.o.year old male seen for diabetic foot exam.  The patient has class findings with diabetes.   BS is under control.  The patient complained of thick toenails and painful lesions which have previously ulcerated.  Patient is concerned with possible recurrent ulceration beneath right third toe joint..  The patient denied any acute pedal disorder, redness, acute swelling, or recent injury        The following portions of the patient's history were reviewed and updated as appropriate:   Problem List[1]  Past Medical History[2]  Past Surgical History[3]  Social History[4]   Current Medications[5]  Family History[6]   Review of Systems   Constitutional: Negative.    HENT: Negative.     Eyes: Negative.    Respiratory: Negative.     Cardiovascular: Negative.    Gastrointestinal: Negative.    Endocrine:        Type 2 diabetes   Genitourinary: Negative.    Musculoskeletal: Negative.    Skin:  Positive for wound.        chronic DFU plantar right forefoot and thick elongated toenails.   Allergic/Immunologic: Negative.   "  Neurological:  Positive for numbness.   Hematological: Negative.    Psychiatric/Behavioral: Negative.           Objective:  /56   Pulse 60   Temp 97.5 °F (36.4 °C)   Resp 16   Ht 5' 11\" (1.803 m)   Wt 101 kg (222 lb)   BMI 30.96 kg/m²     Physical Exam  Constitutional:       Appearance: Normal appearance.   HENT:      Head: Normocephalic.      Right Ear: Tympanic membrane normal.      Left Ear: Tympanic membrane normal.      Nose: No congestion.      Mouth/Throat:      Pharynx: No oropharyngeal exudate or posterior oropharyngeal erythema.     Eyes:      Conjunctiva/sclera: Conjunctivae normal.      Pupils: Pupils are equal, round, and reactive to light.       Cardiovascular:      Rate and Rhythm: Normal rate and regular rhythm.      Pulses:           Dorsalis pedis pulses are 1+ on the right side and 1+ on the left side.        Posterior tibial pulses are 0 on the right side and 0 on the left side.   Pulmonary:      Effort: Pulmonary effort is normal.     Musculoskeletal:      Right lower leg: Edema (+2) present.      Left lower leg: Edema (+2) present.      Right foot: Prominent metatarsal heads present.      Left foot: Prominent metatarsal heads present.   Feet:      Right foot:      Protective Sensation: 10 sites tested.  2 sites sensed.      Skin integrity: Ulcer (Comments) present.      Left foot:      Protective Sensation: 10 sites tested.  2 sites sensed.      Comments: Right foot: Full-thickness ulcerative breakdown subthird MPJ consistent with a Calhoun 1 ulceration appears to have closed completely with good skin though thick hyperkeratotic eschar was present.  Upon debridement of the eschar good skin was noted beneath.    Skin:     General: Skin is warm and dry.      Capillary Refill: Capillary refill takes 2 to 3 seconds.      Coloration: Skin is not pale.      Findings: No bruising, erythema, lesion or rash.      Comments: Left lower leg full-thickness skin tear noted which is 100% " granulated with no infection.  Scant serosanguineous drainage.     Neurological:      Mental Status: He is alert and oriented to person, place, and time.      Cranial Nerves: No cranial nerve deficit.      Sensory: Sensory deficit present.      Motor: No weakness.      Gait: Gait normal.      Deep Tendon Reflexes: Reflexes normal.      Comments: Hypoesthesia with loss of protective sensation noted bilaterally.  No vibratory sensation or monofilament sensation appreciated.  Findings are consistent with advanced diabetic neuropathy.   Psychiatric:         Mood and Affect: Mood normal.         Behavior: Behavior normal.         Judgment: Judgment normal.         Wound 05/30/25 Diabetic Ulcer Foot Right (Active)   Wound Image   06/09/25 1324   Wound Description Dry;Epithelialization 06/09/25 1324   Non-staged Wound Description Not applicable 06/09/25 1324   Wound Length (cm) 0.1 cm 06/09/25 1324   Wound Width (cm) 0.1 cm 06/09/25 1324   Wound Depth (cm) 0.1 cm 06/09/25 1324   Wound Surface Area (cm^2) 0.01 cm^2 06/09/25 1324   Wound Volume (cm^3) 0.001 cm^3 06/09/25 1324   Calculated Wound Volume (cm^3) 0 cm^3 06/09/25 1324   Drainage Amount None 06/09/25 1324   Tash-wound Assessment Dry;Callus 06/09/25 1324   Dressing Status Intact;Dry 06/09/25 1324       Wound 05/30/25 Pressure Injury Sacrum (Active)       Wound 05/30/25 Pressure Injury Buttocks Right (Active)       Wound 05/30/25 Pressure Injury Buttocks Right;Lower (Active)       Wound 06/09/25 Traumatic Skin Tear Pretibial Left;Proximal (Active)   Wound Image   06/09/25 1324   Wound Description Granulation tissue 06/09/25 1324   Non-staged Wound Description Full thickness 06/09/25 1324   Wound Length (cm) 1.6 cm 06/09/25 1324   Wound Width (cm) 1.9 cm 06/09/25 1324   Wound Depth (cm) 0.1 cm 06/09/25 1324   Wound Surface Area (cm^2) 2.39 cm^2 06/09/25 1324   Wound Volume (cm^3) 0.159 cm^3 06/09/25 1324   Calculated Wound Volume (cm^3) 0.3 cm^3 06/09/25 1324  "  Drainage Amount Moderate 06/09/25 1324   Drainage Description Serosanguineous 06/09/25 1324   Tash-wound Assessment Intact;Pink 06/09/25 1324   Dressing Status Intact 06/09/25 1324       Procedures             Wound Instructions:  Orders Placed This Encounter   Procedures   • Wound cleansing and dressings Left;Proximal Pretibial     Your right plantar foot wound is healed. Continue to wear your diabetic inserts to help offload pressure. Get fitted for new inserts.    Left leg skin tear:  Wash with soap and water  Cover with border silicone foam dressing  Change dressing 3 x week     Return to [facility] in 2-3 weeks     Standing Status:   Future     Expiration Date:   6/23/2025   • Wound Procedure Treatment Left;Proximal Pretibial     This order was created via procedure documentation   • Diabetic Shoe     Dispense one pair of depth diabetic shoes and 3 pair of custom molded orthotics with forefoot accommodation for recurrent ulceration site subthree bilateral.  Diabetic shoes are medically necessary to prevent recurrent ulceration.     Type:   Custom Molded     This patient has an active home care or hospice episode. Should this order be COMPLETED by  [facility]?:   Chary Mac DPM      Portions of the record may have been created with voice recognition software. Occasional wrong word or \"sound a like\" substitutions may have occurred due to the inherent limitations of voice recognition software. Read the chart carefully and recognize, using context, where substitutions have occurred.           [1]  Patient Active Problem List  Diagnosis   • Type 2 diabetes mellitus with hyperglycemia, with long-term current use of insulin (HCC)   • Mixed hyperlipidemia   • Diabetic polyneuropathy associated with type 2 diabetes mellitus (HCC)   • Hypertension   • Morbid (severe) obesity due to excess calories (HCC)   • 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 (HCC)   • Elevated parathyroid " hormone   • Diabetic nephropathy associated with type 2 diabetes mellitus ([facility])   • Type 2 diabetes mellitus with diabetic neuropathy, unspecified whether long term insulin use ([facility])   • Pre-ulcerative calluses   • Persistent atrial fibrillation ([facility])   • 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, with fat layer exposed ([facility])   • Stroke-like symptoms   • Generalized weakness   • Chronic heart failure with reduced ejection fraction (HFrEF, <= 40%) ([facility])   • MC (obstructive sleep apnea)   • Abnormal nuclear stress test   • History of tachycardia-bradycardia syndrome s/p DC PPM   • Cardiac pacemaker in situ   • Type 2 diabetes mellitus with diabetic neuropathy, with long-term current use of insulin ([facility])   [2]  Past Medical History:  Diagnosis Date   • Abnormal nuclear stress test 2025   • Acute on chronic diastolic heart failure ([facility]) 12/29/2023   • Asthma    • Bradycardia    • Callus    • Cancer ([facility])     Skin   • Carcinoma, basal cell, skin     head and face and ear   • CHF (congestive heart failure) ([facility])    • Chronic heart failure with reduced ejection fraction (HFrEF, <= 40%) ([facility]) 02/14/2025   • Chronic kidney disease    • Coronary artery disease    • COVID-19 12/05/2022   • Diabetes mellitus ([facility])    • Difficulty walking    • Dupuytren contracture     bilateral ring and right middle   • Fall 01/27/2025   • Gout    • Herniated lumbar intervertebral disc    • History of tachycardia-bradycardia syndrome    • Hypertension    • 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 ([facility]) 03/25/2022   • Myocardial infarction ([facility])     Unknown   • Neuropathy in diabetes ([facility])    • Persistent atrial fibrillation ([facility]) 12/29/2023   • Sleep apnea    [3]  Past Surgical History:  Procedure Laterality Date   • APPENDECTOMY      age 20, ruptured   • CATARACT EXTRACTION, BILATERAL Bilateral    • CERVICAL FUSION     • CYST REMOVAL Left 01/2023   • DUPUYTREN CONTRACTURE  RELEASE Left     left ring finger   • INSERT / REPLACE / REMOVE PACEMAKER     • LUMBAR LAMINECTOMY      belkis placed   • TONSILLECTOMY     • VASECTOMY     [4]  Social History  Socioeconomic History   • Marital status: /Civil Union   Occupational History   • Occupation: teacher     Comment: retired   Tobacco Use   • Smoking status: Never   • Smokeless tobacco: Never   Vaping Use   • Vaping status: Never Used   Substance and Sexual Activity   • Alcohol use: Not Currently   • Drug use: Never   • Sexual activity: Not Currently     Birth control/protection: Male Sterilization     Social Drivers of Health     Food Insecurity: No Food Insecurity (2025)    Nursing - Inadequate Food Risk Classification    • Ran Out of Food in the Last Year: Never true   Transportation Needs: No Transportation Needs (2025)    OASIS : Transportation    • Lack of Transportation (Medical): No    • Lack of Transportation (Non-Medical): No    • Patient Unable or Declines to Respond: No   Intimate Partner Violence: Unknown (2025)    Nursing IPS    • Physically Hurt by Someone: No    • Hurt or Threatened by Someone: No   Housing Stability: Unknown (2025)    Nursing: Inadequate Housing Risk Classification    • Unable to Pay for Housing in the Last Year: No    • Has Housin   [5]    Current Outpatient Medications:   •  Albuterol Sulfate 108 (90 Base) MCG/ACT AEPB, Inhale 2 puffs every 4 (four) hours as needed (SOB, wheezing, cough), Disp: 1 each, Rfl: 3  •  allopurinol (ZYLOPRIM) 300 mg tablet, Take 1 tablet by mouth every 24 hours, Disp: , Rfl:   •  atorvastatin (LIPITOR) 20 mg tablet, Take 1 tablet by mouth in the morning., Disp: , Rfl:   •  azelastine (ASTELIN) 0.1 % nasal spray, 2 sprays into each nostril in the morning and 2 sprays in the evening., Disp: , Rfl:   •  budesonide (PULMICORT) 0.5 mg/2 mL nebulizer solution, Take 0.5 mg by nebulization in the morning and 0.5 mg in the evening. Rinse mouth after use.,  Disp: , Rfl:   •  bumetanide (BUMEX) 2 mg tablet, Take 1 tablet (2 mg total) by mouth 2 (two) times a day Take when your wake up and then again early afternoon, Disp: 180 tablet, Rfl: 3  •  Cholecalciferol (Vitamin D3) 25 MCG (1000 UT) CAPS, Take 1 capsule by mouth every 24 hours, Disp: , Rfl:   •  Coenzyme Q10 (Co Q10) 100 MG CAPS, Take 1 capsule by mouth in the morning, Disp: , Rfl:   •  Easy Comfort Pen Needles 33G X 4 MM MISC, , Disp: , Rfl:   •  fluticasone (FLONASE) 50 mcg/act nasal spray, 1 spray into each nostril in the morning and 1 spray before bedtime., Disp: , Rfl:   •  formoterol (PERFOROMIST) 20 MCG/2ML nebulizer solution, Take 20 mcg by nebulization in the morning and 20 mcg in the evening., Disp: , Rfl:   •  gabapentin (NEURONTIN) 100 mg capsule, Take 1 capsule (100 mg total) by mouth daily, Disp: 180 capsule, Rfl: 0  •  Glucosamine-Chondroitin 500-400 MG CAPS, Take 1 capsule by mouth in the morning., Disp: , Rfl:   •  insulin aspart (NovoLOG FlexPen) 100 UNIT/ML injection pen, INJECT 12 UNITS SUBCUTANEOUSLY with breakfast, LUNCH and with dinner plus sliding scale. (UP  UNITS DAILY). 150-200: 1 unit; 201-250: 2 units; 251-300: 3 units; 301-350: 4 units; 351-400: 5 units; 400+: 6 units, Disp: 45 mL, Rfl: 1  •  Insulin Glargine Solostar (Lantus SoloStar) 100 UNIT/ML SOPN, INJECT 10 UNITS SUBCUTANEOUSLY IN THE MORNING AND 10 UNITS IN THE EVENING. (Patient taking differently: Inject 20 Units under the skin in the morning and 20 Units before bedtime.), Disp: 15 mL, Rfl: 1  •  ipratropium (ATROVENT) 0.03 % nasal spray, 1 spray into each nostril twice a day, Disp: , Rfl:   •  ipratropium-albuterol (DUO-NEB) 0.5-2.5 mg/3 mL nebulizer solution, Take 3 mL by nebulization in the morning and 3 mL in the evening., Disp: , Rfl:   •  loratadine (CLARITIN) 10 mg tablet, Take 1 tablet by mouth in the morning., Disp: , Rfl:   •  losartan (COZAAR) 100 MG tablet, Take 1 tablet by mouth in the morning, Disp: , Rfl:    •  magnesium Oxide (MAG-OX) 400 mg TABS, Take 1 tablet (400 mg total) by mouth daily, Disp: 30 tablet, Rfl: 0  •  metoprolol succinate (TOPROL-XL) 25 mg 24 hr tablet, Take 1 tablet (25 mg total) by mouth daily, Disp: 30 tablet, Rfl: 11  •  montelukast (SINGULAIR) 10 mg tablet, Take 1 tablet (10 mg total) by mouth daily at bedtime, Disp: 30 tablet, Rfl: 5  •  Multiple Vitamin (MULTIVITAMIN ADULT PO), Take 1 tablet by mouth in the morning., Disp: , Rfl:   •  mupirocin (BACTROBAN) 2 % ointment, Apply topically every other day With alginate dressing (Patient taking differently: Apply 1 Application topically every other day apply to right plantar foot wound), Disp: 22 g, Rfl: 0  •  Omega-3 Fatty Acids (Fish Oil) 360 MG CAPS, Take 1 capsule by mouth in the morning., Disp: , Rfl:   •  OneTouch Verio test strip, , Disp: , Rfl:   •  pantoprazole (PROTONIX) 40 mg tablet, Take 1 tablet by mouth in the morning and 1 tablet before bedtime., Disp: , Rfl:   •  potassium chloride (K-DUR,KLOR-CON) 20 mEq tablet, Take 1 tablet (20 mEq total) by mouth daily, Disp: 30 tablet, Rfl: 0  •  rivaroxaban (Xarelto) 20 mg tablet, Take 1 tablet (20 mg total) by mouth daily with breakfast, Disp: 30 tablet, Rfl: 5  •  senna (SENOKOT) 8.6 mg, Take 2 tablets (17.2 mg total) by mouth daily at bedtime as needed for constipation (Patient not taking: Reported on 6/3/2025), Disp: 30 tablet, Rfl: 0  •  spironolactone (ALDACTONE) 25 mg tablet, Take 1 tablet (25 mg total) by mouth daily, Disp: , Rfl:   •  triamcinolone (KENALOG) 0.1 % cream, Apply 1 Application topically in the morning and 1 Application in the evening. To affected areas bilateral feet ., Disp: , Rfl:   •  valACYclovir (VALTREX) 1,000 mg tablet, Take 1 tablet by mouth in the morning and 1 tablet in the evening and 1 tablet before bedtime. (Patient not taking: Reported on 6/3/2025), Disp: , Rfl: [6]  Family History  Problem Relation Name Age of Onset   • Hypertension Mother Althea    •  Diabetes type II Mother Althea    • Diabetes Mother lAthea    • Hypertension Father Bulmaro    • Cancer Father Bulmaro    • Diabetes type II Brother Abhay    • Hypertension Brother Abhay    • Heart disease Brother Abhay    • Kidney disease Sister Don’t know    • Hypertension Daughter amandeep    • GI problems Daughter yusuf

## 2025-06-11 ENCOUNTER — HOME CARE VISIT (OUTPATIENT)
Dept: HOME HEALTH SERVICES | Facility: HOME HEALTHCARE | Age: 85
End: 2025-06-11
Payer: COMMERCIAL

## 2025-06-11 VITALS
WEIGHT: 222 LBS | RESPIRATION RATE: 16 BRPM | BODY MASS INDEX: 30.96 KG/M2 | DIASTOLIC BLOOD PRESSURE: 60 MMHG | SYSTOLIC BLOOD PRESSURE: 120 MMHG | HEART RATE: 70 BPM | TEMPERATURE: 98.9 F | OXYGEN SATURATION: 98 %

## 2025-06-11 PROCEDURE — G0300 HHS/HOSPICE OF LPN EA 15 MIN: HCPCS

## 2025-06-13 ENCOUNTER — HOME CARE VISIT (OUTPATIENT)
Dept: HOME HEALTH SERVICES | Facility: HOME HEALTHCARE | Age: 85
End: 2025-06-13
Payer: COMMERCIAL

## 2025-06-13 VITALS
OXYGEN SATURATION: 100 % | DIASTOLIC BLOOD PRESSURE: 58 MMHG | HEART RATE: 70 BPM | RESPIRATION RATE: 16 BRPM | SYSTOLIC BLOOD PRESSURE: 118 MMHG | TEMPERATURE: 96.7 F

## 2025-06-13 VITALS — HEART RATE: 62 BPM | OXYGEN SATURATION: 92 %

## 2025-06-13 PROCEDURE — G0152 HHCP-SERV OF OT,EA 15 MIN: HCPCS

## 2025-06-13 PROCEDURE — G0299 HHS/HOSPICE OF RN EA 15 MIN: HCPCS

## 2025-06-13 NOTE — CASE COMMUNICATION
Back office please fax to PCP Mina Zavala MD Fax: [phone]   NOMNC signed for DC 6/17 by OT.  Patient discharged from [facility] today goals met.

## 2025-06-17 ENCOUNTER — HOME CARE VISIT (OUTPATIENT)
Dept: HOME HEALTH SERVICES | Facility: HOME HEALTHCARE | Age: 85
End: 2025-06-17
Payer: COMMERCIAL

## 2025-06-17 VITALS — HEART RATE: 70 BPM | DIASTOLIC BLOOD PRESSURE: 80 MMHG | SYSTOLIC BLOOD PRESSURE: 136 MMHG | OXYGEN SATURATION: 98 %

## 2025-06-17 PROCEDURE — G0152 HHCP-SERV OF OT,EA 15 MIN: HCPCS

## 2025-06-20 ENCOUNTER — NURSE TRIAGE (OUTPATIENT)
Age: 85
End: 2025-06-20

## 2025-06-20 NOTE — TELEPHONE ENCOUNTER
"Pt called in stating he has gained 3lbs over night. He is also having coughing. Denies having swelling. He is taking Spironolactone 25mg BID  Wt today-225lbs  Todays wt-222lbs    Warm transferred call to Leigh with  HF    Answer Assessment - Initial Assessment Questions  1. MAIN CONCERN OR SYMPTOM: \"What is your main concern right now?\" \"What's the main symptom you're worried about?\" (e.g., breathing difficulty, ankle swelling, weight gain).      Weight gain and coughing   2. ONSET: \"When did the  weight gain  start?\"      Today   3. BREATHING DIFFICULTY: \"Are you having any difficulty breathing?\" If Yes, ask: \"How bad is it?\"  (e.g., none, mild, moderate, severe)  \"Is this worse than usual for you?\"      Coughing   4. EDEMA - FOOT-LEG SWELLING: \"Do you have swelling of your ankles, feet or legs?\" If Yes, ask: \"How bad is the swelling?\" (e.g., localized; mild, moderate, severe)      Denies   5. WEIGHT - CURRENT: \"What is your weight today?\"      232  6. WEIGHT - TARGET RANGE: \"Do you try to keep your weight in a target (goal) range?\" If Yes, ask: \"What is that range?\"      Unsure   7. WEIGHT - CHANGE: \"Have you gained (or lost) weight in the past 24 hours? Past week (7 days)?\" If Yes, ask:  \"How much weight?\"      3lbs   8. OTHER SYMPTOMS: \"Do you have any other symptoms?\" (e.g., depression, weakness or fatigue, abdomen bloating, hacky cough)      Coughing   9. DIURETICS: \"Are you currently taking water pills?\" (e.g., furosemide [Lasix], hydrochlorothiazide [HCTZ], bumetanide [Bumex], metolazone [Zaroxolyn]) If Yes, ask: \"What medicine are you taking, and how often?\"  \"Any recent change in dose?\"       Spironolactone 25mg BID  10. O2 SATURATION MONITOR: \"Do you use an oxygen saturation monitor (pulse oximeter) at home?\" If Yes, ask: \"What is your reading (oxygen level) today?\" \"What is your usual oxygen saturation reading?\" (e.g., 95%)        Unsure   11. HEART FAILURE HCP: \"Who treats your heart failure?\"  (e.g., " cardiologist or heart specialist, heart failure clinic or center, primary care doctor)        Dr. Holland    Protocols used: Heart Failure on Treatment Follow-up Call-Adult-[place]

## 2025-06-20 NOTE — TELEPHONE ENCOUNTER
Spoke with pt. Wt today is 225 lbs . He is up 3 lbs overnight.  He had spaghetti for dinner.  He has a productive cough that is getting worse. He does his breathing tx to help bring up clear to white thick phylum.    Denies any other CHF symptom. Was in [facility] at the end of May.    5/1/25 Cr-1.43, k-4.7, bun-57, Gfr-48  3/6/25 HBG 12.3    Ok for him to take one extra dose of Bumex?    Please advise

## 2025-06-20 NOTE — TELEPHONE ENCOUNTER
----- Message from Adrianne DUMONT sent at 6/20/2025  9:38 AM EDT -----  Patient was told to call if he gained weight in a da. Patient states he gained 3 pounds and is coughing a lot

## 2025-06-23 ENCOUNTER — OFFICE VISIT (OUTPATIENT)
Dept: WOUND CARE | Facility: HOSPITAL | Age: 85
End: 2025-06-23
Payer: COMMERCIAL

## 2025-06-23 VITALS
DIASTOLIC BLOOD PRESSURE: 60 MMHG | HEART RATE: 80 BPM | TEMPERATURE: 97.6 F | SYSTOLIC BLOOD PRESSURE: 118 MMHG | RESPIRATION RATE: 16 BRPM

## 2025-06-23 DIAGNOSIS — S81.812A SKIN TEAR OF LEFT LOWER LEG WITHOUT COMPLICATION, INITIAL ENCOUNTER: ICD-10-CM

## 2025-06-23 DIAGNOSIS — L97.512 DIABETIC ULCER OF OTHER PART OF RIGHT FOOT ASSOCIATED WITH TYPE 2 DIABETES MELLITUS, WITH FAT LAYER EXPOSED (HCC): ICD-10-CM

## 2025-06-23 DIAGNOSIS — E11.621 DIABETIC ULCER OF OTHER PART OF RIGHT FOOT ASSOCIATED WITH TYPE 2 DIABETES MELLITUS, WITH FAT LAYER EXPOSED (HCC): ICD-10-CM

## 2025-06-23 PROCEDURE — 99212 OFFICE O/P EST SF 10 MIN: CPT | Performed by: PODIATRIST

## 2025-06-23 NOTE — PATIENT INSTRUCTIONS
Orders Placed This Encounter   Procedures    Wound cleansing and dressings     Your wounds are healed. You do not need to follow up at [facility].    Schedule fitting with Addy.    Follow up with Dr. Mac at his office for routine foot care.     Standing Status:   Future     Expiration Date:   6/23/2025

## 2025-06-25 NOTE — PROGRESS NOTES
Patient ID: Hammad Montesinos is a 84 y.o. male Date of Birth 1940       Chief Complaint   Patient presents with   • Follow Up Wound Care Visit     Right plantar foot check and left shin wound f/u       Allergies:  Hydrochlorothiazide, Insulin lispro, Lisinopril, Minoxidil, Nifedipine, Other, Prazosin, Venlafaxine, Doxycycline, and Simvastatin    Assessment & Plan  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, with fat layer exposed (HCC)  Wound has close 100% with good new skin and thin hyperkeratotic eschar.  Follow-up as scheduled for routine diabetic footcare  Call immediately if any signs of recurrence or opening with drainage noted.  Discharged to home in stable condition.  Lab Results   Component Value Date    HGBA1C 9.1 (H) 01/28/2025   Orders:  •  Wound cleansing and dressings; Future    Skin tear of left lower leg without complication, initial encounter  Wound has closed 100%.  Orders:  •  Wound cleansing and dressings; Future      Subjective:   6/23/2025: 84-year-old male seen today for follow-up chronic DFU right foot.  Reports excellent progress and feels his wound has closed completely.  Denies any new pain/discomfort.  Also reports left leg wound noted last visit has healed.    6/9/2025: 84-year-old male seen today for follow-up chronic DFU right foot reports new wound developed on left leg which she is not sure how it happened.  Reports good progress and feels the right foot may be closed or healed completely.    5/22/2025: 84-year-old male seen today for follow-up chronic DFU right foot subthird MPJ.  Recent hospital discharge and now in skilled facility for rehab and conditioning.  Reports drainage from ulcer but only a very small amount.    4/24/2025: 84-year-old type II diabetic seen for follow-up ulcer care and diabetic footcare.  Reports ulceration is only slightly smaller but still continues to drain.    4/15/2025: 84-year-old type II diabetic seen  today for recurrent breakdown of the right forefoot.  Patient reports drainage over the past couple days.  Has been applying silver foam and limiting his activity.    2/4/2025: Hammad Montesinos is a 84 y.o.year old male seen for diabetic foot exam.  The patient has class findings with diabetes.   BS is under control.  The patient complained of thick toenails and painful lesions which have previously ulcerated.  Patient is concerned with possible recurrent ulceration beneath right third toe joint..  The patient denied any acute pedal disorder, redness, acute swelling, or recent injury        The following portions of the patient's history were reviewed and updated as appropriate:   Problem List[1]  Past Medical History[2]  Past Surgical History[3]  Social History[4]   Current Medications[5]  Family History[6]   Review of Systems   Constitutional: Negative.    HENT: Negative.     Eyes: Negative.    Respiratory: Negative.     Cardiovascular: Negative.    Gastrointestinal: Negative.    Endocrine: Negative.    Genitourinary: Negative.    Musculoskeletal: Negative.    Skin:         DFU right foot appears closed   Allergic/Immunologic: Negative.    Neurological: Negative.    Hematological: Negative.    Psychiatric/Behavioral: Negative.           Objective:  /60   Pulse 80   Temp 97.6 °F (36.4 °C)   Resp 16     Physical Exam  Constitutional:       Appearance: Normal appearance.   HENT:      Head: Normocephalic.      Right Ear: Tympanic membrane normal.      Left Ear: Tympanic membrane normal.      Nose: No congestion.      Mouth/Throat:      Pharynx: No oropharyngeal exudate or posterior oropharyngeal erythema.     Eyes:      Conjunctiva/sclera: Conjunctivae normal.      Pupils: Pupils are equal, round, and reactive to light.       Cardiovascular:      Rate and Rhythm: Normal rate and regular rhythm.      Pulses:           Dorsalis pedis pulses are 0 on the right side and 0 on the left side.        Posterior tibial  pulses are 0 on the right side and 0 on the left side.   Pulmonary:      Effort: Pulmonary effort is normal.     Musculoskeletal:         General: Normal range of motion.      Right foot: Prominent metatarsal heads present.   Feet:      Right foot:      Protective Sensation: 10 sites tested.  7 sites sensed.      Skin integrity: Ulcer (Ulcer subthree has closed completely.) present.      Toenail Condition: Right toenails are abnormally thick. Fungal disease present.     Left foot:      Protective Sensation: 10 sites tested.  6 sites sensed.      Skin integrity: Skin integrity normal.      Toenail Condition: Left toenails are abnormally thick. Fungal disease present.    Skin:     General: Skin is warm and dry.      Capillary Refill: Capillary refill takes 2 to 3 seconds.      Coloration: Skin is not pale.      Findings: No bruising, erythema, lesion or rash.     Neurological:      Mental Status: He is alert and oriented to person, place, and time.      Cranial Nerves: No cranial nerve deficit.      Sensory: Sensory deficit present.      Motor: No weakness.      Gait: Gait normal.      Deep Tendon Reflexes: Reflexes normal.      Comments: Epicritic sensations grossly diminished consistent with diabetic neuropathy.  Loss of protective sensation noted.  Numb tingling paresthesias bilateral.   Psychiatric:         Mood and Affect: Mood normal.         Behavior: Behavior normal.         Judgment: Judgment normal.         Wound 05/30/25 Pressure Injury Sacrum (Active)       Wound 05/30/25 Pressure Injury Buttocks Right (Active)       Wound 05/30/25 Pressure Injury Buttocks Right;Lower (Active)       Procedures             Wound Instructions:  Orders Placed This Encounter   Procedures   • Wound cleansing and dressings     Your wounds are healed. You do not need to follow up at [facility].    Schedule fitting with Addy.    Follow up with Dr. Mac at his office for routine foot care.     Standing Status:   Future      "Expiration Date:   6/23/2025         Julio César Mac DPM      Portions of the record may have been created with voice recognition software. Occasional wrong word or \"sound a like\" substitutions may have occurred due to the inherent limitations of voice recognition software. Read the chart carefully and recognize, using context, where substitutions have occurred.           [1]  Patient Active Problem List  Diagnosis   • Type 2 diabetes mellitus with hyperglycemia, with long-term current use of insulin ([facility])   • Mixed hyperlipidemia   • Diabetic polyneuropathy associated with type 2 diabetes mellitus ([facility])   • Hypertension   • Morbid (severe) obesity due to excess calories ([facility])   • 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 ([facility])   • Elevated parathyroid hormone   • Diabetic nephropathy associated with type 2 diabetes mellitus ([facility])   • Type 2 diabetes mellitus with diabetic neuropathy, unspecified whether long term insulin use ([facility])   • Pre-ulcerative calluses   • Persistent atrial fibrillation ([facility])   • 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, with fat layer exposed ([facility])   • Stroke-like symptoms   • Generalized weakness   • Chronic heart failure with reduced ejection fraction (HFrEF, <= 40%) ([facility])   • MC (obstructive sleep apnea)   • Abnormal nuclear stress test   • History of tachycardia-bradycardia syndrome s/p DC PPM   • Cardiac pacemaker in situ   • Type 2 diabetes mellitus with diabetic neuropathy, with long-term current use of insulin ([facility])   [2]  Past Medical History:  Diagnosis Date   • Abnormal nuclear stress test 2025   • Acute on chronic diastolic heart failure ([facility]) 12/29/2023   • Asthma    • Bradycardia    • Callus    • Cancer ([facility])     Skin   • Carcinoma, basal cell, skin     head and face and ear   • CHF (congestive heart failure) ([facility])    • Chronic heart failure with reduced ejection fraction (HFrEF, <= 40%) ([facility]) 02/14/2025   • " Chronic kidney disease    • Coronary artery disease    • COVID-19 12/05/2022   • Diabetes mellitus (HCC)    • Difficulty walking    • Dupuytren contracture     bilateral ring and right middle   • Fall 01/27/2025   • Gout    • Herniated lumbar intervertebral disc    • History of tachycardia-bradycardia syndrome    • Hypertension    • 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 (HCC) 03/25/2022   • Myocardial infarction (HCC)     Unknown   • Neuropathy in diabetes ([facility])    • Persistent atrial fibrillation (HCC) 12/29/2023   • Sleep apnea    [3]  Past Surgical History:  Procedure Laterality Date   • APPENDECTOMY      age 20, ruptured   • CATARACT EXTRACTION, BILATERAL Bilateral    • CERVICAL FUSION     • CYST REMOVAL Left 01/2023   • DUPUYTREN CONTRACTURE RELEASE Left     left ring finger   • INSERT / REPLACE / REMOVE PACEMAKER     • LUMBAR LAMINECTOMY      belkis placed   • TONSILLECTOMY     • VASECTOMY     [4]  Social History  Socioeconomic History   • Marital status: /Civil Union   Occupational History   • Occupation: teacher     Comment: retired   Tobacco Use   • Smoking status: Never   • Smokeless tobacco: Never   Vaping Use   • Vaping status: Never Used   Substance and Sexual Activity   • Alcohol use: Not Currently   • Drug use: Never   • Sexual activity: Not Currently     Birth control/protection: Male Sterilization     Social Drivers of Health     Food Insecurity: No Food Insecurity (2/14/2025)    Nursing - Inadequate Food Risk Classification    • Ran Out of Food in the Last Year: Never true   Transportation Needs: No Transportation Needs (6/17/2025)    OASIS : Transportation    • Lack of Transportation (Medical): No    • Lack of Transportation (Non-Medical): No    • Patient Unable or Declines to Respond: No   Intimate Partner Violence: Unknown (2/14/2025)    Nursing IPS    • Physically Hurt by Someone: No    • Hurt or Threatened by Someone: No   Housing Stability: Unknown  (2/14/2025)    Nursing: Inadequate Housing Risk Classification    • Unable to Pay for Housing in the Last Year: No    • Has Housing: No   [5]    Current Outpatient Medications:   •  Albuterol Sulfate 108 (90 Base) MCG/ACT AEPB, Inhale 2 puffs every 4 (four) hours as needed (SOB, wheezing, cough), Disp: 1 each, Rfl: 3  •  allopurinol (ZYLOPRIM) 300 mg tablet, Take 1 tablet by mouth every 24 hours, Disp: , Rfl:   •  atorvastatin (LIPITOR) 20 mg tablet, Take 1 tablet by mouth in the morning., Disp: , Rfl:   •  azelastine (ASTELIN) 0.1 % nasal spray, 2 sprays into each nostril in the morning and 2 sprays in the evening., Disp: , Rfl:   •  budesonide (PULMICORT) 0.5 mg/2 mL nebulizer solution, Take 0.5 mg by nebulization in the morning and 0.5 mg in the evening. Rinse mouth after use., Disp: , Rfl:   •  bumetanide (BUMEX) 2 mg tablet, Take 1 tablet (2 mg total) by mouth 2 (two) times a day Take when your wake up and then again early afternoon, Disp: 180 tablet, Rfl: 3  •  Cholecalciferol (Vitamin D3) 25 MCG (1000 UT) CAPS, Take 1 capsule by mouth every 24 hours, Disp: , Rfl:   •  Coenzyme Q10 (Co Q10) 100 MG CAPS, Take 1 capsule by mouth in the morning, Disp: , Rfl:   •  Easy Comfort Pen Needles 33G X 4 MM MISC, , Disp: , Rfl:   •  fluticasone (FLONASE) 50 mcg/act nasal spray, 1 spray into each nostril in the morning and 1 spray before bedtime., Disp: , Rfl:   •  formoterol (PERFOROMIST) 20 MCG/2ML nebulizer solution, Take 20 mcg by nebulization in the morning and 20 mcg in the evening., Disp: , Rfl:   •  gabapentin (NEURONTIN) 100 mg capsule, Take 1 capsule (100 mg total) by mouth daily, Disp: 180 capsule, Rfl: 0  •  Glucosamine-Chondroitin 500-400 MG CAPS, Take 1 capsule by mouth in the morning., Disp: , Rfl:   •  insulin aspart (NovoLOG FlexPen) 100 UNIT/ML injection pen, INJECT 12 UNITS SUBCUTANEOUSLY with breakfast, LUNCH and with dinner plus sliding scale. (UP  UNITS DAILY). 150-200: 1 unit; 201-250: 2 units;  251-300: 3 units; 301-350: 4 units; 351-400: 5 units; 400+: 6 units, Disp: 45 mL, Rfl: 1  •  Insulin Glargine Solostar (Lantus SoloStar) 100 UNIT/ML SOPN, INJECT 10 UNITS SUBCUTANEOUSLY IN THE MORNING AND 10 UNITS IN THE EVENING. (Patient taking differently: Inject 20 Units under the skin in the morning and 20 Units before bedtime.), Disp: 15 mL, Rfl: 1  •  ipratropium (ATROVENT) 0.03 % nasal spray, 1 spray into each nostril twice a day, Disp: , Rfl:   •  ipratropium-albuterol (DUO-NEB) 0.5-2.5 mg/3 mL nebulizer solution, Take 3 mL by nebulization in the morning and 3 mL in the evening., Disp: , Rfl:   •  loratadine (CLARITIN) 10 mg tablet, Take 1 tablet by mouth in the morning., Disp: , Rfl:   •  losartan (COZAAR) 100 MG tablet, Take 1 tablet by mouth in the morning, Disp: , Rfl:   •  magnesium Oxide (MAG-OX) 400 mg TABS, Take 1 tablet (400 mg total) by mouth daily, Disp: 30 tablet, Rfl: 0  •  metoprolol succinate (TOPROL-XL) 25 mg 24 hr tablet, Take 1 tablet (25 mg total) by mouth daily, Disp: 30 tablet, Rfl: 11  •  montelukast (SINGULAIR) 10 mg tablet, Take 1 tablet (10 mg total) by mouth daily at bedtime, Disp: 30 tablet, Rfl: 5  •  Multiple Vitamin (MULTIVITAMIN ADULT PO), Take 1 tablet by mouth in the morning., Disp: , Rfl:   •  mupirocin (BACTROBAN) 2 % ointment, Apply topically every other day With alginate dressing (Patient taking differently: Apply 1 Application topically every other day apply to right plantar foot wound), Disp: 22 g, Rfl: 0  •  Omega-3 Fatty Acids (Fish Oil) 360 MG CAPS, Take 1 capsule by mouth in the morning., Disp: , Rfl:   •  OneTouch Verio test strip, , Disp: , Rfl:   •  pantoprazole (PROTONIX) 40 mg tablet, Take 1 tablet by mouth in the morning and 1 tablet before bedtime., Disp: , Rfl:   •  potassium chloride (K-DUR,KLOR-CON) 20 mEq tablet, Take 1 tablet (20 mEq total) by mouth daily, Disp: 30 tablet, Rfl: 0  •  rivaroxaban (Xarelto) 20 mg tablet, Take 1 tablet (20 mg total) by  mouth daily with breakfast, Disp: 30 tablet, Rfl: 5  •  senna (SENOKOT) 8.6 mg, Take 2 tablets (17.2 mg total) by mouth daily at bedtime as needed for constipation (Patient not taking: Reported on 6/3/2025), Disp: 30 tablet, Rfl: 0  •  spironolactone (ALDACTONE) 25 mg tablet, Take 1 tablet (25 mg total) by mouth daily, Disp: , Rfl:   •  triamcinolone (KENALOG) 0.1 % cream, Apply 1 Application topically in the morning and 1 Application in the evening. To affected areas bilateral feet ., Disp: , Rfl:   •  valACYclovir (VALTREX) 1,000 mg tablet, Take 1 tablet by mouth in the morning and 1 tablet in the evening and 1 tablet before bedtime. (Patient not taking: Reported on 6/3/2025), Disp: , Rfl: [6]  Family History  Problem Relation Name Age of Onset   • Hypertension Mother Althea    • Diabetes type II Mother Althea    • Diabetes Mother Althea    • Hypertension Father Bulmaro    • Cancer Father Bulmaro    • Diabetes type II Brother Abhay    • Hypertension Brother Abhay    • Heart disease Brother Abhay    • Kidney disease Sister Don’t know    • Hypertension Daughter amandeep    • GI problems Daughter yusuf

## 2025-06-25 NOTE — ASSESSMENT & PLAN NOTE
Wound has close 100% with good new skin and thin hyperkeratotic eschar.  Follow-up as scheduled for routine diabetic footcare  Call immediately if any signs of recurrence or opening with drainage noted.  Discharged to home in stable condition.  Lab Results   Component Value Date    HGBA1C 9.1 (H) 01/28/2025   Orders:  •  Wound cleansing and dressings; Future

## 2025-06-30 ENCOUNTER — OFFICE VISIT (OUTPATIENT)
Age: 85
End: 2025-06-30
Payer: COMMERCIAL

## 2025-06-30 VITALS
DIASTOLIC BLOOD PRESSURE: 70 MMHG | BODY MASS INDEX: 32.48 KG/M2 | WEIGHT: 232 LBS | HEIGHT: 71 IN | SYSTOLIC BLOOD PRESSURE: 118 MMHG

## 2025-06-30 DIAGNOSIS — K57.90 DIVERTICULOSIS: Primary | ICD-10-CM

## 2025-06-30 DIAGNOSIS — K27.9 PEPTIC ULCER DISEASE: ICD-10-CM

## 2025-06-30 DIAGNOSIS — K64.4 SKIN TAG OF ANUS: ICD-10-CM

## 2025-06-30 PROCEDURE — 99214 OFFICE O/P EST MOD 30 MIN: CPT | Performed by: INTERNAL MEDICINE

## 2025-06-30 NOTE — PROGRESS NOTES
"Name: Hammad Montesinos      : 1940      MRN: [id number]  Encounter Provider: Dav Farley DO  Encounter Date: 2025   Encounter department: [facility] GASTROENTEROLOG[facility] [place]  :  Assessment & Plan  Diverticulosis  No further bleeding, even after starting Xarelto.  His last hemoglobin was 10.1 and has another 1 ordered to be checked next month.       Peptic ulcer disease  Was thought that this was likely ischemic in nature due to his severe diverticular bleed.  I do not think he needs a repeat endoscopy due to no high risk lesions seen       Skin tag of anus  He states that this is really bothering him and intermittently uses, so I have referred him to Dr. Silva to discuss removal  Orders:    Ambulatory Referral to Colorectal Surgery; Future        History of Present Illness   HPI  Hammad Montesinos is a 84 y.o. male who presents in follow-up due to a recent hospitalization at [facility] for severe diverticular bleed with resultant duodenal ischemia.  He presented with hematochezia and upper endoscopy showed linear ulcer in the duodenal sweep consistent with ischemia.  Colonoscopy showed diverticulosis with active bleeding, but no source could be found due to poor visualization.  Since hospital discharge, he has been doing well with no further bleeding.  His only complaint is a skin tag around his anus that bothers him and sometimes oozes.  He has been back on Xarelto.      Review of Systems       Objective   /70   Ht 5' 11\" (1.803 m)   Wt 105 kg (232 lb)   BMI 32.36 kg/m²      Physical Exam  General Appearance: NAD, cooperative, alert  Eyes: Anicteric  GI:  Soft, non-tender, non-distended; normal bowel sounds; no masses, no organomegaly   Rectal: Skin tag  Musculoskeletal: No edema.  Skin:  No jaundice      "

## 2025-06-30 NOTE — ASSESSMENT & PLAN NOTE
Influenza A + on 2/14/2025  Saturating well on room air  Continue Tamiflu x 5 days  Contact and droplet precautions  Monitor respiratory status   Physical Therapy Daily Treatment/Discharge Note    [facility]  [address] [place] [address]  [phone] (phone)  [phone] (fax)    Patient: Branden Brady   : 1953  Diagnosis/ICD-10 Code:  Chronic bilateral low back pain with bilateral sciatica [M54.42, M54.41, G89.29]  Referring practitioner: Panfilo Roland MD  Today's Date: 2025  Patient seen for 16 sessions    Visit Diagnoses:    ICD-10-CM ICD-9-CM   1. Chronic bilateral low back pain with bilateral sciatica  M54.42 724.2    M54.41 724.3    G89.29 338.29   2. Difficulty walking  R26.2 719.7   3. Poor posture  R29.3 781.92   4. Chronic neck pain  M54.2 723.1    G89.29 338.29              Subjective: Greg will see his neurosurgeon in approximately one week.  He feels that his managing his neck and low back issues pretty well at this point.     Objective      Treatment    Manual therapy:  L first rib manipulation in sitting.  In supine with legs on bolster, cervical spine decompression and finishing with sub-occipital release.    Therapeutic exercise  CV flexion, x 20  Neck rotation, with CV flexion, x 10, B  Full neck flexion, with CV flexion, 10 x 2  Each neck exercise performed in supine, pillow with neck roll, and in hook lying.     NMR: verbal cues for exercise technique were given.     Self  care: We spoke of continuing his spinal exercise routine throughout life.  For aerobic exercise it is felt that due to his chronic spine issues aquatic type exercises would be better like walking in water and/or swimming.  I suggested the use of a snorkel when swimming to avoid the repetitious neck turns that may exacerbate his neck.         Assessment & Plan       Assessment  Assessment details: Branden Brady was seen for 16 physical therapy sessions for chronic bilateral low back pain, bilateral sciatica and chronic neck pain.  Treatment included therapeutic exercise, manual therapy,  neuro-muscular retraining , patient education with home exercise program , and orthotic fabrication . Progress to physical therapy goals was good.  He was discharged to an independent HEP and provided patient education to self-manage condition.      Goals  Plan Goals: STGs to be met in 4 weeks  1. A review of core bracing and related exercises is performed. (Partially met)  2. Cervical spine AROM/strength exercises are reviewed to improve neck positioning in standing. (Met)  3. Impressions for custom made orthotics are taken. (Met)     LTGs to be met in 12 weeks  1. Greg is wearing custom made orthotics for all weight bearing activities comfortably. (met)  2. He is able to sit one hour and then do a sit to stand transfer comfortably. (Not met)  3. Greg is independent with a HEP and self care.(met)  4. YUMIKO deficit is below 16%. (met)  5. Greg is able to turn his neck, while driving, and have no pain. (met)      Plan  Plan details: Discharged from skilled physical therapy               Timed:    Manual Therapy:    15     mins  17781;  Therapeutic Exercise:    20     mins  99759;     Neuromuscular Stephy:    2    mins  92281;    Therapeutic Activity:          mins  86774;     Gait Training:             mins  61171;     Ultrasound:           mins  33742;    Self Care                       8        mins   34774  Orthotic Fitting (initial)  _____ mins 37169  Orthotic Fitting (follow-up) _____ mins 16132      Untimed:    Electrical Stimulation:           mins  50887 ( );  Traction:           mins  36731;   Dry Needling  (1-2 muscles)                  mins 29339 (Self-pay)  Dry Needling (3-4 muscles)  ____  67602 (Self-pay)  Dry Needling Trial             DRYNDLTRIAL  (No Charge)  Canalith Repositioning _____ mins 67458    Timed Treatment:   45   mins   Total Treatment:     45   mins    Gigi Wick PT  Physical Therapist    [place] License:[id number]

## 2025-07-01 ENCOUNTER — PROCEDURE VISIT (OUTPATIENT)
Dept: PODIATRY | Facility: CLINIC | Age: 85
End: 2025-07-01
Payer: COMMERCIAL

## 2025-07-01 VITALS — BODY MASS INDEX: 32.2 KG/M2 | HEIGHT: 71 IN | WEIGHT: 230 LBS

## 2025-07-01 DIAGNOSIS — L84 CORNS AND CALLUS: ICD-10-CM

## 2025-07-01 DIAGNOSIS — B35.1 ONYCHOMYCOSIS: Primary | ICD-10-CM

## 2025-07-01 DIAGNOSIS — E11.40 TYPE 2 DIABETES MELLITUS WITH DIABETIC NEUROPATHY, WITH LONG-TERM CURRENT USE OF INSULIN (HCC): ICD-10-CM

## 2025-07-01 DIAGNOSIS — Z79.4 TYPE 2 DIABETES MELLITUS WITH DIABETIC NEUROPATHY, WITH LONG-TERM CURRENT USE OF INSULIN (HCC): ICD-10-CM

## 2025-07-01 PROCEDURE — 11721 DEBRIDE NAIL 6 OR MORE: CPT | Performed by: PODIATRIST

## 2025-07-01 PROCEDURE — 11056 PARNG/CUTG B9 HYPRKR LES 2-4: CPT | Performed by: PODIATRIST

## 2025-07-01 NOTE — ASSESSMENT & PLAN NOTE
1030- PCT took patient CHG wipes, pt stated \" I don't know why you are bringing me those if shes' not going to unhook me I'm not doing anything y'all want. \"    RN educated patient that he cannot be unhooked from his TPN to leave the unit and go downstairs, RN told patient that he can take the IV pole with him to walk around the unit but cannot leave the 5th floor d/t safety reasons. Disease prevention and related risk factors of diabetes were identified and discussed.    The patient was educated in proper foot wear for diabetics.    Educated in daily foot assessment and routine diabetic foot care.    Discussed the importance of controlling BS through diet and exercise.    The patient will follow up in 10 weeks for further diabetic foot exam and care.  Lab Results   Component Value Date    HGBA1C 9.1 (H) 01/28/2025

## 2025-07-01 NOTE — PROGRESS NOTES
"   PATIENT:  Hammad Montesinos  1940    Assessment & Plan  Onychomycosis  Corns and callus  Debridement of mycotic nails as noted below  Paring benign hyperkeratotic lesions x 2 as noted below  Q8 meets class finding requirements for routine footcare       Type 2 diabetes mellitus with diabetic neuropathy, with long-term current use of insulin (HCC)  Disease prevention and related risk factors of diabetes were identified and discussed.    The patient was educated in proper foot wear for diabetics.    Educated in daily foot assessment and routine diabetic foot care.    Discussed the importance of controlling BS through diet and exercise.    The patient will follow up in 10 weeks for further diabetic foot exam and care.  Lab Results   Component Value Date    HGBA1C 9.1 (H) 01/28/2025        Procedures: 62344, 07271  All mycotic toenails were reduced and debrided in length, width, and girth using a nail nipper and electric dremel.    All hyperkeratotic skin lesion(s) if present were sharply pared with a #10 scalpel with no evidence of ulceration/abscess.    Patient tolerated procedure(s) well without complications.    Procedures     HPI:  Hammad Montesinos is a 84 y.o.year old male seen for diabetic foot exam.  The patient has class findings with diabetes.   BS is under control.  The patient complained of thick toenails and painful lesions which have previously ulcerated.  The patient denied any acute pedal disorder, redness, acute swelling, or recent injury.      The following portions of the patient's history were reviewed and updated as appropriate: allergies, current medications, past family history, past medical history, past social history, past surgical history and problem list.    REVIEW OF SYSTEMS:  GENERAL: No fever or chills  HEART: No chest pain, or palpitation  RESPIRATORY:  No acute SOB or cough  GI: No Nausea, vomit or diarrhea  NEUROLOGIC: No syncope or acute weakness    PHYSICAL EXAM:    Ht 5' 11\" " (1.803 m) Comment: stated  Wt 104 kg (230 lb)   BMI 32.08 kg/m²     VASCULAR EXAM  Posterior tibial artery absent bilateral  Dorsalis pedis artery +1 bilateral  The patient has class findings with skin atrophy, lack of digital hair, and nail dystrophy.    There is +1 lower extremity edema bilaterally.    Venous stasis skin changes noted BLE. No    NEUROLOGIC EXAM  Sensation is intact to light touch. Yes   Sensation is intact to 10gm monofilament.  Vibratory sensation moderately diminished.     Tingling numb paresthesias noted bilateral.      No focal neurologic deficit.     DERMATOLOGIC EXAM:   Texture, Tone and Turgor are diminished bilateral.  The patient has dystrophic/hypertrophic toenails with yellow/white discoloration, onycholysis, and subungal debris.   Fungal odor noted.  Brittle nature noted.  Right foot nails severely dystrophic x3 with 0.4 cm ave thickness (1 2 and 4)  Left foot nails severely dystrophic x3 with 0.4 cm ave thickness (1 2 and 3)  Patient has hyperkeratotic lesions noted:  Right foot at subsecond MPJ, ulcer remains closed  Left foot at subsecond MPJ    Ulceration/wounds:  None    No notable suspicious skin lesions.      MUSCULOSKELETAL EXAM:   No acute joint pain, edema, or redness.  No acute musculoskeletal problem.    Patient has deformity including hallux valgus with contracted hammertoes, and plantarflexed second metatarsal bilateral. Patient has minimal ambulation limitations.      Patient wears DM shoes? Yes    Risk Category/Class Findings: Q8 (B1, B2 ABC)  0 = No loss of protective sensation  A1)  Has the patient had a previous amputation of the foot or integral skeletal portion thereof? No  B1)  Does the patient have absent posterior tibial pulse? Yes  B3)  Does the patient have absent dorsalis pedis? No  B2)  Does the patient have three of the following? Yes           1.  Hair growth (increased or decreased), 2.  Nail changes (thickening) and 3.  Pigmentary changes  (discoloring)  C)  Does the patient have two of the following and one above? Yes            3.  Edema and 4.  Paraesthesias (abnormal spontaneous sensations in the feet)

## 2025-07-13 NOTE — ASSESSMENT & PLAN NOTE
History of MC diagnosed more than 20 years ago AHI more than 30.  Stable on CPAP    Deriving symptomatic benefit and fully compliant with CPAP  No issues cleaning materials or getting resupplies  Uses distilled water and water chamber    Compliance data  100%, 100%>4 hours, 30 days  Average use 8 hours 54 minutes  APAP 5-20 cm H2O EPR 3  Pressure median 9.2, P95 16  Leaks 95% 61.7  AHI 2.3, VANESSA 0.1    Nasal pillows, DME [place]

## 2025-07-13 NOTE — PROGRESS NOTES
Follow-up  Visit - Pulmonary Medicine   Name: Hammad Montesinos      : 1940      MRN: [id number]  Encounter Provider: Rufino Ahn MD  Encounter Date: 2025   [facility]  :  Assessment & Plan  Severe persistent asthma without complication  Well-controlled.  Associated with upper airway rhinitis allergic and vasomotor.  Occasionally cough    -Continue Pulmicort/Perforomist twice daily nebulized  -Continue DuoNebs as needed, before nighttime to help break up upper airway mucus  - Continue montelukast  Orders:    budesonide (PULMICORT) 0.5 mg/2 mL nebulizer solution; Take 2 mL (0.5 mg total) by nebulization in the morning and 2 mL (0.5 mg total) in the evening. Rinse mouth after use.    formoterol (PERFOROMIST) 20 MCG/2ML nebulizer solution; Take 2 mL (20 mcg total) by nebulization in the morning and 2 mL (20 mcg total) in the evening.    ipratropium-albuterol (DUO-NEB) 0.5-2.5 mg/3 mL nebulizer solution; Take 3 mL by nebulization in the morning and 3 mL in the evening.    Chronic nonallergic rhinitis  Chronic nonallergic vasomotor rhinitis  Status post Neuromark procedure with ENT in  without sustained benefit     - Currently using multiple nasal sprays, azelastine with most benefit  - Continue montelukast and loratadine  - DuoNebs before bedtime  -Follow-up with ENT as needed  Orders:    fluticasone (FLONASE) 50 mcg/act nasal spray; 1 spray into each nostril in the morning and 1 spray before bedtime.    azelastine (ASTELIN) 0.1 % nasal spray; 2 sprays into each nostril in the morning and 2 sprays in the evening.    ipratropium (ATROVENT) 0.03 % nasal spray; 1 spray into each nostril 2 (two) times a day    Chronic cough  Likely due to asthma and rhinitis postnasal drip.  Skin test negative for environmental allergens.  Office spirometry and allergy office FEV1 75%.  Trial of PPI was ineffective.  CT chest without significant parenchymal  pathology.  Flexible laryngoscopy by ENT was unremarkable.  Not on contributing medications.         MC (obstructive sleep apnea)  History of MC diagnosed more than 20 years ago AHI more than 30.  Stable on CPAP    Deriving symptomatic benefit and fully compliant with CPAP  No issues cleaning materials or getting resupplies  Uses distilled water and water chamber    Compliance data  100%, 100%>4 hours, 30 days  Average use 8 hours 54 minutes  APAP 5-20 cm H2O EPR 3  Pressure median 9.2, P95 16  Leaks 95% 61.7  AHI 2.3, VANESSA 0.1    Nasal pillows, DME [place]        Chronic systolic congestive heart failure (HCC)  Wt Readings from Last 3 Encounters:   07/14/25 104 kg (230 lb)   07/01/25 104 kg (230 lb)   06/30/25 105 kg (232 lb)       LVEF 35-40%  On Bumex and Aldactone  Follows with cardiology (Dr. Holland)  Appears to be euvolemic on exam       Hilar lymphadenopathy  Probable infectious related. Right lower paratracheal and subcarinal. Decreased in size in 2024 CT.          Return in about 1 year (around 7/14/2026).    History of Present Illness   Hammad Montesinos is a 84 y.o. male with a history of diabetes mellitus, paroxysmal atrial fibrillation, hyperlipidemia, chronic systolic heart failure EF 35%, hilar lymphadenopathy, obstructive sleep apnea, chronic cough who is referred by allergy for pulmonary evaluation presents for follow up      The patient is referred by his allergist for ongoing respiratory and nasal symptoms following the residential of his previous pulmonologist.    He has a history of asthma for over 30 years, with no frequent exacerbations outside of those triggered by heart failure exacerbations and respiratory viral infections. He is currently managed on nebulized Pulmicort/formoterol twice daily and DuoNeb as needed, which he uses intermittently. He also uses albuterol inhalers when out in public.    His primary concern is persistent rhinitis, which has been evaluated by ENT in the past. He  underwent the ClariFix (Neurolysis/NeuroMark) procedure with mild, temporary benefit. His current regimen includes Flonase, azelastine, and Atrovent nasal sprays, with azelastine providing the most relief. Despite treatment, he continues to experience persistent nasal congestion, postnasal drip, and cough throughout the day, including just before bedtime.    He also has a longstanding history of obstructive sleep apnea, diagnosed over 20 years ago, and reports excellent adherence to CPAP therapy using a nasal mask. He notes significant benefit from CPAP, including improved sleep quality and reduced nasal drainage and congestion.    Environmental history includes growing up on a farm with extensive hay and animal exposure, building grain storage structures in his teens, and a long career as a schoolteacher. 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 [place], which has undergone recent renovations, and notes salome HVAC systems. He has no current pets, though previously owned cats, dogs, and a bird many years ago. He denies exposure to mold or water damage in his home.    In February 2025, he was admitted to [facility] for sepsis secondary to influenza A and bacterial pneumonia. Since that time, he has had ongoing mucus production and respiratory secretions, though his asthma control has otherwise remained stable.    Review of Systems    Aside from what is mentioned in the HPI, ROS is otherwise negative    Past Medical History   Past Medical History[1]  Past Surgical History[2]  Family History[3]   reports that he has never smoked. He has never been exposed to tobacco smoke. He has never used smokeless tobacco. He reports that he does not currently use alcohol. He reports that he does not use drugs.  Current Outpatient Medications   Medication Instructions    Albuterol Sulfate 108 (90 Base) MCG/ACT AEPB 2 puffs, Inhalation, Every 4 hours PRN    allopurinol (ZYLOPRIM) 300 mg  tablet 1 tablet, Every 24 hours    atorvastatin (LIPITOR) 20 mg tablet 1 tablet, Daily    azelastine (ASTELIN) 0.1 % nasal spray 2 sprays, Nasal, 2 times daily    budesonide (PULMICORT) 0.5 mg, Nebulization, 2 times daily, Rinse mouth after use.    bumetanide (BUMEX) 2 mg, Oral, 2 times daily, Take when your wake up and then again early afternoon    Cholecalciferol (Vitamin D3) 25 MCG (1000 UT) CAPS 1 capsule, Every 24 hours    Coenzyme Q10 (Co Q10) 100 MG CAPS 1 capsule, Daily    Easy Comfort Pen Needles 33G X 4 MM MISC     fluticasone (FLONASE) 50 mcg/act nasal spray 1 spray, Nasal, 2 times daily    formoterol (PERFOROMIST) 20 mcg, Nebulization, 2 times daily    gabapentin (NEURONTIN) 100 mg, Oral, Daily    Glucosamine-Chondroitin 500-400 MG CAPS 1 capsule, Daily    insulin aspart (NovoLOG FlexPen) 100 UNIT/ML injection pen INJECT 12 UNITS SUBCUTANEOUSLY with breakfast, LUNCH and with dinner plus sliding scale. (UP  UNITS DAILY). 150-200: 1 unit; 201-250: 2 units; 251-300: 3 units; 301-350: 4 units; 351-400: 5 units; 400+: 6 units    Insulin Glargine Solostar (Lantus SoloStar) 100 UNIT/ML SOPN INJECT 10 UNITS SUBCUTANEOUSLY IN THE MORNING AND 10 UNITS IN THE EVENING.    ipratropium (ATROVENT) 0.03 % nasal spray 1 spray, Nasal, 2 times daily    ipratropium-albuterol (DUO-NEB) 0.5-2.5 mg/3 mL nebulizer solution 3 mL, Nebulization, 2 times daily    loratadine (CLARITIN) 10 mg tablet 1 tablet, Daily    losartan (COZAAR) 100 MG tablet 1 tablet, Daily    magnesium Oxide (MAG-OX) 400 mg, Oral, Daily    metoprolol succinate (TOPROL-XL) 25 mg, Oral, Daily    montelukast (SINGULAIR) 10 mg, Oral, Daily at bedtime    Multiple Vitamin (MULTIVITAMIN ADULT PO) 1 tablet, Daily    mupirocin (BACTROBAN) 2 % ointment Topical, Every other day, With alginate dressing    Omega-3 Fatty Acids (Fish Oil) 360 MG CAPS 1 capsule, Daily    OneTouch Verio test strip     pantoprazole (PROTONIX) 40 mg tablet 1 tablet, 2 times daily     "potassium chloride (K-DUR,KLOR-CON) 20 mEq tablet 20 mEq, Oral, Daily    rivaroxaban (XARELTO) 20 mg, Oral, Daily with breakfast    senna (SENOKOT) 17.2 mg, Oral, Daily at bedtime PRN    spironolactone (ALDACTONE) 25 mg, Oral, Daily    triamcinolone (KENALOG) 0.1 % cream 1 Application, 2 times daily    valACYclovir (VALTREX) 1,000 mg tablet 1 tablet, 3 times daily   Allergies[4]      Medical History Reviewed by provider this encounter:     .    Objective   /64 (BP Location: Left arm, Patient Position: Sitting, Cuff Size: Large)   Pulse 96   Temp 99.1 °F (37.3 °C) (Tympanic)   Ht 5' 11\" (1.803 m)   Wt 104 kg (230 lb)   SpO2 97%   BMI 32.08 kg/m²     Physical Exam  Vitals and nursing note reviewed.   Constitutional:       General: He is not in acute distress.     Appearance: Normal appearance. He is well-developed. He is not ill-appearing, toxic-appearing or diaphoretic.   HENT:      Head: Normocephalic and atraumatic.      Nose: Nose normal. No congestion or rhinorrhea.      Mouth/Throat:      Mouth: Mucous membranes are moist.      Pharynx: Oropharynx is clear. No oropharyngeal exudate.      Comments: Mallampati 4    Eyes:      Conjunctiva/sclera: Conjunctivae normal.       Cardiovascular:      Rate and Rhythm: Normal rate and regular rhythm.   Pulmonary:      Effort: Pulmonary effort is normal. No respiratory distress.      Breath sounds: Normal breath sounds. No stridor.   Abdominal:      Tenderness: There is no guarding.     Musculoskeletal:         General: No swelling.      Cervical back: Normal range of motion and neck supple. No rigidity.      Right lower leg: No edema.      Left lower leg: No edema.     Skin:     General: Skin is warm and dry.     Neurological:      General: No focal deficit present.      Mental Status: He is alert and oriented to person, place, and time. Mental status is at baseline.     Psychiatric:         Mood and Affect: Mood normal.         Diagnostic Data:  Labs: I " "personally reviewed the most recent laboratory data pertinent to today's visit.    Radiology results:  Radiology Results Review: I have reviewed the following images/report studies in PACS:  6//24 CT chest-coarse reticulations in the right upper lobe and lower lobes.  Hilar lymphadenopathy decreasing in size compared to prior imaging.     PFT/spirometry results:  No results found for: \"FEV1\", \"FVC\", \"XDV7YDS\", \"TLC\", \"DLCO\"      Other studies:  3/13/25 ECHO - LVEF 40%.  Global hypokinesis.  RV normal    Rufino Ahn MD           [1]   Past Medical History:  Diagnosis Date    Abnormal nuclear stress test 2025    Acute on chronic diastolic heart failure (HCC) 12/29/2023    Asthma     Bradycardia     Callus     Cancer (HCC)     Skin    Carcinoma, basal cell, skin     head and face and ear    CHF (congestive heart failure) (HCC)     Chronic heart failure with reduced ejection fraction (HFrEF, <= 40%) ([facility]) 02/14/2025    Chronic kidney disease     Colon polyp     Coronary artery disease     COVID-19 12/05/2022    Diabetes mellitus ([facility])     Difficulty walking     Diverticulitis of colon     Dupuytren contracture     bilateral ring and right middle    Fall 01/27/2025    Gout     Herniated lumbar intervertebral disc     History of tachycardia-bradycardia syndrome     Hypertension     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 (HCC) 03/25/2022    Myocardial infarction ([facility])     Unknown    Neuropathy in diabetes (HCC)     Persistent atrial fibrillation (HCC) 12/29/2023    Sleep apnea    [2]   Past Surgical History:  Procedure Laterality Date    APPENDECTOMY      age 20, ruptured    CATARACT EXTRACTION, BILATERAL Bilateral     CERVICAL FUSION      COLONOSCOPY  05/09/2025    CYST REMOVAL Left 01/2023    DUPUYTREN CONTRACTURE RELEASE Left     left ring finger    EGD AND COLONOSCOPY  05/09/2025    INSERT / REPLACE / REMOVE PACEMAKER      LUMBAR LAMINECTOMY      belkis placed    TONSILLECTOMY      UPPER " GASTROINTESTINAL ENDOSCOPY  05/09/2025    VASECTOMY     [3]   Family History  Problem Relation Name Age of Onset    Hypertension Mother Althea     Diabetes type II Mother Althea     Diabetes Mother Althea     Hypertension Father Bulmaro     Cancer Father Bulmaro     Kidney disease Sister Don’t know     Diabetes type II Brother Abhay     Hypertension Brother Abahy     Heart disease Brother Abhay     Hypertension Daughter amandeep     GI problems Daughter yusuf     Colon cancer Neg Hx     [4]   Allergies  Allergen Reactions    Hydrochlorothiazide Other (See Comments)     SEVERE DIZZINESS    Insulin Lispro Other (See Comments)     Cannot control blood sugars(Humalog)    Lisinopril Other (See Comments)     Reports cough with use    Minoxidil GI Intolerance    Nifedipine Other (See Comments)     EDEMA LEGS    Other Other (See Comments)     SEVERE DIZZINESS  Hydrap-es    Prazosin Other (See Comments)     CHEST PAIN      Tetracycline Other (See Comments)    Venlafaxine Other (See Comments)     Made pt feel bad    Aluminum Rash     Itch     Doxycycline Rash    Simvastatin Rash     PEDAL EDEMA

## 2025-07-14 ENCOUNTER — OFFICE VISIT (OUTPATIENT)
Age: 85
End: 2025-07-14
Payer: COMMERCIAL

## 2025-07-14 VITALS
DIASTOLIC BLOOD PRESSURE: 64 MMHG | WEIGHT: 230 LBS | HEIGHT: 71 IN | HEART RATE: 96 BPM | SYSTOLIC BLOOD PRESSURE: 116 MMHG | BODY MASS INDEX: 32.2 KG/M2 | TEMPERATURE: 99.1 F | OXYGEN SATURATION: 97 %

## 2025-07-14 DIAGNOSIS — J31.0 CHRONIC NONALLERGIC RHINITIS: ICD-10-CM

## 2025-07-14 DIAGNOSIS — R59.0 HILAR LYMPHADENOPATHY: ICD-10-CM

## 2025-07-14 DIAGNOSIS — G47.33 OSA (OBSTRUCTIVE SLEEP APNEA): ICD-10-CM

## 2025-07-14 DIAGNOSIS — I50.22 CHRONIC SYSTOLIC CONGESTIVE HEART FAILURE (HCC): ICD-10-CM

## 2025-07-14 DIAGNOSIS — J30.9 ALLERGIC RHINITIS, UNSPECIFIED SEASONALITY, UNSPECIFIED TRIGGER: ICD-10-CM

## 2025-07-14 DIAGNOSIS — R05.3 CHRONIC COUGH: ICD-10-CM

## 2025-07-14 DIAGNOSIS — J45.50 SEVERE PERSISTENT ASTHMA WITHOUT COMPLICATION: Primary | ICD-10-CM

## 2025-07-14 PROCEDURE — 99214 OFFICE O/P EST MOD 30 MIN: CPT | Performed by: INTERNAL MEDICINE

## 2025-07-14 RX ORDER — IPRATROPIUM BROMIDE 21 UG/1
1 SPRAY, METERED NASAL 2 TIMES DAILY
Qty: 30 ML | Refills: 6 | Status: SHIPPED | OUTPATIENT
Start: 2025-07-14

## 2025-07-14 RX ORDER — FLUTICASONE PROPIONATE 50 MCG
1 SPRAY, SUSPENSION (ML) NASAL 2 TIMES DAILY
Qty: 15.8 ML | Refills: 6 | Status: SHIPPED | OUTPATIENT
Start: 2025-07-14

## 2025-07-14 RX ORDER — BUDESONIDE 0.5 MG/2ML
0.5 INHALANT ORAL 2 TIMES DAILY
Qty: 360 ML | Refills: 3 | Status: SHIPPED | OUTPATIENT
Start: 2025-07-14

## 2025-07-14 RX ORDER — FORMOTEROL FUMARATE 20 UG/2ML
20 SOLUTION RESPIRATORY (INHALATION) 2 TIMES DAILY
Qty: 360 ML | Refills: 3 | Status: SHIPPED | OUTPATIENT
Start: 2025-07-14

## 2025-07-14 RX ORDER — AZELASTINE 1 MG/ML
2 SPRAY, METERED NASAL 2 TIMES DAILY
Qty: 30 ML | Refills: 6 | Status: SHIPPED | OUTPATIENT
Start: 2025-07-14

## 2025-07-14 RX ORDER — IPRATROPIUM BROMIDE AND ALBUTEROL SULFATE 2.5; .5 MG/3ML; MG/3ML
3 SOLUTION RESPIRATORY (INHALATION) 2 TIMES DAILY
Qty: 540 ML | Refills: 2 | Status: SHIPPED | OUTPATIENT
Start: 2025-07-14

## 2025-07-25 ENCOUNTER — TELEPHONE (OUTPATIENT)
Dept: CARDIOLOGY CLINIC | Facility: CLINIC | Age: 85
End: 2025-07-25

## 2025-07-30 ENCOUNTER — OFFICE VISIT (OUTPATIENT)
Age: 85
End: 2025-07-30
Attending: INTERNAL MEDICINE
Payer: COMMERCIAL

## 2025-07-30 ENCOUNTER — TELEPHONE (OUTPATIENT)
Age: 85
End: 2025-07-30

## 2025-07-30 ENCOUNTER — PREP FOR PROCEDURE (OUTPATIENT)
Age: 85
End: 2025-07-30

## 2025-07-30 VITALS
SYSTOLIC BLOOD PRESSURE: 130 MMHG | BODY MASS INDEX: 32.62 KG/M2 | DIASTOLIC BLOOD PRESSURE: 68 MMHG | WEIGHT: 233 LBS | HEIGHT: 71 IN

## 2025-07-30 DIAGNOSIS — K64.4 SKIN TAG OF ANUS: ICD-10-CM

## 2025-07-30 DIAGNOSIS — K62.89 PERIANAL CYST: Primary | ICD-10-CM

## 2025-07-30 PROCEDURE — 46600 DIAGNOSTIC ANOSCOPY SPX: CPT | Performed by: SURGERY

## 2025-07-30 PROCEDURE — 99203 OFFICE O/P NEW LOW 30 MIN: CPT | Performed by: SURGERY

## 2025-07-30 RX ORDER — SODIUM CHLORIDE, SODIUM LACTATE, POTASSIUM CHLORIDE, CALCIUM CHLORIDE 600; 310; 30; 20 MG/100ML; MG/100ML; MG/100ML; MG/100ML
20 INJECTION, SOLUTION INTRAVENOUS CONTINUOUS
OUTPATIENT
Start: 2025-07-30

## 2025-07-31 ENCOUNTER — TELEPHONE (OUTPATIENT)
Age: 85
End: 2025-07-31

## 2025-07-31 ENCOUNTER — TELEPHONE (OUTPATIENT)
Dept: ANESTHESIOLOGY | Facility: CLINIC | Age: 85
End: 2025-07-31

## 2025-08-01 ENCOUNTER — TELEPHONE (OUTPATIENT)
Age: 85
End: 2025-08-01

## 2025-08-06 ENCOUNTER — TELEMEDICINE (OUTPATIENT)
Dept: ANESTHESIOLOGY | Facility: CLINIC | Age: 85
End: 2025-08-06
Payer: COMMERCIAL

## 2025-08-06 ENCOUNTER — OFFICE VISIT (OUTPATIENT)
Age: 85
End: 2025-08-06
Attending: SURGERY
Payer: COMMERCIAL

## 2025-08-06 VITALS — SYSTOLIC BLOOD PRESSURE: 124 MMHG | DIASTOLIC BLOOD PRESSURE: 61 MMHG | HEART RATE: 69 BPM

## 2025-08-06 DIAGNOSIS — Z79.4 TYPE 2 DIABETES MELLITUS WITH HYPERGLYCEMIA, WITH LONG-TERM CURRENT USE OF INSULIN (HCC): Primary | ICD-10-CM

## 2025-08-06 DIAGNOSIS — Z01.89 ENCOUNTER FOR GERIATRIC ASSESSMENT: ICD-10-CM

## 2025-08-06 DIAGNOSIS — I48.19 PERSISTENT ATRIAL FIBRILLATION (HCC): Chronic | ICD-10-CM

## 2025-08-06 DIAGNOSIS — G47.33 OSA (OBSTRUCTIVE SLEEP APNEA): ICD-10-CM

## 2025-08-06 DIAGNOSIS — I15.9 SECONDARY HYPERTENSION: Chronic | ICD-10-CM

## 2025-08-06 DIAGNOSIS — E11.65 TYPE 2 DIABETES MELLITUS WITH HYPERGLYCEMIA, WITH LONG-TERM CURRENT USE OF INSULIN (HCC): Primary | ICD-10-CM

## 2025-08-06 DIAGNOSIS — Z95.0 CARDIAC PACEMAKER IN SITU: Chronic | ICD-10-CM

## 2025-08-06 DIAGNOSIS — R29.90 STROKE-LIKE SYMPTOMS: ICD-10-CM

## 2025-08-06 DIAGNOSIS — K62.89 PERIANAL CYST: ICD-10-CM

## 2025-08-06 DIAGNOSIS — Z79.4 TYPE 2 DIABETES MELLITUS WITH DIABETIC NEUROPATHY, WITH LONG-TERM CURRENT USE OF INSULIN (HCC): ICD-10-CM

## 2025-08-06 DIAGNOSIS — E11.40 TYPE 2 DIABETES MELLITUS WITH DIABETIC NEUROPATHY, WITH LONG-TERM CURRENT USE OF INSULIN (HCC): ICD-10-CM

## 2025-08-06 DIAGNOSIS — I50.22 CHRONIC HEART FAILURE WITH REDUCED EJECTION FRACTION (HFREF, <= 40%) (HCC): Chronic | ICD-10-CM

## 2025-08-06 DIAGNOSIS — Z91.89 AT RISK FOR INFECTION: ICD-10-CM

## 2025-08-06 LAB
ATRIAL RATE: 68 BPM
QRS AXIS: -75 DEGREES
QRSD INTERVAL: 212 MS
QT INTERVAL: 484 MS
QTC INTERVAL: 519 MS
T WAVE AXIS: 89 DEGREES
VENTRICULAR RATE: 69 BPM

## 2025-08-06 PROCEDURE — 93005 ELECTROCARDIOGRAM TRACING: CPT

## 2025-08-06 PROCEDURE — 93010 ELECTROCARDIOGRAM REPORT: CPT | Performed by: INTERNAL MEDICINE

## 2025-08-06 PROCEDURE — 99215 OFFICE O/P EST HI 40 MIN: CPT | Performed by: NURSE PRACTITIONER

## 2025-08-13 ENCOUNTER — TELEPHONE (OUTPATIENT)
Age: 85
End: 2025-08-13

## 2025-08-20 ENCOUNTER — OFFICE VISIT (OUTPATIENT)
Dept: ENDOCRINOLOGY | Facility: HOSPITAL | Age: 85
End: 2025-08-20
Payer: COMMERCIAL

## 2025-08-20 VITALS
SYSTOLIC BLOOD PRESSURE: 106 MMHG | WEIGHT: 232 LBS | HEART RATE: 72 BPM | DIASTOLIC BLOOD PRESSURE: 60 MMHG | BODY MASS INDEX: 32.48 KG/M2 | HEIGHT: 71 IN

## 2025-08-20 DIAGNOSIS — I10 PRIMARY HYPERTENSION: ICD-10-CM

## 2025-08-20 DIAGNOSIS — E11.40 TYPE 2 DIABETES MELLITUS WITH DIABETIC NEUROPATHY, UNSPECIFIED WHETHER LONG TERM INSULIN USE (HCC): Primary | ICD-10-CM

## 2025-08-20 DIAGNOSIS — E78.2 MIXED HYPERLIPIDEMIA: ICD-10-CM

## 2025-08-20 DIAGNOSIS — N18.32 STAGE 3B CHRONIC KIDNEY DISEASE (HCC): ICD-10-CM

## 2025-08-20 PROCEDURE — 99214 OFFICE O/P EST MOD 30 MIN: CPT | Performed by: PHYSICIAN ASSISTANT

## 2025-08-20 PROCEDURE — 95251 CONT GLUC MNTR ANALYSIS I&R: CPT | Performed by: PHYSICIAN ASSISTANT

## 2025-08-20 RX ORDER — INSULIN ASPART 100 [IU]/ML
INJECTION, SOLUTION INTRAVENOUS; SUBCUTANEOUS
Qty: 45 ML | Refills: 1 | Status: SHIPPED | OUTPATIENT
Start: 2025-08-20

## 2025-08-20 RX ORDER — INSULIN GLARGINE 100 [IU]/ML
20 INJECTION, SOLUTION SUBCUTANEOUS 2 TIMES DAILY
Qty: 30 ML | Refills: 2 | Status: SHIPPED | OUTPATIENT
Start: 2025-08-20